# Patient Record
Sex: MALE | Race: WHITE | NOT HISPANIC OR LATINO | Employment: OTHER | URBAN - METROPOLITAN AREA
[De-identification: names, ages, dates, MRNs, and addresses within clinical notes are randomized per-mention and may not be internally consistent; named-entity substitution may affect disease eponyms.]

---

## 2017-03-28 RX ORDER — POTASSIUM CITRATE 10 MEQ/1
TABLET, EXTENDED RELEASE ORAL
Qty: 270 TABLET | Refills: 3 | Status: SHIPPED | OUTPATIENT
Start: 2017-03-28 | End: 2018-05-28 | Stop reason: SDUPTHER

## 2017-05-08 ENCOUNTER — TELEPHONE (OUTPATIENT)
Dept: UROLOGY | Facility: CLINIC | Age: 72
End: 2017-05-08

## 2017-05-08 NOTE — TELEPHONE ENCOUNTER
----- Message from Gladys Falcon sent at 5/8/2017  9:28 AM CDT -----  Contact: pt   X  1st Request  _  2nd Request  _  3rd Request    Who:ALEXANDRO RENE [[id number]]    Why: Patient states he would like to speak with the staff in his medication (Finasteride)..... Please contact patient to further discuss and advise     What Number to Call Back: [phone]    When to Expect a call back: (Before the end of the day)   -- if call after 3:00 call back will be tomorrow.

## 2017-05-08 NOTE — TELEPHONE ENCOUNTER
----- Message from Ananya Flores sent at 5/8/2017 10:19 AM CDT -----  Contact: Self  X  1st Request  _  2nd Request  _  3rd Request        Who: ALEXANDRO RENE [[id number]]    Why: Pt states he wants to also provide his mobile number for a call back.     What Number to Call Back: [phone]    When to Expect a call back: (Before the end of the day)   -- if the call is after 12:00, the call back will be tomorrow.

## 2017-05-30 DIAGNOSIS — Z12.5 ENCOUNTER FOR SCREENING FOR MALIGNANT NEOPLASM OF PROSTATE: ICD-10-CM

## 2017-05-30 DIAGNOSIS — Z00.00 ANNUAL PHYSICAL EXAM: Primary | ICD-10-CM

## 2017-05-30 DIAGNOSIS — I10 ESSENTIAL HYPERTENSION: ICD-10-CM

## 2017-05-30 DIAGNOSIS — N40.0 BENIGN NON-NODULAR PROSTATIC HYPERPLASIA WITHOUT LOWER URINARY TRACT SYMPTOMS: ICD-10-CM

## 2017-05-30 DIAGNOSIS — E78.3 HYPERCHYLOMICRONEMIA: ICD-10-CM

## 2017-05-30 DIAGNOSIS — Z11.59 ENCOUNTER FOR HEPATITIS C SCREENING TEST FOR LOW RISK PATIENT: ICD-10-CM

## 2017-05-30 RX ORDER — LOSARTAN POTASSIUM AND HYDROCHLOROTHIAZIDE 12.5; 1 MG/1; MG/1
TABLET ORAL
Qty: 90 TABLET | Refills: 3 | Status: SHIPPED | OUTPATIENT
Start: 2017-05-30 | End: 2017-06-13

## 2017-05-30 NOTE — TELEPHONE ENCOUNTER
----- Message from Zoie Yuan sent at 5/30/2017  1:16 PM CDT -----  Contact: Patient, phone [phone]  Type: Orders Request    What orders/ testing are being requested? Lab     Is there a future appointment scheduled for the patient with PCP? Yes     When? 6/12/17    Comments: Please call the patient and let him know he can schedule the appointment.

## 2017-06-07 ENCOUNTER — LAB VISIT (OUTPATIENT)
Dept: LAB | Facility: HOSPITAL | Age: 72
End: 2017-06-07
Attending: INTERNAL MEDICINE
Payer: MEDICARE

## 2017-06-07 DIAGNOSIS — Z12.5 ENCOUNTER FOR SCREENING FOR MALIGNANT NEOPLASM OF PROSTATE: ICD-10-CM

## 2017-06-07 DIAGNOSIS — N40.0 BENIGN NON-NODULAR PROSTATIC HYPERPLASIA WITHOUT LOWER URINARY TRACT SYMPTOMS: ICD-10-CM

## 2017-06-07 DIAGNOSIS — Z00.00 ANNUAL PHYSICAL EXAM: ICD-10-CM

## 2017-06-07 DIAGNOSIS — I10 ESSENTIAL HYPERTENSION: ICD-10-CM

## 2017-06-07 DIAGNOSIS — E78.3 HYPERCHYLOMICRONEMIA: ICD-10-CM

## 2017-06-07 DIAGNOSIS — Z11.59 ENCOUNTER FOR HEPATITIS C SCREENING TEST FOR LOW RISK PATIENT: ICD-10-CM

## 2017-06-07 LAB
ALBUMIN SERPL BCP-MCNC: 3.7 G/DL
ALP SERPL-CCNC: 68 U/L
ALT SERPL W/O P-5'-P-CCNC: 33 U/L
ANION GAP SERPL CALC-SCNC: 10 MMOL/L
AST SERPL-CCNC: 32 U/L
BASOPHILS # BLD AUTO: 0.02 K/UL
BASOPHILS NFR BLD: 0.3 %
BILIRUB SERPL-MCNC: 0.6 MG/DL
BUN SERPL-MCNC: 24 MG/DL
CALCIUM SERPL-MCNC: 9.6 MG/DL
CHLORIDE SERPL-SCNC: 104 MMOL/L
CHOLEST/HDLC SERPL: 4.8 {RATIO}
CO2 SERPL-SCNC: 23 MMOL/L
COMPLEXED PSA SERPL-MCNC: 1.4 NG/ML
CREAT SERPL-MCNC: 1.2 MG/DL
DIFFERENTIAL METHOD: ABNORMAL
EOSINOPHIL # BLD AUTO: 0.2 K/UL
EOSINOPHIL NFR BLD: 3.1 %
ERYTHROCYTE [DISTWIDTH] IN BLOOD BY AUTOMATED COUNT: 12.9 %
EST. GFR  (AFRICAN AMERICAN): >60 ML/MIN/1.73 M^2
EST. GFR  (NON AFRICAN AMERICAN): >60 ML/MIN/1.73 M^2
GLUCOSE SERPL-MCNC: 99 MG/DL
HCT VFR BLD AUTO: 46.3 %
HCV AB SERPL QL IA: NEGATIVE
HDL/CHOLESTEROL RATIO: 21.1 %
HDLC SERPL-MCNC: 171 MG/DL
HDLC SERPL-MCNC: 36 MG/DL
HGB BLD-MCNC: 15.6 G/DL
LDLC SERPL CALC-MCNC: 108.4 MG/DL
LYMPHOCYTES # BLD AUTO: 0.8 K/UL
LYMPHOCYTES NFR BLD: 13.9 %
MCH RBC QN AUTO: 30.6 PG
MCHC RBC AUTO-ENTMCNC: 33.7 %
MCV RBC AUTO: 91 FL
MONOCYTES # BLD AUTO: 0.7 K/UL
MONOCYTES NFR BLD: 11.9 %
NEUTROPHILS # BLD AUTO: 4.3 K/UL
NEUTROPHILS NFR BLD: 70.8 %
NONHDLC SERPL-MCNC: 135 MG/DL
PLATELET # BLD AUTO: 206 K/UL
PMV BLD AUTO: 9.7 FL
POTASSIUM SERPL-SCNC: 4.5 MMOL/L
PROT SERPL-MCNC: 7.1 G/DL
RBC # BLD AUTO: 5.1 M/UL
SODIUM SERPL-SCNC: 137 MMOL/L
TRIGL SERPL-MCNC: 133 MG/DL
TSH SERPL DL<=0.005 MIU/L-ACNC: 2.39 UIU/ML
WBC # BLD AUTO: 6.05 K/UL

## 2017-06-07 PROCEDURE — 84443 ASSAY THYROID STIM HORMONE: CPT

## 2017-06-07 PROCEDURE — 86803 HEPATITIS C AB TEST: CPT

## 2017-06-07 PROCEDURE — 85025 COMPLETE CBC W/AUTO DIFF WBC: CPT | Mod: PO

## 2017-06-07 PROCEDURE — 80053 COMPREHEN METABOLIC PANEL: CPT

## 2017-06-07 PROCEDURE — 80061 LIPID PANEL: CPT

## 2017-06-07 PROCEDURE — 36415 COLL VENOUS BLD VENIPUNCTURE: CPT | Mod: PO

## 2017-06-07 PROCEDURE — 84153 ASSAY OF PSA TOTAL: CPT

## 2017-06-12 ENCOUNTER — OFFICE VISIT (OUTPATIENT)
Dept: INTERNAL MEDICINE | Facility: CLINIC | Age: 72
End: 2017-06-12
Payer: MEDICARE

## 2017-06-12 VITALS
BODY MASS INDEX: 29.54 KG/M2 | HEART RATE: 64 BPM | DIASTOLIC BLOOD PRESSURE: 92 MMHG | HEIGHT: 70 IN | SYSTOLIC BLOOD PRESSURE: 144 MMHG | WEIGHT: 206.38 LBS

## 2017-06-12 DIAGNOSIS — Z00.00 ANNUAL PHYSICAL EXAM: Primary | ICD-10-CM

## 2017-06-12 DIAGNOSIS — N40.0 BENIGN NON-NODULAR PROSTATIC HYPERPLASIA WITHOUT LOWER URINARY TRACT SYMPTOMS: ICD-10-CM

## 2017-06-12 DIAGNOSIS — E78.00 PURE HYPERCHOLESTEROLEMIA: ICD-10-CM

## 2017-06-12 DIAGNOSIS — M47.816 SPONDYLOSIS OF LUMBAR REGION WITHOUT MYELOPATHY OR RADICULOPATHY: ICD-10-CM

## 2017-06-12 DIAGNOSIS — F32.A DEPRESSION, UNSPECIFIED DEPRESSION TYPE: ICD-10-CM

## 2017-06-12 DIAGNOSIS — I10 ESSENTIAL HYPERTENSION: ICD-10-CM

## 2017-06-12 PROCEDURE — 99499 UNLISTED E&M SERVICE: CPT | Mod: S$GLB,,, | Performed by: INTERNAL MEDICINE

## 2017-06-12 PROCEDURE — 99397 PER PM REEVAL EST PAT 65+ YR: CPT | Mod: S$GLB,,, | Performed by: INTERNAL MEDICINE

## 2017-06-12 PROCEDURE — 99999 PR PBB SHADOW E&M-EST. PATIENT-LVL III: CPT | Mod: PBBFAC,,, | Performed by: INTERNAL MEDICINE

## 2017-06-12 NOTE — PROGRESS NOTES
REASON FOR VISIT:  This is a 71-year-old male who comes in for his annual   routine check.  1.  The main ongoing problem is chronic low back pain, which he has had for   years now.  He actually went to the [facility], Dr. Shoaib Kimball, had an MRI of the lumbar spine, shows areas of mild spinal canal   and neural foraminal narrowing at L1-L2, moderate spinal canal narrowing at   L2-L3, at L3-L4 and then severe neural foraminal narrowing at L4-L5 and L5-S1.    Surgery was not recommended at first.  There was recommendation to do epidural   steroid injections, which he has deferred on and actually undergo physical   therapy, but he still has the pain, particularly when he wakes up in the   morning.  It is localized, no radicular symptoms of the extremities.  He is   taking three Advil a day.  2.  He also finds that he has no endurance when he does any type of activity   around his home; however, he is able to swim three days a week and go walking   without difficulty, but he just feels that his endurance has gone down.    PAST MEDICAL HISTORY:  Hypertension.  Hyperlipidemia.  Depression, anxiety.  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  One time diagnosed with sleep apnea.  Bilateral retinal detachment.  Adenomatous colon polyp in .  Tonsillectomy.  Hemorrhoidectomy.  Appendectomy.  Left inguinal hernia repair.  Left knee surgery.    SOCIAL HISTORY:  Tobacco and alcohol use - none.  , has four children.    Works in sales.    FAMILY HISTORY:  Father is , lung cancer, but had diabetes.  Mother is   , lung cancer.  One brother is alive, neuralgia.    SCREENING:  Normal colonoscopy in .    MEDICATIONS:  Aspirin 81 mg.  Atorvastatin 40 mg.  Wellbutrin  mg.  Vitamin D 50,000 units once a month.  Lexapro 20 mg.  Losartan /12.5 mg.  Magnesium potassium citrate 10 mg three times a day.  Vitamin B6.  Viagra 20 mg as needed.    REVIEW OF  SYMPTOMS:  No chest pain, shortness of breath, or abdominal pain.  He   does have constipation.  He will take three Dulcolax, five prunes a day, but has   a bowel movement every other day.  As far as urination, he used to be on   finasteride, but stopped because of diminished ejaculation.  His urine stream is   slow, but no different than before.  There is no urgency or incomplete emptying   with nocturia x2.  No headaches or any other arthralgias.    RECENT LABS:  His hepatitis C was negative.  PSA 1.4.  CBC, chemistry normal.    Cholesterol 171 with .    PHYSICAL EXAMINATION:  VITAL SIGNS:  His weight is 206 pounds, pulse 64, blood pressure by me 170/98.  HEENT:  Tympanic membranes normal.  Deviated septum to the left.  Oropharynx, no   abnormal findings.  NECK:  No thyromegaly.  LUNGS:  Clear.  HEART:  Regular rate and rhythm.  ABDOMEN:  Active bowel sounds, soft, nontender.  No hepatosplenomegaly or   abdominal masses.  PULSES:  2+ carotid, 2+ pedal pulses.  EXTREMITIES:  No edema.  LYMPH GLAND:  No palpable adenopathy.  GENITALIA:  No scrotal masses, no hernias.  RECTAL:  Stool is brown, heme-negative.  Prostate is mildly enlarged.    IMPRESSION:  1.  General examination.  2.  Hypertension with labile control.  3.  Hyperlipidemia.  4.  Lumbar degenerative disc disease.  It should be noted that he also has   history of facet arthropathy.  5.  Malaise or diminished endurance.  6.  Depression, anxiety.    PLAN:    We will refer to the Hypertension Digital Program and also the Healthy Back   Program.    Arrange for an EKG and 2D echo.    Regarding his blood pressure, he say he will go to [facility] and get readings of   105-120/72.    One other option is changing his losartan HCT to losartan 100 mg and   chlorthalidone 25 mg.    He is to increase fluid intake.    Phone review to follow up.        /jessica [id number] blank(s)        JAM/CHIARA  dd: 06/12/2017 15:36:57 (CDT)  td: 06/13/2017 05:13:44 (CDT)  Doc ID   #[id number]  Job  ID #[id number]    CC:

## 2017-06-13 ENCOUNTER — TELEPHONE (OUTPATIENT)
Dept: INTERNAL MEDICINE | Facility: CLINIC | Age: 72
End: 2017-06-13

## 2017-06-13 ENCOUNTER — DOCUMENTATION ONLY (OUTPATIENT)
Dept: INTERNAL MEDICINE | Facility: CLINIC | Age: 72
End: 2017-06-13

## 2017-06-13 ENCOUNTER — HOSPITAL ENCOUNTER (OUTPATIENT)
Dept: CARDIOLOGY | Facility: CLINIC | Age: 72
Discharge: HOME | End: 2017-06-13
Payer: MEDICARE

## 2017-06-13 ENCOUNTER — PATIENT MESSAGE (OUTPATIENT)
Dept: INTERNAL MEDICINE | Facility: CLINIC | Age: 72
End: 2017-06-13

## 2017-06-13 DIAGNOSIS — N40.0 BENIGN NON-NODULAR PROSTATIC HYPERPLASIA WITHOUT LOWER URINARY TRACT SYMPTOMS: ICD-10-CM

## 2017-06-13 DIAGNOSIS — E78.00 PURE HYPERCHOLESTEROLEMIA: ICD-10-CM

## 2017-06-13 DIAGNOSIS — I10 ESSENTIAL HYPERTENSION: ICD-10-CM

## 2017-06-13 DIAGNOSIS — F32.A DEPRESSION, UNSPECIFIED DEPRESSION TYPE: ICD-10-CM

## 2017-06-13 DIAGNOSIS — Z00.00 ANNUAL PHYSICAL EXAM: ICD-10-CM

## 2017-06-13 DIAGNOSIS — I10 ESSENTIAL HYPERTENSION: Primary | ICD-10-CM

## 2017-06-13 LAB
DIASTOLIC DYSFUNCTION: YES
MITRAL VALVE MOBILITY: NORMAL
RETIRED EF AND QEF - SEE NOTES: 60 (ref 55–65)

## 2017-06-13 PROCEDURE — 93307 TTE W/O DOPPLER COMPLETE: CPT | Mod: S$GLB,,, | Performed by: INTERNAL MEDICINE

## 2017-06-13 PROCEDURE — 93000 ELECTROCARDIOGRAM COMPLETE: CPT | Mod: S$GLB,,, | Performed by: INTERNAL MEDICINE

## 2017-06-13 RX ORDER — LOSARTAN POTASSIUM 100 MG/1
100 TABLET ORAL DAILY
Qty: 90 TABLET | Refills: 3 | Status: SHIPPED | OUTPATIENT
Start: 2017-06-13 | End: 2017-12-11 | Stop reason: SDUPTHER

## 2017-06-13 RX ORDER — LOSARTAN POTASSIUM 100 MG/1
100 TABLET ORAL DAILY
Qty: 90 TABLET | Refills: 3 | Status: SHIPPED | OUTPATIENT
Start: 2017-06-13 | End: 2017-06-13 | Stop reason: SDUPTHER

## 2017-06-13 RX ORDER — CHLORTHALIDONE 25 MG/1
25 TABLET ORAL DAILY
Qty: 90 TABLET | Refills: 3 | Status: SHIPPED | OUTPATIENT
Start: 2017-06-13 | End: 2017-06-13 | Stop reason: SDUPTHER

## 2017-06-13 RX ORDER — CHLORTHALIDONE 25 MG/1
25 TABLET ORAL DAILY
Qty: 90 TABLET | Refills: 3 | Status: SHIPPED | OUTPATIENT
Start: 2017-06-13 | End: 2017-07-17

## 2017-06-13 NOTE — PROGRESS NOTES
ECG was fine    2decho - normal except diastolic dysfunction    Will change is blood pressure medication to losartan 100 mg  chlorthalidone 25 mg    D/C losartan-Hct   Ep in 6 months

## 2017-06-19 ENCOUNTER — TELEPHONE (OUTPATIENT)
Dept: INTERNAL MEDICINE | Facility: CLINIC | Age: 72
End: 2017-06-19

## 2017-06-19 NOTE — TELEPHONE ENCOUNTER
Ep 6 months with prelabs       Patient schedule for 6 month prior labs.  Prior labs mail out..Ep appt place on hold.

## 2017-06-26 ENCOUNTER — TELEPHONE (OUTPATIENT)
Dept: INTERNAL MEDICINE | Facility: CLINIC | Age: 72
End: 2017-06-26

## 2017-06-26 ENCOUNTER — NURSE TRIAGE (OUTPATIENT)
Dept: ADMINISTRATIVE | Facility: CLINIC | Age: 72
End: 2017-06-26

## 2017-06-26 NOTE — TELEPHONE ENCOUNTER
"Reason for Disposition   Caller has medication question only, adult not sick, and triager answers question    Answer Assessment - Initial Assessment Questions  1. SYMPTOMS: "Do you have any symptoms?"      -  2. SEVERITY: If symptoms are present, ask "Are they mild, moderate or severe?"      -  Mr. Marti called to verify which blood pressure medications he should be taking. Patient's medication list states patient should take chorthalidone 25 mg and losartan 100 mg. Advised patient that Losartan 100-12.5 has been discontinued.    Protocols used: ST MEDICATION QUESTION CALL-A-       "

## 2017-06-26 NOTE — TELEPHONE ENCOUNTER
"    Reason for Disposition   Caller has medication question only, adult not sick, and triager answers question    Answer Assessment - Initial Assessment Questions  1. SYMPTOMS: "Do you have any symptoms?"      -  2. SEVERITY: If symptoms are present, ask "Are they mild, moderate or severe?"      -  Mr. Marti called to verify which blood pressure medications he should be taking. Patient's medication list states patient should take chorthalidone 25 mg and losartan 100 mg. Advised patient that Losartan 100-12.5 has been discontinued.    Protocols used: ST MEDICATION QUESTION CALL-A-      "

## 2017-06-27 ENCOUNTER — CLINICAL SUPPORT (OUTPATIENT)
Dept: REHABILITATION | Facility: OTHER | Age: 72
End: 2017-06-27
Attending: PHYSICAL MEDICINE & REHABILITATION
Payer: MEDICARE

## 2017-06-27 VITALS
HEIGHT: 70 IN | DIASTOLIC BLOOD PRESSURE: 102 MMHG | SYSTOLIC BLOOD PRESSURE: 172 MMHG | BODY MASS INDEX: 29.78 KG/M2 | WEIGHT: 208 LBS | HEART RATE: 72 BPM

## 2017-06-27 DIAGNOSIS — G89.29 CHRONIC BILATERAL LOW BACK PAIN WITHOUT SCIATICA: ICD-10-CM

## 2017-06-27 DIAGNOSIS — M54.50 CHRONIC BILATERAL LOW BACK PAIN WITHOUT SCIATICA: ICD-10-CM

## 2017-06-27 DIAGNOSIS — M51.36 DDD (DEGENERATIVE DISC DISEASE), LUMBAR: Primary | ICD-10-CM

## 2017-06-27 PROCEDURE — 99204 OFFICE O/P NEW MOD 45 MIN: CPT | Mod: ,,, | Performed by: PHYSICAL MEDICINE & REHABILITATION

## 2017-06-27 NOTE — PROGRESS NOTES
Subjective:      Patient ID: Haja Marti is a 71 y.o. male.    Chief Complaint: No chief complaint on file.    Referred by: Andrez Jackson MD     Mr Marti is a 72 yo male sent by Dr. Jackson for evaluation for the healthy back lumbar program.  he has had low back pain for 20 years on and off, but worsened in the last 7 months.  The pain is low back dominant, across the lower back.  There is no leg pain.  The pain is intermittent ranging from 0-5/10.  The pain is a tight feeling. It is worse with standing, lying on his back, stomach, lifting, or bending forward.  It is better with sitting. He also feels like transitions are painful: getting out of bed or out of chair.  He has tried chiropractor and that increased pain and tried PT.  He tried PT in 2016 with mild relief. He continues to swim and do yoga and walk.  His goals are to be able to use weed-eaters, lift, and transition from sit to stand. Pattern 1    MRI of the lumbar spine 2017 according to Dr. 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, left eye  No date: Sleep apnea  No date: Vitreous hemorrhage of left eye    Past Surgical History:  No date: CATARACT EXTRACTION      Comment: OD  No date: EYE SURGERY  4/8/10: laser indirect      Comment: left eye  4/8/10: Laser Indirect Retinopexy      Comment: OS  No date: PROSTATE SURGERY  2004: RETINAL DETACHMENT SURGERY      Comment: OD  11/13/2013: ROTATOR CUFF  REPAIR  No date: TONSILLECTOMY    Review of patient's family history indicates:    Cataracts                      Father                    Cancer                         Father                      Comment: lung    Diabetes                       Father                    Cancer                         Mother                      Comment: lung      Social History    Marital status:              Spouse name:                       Years of education:                 Number of children:               Occupational History  Occupation          Employer            Comment                                   [employer]            Social History Main Topics    Smoking status: Never Smoker                                                                Alcohol use: No              Drug use: No                Current Outpatient Prescriptions:  aspirin 81 MG Chew, Take 81 mg by mouth. 1 Tablet, Chewable Oral Every day, Disp: , Rfl:   atorvastatin (LIPITOR) 40 MG tablet, TAKE 1 TABLET EVERY EVENING, Disp: 90 tablet, Rfl: 3  buPROPion (WELLBUTRIN XL) 300 MG 24 hr tablet, TAKE 1 TABLET ONE TIME DAILY, Disp: 90 tablet, Rfl: 3  chlorthalidone (HYGROTEN) 25 MG Tab, Take 1 tablet (25 mg total) by mouth once daily., Disp: 90 tablet, Rfl: 3  ergocalciferol (VITAMIN D2) 50,000 unit Cap, Take 50,000 Units by mouth every 30 days. , Disp: , Rfl:   escitalopram oxalate (LEXAPRO) 20 MG tablet, Take 1 tablet (20 mg total) by mouth once daily., Disp: 90 tablet, Rfl: 3  losartan (COZAAR) 100 MG tablet, Take 1 tablet (100 mg total) by mouth once daily., Disp: 90 tablet, Rfl: 3  magnesium oxide (MAG-OX) 400 mg tablet, Take 400 mg by mouth once daily., Disp: , Rfl:   potassium citrate (UROCIT-K) 10 mEq (1,080 mg) TbSR, TAKE 1 TABLET THREE TIMES DAILY WITH MEALS, Disp: 270 tablet, Rfl: 3  pyridoxine (VITAMIN B-6) 50 MG Tab, Take 50 mg by mouth once daily., Disp: , Rfl:   sildenafil (REVATIO) 20 mg Tab, Take 1 tablet (20 mg total) by mouth as  needed (use as directed)., Disp: 12 tablet, Rfl: 11    No current facility-administered medications for this visit.       Review of patient's allergies indicates:  No Known Allergies                Review of Systems   Constitution: Negative for weight gain and weight loss.   Cardiovascular: Negative for chest pain.   Respiratory: Negative for shortness of breath.    Musculoskeletal: Positive for back pain. Negative for joint pain and joint swelling.   Gastrointestinal: Negative for abdominal pain and bowel incontinence.   Genitourinary: Negative for bladder incontinence.   Neurological: Negative for numbness.           Objective:          General    Vitals reviewed.  Constitutional: He is oriented to person, place, and time. He appears well-developed and well-nourished.   HENT:   Head: Normocephalic and atraumatic.   Pulmonary/Chest: Effort normal.   Neurological: He is alert and oriented to person, place, and time.   Flat facial expresion   Psychiatric: He has a normal mood and affect. His behavior is normal. Judgment and thought content normal.     General Musculoskeletal Exam   Gait: abnormal (hold arm stiffly and away from side with slightly flexed posture)     Right Ankle/Foot Exam     Tests   Heel Walk: able to perform  Tiptoe Walk: able to perform    Left Ankle/Foot Exam     Tests   Heel Walk: able to perform  Tiptoe Walk: able to perform  Back (L-Spine & T-Spine) / Neck (C-Spine) Exam     Tenderness Right paramedian tenderness of the Sacrum. Left paramedian tenderness of the Sacrum.     Back (L-Spine & T-Spine) Range of Motion   Extension: 0   Flexion: 80   Lateral Bend Right: 20   Lateral Bend Left: 20   Rotation Right: 40   Rotation Left: 40     Spinal Sensation   Right Side Sensation  C-Spine Level: normal   L-Spine Level: normal  S-Spine Level: normal  Left Side Sensation  C-Spine Level: normal  L-Spine Level: normal  S-Spine Level: normal    Back (L-Spine & T-Spine) Tests   Right Side Tests  Straight  leg raise:      Sitting SLR: > 70 degrees      Left Side Tests  Straight leg raise:     Sitting SLR: > 70 degrees          Other He has no scoliosis .  Spinal Kyphosis:  Absent      Muscle Strength   Right Upper Extremity   Biceps: 5/5/5   Deltoid:  5/5  Triceps:  5/5  Wrist Extension: 5/5/5   Finger Flexors:  5/5  Left Upper Extremity  Biceps: 5/5/5   Deltoid:  5/5  Triceps:  5/5  Wrist Extension: 5/5/5   Finger Flexors:  5/5  Right Lower Extremity   Hip Flexion: 5/5   Quadriceps:  5/5   Anterior tibial:  5/5/5  EHL:  5/5  Left Lower Extremity   Hip Flexion: 5/5   Quadriceps:  5/5   Anterior tibial:  5/5/5   EHL:  5/5    Reflexes     Left Side  Biceps:  2+  Triceps:  2+  Brachioradialis:  2+  Quadriceps:  2+  Achilles:  2+  Left Lane's Sign:  Absent  Babinski Sign:  absent    Right Side   Biceps:  2+  Triceps:  2+  Brachioradialis:  2+  Quadriceps:  2+  Achilles:  2+  Right Lane's Sign:  absent  Babinski Sign:  absent    Vascular Exam     Right Pulses        Carotid:                  2+    Left Pulses        Carotid:                  2+        Assessment:       Encounter Diagnoses   Name Primary?    DDD (degenerative disc disease), lumbar Yes    Chronic bilateral low back pain without sciatica          Plan:       Diagnoses and all orders for this visit:    DDD (degenerative disc disease), lumbar  -     Ambulatory Referral to Physical/Occupational Therapy    Chronic bilateral low back pain without sciatica  -     Ambulatory Referral to Physical/Occupational Therapy       The patient has had a history of low back pain with limitations in there activities of Daily living    Previous treatment has not provided relief.    The situation was discussed at length with the patient.  More than 50% of the total time of 45 minutes was spent in counseling. We discussed different causes of back pain and different treatment options.  We discussed the importance of stretching and strengthening.  We discussed posture.  We  discussed the pros and cons of further diagnostic testing, alternative treatment and Medications    Based on the history, physical exam, and functional index, an active physical therapy program is recommended.  The goal is to restore the patients function and reduce pain.  A program of progressive resistance exercises, biomechanical, and mobility maneuvers, instructions in proper body mechanics, aerobic conditioning and HEP will be utilized. The program will continue as long as making improvements.    An assessment of patients progress will be made at each visit to document change in status.    The patient will be actively involved in there own treatment, and responsible for appointments and home program    The patient's lumbar isometric strength will be tested and they will be placed in a program of isolated strength training based on 50% of their total functional torque and advanced as clinically appropriate.      Directional preference of pain will further influence the patients active rehabilitation program    The patient was instructed there might be an initial increase in discomfort    They are enrolled with fair prognosis  Pattern 1  He has a very stiff gait, His BP was high, a message was sent to his PCP

## 2017-06-27 NOTE — PROGRESS NOTES
Health  met with patient to complete initial outcomes for the Healthy Back lumbar program.  Questions were reviewed with patient and discussed with Dr. Roca. The patient will meet with Dr. Roca to determine program enrollment.   HealthyBack Questionnaire  6/27/2017   Please select the location of your worst pain:  Low back   Please select the location of any additional pain: (hold down the control key, and click to select multiple responses) None of the above   Did any event trigger your pain?  No   How long has this pain been going on?  20 yrs on/off worsening since jesus   Please indicate how the pain is changing.  Worsening   What is the WORST level of pain that you have experienced in the last week?  5   What is the LEAST level of pain that you have experienced in the past week?  0   What can you NOT do not that you used to be able to do?  Weed eating for extended periods   Are your limitations mainly due to your pain?  Yes   What are your additional complaints, if any? None   Is there ever a time during the day when your pain stops, even for a brief moment, before returning? Yes   If yes, when your pain stops, does it disappear completely? Is it totally gone? Yes   Does bending forward make your typical pain worse? Yes   Morning: Same   Afternoon: Same   Evening:  Same   Nighttime: Same   Standing:  Worse   Lying on stomach: Worse   Lying on back: Worse   Sitting:  Worse   Walking: Better   Climbing stairs: Worse   In the last seven days, have you had any changes in your bowel and/or bladder habits? No   Have all of your attempts to treat your back/leg pain resulted in failure?  Yes   Do you believe your doctor(s) do NOT understand how much pain you have?  No   Do you believe that you have one or more conditions that have not been diagnosed by your doctor(s)?  No   Do you believe that you deserve more understanding from others including your family than you are getting?  No   Do you feel  relatively calm about how your back/leg pain has impacted your life?  No   Are you OK with treatment taking a very long time (even years) before you feel relief from your back/leg pain?  No   Do you believe that your pain has caused you to be more forgetful?  No   Do you feel that you have not received enough treatment for your pain?  Yes   Do you believe that your doctor(s) do not have the right training to treat your back/leg pain effectively?  No   Do you believe you should not be allowed to work or attend school because of your back/leg pain?  No   When did this pain begin?  20 years on/off   Did the pain begin after an injury or an accident? No   Is the pain work related?  No   Please louise which of the following over-the-counter medicines you take. (hold down the control key, and click to select multiple responses) Ibuprofen   Please louise which of the following prescription medicines you take. (hold down the control key, and click to select multiple responses) None   Emergency department  No   Health care providers (hold down the control key, and click to select multiple responses) Family doctor, Orthopedic, Chiropractor, Physical therapist, Other   Investigations done (hold down the control key, and click to select multiple responses) MRI   Procedures (hold down the control key, and click to select multiple responses) None   Emergency department  No   Health care providers (hold down the control key, and click to select multiple responses) Family doctor, Orthopedic, Chiropractor, Physical therapist, Other   Investigations done (hold down the control key, and click to select multiple responses) MRI   Procedures (hold down the control key, and click to select multiple responses) None   Have you had any surgery on your back?  No   Please louise what you are experiencing. (hold down the control key, and click to select multiple responses) Arthritic joint pain   First activity you would like to perform better:   Transitioning from sitting to standing   Current ability score to perform first activity: 6   Second activity you would like to perform better: Weed eating   Current ability score to perform second activity: 4   Third activity you would like to perform better: Lifting   Current ability score to perform third activity: 6   Pain intensity:  The pain comes and goes and is moderate.   Personal care (washing, dressing, etc.):  Washing and dressing increase the pain, but I manage not to change my way of doing it.   Lifting: I can lift heavy weights, but it causes pain.   Walking: I have some pain walking, but it does not increase with distance.   Sitting: I can sit only in my favorite chair as long as I like.   Standing: Pain prevents me from standing more than 10 minutes.   Sleeping: I get no pain in bed.   Social life: My social life is normal and gives me no pain.   Traveling: I get extra pain while traveling, but it does not compel me to seek alternative forms of travel.   Changing degree of pain: My pain is gradually worsening.   Do you need any help looking after yourself? I need no help at all.   When doing household tasks, e.g., preparing food, gardening, using the video recorder, radio, telephone, or washing the car: Occasionally I need some help with household tasks.   Thinking about how easily you can get around your home and community: I get around my home and community by myself without any difficulty.   Because of your health, your relationships, e.g., your friends, partner or parents, generally: Are very close and warm   Thinking about your relationships with other people: I have plenty of friends and am never lonely.   Thinking about your health and your relationship with your  family:  There are some parts of my family role I cannot carry out.   Thinking about your vision, including when using your glasses or contact lenses if needed: I see normally.   Thinking about your hearing, including using your  hearing aid if needed: I hear normally.   When you communicate with others, e.g., talking, listening, writing, or signing: I have no trouble speaking to them or understanding what they are saying.   Thinking about how you sleep: I am able to sleep without difficulty most of the time.   Thinking about how you generally feel: I feel moderately anxious, worried or depressed.   How much pain or discomfort do you experience? I have moderate pain.   Little interest or pleasure in doing things Several days   Feeling down, depressed or hopeless More than half the days   How do you spend your leisure time? What are your hobbies? Spending time with family/friends, yardwork, exercising   How do you spend your leisure time? What are your hobbies? Spending time with family/friends, yardwork, exercising   What is your highest level of education? Prefer not to answer   What is your work status? Retired   How did you hear about the Healthyback program?  Physician   When did this pain begin?  20 years on/off

## 2017-06-27 NOTE — Clinical Note
I think that therapy will be helpful for his back.  He is getting scheduled.  Thank you for the referral.  His BP was really high today, it could have been nerves, he said he was anxious about his back.  He also has some interesting gait features.  He holds his arm stiffly and in front of him.  This gait might be his usual, but if new might benefit from neurology

## 2017-07-06 ENCOUNTER — CLINICAL SUPPORT (OUTPATIENT)
Dept: REHABILITATION | Facility: OTHER | Age: 72
End: 2017-07-06
Attending: PHYSICAL MEDICINE & REHABILITATION
Payer: MEDICARE

## 2017-07-06 DIAGNOSIS — M51.36 DDD (DEGENERATIVE DISC DISEASE), LUMBAR: Primary | ICD-10-CM

## 2017-07-06 PROCEDURE — 97110 THERAPEUTIC EXERCISES: CPT

## 2017-07-06 PROCEDURE — G8979 MOBILITY GOAL STATUS: HCPCS | Mod: CJ

## 2017-07-06 PROCEDURE — G8978 MOBILITY CURRENT STATUS: HCPCS | Mod: CK

## 2017-07-06 PROCEDURE — 97162 PT EVAL MOD COMPLEX 30 MIN: CPT

## 2017-07-06 NOTE — PROGRESS NOTES
[facility]     Name: Haja Marti  Clinic Number: [id number]    Haja is a 71 y.o. male evaluated on 07/06/2017.   Time In: 8:30  Time out: 10:00    Diagnosis:   Encounter Diagnosis   Name Primary?    DDD (degenerative disc disease), lumbar Yes     Physician: Esmer Roca, *  Treatment Orders: PT Eval and Treat    Past Medical History:   Diagnosis Date    BPH (benign prostatic hypertrophy)     Cataract     OS    Depression     Epiretinal membrane, both eyes     Hyperlipidemia     Hypertension     Kidney stones     Kidney stones     Posterior vitreous detachment of left eye     Retinal detachment 2004    OD    Retinoschisis, left eye     Sleep apnea     Vitreous hemorrhage of left eye      Current Outpatient Prescriptions   Medication Sig    aspirin 81 MG Chew Take 81 mg by mouth. 1 Tablet, Chewable Oral Every day    atorvastatin (LIPITOR) 40 MG tablet TAKE 1 TABLET EVERY EVENING    buPROPion (WELLBUTRIN XL) 300 MG 24 hr tablet TAKE 1 TABLET ONE TIME DAILY    chlorthalidone (HYGROTEN) 25 MG Tab Take 1 tablet (25 mg total) by mouth once daily.    ergocalciferol (VITAMIN D2) 50,000 unit Cap Take 50,000 Units by mouth every 30 days.     escitalopram oxalate (LEXAPRO) 20 MG tablet Take 1 tablet (20 mg total) by mouth once daily.    losartan (COZAAR) 100 MG tablet Take 1 tablet (100 mg total) by mouth once daily.    magnesium oxide (MAG-OX) 400 mg tablet Take 400 mg by mouth once daily.    potassium citrate (UROCIT-K) 10 mEq (1,080 mg) TbSR TAKE 1 TABLET THREE TIMES DAILY WITH MEALS    pyridoxine (VITAMIN B-6) 50 MG Tab Take 50 mg by mouth once daily.    sildenafil (REVATIO) 20 mg Tab Take 1 tablet (20 mg total) by mouth as needed (use as directed).     No current facility-administered medications for this visit.      Review of patient's allergies indicates:  No Known Allergies  Precautions: HTN, eye problems     Visit # authorized:  20  Authorization period: 12/31/2017  Plan of care Expiration: 10/4/2017      HISTORY     History of Present Illness: Mr Marti is a 70 yo male who presents with low back pain.  He has had low back pain for 20 years on and off, but worsened in the last 7 months.  The pain is low back dominant, across the lower back.  There is no leg pain.  The pain is intermittent ranging from 0-6/10.  The pain is a tight feeling. It is worse with standing, lying on his back, stomach, lifting, or bending forward.  It is better with sitting. He also feels like transitions are painful: getting out of bed or out of chair.  He has tried chiropractor and that increased pain and tried PT.  He tried PT in 2016 with mild relief. He continues to swim and do yoga and walk.  His goals are to be able to use weed-eaters, lift, and transition from sit to stand.     Diagnostic Tests: From EPIC MRI  MRI of the lumbar spine 2017 according to Dr. Jackson's note, shows areas of mild spinal canal   and neural foraminal narrowing at L1-L2, moderate spinal canal narrowing at   L2-L3, at L3-L4 and then severe neural foraminal narrowing at L4-L5 and L5-S1    Pain Scale: Ahja rates pain on a scale of 0-10 to be 6 at worst; 0 currently; 0 at best using VAS.   Pain location: low back     Aggravating factors: edging grass, sit to stand, getting into and out of car, standing, as the day goes on, morning, bending.   Easing Factors: walking, movement, Advil.   Disturbed Sleep: No    Pattern of pain questions:  1.  Where is your pain the worst? Low back   2.  Is your pain constant or intermittent?  Intermittent  3.  Does bending forward make your typical pain worse? Yes   4.  Since the start of your back pain, has there been a change in your bowel or bladder? No  5.  What can't you do now that you use to be able to do? Fishing on a boat without    Prior Treatment: Dry needling - some help, chiropractor - hurt, Physical therapy - not much improvement.   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                      Pts goals:  Be able to fish without pain.     Red Flag Screening:   Cough  Sneeze  Strain: No  Bladder/ bowel: No  Falls: No  General health: Good  Night pain: No   Unexplained weight loss: No     OBJECTIVE     POSTURE  Posture Alignment :slouched posture  Postural examination/scapula alignment: Rounded shoulder and Head forward  Joint integrity: Lumbar spine Hypomobile as seen through spring testing  Skin integrity: WNL   Edema: Not significant   Sitting: Poor  Standing: Poor  Correction of posture: Added slimline roll, Improved posture in sitting.     MOVEMENT LOSS    ROM Loss   Flexion major loss   Extension major loss   Side bending Right major loss   Side bending Left major loss   Rotation Right minimal loss   Rotation Left moderate loss     Lower Extremity Strength  Right LE  Left LE    Hip flexion: 5/5 Hip flexion: 5/5   Hip extension:  5/5 Hip extension: 5/5   Hip abduction: 5/5 Hip abduction: 5/5   Hip adduction:  5/5 Hip adduction:  5/5   Hip Internal rotation   5/5 Hip Internal rotation 5/5   Knee Flexion 5/5 Knee Flexion 5/5   Knee Extension 5/5 Knee Extension 5/5   Ankle dorsiflexion: 5/5 Ankle dorsiflexion: 5/5   Ankle plantarflexion: 5/5 Ankle plantarflexion: 5/5       GAIT:  Assistive Device used: None  Level of Assistance: Independent  Patient displays the following gait deviations: Not significant      Special Tests:   Test Name  Test Result   Prone Instability Test (--)   SI Joint Provocation Test (--)   Straight Leg Raise (--)   Neural Tension Test (--)   Crossed Straight Leg Raise (--)   Walking on toes (--)   Walking on heels  (--)       NEUROLOGICAL SCREENING     Sensory deficit: Tactile sensation WNL    Reflexes:    Left Right   Patella Tendon 1+ 1+   Achilles Tendon 2+ 2+   Babinski NT NT   Clonus - -     REPEATED TEST  MOVEMENTS:  Repeated Flexion in Standing no effect   Repeated Extension in Standing no effect   Repeated Flexion in lying better   Repeated Extension in lying  no effect       STATIC TESTS   Sitting slouched  worse   Sitting erect better   Standing slouched no effect   Standing erect  no effect   Lying prone in extension  worse   Long sitting   no effect     Ely's test - positive B  90-90 SLR - limited >45 degrees B  Leg length - equal    Baseline Isometric Testing on Med X equipment: Testing administered by PT    Baseline IM Testing Results:   Date of testin2017  ROM 0-36 deg   Max Peak Torque 64    Min Peak Torque 22    Flex/Ext Ratio 2.91:1   % below normative data -76.5       FOTO: Focus on Therapeutic Outcomes   Category: lumbar   % Impaired: 47  Current Score  = CK = at least 40% but < 60% impaired, limited or restricted  Goal at Discharge Score = CJ = at least 20% but < 40% impaired, limited or restricted    Score interpretation is as follows:     TEST SCORE  Modifier  Impairment Limitation Restriction    0/50  CH  0 % impaired, limited or restricted   1-9/50  CI  @ least 1% but less than 20% impaired, limited or restricted   10-19/50  CJ  @ least 20%<40% impaired, limited or restricted   20-29/50  CK  @ least 40%<60% impaired, limited or restricted   30-39/50  CL  @ least 60% <80% impaired, limited or restricted   40-49/50  CM  @ least 80%<100% impaired limited or restricted   50/50  CN  100% impaired, limited or restricted       Treatment   Time In: 9:30  Time Out: 10:00    PT Evaluation Completed? Yes  Discussed Plan of Care with patient: Yes      Written Home Exercises Provided:   Handouts were given to the patient. Pt demo good understanding of the education provided. Haja demonstrated good return demonstration of activities.     - Patient received education regarding proper posture and body mechanics.    - Kristy roll tried, recommended, and purchase information was provided.    - Patient  received a handout regarding anticipated muscular soreness following the isometric test and strategies for management were reviewed with patient including stretching, using ice and scheduled rest.     HEP as follows    Flexion in lying 10x, 3x/day   Supine active HSS  10x 3x/day   LTR 10x 3x/day    Pt was instructed in and performed the following:   Cardiovascular exercise and therapeutic exercise to improve posture, lumbar/cervical ROM, strength, and muscular endurance as follows:     Haja received therapeutic exercises to develop/improve posture, lumbar/cervical ROM, strength and muscular endurance for 30 minutes including the following exercises: med-x lumbar machine testing    [facility] 7/6/2017   Visit Number 1   VAS Pain Rating 0   Flexion in Lying 10   Lumbar Extension Seat Pad 0   Femur Restraint 6   Top Dead Center 24   Counterweight 237   Lumbar Flexion 36   Lumbar Extension 0   Lumbar Peak Torque 64   Min Torque 22   Percent From Norm 76.5   Ice - Z Lie (in min.) 10     Assessment   This is a 71 y.o. male referred to [facility] and presents with a medical diagnosis of   Encounter Diagnosis   Name Primary?    DDD (degenerative disc disease), lumbar Yes    and demonstrates limitations as described below in the problem list. Pt rehab potential is Good. Pt presents with lumbar dysfunction, poor posture, muscle weakness.     Pain Pattern: 1 PEN       Patient received education on the [facility] program, purpose of the isometric test, progression of back strengthening as well as wellness approach and systemic strengthening.  Details of the program were discussed.  Reviewed that patient should feel support/pressure from med ex restraints but no pain or discomfort and patient expressed understanding.    Patient tolerated evaluation well and reported no increases in symptoms throughout.  Patient is a good candidate for program.     Based on the above history and physical examination an  active physical therapy program is recommended.  Pt will continue to benefit from skilled outpatient physical therapy to address the deficits listed below in the chart, provide pt/family education and to maximize pt's level of independence in the home and community environment. .     No environmental, cultural, spiritual, developmental or education needs expressed or noted    Medical necessity is demonstrated by the following problem list.    Pt presents with the following impairments:   History  Co-morbidities and personal factors that may impact the plan of care Examination  Body Structures and Functions, activity limitations and participation restrictions that may impact the plan of care Clinical Presentation   Decision Making/ Complexity Score   Co-morbidities:   see PMH        Personal Factors:   no deficits Body Regions:   back    Body Systems:   gross symmetry  ROM  strength  transfers  motor control    Activity limitations:   Learning and applying knowledge  no deficits    General Tasks and Commands  no deficits    Communication  no deficits    Mobility  transfers    Self care  no deficits    Domestic Life  doing house work (cleaning house, washing dishes, laundry)    Interactions/Relationships  no deficits    Life Areas  no deficits    Community and Social Life  no deficits    Participation Restrictions:   Fishing on a boat     evolving clinical presentation with changing clinical characteristics   moderate         GOALS: Pt is in agreement with the following goals.    Short term goals:  6 weeks or 10 visits   1.  Pt will demonstrate increased lumbar ROM by at least 3 degrees from the initial ROM value with improvements noted in functional ROM and ability to perform ADLs  2.  Pt will demonstrate increased maximum isometric torque value by 5% when compared to the initial value resulting in improved ability to perform bending, lifting, and carrying activities safely, confidently.  3.  Patient report a  "reduction in worst pain score by 1-2 points for improved tolerance during work and recreational activities  4.  Pt able to perform HEP correctly with minimal cueing or supervision for therapist    Long term goals: 13 weeks or 20 visits   1. Pt will demonstrate increased lumbar ROM by at least 6 degrees from initial ROM value, resulting in improved ability to perform functional fwd bending while standing and sitting.   2. Pt will demonstrate increased maximum isometric torque value by 10% when compared to the initial value resulting in improved ability to perform bending, lifting, and carrying activities safely, confidently.  3. Pt to demonstrate ability to independently control and reduce their pain through posture positioning and mechanical movements throughout a typical day.  4.  Patient will demonstrate improved overall function per FOTO Survey to CJ = at least 20% but < 40% impaired, limited or restricted score or less.      Plan   Outpatient physical therapy 2x week for 13 weeks or 20 visits to include the following:   - Patient education  - Therapeutic exercise  - Manual therapy  - Performance testing   - Neuromuscular Re-education  - Therapeutic activity   - Modalities    Pt may be seen by PTA as part of the rehabilitation team.     Therapist: Esteban La, PT  7/6/2017    "I certify the need for these services furnished under this plan of treatment and while under my care."    ____________________________________  Physician/Referring Practitioner    _______________  Date of Signature          "

## 2017-07-06 NOTE — PLAN OF CARE
[facility]     Name: Haja Marti  Clinic Number: [id number]    Haja is a 71 y.o. male evaluated on 07/06/2017.   Time In: 8:30  Time out: 10:00    Diagnosis:   Encounter Diagnosis   Name Primary?    DDD (degenerative disc disease), lumbar Yes     Physician: Esmer Roca, *  Treatment Orders: PT Eval and Treat    Past Medical History:   Diagnosis Date    BPH (benign prostatic hypertrophy)     Cataract     OS    Depression     Epiretinal membrane, both eyes     Hyperlipidemia     Hypertension     Kidney stones     Kidney stones     Posterior vitreous detachment of left eye     Retinal detachment 2004    OD    Retinoschisis, left eye     Sleep apnea     Vitreous hemorrhage of left eye      Current Outpatient Prescriptions   Medication Sig    aspirin 81 MG Chew Take 81 mg by mouth. 1 Tablet, Chewable Oral Every day    atorvastatin (LIPITOR) 40 MG tablet TAKE 1 TABLET EVERY EVENING    buPROPion (WELLBUTRIN XL) 300 MG 24 hr tablet TAKE 1 TABLET ONE TIME DAILY    chlorthalidone (HYGROTEN) 25 MG Tab Take 1 tablet (25 mg total) by mouth once daily.    ergocalciferol (VITAMIN D2) 50,000 unit Cap Take 50,000 Units by mouth every 30 days.     escitalopram oxalate (LEXAPRO) 20 MG tablet Take 1 tablet (20 mg total) by mouth once daily.    losartan (COZAAR) 100 MG tablet Take 1 tablet (100 mg total) by mouth once daily.    magnesium oxide (MAG-OX) 400 mg tablet Take 400 mg by mouth once daily.    potassium citrate (UROCIT-K) 10 mEq (1,080 mg) TbSR TAKE 1 TABLET THREE TIMES DAILY WITH MEALS    pyridoxine (VITAMIN B-6) 50 MG Tab Take 50 mg by mouth once daily.    sildenafil (REVATIO) 20 mg Tab Take 1 tablet (20 mg total) by mouth as needed (use as directed).     No current facility-administered medications for this visit.      Review of patient's allergies indicates:  No Known Allergies  Precautions: HTN, eye problems     Visit # authorized:  20  Authorization period: 12/31/2017  Plan of care Expiration: 10/4/2017      HISTORY     History of Present Illness: Mr Marti is a 70 yo male who presents with low back pain.  He has had low back pain for 20 years on and off, but worsened in the last 7 months.  The pain is low back dominant, across the lower back.  There is no leg pain.  The pain is intermittent ranging from 0-6/10.  The pain is a tight feeling. It is worse with standing, lying on his back, stomach, lifting, or bending forward.  It is better with sitting. He also feels like transitions are painful: getting out of bed or out of chair.  He has tried chiropractor and that increased pain and tried PT.  He tried PT in 2016 with mild relief. He continues to swim and do yoga and walk.  His goals are to be able to use weed-eaters, lift, and transition from sit to stand.     Diagnostic Tests: From EPIC MRI  MRI of the lumbar spine 2017 according to Dr. Jackson's note, shows areas of mild spinal canal   and neural foraminal narrowing at L1-L2, moderate spinal canal narrowing at   L2-L3, at L3-L4 and then severe neural foraminal narrowing at L4-L5 and L5-S1    Pain Scale: Haja rates pain on a scale of 0-10 to be 6 at worst; 0 currently; 0 at best using VAS.   Pain location: low back     Aggravating factors: edging grass, sit to stand, getting into and out of car, standing, as the day goes on, morning, bending.   Easing Factors: walking, movement, Advil.   Disturbed Sleep: No    Pattern of pain questions:  1.  Where is your pain the worst? Low back   2.  Is your pain constant or intermittent?  Intermittent  3.  Does bending forward make your typical pain worse? Yes   4.  Since the start of your back pain, has there been a change in your bowel or bladder? No  5.  What can't you do now that you use to be able to do? Fishing on a boat without    Prior Treatment: Dry needling - some help, chiropractor - hurt, Physical therapy - not much improvement.   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                      Pts goals:  Be able to fish without pain.     Red Flag Screening:   Cough  Sneeze  Strain: No  Bladder/ bowel: No  Falls: No  General health: Good  Night pain: No   Unexplained weight loss: No     OBJECTIVE     POSTURE  Posture Alignment :slouched posture  Postural examination/scapula alignment: Rounded shoulder and Head forward  Joint integrity: Lumbar spine Hypomobile as seen through spring testing  Skin integrity: WNL   Edema: Not significant   Sitting: Poor  Standing: Poor  Correction of posture: Added slimline roll, Improved posture in sitting.     MOVEMENT LOSS    ROM Loss   Flexion major loss   Extension major loss   Side bending Right major loss   Side bending Left major loss   Rotation Right minimal loss   Rotation Left moderate loss     Lower Extremity Strength  Right LE  Left LE    Hip flexion: 5/5 Hip flexion: 5/5   Hip extension:  5/5 Hip extension: 5/5   Hip abduction: 5/5 Hip abduction: 5/5   Hip adduction:  5/5 Hip adduction:  5/5   Hip Internal rotation   5/5 Hip Internal rotation 5/5   Knee Flexion 5/5 Knee Flexion 5/5   Knee Extension 5/5 Knee Extension 5/5   Ankle dorsiflexion: 5/5 Ankle dorsiflexion: 5/5   Ankle plantarflexion: 5/5 Ankle plantarflexion: 5/5       GAIT:  Assistive Device used: None  Level of Assistance: Independent  Patient displays the following gait deviations: Not significant      Special Tests:   Test Name  Test Result   Prone Instability Test (--)   SI Joint Provocation Test (--)   Straight Leg Raise (--)   Neural Tension Test (--)   Crossed Straight Leg Raise (--)   Walking on toes (--)   Walking on heels  (--)       NEUROLOGICAL SCREENING     Sensory deficit: Tactile sensation WNL    Reflexes:    Left Right   Patella Tendon 1+ 1+   Achilles Tendon 2+ 2+   Babinski NT NT   Clonus - -     REPEATED TEST  MOVEMENTS:  Repeated Flexion in Standing no effect   Repeated Extension in Standing no effect   Repeated Flexion in lying better   Repeated Extension in lying  no effect       STATIC TESTS   Sitting slouched  worse   Sitting erect better   Standing slouched no effect   Standing erect  no effect   Lying prone in extension  worse   Long sitting   no effect     Ely's test - positive B  90-90 SLR - limited >45 degrees B  Leg length - equal    Baseline Isometric Testing on Med X equipment: Testing administered by PT    Baseline IM Testing Results:   Date of testin2017  ROM 0-36 deg   Max Peak Torque 64    Min Peak Torque 22    Flex/Ext Ratio 2.91:1   % below normative data -76.5       FOTO: Focus on Therapeutic Outcomes   Category: lumbar   % Impaired: 47  Current Score  = CK = at least 40% but < 60% impaired, limited or restricted  Goal at Discharge Score = CJ = at least 20% but < 40% impaired, limited or restricted    Score interpretation is as follows:     TEST SCORE  Modifier  Impairment Limitation Restriction    0/50  CH  0 % impaired, limited or restricted   1-9/50  CI  @ least 1% but less than 20% impaired, limited or restricted   10-19/50  CJ  @ least 20%<40% impaired, limited or restricted   20-29/50  CK  @ least 40%<60% impaired, limited or restricted   30-39/50  CL  @ least 60% <80% impaired, limited or restricted   40-49/50  CM  @ least 80%<100% impaired limited or restricted   50/50  CN  100% impaired, limited or restricted       Treatment   Time In: 9:30  Time Out: 10:00    PT Evaluation Completed? Yes  Discussed Plan of Care with patient: Yes      Written Home Exercises Provided:   Handouts were given to the patient. Pt demo good understanding of the education provided. Haja demonstrated good return demonstration of activities.     - Patient received education regarding proper posture and body mechanics.    - Kristy roll tried, recommended, and purchase information was provided.    - Patient  received a handout regarding anticipated muscular soreness following the isometric test and strategies for management were reviewed with patient including stretching, using ice and scheduled rest.     HEP as follows    Flexion in lying 10x, 3x/day   Supine active HSS  10x 3x/day   LTR 10x 3x/day    Pt was instructed in and performed the following:   Cardiovascular exercise and therapeutic exercise to improve posture, lumbar/cervical ROM, strength, and muscular endurance as follows:     Haja received therapeutic exercises to develop/improve posture, lumbar/cervical ROM, strength and muscular endurance for 30 minutes including the following exercises: med-x lumbar machine testing    [facility] 7/6/2017   Visit Number 1   VAS Pain Rating 0   Flexion in Lying 10   Lumbar Extension Seat Pad 0   Femur Restraint 6   Top Dead Center 24   Counterweight 237   Lumbar Flexion 36   Lumbar Extension 0   Lumbar Peak Torque 64   Min Torque 22   Percent From Norm 76.5   Ice - Z Lie (in min.) 10     Assessment   This is a 71 y.o. male referred to [facility] and presents with a medical diagnosis of   Encounter Diagnosis   Name Primary?    DDD (degenerative disc disease), lumbar Yes    and demonstrates limitations as described below in the problem list. Pt rehab potential is Good. Pt presents with lumbar dysfunction, poor posture, muscle weakness.     Pain Pattern: 1 PEN       Patient received education on the [facility] program, purpose of the isometric test, progression of back strengthening as well as wellness approach and systemic strengthening.  Details of the program were discussed.  Reviewed that patient should feel support/pressure from med ex restraints but no pain or discomfort and patient expressed understanding.    Patient tolerated evaluation well and reported no increases in symptoms throughout.  Patient is a good candidate for program.     Based on the above history and physical examination an  active physical therapy program is recommended.  Pt will continue to benefit from skilled outpatient physical therapy to address the deficits listed below in the chart, provide pt/family education and to maximize pt's level of independence in the home and community environment. .     No environmental, cultural, spiritual, developmental or education needs expressed or noted    Medical necessity is demonstrated by the following problem list.    Pt presents with the following impairments:   History  Co-morbidities and personal factors that may impact the plan of care Examination  Body Structures and Functions, activity limitations and participation restrictions that may impact the plan of care Clinical Presentation   Decision Making/ Complexity Score   Co-morbidities:   see PMH        Personal Factors:   no deficits Body Regions:   back    Body Systems:   gross symmetry  ROM  strength  transfers  motor control    Activity limitations:   Learning and applying knowledge  no deficits    General Tasks and Commands  no deficits    Communication  no deficits    Mobility  transfers    Self care  no deficits    Domestic Life  doing house work (cleaning house, washing dishes, laundry)    Interactions/Relationships  no deficits    Life Areas  no deficits    Community and Social Life  no deficits    Participation Restrictions:   Fishing on a boat     evolving clinical presentation with changing clinical characteristics   moderate         GOALS: Pt is in agreement with the following goals.    Short term goals:  6 weeks or 10 visits   1.  Pt will demonstrate increased lumbar ROM by at least 3 degrees from the initial ROM value with improvements noted in functional ROM and ability to perform ADLs  2.  Pt will demonstrate increased maximum isometric torque value by 5% when compared to the initial value resulting in improved ability to perform bending, lifting, and carrying activities safely, confidently.  3.  Patient report a  "reduction in worst pain score by 1-2 points for improved tolerance during work and recreational activities  4.  Pt able to perform HEP correctly with minimal cueing or supervision for therapist    Long term goals: 13 weeks or 20 visits   1. Pt will demonstrate increased lumbar ROM by at least 6 degrees from initial ROM value, resulting in improved ability to perform functional fwd bending while standing and sitting.   2. Pt will demonstrate increased maximum isometric torque value by 10% when compared to the initial value resulting in improved ability to perform bending, lifting, and carrying activities safely, confidently.  3. Pt to demonstrate ability to independently control and reduce their pain through posture positioning and mechanical movements throughout a typical day.  4.  Patient will demonstrate improved overall function per FOTO Survey to CJ = at least 20% but < 40% impaired, limited or restricted score or less.      Plan   Outpatient physical therapy 2x week for 13 weeks or 20 visits to include the following:   - Patient education  - Therapeutic exercise  - Manual therapy  - Performance testing   - Neuromuscular Re-education  - Therapeutic activity   - Modalities    Pt may be seen by PTA as part of the rehabilitation team.     Therapist: Esteban La, PT  7/6/2017    "I certify the need for these services furnished under this plan of treatment and while under my care."    ____________________________________  Physician/Referring Practitioner    _______________  Date of Signature            "

## 2017-07-10 RX ORDER — ESCITALOPRAM OXALATE 20 MG/1
TABLET ORAL
Qty: 90 TABLET | Refills: 3 | Status: CANCELLED | OUTPATIENT
Start: 2017-07-10

## 2017-07-10 NOTE — TELEPHONE ENCOUNTER
Contact: Dr. 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 Jackson about.      Thanks!

## 2017-07-10 NOTE — TELEPHONE ENCOUNTER
----- Message from Zoie Yuan sent at 7/10/2017  9:57 AM CDT -----  Contact: Dr. 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 Jackson about.     Thanks!

## 2017-07-11 ENCOUNTER — CLINICAL SUPPORT (OUTPATIENT)
Dept: REHABILITATION | Facility: OTHER | Age: 72
End: 2017-07-11
Attending: PHYSICAL MEDICINE & REHABILITATION
Payer: MEDICARE

## 2017-07-11 DIAGNOSIS — M51.36 DDD (DEGENERATIVE DISC DISEASE), LUMBAR: Primary | ICD-10-CM

## 2017-07-11 PROCEDURE — 97110 THERAPEUTIC EXERCISES: CPT

## 2017-07-11 NOTE — PROGRESS NOTES
[facility] Treatment      Name: Haja Marti  Clinic Number: [id number]  Date of Treatment: 2017   Diagnosis:   Encounter Diagnosis   Name Primary?    DDD (degenerative disc disease), lumbar Yes     Physician: Esmer Roca, *    Pain pattern determined: 1 PEN  Plan of care signed: 17  Time in: 2:30  Time Out: 3:30  Total Treatment time: 40  Precautions: HTN, eye problems  Visit #: 2      Subjective   Haja reports he has no LBP pre session.  He usually gets LBP with sit>stand transfer and standing prolonged period.    Patient reports their pain to be 0/10 on a 0-10 scale with 0 being no pain and 10 being the worst pain imaginable.  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                      Pts goals:  Be able to fish without pain.          Objective     Baseline IM Testing Results:   Date of testin17  ROM 0-36 deg   Max Peak Torque 64    Min Peak Torque 22    Flex/Ext Ratio 2.91:1   % below normative data -76.5       Treatment    Pt was instructed in and performed the following:     Haja received therapeutic exercises to develop/improved posture, cardiovascular endurance, muscular endurance, lumbar ROM, strength and muscular endurance for 40 minutes including the following exercises: Torso, leg ext, leg curl, chest press and upright row.  [facility] 2017   Visit Number 2   VAS Pain Rating 0   Flexion in Lying 10   Lumbar Weight 50   Repetitions 20   Rating of Perceived Exertion 2   Ice - Z Lie (in min.) 10     TLR x 10 reps    Haja received the following manual therapy techniques: n/a were applied to the: n/a for 0 minutes.         Home Exercise Program as follows:      Flexion in lying 10x, 3x/day     Supine active HSS  10x 3x/day       LTR 10x 3x/day    Handouts were given to the patient. Pt demo good understanding of the education  bob Haja demonstrated good return demonstration of activities.   Lumbar roll use compliance: unknown  Additional exercises taught this treatment session:   Seated flexion stretch 10x   Standing against wall to correct upright posture.  Assessment   Pt c/o no LBP today pre session, during and post session. She demo HEP well with mod vc for tech. Showed him seated trunk flexion he can do during the day when standing or sitting to much.  Pt tolerated lumbar medx machine with starting weight more than 50% of his max peak due to max peak torque low with c/o pain. Increase weight 10% next session due to RPE a 2. He tolerated some of the peripheral medx machine with no c/o LBP. He is trying to be more mindful of standing more upright due to he stands and walks with slight flexed posture. Educated him to stand against wall to correct posture. He will do at home.       Patient is making good progress towards established goals.  Pt will continue to benefit from skilled outpatient physical therapy to address the deficits stated in the impairment chart, provide pt/family education and to maximize pt's level of independence in the home and community environment.       Pt's spiritual, cultural and educational needs considered and pt agreeable to plan of care and goals as stated below:     Medical necessity is demonstrated by the following IMPAIRMENTS/PROBLEMS:        Goals:   Short term goals:  6 weeks or 10 visits   1.  Pt will demonstrate increased lumbar ROM by at least 3 degrees from the initial ROM value with improvements noted in functional ROM and ability to perform ADLs  2.  Pt will demonstrate increased maximum isometric torque value by 5% when compared to the initial value resulting in improved ability to perform bending, lifting, and carrying activities safely, confidently.  3.  Patient report a reduction in worst pain score by 1-2 points for improved tolerance during work and recreational activities  4.  Pt  able to perform HEP correctly with minimal cueing or supervision for therapist     Long term goals: 13 weeks or 20 visits   1. Pt will demonstrate increased lumbar ROM by at least 6 degrees from initial ROM value, resulting in improved ability to perform functional fwd bending while standing and sitting.   2. Pt will demonstrate increased maximum isometric torque value by 10% when compared to the initial value resulting in improved ability to perform bending, lifting, and carrying activities safely, confidently.  3. Pt to demonstrate ability to independently control and reduce their pain through posture positioning and mechanical movements throughout a typical day.  4.  Patient will demonstrate improved overall function per FOTO Survey to CJ = at least 20% but < 40% impaired, limited or restricted score or less.      Plan   Continue with established Plan of Care towards established PT goals.

## 2017-07-12 NOTE — TELEPHONE ENCOUNTER
----- Message from Mildred Rosa sent at 7/12/2017  2:25 PM CDT -----  Contact: HRA  Good afternoon,    Patient is requesting his son receive a callback to discuss patient's medical situation.  Son is a medical doctor.    [phone]   Dr. Tod Marti    Thanks.

## 2017-07-13 ENCOUNTER — OFFICE VISIT (OUTPATIENT)
Dept: INTERNAL MEDICINE | Facility: CLINIC | Age: 72
End: 2017-07-13
Payer: MEDICARE

## 2017-07-13 ENCOUNTER — CLINICAL SUPPORT (OUTPATIENT)
Dept: REHABILITATION | Facility: OTHER | Age: 72
End: 2017-07-13
Attending: PHYSICAL MEDICINE & REHABILITATION
Payer: MEDICARE

## 2017-07-13 ENCOUNTER — HOSPITAL ENCOUNTER (OUTPATIENT)
Dept: RADIOLOGY | Facility: HOSPITAL | Age: 72
Discharge: HOME | End: 2017-07-13
Attending: INTERNAL MEDICINE
Payer: MEDICARE

## 2017-07-13 VITALS
DIASTOLIC BLOOD PRESSURE: 76 MMHG | SYSTOLIC BLOOD PRESSURE: 117 MMHG | HEIGHT: 70 IN | WEIGHT: 201.94 LBS | HEART RATE: 60 BPM | BODY MASS INDEX: 28.91 KG/M2

## 2017-07-13 DIAGNOSIS — R53.83 FATIGUE, UNSPECIFIED TYPE: ICD-10-CM

## 2017-07-13 DIAGNOSIS — R49.0 HYPOPHONIA WITH HOARSENESS: ICD-10-CM

## 2017-07-13 DIAGNOSIS — M51.36 DDD (DEGENERATIVE DISC DISEASE), LUMBAR: Primary | ICD-10-CM

## 2017-07-13 DIAGNOSIS — G25.9 MOVEMENT DISORDER: Primary | ICD-10-CM

## 2017-07-13 DIAGNOSIS — I95.1 ORTHOSTATIC HYPOTENSION: ICD-10-CM

## 2017-07-13 DIAGNOSIS — G25.9 MOVEMENT DISORDER: ICD-10-CM

## 2017-07-13 DIAGNOSIS — F32.A DEPRESSION, UNSPECIFIED DEPRESSION TYPE: ICD-10-CM

## 2017-07-13 PROCEDURE — 71020 XR CHEST PA AND LATERAL: CPT | Mod: 26,,, | Performed by: RADIOLOGY

## 2017-07-13 PROCEDURE — 99999 PR PBB SHADOW E&M-EST. PATIENT-LVL V: CPT | Mod: PBBFAC,,, | Performed by: INTERNAL MEDICINE

## 2017-07-13 PROCEDURE — 99214 OFFICE O/P EST MOD 30 MIN: CPT | Mod: S$GLB,,, | Performed by: INTERNAL MEDICINE

## 2017-07-13 PROCEDURE — 97110 THERAPEUTIC EXERCISES: CPT

## 2017-07-13 PROCEDURE — 71020 XR CHEST PA AND LATERAL: CPT | Mod: TC,PO

## 2017-07-13 PROCEDURE — 1159F MED LIST DOCD IN RCRD: CPT | Mod: S$GLB,,, | Performed by: INTERNAL MEDICINE

## 2017-07-13 PROCEDURE — 1126F AMNT PAIN NOTED NONE PRSNT: CPT | Mod: S$GLB,,, | Performed by: INTERNAL MEDICINE

## 2017-07-13 PROCEDURE — 72040 X-RAY EXAM NECK SPINE 2-3 VW: CPT | Mod: 26,,, | Performed by: RADIOLOGY

## 2017-07-13 PROCEDURE — 99499 UNLISTED E&M SERVICE: CPT | Mod: S$GLB,,, | Performed by: INTERNAL MEDICINE

## 2017-07-13 PROCEDURE — 72040 X-RAY EXAM NECK SPINE 2-3 VW: CPT | Mod: TC,PO

## 2017-07-13 RX ORDER — ESCITALOPRAM OXALATE 20 MG/1
20 TABLET ORAL DAILY
Qty: 90 TABLET | Refills: 3 | Status: SHIPPED | OUTPATIENT
Start: 2017-07-13 | End: 2017-10-17 | Stop reason: SDUPTHER

## 2017-07-13 NOTE — PROGRESS NOTES
REASON FOR VISIT:  This is a 71-year-old male who comes in for a follow-up.    One of the reasons that prompted this visit is that I got a call from his son,    Tod Marti, who is a Critical Care pulmonologist specialist in [place].    He has noticed a progressive decline in his father in the sense that he moves   slow, gets tired easily.  He also noted a very orthostatic drop in blood   pressure about 50 points.  When I saw him recently, his losartan HCT was changed   to losartan 100 and chlorthalidone 25.    Today he is here with his wife.  Again, he reiterates the fact that any   exertional activity he does he gets tired quickly, which was never the case with   him in the past.  He was a very active person.  He does not recall the   shortness of breath issue.  A 2D echo was performed that was normal.  He still   is limited by his back with movement, but he had one session of the [facility]   program and he thinks there could be a difference.  His wife notices that when   he walks, he is walking with a stoop and he says mainly to maintain balance that   he does not appear to be attentive or alert as much that when he talks the   voice progressively gets lower and he has noticed some raspiness in the voice.    He has no trouble with use of his arms or hands and there has been no tremor.    PAST MEDICAL HISTORY:  Outlined in 06/12/2017 note.    MEDICATIONS:  List per MedCard.    PHYSICAL EXAMINATION:  VITAL SIGNS:  Today, his weight is 201 pounds, supine pulse 60, supine blood   pressure 138/76, standing pulse 60; standing blood pressure /62.  PULSES:  2+ carotid pulses.  LUNGS:  Clear.  HEART:  Regular rate and rhythm.  NEUROLOGIC:  Cranial nerves II-XII are normal.  Negative dysmetria.  Negative   Romberg testing.  He has 1+ biceps, triceps reflexes, 2-3+ knee and ankle   reflexes.    IMPRESSION:  1.  Movement disorder, this could bring up the possibility of parkinsonism or   any other  neurodegenerative disease.  2.  Orthostatic hypotension.  3.  Exertional fatigue.  4.  Lumbar spinal stenosis.  5.  Depression, anxiety.    PLAN:    Today, we will repeat a basic metabolic profile.    Consider changing medications.    We will arrange for myasthenia gravis panel, B12, folate, RPR.  Recommended an   MRI of the brain.  Appointment was set up by his wife with Neurology, which is   in October; however, in talking with son, he may be able to get him to meet with   a neurologist in [place].    In the meantime, we will arrange for him to also get a cervical spine film to   see if there might be any degenerative disc changes, although I do not think   that this is cervical spondylosis.    Phone review to follow up.          /jessica [id number] review        JAM/CHIARA  dd: 07/13/2017 12:47:20 (CDT)  td: 07/14/2017 06:52:18 (CDT)  Doc ID   #[id number]  Job ID #[id number]    CC:

## 2017-07-13 NOTE — PROGRESS NOTES
[facility]      Name: Haja Marti  Clinic Number: [id number]  Date of Treatment: 2017   Diagnosis:   Encounter Diagnosis   Name Primary?    DDD (degenerative disc disease), lumbar Yes     Physician: Esmer Roca, *    Pain pattern determined: 1 PEN  Plan of care signed: 17  Time in: 7:30  Time Out: 8:30  Total Treatment time: 45  Precautions: HTN, eye problems  Visit #: 3      Subjective   Haja reports he has no LBP pre session.  He usually gets LBP with sit>stand transfer and standing prolonged period. He reported mild dizziness after getting off treadmill and when standing against wall. He reported feeling better overall by end of session. Pt reported he is following up with PCP about possible movement disorder diagnosis.     Patient reports their pain to be 0/10 on a 0-10 scale with 0 being no pain and 10 being the worst pain imaginable.  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                      Pts goals:  Be able to fish without pain.          Objective     Baseline IM Testing Results:   Date of testin17  ROM 0-36 deg   Max Peak Torque 64    Min Peak Torque 22    Flex/Ext Ratio 2.91:1   % below normative data -76.5       Treatment    Pt was instructed in and performed the following:     Haja received therapeutic exercises to develop/improved posture, cardiovascular endurance, muscular endurance, lumbar ROM, strength and muscular endurance for 40 minutes including the following exercises: Torso, leg ext, leg curl, chest press and upright row.  [facility] 2017   Visit Number 3   VAS Pain Rating 0   Treadmill Time (in min.) 10   Speed 1.1   Flexion in Lying 10   Lumbar Extension Seat Pad -   Femur Restraint -   Top Dead Center -   Counterweight -   Lumbar Flexion -   Lumbar Extension -   Lumbar Peak Torque -   Min Torque -    Percent From Norm -   Lumbar Weight 55   Repetitions 20   Rating of Perceived Exertion 4   Ice - Z Lie (in min.) 10       TLR x 10 reps    Haja received the following manual therapy techniques: n/a were applied to the: n/a for 0 minutes.         Home Exercise Program as follows:      Flexion in lying 10x, 3x/day     Supine active HSS  10x 3x/day       LTR 10x 3x/day   Seated flexion stretch 10x    Standing against wall to correct upright posture.    Handouts were given to the patient. Pt demo good understanding of the education provided. Haja demonstrated good return demonstration of activities.   Lumbar roll use compliance: unknown  Additional exercises taught this treatment session:     Assessment   Pt c/o no LBP today pre session, during and post session. She demo HEP well with mod vc for tech. Pt demonstrates bradykinesia, shuffling gait pattern, and forward trunk lean throughout gait cycle. Pt is following up with PCP about possible diagnosis of neurological movement disorder. Pt reported mild dizziness when getting off treadmill and will require SBA when transitioning on and off treadmill for safety. Recommend attempting bike warm up next session.  Pt tolerated lumbar medx machine with appropriate RPE and no reports of increased pain or discomfort.  He tolerated some of the peripheral medx machine with no c/o LBP. Continue to work on improving upright posture with postural corrections against wall.    Patient is making good progress towards established goals.  Pt will continue to benefit from skilled outpatient physical therapy to address the deficits stated in the impairment chart, provide pt/family education and to maximize pt's level of independence in the home and community environment.       Pt's spiritual, cultural and educational needs considered and pt agreeable to plan of care and goals as stated below:     Medical necessity is demonstrated by the following IMPAIRMENTS/PROBLEMS:  Medical  necessity is demonstrated by the following problem list.    Pt presents with the following impairments:   History  Co-morbidities and personal factors that may impact the plan of care Examination  Body Structures and Functions, activity limitations and participation restrictions that may impact the plan of care Clinical Presentation Decision Making/ Complexity Score   Co-morbidities:   see PMH           Personal Factors:   no deficits Body Regions:   back     Body Systems:   gross symmetry  ROM  strength  transfers  motor control     Activity limitations:   Learning and applying knowledge  no deficits     General Tasks and Commands  no deficits     Communication  no deficits     Mobility  transfers     Self care  no deficits     Domestic Life  doing house work (cleaning house, washing dishes, laundry)     Interactions/Relationships  no deficits     Life Areas  no deficits     Community and Social Life  no deficits     Participation Restrictions:   Fishing on a boat    evolving clinical presentation with changing clinical characteristics    moderate               Goals:   Short term goals:  6 weeks or 10 visits   1.  Pt will demonstrate increased lumbar ROM by at least 3 degrees from the initial ROM value with improvements noted in functional ROM and ability to perform ADLs  2.  Pt will demonstrate increased maximum isometric torque value by 5% when compared to the initial value resulting in improved ability to perform bending, lifting, and carrying activities safely, confidently.  3.  Patient report a reduction in worst pain score by 1-2 points for improved tolerance during work and recreational activities  4.  Pt able to perform HEP correctly with minimal cueing or supervision for therapist     Long term goals: 13 weeks or 20 visits   1. Pt will demonstrate increased lumbar ROM by at least 6 degrees from initial ROM value, resulting in improved ability to perform functional fwd bending while standing and sitting.    2. Pt will demonstrate increased maximum isometric torque value by 10% when compared to the initial value resulting in improved ability to perform bending, lifting, and carrying activities safely, confidently.  3. Pt to demonstrate ability to independently control and reduce their pain through posture positioning and mechanical movements throughout a typical day.  4.  Patient will demonstrate improved overall function per FOTO Survey to CJ = at least 20% but < 40% impaired, limited or restricted score or less.      Plan   Continue with established Plan of Care towards established PT goals.

## 2017-07-13 NOTE — TELEPHONE ENCOUNTER
----- Message from Madonna Marie MA sent at 7/13/2017  8:57 AM CDT -----  Contact: Serg michael/Ifianw-[phone]  Type: Rx    Name of medication(s): escitalopram oxalate (LEXAPRO) 20 MG tablet    Is this a refill? New rx? Refill    Who prescribed medication?    Pharmacy Name, Phone, & Location: [facility] - [facility] [address]    Comments: Please advise. Thanks!

## 2017-07-15 ENCOUNTER — HOSPITAL ENCOUNTER (OUTPATIENT)
Dept: RADIOLOGY | Facility: OTHER | Age: 72
Discharge: HOME | End: 2017-07-15
Attending: INTERNAL MEDICINE
Payer: MEDICARE

## 2017-07-15 DIAGNOSIS — R49.0 HYPOPHONIA WITH HOARSENESS: ICD-10-CM

## 2017-07-15 DIAGNOSIS — G25.9 MOVEMENT DISORDER: ICD-10-CM

## 2017-07-15 PROCEDURE — 70551 MRI BRAIN STEM W/O DYE: CPT | Mod: 26,,, | Performed by: RADIOLOGY

## 2017-07-15 PROCEDURE — 70551 MRI BRAIN STEM W/O DYE: CPT | Mod: TC

## 2017-07-18 ENCOUNTER — CLINICAL SUPPORT (OUTPATIENT)
Dept: REHABILITATION | Facility: OTHER | Age: 72
End: 2017-07-18
Attending: PHYSICAL MEDICINE & REHABILITATION
Payer: MEDICARE

## 2017-07-18 DIAGNOSIS — M51.36 DDD (DEGENERATIVE DISC DISEASE), LUMBAR: Primary | ICD-10-CM

## 2017-07-18 PROCEDURE — 97110 THERAPEUTIC EXERCISES: CPT

## 2017-07-18 NOTE — PROGRESS NOTES
[facility]      Name: Haja Marti  Clinic Number: [id number]  Date of Treatment: 2017   Diagnosis:   Encounter Diagnosis   Name Primary?    DDD (degenerative disc disease), lumbar Yes     Physician: Esmer Roca, *    Pain pattern determined: 1 PEN  Plan of care signed: 17  Time in: 11:00  Time Out: 12:00  Total Treatment time: 45  Precautions: HTN, eye problems  Visit #: 4      Subjective   Haja reports he has no LBP pre session.  He states that he has not had dizziness since last treatment.  He states that he went to see his neurologist yesterday and was told that he might have Parkinson's disease.  He will be started on medication.  He states that he has no restrictions for physical therapy.     Patient reports their pain to be 0/10 on a 0-10 scale with 0 being no pain and 10 being the worst pain imaginable.  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                      Pts goals:  Be able to fish without pain.          Objective     Baseline IM Testing Results:   Date of testin17  ROM 0-36 deg   Max Peak Torque 64    Min Peak Torque 22    Flex/Ext Ratio 2.91:1   % below normative data -76.5       Treatment    Pt was instructed in and performed the following:     Haja received therapeutic exercises to develop/improved posture, cardiovascular endurance, muscular endurance, lumbar ROM, strength and muscular endurance for 40 minutes including the following exercises: Torso, leg ext, leg curl, chest press, upright row, biceps, triceps, leg press, and hip abduction.     [facility] 2017   Visit Number 4   VAS Pain Rating 0   Treadmill Time (in min.) 10   Speed 1.5   Flexion in Lying 10   Lumbar Extension Seat Pad -   Femur Restraint -   Top Dead Center -   Counterweight -   Lumbar Flexion -   Lumbar Extension -   Lumbar Peak Torque -    Min Torque -   Percent From Norm -   Lumbar Weight 58   Repetitions 20   Rating of Perceived Exertion 3   Ice - Z Lie (in min.) 10       Supine HSS c strap 10s/h 5x B  Bridges 10x  LTRx 10 reps    Haja received the following manual therapy techniques: n/a were applied to the: n/a for 0 minutes.     Home Exercise Program as follows:      Flexion in lying 10x, 3x/day     Supine active HSS  10x 3x/day       LTR 10x 3x/day   Seated flexion stretch 10x    Standing against wall to correct upright posture.    Handouts were given to the patient. Pt demo good understanding of the education provided. Haja demonstrated good return demonstration of activities.   Lumbar roll use compliance: unknown  Additional exercises taught this treatment session:   Bridges 10x    Assessment     Pt c/o no LBP today pre session, during and post session. He demo HEP well with mod vc for tech. Pt demonstrates bradykinesia, shuffling gait pattern, and forward trunk lean throughout gait cycle.  Pt tolerated lumbar medx machine with appropriate RPE and no reports of increased pain or discomfort.  He tolerated all of the peripheral medx machine with no c/o LBP. Continue to work on improving upright posture with postural corrections against wall as well as core stabilization exercises.     Patient is making good progress towards established goals.  Pt will continue to benefit from skilled outpatient physical therapy to address the deficits stated in the impairment chart, provide pt/family education and to maximize pt's level of independence in the home and community environment.       Pt's spiritual, cultural and educational needs considered and pt agreeable to plan of care and goals as stated below:     Medical necessity is demonstrated by the following IMPAIRMENTS/PROBLEMS:  Medical necessity is demonstrated by the following problem list.    Pt presents with the following impairments:   History  Co-morbidities and personal factors that may  impact the plan of care Examination  Body Structures and Functions, activity limitations and participation restrictions that may impact the plan of care Clinical Presentation Decision Making/ Complexity Score   Co-morbidities:   see PMH           Personal Factors:   no deficits Body Regions:   back     Body Systems:   gross symmetry  ROM  strength  transfers  motor control     Activity limitations:   Learning and applying knowledge  no deficits     General Tasks and Commands  no deficits     Communication  no deficits     Mobility  transfers     Self care  no deficits     Domestic Life  doing house work (cleaning house, washing dishes, laundry)     Interactions/Relationships  no deficits     Life Areas  no deficits     Community and Social Life  no deficits     Participation Restrictions:   Fishing on a boat    evolving clinical presentation with changing clinical characteristics    moderate               Goals:   Short term goals:  6 weeks or 10 visits   1.  Pt will demonstrate increased lumbar ROM by at least 3 degrees from the initial ROM value with improvements noted in functional ROM and ability to perform ADLs  2.  Pt will demonstrate increased maximum isometric torque value by 5% when compared to the initial value resulting in improved ability to perform bending, lifting, and carrying activities safely, confidently.  3.  Patient report a reduction in worst pain score by 1-2 points for improved tolerance during work and recreational activities  4.  Pt able to perform HEP correctly with minimal cueing or supervision for therapist     Long term goals: 13 weeks or 20 visits   1. Pt will demonstrate increased lumbar ROM by at least 6 degrees from initial ROM value, resulting in improved ability to perform functional fwd bending while standing and sitting.   2. Pt will demonstrate increased maximum isometric torque value by 10% when compared to the initial value resulting in improved ability to perform bending,  lifting, and carrying activities safely, confidently.  3. Pt to demonstrate ability to independently control and reduce their pain through posture positioning and mechanical movements throughout a typical day.  4.  Patient will demonstrate improved overall function per FOTO Survey to CJ = at least 20% but < 40% impaired, limited or restricted score or less.      Plan   Continue with established Plan of Care towards established PT goals.

## 2017-07-20 ENCOUNTER — CLINICAL SUPPORT (OUTPATIENT)
Dept: REHABILITATION | Facility: OTHER | Age: 72
End: 2017-07-20
Attending: PHYSICAL MEDICINE & REHABILITATION
Payer: MEDICARE

## 2017-07-20 DIAGNOSIS — M51.36 DDD (DEGENERATIVE DISC DISEASE), LUMBAR: Primary | ICD-10-CM

## 2017-07-20 PROCEDURE — 97110 THERAPEUTIC EXERCISES: CPT

## 2017-07-20 NOTE — PROGRESS NOTES
[facility]      Name: Haja Marti  Clinic Number: [id number]  Date of Treatment: 2017   Diagnosis:   Encounter Diagnosis   Name Primary?    DDD (degenerative disc disease), lumbar Yes     Physician: Esmer Roca, *    Pain pattern determined: 1 PEN  Plan of care signed: 17  Time in: 1:30  Time Out: 2:30  Total Treatment time: 45  Precautions: HTN, eye problems  Visit #: 5      Subjective   Haja reports he has no LBP pre session.  He states that he feels stiff and he was sore following last treatment.  He states that he has started on medication for Parkinson's disease.  He is not sure if it is helping yet.  He reports pain with transfers and sitting.     Patient reports their pain to be 0/10 on a 0-10 scale with 0 being no pain and 10 being the worst pain imaginable.  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                      Pts goals:  Be able to fish without pain.      Objective     Baseline IM Testing Results:   Date of testin17  ROM 0-36 deg   Max Peak Torque 64    Min Peak Torque 22    Flex/Ext Ratio 2.91:1   % below normative data -76.5     FOTO: [facility]   Category: lumbar EVAL  % Impaired: 47  Current Score  = CK = at least 40% but < 60% impaired, limited or restricted  Goal at Discharge Score = CJ = at least 20% but < 40% impaired, limited or restricted    Category: lumbar 5th visit (2017)  % Impaired: 50  Current Score  = CK = at least 40% but < 60% impaired, limited or restricted  Goal at Discharge Score = CJ = at least 20% but < 40% impaired, limited or restricted  Treatment    Pt was instructed in and performed the following:     Haja received therapeutic exercises to develop/improved posture, cardiovascular endurance, muscular endurance, lumbar ROM, strength and muscular endurance for 40 minutes including  the following exercises: Torso, leg ext, leg curl, chest press, upright row, biceps, triceps, leg press, and hip abduction.     [facility] 7/20/2017   Visit Number 5   VAS Pain Rating 0   Treadmill Time (in min.) 5   Speed 1.8   Flexion in Lying 10   Lumbar Extension Seat Pad -   Femur Restraint -   Top Dead Center -   Counterweight -   Lumbar Flexion -   Lumbar Extension -   Lumbar Peak Torque -   Min Torque -   Percent From Norm -   Lumbar Weight 61   Repetitions 20   Rating of Perceived Exertion 4   Ice - Z Lie (in min.) 10       Supine HSS c strap 10s/h 5x B  LTRx 10 reps  Open Books 10x B    Haja received the following manual therapy techniques: n/a were applied to the: n/a for 0 minutes.     Home Exercise Program as follows:       Flexion in lying 10x, 3x/day      Supine active HSS  10x 3x/day        LTR 10x 3x/day   Seated flexion stretch 10x    Standing against wall to correct upright posture.   Open book 10x, 3x/day    Handouts were given to the patient. Pt demo good understanding of the education provided. Haja demonstrated good return demonstration of activities.   Lumbar roll use compliance: unknown  Additional exercises taught this treatment session:   Open books 10x    Assessment     Pt c/o no LBP today pre session, during and post session. He demo HEP well with mod vc for tech. Pt demonstrates bradykinesia, shuffling gait pattern, and forward trunk lean throughout gait cycle.  Introduced open books to improve rotation in the spine.  Pt tolerated lumbar medx machine with appropriate RPE and no reports of increased pain or discomfort with increased weight.  He tolerated all of the peripheral medx machine with no c/o LBP.    Patient is making good progress towards established goals.  Pt will continue to benefit from skilled outpatient physical therapy to address the deficits stated in the impairment chart, provide pt/family education and to maximize pt's level of independence in the home  and community environment.       Pt's spiritual, cultural and educational needs considered and pt agreeable to plan of care and goals as stated below:     Medical necessity is demonstrated by the following IMPAIRMENTS/PROBLEMS:  Medical necessity is demonstrated by the following problem list.    Pt presents with the following impairments:   History  Co-morbidities and personal factors that may impact the plan of care Examination  Body Structures and Functions, activity limitations and participation restrictions that may impact the plan of care Clinical Presentation Decision Making/ Complexity Score   Co-morbidities:   see PMH           Personal Factors:   no deficits Body Regions:   back     Body Systems:   gross symmetry  ROM  strength  transfers  motor control     Activity limitations:   Learning and applying knowledge  no deficits     General Tasks and Commands  no deficits     Communication  no deficits     Mobility  transfers     Self care  no deficits     Domestic Life  doing house work (cleaning house, washing dishes, laundry)     Interactions/Relationships  no deficits     Life Areas  no deficits     Community and Social Life  no deficits     Participation Restrictions:   Fishing on a boat    evolving clinical presentation with changing clinical characteristics    moderate               Goals:   Short term goals:  6 weeks or 10 visits   1.  Pt will demonstrate increased lumbar ROM by at least 3 degrees from the initial ROM value with improvements noted in functional ROM and ability to perform ADLs  2.  Pt will demonstrate increased maximum isometric torque value by 5% when compared to the initial value resulting in improved ability to perform bending, lifting, and carrying activities safely, confidently.  3.  Patient report a reduction in worst pain score by 1-2 points for improved tolerance during work and recreational activities  4.  Pt able to perform HEP correctly with minimal cueing or supervision for  therapist     Long term goals: 13 weeks or 20 visits   1. Pt will demonstrate increased lumbar ROM by at least 6 degrees from initial ROM value, resulting in improved ability to perform functional fwd bending while standing and sitting.   2. Pt will demonstrate increased maximum isometric torque value by 10% when compared to the initial value resulting in improved ability to perform bending, lifting, and carrying activities safely, confidently.  3. Pt to demonstrate ability to independently control and reduce their pain through posture positioning and mechanical movements throughout a typical day.  4.  Patient will demonstrate improved overall function per FOTO Survey to CJ = at least 20% but < 40% impaired, limited or restricted score or less.      Plan   Continue with established Plan of Care towards established PT goals.

## 2017-07-25 ENCOUNTER — CLINICAL SUPPORT (OUTPATIENT)
Dept: REHABILITATION | Facility: OTHER | Age: 72
End: 2017-07-25
Attending: PHYSICAL MEDICINE & REHABILITATION
Payer: MEDICARE

## 2017-07-25 DIAGNOSIS — M51.36 DDD (DEGENERATIVE DISC DISEASE), LUMBAR: ICD-10-CM

## 2017-07-25 PROCEDURE — 97110 THERAPEUTIC EXERCISES: CPT

## 2017-07-25 NOTE — PROGRESS NOTES
[facility]      Name: Haja Marti  Clinic Number: [id number]  Date of Treatment: 2017   Diagnosis:   Encounter Diagnosis   Name Primary?    DDD (degenerative disc disease), lumbar      Physician: Esmer Roca, *    Pain pattern determined: 1 PEN  Plan of care signed: 17  Time in: 1:30  Time Out: 2:30  Total Treatment time: 45  Precautions: HTN, eye problems  Visit #: 6      Subjective   Haja reports he has no LBP today.  He states that the pain is still when he gets up and starts walking.  Symptoms are not effected by HEP.  He states that it does help with his flexibility.     Patient reports their pain to be 0/10 on a 0-10 scale with 0 being no pain and 10 being the worst pain imaginable.  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                      Pts goals:  Be able to fish without pain.      Objective     Baseline IM Testing Results:   Date of testin17  ROM 0-36 deg   Max Peak Torque 64    Min Peak Torque 22    Flex/Ext Ratio 2.91:1   % below normative data -76.5     FOTO: Focus on Therapeutic Outcomes   Category: lumbar EVAL  % Impaired: 47  Current Score  = CK = at least 40% but < 60% impaired, limited or restricted  Goal at Discharge Score = CJ = at least 20% but < 40% impaired, limited or restricted    Category: lumbar 5th visit (2017)  % Impaired: 50  Current Score  = CK = at least 40% but < 60% impaired, limited or restricted  Goal at Discharge Score = CJ = at least 20% but < 40% impaired, limited or restricted  Treatment    Pt was instructed in and performed the following:     Haja received therapeutic exercises to develop/improved posture, cardiovascular endurance, muscular endurance, lumbar ROM, strength and muscular endurance for 40 minutes including the following exercises: Torso, leg ext, leg curl, chest press, upright row,  biceps, triceps, leg press, and hip abduction.     [facility] 7/25/2017   Visit Number 6   VAS Pain Rating 0   Treadmill Time (in min.) -   Speed -   Recumbent Bike Seat Pos. 12   Time 10   Extension in Standing 10   Flexion in Lying 10   Lumbar Extension Seat Pad -   Femur Restraint -   Top Dead Center -   Counterweight -   Lumbar Flexion -   Lumbar Extension -   Lumbar Peak Torque -   Min Torque -   Percent From Norm -   Lumbar Weight 64   Repetitions 20   Rating of Perceived Exertion 4   Ice - Z Lie (in min.) 10     Supine HSS c strap 10s/h 5x B  LTRx 10 reps  Open Books 10x B  Modified piriformis stretch 10s/h 3x B    Home Exercise Program as follows:       Flexion in lying 10x, 3x/day      Supine active HSS  10x 3x/day        LTR 10x 3x/day   Seated flexion stretch 10x    Standing against wall to correct upright posture.   Open book 10x, 3x/day    Handouts were given to the patient. Pt demo good understanding of the education provided. Haja demonstrated good return demonstration of activities.   Lumbar roll use compliance: unknown  Additional exercises taught this treatment session:   Modified piriformis stretch   EIS 10x    Assessment     Pt c/o no LBP today pre session, during and post session. He demo HEP well with mod vc for tech. Pt demonstrates bradykinesia, shuffling gait pattern, and forward trunk lean throughout gait cycle.  Introduced modified piriformis stretch and extension in standing.  He reported mild discomfort with extension in standing so exercise was not added to HEP.  Pt tolerated lumbar medx machine with appropriate RPE and no reports of increased pain or discomfort with increased weight.  He tolerated all of the peripheral medx machine with no c/o LBP.    Patient is making good progress towards established goals.  Pt will continue to benefit from skilled outpatient physical therapy to address the deficits stated in the impairment chart, provide pt/family education and to  maximize pt's level of independence in the home and community environment.       Pt's spiritual, cultural and educational needs considered and pt agreeable to plan of care and goals as stated below:     Medical necessity is demonstrated by the following IMPAIRMENTS/PROBLEMS:  Medical necessity is demonstrated by the following problem list.    Pt presents with the following impairments:   History  Co-morbidities and personal factors that may impact the plan of care Examination  Body Structures and Functions, activity limitations and participation restrictions that may impact the plan of care Clinical Presentation Decision Making/ Complexity Score   Co-morbidities:   see PMH           Personal Factors:   no deficits Body Regions:   back     Body Systems:   gross symmetry  ROM  strength  transfers  motor control     Activity limitations:   Learning and applying knowledge  no deficits     General Tasks and Commands  no deficits     Communication  no deficits     Mobility  transfers     Self care  no deficits     Domestic Life  doing house work (cleaning house, washing dishes, laundry)     Interactions/Relationships  no deficits     Life Areas  no deficits     Community and Social Life  no deficits     Participation Restrictions:   Fishing on a boat    evolving clinical presentation with changing clinical characteristics    moderate               Goals:   Short term goals:  6 weeks or 10 visits   1.  Pt will demonstrate increased lumbar ROM by at least 3 degrees from the initial ROM value with improvements noted in functional ROM and ability to perform ADLs  2.  Pt will demonstrate increased maximum isometric torque value by 5% when compared to the initial value resulting in improved ability to perform bending, lifting, and carrying activities safely, confidently.  3.  Patient report a reduction in worst pain score by 1-2 points for improved tolerance during work and recreational activities  4.  Pt able to perform HEP  correctly with minimal cueing or supervision for therapist     Long term goals: 13 weeks or 20 visits   1. Pt will demonstrate increased lumbar ROM by at least 6 degrees from initial ROM value, resulting in improved ability to perform functional fwd bending while standing and sitting.   2. Pt will demonstrate increased maximum isometric torque value by 10% when compared to the initial value resulting in improved ability to perform bending, lifting, and carrying activities safely, confidently.  3. Pt to demonstrate ability to independently control and reduce their pain through posture positioning and mechanical movements throughout a typical day.  4.  Patient will demonstrate improved overall function per FOTO Survey to CJ = at least 20% but < 40% impaired, limited or restricted score or less.      Plan   Continue with established Plan of Care towards established PT goals.

## 2017-07-27 ENCOUNTER — CLINICAL SUPPORT (OUTPATIENT)
Dept: REHABILITATION | Facility: OTHER | Age: 72
End: 2017-07-27
Attending: PHYSICAL MEDICINE & REHABILITATION
Payer: MEDICARE

## 2017-07-27 DIAGNOSIS — M51.36 DDD (DEGENERATIVE DISC DISEASE), LUMBAR: Primary | ICD-10-CM

## 2017-07-27 PROCEDURE — 97110 THERAPEUTIC EXERCISES: CPT

## 2017-07-27 NOTE — PROGRESS NOTES
[facility]      Name: Haja Marti  Clinic Number: [id number]  Date of Treatment: 2017   Diagnosis:   Encounter Diagnosis   Name Primary?    DDD (degenerative disc disease), lumbar Yes     Physician: Esmer Roca, *    Pain pattern determined: 1 PEN  Plan of care signed: 17  Time in: 1:00  Time Out: 2:00  Total Treatment time: 45  Precautions: HTN, eye problems  Visit #: 7      Subjective   Haja reports he has no LBP today.  He states that he has not made the progress that he would like.  He states that his pain is still there when he gets up from bed or up from a chair.  He states that he is debating if it is worth it for him to continue since he has not had much relief.      Patient reports their pain to be 0/10 on a 0-10 scale with 0 being no pain and 10 being the worst pain imaginable.  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                      Pts goals:  Be able to fish without pain.      Objective     Baseline IM Testing Results:   Date of testin17  ROM 0-36 deg   Max Peak Torque 64    Min Peak Torque 22    Flex/Ext Ratio 2.91:1   % below normative data -76.5     FOTO: Focus on Therapeutic Outcomes   Category: lumbar EVAL  % Impaired: 47  Current Score  = CK = at least 40% but < 60% impaired, limited or restricted  Goal at Discharge Score = CJ = at least 20% but < 40% impaired, limited or restricted    Category: lumbar 5th visit (2017)  % Impaired: 50  Current Score  = CK = at least 40% but < 60% impaired, limited or restricted  Goal at Discharge Score = CJ = at least 20% but < 40% impaired, limited or restricted  Treatment    Pt was instructed in and performed the following:     Haja received therapeutic exercises to develop/improved posture, cardiovascular endurance, muscular endurance, lumbar ROM, strength and muscular endurance  "for 40 minutes including the following exercises: Torso, leg ext, leg curl, chest press, upright row, biceps, triceps, leg press, and hip abduction.     [facility] 7/27/2017   Visit Number 7   VAS Pain Rating 0   Treadmill Time (in min.) -   Speed -   Recumbent Bike Seat Pos. 12   Time 10   Extension in Standing 10   Flexion in Lying 10   Lumbar Extension Seat Pad -   Femur Restraint -   Top Dead Center -   Counterweight -   Lumbar Flexion -   Lumbar Extension -   Lumbar Peak Torque -   Min Torque -   Percent From Norm -   Lumbar Weight 67   Repetitions 20   Rating of Perceived Exertion 3   Ice - Z Lie (in min.) 10     Manual therapy: Manual therapy consisted of IASTM with edge tool.  IASTM was performed to lumbar paraspinals.  Patient reported no increased symptoms following.       Home Exercise Program as follows:       Flexion in lying 10x, 3x/day      Supine active HSS  10x 3x/day        LTR 10x 3x/day   Seated flexion stretch 10x    Standing against wall to correct upright posture.   Open book 10x, 3x/day   Modified piriformis stretch 10s/h 3x B  Handouts were given to the patient. Pt demo good understanding of the education provided. Haja demonstrated good return demonstration of activities.   Lumbar roll use compliance: unknown  Additional exercises taught this treatment session:   Reviewed HEP    Assessment     Pt c/o no LBP today pre session, during and post session. He demo HEP well with mod vc for tech. Pt demonstrates bradykinesia, shuffling gait pattern, and forward trunk lean throughout gait cycle.  Patient reported that IASTM felt good and he could tell it was doing "something."  He reported no discomfort with extension in standing so exercise was added to HEP.  Pt tolerated lumbar medx machine with appropriate RPE and no reports of increased pain or discomfort with increased weight.  Patient reported no discomfort in performing sit to stand following med-x machine.  He tolerated all of " the peripheral medx machine with no c/o LBP.    Patient is making good progress towards established goals.  Pt will continue to benefit from skilled outpatient physical therapy to address the deficits stated in the impairment chart, provide pt/family education and to maximize pt's level of independence in the home and community environment.       Pt's spiritual, cultural and educational needs considered and pt agreeable to plan of care and goals as stated below:     Medical necessity is demonstrated by the following IMPAIRMENTS/PROBLEMS:  Medical necessity is demonstrated by the following problem list.    Pt presents with the following impairments:   History  Co-morbidities and personal factors that may impact the plan of care Examination  Body Structures and Functions, activity limitations and participation restrictions that may impact the plan of care Clinical Presentation Decision Making/ Complexity Score   Co-morbidities:   see PMH           Personal Factors:   no deficits Body Regions:   back     Body Systems:   gross symmetry  ROM  strength  transfers  motor control     Activity limitations:   Learning and applying knowledge  no deficits     General Tasks and Commands  no deficits     Communication  no deficits     Mobility  transfers     Self care  no deficits     Domestic Life  doing house work (cleaning house, washing dishes, laundry)     Interactions/Relationships  no deficits     Life Areas  no deficits     Community and Social Life  no deficits     Participation Restrictions:   Fishing on a boat    evolving clinical presentation with changing clinical characteristics    moderate               Goals:   Short term goals:  6 weeks or 10 visits   1.  Pt will demonstrate increased lumbar ROM by at least 3 degrees from the initial ROM value with improvements noted in functional ROM and ability to perform ADLs  2.  Pt will demonstrate increased maximum isometric torque value by 5% when compared to the initial  value resulting in improved ability to perform bending, lifting, and carrying activities safely, confidently.  3.  Patient report a reduction in worst pain score by 1-2 points for improved tolerance during work and recreational activities  4.  Pt able to perform HEP correctly with minimal cueing or supervision for therapist     Long term goals: 13 weeks or 20 visits   1. Pt will demonstrate increased lumbar ROM by at least 6 degrees from initial ROM value, resulting in improved ability to perform functional fwd bending while standing and sitting.   2. Pt will demonstrate increased maximum isometric torque value by 10% when compared to the initial value resulting in improved ability to perform bending, lifting, and carrying activities safely, confidently.  3. Pt to demonstrate ability to independently control and reduce their pain through posture positioning and mechanical movements throughout a typical day.  4.  Patient will demonstrate improved overall function per FOTO Survey to CJ = at least 20% but < 40% impaired, limited or restricted score or less.      Plan   Continue with established Plan of Care towards established PT goals.

## 2017-08-01 ENCOUNTER — CLINICAL SUPPORT (OUTPATIENT)
Dept: REHABILITATION | Facility: OTHER | Age: 72
End: 2017-08-01
Attending: PHYSICAL MEDICINE & REHABILITATION
Payer: MEDICARE

## 2017-08-01 DIAGNOSIS — M51.36 DDD (DEGENERATIVE DISC DISEASE), LUMBAR: Primary | ICD-10-CM

## 2017-08-01 PROCEDURE — 97110 THERAPEUTIC EXERCISES: CPT

## 2017-08-01 NOTE — PROGRESS NOTES
[facility]      Name: Haja Marti  Clinic Number: [id number]  Date of Treatment: 2017   Diagnosis:   Encounter Diagnosis   Name Primary?    DDD (degenerative disc disease), lumbar Yes     Physician: Esmer Roca, *    Pain pattern determined: 1 PEN  Plan of care signed: 17  Time in: 1:00  Time Out: 2:00  Total Treatment time: 45  Precautions: HTN, eye problems  Visit #: 8      Subjective   Haja reports he has minimal LBP today.  He states that he has not made the progress that he would like.  He states that his pain is still there when he gets up from bed or up from a chair.  He has been taking medication that the neuro dr gave him for parkinsons and he feels it is helping him stand taller. His family states that he is moving better.     Patient reports their pain to be 2/10 on a 0-10 scale with 0 being no pain and 10 being the worst pain imaginable.  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                      Pts goals:  Be able to fish without pain.      Objective     Baseline IM Testing Results:   Date of testin17  ROM 0-36 deg   Max Peak Torque 64    Min Peak Torque 22    Flex/Ext Ratio 2.91:1   % below normative data -76.5     FOTO: Focus on Therapeutic Outcomes   Category: lumbar EVAL  % Impaired: 47  Current Score  = CK = at least 40% but < 60% impaired, limited or restricted  Goal at Discharge Score = CJ = at least 20% but < 40% impaired, limited or restricted    Category: lumbar 5th visit (2017)  % Impaired: 50  Current Score  = CK = at least 40% but < 60% impaired, limited or restricted  Goal at Discharge Score = CJ = at least 20% but < 40% impaired, limited or restricted  Treatment    Pt was instructed in and performed the following:   [facility] 2017   Visit Number 8   VAS Pain Rating 0   Treadmill Time (in min.) -    Speed -   Recumbent Bike Seat Pos. 12   Time 10   Extension in Standing 5   Flexion in Lying 10   Lumbar Weight 70   Repetitions 20   Rating of Perceived Exertion 4   Ice - Z Lie (in min.) 10       Haja received therapeutic exercises to develop/improved posture, cardiovascular endurance, muscular endurance, lumbar ROM, strength and muscular endurance for 40 minutes including the following exercises: Torso, leg ext, leg curl, chest press, upright row, biceps, triceps, leg press, and hip abduction.       Manual therapy: None      Home Exercise Program as follows:       Flexion in lying 10x, 3x/day      Supine active HSS  10x 3x/day        LTR 10x 3x/day   Seated flexion stretch 10x    Standing against wall to correct upright posture.   Open book 10x, 3x/day   Modified piriformis stretch 10s/h 3x B  Handouts were given to the patient. Pt demo good understanding of the education provided. Haja demonstrated good return demonstration of activities.   Lumbar roll use compliance: unknown  Additional exercises taught this treatment session:   Reviewed HEP  EIS with small extension ROM.   Assessment     Pt c/o no LBP today pre session, that did decrease during and  post session.  He states that post this session, he is looser, but a day after or so he gets tight again.  He demo HEP well with mod vc for tech. Pt demonstrates bradykinesia, shuffling gait pattern, and forward trunk lean throughout gait cycle.   He reported no discomfort with extension in standing so exercise was added to HEP. If he extens to much he will get pain. Educated to ext lightly.   Pt tolerated lumbar medx machine with appropriate RPE and no reports of increased pain or discomfort with increased weight.  Patient reported no discomfort in performing sit to stand following med-x machine.  He tolerated all of the peripheral medx machine with no c/o LBP.    Patient is making good progress towards established goals.  Pt will continue to benefit from  skilled outpatient physical therapy to address the deficits stated in the impairment chart, provide pt/family education and to maximize pt's level of independence in the home and community environment.       Pt's spiritual, cultural and educational needs considered and pt agreeable to plan of care and goals as stated below:     Medical necessity is demonstrated by the following IMPAIRMENTS/PROBLEMS:  Medical necessity is demonstrated by the following problem list.    Pt presents with the following impairments:   History  Co-morbidities and personal factors that may impact the plan of care Examination  Body Structures and Functions, activity limitations and participation restrictions that may impact the plan of care Clinical Presentation Decision Making/ Complexity Score   Co-morbidities:   see PMH           Personal Factors:   no deficits Body Regions:   back     Body Systems:   gross symmetry  ROM  strength  transfers  motor control     Activity limitations:   Learning and applying knowledge  no deficits     General Tasks and Commands  no deficits     Communication  no deficits     Mobility  transfers     Self care  no deficits     Domestic Life  doing house work (cleaning house, washing dishes, laundry)     Interactions/Relationships  no deficits     Life Areas  no deficits     Community and Social Life  no deficits     Participation Restrictions:   Fishing on a boat    evolving clinical presentation with changing clinical characteristics    moderate               Goals:   Short term goals:  6 weeks or 10 visits   1.  Pt will demonstrate increased lumbar ROM by at least 3 degrees from the initial ROM value with improvements noted in functional ROM and ability to perform ADLs  2.  Pt will demonstrate increased maximum isometric torque value by 5% when compared to the initial value resulting in improved ability to perform bending, lifting, and carrying activities safely, confidently.  3.  Patient report a reduction  in worst pain score by 1-2 points for improved tolerance during work and recreational activities  4.  Pt able to perform HEP correctly with minimal cueing or supervision for therapist     Long term goals: 13 weeks or 20 visits   1. Pt will demonstrate increased lumbar ROM by at least 6 degrees from initial ROM value, resulting in improved ability to perform functional fwd bending while standing and sitting.   2. Pt will demonstrate increased maximum isometric torque value by 10% when compared to the initial value resulting in improved ability to perform bending, lifting, and carrying activities safely, confidently.  3. Pt to demonstrate ability to independently control and reduce their pain through posture positioning and mechanical movements throughout a typical day.  4.  Patient will demonstrate improved overall function per FOTO Survey to CJ = at least 20% but < 40% impaired, limited or restricted score or less.      Plan   Continue with established Plan of Care towards established PT goals.

## 2017-08-03 ENCOUNTER — CLINICAL SUPPORT (OUTPATIENT)
Dept: REHABILITATION | Facility: OTHER | Age: 72
End: 2017-08-03
Attending: PHYSICAL MEDICINE & REHABILITATION
Payer: MEDICARE

## 2017-08-03 DIAGNOSIS — M51.36 DDD (DEGENERATIVE DISC DISEASE), LUMBAR: ICD-10-CM

## 2017-08-03 PROCEDURE — 97110 THERAPEUTIC EXERCISES: CPT

## 2017-08-03 NOTE — PROGRESS NOTES
[facility]      Name: Haja Marti  Clinic Number: [id number]  Date of Treatment: 2017   Diagnosis:   Encounter Diagnosis   Name Primary?    DDD (degenerative disc disease), lumbar      Physician: Esmer Rcoa, *    Pain pattern determined: 1 PEN  Plan of care signed: 17  Time in: 1:00  Time Out: 2:00  Total Treatment time: 45  Precautions: HTN, eye problems  Visit #: 9    Reassessment due 2017    Subjective   Haja reports he has no back pain at the moment.  He reports no change in symptoms overall.  He continues to report pain with sit to stand. He has follow up appointment with his neurologist next week.     Patient reports their pain to be 0/10 on a 0-10 scale with 0 being no pain and 10 being the worst pain imaginable.  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                      Pts goals:  Be able to fish without pain.      Objective     Baseline IM Testing Results:   Date of testin17  ROM 0-36 deg   Max Peak Torque 64    Min Peak Torque 22    Flex/Ext Ratio 2.91:1   % below normative data -76.5     FOTO: Focus on Therapeutic Outcomes   Category: lumbar EVAL  % Impaired: 47  Current Score  = CK = at least 40% but < 60% impaired, limited or restricted  Goal at Discharge Score = CJ = at least 20% but < 40% impaired, limited or restricted    Category: lumbar 5th visit (2017)  % Impaired: 50  Current Score  = CK = at least 40% but < 60% impaired, limited or restricted  Goal at Discharge Score = CJ = at least 20% but < 40% impaired, limited or restricted    Treatment    Pt was instructed in and performed the following:     [facility] 8/3/2017   Visit Number 9   VAS Pain Rating 0   Treadmill Time (in min.) 10   Speed 1.5   Recumbent Bike Seat Pos. -   Time -   Extension in Standing 10   Flexion in Lying 10   Lumbar Extension Seat Pad  -   Femur Restraint -   Top Dead Center -   Counterweight -   Lumbar Flexion -   Lumbar Extension -   Lumbar Peak Torque -   Min Torque -   Percent From Norm -   Lumbar Weight 73   Repetitions 20   Rating of Perceived Exertion 4   Ice - Z Lie (in min.) 10     Haja received therapeutic exercises to develop/improved posture, cardiovascular endurance, muscular endurance, lumbar ROM, strength and muscular endurance for 40 minutes including the following exercises: Torso, leg ext, leg curl, chest press, upright row, biceps, triceps, leg press, and hip abduction.     Manual therapy: Manual therapy: Manual therapy consisted of IASTM with edge tool.  IASTM was performed to lumbar.  Patient reported no increased symptoms following.       Home Exercise Program as follows:       Flexion in lying 10x, 3x/day      Supine active HSS  10x 3x/day        LTR 10x 3x/day   Seated flexion stretch 10x    Standing against wall to correct upright posture.   Open book 10x, 3x/day   Modified piriformis stretch 10s/h 3x B  Handouts were given to the patient. Pt demo good understanding of the education provided. Haja demonstrated good return demonstration of activities.   Lumbar roll use compliance: unknown  Additional exercises taught this treatment session:   Reviewed HEP    Assessment     Pt c/o no LBP today pre session, that did decrease with IASTM and post session.  He states that this does not last long usually. He demo HEP well with mod vc for tech. Pt demonstrates bradykinesia, shuffling gait pattern, and forward trunk lean throughout gait cycle.   He reported no discomfort with extension in standing again and was instructed to use exercise when symptoms increase. Pt tolerated lumbar medx machine with appropriate RPE and no reports of increased pain or discomfort with increased weight.  Patient reported no discomfort in performing sit to stand following med-x machine.  He tolerated all of the peripheral medx machine with no c/o  LBP.    Patient is making good progress towards established goals.  Pt will continue to benefit from skilled outpatient physical therapy to address the deficits stated in the impairment chart, provide pt/family education and to maximize pt's level of independence in the home and community environment.       Pt's spiritual, cultural and educational needs considered and pt agreeable to plan of care and goals as stated below:     Medical necessity is demonstrated by the following IMPAIRMENTS/PROBLEMS:  Medical necessity is demonstrated by the following problem list.    Pt presents with the following impairments:   History  Co-morbidities and personal factors that may impact the plan of care Examination  Body Structures and Functions, activity limitations and participation restrictions that may impact the plan of care Clinical Presentation Decision Making/ Complexity Score   Co-morbidities:   see PMH           Personal Factors:   no deficits Body Regions:   back     Body Systems:   gross symmetry  ROM  strength  transfers  motor control     Activity limitations:   Learning and applying knowledge  no deficits     General Tasks and Commands  no deficits     Communication  no deficits     Mobility  transfers     Self care  no deficits     Domestic Life  doing house work (cleaning house, washing dishes, laundry)     Interactions/Relationships  no deficits     Life Areas  no deficits     Community and Social Life  no deficits     Participation Restrictions:   Fishing on a boat    evolving clinical presentation with changing clinical characteristics    moderate               Goals:   Short term goals:  6 weeks or 10 visits   1.  Pt will demonstrate increased lumbar ROM by at least 3 degrees from the initial ROM value with improvements noted in functional ROM and ability to perform ADLs  2.  Pt will demonstrate increased maximum isometric torque value by 5% when compared to the initial value resulting in improved ability to  perform bending, lifting, and carrying activities safely, confidently.  3.  Patient report a reduction in worst pain score by 1-2 points for improved tolerance during work and recreational activities  4.  Pt able to perform HEP correctly with minimal cueing or supervision for therapist     Long term goals: 13 weeks or 20 visits   1. Pt will demonstrate increased lumbar ROM by at least 6 degrees from initial ROM value, resulting in improved ability to perform functional fwd bending while standing and sitting.   2. Pt will demonstrate increased maximum isometric torque value by 10% when compared to the initial value resulting in improved ability to perform bending, lifting, and carrying activities safely, confidently.  3. Pt to demonstrate ability to independently control and reduce their pain through posture positioning and mechanical movements throughout a typical day.  4.  Patient will demonstrate improved overall function per FOTO Survey to CJ = at least 20% but < 40% impaired, limited or restricted score or less.      Plan   Continue with established Plan of Care towards established PT goals.

## 2017-08-08 ENCOUNTER — CLINICAL SUPPORT (OUTPATIENT)
Dept: REHABILITATION | Facility: OTHER | Age: 72
End: 2017-08-08
Attending: PHYSICAL MEDICINE & REHABILITATION
Payer: MEDICARE

## 2017-08-08 DIAGNOSIS — M51.36 DDD (DEGENERATIVE DISC DISEASE), LUMBAR: ICD-10-CM

## 2017-08-08 PROCEDURE — G8979 MOBILITY GOAL STATUS: HCPCS | Mod: CJ

## 2017-08-08 PROCEDURE — 97110 THERAPEUTIC EXERCISES: CPT

## 2017-08-08 PROCEDURE — G8978 MOBILITY CURRENT STATUS: HCPCS | Mod: CK

## 2017-08-08 NOTE — PROGRESS NOTES
[facility]      Name: Haja Marti  Clinic Number: [id number]  Date of Treatment: 2017   Diagnosis:   Encounter Diagnosis   Name Primary?    DDD (degenerative disc disease), lumbar      Physician: Esmer Roca, *    Pain pattern determined: 1 PEN  Plan of care signed: 17  Time in: 1:00  Time Out: 2:00  Total Treatment time: 45  Precautions: HTN, eye problems  Visit #: 10    Reassessment due 2017, done 2017  Reassessment due 2017    Subjective   Haja reports he has no back pain at the moment.  He is concerned because his symptoms have not changed since starting therapy.  He states that he does not know his diagnosis but has been to a neurologist and he may have parkinson's  He will be going to neurologist soon.  If he has Parkinson's he may want to do therapy to address that.     Patient reports their pain to be 0/10 on a 0-10 scale with 0 being no pain and 10 being the worst pain imaginable.  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                      Pts goals:  Be able to fish without pain.      Objective     Baseline IM Testing Results:   Date of testin17  ROM 0-36 deg   Max Peak Torque 64    Min Peak Torque 22    Flex/Ext Ratio 2.91:1   % below normative data -76.5     Midpoint IM Testing Results:   Date of testin17  ROM 0-42 deg   Max Peak Torque 109   Min Peak Torque 35   Flex/Ext Ratio 3.11:1   % below normative data -59.75     MOVEMENT LOSS 2017    ROM Loss   Flexion Moderate loss   Extension major loss   Side bending Right major loss   Side bending Left major loss   Rotation Right minimal loss   Rotation Left Minimal loss       FOTO: Focus on Therapeutic Outcomes   Category: lumbar EVAL  % Impaired: 47  Current Score  = CK = at least 40% but < 60% impaired, limited or restricted  Goal at Discharge Score = CJ = at least  20% but < 40% impaired, limited or restricted    Category: lumbar 5th visit (7/20/2017)  % Impaired: 50  Current Score  = CK = at least 40% but < 60% impaired, limited or restricted  Goal at Discharge Score = CJ = at least 20% but < 40% impaired, limited or restricted    Category: lumbar 10th visit 8/8/2017  % Impaired: 55%  Current Score  = CK = at least 40% but < 60% impaired, limited or restricted  Goal at Discharge Score = CJ = at least 20% but < 40% impaired, limited or restricted    Treatment    Pt was instructed in and performed the following:     [facility] 8/8/2017   Visit Number 10   VAS Pain Rating 0   Treadmill Time (in min.) 10   Speed 2   Recumbent Bike Seat Pos. -   Time -   Extension in Standing 10   Flexion in Lying 10   Lumbar Extension Seat Pad -   Femur Restraint -   Top Dead Center -   Counterweight -   Lumbar Flexion 42   Lumbar Extension 0   Lumbar Peak Torque 109   Min Torque 35   Percent From Norm -59.75   Percent Change from Initial 75.25   Lumbar Weight -   Repetitions -   Rating of Perceived Exertion -   Ice - Z Lie (in min.) 10     Haja received therapeutic exercises to develop/improved posture, cardiovascular endurance, muscular endurance, lumbar ROM, strength and muscular endurance for 40 minutes including the following exercises: Torso, leg ext, leg curl, chest press, upright row, biceps, triceps, leg press, and hip abduction.     Home Exercise Program as follows:       Flexion in lying 10x, 3x/day      Supine active HSS  10x 3x/day        LTR 10x 3x/day   Seated flexion stretch 10x    Standing against wall to correct upright posture.   Open book 10x, 3x/day   Modified piriformis stretch 10s/h 3x B  Handouts were given to the patient. Pt demo good understanding of the education provided. Haja demonstrated good return demonstration of activities.   Lumbar roll use compliance: unknown  Additional exercises taught this treatment session:   Reviewed HEP    Assessment      Patient has attended 10 visits at [facility] which included MD evaluation, PT evaluation with isometric testing, and physical therapy treatment including HEP instruction, education, aerobic work, dynamic strengthening on med ex equipment for the spine, and whole body strengthening on med ex equipment with increasing weight loads.  Patient  is demonstrating increased ability to reduce symptoms, improved posture, improved lumbar ROM, and improved lumbar strength on med ex test by 75%  average.    Pt c/o no LBP today.  He demo HEP well with mod vc for tech. Pt demonstrates bradykinesia, shuffling gait pattern, and forward trunk lean throughout gait cycle.  Patient tolerated med-x testing well with no increases in symptoms. Patient demonstrated increased strength and ROM.  He tolerated all of the peripheral medx machine with no c/o LBP. Patient progressing towards all goals. Plan to continue with therapy at this point.      Patient is making good progress towards established goals.  Pt will continue to benefit from skilled outpatient physical therapy to address the deficits stated in the impairment chart, provide pt/family education and to maximize pt's level of independence in the home and community environment.       Pt's spiritual, cultural and educational needs considered and pt agreeable to plan of care and goals as stated below:     Medical necessity is demonstrated by the following IMPAIRMENTS/PROBLEMS:  Medical necessity is demonstrated by the following problem list.    Pt presents with the following impairments:   History  Co-morbidities and personal factors that may impact the plan of care Examination  Body Structures and Functions, activity limitations and participation restrictions that may impact the plan of care Clinical Presentation Decision Making/ Complexity Score   Co-morbidities:   see PMH           Personal Factors:   no deficits Body Regions:   back     Body Systems:   gross  symmetry  ROM  strength  transfers  motor control     Activity limitations:   Learning and applying knowledge  no deficits     General Tasks and Commands  no deficits     Communication  no deficits     Mobility  transfers     Self care  no deficits     Domestic Life  doing house work (cleaning house, washing dishes, laundry)     Interactions/Relationships  no deficits     Life Areas  no deficits     Community and Social Life  no deficits     Participation Restrictions:   Fishing on a boat    evolving clinical presentation with changing clinical characteristics    moderate        Goals:   Short term goals:  6 weeks or 10 visits   1.  Pt will demonstrate increased lumbar ROM by at least 3 degrees from the initial ROM value with improvements noted in functional ROM and ability to perform ADLs MET  2.  Pt will demonstrate increased maximum isometric torque value by 5% when compared to the initial value resulting in improved ability to perform bending, lifting, and carrying activities safely, confidently.MET  3.  Patient report a reduction in worst pain score by 1-2 points for improved tolerance during work and recreational activities NOT MET  4.  Pt able to perform HEP correctly with minimal cueing or supervision for therapist. MET     Long term goals: 13 weeks or 20 visits   1. Pt will demonstrate increased lumbar ROM by at least 6 degrees from initial ROM value, resulting in improved ability to perform functional fwd bending while standing and sitting. PROGRESSING  2. Pt will demonstrate increased maximum isometric torque value by 10% when compared to the initial value resulting in improved ability to perform bending, lifting, and carrying activities safely, confidently. PROGRESSING  3. Pt to demonstrate ability to independently control and reduce their pain through posture positioning and mechanical movements throughout a typical day. PROGRESSING  4.  Patient will demonstrate improved overall function per FOTO Survey  to CJ = at least 20% but < 40% impaired, limited or restricted score or less. PROGRESSING    Plan   Continue with established Plan of Care towards established PT goals.

## 2017-08-10 ENCOUNTER — CLINICAL SUPPORT (OUTPATIENT)
Dept: REHABILITATION | Facility: OTHER | Age: 72
End: 2017-08-10
Attending: PHYSICAL MEDICINE & REHABILITATION
Payer: MEDICARE

## 2017-08-10 DIAGNOSIS — M51.36 DDD (DEGENERATIVE DISC DISEASE), LUMBAR: ICD-10-CM

## 2017-08-10 DIAGNOSIS — M54.50 CHRONIC BILATERAL LOW BACK PAIN WITHOUT SCIATICA: ICD-10-CM

## 2017-08-10 DIAGNOSIS — G89.29 CHRONIC BILATERAL LOW BACK PAIN WITHOUT SCIATICA: ICD-10-CM

## 2017-08-10 PROCEDURE — 97110 THERAPEUTIC EXERCISES: CPT

## 2017-08-10 NOTE — PROGRESS NOTES
[facility]      Name: Haja Marti  Clinic Number: [id number]  Date of Treatment: 08/10/2017   Diagnosis:   No diagnosis found.  Physician: Esmer Roca, *    Pain pattern determined: 1 PEN  Plan of care signed: 17  Time in: 1:30  Time Out: 2:30  Total Treatment time: 45  Precautions: HTN, eye problems  Visit #: 11    Reassessment due 2017, done 2017  Reassessment due 2017    Subjective   Haja reports he has no back pain at the moment.  He is concerned because his symptoms have not changed since starting therapy.  He went to his neuro Dr. yesterday and he told him he has beginning sign's of Parkinson's. The PT staff at  suggested him to start the [facility] program for parkinsons at the [facility]. He agreed.     Patient reports their pain to be 0/10 on a 0-10 scale with 0 being no pain and 10 being the worst pain imaginable.  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                      Pts goals:  Be able to fish without pain.      Objective     Baseline IM Testing Results:   Date of testin17  ROM 0-36 deg   Max Peak Torque 64    Min Peak Torque 22    Flex/Ext Ratio 2.91:1   % below normative data -76.5     Midpoint IM Testing Results:   Date of testin17  ROM 0-42 deg   Max Peak Torque 109   Min Peak Torque 35   Flex/Ext Ratio 3.11:1   % below normative data -59.75     MOVEMENT LOSS 2017    ROM Loss   Flexion Moderate loss   Extension major loss   Side bending Right major loss   Side bending Left major loss   Rotation Right minimal loss   Rotation Left Minimal loss       FOTO: Focus on Therapeutic Outcomes   Category: lumbar EVAL  % Impaired: 47  Current Score  = CK = at least 40% but < 60% impaired, limited or restricted  Goal at Discharge Score = CJ = at least 20% but < 40% impaired, limited or restricted    Category:  lumbar 5th visit (7/20/2017)  % Impaired: 50  Current Score  = CK = at least 40% but < 60% impaired, limited or restricted  Goal at Discharge Score = CJ = at least 20% but < 40% impaired, limited or restricted    Category: lumbar 10th visit 8/8/2017  % Impaired: 55%  Current Score  = CK = at least 40% but < 60% impaired, limited or restricted  Goal at Discharge Score = CJ = at least 20% but < 40% impaired, limited or restricted    Treatment    Pt was instructed in and performed the following:       Haja received therapeutic exercises to develop/improved posture, cardiovascular endurance, muscular endurance, lumbar ROM, strength and muscular endurance for 40 minutes including the following exercises: Torso, leg ext, leg curl, chest press, upright row, biceps, triceps, leg press, and hip abduction.   [facility] 8/10/2017   Visit Number 11   VAS Pain Rating 0   Treadmill Time (in min.) 10   Speed -   Recumbent Bike Seat Pos. 19   Time 10   Extension in Standing 10   Flexion in Lying 10   Lumbar Weight 77   Repetitions 18   Rating of Perceived Exertion 4   Ice - Z Lie (in min.) 10   Standing ext 10x  HHS 3x B LE  Open book  LTR 10x    Home Exercise Program as follows:       Flexion in lying 10x, 3x/day      Supine active HSS  10x 3x/day        LTR 10x 3x/day   Seated flexion stretch 10x    Standing against wall to correct upright posture.   Open book 10x, 3x/day   Modified piriformis stretch 10s/h 3x B    Handouts were given to the patient. Pt demo good understanding of the education provided. Haja demonstrated good return demonstration of activities.   Lumbar roll use compliance: unknown  Additional exercises taught this treatment session:   Reviewed HEP    Assessment       Pt c/o no LBP today pre, during and post session.  He demo HEP well with mod vc for tech. Pt demonstrates bradykinesia, shuffling gait pattern, and forward trunk lean throughout gait cycle. Pt tolerated lumbar medx machine at same  weight with no c/o pain.  He tolerated all of the peripheral medx machine with no c/o LBP. Patient progressing towards all goals. Reviewed log roll in clinic and pt demo well and is doing it at home.  Plan to continue with therapy at this point until pt gets a referral from neuro  For Parkinson program at the [facility].     Patient is making good progress towards established goals.  Pt will continue to benefit from skilled outpatient physical therapy to address the deficits stated in the impairment chart, provide pt/family education and to maximize pt's level of independence in the home and community environment.       Pt's spiritual, cultural and educational needs considered and pt agreeable to plan of care and goals as stated below:     Medical necessity is demonstrated by the following IMPAIRMENTS/PROBLEMS:  Medical necessity is demonstrated by the following problem list.    Pt presents with the following impairments:   History  Co-morbidities and personal factors that may impact the plan of care Examination  Body Structures and Functions, activity limitations and participation restrictions that may impact the plan of care Clinical Presentation Decision Making/ Complexity Score   Co-morbidities:   see PMH           Personal Factors:   no deficits Body Regions:   back     Body Systems:   gross symmetry  ROM  strength  transfers  motor control     Activity limitations:   Learning and applying knowledge  no deficits     General Tasks and Commands  no deficits     Communication  no deficits     Mobility  transfers     Self care  no deficits     Domestic Life  doing house work (cleaning house, washing dishes, laundry)     Interactions/Relationships  no deficits     Life Areas  no deficits     Community and Social Life  no deficits     Participation Restrictions:   Fishing on a boat    evolving clinical presentation with changing clinical characteristics    moderate        Goals:   Short term goals:  6  weeks or 10 visits   1.  Pt will demonstrate increased lumbar ROM by at least 3 degrees from the initial ROM value with improvements noted in functional ROM and ability to perform ADLs MET  2.  Pt will demonstrate increased maximum isometric torque value by 5% when compared to the initial value resulting in improved ability to perform bending, lifting, and carrying activities safely, confidently.MET  3.  Patient report a reduction in worst pain score by 1-2 points for improved tolerance during work and recreational activities NOT MET  4.  Pt able to perform HEP correctly with minimal cueing or supervision for therapist. MET     Long term goals: 13 weeks or 20 visits   1. Pt will demonstrate increased lumbar ROM by at least 6 degrees from initial ROM value, resulting in improved ability to perform functional fwd bending while standing and sitting. PROGRESSING  2. Pt will demonstrate increased maximum isometric torque value by 10% when compared to the initial value resulting in improved ability to perform bending, lifting, and carrying activities safely, confidently. PROGRESSING  3. Pt to demonstrate ability to independently control and reduce their pain through posture positioning and mechanical movements throughout a typical day. PROGRESSING  4.  Patient will demonstrate improved overall function per FOTO Survey to CJ = at least 20% but < 40% impaired, limited or restricted score or less. PROGRESSING    Plan   Continue with established Plan of Care towards established PT goals. Plan to cont to see pt for a few visits at  while [place] the PT connects with the pts neuro doctor to get an referral (script) for pt to start the Parkinson's program [facility].

## 2017-08-15 ENCOUNTER — TELEPHONE (OUTPATIENT)
Dept: REHABILITATION | Facility: OTHER | Age: 72
End: 2017-08-15

## 2017-08-16 ENCOUNTER — CLINICAL SUPPORT (OUTPATIENT)
Dept: REHABILITATION | Facility: OTHER | Age: 72
End: 2017-08-16
Attending: PHYSICAL MEDICINE & REHABILITATION
Payer: MEDICARE

## 2017-08-16 DIAGNOSIS — M51.36 DDD (DEGENERATIVE DISC DISEASE), LUMBAR: Primary | ICD-10-CM

## 2017-08-16 PROCEDURE — 97110 THERAPEUTIC EXERCISES: CPT

## 2017-08-16 NOTE — PROGRESS NOTES
[facility]      Name: Haja Marti  Clinic Number: [id number]  Date of Treatment: 2017   Diagnosis:   Encounter Diagnosis   Name Primary?    DDD (degenerative disc disease), lumbar Yes     Physician: Esmer Roca, *    Pain pattern determined: 1 PEN  Plan of care signed: 17  Time in: 1:30  Time Out: 2:30  Total Treatment time: 45  Precautions: HTN, eye problems  Visit #: 11    Reassessment due 2017, done 2017  Reassessment due 2017    Subjective   Haja reports he has LBP today and he feels that he has not gotten better.  He is concerned because his symptoms have not changed since starting therapy.  He went to his neuro Dr. yesterday and he told him he has beginning sign's of Parkinson's.  Educated him about the [facility] Parkinson's program at [facility] and he is thinking about it, but he would like to go to the facility and see the program. We are going to follow with the program and get more info for the pt.     Patient reports their pain to be 0/10 on a 0-10 scale with 0 being no pain and 10 being the worst pain imaginable.  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                      Pts goals:  Be able to fish without pain.      Objective     Baseline IM Testing Results:   Date of testin17  ROM 0-36 deg   Max Peak Torque 64    Min Peak Torque 22    Flex/Ext Ratio 2.91:1   % below normative data -76.5     Midpoint IM Testing Results:   Date of testin17  ROM 0-42 deg   Max Peak Torque 109   Min Peak Torque 35   Flex/Ext Ratio 3.11:1   % below normative data -59.75     MOVEMENT LOSS 2017    ROM Loss   Flexion Moderate loss   Extension major loss   Side bending Right major loss   Side bending Left major loss   Rotation Right minimal loss   Rotation Left Minimal loss       FOTO: Focus on Therapeutic Outcomes   Category: lumbar  EVAL  % Impaired: 47  Current Score  = CK = at least 40% but < 60% impaired, limited or restricted  Goal at Discharge Score = CJ = at least 20% but < 40% impaired, limited or restricted    Category: lumbar 5th visit (7/20/2017)  % Impaired: 50  Current Score  = CK = at least 40% but < 60% impaired, limited or restricted  Goal at Discharge Score = CJ = at least 20% but < 40% impaired, limited or restricted    Category: lumbar 10th visit 8/8/2017  % Impaired: 55%  Current Score  = CK = at least 40% but < 60% impaired, limited or restricted  Goal at Discharge Score = CJ = at least 20% but < 40% impaired, limited or restricted    Treatment    Pt was instructed in and performed the following:       Haja received therapeutic exercises to develop/improved posture, cardiovascular endurance, muscular endurance, lumbar ROM, strength and muscular endurance for 40 minutes including the following exercises: Torso, leg ext, leg curl, chest press, upright row, biceps, triceps, leg press, and hip abduction.  [facility] 8/16/2017   Visit Number 12   VAS Pain Rating 5   Recumbent Bike Seat Pos. 19   Time 10   Extension in Standing 10   Flexion in Lying 10   Lumbar Weight 77   Repetitions 20   Rating of Perceived Exertion 5   Ice - Z Lie (in min.) 10        Standing ext 10x  HHS 3x B LE  Open book  LTR 10x    Home Exercise Program as follows:       Flexion in lying 10x, 3x/day      Supine active HSS  10x 3x/day        LTR 10x 3x/day   Seated flexion stretch 10x    Standing against wall to correct upright posture.   Open book 10x, 3x/day   Modified piriformis stretch 10s/h 3x B    Handouts were given to the patient. Pt demo good understanding of the education provided. Haja demonstrated good return demonstration of activities.   Lumbar roll use compliance: unknown  Additional exercises taught this treatment session:   Reviewed HEP    Assessment       Pt c/o LBP that stayed the same with during and post session.  He demo  HEP well with min vc for tech. Pt demonstrates bradykinesia, shuffling gait pattern, and forward trunk lean throughout gait cycle. Pt tolerated lumbar medx machine with no c/o increased pain.  He tolerated all of the peripheral medx machine with no c/o increased LBP. Patient progressing towards all goals.  Plan to continue with therapy at this point until pt gets more info about the Parkinson program at the [facility] therapy [facility]. We are helping him find out more info about the program.     Patient is making good progress towards established goals.  Pt will continue to benefit from skilled outpatient physical therapy to address the deficits stated in the impairment chart, provide pt/family education and to maximize pt's level of independence in the home and community environment.       Pt's spiritual, cultural and educational needs considered and pt agreeable to plan of care and goals as stated below:     Medical necessity is demonstrated by the following IMPAIRMENTS/PROBLEMS:  Medical necessity is demonstrated by the following problem list.    Pt presents with the following impairments:   History  Co-morbidities and personal factors that may impact the plan of care Examination  Body Structures and Functions, activity limitations and participation restrictions that may impact the plan of care Clinical Presentation Decision Making/ Complexity Score   Co-morbidities:   see PMH           Personal Factors:   no deficits Body Regions:   back     Body Systems:   gross symmetry  ROM  strength  transfers  motor control     Activity limitations:   Learning and applying knowledge  no deficits     General Tasks and Commands  no deficits     Communication  no deficits     Mobility  transfers     Self care  no deficits     Domestic Life  doing house work (cleaning house, washing dishes, laundry)     Interactions/Relationships  no deficits     Life Areas  no deficits     Community and Social Life  no deficits     Participation  Restrictions:   Fishing on a boat    evolving clinical presentation with changing clinical characteristics    moderate        Goals:   Short term goals:  6 weeks or 10 visits   1.  Pt will demonstrate increased lumbar ROM by at least 3 degrees from the initial ROM value with improvements noted in functional ROM and ability to perform ADLs MET  2.  Pt will demonstrate increased maximum isometric torque value by 5% when compared to the initial value resulting in improved ability to perform bending, lifting, and carrying activities safely, confidently.MET  3.  Patient report a reduction in worst pain score by 1-2 points for improved tolerance during work and recreational activities NOT MET  4.  Pt able to perform HEP correctly with minimal cueing or supervision for therapist. MET     Long term goals: 13 weeks or 20 visits   1. Pt will demonstrate increased lumbar ROM by at least 6 degrees from initial ROM value, resulting in improved ability to perform functional fwd bending while standing and sitting. PROGRESSING  2. Pt will demonstrate increased maximum isometric torque value by 10% when compared to the initial value resulting in improved ability to perform bending, lifting, and carrying activities safely, confidently. PROGRESSING  3. Pt to demonstrate ability to independently control and reduce their pain through posture positioning and mechanical movements throughout a typical day. PROGRESSING  4.  Patient will demonstrate improved overall function per FOTO Survey to CJ = at least 20% but < 40% impaired, limited or restricted score or less. PROGRESSING    Plan   Continue with established Plan of Care towards established PT goals. Plan to cont to see pt for a few visits at  while pt finds more info about the  Parkinson's program at [facility]. Manager of [facility] (Alexys) is going to call pt to discuss the benefits of the [facility] program.

## 2017-08-18 ENCOUNTER — CLINICAL SUPPORT (OUTPATIENT)
Dept: REHABILITATION | Facility: OTHER | Age: 72
End: 2017-08-18
Attending: PHYSICAL MEDICINE & REHABILITATION
Payer: MEDICARE

## 2017-08-18 DIAGNOSIS — M51.36 DDD (DEGENERATIVE DISC DISEASE), LUMBAR: ICD-10-CM

## 2017-08-18 PROCEDURE — G8980 MOBILITY D/C STATUS: HCPCS | Mod: CK | Performed by: PHYSICAL MEDICINE & REHABILITATION

## 2017-08-18 PROCEDURE — 97110 THERAPEUTIC EXERCISES: CPT | Performed by: PHYSICAL MEDICINE & REHABILITATION

## 2017-08-18 PROCEDURE — G8979 MOBILITY GOAL STATUS: HCPCS | Mod: CJ | Performed by: PHYSICAL MEDICINE & REHABILITATION

## 2017-08-18 NOTE — PROGRESS NOTES
[facility]        DISCHARGE NOTE- PATIENT  SELF DISCHARGING      Name: Haja Marti  Clinic Number: [id number]  Date of Treatment: 2017   Diagnosis:   Encounter Diagnosis   Name Primary?    DDD (degenerative disc disease), lumbar      Physician: Esmer Roca, *    Pain pattern determined: 1 PEN  Plan of care signed: 17  Time in: 11:20 am  Time Out: 12:20 pm  Total Treatment time: 45  Precautions: HTN, eye problems  Visit #: 13    Reassessment due 2017, done 2017          Subjective   Haja reports he has LBP today and he feels that he has not gotten better.  He is concerned because his symptoms have not changed since starting therapy.  He went to his neuro Dr. yesterday and he told him he has beginning sign's of Parkinson's.  Educated him about the [facility] Parkinson's program at [facility] and [employer] and [facility] therapist, Fadia called him.  He does not want to continue therapy, he also does not want to go to [email or web address].  He reports he belongs to 2 gyms, and he walks 1 hour a day, he swims 2/week, he does weights.  He wants to manage on his own.  He does his stretches.  His back pain continues. He uses a lumbar roll.  Praised him for his compliance, again gave him options, and he reported being thankful for options, but reports he wants to continue indep.   He did his exercises well.   He left with pain the same 2/10 pre and post visit.  He is thankful for the time and exercises, but reports he can manage and he does all his own work.      Patient reports their pain to be 2/10 on a 0-10 scale with 0 being no pain and 10 being the worst pain imaginable.  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                      Pts goals:  Be able to fish without pain. -fishing but with pain     Objective     Baseline IM Testing Results:   Date of testin17  ROM  0-36 deg   Max Peak Torque 64    Min Peak Torque 22    Flex/Ext Ratio 2.91:1   % below normative data -76.5     Midpoint IM Testing Results:   Date of testin17  ROM 0-42 deg   Max Peak Torque 109   Min Peak Torque 35   Flex/Ext Ratio 3.11:1   % below normative data -59.75     MOVEMENT LOSS 2017    ROM Loss   Flexion Moderate loss   Extension major loss   Side bending Right major loss   Side bending Left major loss   Rotation Right minimal loss   Rotation Left Minimal loss       FOTO: Focus on Therapeutic Outcomes   Category: lumbar EVAL  % Impaired: 47  Current Score  = CK = at least 40% but < 60% impaired, limited or restricted  Goal at Discharge Score = CJ = at least 20% but < 40% impaired, limited or restricted    Category: lumbar 5th visit (2017)  % Impaired: 50  Current Score  = CK = at least 40% but < 60% impaired, limited or restricted  Goal at Discharge Score = CJ = at least 20% but < 40% impaired, limited or restricted    Category: lumbar 13th visit 2017  % Impaired: 55%  Current Score  = CK = at least 40% but < 60% impaired, limited or restricted  Goal at Discharge Score = CJ = at least 20% but < 40% impaired, limited or restricted    Treatment    Pt was instructed in and performed the following:       Haja received therapeutic exercises to develop/improved posture, cardiovascular endurance, muscular endurance, lumbar ROM, strength and muscular endurance for 40 minutes including the following exercises: Torso, leg ext, leg curl, chest press, upright row, biceps, triceps, leg press, and hip abduction.    [facility] 2017   Visit Number 13   VAS Pain Rating 2   Treadmill Time (in min.) -   Speed -   Recumbent Bike Seat Pos. 19   Time 10   Extension in Standing 10   Flexion in Lying 10   Lumbar Extension Seat Pad -   Femur Restraint -   Top Dead Center -   Counterweight -   Lumbar Flexion -   Lumbar Extension -   Lumbar Peak Torque -   Min Torque -   Percent From Norm -    Percent Change from Initial -   Lumbar Weight 80   Repetitions 20   Rating of Perceived Exertion 4   Ice - Z Lie (in min.) 10          Standing ext 10x  HHS 3x B LE  Open book  LTR 10x    Home Exercise Program as follows:       Flexion in lying 10x, 3x/day      Supine active HSS  10x 3x/day        LTR 10x 3x/day   Seated flexion stretch 10x    Standing against wall to correct upright posture.   Open book 10x, 3x/day   Modified piriformis stretch 10s/h 3x B    Handouts were given to the patient. Pt demo good understanding of the education provided. Haja demonstrated good return demonstration of activities.   Lumbar roll use compliance: unknown  Additional exercises taught this treatment session:   Reviewed HEP    Assessment       Pt c/o LBP that stayed the same with during and post session.  He demo HEP well with no vc for tech. Pt demonstrates bradykinesia, shuffling gait pattern, and forward trunk lean throughout gait cycle. Pt tolerated lumbar medx machine with no c/o increased pain.  He tolerated all of the peripheral medx machine with no c/o increased LBP. Patient progressing towards all goals.   Patient does not wish to continue therapy at this time.   Discussed back care, posture, use of lumbar roll, stretching, there ex.  He understands information and declined interest in wellness, [facility] and continuing therapy.  He was pleased with care but reports no change and he can manage indep.             Goals:   Short term goals:  6 weeks or 10 visits   1.  Pt will demonstrate increased lumbar ROM by at least 3 degrees from the initial ROM value with improvements noted in functional ROM and ability to perform ADLs MET  2.  Pt will demonstrate increased maximum isometric torque value by 5% when compared to the initial value resulting in improved ability to perform bending, lifting, and carrying activities safely, confidently.MET  3.  Patient report a reduction in worst pain score by 1-2 points for improved  tolerance during work and recreational activities NOT MET  4.  Pt able to perform HEP correctly with minimal cueing or supervision for therapist. MET     Long term goals: 13 weeks or 20 visits   1. Pt will demonstrate increased lumbar ROM by at least 6 degrees from initial ROM value, resulting in improved ability to perform functional fwd bending while standing and sitting. PROGRESSING  2. Pt will demonstrate increased maximum isometric torque value by 10% when compared to the initial value resulting in improved ability to perform bending, lifting, and carrying activities safely, confidently. PROGRESSING  3. Pt to demonstrate ability to independently control and reduce their pain through posture positioning and mechanical movements throughout a typical day. PROGRESSING  4.  Patient will demonstrate improved overall function per FOTO Survey to CJ = at least 20% but < 40% impaired, limited or restricted score or less. PROGRESSING    Plan     Discharge, education reviewed, patient self discharging to manage his program indep.

## 2017-08-29 ENCOUNTER — TELEPHONE (OUTPATIENT)
Dept: INTERNAL MEDICINE | Facility: CLINIC | Age: 72
End: 2017-08-29

## 2017-08-29 DIAGNOSIS — M48.061 LUMBAR SPINAL STENOSIS: Primary | ICD-10-CM

## 2017-08-29 NOTE — TELEPHONE ENCOUNTER
Pt states you advised him to go to [facility] with [facility] but it didn't help at all and is now requesting pain management to get injections because he has tried everything at this point.      Please advise

## 2017-08-29 NOTE — TELEPHONE ENCOUNTER
----- Message from Sola Jackson sent at 8/29/2017  3:15 PM CDT -----  Contact: Self/ [phone]   Pt is calling to see if the doctor can recommend a pain management doctor for his back. Please call and advise     Thank you

## 2017-08-30 NOTE — TELEPHONE ENCOUNTER
Spoke to pt and informed order for the pain clinic was put in and to bring  Copy of the MRI with him per pcp.    Pt expressed understanding

## 2017-08-31 ENCOUNTER — OFFICE VISIT (OUTPATIENT)
Dept: UROLOGY | Facility: CLINIC | Age: 72
End: 2017-08-31
Payer: MEDICARE

## 2017-08-31 ENCOUNTER — TELEPHONE (OUTPATIENT)
Dept: UROLOGY | Facility: CLINIC | Age: 72
End: 2017-08-31

## 2017-08-31 VITALS
DIASTOLIC BLOOD PRESSURE: 68 MMHG | SYSTOLIC BLOOD PRESSURE: 102 MMHG | HEIGHT: 70 IN | HEART RATE: 78 BPM | BODY MASS INDEX: 28.93 KG/M2 | WEIGHT: 202.06 LBS

## 2017-08-31 DIAGNOSIS — R39.15 URINARY URGENCY: Primary | ICD-10-CM

## 2017-08-31 LAB
BILIRUB SERPL-MCNC: NORMAL MG/DL
BLOOD URINE, POC: NORMAL
COLOR, POC UA: YELLOW
GLUCOSE UR QL STRIP: NORMAL
KETONES UR QL STRIP: NORMAL
LEUKOCYTE ESTERASE URINE, POC: NORMAL
NITRITE, POC UA: NORMAL
PH, POC UA: 5
PROTEIN, POC: NORMAL
SPECIFIC GRAVITY, POC UA: 1.02
UROBILINOGEN, POC UA: NORMAL

## 2017-08-31 PROCEDURE — 1126F AMNT PAIN NOTED NONE PRSNT: CPT | Mod: S$GLB,,, | Performed by: NURSE PRACTITIONER

## 2017-08-31 PROCEDURE — 3074F SYST BP LT 130 MM HG: CPT | Mod: S$GLB,,, | Performed by: NURSE PRACTITIONER

## 2017-08-31 PROCEDURE — 99499 UNLISTED E&M SERVICE: CPT | Mod: S$GLB,,, | Performed by: NURSE PRACTITIONER

## 2017-08-31 PROCEDURE — 99213 OFFICE O/P EST LOW 20 MIN: CPT | Mod: 25,S$GLB,, | Performed by: NURSE PRACTITIONER

## 2017-08-31 PROCEDURE — 3008F BODY MASS INDEX DOCD: CPT | Mod: S$GLB,,, | Performed by: NURSE PRACTITIONER

## 2017-08-31 PROCEDURE — 81002 URINALYSIS NONAUTO W/O SCOPE: CPT | Mod: S$GLB,,, | Performed by: NURSE PRACTITIONER

## 2017-08-31 PROCEDURE — 1159F MED LIST DOCD IN RCRD: CPT | Mod: S$GLB,,, | Performed by: NURSE PRACTITIONER

## 2017-08-31 PROCEDURE — 3078F DIAST BP <80 MM HG: CPT | Mod: S$GLB,,, | Performed by: NURSE PRACTITIONER

## 2017-08-31 RX ORDER — ERGOCALCIFEROL 1.25 MG/1
50000 CAPSULE ORAL
COMMUNITY
End: 2017-08-31 | Stop reason: SDUPTHER

## 2017-08-31 RX ORDER — BUPROPION HYDROCHLORIDE 150 MG/1
150 TABLET ORAL
COMMUNITY
End: 2017-10-17

## 2017-08-31 RX ORDER — POTASSIUM CITRATE AND CITRIC ACID MONOHYDRATE 1100; 334 MG/5ML; MG/5ML
10 SOLUTION ORAL
COMMUNITY
End: 2020-03-03

## 2017-08-31 RX ORDER — ASPIRIN 81 MG/1
81 TABLET ORAL
COMMUNITY
End: 2017-08-31 | Stop reason: SDUPTHER

## 2017-08-31 RX ORDER — ESCITALOPRAM OXALATE 10 MG/1
10 TABLET ORAL
COMMUNITY
End: 2017-08-31 | Stop reason: SDUPTHER

## 2017-08-31 RX ORDER — CARBIDOPA AND LEVODOPA 25; 100 MG/1; MG/1
25-100 TABLET ORAL 3 TIMES DAILY
COMMUNITY
Start: 2017-08-10 | End: 2017-11-30 | Stop reason: SDUPTHER

## 2017-08-31 RX ORDER — LANOLIN ALCOHOL/MO/W.PET/CERES
50 CREAM (GRAM) TOPICAL
COMMUNITY
End: 2017-08-31 | Stop reason: SDUPTHER

## 2017-08-31 RX ORDER — DONEPEZIL HYDROCHLORIDE 5 MG/1
5 TABLET, FILM COATED ORAL
COMMUNITY
Start: 2017-08-30 | End: 2018-04-24

## 2017-08-31 RX ORDER — FINASTERIDE 5 MG/1
5 TABLET, FILM COATED ORAL
COMMUNITY
End: 2017-10-31 | Stop reason: SDUPTHER

## 2017-08-31 RX ORDER — ATORVASTATIN CALCIUM 40 MG/1
20 TABLET, FILM COATED ORAL
COMMUNITY
End: 2017-08-31 | Stop reason: SDUPTHER

## 2017-08-31 RX ORDER — LOSARTAN POTASSIUM 100 MG/1
100 TABLET ORAL
COMMUNITY
End: 2017-08-31 | Stop reason: SDUPTHER

## 2017-08-31 RX ORDER — CHLORTHALIDONE 25 MG/1
TABLET ORAL
COMMUNITY
Start: 2017-08-28 | End: 2017-10-17

## 2017-08-31 NOTE — PROGRESS NOTES
"Subjective:      Haja Marti is a 71 y.o. male who returns today regarding his urinary symptoms. The patient is an established patient of Dr. Oquendo and is new to me today.    The patient reports urinary urgency, nocturia x2, and urinary frequency for the last few months. He denies a weak urinary stream and incomplete bladder emptying. PVR today is 20 mL. He was previously on Proscar but stopped the medication due to adverse sexual SE. He has been off of this medication for the last year. He was recently diagnosed with parkinson's disease and has orthostatic hypotension. Today his AUASS is 22/5, with urgency being his primary concern.     The following portions of the patient's history were reviewed and updated as appropriate: allergies, current medications, past family history, past medical history, past social history, past surgical history and problem list.    Review of Systems  A comprehensive multipoint review of systems was negative except as otherwise stated in the HPI.     Objective:   Vitals: /68 (BP Location: Left arm, Patient Position: Sitting, BP Method: Large (Automatic))   Pulse 78   Ht 5' 10" (1.778 m)   Wt 91.7 kg (202 lb 0.8 oz)   BMI 28.99 kg/m²     Physical Exam   General: alert and oriented, no acute distress  Respiratory: Symmetric expansion, non-labored breathing  Cardiovascular: regular rate and rhythm, no peripheral edema  Abdomen: soft, non distended  Genitourinary: not done  Rectal: patient declined exam  Skin: normal coloration and turgor, no rashes, no suspicious skin lesions noted  Neuro: no gross deficits  Psych: normal judgment and insight, normal mood/affect and non-anxious    Lab Review   Urinalysis demonstrates positive for protein (trace)  PVR: 20 mL  Lab Results   Component Value Date    WBC 6.05 06/07/2017    HGB 15.6 06/07/2017    HCT 46.3 06/07/2017    MCV 91 06/07/2017     06/07/2017     Lab Results   Component Value Date    CREATININE 1.3 07/13/2017    " BUN 33 (H) 07/13/2017     Lab Results   Component Value Date    PSA 1.4 06/07/2017    PSADIAG 0.67 06/15/2015     Imaging   None     Assessment and Plan:     Haja was seen today for urinary frequency, urinary urgency, urinary incontinence and advice only.    Diagnoses and all orders for this visit:    Urinary urgency  -     POCT URINE DIPSTICK WITHOUT MICROSCOPE  -     mirabegron (MYRBETRIQ) 25 mg Tb24 ER tablet; Take 1 tablet (25 mg total) by mouth once daily.  -     mirabegron (MYRBETRIQ) 25 mg Tb24 ER tablet; Take 1 tablet (25 mg total) by mouth once daily.    Plan:   --Would not recommend flomax given history of orthostatic hypotension. The patient would like more immediate relief for his symptoms. Therefore Proscar is not the best option for treatment.   --Trial of myrbetriq for OAB symptoms  --Follow up in 6 weeks with PVR and DEVI or sooner if no relief

## 2017-08-31 NOTE — TELEPHONE ENCOUNTER
----- Message from Pooja Fleming sent at 8/31/2017  8:49 AM CDT -----  Contact: Patient  x_  1st Request  _  2nd Request  _  3rd Request        Who: ALEXANDRO RENE [[id number]]    Why: Pt returned a call from the office, he said he was disconnected. Please call him back.     What Number to Call Back: [phone]    When to Expect a call back: (With in 24 hours)

## 2017-09-05 ENCOUNTER — HOSPITAL ENCOUNTER (OUTPATIENT)
Dept: RADIOLOGY | Facility: OTHER | Age: 72
Discharge: HOME | End: 2017-09-05
Attending: ANESTHESIOLOGY
Payer: MEDICARE

## 2017-09-05 ENCOUNTER — OFFICE VISIT (OUTPATIENT)
Dept: PAIN MEDICINE | Facility: CLINIC | Age: 72
End: 2017-09-05
Attending: ANESTHESIOLOGY
Payer: MEDICARE

## 2017-09-05 VITALS — HEART RATE: 73 BPM | HEIGHT: 70 IN | SYSTOLIC BLOOD PRESSURE: 122 MMHG | DIASTOLIC BLOOD PRESSURE: 83 MMHG

## 2017-09-05 DIAGNOSIS — M48.07 FORAMINAL STENOSIS OF LUMBOSACRAL REGION: ICD-10-CM

## 2017-09-05 DIAGNOSIS — M54.16 LUMBAR RADICULOPATHY: ICD-10-CM

## 2017-09-05 DIAGNOSIS — M47.816 FACET ARTHROPATHY, LUMBAR: ICD-10-CM

## 2017-09-05 DIAGNOSIS — M51.36 DDD (DEGENERATIVE DISC DISEASE), LUMBAR: Primary | ICD-10-CM

## 2017-09-05 DIAGNOSIS — M51.36 DDD (DEGENERATIVE DISC DISEASE), LUMBAR: ICD-10-CM

## 2017-09-05 PROCEDURE — 3008F BODY MASS INDEX DOCD: CPT | Mod: S$GLB,,, | Performed by: ANESTHESIOLOGY

## 2017-09-05 PROCEDURE — 99499 UNLISTED E&M SERVICE: CPT | Mod: S$GLB,,, | Performed by: ANESTHESIOLOGY

## 2017-09-05 PROCEDURE — 3079F DIAST BP 80-89 MM HG: CPT | Mod: S$GLB,,, | Performed by: ANESTHESIOLOGY

## 2017-09-05 PROCEDURE — 99999 PR PBB SHADOW E&M-EST. PATIENT-LVL III: CPT | Mod: PBBFAC,,, | Performed by: ANESTHESIOLOGY

## 2017-09-05 PROCEDURE — 99204 OFFICE O/P NEW MOD 45 MIN: CPT | Mod: S$GLB,,, | Performed by: ANESTHESIOLOGY

## 2017-09-05 PROCEDURE — 1125F AMNT PAIN NOTED PAIN PRSNT: CPT | Mod: S$GLB,,, | Performed by: ANESTHESIOLOGY

## 2017-09-05 PROCEDURE — 72114 X-RAY EXAM L-S SPINE BENDING: CPT | Mod: 26,,, | Performed by: RADIOLOGY

## 2017-09-05 PROCEDURE — 1159F MED LIST DOCD IN RCRD: CPT | Mod: S$GLB,,, | Performed by: ANESTHESIOLOGY

## 2017-09-05 PROCEDURE — 3074F SYST BP LT 130 MM HG: CPT | Mod: S$GLB,,, | Performed by: ANESTHESIOLOGY

## 2017-09-05 PROCEDURE — 72114 X-RAY EXAM L-S SPINE BENDING: CPT | Mod: TC

## 2017-09-05 NOTE — LETTER
September 5, 2017      Andrez Jackson MD  [address]  [facility] [address]           [facility]  [address]  [facility] [address]  Phone: [phone]  Fax: [phone]          Patient: Haja Marti   MR Number: [id number]   YOB: 1945   Date of Visit: 9/5/2017       Dear Dr. Andrez Jackson:    Thank you for referring Haja Marti to me for evaluation. Attached you will find relevant portions of my assessment and plan of care.    If you have questions, please do not hesitate to call me. I look forward to following Haja Marti along with you.    Sincerely,    Laina Crockett MD    Enclosure  CC:  No Recipients    If you would like to receive this communication electronically, please contact [email or web address] or [phone] to request more information on [facility] Link access.    For providers and/or their staff who would like to refer a patient to [facility], please contact us through our one-stop-shop provider referral line, [facility], at [phone].    If you feel you have received this communication in error or would no longer like to receive these types of communications, please e-mail [email or web address]

## 2017-09-05 NOTE — PROGRESS NOTES
"  Chronic Pain - New Consult    Referring Physician: Andrez Jackson MD    Chief Complaint:   Chief Complaint   Patient presents with    Low-back Pain        SUBJECTIVE: Disclaimer: This note has been generated using voice-recognition software. There may be typographical errors that have been missed during proof-reading    Initial encounter:    Haja Marti presents to the clinic for the evaluation of back pain. The pain started 3 years ago  and symptoms have been worsening.  Pt reports doing back exercises from orthopedic office and didn't help.  Pt reports [facility]'s "[facility]" physical therapy program did not help.  Walks 2 mi daily at [facility]. Also used to run marathons. 15 total. Started swimming 2 years ago, stopped swimming 3 mo ago due to increased weakness from Parkinson's disease. Staying active helps the pain. Denies radiation. Cannot stand upright for longer than 10-15min before back pain gets more intense. Walking does not exacerbate the pain. Transitioning from sitting to standing causes pain. Pain is described as an ache.  Tightness in upper back. Not associated with trauma/fall or other inciting event.     Brief history:    Pain Description:    The pain is located in the back area     At BEST  6/10     At WORST  9/10 on the WORST day.      On average pain is rated as 7/10.     Today the pain is rated as 6/10    The pain is described as aching and tight band      Symptoms interfere with daily activity (pt wishes to be able to swim again, and do light weight-lifting exercises),  pain does not awaken pt during the night and does not hinder the patient the patient from falling asleep.     Exacerbating factors: Getting out of bed/chair.      Mitigating factors nothing.     Patient denies .  Patient denies any suicidal or homicidal ideations    Pain Medications:  Current:  advil with mild relief    Tried in Past:  NSAIDs - Advil prn   TCA -never   SNRI -Wellbutrin, lexapro  Anti-convulsants " -Never  Muscle Relaxants -Never  Opioids- Never    Physical Therapy/Home Exercise:  yes       report:  Reviewed and consistent with medication use as prescribed.    Pain Procedures: None     Chiropractor - adjustment made pain worse in March  Acupuncture - no results, dry needling 2 months ago  TENS unit -never  Spinal decompression -never  Joint replacement -never    Imaging:  Outside MRI L SPINE 2/15/2016    Impression:   Multilevel lumbar spondylosis.   Multilevel, multifactorial spinal canal and neuroforaminal narrowing as above.  Patient has facet hypertrophy throughout the lumbar spine injury to right sided severe neuroforaminal stenosis at L4-5 and bilateral neuroforaminal stenosis at L5-S1 which is mild to moderate.               Past Medical History:   Diagnosis Date    BPH (benign prostatic hypertrophy)     Cataract     OS    Depression     Epiretinal membrane, both eyes     Hyperlipidemia     Hypertension     Kidney stones     Kidney stones     Posterior vitreous detachment of left eye     Retinal detachment 2004    OD    Retinoschisis, left eye     Sleep apnea     Vitreous hemorrhage of left eye      Past Surgical History:   Procedure Laterality Date    CATARACT EXTRACTION      OD    EYE SURGERY      laser indirect  4/8/10    left eye    Laser Indirect Retinopexy  4/8/10    OS    PROSTATE SURGERY      RETINAL DETACHMENT SURGERY  2004    OD    ROTATOR CUFF REPAIR  11/13/2013    TONSILLECTOMY       Social History     Social History    Marital status:      Spouse name: N/A    Number of children: N/A    Years of education: N/A     Occupational History     [employer]     Social History Main Topics    Smoking status: Never Smoker    Smokeless tobacco: Never Used    Alcohol use No    Drug use: No    Sexual activity: Not on file     Other Topics Concern    Not on file     Social History Narrative    No narrative on file     Family History   Problem Relation Age of  Onset    Cataracts Father     Cancer Father      lung    Diabetes Father     Cancer Mother      lung       Review of patient's allergies indicates:  No Known Allergies    Current Outpatient Prescriptions   Medication Sig    aspirin 81 MG Chew Take 81 mg by mouth. 1 Tablet, Chewable Oral Every day    atorvastatin (LIPITOR) 40 MG tablet TAKE 1 TABLET EVERY EVENING    buPROPion (WELLBUTRIN XL) 150 MG TB24 tablet Take 150 mg by mouth.    carbidopa-levodopa  mg (SINEMET)  mg per tablet     chlorthalidone (HYGROTEN) 25 MG Tab     citric acid-potassium citrate (POLYCITRA) 1,100-334 mg/5 mL solution Take 10 mLs by mouth.    donepezil (ARICEPT) 5 MG tablet Take 5 mg by mouth.    ergocalciferol (VITAMIN D2) 50,000 unit Cap Take 50,000 Units by mouth every 30 days.     escitalopram oxalate (LEXAPRO) 20 MG tablet Take 1 tablet (20 mg total) by mouth once daily.    finasteride (PROSCAR) 5 mg tablet Take 5 mg by mouth.    losartan (COZAAR) 100 MG tablet Take 1 tablet (100 mg total) by mouth once daily.    magnesium oxide (MAG-OX) 400 mg tablet Take 400 mg by mouth once daily.    mirabegron (MYRBETRIQ) 25 mg Tb24 ER tablet Take 1 tablet (25 mg total) by mouth once daily.    mirabegron (MYRBETRIQ) 25 mg Tb24 ER tablet Take 1 tablet (25 mg total) by mouth once daily.    potassium citrate (UROCIT-K) 10 mEq (1,080 mg) TbSR TAKE 1 TABLET THREE TIMES DAILY WITH MEALS    pyridoxine (VITAMIN B-6) 50 MG Tab Take 50 mg by mouth once daily.    sildenafil (REVATIO) 20 mg Tab Take 1 tablet (20 mg total) by mouth as needed (use as directed).     No current facility-administered medications for this visit.        REVIEW OF SYSTEMS:    GENERAL:  weight loss 3-4 lb/month, no malaise or fevers.  HEENT:   No recent changes in vision or hearing  NECK:  Negative for lumps, no difficulty with swallowing.  RESPIRATORY:  Negative for cough, wheezing or shortness of breath, patient denies any recent URI.  CARDIOVASCULAR:   "Negative for chest pain, leg swelling or palpitations.  GI: +N/V a/w pt's home medications. Negative for abdominal discomfort, blood in stools or black stools or change in bowel habits.  MUSCULOSKELETAL:  See HPI.  SKIN:  Negative for lesions, rash, and itching.  PSYCH:  No mood disorder or recent psychosocial stressors.  Patients sleep is not disturbed secondary to pain.  HEMATOLOGY/LYMPHOLOGY: + bruising easily   ENDO: No history of diabetes or thyroid dysfunction  NEURO:    Patient has Parkinson's with parkinsonian tremor and affect.  All other reviewed and negative other than HPI.    OBJECTIVE:    /83   Pulse 73   Ht 5' 10" (1.778 m)     PHYSICAL EXAMINATION:    GENERAL: Well appearing, in no acute distress, alert and oriented x3.  PSYCH:  Mood and affect appropriate.  SKIN: Skin color, texture, turgor normal, no rashes or lesions.  HEAD/FACE:  Normocephalic, atraumatic. Cranial nerves grossly intact.  CV: RRR with palpation of the radial artery.  PULM: No evidence of respiratory difficulty, symmetric chest rise.  BACK: Straight leg raising in the supine position is positive to radicular pain in the L5 distribution on the right at 30 degrees and in the L5/S1 distribution on the left at 30 degrees. There is pain with palpation over the facet joints of the lumbar spine bilaterally at L5/S1. There is decreased range of motion with extension to 15 degrees, and facet loading maneuvers cause reproducible pain.    EXTREMITIES: Peripheral joint ROM is full and pain free without obvious instability or laxity in all four extremities. No deformities, edema, or skin discoloration. Good capillary refill.  MUSCULOSKELETAL: Hip, and knee provocative maneuvers are negative.  There is no pain with palpation over the sacroiliac joints bilaterally.  There is no pain to palpation over the greater trochanteric bursa bilaterally.  FABERs test is negative.  FADIRs test is negative.   5/5 strength in right ankle with plantar " and 4/5 dorsiflexion 4/5 EHL raise, 5/5 strength in left ankle with plantar and dorsiflexion 5/5 EHL raise, 5/5 strength with right knee flexion extension, 5/5 strength with knee flexion extension on the left  No atrophy or tone abnormalities are noted.  NEURO:  2+ right knee, 3+ left knee reflexes, absent S1 reflexes bilaterally.  Plantar response are downgoing. No clonus.  No loss of sensation is noted.  GAIT: Antalgic, ambulates without assistance       ASSESSMENT: 71 y.o. year old male with pain, consistent with     Encounter Diagnoses   Name Primary?    DDD (degenerative disc disease), lumbar Yes    Foraminal stenosis of lumbosacral region     Lumbar radiculopathy     Facet arthropathy, lumbar        PLAN:   Flexion extension x-rays lumbar spine to rule out instability and evaluate for facet arthropathy bilateral L4 transforaminal epidural steroid injection by 2, 2 weeks apart with a two-week follow-up    Follow-up 2 weeks after second injection with APC in the future the patient may benefit from medial branch nerve blocks to be followed by radiofrequency ablation of diagnostic if his lower extremity weakness starts to resolve, otherwise we will send the patient for neurosurgical evaluation.    Greater than 25 minutes spent in total in todays visit with the patient, with more than half that time direct face to face counseling and education with the patient today. We discussed the disease process, prognosis, treatment plan, and risks and benefits.     Assistants:  Parias Cooley MD PGY-3  I was present and supervising all critical portions of the visit    Laina Crockett  09/05/2017

## 2017-09-06 ENCOUNTER — HOSPITAL ENCOUNTER (OUTPATIENT)
Facility: OTHER | Age: 72
Discharge: HOME | End: 2017-09-06
Attending: ANESTHESIOLOGY | Admitting: ANESTHESIOLOGY
Payer: MEDICARE

## 2017-09-06 ENCOUNTER — SURGERY (OUTPATIENT)
Age: 72
End: 2017-09-06

## 2017-09-06 VITALS
BODY MASS INDEX: 28.35 KG/M2 | RESPIRATION RATE: 18 BRPM | OXYGEN SATURATION: 95 % | DIASTOLIC BLOOD PRESSURE: 72 MMHG | TEMPERATURE: 98 F | HEIGHT: 70 IN | WEIGHT: 198 LBS | SYSTOLIC BLOOD PRESSURE: 126 MMHG | HEART RATE: 54 BPM

## 2017-09-06 DIAGNOSIS — M54.16 LUMBAR RADICULOPATHY: Primary | ICD-10-CM

## 2017-09-06 PROCEDURE — 25500020 PHARM REV CODE 255: Performed by: ANESTHESIOLOGY

## 2017-09-06 PROCEDURE — 64483 NJX AA&/STRD TFRM EPI L/S 1: CPT | Mod: 50 | Performed by: ANESTHESIOLOGY

## 2017-09-06 PROCEDURE — 25000003 PHARM REV CODE 250: Performed by: ANESTHESIOLOGY

## 2017-09-06 PROCEDURE — 99152 MOD SED SAME PHYS/QHP 5/>YRS: CPT | Mod: ,,, | Performed by: ANESTHESIOLOGY

## 2017-09-06 PROCEDURE — 64483 NJX AA&/STRD TFRM EPI L/S 1: CPT | Mod: 50,,, | Performed by: ANESTHESIOLOGY

## 2017-09-06 PROCEDURE — 63600175 PHARM REV CODE 636 W HCPCS: Performed by: ANESTHESIOLOGY

## 2017-09-06 RX ORDER — MIDAZOLAM HYDROCHLORIDE 1 MG/ML
INJECTION INTRAMUSCULAR; INTRAVENOUS
Status: DISCONTINUED | OUTPATIENT
Start: 2017-09-06 | End: 2017-09-06 | Stop reason: HOSPADM

## 2017-09-06 RX ORDER — SODIUM CHLORIDE 9 MG/ML
INJECTION, SOLUTION INTRAVENOUS CONTINUOUS
Status: DISCONTINUED | OUTPATIENT
Start: 2017-09-06 | End: 2017-09-06 | Stop reason: HOSPADM

## 2017-09-06 RX ORDER — METHYLPREDNISOLONE ACETATE 40 MG/ML
INJECTION, SUSPENSION INTRA-ARTICULAR; INTRALESIONAL; INTRAMUSCULAR; SOFT TISSUE
Status: DISCONTINUED | OUTPATIENT
Start: 2017-09-06 | End: 2017-09-06 | Stop reason: HOSPADM

## 2017-09-06 RX ORDER — VITAMIN A 3000 MCG
10000 CAPSULE ORAL DAILY
COMMUNITY
End: 2017-10-31

## 2017-09-06 RX ORDER — LIDOCAINE HYDROCHLORIDE 10 MG/ML
INJECTION INFILTRATION; PERINEURAL
Status: DISCONTINUED | OUTPATIENT
Start: 2017-09-06 | End: 2017-09-06 | Stop reason: HOSPADM

## 2017-09-06 RX ADMIN — METHYLPREDNISOLONE ACETATE 80 MG: 40 INJECTION, SUSPENSION INTRA-ARTICULAR; INTRALESIONAL; INTRAMUSCULAR; SOFT TISSUE at 11:09

## 2017-09-06 RX ADMIN — MIDAZOLAM HYDROCHLORIDE 2 MG: 1 INJECTION, SOLUTION INTRAMUSCULAR; INTRAVENOUS at 11:09

## 2017-09-06 RX ADMIN — IOHEXOL 50 ML: 300 INJECTION, SOLUTION INTRAVENOUS at 11:09

## 2017-09-06 RX ADMIN — LIDOCAINE HYDROCHLORIDE 10 ML: 10 INJECTION, SOLUTION INFILTRATION; PERINEURAL at 11:09

## 2017-09-06 NOTE — OP NOTE
INFORMED CONSENT: The procedure, risks, benefits and options were discussed with patient. There are no contraindications to the procedure. The patient expressed understanding and agreed to proceed. The personnel performing the procedure was discussed.    09/06/2017    Surgeon: Laina Crockett MD    Assistants: None    PROCEDURE:  Bilateral  L4  1) Left  L4 TRANSFORAMINAL EPIDURAL STEROID INJECTION  2) Right  L4 TRANSFORAMINAL EPIDURAL STEROID INJECTION      Pre Procedure diagnosis:  Bilateral L4  Lumbar radiculopathy [M54.16]    Post-Procedure diagnosis:   same    Complications: None    Specimens: None      DESCRIPTION OF PROCEDURE: The patient was brought to the procedure room. IV access was obtained prior to the procedure. The patient was positioned prone on the fluoroscopy table. Continuous hemodynamic monitoring was initiated including blood pressure, EKG, and pulse oximetry. . The skin was prepped with chlorhexidine and draped in a sterile fashion. Skin anesthesia was achieved using a total of 10mL of lidocaine, 5mL over each respective injection site.     The  L4 transforaminal spaces were identified with fluoroscopy in the  AP, oblique, and lateral views.  A 22 gauge spinal quinke needle was then advanced into the area of the trans foraminal spaces bilaterally with confirmation of proper needle position using AP, oblique, and lateral fluoroscopic views. Once the needle tip was in the area of the transforaminal space, and there was no blood, CSF or paraesthesias,  1.5 mL of Omnipaque 300mg/ml was injected on each side for a total of 3mL.  Fluoroscopic imaging in the AP and lateral views revealed a clear outline of the spinal nerve with proximal spread of agent through the neural foramen into the epidural space. A total combination of 1 mL of Bupivicaine 0.25% and 40 mg depo medrol was injected on each side for a total of 4mL of injected medications with displacement of the contrast dye confirming that the  medication went into the area of the transforaminal spaces bilaterally. A sterile dressing was applied.   Patient tolerated the procedure well.    Conscious sedation provided by M.D    The patient was monitored with continuous pulse oximetry, EKG, and intermittent blood pressure monitors.  The patient was hemodynamically stable throughout the entire process was responsive to voice, and breathing spontaneously.  Supplemental O2 was provided at 2L/min via nasal cannula.  Patient was comfortable for the duration of the procedure. (See nurse documentation and case log for sedation time)    There was a total of 2mg IV Midazolam was titrated for the procedure    Patient was taken back to the recovery room for further observation.     The patient was discharged to home in stable condition

## 2017-09-06 NOTE — H&P (VIEW-ONLY)
"  Chronic Pain - New Consult    Referring Physician: Andrez Jackson MD    Chief Complaint:   Chief Complaint   Patient presents with    Low-back Pain        SUBJECTIVE: Disclaimer: This note has been generated using voice-recognition software. There may be typographical errors that have been missed during proof-reading    Initial encounter:    Haja Marti presents to the clinic for the evaluation of back pain. The pain started 3 years ago  and symptoms have been worsening.  Pt reports doing back exercises from orthopedic office and didn't help.  Pt reports [facility]'s "[facility]" physical therapy program did not help.  Walks 2 mi daily at [facility]. Also used to run marathons. 15 total. Started swimming 2 years ago, stopped swimming 3 mo ago due to increased weakness from Parkinson's disease. Staying active helps the pain. Denies radiation. Cannot stand upright for longer than 10-15min before back pain gets more intense. Walking does not exacerbate the pain. Transitioning from sitting to standing causes pain. Pain is described as an ache.  Tightness in upper back. Not associated with trauma/fall or other inciting event.     Brief history:    Pain Description:    The pain is located in the back area     At BEST  6/10     At WORST  9/10 on the WORST day.      On average pain is rated as 7/10.     Today the pain is rated as 6/10    The pain is described as aching and tight band      Symptoms interfere with daily activity (pt wishes to be able to swim again, and do light weight-lifting exercises),  pain does not awaken pt during the night and does not hinder the patient the patient from falling asleep.     Exacerbating factors: Getting out of bed/chair.      Mitigating factors nothing.     Patient denies .  Patient denies any suicidal or homicidal ideations    Pain Medications:  Current:  advil with mild relief    Tried in Past:  NSAIDs - Advil prn   TCA -never   SNRI -Wellbutrin, lexapro  Anti-convulsants " -Never  Muscle Relaxants -Never  Opioids- Never    Physical Therapy/Home Exercise:  yes       report:  Reviewed and consistent with medication use as prescribed.    Pain Procedures: None     Chiropractor - adjustment made pain worse in March  Acupuncture - no results, dry needling 2 months ago  TENS unit -never  Spinal decompression -never  Joint replacement -never    Imaging:  Outside MRI L SPINE 2/15/2016    Impression:   Multilevel lumbar spondylosis.   Multilevel, multifactorial spinal canal and neuroforaminal narrowing as above.  Patient has facet hypertrophy throughout the lumbar spine injury to right sided severe neuroforaminal stenosis at L4-5 and bilateral neuroforaminal stenosis at L5-S1 which is mild to moderate.               Past Medical History:   Diagnosis Date    BPH (benign prostatic hypertrophy)     Cataract     OS    Depression     Epiretinal membrane, both eyes     Hyperlipidemia     Hypertension     Kidney stones     Kidney stones     Posterior vitreous detachment of left eye     Retinal detachment 2004    OD    Retinoschisis, left eye     Sleep apnea     Vitreous hemorrhage of left eye      Past Surgical History:   Procedure Laterality Date    CATARACT EXTRACTION      OD    EYE SURGERY      laser indirect  4/8/10    left eye    Laser Indirect Retinopexy  4/8/10    OS    PROSTATE SURGERY      RETINAL DETACHMENT SURGERY  2004    OD    ROTATOR CUFF REPAIR  11/13/2013    TONSILLECTOMY       Social History     Social History    Marital status:      Spouse name: N/A    Number of children: N/A    Years of education: N/A     Occupational History     [employer]     Social History Main Topics    Smoking status: Never Smoker    Smokeless tobacco: Never Used    Alcohol use No    Drug use: No    Sexual activity: Not on file     Other Topics Concern    Not on file     Social History Narrative    No narrative on file     Family History   Problem Relation Age of  Onset    Cataracts Father     Cancer Father      lung    Diabetes Father     Cancer Mother      lung       Review of patient's allergies indicates:  No Known Allergies    Current Outpatient Prescriptions   Medication Sig    aspirin 81 MG Chew Take 81 mg by mouth. 1 Tablet, Chewable Oral Every day    atorvastatin (LIPITOR) 40 MG tablet TAKE 1 TABLET EVERY EVENING    buPROPion (WELLBUTRIN XL) 150 MG TB24 tablet Take 150 mg by mouth.    carbidopa-levodopa  mg (SINEMET)  mg per tablet     chlorthalidone (HYGROTEN) 25 MG Tab     citric acid-potassium citrate (POLYCITRA) 1,100-334 mg/5 mL solution Take 10 mLs by mouth.    donepezil (ARICEPT) 5 MG tablet Take 5 mg by mouth.    ergocalciferol (VITAMIN D2) 50,000 unit Cap Take 50,000 Units by mouth every 30 days.     escitalopram oxalate (LEXAPRO) 20 MG tablet Take 1 tablet (20 mg total) by mouth once daily.    finasteride (PROSCAR) 5 mg tablet Take 5 mg by mouth.    losartan (COZAAR) 100 MG tablet Take 1 tablet (100 mg total) by mouth once daily.    magnesium oxide (MAG-OX) 400 mg tablet Take 400 mg by mouth once daily.    mirabegron (MYRBETRIQ) 25 mg Tb24 ER tablet Take 1 tablet (25 mg total) by mouth once daily.    mirabegron (MYRBETRIQ) 25 mg Tb24 ER tablet Take 1 tablet (25 mg total) by mouth once daily.    potassium citrate (UROCIT-K) 10 mEq (1,080 mg) TbSR TAKE 1 TABLET THREE TIMES DAILY WITH MEALS    pyridoxine (VITAMIN B-6) 50 MG Tab Take 50 mg by mouth once daily.    sildenafil (REVATIO) 20 mg Tab Take 1 tablet (20 mg total) by mouth as needed (use as directed).     No current facility-administered medications for this visit.        REVIEW OF SYSTEMS:    GENERAL:  weight loss 3-4 lb/month, no malaise or fevers.  HEENT:   No recent changes in vision or hearing  NECK:  Negative for lumps, no difficulty with swallowing.  RESPIRATORY:  Negative for cough, wheezing or shortness of breath, patient denies any recent URI.  CARDIOVASCULAR:   "Negative for chest pain, leg swelling or palpitations.  GI: +N/V a/w pt's home medications. Negative for abdominal discomfort, blood in stools or black stools or change in bowel habits.  MUSCULOSKELETAL:  See HPI.  SKIN:  Negative for lesions, rash, and itching.  PSYCH:  No mood disorder or recent psychosocial stressors.  Patients sleep is not disturbed secondary to pain.  HEMATOLOGY/LYMPHOLOGY: + bruising easily   ENDO: No history of diabetes or thyroid dysfunction  NEURO:    Patient has Parkinson's with parkinsonian tremor and affect.  All other reviewed and negative other than HPI.    OBJECTIVE:    /83   Pulse 73   Ht 5' 10" (1.778 m)     PHYSICAL EXAMINATION:    GENERAL: Well appearing, in no acute distress, alert and oriented x3.  PSYCH:  Mood and affect appropriate.  SKIN: Skin color, texture, turgor normal, no rashes or lesions.  HEAD/FACE:  Normocephalic, atraumatic. Cranial nerves grossly intact.  CV: RRR with palpation of the radial artery.  PULM: No evidence of respiratory difficulty, symmetric chest rise.  BACK: Straight leg raising in the supine position is positive to radicular pain in the L5 distribution on the right at 30 degrees and in the L5/S1 distribution on the left at 30 degrees. There is pain with palpation over the facet joints of the lumbar spine bilaterally at L5/S1. There is decreased range of motion with extension to 15 degrees, and facet loading maneuvers cause reproducible pain.    EXTREMITIES: Peripheral joint ROM is full and pain free without obvious instability or laxity in all four extremities. No deformities, edema, or skin discoloration. Good capillary refill.  MUSCULOSKELETAL: Hip, and knee provocative maneuvers are negative.  There is no pain with palpation over the sacroiliac joints bilaterally.  There is no pain to palpation over the greater trochanteric bursa bilaterally.  FABERs test is negative.  FADIRs test is negative.   5/5 strength in right ankle with plantar " and 4/5 dorsiflexion 4/5 EHL raise, 5/5 strength in left ankle with plantar and dorsiflexion 5/5 EHL raise, 5/5 strength with right knee flexion extension, 5/5 strength with knee flexion extension on the left  No atrophy or tone abnormalities are noted.  NEURO:  2+ right knee, 3+ left knee reflexes, absent S1 reflexes bilaterally.  Plantar response are downgoing. No clonus.  No loss of sensation is noted.  GAIT: Antalgic, ambulates without assistance       ASSESSMENT: 71 y.o. year old male with pain, consistent with     Encounter Diagnoses   Name Primary?    DDD (degenerative disc disease), lumbar Yes    Foraminal stenosis of lumbosacral region     Lumbar radiculopathy     Facet arthropathy, lumbar        PLAN:   Flexion extension x-rays lumbar spine to rule out instability and evaluate for facet arthropathy bilateral L4 transforaminal epidural steroid injection by 2, 2 weeks apart with a two-week follow-up    Follow-up 2 weeks after second injection with APC in the future the patient may benefit from medial branch nerve blocks to be followed by radiofrequency ablation of diagnostic if his lower extremity weakness starts to resolve, otherwise we will send the patient for neurosurgical evaluation.    Greater than 25 minutes spent in total in todays visit with the patient, with more than half that time direct face to face counseling and education with the patient today. We discussed the disease process, prognosis, treatment plan, and risks and benefits.     Assistants:  Parisa Cooley MD PGY-3  I was present and supervising all critical portions of the visit    Laina Crockett  09/05/2017

## 2017-09-06 NOTE — DISCHARGE SUMMARY
Discharge Note  Short Stay      SUMMARY     Admit Date: 9/6/2017    Attending Physician: Laina Crockett    Discharge Diagnosis: Lumbar radiculopathy [M54.16]    Discharge Physician: Laina Crockett      Discharge Date: 9/6/2017 11:51 AM     PROCEDURE:  Bilateral  L4  1) Left  L4 TRANSFORAMINAL EPIDURAL STEROID INJECTION  2) Right  L4 TRANSFORAMINAL EPIDURAL STEROID INJECTION      Pre Procedure diagnosis:  Bilateral L4  Lumbar radiculopathy [M54.16]    Disposition: Home or self care    Patient Instructions:   Current Discharge Medication List      CONTINUE these medications which have NOT CHANGED    Details   vitamin A 60015 UNIT capsule Take 10,000 Units by mouth once daily.      aspirin 81 MG Chew Take 81 mg by mouth. 1 Tablet, Chewable Oral Every day      atorvastatin (LIPITOR) 40 MG tablet TAKE 1 TABLET EVERY EVENING  Qty: 90 tablet, Refills: 3      buPROPion (WELLBUTRIN XL) 150 MG TB24 tablet Take 150 mg by mouth.      carbidopa-levodopa  mg (SINEMET)  mg per tablet       chlorthalidone (HYGROTEN) 25 MG Tab       citric acid-potassium citrate (POLYCITRA) 1,100-334 mg/5 mL solution Take 10 mLs by mouth.      donepezil (ARICEPT) 5 MG tablet Take 5 mg by mouth.      ergocalciferol (VITAMIN D2) 50,000 unit Cap Take 50,000 Units by mouth every 30 days.       escitalopram oxalate (LEXAPRO) 20 MG tablet Take 1 tablet (20 mg total) by mouth once daily.  Qty: 90 tablet, Refills: 3      finasteride (PROSCAR) 5 mg tablet Take 5 mg by mouth.      losartan (COZAAR) 100 MG tablet Take 1 tablet (100 mg total) by mouth once daily.  Qty: 90 tablet, Refills: 3      magnesium oxide (MAG-OX) 400 mg tablet Take 400 mg by mouth once daily.      !! mirabegron (MYRBETRIQ) 25 mg Tb24 ER tablet Take 1 tablet (25 mg total) by mouth once daily.  Qty: 30 tablet, Refills: 0    Associated Diagnoses: Urinary urgency      !! mirabegron (MYRBETRIQ) 25 mg Tb24 ER tablet Take 1 tablet (25 mg total) by mouth once daily.  Qty: 90  tablet, Refills: 3    Associated Diagnoses: Urinary urgency      potassium citrate (UROCIT-K) 10 mEq (1,080 mg) TbSR TAKE 1 TABLET THREE TIMES DAILY WITH MEALS  Qty: 270 tablet, Refills: 3      pyridoxine (VITAMIN B-6) 50 MG Tab Take 50 mg by mouth once daily.      sildenafil (REVATIO) 20 mg Tab Take 1 tablet (20 mg total) by mouth as needed (use as directed).  Qty: 12 tablet, Refills: 11       !! - Potential duplicate medications found. Please discuss with provider.          Resume home diet and activity

## 2017-09-06 NOTE — DISCHARGE INSTRUCTIONS
Home Care Instructions Pain Management:    1. DIET:   You may resume your normal diet today.   2. BATHING:   You may shower with luke warm water.  3. DRESSING:   You may remove your bandage today.   4. ACTIVITY LEVEL:   You may resume your normal activities 24 hrs after your procedure.  5. MEDICATIONS:   You may resume your normal medications today.   6. SPECIAL INSTRUCTIONS:   No heat to the injection site for 24 hrs including, bath or shower, heating pad, moist heat, or hot tubs.    Use ice pack to injection site for any pain or discomfort.  Apply ice packs for 20 minute intervals as needed.   If you have received any sedatives by mouth today you may not drive for 12 hours.    If you have received any sedation through your IV, you may not drive for 24 hrs.     PLEASE CALL YOUR DOCTOR IF:  1. Redness or swelling around the injection site.  2. Fever of 101 degrees  3. Drainage (pus) from the injection site.  4. For any continuous bleeding (some dried blood over the incision is normal.)    FOR EMERGENCIES:   If any unusual problems or difficulties occur during clinic hours, call [phone] or [phone].   Procedural Sedation  Procedural sedation is medicine to ease discomfort, pain, and anxiety during a procedure. The medicine is often given through an intravenous (IV) line in your arm or hand. In some cases, the medicine may be taken by mouth or inhaled. While you are under sedation, you will likely be awake. But you may not remember anything afterward.  Why procedural sedation is used  Sedation is used for many types of procedures. The goal is to reduce pain, anxiety, and stressful memories of a procedure. It can also help your health care provider treat you. For example, having a broken bone fixed may be easier if you feel relaxed.  Procedural sedation is used only for short, basic procedures. It is not used for complex surgeries. Some procedures that use this type of sedation include:  · Dental surgery  · Breast  biopsy, to take a sample of breast tissue  · Endoscopy, to look at gastrointestinal problems  · Bronchoscopy, to check for lung problems  · Bone or joint realignment, to fix a broken bone or dislocated joint  · Minor foot or skin surgery  · Electrical cardioversion, to restore a normal heart rhythm  · Lumbar puncture, to assess neurological disease  Risks of procedural sedation  Procedural sedation has some risks and possible side effects, such as:  · Headache  · Nausea and vomiting  · Unpleasant memory of the procedure  · Lowered rate of breathing  · Changes in heart rate and blood pressure (rare)  · Inhalation of stomach contents into your lungs (rare)  Side effects will likely go away shortly after the procedure. Your health care team will watch your heart rate and breathing during your sedation. This is to help prevent problems.  Your own risks may vary based on your age and your overall health. They also depend on the type of sedation you are given. Talk with your health care provider about the risks that apply most to you.  Getting ready for procedural sedation  Talk with your health care provider how to get ready for your procedure. Tell him or her about all the medicines you take. This includes over-the-counter medicines such as ibuprofen. It also includes vitamins, herbs, and other supplements. You may need to stop taking some medicines before the procedure, such as blood thinners and aspirin. If you smoke, you may need to stop. Talk with your health care provider if you need help to stop smoking.  Tell your health care provider if you:  · Have had any problems in the past with sedation or anesthesia  · Have had any recent changes in your health, such as an infection or fever  · Are pregnant or think you may be pregnant  Also, make sure to:  · Ask a family member or friend to take you home after the procedure. You cannot drive on the day you receive sedation.  · Not eat or drink after midnight the night  before your procedure, if advised.  · Follow all other instructions from your health care provider.  During your procedural sedation  You may have your procedure in a hospital or a medical clinic. Sedation is done by a trained health care provider. In general, you can expect the following:  · You will be given medicine through an IV line in your arm or hand. Or you may receive a shot. The medicine may also be given by mouth. Or you may inhale it through a mask.  · If you receive medicine through an IV, you may feel the effects very quickly. You will start to feel relaxed and drowsy.  · During the procedure, your heart rate, breathing, and blood pressure will be closely watched. Your breathing and blood pressure may decrease a little. But you will likely not need help with your breathing. You may receive a little extra oxygen through a mask.  · You will probably be awake the entire time. If you do fall asleep, you should be easy to wake up, if needed. You should feel little or no pain.  · When your procedure is over, the sedative medicine will be stopped.  After your procedural sedation  You will begin to feel more awake and aware. But you will likely be drowsy for a while afterward. You will be closely watched as you become more alert. You may have a faint memory of the procedure. Or you may not remember it at all.  You should be able to return home within an hour or two after your procedure. Plan to have someone stay with you for a few hours. Side effects such as headache and nausea may go away quickly. Tell your health care provider if they continue.  Dont drive or make any important decisions for at least 24 hours. Be sure to follow all after-care instructions.      When to call your health care provider  Have someone call your health care provider right away if you have any of these:  · Drowsiness that gets worse  · Weakness or dizziness that gets worse  · Repeated vomiting  · You cant be awakened   Date Last  Reviewed: 2/6/2015  © 7610-1516 [employer]. [address] [place] [address], [place] [address]. All rights reserved. This information is not intended as a substitute for professional medical care. Always follow your healthcare professional's instructions.

## 2017-09-26 ENCOUNTER — PATIENT MESSAGE (OUTPATIENT)
Dept: INTERNAL MEDICINE | Facility: CLINIC | Age: 72
End: 2017-09-26

## 2017-09-26 ENCOUNTER — TELEPHONE (OUTPATIENT)
Dept: INTERNAL MEDICINE | Facility: CLINIC | Age: 72
End: 2017-09-26

## 2017-09-26 NOTE — TELEPHONE ENCOUNTER
----- Message from Jacinta Gary sent at 9/26/2017  3:36 PM CDT -----  Contact: pt [phone]  Pt would like call back regarding a form that needs to be filled out, he dropped it off 10 days ago. It was a form allowing him to participate in noncontact boxing

## 2017-09-27 DIAGNOSIS — R39.15 URINARY URGENCY: ICD-10-CM

## 2017-09-27 RX ORDER — MIRABEGRON 25 MG/1
25 TABLET, FILM COATED, EXTENDED RELEASE ORAL DAILY
Qty: 30 TABLET | Refills: 0 | Status: SHIPPED | OUTPATIENT
Start: 2017-09-27 | End: 2017-10-31 | Stop reason: SDUPTHER

## 2017-09-27 NOTE — TELEPHONE ENCOUNTER
Called pt back to inform him that his papers are signed and he can come and get them spoke to wife and informed her to let him know she said he will  today

## 2017-10-11 ENCOUNTER — SURGERY (OUTPATIENT)
Age: 72
End: 2017-10-11

## 2017-10-11 ENCOUNTER — HOSPITAL ENCOUNTER (OUTPATIENT)
Facility: OTHER | Age: 72
Discharge: HOME | End: 2017-10-11
Attending: ANESTHESIOLOGY | Admitting: ANESTHESIOLOGY
Payer: MEDICARE

## 2017-10-11 VITALS
HEART RATE: 97 BPM | SYSTOLIC BLOOD PRESSURE: 152 MMHG | TEMPERATURE: 98 F | WEIGHT: 195 LBS | DIASTOLIC BLOOD PRESSURE: 84 MMHG | HEIGHT: 70 IN | BODY MASS INDEX: 27.92 KG/M2 | RESPIRATION RATE: 18 BRPM | OXYGEN SATURATION: 97 %

## 2017-10-11 DIAGNOSIS — M48.07 FORAMINAL STENOSIS OF LUMBOSACRAL REGION: Primary | ICD-10-CM

## 2017-10-11 DIAGNOSIS — M54.16 LUMBAR RADICULOPATHY: ICD-10-CM

## 2017-10-11 PROCEDURE — 63600175 PHARM REV CODE 636 W HCPCS: Performed by: ANESTHESIOLOGY

## 2017-10-11 PROCEDURE — 64483 NJX AA&/STRD TFRM EPI L/S 1: CPT | Mod: 50 | Performed by: ANESTHESIOLOGY

## 2017-10-11 PROCEDURE — 25000003 PHARM REV CODE 250: Performed by: ANESTHESIOLOGY

## 2017-10-11 PROCEDURE — 64483 NJX AA&/STRD TFRM EPI L/S 1: CPT | Mod: 50,,, | Performed by: ANESTHESIOLOGY

## 2017-10-11 PROCEDURE — 25500020 PHARM REV CODE 255: Performed by: ANESTHESIOLOGY

## 2017-10-11 RX ORDER — METHYLPREDNISOLONE ACETATE 40 MG/ML
INJECTION, SUSPENSION INTRA-ARTICULAR; INTRALESIONAL; INTRAMUSCULAR; SOFT TISSUE
Status: DISCONTINUED | OUTPATIENT
Start: 2017-10-11 | End: 2017-10-11 | Stop reason: HOSPADM

## 2017-10-11 RX ORDER — LIDOCAINE HYDROCHLORIDE 10 MG/ML
INJECTION INFILTRATION; PERINEURAL
Status: DISCONTINUED | OUTPATIENT
Start: 2017-10-11 | End: 2017-10-11 | Stop reason: HOSPADM

## 2017-10-11 RX ORDER — SODIUM CHLORIDE 9 MG/ML
INJECTION, SOLUTION INTRAVENOUS CONTINUOUS
Status: DISCONTINUED | OUTPATIENT
Start: 2017-10-11 | End: 2017-10-11 | Stop reason: HOSPADM

## 2017-10-11 RX ORDER — ALPRAZOLAM 0.5 MG/1
0.5 TABLET, ORALLY DISINTEGRATING ORAL ONCE
Status: COMPLETED | OUTPATIENT
Start: 2017-10-11 | End: 2017-10-11

## 2017-10-11 RX ORDER — BUPIVACAINE HYDROCHLORIDE 2.5 MG/ML
INJECTION, SOLUTION EPIDURAL; INFILTRATION; INTRACAUDAL
Status: DISCONTINUED | OUTPATIENT
Start: 2017-10-11 | End: 2017-10-11 | Stop reason: HOSPADM

## 2017-10-11 RX ADMIN — ALPRAZOLAM 0.5 MG: 0.5 TABLET, ORALLY DISINTEGRATING ORAL at 10:10

## 2017-10-11 RX ADMIN — IOHEXOL 5 ML: 300 INJECTION, SOLUTION INTRAVENOUS at 10:10

## 2017-10-11 RX ADMIN — LIDOCAINE HYDROCHLORIDE 10 ML: 10 INJECTION, SOLUTION INFILTRATION; PERINEURAL at 10:10

## 2017-10-11 RX ADMIN — METHYLPREDNISOLONE ACETATE 40 MG: 40 INJECTION, SUSPENSION INTRA-ARTICULAR; INTRALESIONAL; INTRAMUSCULAR; SOFT TISSUE at 10:10

## 2017-10-11 RX ADMIN — BUPIVACAINE HYDROCHLORIDE 10 ML: 2.5 INJECTION, SOLUTION EPIDURAL; INFILTRATION; INTRACAUDAL; PERINEURAL at 10:10

## 2017-10-11 NOTE — DISCHARGE SUMMARY
Discharge Note  Short Stay      SUMMARY     Admit Date: 10/11/2017    Attending Physician: Laina Crockett    Discharge Diagnosis: Lumbar radiculopathy [M54.16]    Discharge Physician: Laina Crockett      Discharge Date: 10/11/2017 10:47 AM       PROCEDURE:  Bilateral  L4  1) Left  L4 TRANSFORAMINAL EPIDURAL STEROID INJECTION  2) Right  L4 TRANSFORAMINAL EPIDURAL STEROID INJECTION      Pre Procedure diagnosis:  Bilateral L4  Lumbar radiculopathy [M54.16]    Disposition: Home or self care    Patient Instructions:   Current Discharge Medication List      CONTINUE these medications which have NOT CHANGED    Details   aspirin 81 MG Chew Take 81 mg by mouth. 1 Tablet, Chewable Oral Every day      atorvastatin (LIPITOR) 40 MG tablet TAKE 1 TABLET EVERY EVENING  Qty: 90 tablet, Refills: 3      buPROPion (WELLBUTRIN XL) 150 MG TB24 tablet Take 150 mg by mouth.      carbidopa-levodopa  mg (SINEMET)  mg per tablet       chlorthalidone (HYGROTEN) 25 MG Tab       citric acid-potassium citrate (POLYCITRA) 1,100-334 mg/5 mL solution Take 10 mLs by mouth.      donepezil (ARICEPT) 5 MG tablet Take 5 mg by mouth.      ergocalciferol (VITAMIN D2) 50,000 unit Cap Take 50,000 Units by mouth every 30 days.       escitalopram oxalate (LEXAPRO) 20 MG tablet Take 1 tablet (20 mg total) by mouth once daily.  Qty: 90 tablet, Refills: 3      finasteride (PROSCAR) 5 mg tablet Take 5 mg by mouth.      losartan (COZAAR) 100 MG tablet Take 1 tablet (100 mg total) by mouth once daily.  Qty: 90 tablet, Refills: 3      magnesium oxide (MAG-OX) 400 mg tablet Take 400 mg by mouth once daily.      !! mirabegron (MYRBETRIQ) 25 mg Tb24 ER tablet Take 1 tablet (25 mg total) by mouth once daily.  Qty: 90 tablet, Refills: 3    Associated Diagnoses: Urinary urgency      !! MYRBETRIQ 25 mg Tb24 ER tablet TAKE 1 TABLET (25 MG TOTAL) BY MOUTH ONCE DAILY.  Qty: 30 tablet, Refills: 0    Associated Diagnoses: Urinary urgency      potassium citrate  (UROCIT-K) 10 mEq (1,080 mg) TbSR TAKE 1 TABLET THREE TIMES DAILY WITH MEALS  Qty: 270 tablet, Refills: 3      pyridoxine (VITAMIN B-6) 50 MG Tab Take 50 mg by mouth once daily.      sildenafil (REVATIO) 20 mg Tab Take 1 tablet (20 mg total) by mouth as needed (use as directed).  Qty: 12 tablet, Refills: 11      vitamin A 55504 UNIT capsule Take 10,000 Units by mouth once daily.       !! - Potential duplicate medications found. Please discuss with provider.          Resume home diet and activity

## 2017-10-11 NOTE — DISCHARGE INSTRUCTIONS

## 2017-10-11 NOTE — OP NOTE
INFORMED CONSENT: The procedure, risks, benefits and options were discussed with patient. There are no contraindications to the procedure. The patient expressed understanding and agreed to proceed. The personnel performing the procedure was discussed.    10/11/2017    Surgeon: Laina Crockett MD    Assistants:   Heath Yeung MD PGY-2  I was present and supervising all critical portions of the procedure      PROCEDURE:  Bilateral  L4  1) Left  L4 TRANSFORAMINAL EPIDURAL STEROID INJECTION  2) Right  L4 TRANSFORAMINAL EPIDURAL STEROID INJECTION      Pre Procedure diagnosis:  Bilateral L4  Lumbar radiculopathy [M54.16]    Post-Procedure diagnosis:   same    Complications: None    Specimens: None      DESCRIPTION OF PROCEDURE: The patient was brought to the procedure room. IV access was obtained prior to the procedure. The patient was positioned prone on the fluoroscopy table. Continuous hemodynamic monitoring was initiated including blood pressure, EKG, and pulse oximetry. . The skin was prepped with chlorhexidine and draped in a sterile fashion. Skin anesthesia was achieved using a total of 10mL of lidocaine, 5mL over each respective injection site.     The  L4 transforaminal spaces were identified with fluoroscopy in the  AP, oblique, and lateral views.  A 22 gauge spinal quinke needle was then advanced into the area of the trans foraminal spaces bilaterally with confirmation of proper needle position using AP, oblique, and lateral fluoroscopic views. Once the needle tip was in the area of the transforaminal space, and there was no blood, CSF or paraesthesias,  1.5 mL of Omnipaque 300mg/ml was injected on each side for a total of 3mL.  Fluoroscopic imaging in the AP and lateral views revealed a clear outline of the spinal nerve with proximal spread of agent through the neural foramen into the epidural space. A total combination of 1 mL of Bupivicaine 0.25% and 40 mg depo medrol was injected on each side for a total  of 4mL of injected medications with displacement of the contrast dye confirming that the medication went into the area of the transforaminal spaces bilaterally. A sterile dressing was applied.   Patient tolerated the procedure well.    Patient was taken back to the recovery room for further observation.     The patient was discharged to home in stable condition

## 2017-10-17 ENCOUNTER — OFFICE VISIT (OUTPATIENT)
Dept: PSYCHIATRY | Facility: CLINIC | Age: 72
End: 2017-10-17
Payer: MEDICARE

## 2017-10-17 ENCOUNTER — TELEPHONE (OUTPATIENT)
Dept: PSYCHIATRY | Facility: CLINIC | Age: 72
End: 2017-10-17

## 2017-10-17 VITALS
DIASTOLIC BLOOD PRESSURE: 79 MMHG | HEIGHT: 70 IN | WEIGHT: 200.81 LBS | HEART RATE: 66 BPM | BODY MASS INDEX: 28.75 KG/M2 | SYSTOLIC BLOOD PRESSURE: 131 MMHG

## 2017-10-17 DIAGNOSIS — F41.1 GENERALIZED ANXIETY DISORDER: Primary | ICD-10-CM

## 2017-10-17 PROCEDURE — 99999 PR PBB SHADOW E&M-EST. PATIENT-LVL III: CPT | Mod: PBBFAC,GC,, | Performed by: PSYCHIATRY & NEUROLOGY

## 2017-10-17 PROCEDURE — 99204 OFFICE O/P NEW MOD 45 MIN: CPT | Mod: GC,S$GLB,, | Performed by: PSYCHIATRY & NEUROLOGY

## 2017-10-17 RX ORDER — SERTRALINE HYDROCHLORIDE 50 MG/1
50 TABLET, FILM COATED ORAL DAILY
Qty: 30 TABLET | Refills: 1 | Status: SHIPPED | OUTPATIENT
Start: 2017-10-17 | End: 2017-11-14 | Stop reason: SDUPTHER

## 2017-10-17 RX ORDER — ESCITALOPRAM OXALATE 20 MG/1
10 TABLET ORAL DAILY
Qty: 90 TABLET | Refills: 3
Start: 2017-10-17 | End: 2017-10-17

## 2017-10-17 NOTE — TELEPHONE ENCOUNTER
Spoke with patient on the phone. He wanted to clarify instructions regarding medication changes. I recommended discontinuation of bupropion and escitalopram, and also removed chlorthalidone from his medication list per the medication reconciliation we did earlier today. He again confirmed that medication was stopped by his PCP, and voiced understanding with the instructions.    ----- Message from Tahmina Coates MA sent at 10/17/2017  4:50 PM CDT -----  Contact: pt  [phone]    Pt would like you to call him says it's urgent. Says he is confused about the information you gave him today

## 2017-10-17 NOTE — PROGRESS NOTES
"Outpatient Psychiatry Initial Visit (MD/NP)    10/17/2017    Haja Marti, a 71 y.o. male, for initial evaluation visit.  Patient is referred by Andrez Jackson MD .      Reason for Encounter: Referral for treatment    Complaints:  He has been experiencing anxiety about a large number of things. Says that he had many worries throughout his life, able to worry "about anything". Felt this was excessive, but was able to manage it on his own and continue functioning well. Says this was exacerbated by diagnosis of Parkinson's disease given in June 2017. Reports anxiety keeping him awake at night occasionally and causing fatigue. Also describes orthostatic hypotension, shuffling gait, stooped posture, quiet voice, balance problems. Also moves around a lot in his sleep, including punching headboard, hitting wife, falling onto floor - did not used to have this problem. Reports a few falls when on boat over past few years. Reports intermittent sadness related to situation, but feels like this is secondary to anxiety. Denies extended periods of depressed mood, anhedonia. Denies hopelessness, SI/HI. Reports compliance with buproprion XL 150mg PO daily (which he has taken for about 3 years) and escitalopram 10mg PO daily (which he has taken for 1-2 years), but has never found either of these helpful with mood, anxiety, or Parkinson's symptoms. Recently received a small amount of alprazolam from his son, and says he has taken a half-tablet of this occasionally but is concerned about potential for addiction and worsening memory. Is interested in discontinuing both of these medications and starting something different that can help with anxiety. Gave permission for his wife to be present during encounter.    Endorses difficulties with memory, and needing to ask questions multiple times. However denies forgetting names of loved ones or getting lost. Does all ADLs and IADLs independently.     His wife agrees with the above. " She believes the patient has excessive anxiety that is affecting his quality of life. However, she also adds that part of his sleep problem is that he has SHU and does not use a CPAP. He has tried one in the past for just a few days - he did not like it and immediately discontinued.    Current Medications:  Scheduled Meds  Current Outpatient Prescriptions on File Prior to Visit   Medication Sig Dispense Refill    aspirin 81 MG Chew Take 81 mg by mouth. 1 Tablet, Chewable Oral Every day      atorvastatin (LIPITOR) 40 MG tablet TAKE 1 TABLET EVERY EVENING 90 tablet 3    buPROPion (WELLBUTRIN XL) 150 MG TB24 tablet Take 150 mg by mouth.      carbidopa-levodopa  mg (SINEMET)  mg per tablet       citric acid-potassium citrate (POLYCITRA) 1,100-334 mg/5 mL solution Take 10 mLs by mouth.      donepezil (ARICEPT) 5 MG tablet Take 5 mg by mouth.      ergocalciferol (VITAMIN D2) 50,000 unit Cap Take 50,000 Units by mouth every 30 days.       finasteride (PROSCAR) 5 mg tablet Take 5 mg by mouth.      losartan (COZAAR) 100 MG tablet Take 1 tablet (100 mg total) by mouth once daily. 90 tablet 3    magnesium oxide (MAG-OX) 400 mg tablet Take 400 mg by mouth once daily.      mirabegron (MYRBETRIQ) 25 mg Tb24 ER tablet Take 1 tablet (25 mg total) by mouth once daily. 90 tablet 3    MYRBETRIQ 25 mg Tb24 ER tablet TAKE 1 TABLET (25 MG TOTAL) BY MOUTH ONCE DAILY. 30 tablet 0    potassium citrate (UROCIT-K) 10 mEq (1,080 mg) TbSR TAKE 1 TABLET THREE TIMES DAILY WITH MEALS 270 tablet 3    pyridoxine (VITAMIN B-6) 50 MG Tab Take 50 mg by mouth once daily.      sildenafil (REVATIO) 20 mg Tab Take 1 tablet (20 mg total) by mouth as needed (use as directed). 12 tablet 11    vitamin A 88295 UNIT capsule Take 10,000 Units by mouth once daily.      [DISCONTINUED] chlorthalidone (HYGROTEN) 25 MG Tab       [DISCONTINUED] escitalopram oxalate (LEXAPRO) 20 MG tablet Take 1 tablet (20 mg total) by mouth once daily. 90  tablet 3     No current facility-administered medications on file prior to visit.        Stressors:      History:     Past Psychiatric History:   Previous therapy: no  Previous psychiatric treatment and medication trials: yes - currently on bupropion and escitalopram  Previous psychiatric hospitalizations: no  Previous diagnoses: yes  Previous suicide attempts: no  History of violence: no  Not currently in any kind of treatment.  Education: post college graduate work or degree  Other pertinent history: None  Depression screening was performed with standardized tool: Not Screened    Substance Abuse History:  Recreational drugs: none  Use of alcohol: denied  Use of caffeine: coffee 1 /day  Tobacco use: no  Legal consequences of chemical use: no  Patient feels he ought to cut down on drinking and/or drug use: no  Patient has been annoyed by others criticizing his drinking or drug use: no  Patient has felt bad or guilty about drinking or drug use:no  Patient has had a drink or used drugs as an eye opener first thing in the morning to steady nerves, get rid of a hangover or get the day started: no  Use of OTC medications: none    Record Review: moderate      Review Of Systems:     Medical Review Of Systems:  A comprehensive review of systems was negative except for: Neurological: positive for dizziness, gait problems and speech problems    Psychiatric Review Of Systems:  Sleep: no  Appetite changes: yes (although relates this to decreased physical activity)  Weight changes: no  Energy: yes  Interest/pleasure/anhedonia: no  Somatic symptoms: no  Panic: no  Guilty/hopeless: yes, feeling guilty. Denies hopelessness  Self-injurious behavior/risky behavior: no  Any drugs: no  Alcohol: no     Current Evaluation:     Mental Status Evaluation:  Appearance:  well-nourished white male, good grooming and hygiene, shuffling gait, masked facies   Behavior:  normal, cooperative   Speech:  quiet and raspy, but normal rate and tone    Mood:  steady, euthymic   Affect:  congruent and appropriate, appropriate   Thought Process:  normal and logical   Thought Content:  normal, no suicidality, no homicidality, delusions, or paranoia   Sensorium:  grossly intact, person, place, situation, time/date, day of week, month of year, year   Cognition:  See MOCA below   Insight:  intact   Judgment:  behavior is adequate to circumstances     MOCA: 23/30  - Visuospatial/Executive: 3/5. Lost points on trail-making and cube-copy  - Naming: 3/3.  - Attention: 6/6.  - Language: 2/3. Lost 1 point for verbal fluency  - Abstraction: 2/2  - Delayed Recall: 1/5. Got 2 more with category cues and final 2 with multiple choice  - Orientation: 6/6      SIGECAPS  Sleep: interrupted, non-restorative  Interest: no changes  Guilt: yes  Energy: yes  Concentration: No problems. Does notice some memory lapses occasionally, but denies forgetting names of loved ones or getting lost.  Appetite: decreased, lost about 10 pounds since June but attributes this to Sinemet  Psychomotor agitation: yes, occasionally pacing around or feeling restless  Suicidal intentions: no  Suicidal plan: no    Functioning in Relationships:  Spouse/partner: good  Peers: good  Employers: N/A    Family:  Sons with depression and anxiety. They take combinations of citalopram and bupropion and find these helpful. No suicide or substance problems in the family.    Social:  Used to exercise 7 days per week, 1-2 hours per day, doing things like walking, swimming, yoga. Recently started boxing classes designed for Parkinson's patients. They have 3 sons and 1 daughter. Born and raised in [place], masters degree in Marketing and Business. 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 Retired when wife turned 65, which was 2.5 years ago. Wife believes that may have been motivated by anxiety somewhat. Daughter lives in [place] and visits with them often. Son with 5 children  lives in [place], and those kids are in school so do not visit very often.    Assessment - Diagnosis - Goals:       ICD-10-CM ICD-9-CM   1. Generalized anxiety disorder F41.1 300.02       Treatment Goals:  Specify outcomes written in observable, behavioral terms:   Anxiety: reducing physical symptoms of anxiety and reducing time spent worrying (<30 minutes/day)    Treatment Plan/Recommendations:   Follow-up plan for anxiety was discussed with patient.   - discontinue bupropion and escitalopram   - initiate sertraline 50mg PO daily    Review with patient: Treatment plan reviewed with the patient.  Medication risks/benefit reviewed with the patient    Return to clinic in 4 weeks.    Patient seen and discussed with Dr Sudhir Elliott.    Alexys Nash

## 2017-10-17 NOTE — PATIENT INSTRUCTIONS
Discontinue bupropion and escitalopram.    Initiate sertraline 50mg PO daily.    Return to clinic on Tuesday, November 14 at 1:30.

## 2017-10-23 ENCOUNTER — OFFICE VISIT (OUTPATIENT)
Dept: NEUROLOGY | Facility: CLINIC | Age: 72
End: 2017-10-23
Payer: MEDICARE

## 2017-10-23 VITALS
HEIGHT: 70 IN | DIASTOLIC BLOOD PRESSURE: 77 MMHG | BODY MASS INDEX: 28.92 KG/M2 | SYSTOLIC BLOOD PRESSURE: 125 MMHG | WEIGHT: 202 LBS | HEART RATE: 76 BPM

## 2017-10-23 DIAGNOSIS — G20.A1 PARKINSON DISEASE: Primary | ICD-10-CM

## 2017-10-23 PROCEDURE — 99205 OFFICE O/P NEW HI 60 MIN: CPT | Mod: S$GLB,,, | Performed by: PSYCHIATRY & NEUROLOGY

## 2017-10-23 PROCEDURE — 99499 UNLISTED E&M SERVICE: CPT | Mod: S$GLB,,, | Performed by: PSYCHIATRY & NEUROLOGY

## 2017-10-23 PROCEDURE — 99999 PR PBB SHADOW E&M-EST. PATIENT-LVL III: CPT | Mod: PBBFAC,,, | Performed by: PSYCHIATRY & NEUROLOGY

## 2017-10-23 RX ORDER — CLONAZEPAM 0.5 MG/1
0.5 TABLET ORAL NIGHTLY PRN
Qty: 30 TABLET | Refills: 5 | Status: SHIPPED | OUTPATIENT
Start: 2017-10-23 | End: 2017-12-12

## 2017-10-23 RX ORDER — RASAGILINE 1 MG/1
1 TABLET ORAL DAILY
Qty: 30 TABLET | Refills: 11 | Status: SHIPPED | OUTPATIENT
Start: 2017-10-23 | End: 2017-11-08

## 2017-10-23 NOTE — LETTER
October 31, 2017      Andrez Jackson MD  [address]  [facility] [address]           [facility]  [address]  [facility] [address]  Phone: [phone]  Fax: [phone]          Patient: Haja Marti   MR Number: [id number]   YOB: 1945   Date of Visit: 10/23/2017       Dear Dr. Andrez Jackson:    Thank you for referring Haja Marit to me for evaluation. Attached you will find relevant portions of my assessment and plan of care.    If you have questions, please do not hesitate to call me. I look forward to following Haja Marti along with you.    Sincerely,    Edward Ivey MD    Enclosure  CC:  No Recipients    If you would like to receive this communication electronically, please contact [email or web address] or [phone] to request more information on [employer] access.    For providers and/or their staff who would like to refer a patient to [facility], please contact us through our one-stop-shop provider referral line, [facility], at [phone].    If you feel you have received this communication in error or would no longer like to receive these types of communications, please e-mail [email or web address]

## 2017-10-23 NOTE — PATIENT INSTRUCTIONS
1) Klonopin 0.5 mg at night, try half tab initially  2) Azilect 1mg daily  3) Stop the carbidopa/levodopa    Edward Ivey MD, MPH  Division of Movement and Memory Disorders  [facility]

## 2017-10-23 NOTE — PROGRESS NOTES
"Name: Haja Marti  MRN: [id number]   CSN: [id number]      Date: 10/23/2017    Referring physician:  Andrez Jackson MD  [address] [place] [address]    Chief Complaint / Interval History: No chief complaint on file.      History of Present Illness (HPI):  70 yo     2+ years of progressive stooping posture, shuffling feet, slower walking, struggles to get out of a chair.  Handwriting has gotten "terrible."  Slower with dressing overall, and with most things.    Is a cautious , but has a tendency to pull to the R side of the road (correcting to the left).  More issues with parking, no issues moving backwards but uses side mirrors.  No accidents.      For about 6 months, there has been a progressive hoarseness of the voice.  Comes and goes, not clear timing.      No tremor.      Saw Dr. 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  RBD/sleep issues (Constitutional)?Yes - fights in his sleep, jumps out of bed, falls out over 5 years, talking for 30 years.  Depression/anxiety (Psychiatric)?Yes  Fatigue (Constitutional)?Yes  Constipation (GI)?Yes  Urinary issues ()?Yes  Sexual dysfunction ()?Yes  -ED  Orthostasis (Cardiovascular)?Yes  Leg swelling (Cardiovascular)? No  Falls (Musculoskeletal)?Yes - has had a couple of falls, more unsteady on a boat.    Cognitive impairment (Neurologic)?Yes - a little slower overall  Psychoses (Psychiatric)?Yes - recently had experience thinking he saw people in his den, non-descript faces; 2 years ago, had a experience of seeing someone in the bedroom.  He's had no clear delusions.   Pain/Paresthesia (Neurologic)?Yes - lumbar for years, some disability, helped with pain management.  Visual changes (Eyes)?No  Moles / skin changes (Skin)?No  Stridor / SOB (Pulm)?No  Bruising (Heme)?No    Past " Medical History: The patient  has a past medical history of Anxiety; BPH (benign prostatic hypertrophy); Cataract; Depression; Epiretinal membrane, both eyes; psychiatric care; Hyperlipidemia; Hypertension; Kidney stones; Kidney stones; Posterior vitreous detachment of left eye; Psychiatric problem; Retinal detachment (2004); Retinoschisis, left eye; Sleep apnea; and Vitreous hemorrhage of left eye.    Social History: The patient  reports that he has never smoked. He has never used smokeless tobacco. He reports that he does not drink alcohol or use drugs.    Family History: Their family history includes Anxiety disorder in his daughter and son; Cancer in his father and mother; Cataracts in his father; Depression in his son; Diabetes in his father.    Allergies: Patient has no known allergies.     Meds:   Current Outpatient Prescriptions on File Prior to Visit   Medication Sig Dispense Refill    aspirin 81 MG Chew Take 81 mg by mouth. 1 Tablet, Chewable Oral Every day      atorvastatin (LIPITOR) 40 MG tablet TAKE 1 TABLET EVERY EVENING 90 tablet 3    carbidopa-levodopa  mg (SINEMET)  mg per tablet       citric acid-potassium citrate (POLYCITRA) 1,100-334 mg/5 mL solution Take 10 mLs by mouth.      donepezil (ARICEPT) 5 MG tablet Take 5 mg by mouth.      ergocalciferol (VITAMIN D2) 50,000 unit Cap Take 50,000 Units by mouth every 30 days.       finasteride (PROSCAR) 5 mg tablet Take 5 mg by mouth.      losartan (COZAAR) 100 MG tablet Take 1 tablet (100 mg total) by mouth once daily. 90 tablet 3    magnesium oxide (MAG-OX) 400 mg tablet Take 400 mg by mouth once daily.      MYRBETRIQ 25 mg Tb24 ER tablet TAKE 1 TABLET (25 MG TOTAL) BY MOUTH ONCE DAILY. 30 tablet 0    potassium citrate (UROCIT-K) 10 mEq (1,080 mg) TbSR TAKE 1 TABLET THREE TIMES DAILY WITH MEALS 270 tablet 3    pyridoxine (VITAMIN B-6) 50 MG Tab Take 50 mg by mouth once daily.      sertraline (ZOLOFT) 50 MG tablet Take 1 tablet  "(50 mg total) by mouth once daily. 30 tablet 1    sildenafil (REVATIO) 20 mg Tab Take 1 tablet (20 mg total) by mouth as needed (use as directed). 12 tablet 11    vitamin A 89354 UNIT capsule Take 10,000 Units by mouth once daily.      [DISCONTINUED] mirabegron (MYRBETRIQ) 25 mg Tb24 ER tablet Take 1 tablet (25 mg total) by mouth once daily. 90 tablet 3     No current facility-administered medications on file prior to visit.        Exam:  /77 (BP Location: Right arm, Patient Position: Sitting, BP Method: Large (Automatic))   Pulse 76   Ht 5' 10" (1.778 m)   Wt 91.6 kg (202 lb)   BMI 28.98 kg/m²     Constitutional  Well-developed, well-nourished, appears stated age   Ophthalmoscopic  No papilledema with no hemorrhages or exudates bilaterally   Cardiovascular  Radial pulses 2+ and symmetric, no LE edema bilaterally   Neurological    * Mental status  MOCA =      - Orientation  Oriented to person, place, time, and situation     - Memory   Intact recent and remote     - Attention/concentration  Attentive, vigilant during exam     - Language  Naming & repetition intact, +2-step commands     - Fund of knowledge  Aware of current events     - Executive  Well-organized thoughts     - Other     * Cranial nerves       - CN II  PERRL, visual fields full to confrontation     - CN III, IV, VI  Extraocular movements full, normal pursuits and saccades     - CN V  Sensation V1 - V3 intact     - CN VII  Face strong and symmetric bilaterally     - CN VIII  Hearing intact bilaterally     - CN IX, X  Palate raises midline and symmetric     - CN XI  SCM and trapezius 5/5 bilaterally     - CN XII  Tongue midline   * Motor  Muscle bulk normal, strength 5/5 throughout   * Sensory   Intact to temperature and vibration throughout   * Coordination  No dysmetria with finger-to-nose or heel-to-shin   * Gait  See below.   * Deep tendon reflexes  2+ and symmetric throughout   Babinski downgoing bilaterally   * Specialized movement " exam  Severe hypophonic speech.    ++ mouth sl open, facial masking.   L>R mild-mod cogwheel rigidity.     L>R mild-mod bradykinesia.   No tremor with rest, posture, kinesis, or intention.    No other dystonia, chorea, athetosis, myoclonus, or tics.   No motor impersistence.   Normal-based gait.   Mildly shortened stride length.  + abnormal arm swing.     No postural instability.      Laboratory/Radiological:  - Results:  Office Visit on 08/31/2017   Component Date Value Ref Range Status    Color, UA 08/31/2017 YELLOW   Final    Spec Grav UA 08/31/2017 1.025   Final    pH, UA 08/31/2017 5   Final    WBC, UA 08/31/2017 NEG   Final    Nitrite, UA 08/31/2017 NEG   Final    Protein 08/31/2017 NEG   Final    Glucose, UA 08/31/2017 NORM   Final    Ketones, UA 08/31/2017 NEG   Final    Urobilinogen, UA 08/31/2017 NORM   Final    Bilirubin 08/31/2017 NEG   Final    Blood, UA 08/31/2017 NEG   Final       - Independent review of images:    Diagnoses:          1) Parkinsonism, 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 DLB      Medical Decision Making:  - klonopin 0.5 mg at night for RBD   - trial of azilect, aware of taking zoloft, risk of interaction is low  - stop cd/ld for now - will retry later at broader low dose or rytary  - exercise  - diet    Edward Ivey MD, MPH  Division of Movement and Memory Disorders  [facility]  [phone]

## 2017-10-26 ENCOUNTER — TELEPHONE (OUTPATIENT)
Dept: UROLOGY | Facility: CLINIC | Age: 72
End: 2017-10-26

## 2017-10-26 ENCOUNTER — OFFICE VISIT (OUTPATIENT)
Dept: PAIN MEDICINE | Facility: CLINIC | Age: 72
End: 2017-10-26
Payer: MEDICARE

## 2017-10-26 VITALS
HEART RATE: 70 BPM | BODY MASS INDEX: 29.03 KG/M2 | RESPIRATION RATE: 16 BRPM | TEMPERATURE: 97 F | DIASTOLIC BLOOD PRESSURE: 82 MMHG | HEIGHT: 70 IN | WEIGHT: 202.81 LBS | SYSTOLIC BLOOD PRESSURE: 123 MMHG

## 2017-10-26 DIAGNOSIS — M47.816 FACET ARTHRITIS, DEGENERATIVE, LUMBAR SPINE: ICD-10-CM

## 2017-10-26 DIAGNOSIS — M51.36 DDD (DEGENERATIVE DISC DISEASE), LUMBAR: ICD-10-CM

## 2017-10-26 DIAGNOSIS — M48.07 FORAMINAL STENOSIS OF LUMBOSACRAL REGION: Primary | ICD-10-CM

## 2017-10-26 DIAGNOSIS — M43.06 SPONDYLOLYSIS OF LUMBAR REGION: ICD-10-CM

## 2017-10-26 PROCEDURE — 99213 OFFICE O/P EST LOW 20 MIN: CPT | Mod: S$GLB,,, | Performed by: NURSE PRACTITIONER

## 2017-10-26 PROCEDURE — 99499 UNLISTED E&M SERVICE: CPT | Mod: S$GLB,,, | Performed by: NURSE PRACTITIONER

## 2017-10-26 PROCEDURE — 99999 PR PBB SHADOW E&M-EST. PATIENT-LVL III: CPT | Mod: PBBFAC,,, | Performed by: NURSE PRACTITIONER

## 2017-10-26 NOTE — PROGRESS NOTES
"  Chronic Pain - Established Visit    Referring Physician: No ref. provider found    Chief Complaint:   Chief Complaint   Patient presents with    Low-back Pain        SUBJECTIVE: Disclaimer: This note has been generated using voice-recognition software. There may be typographical errors that have been missed during proof-reading    Interval History 10/26/2017:  The patient returns to clinic today for follow up. He is s/p bilateral L4 TF AFTAB on 9/6/2017 and 10/11/2017. He reports 50% relief of his low back pain. He does report intermittent low back pain that is aching in nature. He does endorse stiffness especially first this in the morning. The pain is worse with prolonged sitting and transitioning for sitting to standing. He denies any radiating leg pain. He does take Ibuprofen with benefit. He denies any other health changes. He denies any bowel or bladder incontinence. His pain today is 4/10.    Initial encounter:    Haja Marti presents to the clinic for the evaluation of back pain. The pain started 3 years ago  and symptoms have been worsening.  Pt reports doing back exercises from orthopedic office and didn't help.  Pt reports [facility]" physical therapy program did not help.  [facility]ks 2 mi daily at [facility]. Also used to run marathons. 15 total. Started swimming 2 years ago, stopped swimming 3 mo ago due to increased weakness from Parkinson's disease. Staying active helps the pain. Denies radiation. Cannot stand upright for longer than 10-15min before back pain gets more intense. Walking does not exacerbate the pain. Transitioning from sitting to standing causes pain. Pain is described as an ache.  Tightness in upper back. Not associated with trauma/fall or other inciting event.     Brief history:    Pain Description:    The pain is located in the back area     At BEST  6/10     At WORST  9/10 on the WORST day.      On average pain is rated as 7/10.     Today the pain is rated as 6/10    The " pain is described as aching and tight band      Symptoms interfere with daily activity (pt wishes to be able to swim again, and do light weight-lifting exercises),  pain does not awaken pt during the night and does not hinder the patient the patient from falling asleep.     Exacerbating factors: Getting out of bed/chair.      Mitigating factors nothing.     Patient denies .  Patient denies any suicidal or homicidal ideations    Pain Medications:  Current:  advil with mild relief    Tried in Past:  NSAIDs - Advil prn   TCA -never   SNRI -Wellbutrin, lexapro  Anti-convulsants -Never  Muscle Relaxants -Never  Opioids- Never    Physical Therapy/Home Exercise:  yes       report:  Reviewed and consistent with medication use as prescribed.    Pain Procedures: None     Chiropractor - adjustment made pain worse in March  Acupuncture - no results, dry needling 2 months ago  TENS unit -never  Spinal decompression -never  Joint replacement -never    Imaging:  Outside MRI L SPINE 2/15/2016    Impression:   Multilevel lumbar spondylosis.   Multilevel, multifactorial spinal canal and neuroforaminal narrowing as above.  Patient has facet hypertrophy throughout the lumbar spine injury to right sided severe neuroforaminal stenosis at L4-5 and bilateral neuroforaminal stenosis at L5-S1 which is mild to moderate.            Past Medical History:   Diagnosis Date    Anxiety     BPH (benign prostatic hypertrophy)     Cataract     OS    Depression     Epiretinal membrane, both eyes     Hx of psychiatric care     Hyperlipidemia     Hypertension     Kidney stones     Kidney stones     Posterior vitreous detachment of left eye     Psychiatric problem     Retinal detachment 2004    OD    Retinoschisis, left eye     Sleep apnea     Vitreous hemorrhage of left eye      Past Surgical History:   Procedure Laterality Date    CATARACT EXTRACTION      OD    EYE SURGERY      laser indirect  4/8/10    left eye    Laser Indirect  Retinopexy  4/8/10    OS    PROSTATE SURGERY      RETINAL DETACHMENT SURGERY  2004    OD    ROTATOR CUFF REPAIR  11/13/2013    TONSILLECTOMY       Social History     Social History    Marital status:      Spouse name: Trinidad    Number of children: 4    Years of education: N/A     Occupational History     [employer]     Social History Main Topics    Smoking status: Never Smoker    Smokeless tobacco: Never Used    Alcohol use No    Drug use: No    Sexual activity: Not on file     Other Topics Concern    Not on file     Social History Narrative    No narrative on file     Family History   Problem Relation Age of Onset    Cataracts Father     Cancer Father      lung    Diabetes Father     Cancer Mother      lung    Anxiety disorder Son     Depression Son     Anxiety disorder Daughter        Review of patient's allergies indicates:  No Known Allergies    Current Outpatient Prescriptions   Medication Sig    aspirin 81 MG Chew Take 81 mg by mouth. 1 Tablet, Chewable Oral Every day    atorvastatin (LIPITOR) 40 MG tablet TAKE 1 TABLET EVERY EVENING    carbidopa-levodopa  mg (SINEMET)  mg per tablet     citric acid-potassium citrate (POLYCITRA) 1,100-334 mg/5 mL solution Take 10 mLs by mouth.    clonazePAM (KLONOPIN) 0.5 MG tablet Take 1 tablet (0.5 mg total) by mouth nightly as needed (sleep). Try 1/2 tab with first dosing    donepezil (ARICEPT) 5 MG tablet Take 5 mg by mouth.    ergocalciferol (VITAMIN D2) 50,000 unit Cap Take 50,000 Units by mouth every 30 days.     finasteride (PROSCAR) 5 mg tablet Take 5 mg by mouth.    losartan (COZAAR) 100 MG tablet Take 1 tablet (100 mg total) by mouth once daily.    magnesium oxide (MAG-OX) 400 mg tablet Take 400 mg by mouth once daily.    MYRBETRIQ 25 mg Tb24 ER tablet TAKE 1 TABLET (25 MG TOTAL) BY MOUTH ONCE DAILY.    potassium citrate (UROCIT-K) 10 mEq (1,080 mg) TbSR TAKE 1 TABLET THREE TIMES DAILY WITH MEALS    pyridoxine  "(VITAMIN B-6) 50 MG Tab Take 50 mg by mouth once daily.    rasagiline (AZILECT) 1 mg Tab Take 1 tablet (1 mg total) by mouth once daily.    sertraline (ZOLOFT) 50 MG tablet Take 1 tablet (50 mg total) by mouth once daily.    sildenafil (REVATIO) 20 mg Tab Take 1 tablet (20 mg total) by mouth as needed (use as directed).    vitamin A 33492 UNIT capsule Take 10,000 Units by mouth once daily.     No current facility-administered medications for this visit.        REVIEW OF SYSTEMS:    GENERAL:  weight loss 3-4 lb/month, no malaise or fevers.  HEENT:   No recent changes in vision or hearing  NECK:  Negative for lumps, no difficulty with swallowing.  RESPIRATORY:  Negative for cough, wheezing or shortness of breath, patient denies any recent URI.  CARDIOVASCULAR:  Negative for chest pain, leg swelling or palpitations.  GI: +N/V a/w pt's home medications. Negative for abdominal discomfort, blood in stools or black stools or change in bowel habits.  MUSCULOSKELETAL:  See HPI.  SKIN:  Negative for lesions, rash, and itching.  PSYCH:  No mood disorder or recent psychosocial stressors.  Patients sleep is not disturbed secondary to pain.  HEMATOLOGY/LYMPHOLOGY: + bruising easily   ENDO: No history of diabetes or thyroid dysfunction  NEURO:    Patient has Parkinson's with parkinsonian tremor and affect.  All other reviewed and negative other than HPI.    OBJECTIVE:    /82   Pulse 70   Temp 97.1 °F (36.2 °C) (Oral)   Resp 16   Ht 5' 10" (1.778 m)   Wt 92 kg (202 lb 13.2 oz)   BMI 29.10 kg/m²     PHYSICAL EXAMINATION:    GENERAL: Well appearing, in no acute distress, alert and oriented x3.  PSYCH:  Mood and affect appropriate.  SKIN: Skin color, texture, turgor normal, no rashes or lesions.  HEAD/FACE:  Normocephalic, atraumatic. Cranial nerves grossly intact.  CV: RRR with palpation of the radial artery.  PULM: No evidence of respiratory difficulty, symmetric chest rise.  BACK: Straight leg raising in the sitting " position is negative to radicular pain bilaterally. There is pain with palpation over the facet joints of the lumbar spine bilaterally at L5/S1. Limited ROM with pain on extension. Positive facet loading bilaterally.   EXTREMITIES: Peripheral joint ROM is full and pain free without obvious instability or laxity in all four extremities. No deformities, edema, or skin discoloration. Good capillary refill.  MUSCULOSKELETAL: Hip, and knee provocative maneuvers are negative.  There is no pain with palpation over the sacroiliac joints bilaterally.  There is no pain to palpation over the greater trochanteric bursa bilaterally.  FABERs test is negative.  FADIRs test is negative.   5/5 strength in right ankle with plantar and 4/5 dorsiflexion 4/5 EHL raise, 5/5 strength in left ankle with plantar and dorsiflexion 5/5 EHL raise, 5/5 strength with right knee flexion extension, 5/5 strength with knee flexion extension on the left  No atrophy or tone abnormalities are noted.  NEURO:  2+ right knee, 3+ left knee reflexes, absent S1 reflexes bilaterally.  Plantar response are downgoing. No clonus.  No loss of sensation is noted.  GAIT: Antalgic, ambulates without assistance       ASSESSMENT: 71 y.o. year old male with pain, consistent with     Encounter Diagnoses   Name Primary?    Foraminal stenosis of lumbosacral region Yes    Spondylolysis of lumbar region     Facet arthritis, degenerative, lumbar spine     DDD (degenerative disc disease), lumbar        PLAN:     - Previous imaging was reviewed and discussed with the patient today.    - He is s/p bilateral L4 TF AFTAB with relief. We can repeat this in the future if needed.     - Consider medial branch blocks in the future for facet mediated pain. The patient would like to wait at this time.     - Continue home exercise program.     - RTC PRN.     - Dr. Crockett was consulted on the patient and agrees with this plan.    The above plan and management options were discussed at  length with patient. Patient is in agreement with the above and verbalized understanding.     Xochilt Arenas NP  10/26/2017

## 2017-10-26 NOTE — TELEPHONE ENCOUNTER
----- Message from Serafin Oquendo MD sent at 10/26/2017  1:03 PM CDT -----  Contact: ALEXANDRO RENE [[id number]]  Enriqueta    He just need to see me for a UA, PVR and prostate exam  No other testing    Thanks    sc  ----- Message -----  From: Mimi Langford MA  Sent: 10/25/2017   4:31 PM  To: Serafin Oquendo MD    Please inform myself of what labs , x-ray or ultrasound that you may request on the above patient .I will call the patient to schedule after orders are placed/ds  ----- Message -----  From: Pooja Ngocsalomon  Sent: 10/25/2017   4:03 PM  To: Jere Betancourt Staff    x_  1st Request  _  2nd Request  _  3rd Request        Who: ALEXANDRO RENE [[id number]]    Why: Requesting a call back in regards to getting blood work before his appointment on 10/31    What Number to Call Back: [phone]    When to Expect a call back: (Within 24 hours)    Please return the call at earliest convenience. Thanks!

## 2017-10-31 ENCOUNTER — OFFICE VISIT (OUTPATIENT)
Dept: UROLOGY | Facility: CLINIC | Age: 72
End: 2017-10-31
Attending: UROLOGY
Payer: MEDICARE

## 2017-10-31 VITALS
DIASTOLIC BLOOD PRESSURE: 94 MMHG | SYSTOLIC BLOOD PRESSURE: 164 MMHG | WEIGHT: 198 LBS | HEART RATE: 76 BPM | BODY MASS INDEX: 28.35 KG/M2 | HEIGHT: 70 IN

## 2017-10-31 DIAGNOSIS — N40.0 ENLARGED PROSTATE: ICD-10-CM

## 2017-10-31 DIAGNOSIS — R39.15 URINARY URGENCY: Primary | ICD-10-CM

## 2017-10-31 DIAGNOSIS — N20.0 KIDNEY STONES: ICD-10-CM

## 2017-10-31 PROCEDURE — 99214 OFFICE O/P EST MOD 30 MIN: CPT | Mod: S$GLB,,, | Performed by: UROLOGY

## 2017-10-31 RX ORDER — FINASTERIDE 5 MG/1
5 TABLET, FILM COATED ORAL DAILY
Qty: 90 TABLET | Refills: 3 | Status: SHIPPED | OUTPATIENT
Start: 2017-10-31 | End: 2018-04-24 | Stop reason: SDUPTHER

## 2017-10-31 NOTE — PROGRESS NOTES
"Subjective:      Haja Marti is a 71 y.o. male who returns today regarding his     Dribbling and nocturia.  His urinary urgency and frequency have improved somewhat with Myrbetriq 25 mg but have not completely resolved.  His urinary stream is plus minus.  He previously stopped Proscar due to sexual side effects; he is not sexually active at this time    He has not had any further difficulty with orthostatic hypotension.  He reports no side effects with Myrbetriq  .    The following portions of the patient's history were reviewed and updated as appropriate: allergies, current medications, past family history, past medical history, past social history, past surgical history and problem list.    Review of Systems  Pertinent items are noted in HPI.  A comprehensive multipoint review of systems was negative except as otherwise stated in the HPI.     Objective:   Vitals: BP (!) 164/94   Pulse 76   Ht 5' 10" (1.778 m)   Wt 89.8 kg (198 lb)   BMI 28.41 kg/m²     Physical Exam   General: alert and oriented, no acute distress  Respiratory: Symmetric expansion, non-labored breathing  Cardiovascular: no peripheral edema  Abdomen: soft, non distended  Skin: normal coloration and turgor, no rashes, no suspicious skin lesions noted  Neuro: no gross deficits  Psych: normal judgment and insight, normal mood/affect and non-anxious  Prostate difficult examination unable to reach base    Physical Exam    Lab Review   Urinalysis demonstrates as of blood otherwise negative microscopic urinalysis 10-25 red blood cells per high-power field otherwise negative.  Previous hematuria workups were negative patient has a long history of renal stones  Lab Results   Component Value Date    WBC 6.05 06/07/2017    HGB 15.6 06/07/2017    HCT 46.3 06/07/2017    MCV 91 06/07/2017     06/07/2017     Lab Results   Component Value Date    CREATININE 1.3 07/13/2017    BUN 33 (H) 07/13/2017       Imaging  Post Void residual 0 " cc    Assessment and Plan:   Urinary urgency  -     mirabegron (MYRBETRIQ) 25 mg Tb24 ER tablet; Take 1 tablet (25 mg total) by mouth once daily.  Dispense: 90 tablet; Refill: 3  Avoid pm fluids  Avoid caffeine and alcohol  Timed voiding      Kidney stones  -     X-Ray KUB; Future; Expected date: 04/30/2018  He is seeing nephrology for metabolic management, Dr. Blanche Wright    Enlarged prostate  Cannot tolerate Flomax due to orthostatic hypotension.  Would restart Proscar.  Return to clinic 6 months for repeat prostate examination      Other orders  -     finasteride (PROSCAR) 5 mg tablet; Take 1 tablet (5 mg total) by mouth once daily.  Dispense: 90 tablet; Refill: 3

## 2017-11-02 ENCOUNTER — PATIENT MESSAGE (OUTPATIENT)
Dept: NEUROLOGY | Facility: CLINIC | Age: 72
End: 2017-11-02

## 2017-11-03 ENCOUNTER — TELEPHONE (OUTPATIENT)
Dept: NEUROLOGY | Facility: CLINIC | Age: 72
End: 2017-11-03

## 2017-11-03 NOTE — TELEPHONE ENCOUNTER
Spoken to  Patient wife and she informed me that since the intake of the two prescribed medication rasagiline and clonazepam her  been having sleepless nights. He barely sleep and when he does its only for a short period of time. patient wife would like a call back from the provider regarding this situation.    Whitley Marti [phone]

## 2017-11-03 NOTE — TELEPHONE ENCOUNTER
----- Message from Vinnie Dillon sent at 11/1/2017  1:09 PM CDT -----  Contact: Self @ [phone]  Pt states he's been taking clonazePAM (KLONOPIN) 0.5 MG tablet since the 23rd and he is not sleeping until 3 AM or 4 AM and when waking up, he feels drugged. Pt is asking to speak the doctor in regards to this. Pls call.

## 2017-11-06 ENCOUNTER — TELEPHONE (OUTPATIENT)
Dept: NEUROLOGY | Facility: CLINIC | Age: 72
End: 2017-11-06

## 2017-11-06 NOTE — TELEPHONE ENCOUNTER
----- Message from Karthikeyan Eaton sent at 11/6/2017 11:28 AM CST -----  Contact: German ( spouse ) [phone]  Patient is requesting a return call regarding medication, after taking the new medication on 10-23, he's unable to sleep.  (clonazePAM (KLONOPIN) 0.5 MG tablet ) (sertraline (ZOLOFT) 50 MG tablet ) (rasagiline (AZILECT) 1 mg Tab ), pls call

## 2017-11-07 NOTE — TELEPHONE ENCOUNTER
Message repeat, no changes patient would like to providers response joey.    Me            11/3/17 9:18 AM   Note      Spoken to  Patient wife and she informed me that since the intake of the two prescribed medication rasagiline and clonazepam her  been having sleepless nights. He barely sleep and when he does its only for a short period of time. patient wife would like a call back from the provider regarding this situation.     Whitley Marti [phone]

## 2017-11-08 ENCOUNTER — TELEPHONE (OUTPATIENT)
Dept: NEUROLOGY | Facility: CLINIC | Age: 72
End: 2017-11-08

## 2017-11-08 NOTE — TELEPHONE ENCOUNTER
Spoke to patient.  Instructed to stop the rasagiline and will take the low dose klonopin earlier in the evening versus not at all.    May try trazodone as an alternative at night.

## 2017-11-08 NOTE — TELEPHONE ENCOUNTER
----- Message from Teresa Vaughn sent at 11/8/2017 12:41 PM CST -----  Contact: Pt  Pt has not been able to sleep for about a week in a half and the medication he is taking is not the right medication...    Says he has been trying to get a hold of doctor and none of his calls has been returned    Pt can be reached at [phone] or [phone]    Thanks

## 2017-11-09 RX ORDER — SERTRALINE HYDROCHLORIDE 50 MG/1
50 TABLET, FILM COATED ORAL DAILY
Qty: 30 TABLET | Refills: 1 | Status: CANCELLED | OUTPATIENT
Start: 2017-11-09 | End: 2018-11-09

## 2017-11-13 ENCOUNTER — PATIENT MESSAGE (OUTPATIENT)
Dept: NEUROLOGY | Facility: CLINIC | Age: 72
End: 2017-11-13

## 2017-11-14 ENCOUNTER — OFFICE VISIT (OUTPATIENT)
Dept: PSYCHIATRY | Facility: CLINIC | Age: 72
End: 2017-11-14
Payer: MEDICARE

## 2017-11-14 VITALS
HEART RATE: 67 BPM | HEIGHT: 70 IN | SYSTOLIC BLOOD PRESSURE: 142 MMHG | DIASTOLIC BLOOD PRESSURE: 78 MMHG | WEIGHT: 209 LBS | BODY MASS INDEX: 29.92 KG/M2

## 2017-11-14 DIAGNOSIS — F41.1 GENERALIZED ANXIETY DISORDER: Primary | ICD-10-CM

## 2017-11-14 PROCEDURE — 99999 PR PBB SHADOW E&M-EST. PATIENT-LVL II: CPT | Mod: PBBFAC,GC,, | Performed by: PSYCHIATRY & NEUROLOGY

## 2017-11-14 PROCEDURE — 99213 OFFICE O/P EST LOW 20 MIN: CPT | Mod: GC,S$GLB,, | Performed by: PSYCHIATRY & NEUROLOGY

## 2017-11-14 RX ORDER — TRAZODONE HYDROCHLORIDE 50 MG/1
25 TABLET ORAL NIGHTLY PRN
Qty: 15 TABLET | Refills: 1 | Status: SHIPPED | OUTPATIENT
Start: 2017-11-14 | End: 2017-11-14 | Stop reason: SDUPTHER

## 2017-11-14 RX ORDER — TRAZODONE HYDROCHLORIDE 50 MG/1
25 TABLET ORAL NIGHTLY PRN
Qty: 15 TABLET | Refills: 1 | Status: SHIPPED | OUTPATIENT
Start: 2017-11-14 | End: 2017-12-12 | Stop reason: SDUPTHER

## 2017-11-14 RX ORDER — SERTRALINE HYDROCHLORIDE 100 MG/1
100 TABLET, FILM COATED ORAL DAILY
Qty: 30 TABLET | Refills: 1 | Status: SHIPPED | OUTPATIENT
Start: 2017-11-14 | End: 2017-12-12 | Stop reason: SDUPTHER

## 2017-11-14 NOTE — TELEPHONE ENCOUNTER
STAFF NOTE:  I received an error code e-message noting the trazodone e-Rx had not been communicated with the pharmacy.  I then called in a new Rx for trazodone for this pt to a [facility] pharmacy as noted in the EMR.

## 2017-11-14 NOTE — PROGRESS NOTES
Outpatient Psychiatry Follow-Up Visit (MD/NP)    11/14/2017    Clinical Status of Patient:  Outpatient (Ambulatory)    Chief Complaint:  Haja Marti is a 72 y.o. male who presents today for follow-up of anxiety.  Met with patient and spouse.      Interval History and Content of Current Session:  Interim Events/Subjective Report/Content of Current Session: On interview, Mr Marti states that he has been having significant trouble sleeping since last visit. Says that he started sertraline right after our last appointment and reports compliance with it - started taking rasagaline and clonazepam about one week later as prescribed by neurologist. Sleep onset was gradually delayed to the point that he was hardly sleeping at all. Last Thursday his neurologist recommended discontinuing rasagaline and clonazepam. He also called the clinic complaining of troubles sleeping which he thought were related to a medication, but has been getting more sleep over last few nights. Neurologist recommended discontinuation of rasagaline, and his sleep has improved since then. Over past few nights has been falling asleep between 2 and 4am, and sleeping about 6 hours per night. Still feels mostly tired during the day. Appetite low in the mornings but then increases later in the day. Anxiety not as severe recently (not having dry heaves since last visit and denies panic attacks), although still is significant and bothersome. Is ultimately agreeable to continuing titration of sertraline to target anxiety.    Wife was also present was patient's permission. She agrees with the above.      Review of Systems   · Prob Pert. 1 sys, Ext. psych +2 add., Comp. 10-14 sys  · PSYCHIATRIC: Pertinant items are noted in the narrative.  · NEUROLOGIC: tremors, shuffling gait  · CARDIOVASCULAR: No tachycardia or chest pain.    Past Medical, Family and Social History: The patient's past medical, family and social history have been reviewed and updated as  "appropriate within the electronic medical record - see encounter notes.    Compliance: yes    Side effects: possibly causing insomnia as described in HPI    Risk Parameters:  Patient reports no suicidal ideation  Patient reports no homicidal ideation  Patient reports no self-injurious behavior  Patient reports no violent behavior    Exam (detailed: at least 9 elements; comprehensive: all 15 elements)   Constitutional  Vitals:  Most recent vital signs, dated less than 90 days prior to this appointment, were reviewed.   Vitals:    11/14/17 1324   BP: (!) 142/78   Pulse: 67   Weight: 94.8 kg (209 lb)   Height: 5' 10" (1.778 m)        General:  unremarkable, age appropriate, normal weight, well nourished     Musculoskeletal  Muscle Strength/Tone:  cogwheeling noted in BUEs   Gait & Station:  parkinsonian, slow     Psychiatric  Speech:  slowed, soft   Mood & Affect:  "anxious"  congruent and appropriate, appropriate, blunted, mood-congruent   Thought Process:  normal and logical   Associations:  intact   Thought Content:  normal, no suicidality, no homicidality, delusions, or paranoia   Insight:  intact   Judgement: behavior is adequate to circumstances   Orientation:  grossly intact   Memory: intact for content of interview   Language: grossly intact   Attention Span & Concentration:  able to focus   Fund of Knowledge:  intact and appropriate to age and level of education     Assessment and Diagnosis   Status/Progress: Based on the examination today, the patient's problem(s) is/are improved and inadequately controlled.  New problems have been presented today (insomnia - this is not likely related to sertraline given severity and the fact that it has improved significantly since discontinuation of rasagaline).   Co-morbidities are complicating management of the primary condition.  There are no active rule-out diagnoses for this patient at this time.     General Impression:      ICD-10-CM ICD-9-CM   1. Generalized anxiety " disorder F41.1 300.02       Intervention/Counseling/Treatment Plan   · Medication Management: The risks and benefits of medication were discussed with the patient.    Follow-up plan for anxiety was discussed with patient.              - increase sertraline to 100mg PO daily   - initiate trazodone 25mg PO qhs for insomnia    Return to Clinic: 1 month

## 2017-11-20 ENCOUNTER — OFFICE VISIT (OUTPATIENT)
Dept: PAIN MEDICINE | Facility: CLINIC | Age: 72
End: 2017-11-20
Attending: ANESTHESIOLOGY
Payer: MEDICARE

## 2017-11-20 VITALS
DIASTOLIC BLOOD PRESSURE: 99 MMHG | HEART RATE: 65 BPM | SYSTOLIC BLOOD PRESSURE: 172 MMHG | RESPIRATION RATE: 18 BRPM | WEIGHT: 211.63 LBS | BODY MASS INDEX: 30.3 KG/M2 | HEIGHT: 70 IN | TEMPERATURE: 98 F

## 2017-11-20 DIAGNOSIS — M51.36 DDD (DEGENERATIVE DISC DISEASE), LUMBAR: ICD-10-CM

## 2017-11-20 DIAGNOSIS — M48.07 FORAMINAL STENOSIS OF LUMBOSACRAL REGION: ICD-10-CM

## 2017-11-20 DIAGNOSIS — M54.16 LUMBAR RADICULOPATHY: ICD-10-CM

## 2017-11-20 DIAGNOSIS — M47.816 FACET ARTHROPATHY, LUMBAR: Primary | ICD-10-CM

## 2017-11-20 PROCEDURE — 99999 PR PBB SHADOW E&M-EST. PATIENT-LVL III: CPT | Mod: PBBFAC,,, | Performed by: ANESTHESIOLOGY

## 2017-11-20 PROCEDURE — 99213 OFFICE O/P EST LOW 20 MIN: CPT | Mod: S$GLB,,, | Performed by: ANESTHESIOLOGY

## 2017-11-20 PROCEDURE — 99499 UNLISTED E&M SERVICE: CPT | Mod: S$GLB,,, | Performed by: ANESTHESIOLOGY

## 2017-11-20 NOTE — PROGRESS NOTES
Chronic Pain - Established Visit    Referring Physician: No ref. provider found    Chief Complaint:   Chief Complaint   Patient presents with    Back Pain        SUBJECTIVE: Disclaimer: This note has been generated using voice-recognition software. There may be typographical errors that have been missed during proof-reading    Interval History 11/20/2017  The patient returns to the clinic for a follow up visit, he is reporting back pain of 2/10 today.  His pain had significantly exacerbated on Friday when the patient may be appointment but states that over the weekend with conservative care and occasional ibuprofen his pain has gradually improved.  He denies any radicular symptoms but is continue to have significant lower back pain over the area of the facet joints of the lumbar spine.  Previous x-rays have revealed that the patient has significant facet arthropathy most severe at L4-5 in the area where the patient is having his most severe pain.  On previous encounter was discussed that in the future if his pain persists we can proceed with medial branch nerve blocks to be followed by radiofrequency ablation if diagnostic, the patient has been tolerating physical therapy and doing home stretches and states that overall his pain is better currently compared to what it was on Friday.      Interval History 10/26/2017:  The patient returns to clinic today for follow up. He is s/p bilateral L4 TF AFTAB on 9/6/2017 and 10/11/2017. He reports 50% relief of his low back pain. He does report intermittent low back pain that is aching in nature. He does endorse stiffness especially first this in the morning. The pain is worse with prolonged sitting and transitioning for sitting to standing. He denies any radiating leg pain. He does take Ibuprofen with benefit. He denies any other health changes. He denies any bowel or bladder incontinence. His pain today is 4/10.    Initial encounter:    Haja Marti presents to the  "clinic for the evaluation of back pain. The pain started 3 years ago  and symptoms have been worsening.  Pt reports doing back exercises from orthopedic office and didn't help.  Pt reports [facility]'s "[facility]" physical therapy program did not help.  Walks 2 mi daily at [facility]. Also used to run marathons. 15 total. Started swimming 2 years ago, stopped swimming 3 mo ago due to increased weakness from Parkinson's disease. Staying active helps the pain. Denies radiation. Cannot stand upright for longer than 10-15min before back pain gets more intense. Walking does not exacerbate the pain. Transitioning from sitting to standing causes pain. Pain is described as an ache.  Tightness in upper back. Not associated with trauma/fall or other inciting event.     Brief history:    Pain Description:    The pain is located in the back area     At BEST  6/10     At WORST  9/10 on the WORST day.      On average pain is rated as 7/10.     Today the pain is rated as 6/10    The pain is described as aching and tight band      Symptoms interfere with daily activity (pt wishes to be able to swim again, and do light weight-lifting exercises),  pain does not awaken pt during the night and does not hinder the patient the patient from falling asleep.     Exacerbating factors: Getting out of bed/chair.      Mitigating factors nothing.     Patient denies .  Patient denies any suicidal or homicidal ideations    Pain Medications:  Current:  advil with mild relief    Tried in Past:  NSAIDs - Advil prn   TCA -never   SNRI -Wellbutrin, lexapro  Anti-convulsants -Never  Muscle Relaxants -Never  Opioids- Never    Physical Therapy/Home Exercise:  yes       report:  Reviewed and consistent with medication use as prescribed.    Pain Procedures:   10/11/2017 BL L4 TFESI    Chiropractor - adjustment made pain worse in March  Acupuncture - no results, dry needling 2 months ago  TENS unit -never  Spinal decompression -never  Joint replacement " -never    Imaging:  Outside MRI L SPINE 2/15/2016    Impression:   Multilevel lumbar spondylosis.   Multilevel, multifactorial spinal canal and neuroforaminal narrowing as above.  Patient has facet hypertrophy throughout the lumbar spine injury to right sided severe neuroforaminal stenosis at L4-5 and bilateral neuroforaminal stenosis at L5-S1 which is mild to moderate.            Past Medical History:   Diagnosis Date    Anxiety     BPH (benign prostatic hypertrophy)     Cataract     OS    Depression     Epiretinal membrane, both eyes     Hx of psychiatric care     Hyperlipidemia     Hypertension     Kidney stones     Kidney stones     Parkinsons     Posterior vitreous detachment of left eye     Psychiatric problem     Retinal detachment 2004    OD    Retinoschisis, left eye     Sleep apnea     Vitreous hemorrhage of left eye      Past Surgical History:   Procedure Laterality Date    CATARACT EXTRACTION      OD    EYE SURGERY      laser indirect  4/8/10    left eye    Laser Indirect Retinopexy  4/8/10    OS    PROSTATE SURGERY      RETINAL DETACHMENT SURGERY  2004    OD    ROTATOR CUFF REPAIR  11/13/2013    TONSILLECTOMY       Social History     Social History    Marital status:      Spouse name: Trinidad    Number of children: 4    Years of education: N/A     Occupational History     [employer]     Social History Main Topics    Smoking status: Never Smoker    Smokeless tobacco: Never Used    Alcohol use No    Drug use: No    Sexual activity: Not on file     Other Topics Concern    Not on file     Social History Narrative    No narrative on file     Family History   Problem Relation Age of Onset    Cataracts Father     Cancer Father      lung    Diabetes Father     Cancer Mother      lung    Anxiety disorder Son     Depression Son     Anxiety disorder Daughter        Review of patient's allergies indicates:  No Known Allergies    Current Outpatient Prescriptions    Medication Sig    aspirin 81 MG Chew Take 81 mg by mouth. 1 Tablet, Chewable Oral Every day    atorvastatin (LIPITOR) 40 MG tablet TAKE 1 TABLET EVERY EVENING    carbidopa-levodopa  mg (SINEMET)  mg per tablet     citric acid-potassium citrate (POLYCITRA) 1,100-334 mg/5 mL solution Take 10 mLs by mouth.    clonazePAM (KLONOPIN) 0.5 MG tablet Take 1 tablet (0.5 mg total) by mouth nightly as needed (sleep). Try 1/2 tab with first dosing    donepezil (ARICEPT) 5 MG tablet Take 5 mg by mouth.    ergocalciferol (VITAMIN D2) 50,000 unit Cap Take 50,000 Units by mouth every 30 days.     finasteride (PROSCAR) 5 mg tablet Take 1 tablet (5 mg total) by mouth once daily.    losartan (COZAAR) 100 MG tablet Take 1 tablet (100 mg total) by mouth once daily.    magnesium oxide (MAG-OX) 400 mg tablet Take 400 mg by mouth once daily.    mirabegron (MYRBETRIQ) 25 mg Tb24 ER tablet Take 1 tablet (25 mg total) by mouth once daily.    potassium citrate (UROCIT-K) 10 mEq (1,080 mg) TbSR TAKE 1 TABLET THREE TIMES DAILY WITH MEALS    pyridoxine (VITAMIN B-6) 50 MG Tab Take 50 mg by mouth once daily.    sertraline (ZOLOFT) 100 MG tablet Take 1 tablet (100 mg total) by mouth once daily.    traZODone (DESYREL) 50 MG tablet Take 0.5 tablets (25 mg total) by mouth nightly as needed for Insomnia.     No current facility-administered medications for this visit.        REVIEW OF SYSTEMS:    GENERAL:  weight loss 3-4 lb/month, no malaise or fevers.  HEENT:   No recent changes in vision or hearing  NECK:  Negative for lumps, no difficulty with swallowing.  RESPIRATORY:  Negative for cough, wheezing or shortness of breath, patient denies any recent URI.  CARDIOVASCULAR:  Negative for chest pain, leg swelling or palpitations.  GI: +N/V a/w pt's home medications. Negative for abdominal discomfort, blood in stools or black stools or change in bowel habits.  MUSCULOSKELETAL:  See HPI.  SKIN:  Negative for lesions, rash, and  "itching.  PSYCH:  No mood disorder or recent psychosocial stressors.  Patients sleep is not disturbed secondary to pain.  HEMATOLOGY/LYMPHOLOGY: + bruising easily   ENDO: No history of diabetes or thyroid dysfunction  NEURO:    Patient has Parkinson's with parkinsonian tremor and affect.  All other reviewed and negative other than HPI.    OBJECTIVE:    BP (!) 172/99 (BP Location: Left arm, Patient Position: Sitting)   Pulse 65   Temp 98.1 °F (36.7 °C) (Oral)   Resp 18   Ht 5' 10" (1.778 m)   Wt 96 kg (211 lb 10.3 oz)   BMI 30.37 kg/m²     PHYSICAL EXAMINATION:    GENERAL: Well appearing, in no acute distress, alert and oriented x3.  PSYCH:  Mood and affect appropriate.  SKIN: Skin color, texture, turgor normal, no rashes or lesions.  HEAD/FACE:  Normocephalic, atraumatic. Cranial nerves grossly intact.  CV: RRR with palpation of the radial artery.  PULM: No evidence of respiratory difficulty, symmetric chest rise.  BACK: Straight leg raising in the sitting position is negative to radicular pain bilaterally. There is pain with palpation over the facet joints of the lumbar spine bilaterally at L5/S1. Limited ROM with pain on extension. Positive facet loading bilaterally.   EXTREMITIES: Peripheral joint ROM is full and pain free without obvious instability or laxity in all four extremities. No deformities, edema, or skin discoloration. Good capillary refill.  MUSCULOSKELETAL: Hip, and knee provocative maneuvers are negative.  There is no pain with palpation over the sacroiliac joints bilaterally.  There is no pain to palpation over the greater trochanteric bursa bilaterally.  FABERs test is negative.  FADIRs test is negative.   5/5 strength in right ankle with plantar and 4/5 dorsiflexion 4/5 EHL raise, 5/5 strength in left ankle with plantar and dorsiflexion 5/5 EHL raise, 5/5 strength with right knee flexion extension, 5/5 strength with knee flexion extension on the left  No atrophy or tone abnormalities are " noted.  NEURO:  2+ right knee, 3+ left knee reflexes, absent S1 reflexes bilaterally.  Plantar response are downgoing. No clonus.  No loss of sensation is noted.  GAIT: Antalgic, ambulates without assistance       ASSESSMENT: 72 y.o. year old male with pain, consistent with     Encounter Diagnoses   Name Primary?    Facet arthropathy, lumbar Yes    Lumbar radiculopathy     DDD (degenerative disc disease), lumbar     Foraminal stenosis of lumbosacral region        PLAN:       Previous imaging was reviewed and discussed with the patient today    In the future his radicular symptoms return repeat  Bilateral L4 transforaminal epidural steroid injections    Continue home exercises    I will schedule patient for bilateral medial branch nerve blocks at the levels of L2, L3, L4, L5 for diagnostic purposes if his pain worsens, I explained to the patient that he can call and schedule the procedure directly if his pain intensifies and remains severe    Laina Crockett MD  11/20/2017

## 2017-11-22 NOTE — TELEPHONE ENCOUNTER
What would you like to increase the medication to? I can put the medication in and route it over to you.

## 2017-11-30 RX ORDER — CARBIDOPA AND LEVODOPA 25; 100 MG/1; MG/1
TABLET ORAL
Qty: 135 TABLET | Refills: 3 | Status: SHIPPED | OUTPATIENT
Start: 2017-11-30 | End: 2017-12-21 | Stop reason: SDUPTHER

## 2017-11-30 NOTE — TELEPHONE ENCOUNTER
Confirmed with pt's wife that we will restart CD/LD 25-100mg tablets at one half tablet three times daily with meals. Previous dosing of CD/LD 25-100mg BID was causing nausea for the pt. Ms. Arreaga states he has done well on trazadone thus far regarding reduction of insomnia. F/u appt currently scheduled for end of January (appt with FILI Faye to be cancelled due to illness). I advised to contact office near end of December to update us on adjustment to new dosing.

## 2017-12-01 ENCOUNTER — PATIENT MESSAGE (OUTPATIENT)
Dept: NEUROLOGY | Facility: CLINIC | Age: 72
End: 2017-12-01

## 2017-12-01 NOTE — TELEPHONE ENCOUNTER
Left voicemail informing of 12/7 appointment cancellation. Will return call with r/s options. Asked to return call if any further questions

## 2017-12-11 RX ORDER — ATORVASTATIN CALCIUM 40 MG/1
40 TABLET, FILM COATED ORAL DAILY
Qty: 90 TABLET | Refills: 3 | Status: SHIPPED | OUTPATIENT
Start: 2017-12-11 | End: 2019-03-11 | Stop reason: SDUPTHER

## 2017-12-11 RX ORDER — LOSARTAN POTASSIUM 100 MG/1
100 TABLET ORAL DAILY
Qty: 90 TABLET | Refills: 3 | Status: SHIPPED | OUTPATIENT
Start: 2017-12-11 | End: 2018-12-15 | Stop reason: SDUPTHER

## 2017-12-11 NOTE — TELEPHONE ENCOUNTER
"----- Message from Kellie Gifford sent at 12/11/2017  9:51 AM CST -----  Contact: Pt [phone]  RX request - refill or new RX.  Is this a refill or new RX:  Refill  RX name and strength: losartan (COZAAR) 100 MG tablet  Directions:   Is this a 30 day or 90 day RX:  90  Pharmacy name and phone # (DON'T enter "on file" or "in chart"): [facility] - [place] [id number] [facility] [phone] (Phone)  [phone] (Fax)  Comments:      RX request - refill or new RX.  Is this a refill or new RX:  Refill  RX name and strength: atorvastatin (LIPITOR) 40 MG tablet  Directions:   Is this a 30 day or 90 day RX:  90  Pharmacy name and phone # (DON'T enter "on file" or "in chart"): [facility] [address] [facility] [phone] (Phone)  [phone] (Fax)  Comments:      "

## 2017-12-12 ENCOUNTER — OFFICE VISIT (OUTPATIENT)
Dept: PSYCHIATRY | Facility: CLINIC | Age: 72
End: 2017-12-12
Payer: MEDICARE

## 2017-12-12 VITALS
HEART RATE: 70 BPM | SYSTOLIC BLOOD PRESSURE: 165 MMHG | HEIGHT: 70 IN | WEIGHT: 209 LBS | DIASTOLIC BLOOD PRESSURE: 85 MMHG | BODY MASS INDEX: 29.92 KG/M2

## 2017-12-12 DIAGNOSIS — F41.1 GENERALIZED ANXIETY DISORDER: Primary | ICD-10-CM

## 2017-12-12 PROCEDURE — 3008F BODY MASS INDEX DOCD: CPT | Mod: GC,S$GLB,, | Performed by: PSYCHIATRY & NEUROLOGY

## 2017-12-12 PROCEDURE — 1159F MED LIST DOCD IN RCRD: CPT | Mod: GC,S$GLB,, | Performed by: PSYCHIATRY & NEUROLOGY

## 2017-12-12 PROCEDURE — 99213 OFFICE O/P EST LOW 20 MIN: CPT | Mod: GC,S$GLB,, | Performed by: PSYCHIATRY & NEUROLOGY

## 2017-12-12 PROCEDURE — 99999 PR PBB SHADOW E&M-EST. PATIENT-LVL III: CPT | Mod: PBBFAC,GC,, | Performed by: PSYCHIATRY & NEUROLOGY

## 2017-12-12 RX ORDER — SERTRALINE HYDROCHLORIDE 100 MG/1
TABLET, FILM COATED ORAL
Qty: 135 TABLET | Refills: 1 | Status: SHIPPED | OUTPATIENT
Start: 2017-12-12 | End: 2018-05-15 | Stop reason: SDUPTHER

## 2017-12-12 RX ORDER — TRAZODONE HYDROCHLORIDE 50 MG/1
50 TABLET ORAL NIGHTLY PRN
Qty: 90 TABLET | Refills: 1 | Status: SHIPPED | OUTPATIENT
Start: 2017-12-12 | End: 2018-01-27 | Stop reason: SDUPTHER

## 2017-12-12 NOTE — PROGRESS NOTES
Outpatient Psychiatry Follow-Up Visit (MD/NP)    12/12/2017    Clinical Status of Patient:  Outpatient (Ambulatory)    Chief Complaint:  Haja Marti is a 72 y.o. male who presents today for follow-up of anxiety.  Met with patient and spouse.      Interval History and Content of Current Session:  Interim Events/Subjective Report/Content of Current Session: On interview, Mr Marti states anxiety continues to be problematic - uses anxiety about today's clinic visit as an example, but then admits that he is frequently anxious on other mornings as well. Does not feel sertraline has been helpful at all. Does not feel trazodone has been helpful, but says that he is typically falling asleep at 1AM, getting about 5-6 hours per night, and also taking a nap each day. Feels fatigued very quickly from any physical activity. Reports depressed mood which he attributes to his physical condition, but feels this is in proportion to situation. Endorses irritability, but denies Hi. Reports compliance with medications, denies adverse effects. Continues to participate in boxing 3x per week and also go to gym to exercise on other mornings. Is interested in increasing sertraline to see if he can get any more benefits from it.    Wife was also present was patient's permission. She agrees with the above.      Review of Systems   · Prob Pert. 1 sys, Ext. psych +2 add., Comp. 10-14 sys  · PSYCHIATRIC: Pertinant items are noted in the narrative.  · NEUROLOGIC: tremors, shuffling gait  · CARDIOVASCULAR: No tachycardia or chest pain.    Past Medical, Family and Social History: The patient's past medical, family and social history have been reviewed and updated as appropriate within the electronic medical record - see encounter notes.    Compliance: yes    Side effects: possibly causing insomnia as described in HPI    Risk Parameters:  Patient reports no suicidal ideation  Patient reports no homicidal ideation  Patient reports no  "self-injurious behavior  Patient reports no violent behavior    Exam (detailed: at least 9 elements; comprehensive: all 15 elements)   Constitutional  Vitals:  Most recent vital signs, dated less than 90 days prior to this appointment, were reviewed.   Vitals:    12/12/17 1430   BP: (!) 165/85   Pulse: 70   Weight: 94.8 kg (209 lb)   Height: 5' 10" (1.778 m)        General:  unremarkable, age appropriate, normal weight, well nourished     Musculoskeletal  Muscle Strength/Tone:  cogwheeling noted in BUEs   Gait & Station:  parkinsonian, slow     Psychiatric  Speech:  slowed, soft   Mood & Affect:  "anxious"  congruent and appropriate, appropriate, blunted, mood-congruent   Thought Process:  normal and logical   Associations:  intact   Thought Content:  normal, no suicidality, no homicidality, delusions, or paranoia   Insight:  intact   Judgement: behavior is adequate to circumstances   Orientation:  grossly intact   Memory: intact for content of interview   Language: grossly intact   Attention Span & Concentration:  able to focus   Fund of Knowledge:  intact and appropriate to age and level of education     Assessment and Diagnosis   Status/Progress: Based on the examination today, the patient's problem(s) is/are inadequately controlled.  New problems have not been presented today.  Co-morbidities are complicating management of the primary condition.  There are no active rule-out diagnoses for this patient at this time.     General Impression:      ICD-10-CM ICD-9-CM   1. Generalized anxiety disorder F41.1 300.02       Intervention/Counseling/Treatment Plan   · Medication Management: The risks and benefits of medication were discussed with the patient.    Follow-up plan for anxiety was discussed with patient.              - increase sertraline to 150mg PO daily   - initiate trazodone 50mg PO qhs for insomnia    Return to Clinic: 1 month  "

## 2017-12-13 RX ORDER — LOSARTAN POTASSIUM 100 MG/1
100 TABLET ORAL DAILY
Qty: 90 TABLET | Refills: 3 | Status: CANCELLED | OUTPATIENT
Start: 2017-12-13 | End: 2018-12-13

## 2017-12-13 RX ORDER — ATORVASTATIN CALCIUM 40 MG/1
40 TABLET, FILM COATED ORAL DAILY
Qty: 90 TABLET | Refills: 3 | Status: CANCELLED | OUTPATIENT
Start: 2017-12-13

## 2017-12-13 NOTE — TELEPHONE ENCOUNTER
----- Message from Kylah Nicholas sent at 12/13/2017 12:45 PM CST -----  Contact: calll [phone]  Please call patient he states that his refill requests were sent to the wrong pharmacy,  [facility] to [facility] and needs to have sent to [facility], NEEDS DONE TODAY TO MAKE SURE HE GETS HIS MEDICATIONS IN TIME

## 2017-12-13 NOTE — TELEPHONE ENCOUNTER
----- Message from Ty Parry sent at 12/13/2017  8:30 AM CST -----  Contact: self/ [phone] home  Pt would like to request that the medications atorvastatin (LIPITOR) 40 MG tablet and losartan (COZAAR) 100 MG tablet be re-sent to the [facility] pharmacy and not the [facility] pharmacy.  He is running low on the medication and would like to request that it be sent in today, if possible.  He would like a call back when the prescriptions have been sent in to [facility].  Please call and advise.    Thank you

## 2017-12-22 RX ORDER — CARBIDOPA AND LEVODOPA 25; 100 MG/1; MG/1
TABLET ORAL
Qty: 135 TABLET | Refills: 3 | Status: SHIPPED | OUTPATIENT
Start: 2017-12-22 | End: 2018-04-24

## 2018-01-03 ENCOUNTER — TELEPHONE (OUTPATIENT)
Dept: PSYCHIATRY | Facility: CLINIC | Age: 73
End: 2018-01-03

## 2018-01-03 DIAGNOSIS — F41.9 ANXIETY DISORDER, UNSPECIFIED TYPE: Primary | ICD-10-CM

## 2018-01-03 RX ORDER — CLONAZEPAM 0.5 MG/1
TABLET ORAL
Qty: 60 TABLET | Refills: 1 | Status: SHIPPED | OUTPATIENT
Start: 2018-01-03 | End: 2018-03-06 | Stop reason: SDUPTHER

## 2018-01-08 NOTE — PROGRESS NOTES
STAFF NOTE:  Patient's case was formally presented to me by Dr. Nash and patient was seen by me in supervision on the same day.  I agree with his assessment and plan as specified to discontinue bupropion and escitalopram and try sertraline instead for anxiety Sx management.

## 2018-01-09 ENCOUNTER — OFFICE VISIT (OUTPATIENT)
Dept: PSYCHIATRY | Facility: CLINIC | Age: 73
End: 2018-01-09
Payer: MEDICARE

## 2018-01-09 VITALS
BODY MASS INDEX: 29.78 KG/M2 | SYSTOLIC BLOOD PRESSURE: 111 MMHG | HEIGHT: 70 IN | DIASTOLIC BLOOD PRESSURE: 69 MMHG | HEART RATE: 71 BPM | WEIGHT: 208 LBS

## 2018-01-09 DIAGNOSIS — F41.1 GENERALIZED ANXIETY DISORDER: Primary | ICD-10-CM

## 2018-01-09 PROCEDURE — 99999 PR PBB SHADOW E&M-EST. PATIENT-LVL III: CPT | Mod: PBBFAC,GC,, | Performed by: PSYCHIATRY & NEUROLOGY

## 2018-01-09 PROCEDURE — 3074F SYST BP LT 130 MM HG: CPT | Mod: CPTII,GC,S$GLB, | Performed by: PSYCHIATRY & NEUROLOGY

## 2018-01-09 PROCEDURE — 99213 OFFICE O/P EST LOW 20 MIN: CPT | Mod: GC,S$GLB,, | Performed by: PSYCHIATRY & NEUROLOGY

## 2018-01-09 PROCEDURE — 3078F DIAST BP <80 MM HG: CPT | Mod: CPTII,GC,S$GLB, | Performed by: PSYCHIATRY & NEUROLOGY

## 2018-01-09 NOTE — PROGRESS NOTES
Outpatient Psychiatry Follow-Up Visit (MD/NP)    1/9/2018    Clinical Status of Patient:  Outpatient (Ambulatory)    Chief Complaint:  Haja Marti is a 72 y.o. male who presents today for follow-up of anxiety.  Met with patient and spouse.      Interval History and Content of Current Session:  Interim Events/Subjective Report/Content of Current Session: On interview, Mr Marti states that he was feeling more anxious around holidays. This was because large groups of people came over to his home for parties - on New Year's day he actually went to daughter's house to be alone while everyone else was in his house. He states this is because he feels embarrassed about his Parkinson's disease. Says that anxiety is decreasing somewhat now that holidays have past. Denies feelings of panic at other times, but does not feel anxiety has decreased much either. States that he has low energy all the time, but that he continues to do cardiovascular exercise every morning and boxing class three times per week. Reports compliance with psychotropics, denies adverse effects. Tried going without trazodone for a few days, but did not sleep well during that time so he re-started it. Sleep good over last few weeks. Clonazepam seems to help somewhat, but patient is concerned this may be making him more sedated. Says he has been taking 1/2 tablet of this three times every day. Also increased to 200mg of sertraline daily for last few days after speaking with Dr Elliott on the phone. Endorses feeling sad at times, but says he thinks more about his physical symptoms themselves rather than feeling sad. Denies anhedonia, irritability, feelings of guilt, hopelessness, SI/HI. Is agreeable to continuing current regimen, and says he will probably take trazodone only when needed and decrease clonazepam to twice daily. Is interested in participating in psychotherapy.    Wife was also present was patient's permission. She agrees with the above,  "although worries the patient is becoming less and less active.      Review of Systems   · Prob Pert. 1 sys, Ext. psych +2 add., Comp. 10-14 sys  · PSYCHIATRIC: Pertinant items are noted in the narrative.  · NEUROLOGIC: tremors, shuffling gait No weakness, sensory changes, seizures, confusion, memory loss, tremor or other abnormal movements.  · RESPIRATORY: Positive for cough and wheeze.  · CARDIOVASCULAR: No tachycardia or chest pain.    Past Medical, Family and Social History: The patient's past medical, family and social history have been reviewed and updated as appropriate within the electronic medical record - see encounter notes.    Compliance: yes    Side effects: possibly causing insomnia as described in HPI    Risk Parameters:  Patient reports no suicidal ideation  Patient reports no homicidal ideation  Patient reports no self-injurious behavior  Patient reports no violent behavior    Exam (detailed: at least 9 elements; comprehensive: all 15 elements)   Constitutional  Vitals:  Most recent vital signs, dated less than 90 days prior to this appointment, were reviewed.   Vitals:    01/09/18 1332   BP: 111/69   Pulse: 71   Weight: 94.3 kg (208 lb)   Height: 5' 10" (1.778 m)        General:  unremarkable, age appropriate, normal weight, well nourished     Musculoskeletal  Muscle Strength/Tone:  cogwheeling noted in BUEs   Gait & Station:  parkinsonian, slow     Psychiatric  Speech:  slowed, soft   Mood & Affect:  "anxious"  congruent and appropriate, appropriate, blunted, mood-congruent   Thought Process:  normal and logical   Associations:  intact   Thought Content:  normal, no suicidality, no homicidality, delusions, or paranoia   Insight:  intact   Judgement: behavior is adequate to circumstances   Orientation:  grossly intact   Memory: intact for content of interview   Language: grossly intact   Attention Span & Concentration:  able to focus   Fund of Knowledge:  intact and appropriate to age and level of " education     Assessment and Diagnosis   Status/Progress: Based on the examination today, the patient's problem(s) is/are inadequately controlled.  New problems have not been presented today.  Co-morbidities are complicating management of the primary condition.  There are no active rule-out diagnoses for this patient at this time.     General Impression:      ICD-10-CM ICD-9-CM   1. Generalized anxiety disorder F41.1 300.02       Intervention/Counseling/Treatment Plan   · Medication Management: The risks and benefits of medication were discussed with the patient.    Follow-up plan for anxiety was discussed with patient.              - continue sertraline at recently-increased dose of 200mg PO daily   - continue trazodone 50mg PO qhs for insomnia, with instruction that he can take it as-needed   - continue clonazepam 0.5-1mg PO TID PRN anxiety for now (patient has recently been taking 0.25mg TID on a scheduled basis)    Return to Clinic: 1 month     Alexys Nash MD

## 2018-01-11 ENCOUNTER — PATIENT MESSAGE (OUTPATIENT)
Dept: PSYCHIATRY | Facility: CLINIC | Age: 73
End: 2018-01-11

## 2018-01-23 ENCOUNTER — TELEPHONE (OUTPATIENT)
Dept: UROLOGY | Facility: CLINIC | Age: 73
End: 2018-01-23

## 2018-01-23 NOTE — TELEPHONE ENCOUNTER
Pt did not want to change med at this time.  He will check with neurology to see if anticholinergics would be ok to take with his Parkinsons.  They will call us back, or see us in April.

## 2018-01-23 NOTE — TELEPHONE ENCOUNTER
----- Message from Daja Ford sent at 1/23/2018  9:43 AM CST -----  Contact: pt  x_  1st Request  _  2nd Request  _  3rd Request      Who:pt    Why: states that  mirabegron (MYRBETRIQ) 25 mg Tb24 ER tablet 90 tablet is to expensive      What Number to Call Back: [phone]    When to Expect a call back: (Before the end of the day)   -- if call after 3:00 call back will be tomorrow.

## 2018-01-23 NOTE — PROGRESS NOTES
STAFF NOTE:  Patient's case was discussed with Dr. Nash in supervision.  I agree with his assessment and plan as specified to increase sertraline and try trazodone for treatment of anxiety and insomnia, respectively.

## 2018-01-29 RX ORDER — TRAZODONE HYDROCHLORIDE 50 MG/1
TABLET ORAL
Qty: 15 TABLET | Refills: 2 | Status: SHIPPED | OUTPATIENT
Start: 2018-01-29 | End: 2018-03-06 | Stop reason: SDUPTHER

## 2018-01-31 ENCOUNTER — OFFICE VISIT (OUTPATIENT)
Dept: NEUROLOGY | Facility: CLINIC | Age: 73
End: 2018-01-31
Payer: MEDICARE

## 2018-01-31 VITALS
HEIGHT: 70 IN | HEART RATE: 64 BPM | BODY MASS INDEX: 28.75 KG/M2 | SYSTOLIC BLOOD PRESSURE: 149 MMHG | WEIGHT: 200.81 LBS | DIASTOLIC BLOOD PRESSURE: 88 MMHG

## 2018-01-31 DIAGNOSIS — G20.A1 PARKINSON DISEASE: Primary | ICD-10-CM

## 2018-01-31 PROCEDURE — 1126F AMNT PAIN NOTED NONE PRSNT: CPT | Mod: S$GLB,,, | Performed by: PSYCHIATRY & NEUROLOGY

## 2018-01-31 PROCEDURE — 99214 OFFICE O/P EST MOD 30 MIN: CPT | Mod: S$GLB,,, | Performed by: PSYCHIATRY & NEUROLOGY

## 2018-01-31 PROCEDURE — 1159F MED LIST DOCD IN RCRD: CPT | Mod: S$GLB,,, | Performed by: PSYCHIATRY & NEUROLOGY

## 2018-01-31 PROCEDURE — 99999 PR PBB SHADOW E&M-EST. PATIENT-LVL III: CPT | Mod: PBBFAC,,, | Performed by: PSYCHIATRY & NEUROLOGY

## 2018-01-31 PROCEDURE — 3008F BODY MASS INDEX DOCD: CPT | Mod: S$GLB,,, | Performed by: PSYCHIATRY & NEUROLOGY

## 2018-01-31 NOTE — PROGRESS NOTES
"Name: Haja Marti  MRN: [id number]   CSN: [id number]      Date: 01/31/2018    Chief Complaint / Interval History:   - azilect had some insomnia  - restarted the cd/ld 1/2 tid - wife thinks all is a little better but not dramatic  - voice particularly better  - no recurrence of hallucinations on the low dose cd/ld  -  He thinks the new cd/ld has leveled out, but anxiety is worse, for nearly every event in the family  - he is ruminating about having to give a speech in the next month and he is very nervous    * might be worse since he started the aricept    History of Present Illness (HPI):  70 yo     2+ years of progressive stooping posture, shuffling feet, slower walking, struggles to get out of a chair.  Handwriting has gotten "terrible."  Slower with dressing overall, and with most things.    Is a cautious , but has a tendency to pull to the R side of the road (correcting to the left).  More issues with parking, no issues moving backwards but uses side mirrors.  No accidents.      For about 6 months, there has been a progressive hoarseness of the voice.  Comes and goes, not clear timing.      No tremor.      Saw Dr. 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  RBD/sleep issues (Constitutional)?Yes - fights in his sleep, jumps out of bed, falls out over 5 years, talking for 30 years.  Depression/anxiety (Psychiatric)?Yes  Fatigue (Constitutional)?Yes  Constipation (GI)?Yes  Urinary issues ()?Yes  Sexual dysfunction ()?Yes  -ED  Orthostasis (Cardiovascular)?Yes  Leg swelling (Cardiovascular)? No  Falls (Musculoskeletal)?Yes - has had a couple of falls, more unsteady on a boat.    Cognitive impairment (Neurologic)?Yes - a little slower overall  Psychoses (Psychiatric)?Yes - recently had experience thinking he saw people in his den, " non-descript faces; 2 years ago, had a experience of seeing someone in the bedroom.  He's had no clear delusions.   Pain/Paresthesia (Neurologic)?Yes - lumbar for years, some disability, helped with pain management.  Visual changes (Eyes)?No  Moles / skin changes (Skin)?No  Stridor / SOB (Pulm)?No  Bruising (Heme)?No    Past Medical History: The patient  has a past medical history of Anxiety; BPH (benign prostatic hypertrophy); Cataract; Depression; Epiretinal membrane, both eyes; psychiatric care; Hyperlipidemia; Hypertension; Kidney stones; Kidney stones; Parkinsons; Posterior vitreous detachment of left eye; Psychiatric problem; Retinal detachment (2004); Retinoschisis, left eye; Sleep apnea; and Vitreous hemorrhage of left eye.    Social History: The patient  reports that he has never smoked. He has never used smokeless tobacco. He reports that he does not drink alcohol or use drugs.    Family History: Their family history includes Anxiety disorder in his daughter and son; Cancer in his father and mother; Cataracts in his father; Depression in his son; Diabetes in his father.    Allergies: Patient has no known allergies.     Meds:   Current Outpatient Prescriptions on File Prior to Visit   Medication Sig Dispense Refill    aspirin 81 MG Chew Take 81 mg by mouth. 1 Tablet, Chewable Oral Every day      atorvastatin (LIPITOR) 40 MG tablet Take 1 tablet (40 mg total) by mouth once daily. 90 tablet 3    carbidopa-levodopa  mg (SINEMET)  mg per tablet Take half tablet three times daily with meals 135 tablet 3    citric acid-potassium citrate (POLYCITRA) 1,100-334 mg/5 mL solution Take 10 mLs by mouth.      clonazePAM (KLONOPIN) 0.5 MG tablet Take oral 1/2 to 1 tablet TID prn anxiety 60 tablet 1    donepezil (ARICEPT) 5 MG tablet Take 5 mg by mouth.      ergocalciferol (VITAMIN D2) 50,000 unit Cap Take 50,000 Units by mouth every 30 days.       finasteride (PROSCAR) 5 mg tablet Take 1 tablet (5 mg  "total) by mouth once daily. 90 tablet 3    losartan (COZAAR) 100 MG tablet Take 1 tablet (100 mg total) by mouth once daily. 90 tablet 3    magnesium oxide (MAG-OX) 400 mg tablet Take 400 mg by mouth once daily.      mirabegron (MYRBETRIQ) 25 mg Tb24 ER tablet Take 1 tablet (25 mg total) by mouth once daily. 90 tablet 3    potassium citrate (UROCIT-K) 10 mEq (1,080 mg) TbSR TAKE 1 TABLET THREE TIMES DAILY WITH MEALS 270 tablet 3    pyridoxine (VITAMIN B-6) 50 MG Tab Take 50 mg by mouth once daily.      sertraline (ZOLOFT) 100 MG tablet TAKE 1.5 TABLETS BY MOUTH EVERY MORNING 135 tablet 1    traZODone (DESYREL) 50 MG tablet TAKE 1/2 TABLET BY MOUTH EVERY DAY AT BEDTIME AS NEEDED FOR INSOMNIA 15 tablet 2     No current facility-administered medications on file prior to visit.        Exam:  BP (!) 149/88   Pulse 64   Ht 5' 10" (1.778 m)   Wt 91.1 kg (200 lb 13.4 oz)   BMI 28.82 kg/m²     * Specialized movement exam  Severe hypophonic speech.    ++ mouth sl open, facial masking.   L>R mild-mod cogwheel rigidity.     L>R mild-mod bradykinesia.   No tremor with rest, posture, kinesis, or intention.    No other dystonia, chorea, athetosis, myoclonus, or tics.   No motor impersistence.   Normal-based gait.   Mildly shortened stride length.  + abnormal arm swing.     No postural instability.      Laboratory/Radiological:  - Results:  No visits with results within 3 Month(s) from this visit.   Latest known visit with results is:   Office Visit on 08/31/2017   Component Date Value Ref Range Status    Color, UA 08/31/2017 YELLOW   Final    Spec Grav UA 08/31/2017 1.025   Final    pH, UA 08/31/2017 5   Final    WBC, UA 08/31/2017 NEG   Final    Nitrite, UA 08/31/2017 NEG   Final    Protein 08/31/2017 NEG   Final    Glucose, UA 08/31/2017 NORM   Final    Ketones, UA 08/31/2017 NEG   Final    Urobilinogen, UA 08/31/2017 NORM   Final    Bilirubin 08/31/2017 NEG   Final    Blood, UA 08/31/2017 NEG   Final     - " Independent review of images:    Diagnoses:          1) Parkinsonism, likely iPD (vs. DLB given the early neuropsychiatric features)      Medical Decision Makin) Dr. Elliott to prescribe for anxiety  2) I am stopping the Aricept now to see if it is worsening the anxiety  3) Cautious driving for now  4) Exercising regularly -   5) Diet    Edward Ivey MD, MPH  Division of Movement and Memory Disorders  [facility]  [phone]

## 2018-01-31 NOTE — LETTER
January 31, 2018    Haja Marti  [address] [facility] [address]     [facility] - Neurology  [address]  [place] [address]  Phone: [phone]  Fax: [phone] To Whom It May Concern:    I am the treating neurologist for Mr. Marti and his Parkinson's disease.  He cannot tolerate lengthy standing in lines.  Please accommodate his needs in such situations as able.    Please feel free to contact me with questions.    Sincerely,        Edward Ivey MD

## 2018-02-06 ENCOUNTER — OFFICE VISIT (OUTPATIENT)
Dept: PSYCHIATRY | Facility: CLINIC | Age: 73
End: 2018-02-06
Payer: MEDICARE

## 2018-02-06 VITALS
BODY MASS INDEX: 28.6 KG/M2 | HEIGHT: 70 IN | WEIGHT: 199.81 LBS | DIASTOLIC BLOOD PRESSURE: 76 MMHG | HEART RATE: 71 BPM | SYSTOLIC BLOOD PRESSURE: 137 MMHG

## 2018-02-06 DIAGNOSIS — F41.1 GENERALIZED ANXIETY DISORDER: Primary | ICD-10-CM

## 2018-02-06 PROCEDURE — 3075F SYST BP GE 130 - 139MM HG: CPT | Mod: CPTII,GC,S$GLB, | Performed by: PSYCHIATRY & NEUROLOGY

## 2018-02-06 PROCEDURE — 99213 OFFICE O/P EST LOW 20 MIN: CPT | Mod: GC,S$GLB,, | Performed by: PSYCHIATRY & NEUROLOGY

## 2018-02-06 PROCEDURE — 99999 PR PBB SHADOW E&M-EST. PATIENT-LVL III: CPT | Mod: PBBFAC,GC,, | Performed by: PSYCHIATRY & NEUROLOGY

## 2018-02-06 PROCEDURE — 3078F DIAST BP <80 MM HG: CPT | Mod: CPTII,GC,S$GLB, | Performed by: PSYCHIATRY & NEUROLOGY

## 2018-02-06 NOTE — PROGRESS NOTES
Outpatient Psychiatry Follow-Up Visit (MD/NP)    2/6/2018    Clinical Status of Patient:  Outpatient (Ambulatory)    Chief Complaint:  Haja Marti is a 72 y.o. male who presents today for follow-up of anxiety.  Met with patient and spouse.      Interval History and Content of Current Session:  Interim Events/Subjective Report/Content of Current Session: On interview, Mr Marti states that anxiety seems to have decreased slightly since last visit, although is still bothersome. 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 He continues to participate in boxing class 3 days per week and do aerobic exercises the other days - finds this physically exhausting but feels better after he takes a nap. Sleeping well at night, from about 10pm to 6am with usually just one awakening to use the restroom. Has been using a relaxation favio his son recommended to him, and finds this helpful. Continues to take trazodone every night, but wants to try taking it only when needed now. Taking clonazepam 0.25mg BID and finds that helpful. Thinks the sertraline might also be helping some, and feels that discontinuing donepezil may also have helped reduce anxiety. Denies feelings of sadness, anhedonia, SI/HI. Wants to continue medications the same while he starts psychotherapy with Sherif Galvan.    Wife was also present was patient's permission. She agrees with the above, although states that the patient does isolate himself sometimes when he is anxious. He typically attributes this to feeling tired, but she feels anxiety is also contributing.      Review of Systems   · Prob Pert. 1 sys, Ext. psych +2 add., Comp. 10-14 sys  · PSYCHIATRIC: Pertinant items are noted in the narrative.  · NEUROLOGIC: tremors, shuffling gait No weakness, sensory changes, seizures, confusion, memory loss,  "tremor or other abnormal movements.  · RESPIRATORY: Positive for cough and wheeze.  · CARDIOVASCULAR: No tachycardia or chest pain.    Past Medical, Family and Social History: The patient's past medical, family and social history have been reviewed and updated as appropriate within the electronic medical record - see encounter notes.    Compliance: yes    Side effects: possibly causing insomnia as described in HPI    Risk Parameters:  Patient reports no suicidal ideation  Patient reports no homicidal ideation  Patient reports no self-injurious behavior  Patient reports no violent behavior    Exam (detailed: at least 9 elements; comprehensive: all 15 elements)   Constitutional  Vitals:  Most recent vital signs, dated less than 90 days prior to this appointment, were reviewed.   Vitals:    02/06/18 1248   BP: 137/76   Pulse: 71   Weight: 90.6 kg (199 lb 12.8 oz)   Height: 5' 10" (1.778 m)        General:  unremarkable, age appropriate, normal weight, well nourished     Musculoskeletal  Muscle Strength/Tone:  cogwheeling noted in BUEs   Gait & Station:  parkinsonian, slow     Psychiatric  Speech:  slowed but normal volume and tone   Mood & Affect:  a little better  congruent and appropriate, appropriate, blunted, mood-congruent   Thought Process:  normal and logical   Associations:  intact   Thought Content:  normal, no suicidality, no homicidality, delusions, or paranoia   Insight:  intact   Judgement: behavior is adequate to circumstances   Orientation:  grossly intact   Memory: intact for content of interview   Language: grossly intact   Attention Span & Concentration:  able to focus   Fund of Knowledge:  intact and appropriate to age and level of education     Assessment and Diagnosis   Status/Progress: Based on the examination today, the patient's problem(s) is/are resolving but still problematic.  New problems have not been presented today.  Co-morbidities are complicating management of the primary condition.  " There are no active rule-out diagnoses for this patient at this time.     General Impression:      ICD-10-CM ICD-9-CM   1. Generalized anxiety disorder F41.1 300.02       Intervention/Counseling/Treatment Plan   · Medication Management: The risks and benefits of medication were discussed with the patient.    Follow-up plan for anxiety was discussed with patient.              - continue sertraline at 200mg PO daily   - continue trazodone 50mg PO qhs for insomnia, with reminder that he can take it as-needed   - continue clonazepam 0.5-1mg PO TID PRN anxiety for now (patient has recently been taking 0.25mg BID on a scheduled basis, which is slight decrease compared to previous use)    Starting psychotherapy with Sherif Galvan later this month; hopefully this combined with current medication will provide improved control of anxiety    Return to Clinic: 1 month     Alexys Nash MD

## 2018-02-18 NOTE — PROGRESS NOTES
STAFF NOTE:  Patient's case was formally presented to me by Dr. Nash and patient was seen by me in supervision on the same day.  I agree with his assessment and plan as specified to increase sertraline and initiate trazodone for better management of anxiety Sx.

## 2018-02-20 ENCOUNTER — OFFICE VISIT (OUTPATIENT)
Dept: PSYCHIATRY | Facility: CLINIC | Age: 73
End: 2018-02-20
Payer: MEDICARE

## 2018-02-20 DIAGNOSIS — F41.1 GENERALIZED ANXIETY DISORDER: Primary | ICD-10-CM

## 2018-02-20 PROCEDURE — 90791 PSYCH DIAGNOSTIC EVALUATION: CPT | Mod: S$GLB,,, | Performed by: SOCIAL WORKER

## 2018-02-20 PROCEDURE — 99499 UNLISTED E&M SERVICE: CPT | Mod: S$GLB,,, | Performed by: SOCIAL WORKER

## 2018-02-20 NOTE — PROGRESS NOTES
Psychiatry Initial Visit (PhD/LCSW)  Diagnostic Interview - CPT 72172    Date: 2018    Site: [facility]    Referral source: self    Clinical status of patient: Outpatient    Haja Marti, a 72 y.o. male, for initial evaluation visit.  Met with patient and spouse.    Chief complaint/reason for encounter: anxiety    History of present illness:     Pain: noncontributory    Symptoms:   · Mood: dysphoria 3-4  Scale to 10.    Wife says 6.  Sleep is corrected with trazodone.  Self critical as per decline in activity per parkinson's,   No thoughts of suicide, no hx of attempt,   It has affected intimacy as well.     Memory declined   Energy is low.  Doing boxing class for parkinson's     ·  That he is mad at the   · Anxiety: no trauma.   Irritable,  Worry and excessive.   Not restless,  No rituals. No phobias.     · Wife recently moved out of bedroom because pt restless sleep and snoring.   · Substance abuse: denied  · Cognitive functioning: denied  · Health behaviors: noncontributory    Psychiatric history:  Counseling as senior in high school for nerviousness;  some family counseling later in life.   since 3-4 mo. seeing Dr. Nash.  he has parkinson's as well and his worries intensified significantly.   he reports that a few years ago he had a presentation he had dry heeves and felt anxious; wife reports it was other areas too that provoked the dry heeves.  parkinsons dx in  last year but suspect showing signs by a few years.      Medical history: see epic.      Family history of psychiatric illness: two children depression.       Social history (marriage, employment, etc.):   Wife retired with [employer] and pt a few years ago.  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  Christianity.   First marriage  4 children.    Played sports in school.   Masters in education.        Substance use:   Alcohol: none   Drugs: none   Tobacco: none    Caffeine: none    Current medications and drug reactions (include OTC, herbal): see medication list     Strengths and liabilities: Strength: Patient accepts guidance/feedback, Strength: Patient is expressive/articulate., Strength: Patient has positive support network., Strength: Patient has reasonable judgment.    Current Evaluation:     Mental Status Exam:  General Appearance:  unremarkable, age appropriate   Speech: normal tone, normal rate, normal pitch, normal volume, slowed, increased latency of response      Level of Cooperation: cooperative      Thought Processes: normal and logical   Mood: anxious      Thought Content: normal, no suicidality, no homicidality, delusions, or paranoia   Affect: congruent and appropriate, restricted   Orientation: Oriented x3   Memory: recent >  intact   Attention Span & Concentration: intact   Fund of General Knowledge: 4 of 4 recent presidents   Abstract Reasoning: not tested   Judgment & Insight: good     Language  intact     Diagnostic Impression - Plan:       ICD-10-CM ICD-9-CM   1. Generalized anxiety disorder F41.1 300.02       Plan:individual psychotherapy    Return to Clinic: 1 month    Length of Service (minutes): 45

## 2018-03-06 ENCOUNTER — OFFICE VISIT (OUTPATIENT)
Dept: PSYCHIATRY | Facility: CLINIC | Age: 73
End: 2018-03-06
Payer: MEDICARE

## 2018-03-06 VITALS
BODY MASS INDEX: 28.1 KG/M2 | WEIGHT: 196.31 LBS | SYSTOLIC BLOOD PRESSURE: 134 MMHG | DIASTOLIC BLOOD PRESSURE: 70 MMHG | HEIGHT: 70 IN | HEART RATE: 69 BPM

## 2018-03-06 DIAGNOSIS — F41.9 ANXIETY DISORDER, UNSPECIFIED TYPE: Primary | ICD-10-CM

## 2018-03-06 PROCEDURE — 99999 PR PBB SHADOW E&M-EST. PATIENT-LVL II: CPT | Mod: PBBFAC,GC,, | Performed by: PSYCHIATRY & NEUROLOGY

## 2018-03-06 PROCEDURE — 99499 UNLISTED E&M SERVICE: CPT | Mod: GC,S$GLB,, | Performed by: PSYCHIATRY & NEUROLOGY

## 2018-03-06 RX ORDER — CLONAZEPAM 0.5 MG/1
TABLET ORAL
Qty: 60 TABLET | Refills: 1 | Status: SHIPPED | OUTPATIENT
Start: 2018-03-06 | End: 2018-03-06 | Stop reason: SDUPTHER

## 2018-03-06 RX ORDER — TRAZODONE HYDROCHLORIDE 50 MG/1
50 TABLET ORAL NIGHTLY PRN
Qty: 30 TABLET | Refills: 2 | Status: SHIPPED | OUTPATIENT
Start: 2018-03-06 | End: 2019-01-23

## 2018-03-06 RX ORDER — CLONAZEPAM 0.5 MG/1
TABLET ORAL
Qty: 60 TABLET | Refills: 1 | Status: SHIPPED | OUTPATIENT
Start: 2018-03-06 | End: 2018-08-14 | Stop reason: SDUPTHER

## 2018-03-06 NOTE — PROGRESS NOTES
"Outpatient Psychiatry Follow-Up Visit (MD/NP)    3/6/2018    Clinical Status of Patient:  Outpatient (Ambulatory)    Chief Complaint:  Haja Marti is a 72 y.o. male who presents today for follow-up of anxiety.  Met with patient and spouse.      Interval History and Content of Current Session:  Interim Events/Subjective Report/Content of Current Session: On interview, Mr Marti states that anxiety seems to have decreased significantly since last visit. Is not sure if medications are now working better or if this is just because he does not have any large, anxiety-provoking events coming up. Denies adverse effects from medications. States he has been having a "good month", and that his legs are actually starting to feel a bit stronger now. Denies feeling depressed mood, although does get irritable at times. Denies yelling or urge to hurt anyone. Enjoyed family vacation to [place] and also says that recent speech went well. Sleeping well, taking trazodone only as needed now. Continues to take clonazepam 0.25mg two times per day. Continues with boxing class and other daily exercise, and feels he is actually starting to get more energy now. Had first visit with psychotherapist and found that helpful - plans to continue that.      Review of Systems   · Prob Pert. 1 sys, Ext. psych +2 add., Comp. 10-14 sys  · PSYCHIATRIC: Pertinant items are noted in the narrative.  · NEUROLOGIC: tremors, shuffling gait No weakness, sensory changes, seizures, confusion, memory loss, tremor or other abnormal movements.  · RESPIRATORY: Positive for cough and wheeze.  · CARDIOVASCULAR: No tachycardia or chest pain.    Past Medical, Family and Social History: The patient's past medical, family and social history have been reviewed and updated as appropriate within the electronic medical record - see encounter notes.    Compliance: yes    Side effects: None    Risk Parameters:  Patient reports no suicidal ideation  Patient reports " "no homicidal ideation  Patient reports no self-injurious behavior  Patient reports no violent behavior    Exam (detailed: at least 9 elements; comprehensive: all 15 elements)   Constitutional  Vitals:  Most recent vital signs, dated less than 90 days prior to this appointment, were reviewed.   Vitals:    03/06/18 1437   BP: 134/70   Pulse: 69   Weight: 89 kg (196 lb 5.1 oz)   Height: 5' 10" (1.778 m)        General:  unremarkable, age appropriate, normal weight, well nourished     Musculoskeletal  Muscle Strength/Tone:  cogwheeling noted in BUEs   Gait & Station:  parkinsonian, stooped but steady     Psychiatric  Speech:  slowed but normal volume and tone   Mood & Affect:  "not bad"  congruent and appropriate, appropriate, mood-congruent, reactive, full range   Thought Process:  normal and logical   Associations:  intact   Thought Content:  normal, no suicidality, no homicidality, delusions, or paranoia   Insight:  intact   Judgement: behavior is adequate to circumstances   Orientation:  grossly intact   Memory: intact for content of interview   Language: grossly intact   Attention Span & Concentration:  able to focus   Fund of Knowledge:  intact and appropriate to age and level of education     Assessment and Diagnosis   Status/Progress: Based on the examination today, the patient's problem(s) is/are improved and well controlled.  New problems have not been presented today.  Co-morbidities are complicating management of the primary condition.  There are no active rule-out diagnoses for this patient at this time.     General Impression:      ICD-10-CM ICD-9-CM   1. Anxiety disorder, unspecified type F41.9 300.00       Intervention/Counseling/Treatment Plan   · Medication Management: The risks and benefits of medication were discussed with the patient.    Follow-up plan for anxiety was discussed with patient.              - continue sertraline at 200mg PO daily   - continue trazodone 50mg PO qhs PRN insomnia   - " continue clonazepam 0.5-1mg PO TID PRN anxiety for now (patient has recently been taking 0.25mg BID on a scheduled basis, which is slight decrease compared to previous use)    Continuing psychotherapy as well. If he can maintain some stability until next visit, then we may try decreasing, or even discontinuing, clonazepam.    Return to Clinic: 1 month     Alexys Nash MD

## 2018-03-15 ENCOUNTER — TELEPHONE (OUTPATIENT)
Dept: NEUROLOGY | Facility: CLINIC | Age: 73
End: 2018-03-15

## 2018-03-15 NOTE — TELEPHONE ENCOUNTER
----- Message from Teresa Vaughn sent at 3/15/2018 10:04 AM CDT -----  Contact: Pt  Pt needs to know if he made the list for Parkinson says he was informed to call within a few days and he will be informed     Pt can be reached at [phone]    Thanks

## 2018-03-15 NOTE — TELEPHONE ENCOUNTER
Returned call to pt and confirmed it was okay he did not leave his provider name and it was okay. WE have him confirmed for Parkinson's disease symposium.

## 2018-03-27 ENCOUNTER — OFFICE VISIT (OUTPATIENT)
Dept: PSYCHIATRY | Facility: CLINIC | Age: 73
End: 2018-03-27
Payer: MEDICARE

## 2018-03-27 DIAGNOSIS — F41.1 GENERALIZED ANXIETY DISORDER: Primary | ICD-10-CM

## 2018-03-27 PROCEDURE — 99499 UNLISTED E&M SERVICE: CPT | Mod: S$GLB,,, | Performed by: SOCIAL WORKER

## 2018-03-27 PROCEDURE — 90834 PSYTX W PT 45 MINUTES: CPT | Mod: S$GLB,,, | Performed by: SOCIAL WORKER

## 2018-03-27 NOTE — PROGRESS NOTES
Individual Psychotherapy (PhD/LCSW)    3/27/2018    Site:  [facility]         Therapeutic Intervention: Met with patient and spouse.  Outpatient - Insight oriented psychotherapy 45 min - CPT code 68090    Chief complaint/reason for encounter: anxiety     Interval history and content of current session:   Pt was worried he would not be able to answer questions today.  He believes 50-60% estimation.  Once here he gave it about a 30-40%.   I had wife score the number of times he answer or not the questions.  Pt was able to see his score was 100% able.   He was taught how to score anxiety, record situation and thoughts.   He has read chapter one and is to read chapter two.   He will record his thoughts for now.   Logic behind cognitive therapy was fully explained.   Answers were given at end of session.      Treatment plan:  · Target symptoms: anxiety   · Why chosen therapy is appropriate versus another modality: relevant to diagnosis  · Outcome monitoring methods: self-report, observation  · Therapeutic intervention type: behavior modifying psychotherapy    Risk parameters:  Patient reports no suicidal ideation  Patient reports no homicidal ideation  Patient reports no self-injurious behavior  Patient reports no violent behavior    Verbal deficits: None    Patient's response to intervention:  The patient's response to intervention is accepting.    Progress toward goals and other mental status changes:  The patient's progress toward goals is fair .    Diagnosis:     ICD-10-CM ICD-9-CM   1. Generalized anxiety disorder F41.1 300.02       Plan:  individual psychotherapy    Return to clinic: 2 weeks    Length of Service (minutes): 45

## 2018-04-17 ENCOUNTER — OFFICE VISIT (OUTPATIENT)
Dept: NEUROLOGY | Facility: CLINIC | Age: 73
End: 2018-04-17
Payer: MEDICARE

## 2018-04-17 VITALS
DIASTOLIC BLOOD PRESSURE: 94 MMHG | HEIGHT: 70 IN | BODY MASS INDEX: 29.03 KG/M2 | WEIGHT: 202.81 LBS | SYSTOLIC BLOOD PRESSURE: 170 MMHG | HEART RATE: 64 BPM

## 2018-04-17 DIAGNOSIS — G20.A1 PD (PARKINSON'S DISEASE): Primary | ICD-10-CM

## 2018-04-17 PROCEDURE — 3077F SYST BP >= 140 MM HG: CPT | Mod: CPTII,S$GLB,, | Performed by: PSYCHIATRY & NEUROLOGY

## 2018-04-17 PROCEDURE — 3080F DIAST BP >= 90 MM HG: CPT | Mod: CPTII,S$GLB,, | Performed by: PSYCHIATRY & NEUROLOGY

## 2018-04-17 PROCEDURE — 99214 OFFICE O/P EST MOD 30 MIN: CPT | Mod: S$GLB,,, | Performed by: PSYCHIATRY & NEUROLOGY

## 2018-04-17 PROCEDURE — 99999 PR PBB SHADOW E&M-EST. PATIENT-LVL III: CPT | Mod: PBBFAC,,, | Performed by: PSYCHIATRY & NEUROLOGY

## 2018-04-17 PROCEDURE — 99499 UNLISTED E&M SERVICE: CPT | Mod: S$PBB,,, | Performed by: PSYCHIATRY & NEUROLOGY

## 2018-04-17 NOTE — PROGRESS NOTES
"Name: Haja Marti  MRN: [id number]   CSN: [id number]      Date: 04/17/2018    Chief Complaint / Interval History:   - balance and gait is better   - taking miralax as needed, usually takes 2-3 days to move bowels  - not drinking much water    From January 2018:  - azilect had some insomnia  - restarted the cd/ld 1/2 tid - wife thinks all is a little better but not dramatic  - voice particularly better  - no recurrence of hallucinations on the low dose cd/ld  -  He thinks the new cd/ld has leveled out, but anxiety is worse, for nearly every event in the family  - he is ruminating about having to give a speech in the next month and he is very nervous    * might be worse since he started the aricept    History of Present Illness (HPI):  72 yo     2+ years of progressive stooping posture, shuffling feet, slower walking, struggles to get out of a chair.  Handwriting has gotten "terrible."  Slower with dressing overall, and with most things.    Is a cautious , but has a tendency to pull to the R side of the road (correcting to the left).  More issues with parking, no issues moving backwards but uses side mirrors.  No accidents.      For about 6 months, there has been a progressive hoarseness of the voice.  Comes and goes, not clear timing.      No tremor.      Saw Dr. 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  RBD/sleep issues (Constitutional)?Yes - fights in his sleep, jumps out of bed, falls out over 5 years, talking for 30 years.  Depression/anxiety (Psychiatric)?Yes  Fatigue (Constitutional)?Yes  Constipation (GI)?Yes  Urinary issues ()?Yes  Sexual dysfunction ()?Yes  -ED  Orthostasis (Cardiovascular)?Yes  Leg swelling (Cardiovascular)? No  Falls (Musculoskeletal)?Yes - has had a couple of falls, more unsteady on a boat.    Cognitive " impairment (Neurologic)?Yes - a little slower overall  Psychoses (Psychiatric)?Yes - recently had experience thinking he saw people in his den, non-descript faces; 2 years ago, had a experience of seeing someone in the bedroom.  He's had no clear delusions.   Pain/Paresthesia (Neurologic)?Yes - lumbar for years, some disability, helped with pain management.  Visual changes (Eyes)?No  Moles / skin changes (Skin)?No  Stridor / SOB (Pulm)?No  Bruising (Heme)?No    Past Medical History: The patient  has a past medical history of Anxiety; BPH (benign prostatic hypertrophy); Cataract; Depression; Epiretinal membrane, both eyes; psychiatric care; Hyperlipidemia; Hypertension; Kidney stones; Kidney stones; Parkinsons; Posterior vitreous detachment of left eye; Psychiatric problem; Retinal detachment (2004); Retinoschisis, left eye; Sleep apnea; and Vitreous hemorrhage of left eye.    Social History: The patient  reports that he has never smoked. He has never used smokeless tobacco. He reports that he does not drink alcohol or use drugs.    Family History: Their family history includes Anxiety disorder in his daughter and son; Cancer in his father and mother; Cataracts in his father; Depression in his son; Diabetes in his father.    Allergies: Patient has no known allergies.     Meds:   Current Outpatient Prescriptions on File Prior to Visit   Medication Sig Dispense Refill    aspirin 81 MG Chew Take 81 mg by mouth. 1 Tablet, Chewable Oral Every day      atorvastatin (LIPITOR) 40 MG tablet Take 1 tablet (40 mg total) by mouth once daily. 90 tablet 3    carbidopa-levodopa  mg (SINEMET)  mg per tablet Take half tablet three times daily with meals 135 tablet 3    citric acid-potassium citrate (POLYCITRA) 1,100-334 mg/5 mL solution Take 10 mLs by mouth.      clonazePAM (KLONOPIN) 0.5 MG tablet Take oral 1/2 to 1 tablet TID prn anxiety 60 tablet 1    ergocalciferol (VITAMIN D2) 50,000 unit Cap Take 50,000 Units  "by mouth every 30 days.       finasteride (PROSCAR) 5 mg tablet Take 1 tablet (5 mg total) by mouth once daily. 90 tablet 3    losartan (COZAAR) 100 MG tablet Take 1 tablet (100 mg total) by mouth once daily. 90 tablet 3    magnesium oxide (MAG-OX) 400 mg tablet Take 400 mg by mouth once daily.      mirabegron (MYRBETRIQ) 25 mg Tb24 ER tablet Take 1 tablet (25 mg total) by mouth once daily. 90 tablet 3    potassium citrate (UROCIT-K) 10 mEq (1,080 mg) TbSR TAKE 1 TABLET THREE TIMES DAILY WITH MEALS 270 tablet 3    pyridoxine (VITAMIN B-6) 50 MG Tab Take 50 mg by mouth once daily.      sertraline (ZOLOFT) 100 MG tablet TAKE 1.5 TABLETS BY MOUTH EVERY MORNING 135 tablet 1    traZODone (DESYREL) 50 MG tablet Take 1 tablet (50 mg total) by mouth nightly as needed for Insomnia. 30 tablet 2    donepezil (ARICEPT) 5 MG tablet Take 5 mg by mouth.       No current facility-administered medications on file prior to visit.      Exam:  BP (!) 155/93   Pulse 62   Ht 5' 10" (1.778 m)   Wt 92 kg (202 lb 13.2 oz)   BMI 29.10 kg/m²     * Specialized movement exam  Severe hypophonic speech.    ++ mouth sl open, facial masking.   L>R mild-mod cogwheel rigidity.     L>R mild-mod bradykinesia.   No tremor with rest, posture, kinesis, or intention.    No other dystonia, chorea, athetosis, myoclonus, or tics.   No motor impersistence.   Normal-based gait.   Mildly shortened stride length.  + abnormal arm swing.     No postural instability.      Laboratory/Radiological:  - Results:  No visits with results within 3 Month(s) from this visit.   Latest known visit with results is:   Office Visit on 08/31/2017   Component Date Value Ref Range Status    Color, UA 08/31/2017 YELLOW   Final    Spec Grav UA 08/31/2017 1.025   Final    pH, UA 08/31/2017 5   Final    WBC, UA 08/31/2017 NEG   Final    Nitrite, UA 08/31/2017 NEG   Final    Protein 08/31/2017 NEG   Final    Glucose, UA 08/31/2017 NORM   Final    Ketones, UA " 08/31/2017 NEG   Final    Urobilinogen, UA 08/31/2017 NORM   Final    Bilirubin 08/31/2017 NEG   Final    Blood, UA 08/31/2017 NEG   Final     - Independent review of images:    Diagnoses:          1) Parkinsonism, likely iPD (vs. DLB given the early neuropsychiatric features)    Medical Decision Making:  - LSVT LOUD THERAPY NOW   - continue meds at current dose  - continue with boxing  - clean diet  -      Edward Ivey MD, MPH  Division of Movement and Memory Disorders  [facility]  [phone]

## 2018-04-17 NOTE — LETTER
April 20, 2018      Andrez Jackson MD  [address]  [facility] [address]           [facility]  [address]  [facility] [address]  Phone: [phone]  Fax: [phone]          Patient: Haja Marti   MR Number: [id number]   YOB: 1945   Date of Visit: 4/17/2018       Dear Dr. Andrez Jackson:    Thank you for referring Haja Marti to me for evaluation. Attached you will find relevant portions of my assessment and plan of care.    If you have questions, please do not hesitate to call me. I look forward to following Haja Marti along with you.    Sincerely,        Enclosure  CC:  No Recipients    If you would like to receive this communication electronically, please contact [email or web address] or [phone] to request more information on [employer] access.    For providers and/or their staff who would like to refer a patient to [facility], please contact us through our one-stop-shop provider referral line, [facility], at [phone].    If you feel you have received this communication in error or would no longer like to receive these types of communications, please e-mail [email or web address]

## 2018-04-19 ENCOUNTER — PATIENT MESSAGE (OUTPATIENT)
Dept: NEUROLOGY | Facility: CLINIC | Age: 73
End: 2018-04-19

## 2018-04-24 ENCOUNTER — PATIENT MESSAGE (OUTPATIENT)
Dept: NEUROLOGY | Facility: CLINIC | Age: 73
End: 2018-04-24

## 2018-04-24 ENCOUNTER — OFFICE VISIT (OUTPATIENT)
Dept: UROLOGY | Facility: CLINIC | Age: 73
End: 2018-04-24
Attending: UROLOGY
Payer: MEDICARE

## 2018-04-24 ENCOUNTER — HOSPITAL ENCOUNTER (OUTPATIENT)
Dept: RADIOLOGY | Facility: OTHER | Age: 73
Discharge: HOME | End: 2018-04-24
Attending: UROLOGY
Payer: MEDICARE

## 2018-04-24 ENCOUNTER — OFFICE VISIT (OUTPATIENT)
Dept: INTERNAL MEDICINE | Facility: CLINIC | Age: 73
End: 2018-04-24
Payer: MEDICARE

## 2018-04-24 ENCOUNTER — LAB VISIT (OUTPATIENT)
Dept: LAB | Facility: HOSPITAL | Age: 73
End: 2018-04-24
Attending: INTERNAL MEDICINE
Payer: MEDICARE

## 2018-04-24 VITALS
HEIGHT: 70 IN | SYSTOLIC BLOOD PRESSURE: 200 MMHG | HEART RATE: 71 BPM | DIASTOLIC BLOOD PRESSURE: 102 MMHG | WEIGHT: 202 LBS | BODY MASS INDEX: 28.92 KG/M2

## 2018-04-24 VITALS
BODY MASS INDEX: 29.22 KG/M2 | WEIGHT: 204.13 LBS | DIASTOLIC BLOOD PRESSURE: 72 MMHG | SYSTOLIC BLOOD PRESSURE: 136 MMHG | HEART RATE: 75 BPM | HEIGHT: 70 IN

## 2018-04-24 DIAGNOSIS — E78.00 PURE HYPERCHOLESTEROLEMIA: ICD-10-CM

## 2018-04-24 DIAGNOSIS — R39.15 URINARY URGENCY: ICD-10-CM

## 2018-04-24 DIAGNOSIS — Z12.5 ENCOUNTER FOR SCREENING FOR MALIGNANT NEOPLASM OF PROSTATE: ICD-10-CM

## 2018-04-24 DIAGNOSIS — N20.0 KIDNEY STONES: Primary | ICD-10-CM

## 2018-04-24 DIAGNOSIS — I10 ESSENTIAL HYPERTENSION: ICD-10-CM

## 2018-04-24 DIAGNOSIS — N40.0 ENLARGED PROSTATE: ICD-10-CM

## 2018-04-24 DIAGNOSIS — N20.0 KIDNEY STONES: ICD-10-CM

## 2018-04-24 DIAGNOSIS — I10 ESSENTIAL HYPERTENSION: Primary | ICD-10-CM

## 2018-04-24 LAB
ALBUMIN SERPL BCP-MCNC: 3.8 G/DL
ALP SERPL-CCNC: 81 U/L
ALT SERPL W/O P-5'-P-CCNC: 31 U/L
ANION GAP SERPL CALC-SCNC: 13 MMOL/L
AST SERPL-CCNC: 26 U/L
BILIRUB SERPL-MCNC: 0.7 MG/DL
BILIRUB SERPL-MCNC: ABNORMAL MG/DL
BLOOD URINE, POC: ABNORMAL
BUN SERPL-MCNC: 21 MG/DL
CALCIUM SERPL-MCNC: 9.6 MG/DL
CHLORIDE SERPL-SCNC: 103 MMOL/L
CHOLEST SERPL-MCNC: 152 MG/DL
CHOLEST/HDLC SERPL: 3.8 {RATIO}
CO2 SERPL-SCNC: 24 MMOL/L
COLOR, POC UA: ABNORMAL
CREAT SERPL-MCNC: 1 MG/DL
EST. GFR  (AFRICAN AMERICAN): >60 ML/MIN/1.73 M^2
EST. GFR  (NON AFRICAN AMERICAN): >60 ML/MIN/1.73 M^2
GLUCOSE SERPL-MCNC: 79 MG/DL
GLUCOSE UR QL STRIP: NORMAL
HDLC SERPL-MCNC: 40 MG/DL
HDLC SERPL: 26.3 %
KETONES UR QL STRIP: ABNORMAL
LDLC SERPL CALC-MCNC: 68 MG/DL
LEUKOCYTE ESTERASE URINE, POC: ABNORMAL
NITRITE, POC UA: ABNORMAL
NONHDLC SERPL-MCNC: 112 MG/DL
PH, POC UA: 5
POC RESIDUAL URINE VOLUME: 0 ML (ref 0–100)
POTASSIUM SERPL-SCNC: 4.2 MMOL/L
PROT SERPL-MCNC: 7.1 G/DL
PROTEIN, POC: ABNORMAL
SODIUM SERPL-SCNC: 140 MMOL/L
SPECIFIC GRAVITY, POC UA: 1.02
TRIGL SERPL-MCNC: 220 MG/DL
UROBILINOGEN, POC UA: NORMAL

## 2018-04-24 PROCEDURE — 3077F SYST BP >= 140 MM HG: CPT | Mod: CPTII,S$GLB,, | Performed by: UROLOGY

## 2018-04-24 PROCEDURE — 99499 UNLISTED E&M SERVICE: CPT | Mod: S$PBB,,, | Performed by: INTERNAL MEDICINE

## 2018-04-24 PROCEDURE — 3075F SYST BP GE 130 - 139MM HG: CPT | Mod: CPTII,S$GLB,, | Performed by: INTERNAL MEDICINE

## 2018-04-24 PROCEDURE — 74018 RADEX ABDOMEN 1 VIEW: CPT | Mod: 26,,, | Performed by: RADIOLOGY

## 2018-04-24 PROCEDURE — 74018 RADEX ABDOMEN 1 VIEW: CPT | Mod: TC,FY

## 2018-04-24 PROCEDURE — 99499 UNLISTED E&M SERVICE: CPT | Mod: S$GLB,,, | Performed by: UROLOGY

## 2018-04-24 PROCEDURE — 3080F DIAST BP >= 90 MM HG: CPT | Mod: CPTII,S$GLB,, | Performed by: UROLOGY

## 2018-04-24 PROCEDURE — 36415 COLL VENOUS BLD VENIPUNCTURE: CPT | Mod: PO

## 2018-04-24 PROCEDURE — 3078F DIAST BP <80 MM HG: CPT | Mod: CPTII,S$GLB,, | Performed by: INTERNAL MEDICINE

## 2018-04-24 PROCEDURE — 81002 URINALYSIS NONAUTO W/O SCOPE: CPT | Mod: S$GLB,,, | Performed by: UROLOGY

## 2018-04-24 PROCEDURE — 99214 OFFICE O/P EST MOD 30 MIN: CPT | Mod: S$GLB,,, | Performed by: INTERNAL MEDICINE

## 2018-04-24 PROCEDURE — 80053 COMPREHEN METABOLIC PANEL: CPT

## 2018-04-24 PROCEDURE — 80061 LIPID PANEL: CPT

## 2018-04-24 PROCEDURE — 99214 OFFICE O/P EST MOD 30 MIN: CPT | Mod: 25,S$GLB,, | Performed by: UROLOGY

## 2018-04-24 PROCEDURE — 51798 US URINE CAPACITY MEASURE: CPT | Mod: S$GLB,,, | Performed by: UROLOGY

## 2018-04-24 PROCEDURE — 99499 UNLISTED E&M SERVICE: CPT | Mod: ,,, | Performed by: INTERNAL MEDICINE

## 2018-04-24 PROCEDURE — 99999 PR PBB SHADOW E&M-EST. PATIENT-LVL III: CPT | Mod: PBBFAC,,, | Performed by: INTERNAL MEDICINE

## 2018-04-24 RX ORDER — FINASTERIDE 5 MG/1
5 TABLET, FILM COATED ORAL DAILY
Qty: 90 TABLET | Refills: 3 | Status: SHIPPED | OUTPATIENT
Start: 2018-04-24 | End: 2019-08-05 | Stop reason: SDUPTHER

## 2018-04-24 RX ORDER — CARBIDOPA AND LEVODOPA 25; 100 MG/1; MG/1
1 TABLET ORAL 3 TIMES DAILY
COMMUNITY
End: 2018-07-16 | Stop reason: SDUPTHER

## 2018-04-24 NOTE — PROGRESS NOTES
"REASON FOR VISIT:  This is a 72-year-old male.  He is here to follow up on his   blood pressure.  Today, he was seen in Urology where there was a blood pressure   reading recorded of 202/102 in the office.  Last week, he was seen by his   neurologist where there was a reading listed as 179/94.  Actually in reviewing   previous Neurology visits and Urology visits since 10/31/2017, the blood   pressure readings have been systolics in the 170, diastolics in the 90s,   although there were other readings in Psychiatry where reportedly readings   showed the systolics to be below 100.  At times when he checks his blood   pressure at home, he gets "normal readings."  He was started on Myrbetriq by   Urology back in August and today it was recommended to stop the medication.    Despite taking the medicine, he still reports having some symptoms of urgency   and frequency and so he is not totally certain if it was helpful.    PAST MEDICAL HISTORY:  Hypertension.  BPH with overactive bladder  Parkinsonism.  Nephrolithiasis.    MEDICATIONS:  List was reviewed and as per MedCard.  His antihypertensives   include losartan 100 mg a day.    PHYSICAL EXAMINATION:  VITAL SIGNS:  His weight is 204 pounds, pulse 76, blood pressure reading at   first was 144/72, then later 136/72.  LUNGS:  Clear.  HEART:  Regular rate and rhythm.  ABDOMEN:  Active bowel sounds, soft, nontender.    IMPRESSION:  1.  Hypertension with labile control.  2.  Hyperlipidemia.  3.  Parkinson disease.    PLAN:  We will not add any medication.  We will see how he does without the use   of Myrbetriq.  I am going to have him come back in to get up-to-date on lab   testing and talked about him get involved in the digital hypertension medicine   program.          /jessica [id number] review        IVONE/CHIARA  dd: 04/24/2018 16:35:35 (CDT)  td: 04/25/2018 10:52:29 (CDT)  Doc ID   #[id number]  Job ID #[id number]    CC:       "

## 2018-04-24 NOTE — PROGRESS NOTES
"Subjective:      Haja Marti is a 72 y.o. male who returns today regarding his     Urinary symptoms are not optimal but controlled with finasteride and Myrbetriq    Nocturia is bothersome.    He is seeing Dr. Blanche Wright nephrology for metabolic management of his stone disease.  No recent episodes of flank pain.    The following portions of the patient's history were reviewed and updated as appropriate: allergies, current medications, past family history, past medical history, past social history, past surgical history and problem list.    Review of Systems  Pertinent items are noted in HPI.  A comprehensive multipoint review of systems was negative except as otherwise stated in the HPI.     Objective:   Vitals: BP (!) 200/102 (BP Location: Left arm, Patient Position: Sitting, BP Method: Large (Automatic))   Pulse 71   Ht 5' 10" (1.778 m)   Wt 91.6 kg (202 lb)   BMI 28.98 kg/m²     Physical Exam   General: alert and oriented, no acute distress  Respiratory: Symmetric expansion, non-labored breathing  Cardiovascular: no peripheral edema  Abdomen: non distended  Skin: normal coloration and turgor, no rashes, no suspicious skin lesions noted  Neuro: no gross deficits  Psych: normal judgment and insight, normal mood/affect and non-anxious  Prostate 55 g no nodules smaller than last examination    Physical Exam    Lab Review   Urinalysis demonstrates negative for all components  Lab Results   Component Value Date    WBC 6.05 06/07/2017    HGB 15.6 06/07/2017    HCT 46.3 06/07/2017    MCV 91 06/07/2017     06/07/2017     Lab Results   Component Value Date    CREATININE 1.3 07/13/2017    BUN 33 (H) 07/13/2017       Imaging  Postvoid residual 0 cc    TECHNIQUE:  Single AP supine view of the abdomen (KUB) was performed    COMPARISON:  02/05/2016    FINDINGS:  Scattered gas-filled loops of bowel within the abdomen and pelvis with gas and fecal material projecting over the colon and rectum.  Overall " nonspecific bowel gas pattern.  Punctate probable phleboliths in the right lower pelvis again seen.  Degenerative change in the visualized spine.  Probable left hilar calcified lymph nodes partially visualized.  Further evaluation as warranted clinically.   Impression       Please see above      Electronically signed by: Kody Ly DO  Date: 04/24/2018  Time: 08:24         Assessment and Plan:   Urinary urgency  -     POCT URINE DIPSTICK WITHOUT MICROSCOPE  -     mirabegron (MYRBETRIQ) 25 mg Tb24 ER tablet; Take 1 tablet (25 mg total) by mouth once daily.  Dispense: 90 tablet; Refill: 3    Kidney stones; stone free  -     POCT URINE DIPSTICK WITHOUT MICROSCOPE  -     X-Ray KUB; Future; Expected date: 04/24/2019  Continue seeing Dr. Wright nephrology for metabolic management.  Return to clinic 1 year with KUB    Enlarged prostate  -     POCT URINE DIPSTICK WITHOUT MICROSCOPE  Cont proscar  RTC 1 yr for prostate exam    Other orders  -     finasteride (PROSCAR) 5 mg tablet; Take 1 tablet (5 mg total) by mouth once daily.  Dispense: 90 tablet; Refill: 3      See primary physician for hypertension and to follow-up on the calcified pulmonary hilar nodes noted.  I gave Mr. Marti copy of this reports that he could also bring this to his son who is a pulmonologist  We will stop the Myrbetriq due to his hypertension.  If his primary physician is able to obtain better controlled hypertension he can restart this at that time

## 2018-04-24 NOTE — PATIENT INSTRUCTIONS
Stop Myrbetriq      See your primary doctor to follow-up on your blood pressure and the calcified lymph nodes

## 2018-04-25 ENCOUNTER — PATIENT MESSAGE (OUTPATIENT)
Dept: NEUROLOGY | Facility: CLINIC | Age: 73
End: 2018-04-25

## 2018-04-25 PROBLEM — R49.8 HYPOPHONIA: Status: ACTIVE | Noted: 2018-04-25

## 2018-04-25 PROBLEM — R49.9 VOICE AND RESONANCE DISORDER: Status: ACTIVE | Noted: 2018-04-25

## 2018-04-25 PROBLEM — R47.1 DYSARTHRIA: Status: ACTIVE | Noted: 2018-04-25

## 2018-04-25 NOTE — PROGRESS NOTES
Test results looked good      Triglycerides slightly elevated probably due to nonfasting state      He will be more consistent monitoring his blood pressure     If readings remain consistently elevated above 140 systolic, , will start on amlodipine 2.5-5 mg    We'll set up return in 4 months for annual checkup

## 2018-04-26 ENCOUNTER — PATIENT MESSAGE (OUTPATIENT)
Dept: NEUROLOGY | Facility: CLINIC | Age: 73
End: 2018-04-26

## 2018-05-03 ENCOUNTER — TELEPHONE (OUTPATIENT)
Dept: INTERNAL MEDICINE | Facility: CLINIC | Age: 73
End: 2018-05-03

## 2018-05-03 NOTE — TELEPHONE ENCOUNTER
MD Lis Carrizales MA             Physical in 4 months with prelabs     Patient schedule for Phys appt/labs.  Appt's mail out to home address on file.

## 2018-05-14 ENCOUNTER — OFFICE VISIT (OUTPATIENT)
Dept: OTOLARYNGOLOGY | Facility: CLINIC | Age: 73
End: 2018-05-14
Payer: MEDICARE

## 2018-05-14 VITALS
SYSTOLIC BLOOD PRESSURE: 124 MMHG | TEMPERATURE: 99 F | BODY MASS INDEX: 28.5 KG/M2 | HEART RATE: 75 BPM | WEIGHT: 198.63 LBS | DIASTOLIC BLOOD PRESSURE: 85 MMHG

## 2018-05-14 DIAGNOSIS — R49.9 VOICE AND RESONANCE DISORDER: Primary | ICD-10-CM

## 2018-05-14 DIAGNOSIS — R49.8 HYPOPHONIA: ICD-10-CM

## 2018-05-14 DIAGNOSIS — J38.3 VOCAL FOLD ATROPHY: ICD-10-CM

## 2018-05-14 PROCEDURE — 3074F SYST BP LT 130 MM HG: CPT | Mod: CPTII,S$GLB,, | Performed by: OTOLARYNGOLOGY

## 2018-05-14 PROCEDURE — 3079F DIAST BP 80-89 MM HG: CPT | Mod: CPTII,S$GLB,, | Performed by: OTOLARYNGOLOGY

## 2018-05-14 PROCEDURE — 99999 PR PBB SHADOW E&M-EST. PATIENT-LVL III: CPT | Mod: PBBFAC,,, | Performed by: OTOLARYNGOLOGY

## 2018-05-14 PROCEDURE — 99499 UNLISTED E&M SERVICE: CPT | Mod: S$PBB,,, | Performed by: OTOLARYNGOLOGY

## 2018-05-14 PROCEDURE — 99203 OFFICE O/P NEW LOW 30 MIN: CPT | Mod: 25,S$GLB,, | Performed by: OTOLARYNGOLOGY

## 2018-05-14 PROCEDURE — 31579 LARYNGOSCOPY TELESCOPIC: CPT | Mod: S$GLB,,, | Performed by: OTOLARYNGOLOGY

## 2018-05-14 NOTE — PROGRESS NOTES
[facility]  Department of Otorhinolaryngology and Communication Sciences    Haja Marti is a 72 y.o. male who presents to the [facility] for consultation at the kind request of Dr. Edward Ivey for further management of dysphonia. He is followed by Dr. Ievy regarding PD.    He complains of hoarseness. He reports a weak voice, frequent loss of voice, inability to be understood, his words cutting off at the end of phrases.  Onset was gradual. Duration is 1+ year. Time course is constant. Symptoms are worsening. Exacerbating factors include voice use; sometimes he is unable to complete a sentence. He denies any alleviating factors. He denies any associated symptoms.  PD was dx'd in July 2017.      Voice Handicap Index-10 (VHI-10) score is 15.   Reflux Symptom Index (RSI) score is 5.   Eating Assessment Tool-10 (EAT-10) score is 0.   Dyspnea Index (DI) score is 0.  Cough Severity Index (CSI) score is 0.    Past Medical History  He has a past medical history of Anxiety; BPH (benign prostatic hypertrophy); Cataract; Depression; Epiretinal membrane, both eyes; psychiatric care; Hyperlipidemia; Hypertension; Kidney stones; Kidney stones; Parkinsons; Posterior vitreous detachment of left eye; Psychiatric problem; Retinal detachment; Retinoschisis, left eye; Sleep apnea; and Vitreous hemorrhage of left eye.    Past Surgical History  He has a past surgical history that includes Tonsillectomy; Eye surgery; Prostate surgery; laser indirect (4/8/10); Retinal detachment surgery (2004); Laser Indirect Retinopexy (4/8/10); Cataract extraction; and Rotator cuff repair (11/13/2013).    Family History  His family history includes Anxiety disorder in his daughter and son; Cancer in his father and mother; Cataracts in his father; Depression in his son; Diabetes in his father.    Social History  He reports that he has never smoked. He has never used smokeless tobacco. He reports that he does not drink alcohol or  use drugs.    Allergies  He has No Known Allergies.    Medications  He has a current medication list which includes the following prescription(s): aspirin, atorvastatin, carbidopa-levodopa  mg, citric acid-potassium citrate, clonazepam, ergocalciferol, finasteride, losartan, magnesium oxide, potassium citrate, pyridoxine (vitamin b6), sertraline, and trazodone.    Review of Systems   Constitutional: Negative for fever.   HENT: Negative for sore throat.    Eyes: Negative for visual disturbance.   Respiratory: Negative for wheezing.    Cardiovascular: Negative for chest pain.   Gastrointestinal: Negative for nausea.   Musculoskeletal: Negative for arthralgias.   Skin: Negative for rash.   Neurological: Negative for tremors.   Hematological: Does not bruise/bleed easily.   Psychiatric/Behavioral: The patient is nervous/anxious.           Objective:     /85 (Patient Position: Sitting)   Pulse 75   Temp 99 °F (37.2 °C) (Tympanic)   Wt 90.1 kg (198 lb 10.2 oz)   BMI 28.50 kg/m²      Physical Exam    Constitutional: comfortable, well dressed  Psychiatric: appropriate affect  Respiratory: comfortably breathing, symmetric chest rise, no stridor  Voice: breathy; weak; impaired flexibility; minimal roughness  Cardiovascular: upper extremities non-edematous  Lymphatic: no cervical lymphadenopathy  Neurologic: alert and oriented to time, place, person, and situation; cranial nerves 3-12 grossly intact  Head: normocephalic  Eyes: conjunctivae and sclerae clear  Ears: normal pinnae, normal external auditory canals, tympanic membranes intact  Nose: mucosa pink and noncongested, no masses, no mucopurulence, no polyps  Oral cavity / oropharynx: no mucosal lesions  Neck: soft, full range of motion, laryngotracheal complex palpable with appropriate landmarks, larynx elevates on swallowing  Indirect laryngoscopy: limited due to gag    Procedure  Flexible Laryngeal Videostroboscopy (95079): Laryngeal videostroboscopy is  indicated to assess laryngeal vibratory biomechanics and vocal fold oscillation, which cannot be assessed with a plain light examination. This was carried out transnasally with a distal chip videoendoscope. After verbal consent was obtained, the patient was positioned and the nose was topically decongested with 1% phenylephrine and topically anesthetized with 4% lidocaine. The endoscope was passed through the most patent nasal cavity and positioned to image the nasopharynx, larynx, and hypopharynx in detail. The following features were examined: nasopharyngeal, laryngeal, hypopharyngeal masses; velopharyngeal strength, closure, and symmetry of motion; vocal fold range and symmetry of motion; laryngeal mucosal edema, erythema, inflammation, and hydration; salivary pooling; and gross laryngeal sensation. During phonation, the vocal folds were assessed for glottal closure; mucosal wave; vocal fold lesions; vibratory periodicity, amplitude, and phase symmetry; and vertical height match. The equipment was removed. The patient tolerated the procedure well without complication. All findings were normal except:  - bilateral, symmetric true vocal fold bowing  - glottal insufficiency across all frequencies, mild; spindle shaped gap; slightly impaired pliability        Assessment:     Haja Marti is a 72 y.o. 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       Plan:        I had a discussion with the patient regarding his condition and the further workup and management options.      LSVT SLP treatment is medically necessary for restoration of voice function. Should he fail to make progress, he may wish to consider vocal fold injection augmentation. He will follow up as planned for LSVT. He will follow up with me in the future on an as-needed basis.    All questions were answered, and the patient is in agreement with the above.     Rj Goodson M.D.  [facility]  Department of Otorhinolaryngology and  Communication Sciences

## 2018-05-14 NOTE — PATIENT INSTRUCTIONS
VOCAL CORD BOWING or VOCAL CORD ATROPHY    What is bowing?    Bowed (or atrophic) vocal folds are vocal folds that look like two bows joined at the front and the back. While the front and back of the two cords come together, the middle stays open. The opening in the middle allows air to leak out during voice use, causing symptoms including weak voice and vocal fatigue.    Bowing is most often caused by presbylarynx, which means an aging larynx. However, it can also occur in young persons. Often, bowing comes from a lack of nerve input to the vocal folds, resulting in poor closure in the middle part of the cords. Over time, the muscle bulk in the vocal folds can be lost, adding to greater difficulty with closure.     How is bowing treated?    The first line of treatment for bowing is voice therapy. Therapy is used to exercise the vocal folds and to bulk them up to improve the closure. Therapy exercises may also work to reduce over-activity of the surrounding muscles, which often get over-used to compensate when the vocal folds arent closing well.    In extreme cases of vocal fold bowing, one of both vocal folds may be injected with material to add bulk to improve closure.           VOCAL FOLD INJECTION AUGMENTATION     Description   If the vocal folds (vocal cords) cannot close completely, you may experience voice problems: roughness, breathiness, inability to get loud, increased vocal effort, increased vocal fatigue. Some patients may also experience aspiration (coughing or choking with swallowing). Vocal fold injection augmentation plumps up the vocal fold and/or repositions it in the midline in order to help the vocal folds close completely while speaking or swallowing. Following the procedure, most patients experience a louder, stronger, clearer voice. The procedure also helps some patients protect against aspiration, although the swallowing improvement is not as dramatic as the voice improvement. We  use the following materials for the procedure: hyaluronic acid (Restylane); carboxymethylcellulose (Radiesse Voice Gel); and calcium hydroxyapatite (Radiesse Voice). For most patients, the injection is performed with a small needle passed through the skin of the neck. However, in some patients we perform the injection with a device passed through the mouth. In either case, the injection is guided by the visualization provided by a scope passed through the nose.     What to expect during the procedure   We perform the injection in our office under local (topical) anesthesia. You are awake the whole time, and the entire procedure lasts about 15 minutes. Our primary focus is your safety and comfort. We usually make the larynx numb by spraying the throat and/or dripping anesthetic on the larynx. At this time, you may cough or gag, or you may have the sensation that you spilled some water down the wrong pipe. These are temporary sensations that allow us to get you numb. The numbing process takes about 2 minutes. We will not proceed until we know you will be as comfortable as possible. A small needle is passed either through the skin of the neck or via a long instrument through the mouth to perform the injection. During the injection, you may experience mild discomfort in the throat. You may feel an unusual sensation in the ear because the larynx and the ear share the same sensory nerve. In rare cases in which a patient does not tolerate the procedure, it may be performed in the operating room, with the patient completely asleep under general anesthesia.     What to expect afterwards   Most patients note a stronger, less effortful voice immediately after the injection. Sometimes the voice is tight or pressed voice is noted right after the injection. The voice usually has good days and bad days and gradually improves until you reach your new baseline voice over the first 1-2 weeks. Voice therapy may be a necessary part of  your treatment plan to optimize your vocal outcome. None of the materials we inject are permanent. As the material dissolves, you may experience a gradual worsening of voice quality over the course of several months. At this point we may consider repeating the injection. You may be a candidate for a permanent fix, which involves an open surgery in the neck performed in the operating room.     Instructions: before the procedure   1. Do not take aspirin-containing products or other medications that can thin the blood (such as ibuprofen, Advil, Motrin, Aleve, Plavix) for 7 days prior to the injection. If you take Coumadin (warfarin), you may need to stop using this a few days prior to the injection. If you are on blood thinning (anti-platelet or anti-coagulant) medication and it is not clear what you should do, please clarify this with your physician.   2. On the day of your procedure, please make sure you take your other regular morning medications.   3. On the day of your procedure, it is OK to eat and drink as you would normally, up until 3 hours before to your appointment time. During that time frame, we ask that you restrict yourself only to clear liquids. A clear liquid is anything you can see through (water, ginger ale, apple juice).     Instructions: after the procedure   1. Please refrain from eating or drinking for 1 hour following the procedure. This allows time for the numbing medication to wear off.   2. Most patients experience very little pain. If necessary, most patients are able to keep comfortable with plain Tylenol (acetaminophen) and/or other non-steroidal anti-inflammatory medications such as ibuprofen (Advil, Motrin). Please follow package instructions if considering taking these medications.   3. In most instances, it is OK to use your voice immediately after the procedure. However, for the first week, you should avoid speaking over heavy background noise or in a very loud voice. It is rare, but  in some cases you will be asked to rest your voice for the first 24 hours.   4. Please call the [facility] Center at [phone] if   · You have a temperature above 101°F   · You develop Increasing throat pain not relieved by over-the-counter medications   · You have any other post-operative questions or concerns   5. Please go immediately to the nearest emergency room if you are experiencing   · Shortness of breath   · Difficulty breathing   · Difficulty swallowing   · Severe bleeding     FREQUENTLY ASKED QUESTIONS     Is this a Botox injection? No. Botox weakens the voice box muscles. Instead, with a vocal fold injection augmentation, we are injecting filler material to bulk up the vocal fold(s).     How do you decide which material to use for the injection? Our decision is based on the indication for the procedure, the position of the vocal fold, and other patient historical/anatomical factors. In some instances, the approval of your insurance company is an important factor.     How to you decide which technique to use (through the neck versus through the mouth)? Patient anatomy and the position of the vocal fold play an important role. Other patient factors such as gag reflex are also strongly considered.     Why do you perform this in your office instead of in the operating room? Performing the procedure in the office is safe and precise. In addition, performing the injection with the patient awake gives us direct visual and auditory feedback, which we do not get when the patient is asleep in the operating room. Furthermore, an office-based injection is much less time consuming, is more convenient for the patient, and does not involve the risks or the recovery time associated with general anesthesia. We can still do this in the operating room; we save that setting for specific diagnoses or situations, as well as for the rare patient who is unable to tolerate the awake procedure.     Why did I have discomfort in  my ear (during or after the injection)? This is an example of referred pain. The ear and the larynx share the same sensory nerve.     I got an injection 3 days ago. Why is my voice still hoarse? To optimize vocal outcome, we overinject a little bit. Additionally, there may be a mild amount of swelling. Finally, the muscles of the larynx need to adjust to the injected material. Most people will have good days and bad days at first. After 1-2 weeks, you should settle out to your new baseline voice.     How long does the injection last? Carboxymethylcellulose (Radiesse Voice Gel) lasts approximately 1-2 months. Hyaluronic acid (Restylane) lasts approximately 4 months. Calcium hydroxyapatite (Radiesse Voice) lasts up to 1 year; however its characteristics are such that only few patients are appropriate for using this material.     Is there a permanent injectable material? No.     Can the injection be repeated? Yes. There is no limit to the number of times an injection can be repeated. However, a permanent surgical fix is often an option to consider.

## 2018-05-14 NOTE — LETTER
May 16, 2018      Edward Ivey MD  [address]  [facility] [address]           [facility]  [address] [facility] [address] [facility]  [facility] [address]  Phone: [phone]  Fax: [phone]          Patient: Haja Marti   MR Number: [id number]   YOB: 1945   Date of Visit: 5/14/2018       Dear Dr. Edward Ivey:    Thank you for referring Haja Marti to me for evaluation. Attached you will find relevant portions of my assessment and plan of care.    If you have questions, please do not hesitate to call me. I look forward to following Haja Marti along with you.    Sincerely,    Rj Goodson MD    Enclosure  CC:  Andrez Jackson MD    If you would like to receive this communication electronically, please contact [email or web address] or [phone] to request more information on [employer] Link access.    For providers and/or their staff who would like to refer a patient to [facility], please contact us through our one-stop-shop provider referral line, [facility], at [phone].    If you feel you have received this communication in error or would no longer like to receive these types of communications, please e-mail [email or web address]

## 2018-05-15 ENCOUNTER — OFFICE VISIT (OUTPATIENT)
Dept: PSYCHIATRY | Facility: CLINIC | Age: 73
End: 2018-05-15
Payer: MEDICARE

## 2018-05-15 ENCOUNTER — PES CALL (OUTPATIENT)
Dept: ADMINISTRATIVE | Facility: CLINIC | Age: 73
End: 2018-05-15

## 2018-05-15 VITALS
BODY MASS INDEX: 29.05 KG/M2 | WEIGHT: 202.5 LBS | HEART RATE: 66 BPM | SYSTOLIC BLOOD PRESSURE: 127 MMHG | DIASTOLIC BLOOD PRESSURE: 76 MMHG

## 2018-05-15 DIAGNOSIS — F41.1 GENERALIZED ANXIETY DISORDER: Primary | ICD-10-CM

## 2018-05-15 PROCEDURE — 3078F DIAST BP <80 MM HG: CPT | Mod: CPTII,GC,S$GLB, | Performed by: PSYCHIATRY & NEUROLOGY

## 2018-05-15 PROCEDURE — 99999 PR PBB SHADOW E&M-EST. PATIENT-LVL II: CPT | Mod: PBBFAC,GC,, | Performed by: PSYCHIATRY & NEUROLOGY

## 2018-05-15 PROCEDURE — 99213 OFFICE O/P EST LOW 20 MIN: CPT | Mod: GC,S$GLB,, | Performed by: PSYCHIATRY & NEUROLOGY

## 2018-05-15 PROCEDURE — 3074F SYST BP LT 130 MM HG: CPT | Mod: CPTII,GC,S$GLB, | Performed by: PSYCHIATRY & NEUROLOGY

## 2018-05-15 RX ORDER — SERTRALINE HYDROCHLORIDE 100 MG/1
200 TABLET, FILM COATED ORAL DAILY
Qty: 180 TABLET | Refills: 1 | Status: SHIPPED | OUTPATIENT
Start: 2018-05-15 | End: 2018-12-15 | Stop reason: SDUPTHER

## 2018-05-15 NOTE — PROGRESS NOTES
Outpatient Psychiatry Follow-Up Visit (MD/NP)    5/15/2018    Clinical Status of Patient:  Outpatient (Ambulatory)    Chief Complaint:  Haja Marti is a 72 y.o. male who presents today for follow-up of anxiety.  Met with patient and spouse.      Interval History and Content of Current Session:  Interim Events/Subjective Report/Content of Current Session: On interview, Mr Marti reports that he is doing well overall. However after wife expresses concerns, he admits that he does get angered quickly with grandchildren. Then goes to sit by himself in a different room and watch television. Says that he is doing well otherwise - gets easily fatigued but denies feeling sad often. Does get anxious over the unknown (mostly doctors visits and medical procedures) but denies panic attacks. Continues to take clonazepam 0.25mg PO BID. States he is taking sertraline every day, but is taking 150mg daily because that is what the prescription says. Denies adverse effects and feels it has been helpful - is agreeable to increasing dose to 200 daily. Is not taking trazodone at all, saying that he is sleeping well without it (continues to use relaxation/meditation tapes his son gave him). Continues to exercise on a regular basis and is about to start speech therapy. Working with Apolinar Galvan and finding that helpful. Has no other questions or complaints at this time.    Wife agrees with the above but is concerned that he still gets depressed at times. Feels he has improved, though, and also thinks maximum dose of sertraline is a good idea.      Review of Systems   · Prob Pert. 1 sys, Ext. psych +2 add., Comp. 10-14 sys  · PSYCHIATRIC: Pertinant items are noted in the narrative.  · NEUROLOGIC: tremors, shuffling gait No weakness, sensory changes, seizures, confusion, memory loss, tremor or other abnormal movements.  · CARDIOVASCULAR: No tachycardia or chest pain.    Past Medical, Family and Social History: The patient's past  "medical, family and social history have been reviewed and updated as appropriate within the electronic medical record - see encounter notes.    Compliance: yes    Side effects: None    Risk Parameters:  Patient reports no suicidal ideation  Patient reports no homicidal ideation  Patient reports no self-injurious behavior  Patient reports no violent behavior    Exam (detailed: at least 9 elements; comprehensive: all 15 elements)   Constitutional  Vitals:  Most recent vital signs, dated less than 90 days prior to this appointment, were reviewed.   Vitals:    05/15/18 1253   BP: 127/76   Pulse: 66   Weight: 91.8 kg (202 lb 7.9 oz)        General:  unremarkable, age appropriate, normal weight, well nourished     Musculoskeletal  Muscle Strength/Tone:  cogwheeling noted in BUEs   Gait & Station:  parkinsonian, stooped but steady     Psychiatric  Speech:  slowed but normal volume and tone   Mood & Affect:  "pretty good, I think"  congruent and appropriate, appropriate, mood-congruent, reactive, full range   Thought Process:  normal and logical   Associations:  intact   Thought Content:  normal, no suicidality, no homicidality, delusions, or paranoia   Insight:  intact   Judgement: behavior is adequate to circumstances   Orientation:  grossly intact   Memory: intact for content of interview   Language: grossly intact   Attention Span & Concentration:  able to focus   Fund of Knowledge:  intact and appropriate to age and level of education     Assessment and Diagnosis   Status/Progress: Based on the examination today, the patient's problem(s) is/are improved and well controlled.  New problems have not been presented today.  Co-morbidities are complicating management of the primary condition.  There are no active rule-out diagnoses for this patient at this time.     General Impression:      ICD-10-CM ICD-9-CM   1. Generalized anxiety disorder F41.1 300.02       Intervention/Counseling/Treatment Plan   · Medication " Management: The risks and benefits of medication were discussed with the patient.    Follow-up plan for anxiety was discussed with patient.              - increase sertraline to 200mg PO daily   - discontinue trazodone, as patient is no longer taking   - continue clonazepam 0.5-1mg PO TID PRN anxiety for now (patient has continued to take 0.25mg BID on a scheduled basis)    Continuing psychotherapy as well. If he can maintain some stability until next visit, then we may try decreasing, or even discontinuing, clonazepam.    Return to Clinic: 1 month with Dr Elliott directly. Care transition discussed today    Alexys Nash MD

## 2018-05-16 ENCOUNTER — PATIENT MESSAGE (OUTPATIENT)
Dept: NEUROLOGY | Facility: CLINIC | Age: 73
End: 2018-05-16

## 2018-05-18 ENCOUNTER — PATIENT MESSAGE (OUTPATIENT)
Dept: NEUROLOGY | Facility: CLINIC | Age: 73
End: 2018-05-18

## 2018-05-23 ENCOUNTER — OFFICE VISIT (OUTPATIENT)
Dept: PSYCHIATRY | Facility: CLINIC | Age: 73
End: 2018-05-23
Payer: MEDICARE

## 2018-05-23 DIAGNOSIS — F41.1 GENERALIZED ANXIETY DISORDER: Primary | ICD-10-CM

## 2018-05-23 PROCEDURE — 99499 UNLISTED E&M SERVICE: CPT | Mod: S$PBB,,, | Performed by: SOCIAL WORKER

## 2018-05-23 PROCEDURE — 90834 PSYTX W PT 45 MINUTES: CPT | Mod: S$GLB,,, | Performed by: SOCIAL WORKER

## 2018-05-23 PROCEDURE — 90785 PSYTX COMPLEX INTERACTIVE: CPT | Mod: S$GLB,,, | Performed by: SOCIAL WORKER

## 2018-05-23 NOTE — PROGRESS NOTES
Individual Psychotherapy (PhD/LCSW)    5/23/2018    Site:  [facility]         Therapeutic Intervention: Met with patient and spouse.  Outpatient - Insight oriented psychotherapy 45 min - CPT code 22100    Chief complaint/reason for encounter: anxiety Wife Whitley  Pt with parkinsons with cognitive difficulties for which wife is needed to aid in therapeutic instructions including homework.       Interval history and content of current session:    He read chapter two.  Wife downloaded cbt favio.  We went through how to record worries.  Exposure therapy was explained and a video was shown to further this explanation.   Pt uses distractions likely inhibiting full mental and physical process.   They will experiment with worry time 20 min daily.  Wife will ask  for the thought once or twice daily when she sees he is anxious.   He will continue book.   About 3 pages a day a few days per week.       Pt may have a chore belief that anxiety is dangerous and he cant possibly stand it.             Treatment plan:  · Target symptoms: anxiety   · Why chosen therapy is appropriate versus another modality: relevant to diagnosis  · Outcome monitoring methods: self-report, observation  · Therapeutic intervention type: behavior modifying psychotherapy    Risk parameters:  Patient reports no suicidal ideation  Patient reports no homicidal ideation  Patient reports no self-injurious behavior  Patient reports no violent behavior    Verbal deficits: None    Patient's response to intervention:  The patient's response to intervention is accepting.    Progress toward goals and other mental status changes:  The patient's progress toward goals is fair .    Diagnosis:   No diagnosis found.    Plan:  individual psychotherapy    Return to clinic: 2 weeks    Length of Service (minutes): 45

## 2018-05-29 RX ORDER — POTASSIUM CITRATE 10 MEQ/1
TABLET, EXTENDED RELEASE ORAL
Qty: 270 TABLET | Refills: 3 | Status: SHIPPED | OUTPATIENT
Start: 2018-05-29

## 2018-06-04 ENCOUNTER — CLINICAL SUPPORT (OUTPATIENT)
Dept: REHABILITATION | Facility: HOSPITAL | Age: 73
End: 2018-06-04
Payer: MEDICARE

## 2018-06-04 DIAGNOSIS — R49.8 HYPOPHONIA: ICD-10-CM

## 2018-06-04 DIAGNOSIS — R47.1 DYSARTHRIA: ICD-10-CM

## 2018-06-04 DIAGNOSIS — R49.9 VOICE AND RESONANCE DISORDER: ICD-10-CM

## 2018-06-04 PROCEDURE — 92507 TX SP LANG VOICE COMM INDIV: CPT | Mod: PO

## 2018-06-04 NOTE — PROGRESS NOTES
"Outpatient Neurological Rehabilitation   Speech and Language Therapy Daily Note  Date:  6/4/2018     Start Time:  1645  Stop Time:  1730    Name: Haja Marti   MRN: [id number]   Therapy Diagnosis:   Encounter Diagnoses   Name Primary?    Hypophonia     Dysarthria     Voice and resonance disorder    Physician: Edward Ivey MD  Physician Orders: ST evaluate and treat   Medical Diagnosis: Parkinson's Disease    Visit #:  2  Visits Authorized: 20  Visits Cancelled: 0  Visits No Showed: 0  Date of Evaluation:  4/25/18  Insurance Authorization Period: 4/25/18 - 12/31/18  Plan of Care Expiration:  4/25/18 - 6/22/18 ( LSVT LOUD program initiated 6/4/18)  Extended POC:    G-CODE   2 /10    Precautions: Standard  History:   Current Medical History: Haja Marti  presents to the [facility] with moderately impaired speech skills secondary to the medical diagnosis of Parkinson's Disease.  Subjective:   Pt reports: feeling ok.    Response to previous treatment: Pt was seen by Dr. Tyler Goodson on 5/14/18 and cleared for LSVT LOUD voice therapy.  "LSVT SLP treatment is medically necessary for restoration of voice function. Should he fail to make progress, he may wish to consider vocal fold injection augmentation. He will follow up as planned for LSVT. He will follow up with me in the future on an as-needed basis."  Pain Scale:  0/10 on VAS currently.   Pain Location: n/a  Objective:     UNTIMED  Procedure Min.   Speech- Language- Voice Therapy    45   Total Minutes: 45  Total Timed Units: 0  Total Untimed Units: 1  Charges Billed/# of units: 1    Short Term Goals: (4 weeks) Current Progress:   1. Mr. Marti will be seen by ENT to assess vocal cord function. Goal met/ Discontinue   2. Mr. Marti will participate in further assessment of his/her vocal intensity to determine his sound pressure level (SPL, acoustic correlate of vocal loudness) measured with a sound " "level meter and more specific goals will be determined. - GOAL MET for assessment with results as follows:  - Task 1: sustained "ah" for average SPL of 74.8 dB (range 73.2-76.4 dB) and average 5.0 seconds (range 4-5.75 secs)  - Task 2a: sustained "high ah" for average SPL of 80.3 dB (range 79.3 - 82.3 dB) and highest pitch 288.8 Hz (pitch range 285-294 Hz)  - Task 2b: sustained "low ah" for average of SPL of 76.3 dB (range of 75.2-78.1 dB) and lowest pitch 191.8 (pitch range 184-198 Hz)  - Task 3: while reading a passage: Average SPL of 76.6 dB (range of 66.9-80.6 dB)  - Task 4: during monologue: Average SPL of 73.1 dB (range of 64.1-80.6 dB)  - Task 5: generated words with specific letters:  Average SPL of 69.2 dB (range of 61.1-81.8 dB)  - Task 6: describe a motor task while completing a dual motor activity: Average SPL of 74.2 dB (range of 63.3 - 81.0 dB)  - Task 7a: stimulability testing: sustained vowel phonation ("ah") with average SPL 86.2 dB (range 87.7-87.9 dB) and duration average of 6.7 seconds (range 5-8 seconds)  - Task 7b: "high ah": average frequency of 361.3 Hz (range 346-374 Hz) and SPL average 80.7 dB (range 78.1-82.5 dB)  - Task 7c:  "low ah": average frequency of 187.0 Hz (range 186-189 Hz) and average SPL 84.2 dB (range 82.8-85.0 dB)   - Task 7d: functional phrases: SPL range of 85.1 dB (range 65.9-90.8 dB)   New Goals 6/4/18  1. The patient will produce loud, good quality "AH" sounds in the 80 decibel range for an average of 20 to 25 seconds (as a voice/speech "foundation" skill) for sets of 15 with minimal to no cues at least 95% of the time to further develop breath-speech coordination and for loudness calibration.     2. The patient will produce "AH" from a mid-level to at least 5 notes higher with 80 SPL decibel range and from a mid-level to at least 3 notes lower with 80 SPL decibel range in sets of 15 for each pitch direction with minimal to no cues 95% of the time to improve pitch " variability.     3. The patient will verbalize a set of 10 functional phrases/ sentences at an average loudness level at least in the 80 decibel range with minimal to no cues at least 95% of the time.    4. The patient will read aloud at an average loudness level at least in the 80 decibel range with minimal to no cues at least 95% of the time.    5. The patient will verbalize at least in the 80 decibel range in his conversational speech at least 95% of the time with minimal to no cues.      Patient Education/Response:   LSVT LOUD exercises were demonstrated. Pt verbalized understanding.   Home Program: Voice Handicap Index, perceptual rating form, 10 functional phrases/sentences  Assessment:   Haja is progressing well towards his goals. Pt was cleared for LSVT LOUD program. Voice quality is hoarse, pitch is monotone and flat, vocal intensity is reduced. Current goals remain appropriate. Goals to be updated as necessary.   Pt prognosis is Good to fair. Pt will continue to benefit from skilled outpatient speech and language therapy to address the deficits listed in the problem list on initial evaluation, provide pt/family education and to maximize pt's level of independence in the home and community environment.     Medical necessity is demonstrated by the following IMPAIRMENTS:  Pt with decreased vocal intensity resulting in severely decreased speech intelligibility. Reportedly, this worsens over the phone negatively impacting his ability to effectively and efficiently explain an emergency situation to emergency operators via phone or in person.  Barriers to Therapy: progressive degenerative disease  Environmental Concerns/Cultural/Spiritual/Developmental/Educational Needs:  Pt's spiritual, cultural and educational needs considered and pt agreeable to plan of care and goals.    Functional Communication Measure (FCM):   Severity Modifier for Medicare G-Code:  Motor Speech  Current status: FCM:  LEVEL 6:  - CI  at least 1% but less than 20% impaired, limited or restricted  Projected status:  FCM:   LEVEL 7:  - CH 0% impaired, limited or restricted       Voice  Current status: FCM:  LEVEL 4:  - CK at least 40% < 60% impaired, limited or restricted  Projected status:  FCM:  LEVEL 5:  -  CJ at least 20% < 40% impaired, limited or restricted     Plan:   Continue POC with focus on voice loudness and clarity    JERAMY Villarreal, CCC-SLP, IS  Speech-Language Pathologist  6/4/2018

## 2018-06-05 ENCOUNTER — CLINICAL SUPPORT (OUTPATIENT)
Dept: REHABILITATION | Facility: HOSPITAL | Age: 73
End: 2018-06-05
Payer: MEDICARE

## 2018-06-05 DIAGNOSIS — R49.8 HYPOPHONIA: ICD-10-CM

## 2018-06-05 DIAGNOSIS — R47.1 DYSARTHRIA: ICD-10-CM

## 2018-06-05 DIAGNOSIS — R49.9 VOICE AND RESONANCE DISORDER: ICD-10-CM

## 2018-06-05 PROCEDURE — 92507 TX SP LANG VOICE COMM INDIV: CPT | Mod: PO

## 2018-06-05 NOTE — PROGRESS NOTES
"Outpatient Neurological Rehabilitation   Speech and Language Therapy Daily Note  Date:  6/5/2018     Start Time:  9:00  Stop Time:  9:45    Name: Haja Marti   MRN: [id number]   Therapy Diagnosis:   Encounter Diagnoses   Name Primary?    Hypophonia     Dysarthria     Voice and resonance disorder    Physician: Edward Ivey MD  Physician Orders: ST evaluate and treat   Medical Diagnosis: Parkinson's Disease    Visit #:  3  Visits Authorized: 20  Visits Cancelled: 0  Visits No Showed: 0  Date of Evaluation:  4/25/18  Insurance Authorization Period: 4/25/18 - 12/31/18  Plan of Care Expiration:  4/25/18 - 6/22/18 ( LSVT LOUD program initiated 6/4/18)  Extended POC:    G-CODE   3/10    Precautions: Standard  History:   Current Medical History: Haja Marti  presents to the [facility] with moderately impaired speech skills secondary to the medical diagnosis of Parkinson's Disease.  Subjective:   Pt reports: "I didn't do it right."    Response to previous treatment: Pt completed the Voice Handicap Index form and perceptual rating form incorrectly. He will redo for tomorrow.  Pain Scale:  0/10 on VAS currently.   Pain Location: n/a  Objective:     UNTIMED  Procedure Min.   Speech- Language- Voice Therapy    45   Total Minutes: 45  Total Timed Units: 0  Total Untimed Units: 1  Charges Billed/# of units: 1    Short Term Goals: (4 weeks) Current Progress:   1. Mr. Marti will be seen by ENT to assess vocal cord function. Goal met/ Discontinue   2. Mr. Marti will participate in further assessment of his/her vocal intensity to determine his sound pressure level (SPL, acoustic correlate of vocal loudness) measured with a sound level meter and more specific goals will be determined. - GOAL MET for assessment with results as follows:  - Task 1: sustained "ah" for average SPL of 74.8 dB (range 73.2-76.4 dB) and average 5.0 seconds (range 4-5.75 secs)  - Task 2a: " "sustained "high ah" for average SPL of 80.3 dB (range 79.3 - 82.3 dB) and highest pitch 288.8 Hz (pitch range 285-294 Hz)  - Task 2b: sustained "low ah" for average of SPL of 76.3 dB (range of 75.2-78.1 dB) and lowest pitch 191.8 (pitch range 184-198 Hz)  - Task 3: while reading a passage: Average SPL of 76.6 dB (range of 66.9-80.6 dB)  - Task 4: during monologue: Average SPL of 73.1 dB (range of 64.1-80.6 dB)  - Task 5: generated words with specific letters:  Average SPL of 69.2 dB (range of 61.1-81.8 dB)  - Task 6: describe a motor task while completing a dual motor activity: Average SPL of 74.2 dB (range of 63.3 - 81.0 dB)  - Task 7a: stimulability testing: sustained vowel phonation ("ah") with average SPL 86.2 dB (range 87.7-87.9 dB) and duration average of 6.7 seconds (range 5-8 seconds)  - Task 7b: "high ah": average frequency of 361.3 Hz (range 346-374 Hz) and SPL average 80.7 dB (range 78.1-82.5 dB)  - Task 7c:  "low ah": average frequency of 187.0 Hz (range 186-189 Hz) and average SPL 84.2 dB (range 82.8-85.0 dB)   - Task 7d: functional phrases: SPL range of 85.1 dB (range 65.9-90.8 dB)   New Goals 6/4/18  1. The patient will produce loud, good quality "AH" sounds in the 80 decibel range for an average of 20 to 25 seconds (as a voice/speech "foundation" skill) for sets of 15 with minimal to no cues at least 95% of the time to further develop breath-speech coordination and for loudness calibration.  - Pt sustained the vowel for an average SPL of 83.8 dB (range 81.8-88.3dB) and average of 9.0 seconds (range 4.5-11.25 seconds) given max cues for proper technique.    2. The patient will produce "AH" from a mid-level to at least 5 notes higher with 80 SPL decibel range and from a mid-level to at least 3 notes lower with 80 SPL decibel range in sets of 15 for each pitch direction with minimal to no cues 95% of the time to improve pitch variability.  - Pt produced high AH for an average SPL of 81.9 dB (terrence " 76.4-87.9 dB) and average 335.2 Hz (range 243-379 Hz) given max cues for proper technique.    - Pt produced low AH for an average SPL of 80.7 dB (terrence 76.9-86.3 dB) and average 155.6 Hz (range 137-177 Hz) given max cues for proper technique.   3. The patient will verbalize a set of 10 functional phrases/ sentences at an average loudness level at least in the 80 decibel range with minimal to no cues at least 95% of the time. Not formally addressed      4. The patient will read aloud at an average loudness level at least in the 80 decibel range with minimal to no cues at least 95% of the time. Not formally addressed      5. The patient will verbalize at least in the 80 decibel range in his conversational speech at least 95% of the time with minimal to no cues. Not formally addressed        Patient Education/Response:   LSVT LOUD exercises were demonstrated. Pt verbalized understanding.   Home Program: Voice Handicap Index, perceptual rating form, AH exercises  Assessment:   Haja is progressing well towards his goals. Max cues needed for breath-speech support. Current goals remain appropriate. Goals to be updated as necessary.   Pt prognosis is Good to fair. Pt will continue to benefit from skilled outpatient speech and language therapy to address the deficits listed in the problem list on initial evaluation, provide pt/family education and to maximize pt's level of independence in the home and community environment.     Medical necessity is demonstrated by the following IMPAIRMENTS:  Pt with decreased vocal intensity resulting in severely decreased speech intelligibility. Reportedly, this worsens over the phone negatively impacting his ability to effectively and efficiently explain an emergency situation to emergency operators via phone or in person.  Barriers to Therapy: progressive degenerative disease  Environmental Concerns/Cultural/Spiritual/Developmental/Educational Needs:  Pt's spiritual, cultural and  educational needs considered and pt agreeable to plan of care and goals.    Functional Communication Measure (FCM):   Severity Modifier for Medicare G-Code:  Motor Speech  Current status: FCM:  LEVEL 6:  - CI at least 1% but less than 20% impaired, limited or restricted  Projected status:  FCM:   LEVEL 7:  - CH 0% impaired, limited or restricted       Voice  Current status: FCM:  LEVEL 4:  - CK at least 40% < 60% impaired, limited or restricted  Projected status:  FCM:  LEVEL 5:  -  CJ at least 20% < 40% impaired, limited or restricted     Plan:   Continue POC with focus on voice loudness and clarity    MEG Villarreal., CCC-SLP, CBIS  Speech-Language Pathologist  6/5/2018

## 2018-06-06 NOTE — PROGRESS NOTES
"Outpatient Neurological Rehabilitation   Speech and Language Therapy Daily Note  Date:  6/7/2018     Start Time:  1550  Stop Time:  1630    Name: Haja Marti   MRN: [id number]   Therapy Diagnosis:   Encounter Diagnoses   Name Primary?    Hypophonia     Dysarthria     Voice and resonance disorder    Physician: Edward Ivey MD  Physician Orders: ST evaluate and treat   Medical Diagnosis: Parkinson's Disease    Visit #:  4  Visits Authorized: 20  Visits Cancelled: 0  Visits No Showed: 0  Date of Evaluation:  4/25/18  Insurance Authorization Period: 4/25/18 - 12/31/18  Plan of Care Expiration:  4/25/18 - 6/22/18 ( LSVT LOUD program initiated 6/4/18)  Extended POC:    G-CODE   4/10    Precautions: Standard  History:   Current Medical History: Haja Marti  presents to the [facility] with moderately impaired speech skills secondary to the medical diagnosis of Parkinson's Disease.  Subjective:   Pt reports: that he is frustrated with his PT experience; he participated in [facility] at [facility] but feels his legs are getting generally weaker. He reports that he goes to [employer] 3x per week and the gym 3x per week. He is a former triathlete and marathon participant. Mobility is very important to him. In addition, he reports a renewed interest in his sachi s/p Parkinson's diagnosis. Would like additional stimuli to revolve around the [employer].  Response to previous treatment: "it's hard. My voice gives out at the end".  Pain Scale:  0/10 on VAS currently.   Pain Location: n/a  Objective:     UNTIMED  Procedure Min.   Speech- Language- Voice Therapy    40   Total Minutes: 40  Total Timed Units: 0  Total Untimed Units: 1  Charges Billed/# of units: 1    Short Term Goals: (4 weeks) Current Progress:   1. The patient will produce loud, good quality "AH" sounds in the 80 decibel range for an average of 20 to 25 seconds (as a voice/speech " ""foundation" skill) for sets of 15 with minimal to no cues at least 95% of the time to further develop breath-speech coordination and for loudness calibration.  - Pt sustained the vowel for an average SPL of 84.0 dB (range 81.0 to 87.1dB) and average of 7.7 seconds (range 3.75 to 12.25 seconds) given max cues for proper technique.     - hard glottal attack at beginning of sustained phonation that was not improved    2. The patient will produce "AH" from a mid-level to at least 5 notes higher with 80 SPL decibel range and from a mid-level to at least 3 notes lower with 80 SPL decibel range in sets of 15 for each pitch direction with minimal to no cues 95% of the time to improve pitch variability.  - Pt produced high AH for an average SPL of 81.3 dB (range 78.1 to 85.4 dB) and average 253.7 Hz (range 222-291 Hz) given max cues for proper technique.    - Pt produced low AH for an average SPL of 80.3 dB (range 75.5 to 85.7 dB)and average 151.1 Hz (range 125-177 Hz) given max cues for proper technique.   3. The patient will verbalize a set of 10 functional phrases/ sentences at an average loudness level at least in the 80 decibel range with minimal to no cues at least 95% of the time. Pt read functional phrases after a model and indly with an average SPL of 85.6 dB   4. The patient will read aloud at an average loudness level at least in the 80 decibel range with minimal to no cues at least 95% of the time. Not formally addressed      5. The patient will verbalize at least in the 80 decibel range in his conversational speech at least 95% of the time with minimal to no cues. Not formally addressed        Goals Met:  1. Mr. Marti will be seen by ENT to assess vocal cord function. Goal Met 5/14/18  2. Mr. Marti will participate in further assessment of his/her vocal intensity to determine his sound pressure level (SPL, acoustic correlate of vocal loudness) measured with a sound level meter and more specific goals will " be determined. Goal Met 6/4/18 /Discontinue  Patient Education/Response:   LSVT LOUD exercises were demonstrated. Pt verbalized understanding.   Home Program: Voice Handicap Index, perceptual rating form, AH exercises  Assessment:   Haja is progressing well towards his goals. Pt's vocal intensity decreased as session progressed. Pt with good awareness of decrease Current goals remain appropriate. Goals to be updated as necessary.   Pt prognosis is Good to fair. Pt will continue to benefit from skilled outpatient speech and language therapy to address the deficits listed in the problem list on initial evaluation, provide pt/family education and to maximize pt's level of independence in the home and community environment.     Medical necessity is demonstrated by the following IMPAIRMENTS:  Pt with decreased vocal intensity resulting in severely decreased speech intelligibility. Reportedly, this worsens over the phone negatively impacting his ability to effectively and efficiently explain an emergency situation to emergency operators via phone or in person.  Barriers to Therapy: progressive degenerative disease  Environmental Concerns/Cultural/Spiritual/Developmental/Educational Needs:  Pt's spiritual, cultural and educational needs considered and pt agreeable to plan of care and goals.    Functional Communication Measure (FCM):   Severity Modifier for Medicare G-Code:  Motor Speech  Current status: FCM:  LEVEL 6:  - CI at least 1% but less than 20% impaired, limited or restricted  Projected status:  FCM:   LEVEL 7:  - CH 0% impaired, limited or restricted    Voice  Current status: FCM:  LEVEL 4:  - CK at least 40% < 60% impaired, limited or restricted  Projected status:  FCM:  LEVEL 5:  -  CJ at least 20% < 40% impaired, limited or restricted  Plan:   Continue POC with focus on voice loudness and clarity    JAYMIE Bailey, CCC-SLP  Speech Language Pathologist   6/7/2018

## 2018-06-07 ENCOUNTER — CLINICAL SUPPORT (OUTPATIENT)
Dept: REHABILITATION | Facility: HOSPITAL | Age: 73
End: 2018-06-07
Payer: MEDICARE

## 2018-06-07 DIAGNOSIS — R49.8 HYPOPHONIA: ICD-10-CM

## 2018-06-07 DIAGNOSIS — R47.1 DYSARTHRIA: ICD-10-CM

## 2018-06-07 DIAGNOSIS — R49.9 VOICE AND RESONANCE DISORDER: ICD-10-CM

## 2018-06-07 PROCEDURE — 92507 TX SP LANG VOICE COMM INDIV: CPT | Mod: PO

## 2018-06-08 ENCOUNTER — CLINICAL SUPPORT (OUTPATIENT)
Dept: REHABILITATION | Facility: HOSPITAL | Age: 73
End: 2018-06-08
Payer: MEDICARE

## 2018-06-08 DIAGNOSIS — R49.8 HYPOPHONIA: ICD-10-CM

## 2018-06-08 DIAGNOSIS — R47.1 DYSARTHRIA: ICD-10-CM

## 2018-06-08 DIAGNOSIS — R49.9 VOICE AND RESONANCE DISORDER: ICD-10-CM

## 2018-06-08 PROCEDURE — 92507 TX SP LANG VOICE COMM INDIV: CPT | Mod: PO

## 2018-06-08 NOTE — PROGRESS NOTES
"Outpatient Neurological Rehabilitation   Speech and Language Therapy Daily Note  Date:  6/8/2018     Start Time:  9:00  Stop Time:  9:45    Name: Haja Marti   MRN: [id number]   Therapy Diagnosis:   Encounter Diagnoses   Name Primary?    Hypophonia     Dysarthria     Voice and resonance disorder    Physician: Edward Ivey MD  Physician Orders: ST evaluate and treat   Medical Diagnosis: Parkinson's Disease    Visit #:  6  Visits Authorized: 20  Visits Cancelled: 0  Visits No Showed: 0  Date of Evaluation:  4/25/18  Insurance Authorization Period: 4/25/18 - 12/31/18  Plan of Care Expiration:  4/25/18 - 6/22/18 ( LSVT LOUD program initiated 6/4/18)  Extended POC:    G-CODE   6/10    Precautions: Standard  History:   Current Medical History: Haja Marti  presents to the [facility] with moderately impaired speech skills secondary to the medical diagnosis of Parkinson's Disease.  Subjective:   Pt reports: "It's a little difficult."  Response to previous treatment: n/a  Pain Scale:  0/10 on VAS currently.   Pain Location: n/a  Objective:     UNTIMED  Procedure Min.   Speech- Language- Voice Therapy    45   Total Minutes: 45  Total Timed Units: 0  Total Untimed Units: 1  Charges Billed/# of units: 1    Short Term Goals: (4 weeks) Current Progress:   1. The patient will produce loud, good quality "AH" sounds in the 80 decibel range for an average of 20 to 25 seconds (as a voice/speech "foundation" skill) for sets of 15 with minimal to no cues at least 95% of the time to further develop breath-speech coordination and for loudness calibration.  - Pt sustained the vowel for an average SPL of 82.1 dB (range 78.4-85.4 dB) and average of 10.9 seconds (range 8.75-13.75 seconds) given mod cues for proper technique.    2. The patient will produce "AH" from a mid-level to at least 5 notes higher with 80 SPL decibel range and from a mid-level to at least 3 notes lower " with 80 SPL decibel range in sets of 15 for each pitch direction with minimal to no cues 95% of the time to improve pitch variability.  - Pt produced high AH for an average SPL of 82.0 dB (range 78.1-84.3 dB) and average 308.5 Hz (range 299-337 Hz) given max cues for proper technique.    - Pt produced low AH for an average SPL of 80.1 dB (range 76.8-84.8 dB)and average 151.2 Hz (range 139-167 Hz) given max cues for proper technique.   3. The patient will verbalize a set of 10 functional phrases/ sentences at an average loudness level at least in the 80 decibel range with minimal to no cues at least 95% of the time. Pt read functional sentences with an average SPL of 78.8 dB.   4. The patient will read aloud at an average loudness level at least in the 80 decibel range with minimal to no cues at least 95% of the time. Pt read a list of words with an average SPL of 77.7 dB.      5. The patient will verbalize at least in the 80 decibel range in his conversational speech at least 95% of the time with minimal to no cues. Pt engaged in conversation with an average SPL of 72.7 dB with min cues to speak louder.         Goals Met:  1. Mr. Marti will be seen by ENT to assess vocal cord function. Goal Met 5/14/18  2. Mr. Marti will participate in further assessment of his/her vocal intensity to determine his sound pressure level (SPL, acoustic correlate of vocal loudness) measured with a sound level meter and more specific goals will be determined. Goal Met 6/4/18 /Discontinue  Patient Education/Response:   LSVT LOUD exercises were demonstrated. Pt verbalized understanding.   Home Program: Voice Handicap Index, perceptual rating form, AH exercises  Assessment:   Haja is progressing well towards his goals. Pt's vocal intensity decreased as session progressed. Pt with good awareness of decrease. He was motivated to perform LOUD exercises and tasks.  Current goals remain appropriate. Goals to be updated as necessary.   Pt  prognosis is Good to fair. Pt will continue to benefit from skilled outpatient speech and language therapy to address the deficits listed in the problem list on initial evaluation, provide pt/family education and to maximize pt's level of independence in the home and community environment.     Medical necessity is demonstrated by the following IMPAIRMENTS:  Pt with decreased vocal intensity resulting in severely decreased speech intelligibility. Reportedly, this worsens over the phone negatively impacting his ability to effectively and efficiently explain an emergency situation to emergency operators via phone or in person.  Barriers to Therapy: progressive degenerative disease  Environmental Concerns/Cultural/Spiritual/Developmental/Educational Needs:  Pt's spiritual, cultural and educational needs considered and pt agreeable to plan of care and goals.    Functional Communication Measure (FCM):   Severity Modifier for Medicare G-Code:  Motor Speech  Current status: FCM:  LEVEL 6:  - CI at least 1% but less than 20% impaired, limited or restricted  Projected status:  FCM:   LEVEL 7:  - CH 0% impaired, limited or restricted    Voice  Current status: FCM:  LEVEL 4:  - CK at least 40% < 60% impaired, limited or restricted  Projected status:  FCM:  LEVEL 5:  -  CJ at least 20% < 40% impaired, limited or restricted  Plan:   Continue POC with focus on voice loudness and clarity    MEG Villarreal., CCC-SLP, CBIS  Speech-Language Pathologist  6/8/2018

## 2018-06-11 ENCOUNTER — CLINICAL SUPPORT (OUTPATIENT)
Dept: REHABILITATION | Facility: HOSPITAL | Age: 73
End: 2018-06-11
Payer: MEDICARE

## 2018-06-11 DIAGNOSIS — R49.8 HYPOPHONIA: ICD-10-CM

## 2018-06-11 DIAGNOSIS — R47.1 DYSARTHRIA: ICD-10-CM

## 2018-06-11 DIAGNOSIS — R49.9 VOICE AND RESONANCE DISORDER: ICD-10-CM

## 2018-06-11 PROCEDURE — 92507 TX SP LANG VOICE COMM INDIV: CPT | Mod: PO

## 2018-06-12 RX ORDER — TRAZODONE HYDROCHLORIDE 50 MG/1
50 TABLET ORAL NIGHTLY PRN
Qty: 30 TABLET | Refills: 2 | OUTPATIENT
Start: 2018-06-12

## 2018-06-12 NOTE — PROGRESS NOTES
"Outpatient Neurological Rehabilitation   Speech and Language Therapy Daily Note  Date:  6/11/2018     Start Time:  1645  Stop Time:  1730    Name: Haja Marti   MRN: [id number]   Therapy Diagnosis:   Encounter Diagnoses   Name Primary?    Hypophonia     Dysarthria     Voice and resonance disorder    Physician: Edward Ivey MD  Physician Orders: ST evaluate and treat   Medical Diagnosis: Parkinson's Disease    Visit #:  7  Visits Authorized: 20  Visits Cancelled: 0  Visits No Showed: 0  Date of Evaluation:  4/25/18  Insurance Authorization Period: 4/25/18 - 12/31/18  Plan of Care Expiration:  4/25/18 - 6/22/18 ( LSVT LOUD program initiated 6/4/18)  Extended POC:    G-CODE   7/10    Precautions: Standard  History:   Current Medical History: Haja Marti  presents to the [facility] with moderately impaired speech skills secondary to the medical diagnosis of Parkinson's Disease.  Subjective:   Pt reports: Pt came directly from [facility]   Response to previous treatment: n/a  Pain Scale:  0/10 on VAS currently.   Pain Location: n/a  Objective:     UNTIMED  Procedure Min.   Speech- Language- Voice Therapy    45   Total Minutes: 45  Total Timed Units: 0  Total Untimed Units: 1  Charges Billed/# of units: 1    Short Term Goals: (4 weeks) Current Progress:   1. The patient will produce loud, good quality "AH" sounds in the 80 decibel range for an average of 20 to 25 seconds (as a voice/speech "foundation" skill) for sets of 15 with minimal to no cues at least 95% of the time to further develop breath-speech coordination and for loudness calibration.  - Pt sustained the vowel for an average SPL of 83.7 dB (range 79.6-86.04 dB) and average of 10.7 seconds (range 6.5-14.25 seconds) given min cues for proper technique.    2. The patient will produce "AH" from a mid-level to at least 5 notes higher with 80 SPL decibel range and from a mid-level to at " least 3 notes lower with 80 SPL decibel range in sets of 15 for each pitch direction with minimal to no cues 95% of the time to improve pitch variability.  - Pt produced high AH for an average SPL of 84.1 dB (range 78.1-84.3 dB) and average 299.9 Hz (range 292-319 Hz) given min cues for proper technique.    - Pt produced low AH for an average SPL of 80.9 dB (range 78.0-82.8 dB)and average 157.0 Hz (range 145-163 Hz) given min cues for proper technique.   3. The patient will verbalize a set of 10 functional phrases/ sentences at an average loudness level at least in the 80 decibel range with minimal to no cues at least 95% of the time. Pt read functional sentences x 3 with an average SPL of 78.0 dB.   4. The patient will read aloud at an average loudness level at least in the 80 decibel range with minimal to no cues at least 95% of the time. Pt read functional phrases with an average SPL of 82.8 dB.      5. The patient will verbalize at least in the 80 decibel range in his conversational speech at least 95% of the time with minimal to no cues. Pt engaged in conversation with an average SPL of 71.7 dB with min cues to speak louder.         Goals Met:  1. Mr. Marti will be seen by ENT to assess vocal cord function. Goal Met 5/14/18  2. Mr. Marti will participate in further assessment of his/her vocal intensity to determine his sound pressure level (SPL, acoustic correlate of vocal loudness) measured with a sound level meter and more specific goals will be determined. Goal Met 6/4/18 /Discontinue  Patient Education/Response:   LSVT LOUD exercises were demonstrated. Pt verbalized understanding.   Home Program: LOUD exercises, word, phrase, and sentence lists  Assessment:   Haja is progressing well towards his goals. Pt's vocal intensity decreased with aphonia as session progressed. Current goals remain appropriate. Goals to be updated as necessary.   Pt prognosis is Good to fair. Pt will continue to benefit from  skilled outpatient speech and language therapy to address the deficits listed in the problem list on initial evaluation, provide pt/family education and to maximize pt's level of independence in the home and community environment.     Medical necessity is demonstrated by the following IMPAIRMENTS:  Pt with decreased vocal intensity resulting in severely decreased speech intelligibility. Reportedly, this worsens over the phone negatively impacting his ability to effectively and efficiently explain an emergency situation to emergency operators via phone or in person.  Barriers to Therapy: progressive degenerative disease  Environmental Concerns/Cultural/Spiritual/Developmental/Educational Needs:  Pt's spiritual, cultural and educational needs considered and pt agreeable to plan of care and goals.    Functional Communication Measure (FCM):   Severity Modifier for Medicare G-Code:  Motor Speech  Current status: FCM:  LEVEL 6:  - CI at least 1% but less than 20% impaired, limited or restricted  Projected status:  FCM:   LEVEL 7:  - CH 0% impaired, limited or restricted    Voice  Current status: FCM:  LEVEL 4:  - CK at least 40% < 60% impaired, limited or restricted  Projected status:  FCM:  LEVEL 5:  -  CJ at least 20% < 40% impaired, limited or restricted  Plan:   Continue POC with focus on voice loudness and clarity    MEG Villarreal., CCC-SLP, CBIS  Speech-Language Pathologist  6/11/2018

## 2018-06-13 NOTE — PROGRESS NOTES
"Outpatient Neurological Rehabilitation   Speech and Language Therapy Daily Note  Date:  6/14/2018     Start Time:  1605  Stop Time:  1645    Name: Haja Marti   MRN: [id number]   Therapy Diagnosis:   Encounter Diagnoses   Name Primary?    Hypophonia     Dysarthria     Voice and resonance disorder    Physician: Edward Ivey MD  Physician Orders: ST evaluate and treat   Medical Diagnosis: Parkinson's Disease    Visit #:  8  Visits Authorized: 20  Visits Cancelled: 0  Visits No Showed: 0  Date of Evaluation:  4/25/18  Insurance Authorization Period: 4/25/18 - 12/31/18  Plan of Care Expiration:  4/25/18 - 6/22/18 ( LSVT LOUD program initiated 6/4/18)  Extended POC:    G-CODE   8/10    Precautions: Standard  History:   Current Medical History: Haja Marti  presents to the [facility] with moderately impaired speech skills secondary to the medical diagnosis of Parkinson's Disease.  Subjective:   Pt reports: fatigue has been worsening over the past month. Asked if he should ask his MD regarding medication changes. SLP advised pt to call MD for information regarding medication changes. Pt verbalized understanding.   Response to previous treatment: "it was good"  Pain Scale:  0/10 on VAS currently.   Pain Location: n/a  Objective:   UNTIMED  Procedure Min.   Speech- Language- Voice Therapy    40   Total Minutes: 40  Total Timed Units: 0  Total Untimed Units: 1  Charges Billed/# of units: 1    Short Term Goals: (4 weeks) Current Progress:   1. The patient will produce loud, good quality "AH" sounds in the 80 decibel range for an average of 20 to 25 seconds (as a voice/speech "foundation" skill) for sets of 15 with minimal to no cues at least 95% of the time to further develop breath-speech coordination and for loudness calibration.  - Pt sustained the vowel for an average SPL of 83.2  dB (range 80.7. To 84.0  dB) and average of 9.5 seconds (range 5.75 to 11.5  " "seconds) given maximum visual cues for proper technique.    2. The patient will produce "AH" from a mid-level to at least 5 notes higher with 80 SPL decibel range and from a mid-level to at least 3 notes lower with 80 SPL decibel range in sets of 15 for each pitch direction with minimal to no cues 95% of the time to improve pitch variability.  - Pt produced high AH for an average SPL of 82.1  dB (range 80.3 to 84.8  dB) and average 280.5  Hz (range 274 to 296 Hz) given min cues for proper technique.    - Pt produced low AH for an average SPL of 80.3  dB (range 79.4 to 82.0  dB)and average 154.5 Hz (range 149 to 158 Hz) given min cues for proper technique.   3. The patient will verbalize a set of 10 functional phrases/ sentences at an average loudness level at least in the 80 decibel range with minimal to no cues at least 95% of the time. Pt read functional sentences x 3 with an average SPL of 85.3 dB.   4. The patient will read aloud at an average loudness level at least in the 80 decibel range with minimal to no cues at least 95% of the time. Pt read at word / phrase level with an average SPL of 80.7  dB.      5. The patient will verbalize at least in the 80 decibel range in his conversational speech at least 95% of the time with minimal to no cues. Not formally addressed       Goals Met:  1. Mr. Marti will be seen by ENT to assess vocal cord function. Goal Met 5/14/18  2. Mr. Marti will participate in further assessment of his/her vocal intensity to determine his sound pressure level (SPL, acoustic correlate of vocal loudness) measured with a sound level meter and more specific goals will be determined. Goal Met 6/4/18 /Discontinue  Patient Education/Response:   LSVT LOUD exercises were demonstrated. Pt verbalized understanding.   Home Program: LOUD exercises, word, phrase, and sentence lists  Assessment:   Haja is progressing well towards his goals. Increased loudness after review of sensory mismatch " between perception of vocal loudness and measured vocal loudness associated with Parkinson's. Current goals remain appropriate. Goals to be updated as necessary.   Pt prognosis is Good to fair. Pt will continue to benefit from skilled outpatient speech and language therapy to address the deficits listed in the problem list on initial evaluation, provide pt/family education and to maximize pt's level of independence in the home and community environment.     Medical necessity is demonstrated by the following IMPAIRMENTS:  Pt with decreased vocal intensity resulting in severely decreased speech intelligibility. Reportedly, this worsens over the phone negatively impacting his ability to effectively and efficiently explain an emergency situation to emergency operators via phone or in person.  Barriers to Therapy: progressive degenerative disease  Environmental Concerns/Cultural/Spiritual/Developmental/Educational Needs:  Pt's spiritual, cultural and educational needs considered and pt agreeable to plan of care and goals.    Functional Communication Measure (FCM):   Severity Modifier for Medicare G-Code:  Motor Speech  Current status: FCM:  LEVEL 6:  - CI at least 1% but less than 20% impaired, limited or restricted  Projected status:  FCM:   LEVEL 7:  - CH 0% impaired, limited or restricted    Voice  Current status: FCM:  LEVEL 4:  - CK at least 40% < 60% impaired, limited or restricted  Projected status:  FCM:  LEVEL 5:  -  CJ at least 20% < 40% impaired, limited or restricted  Plan:   Continue POC with focus on voice loudness and clarity    JAYMIE Bailey, CCC-SLP  Speech Language Pathologist   6/14/2018

## 2018-06-14 ENCOUNTER — CLINICAL SUPPORT (OUTPATIENT)
Dept: REHABILITATION | Facility: HOSPITAL | Age: 73
End: 2018-06-14
Payer: MEDICARE

## 2018-06-14 DIAGNOSIS — R49.8 HYPOPHONIA: ICD-10-CM

## 2018-06-14 DIAGNOSIS — R49.9 VOICE AND RESONANCE DISORDER: ICD-10-CM

## 2018-06-14 DIAGNOSIS — R47.1 DYSARTHRIA: ICD-10-CM

## 2018-06-14 PROCEDURE — 92507 TX SP LANG VOICE COMM INDIV: CPT | Mod: PO

## 2018-06-15 ENCOUNTER — CLINICAL SUPPORT (OUTPATIENT)
Dept: REHABILITATION | Facility: HOSPITAL | Age: 73
End: 2018-06-15
Payer: MEDICARE

## 2018-06-15 ENCOUNTER — PATIENT MESSAGE (OUTPATIENT)
Dept: NEUROLOGY | Facility: CLINIC | Age: 73
End: 2018-06-15

## 2018-06-15 DIAGNOSIS — R49.8 HYPOPHONIA: ICD-10-CM

## 2018-06-15 DIAGNOSIS — R47.1 DYSARTHRIA: ICD-10-CM

## 2018-06-15 DIAGNOSIS — R49.9 VOICE AND RESONANCE DISORDER: ICD-10-CM

## 2018-06-15 PROCEDURE — 92507 TX SP LANG VOICE COMM INDIV: CPT | Mod: PO

## 2018-06-15 NOTE — PROGRESS NOTES
"Outpatient Neurological Rehabilitation   Speech and Language Therapy Daily Note  Date:  6/15/2018     Start Time:  815  Stop Time:  900    Name: Haja Marti   MRN: [id number]   Therapy Diagnosis:   Encounter Diagnoses   Name Primary?    Hypophonia     Dysarthria     Voice and resonance disorder    Physician: Edward Ivey MD  Physician Orders: ST evaluate and treat   Medical Diagnosis: Parkinson's Disease    Visit #:  9  Visits Authorized: 20  Visits Cancelled: 0  Visits No Showed: 0  Date of Evaluation:  4/25/18  Insurance Authorization Period: 4/25/18 - 12/31/18  Plan of Care Expiration:  4/25/18 - 6/22/18 ( LSVT LOUD program initiated 6/4/18)  Extended POC:    G-CODE   9/10    Precautions: Standard  History:   Current Medical History: Haja Marti  presents to the [facility] with moderately impaired speech skills secondary to the medical diagnosis of Parkinson's Disease.  Subjective:   Pt reports: "Mornings take longer to get moving."   Response to previous treatment: "I think it went good."  Pain Scale:  0/10 on VAS currently.   Pain Location: n/a  Objective:   UNTIMED  Procedure Min.   Speech- Language- Voice Therapy    45   Total Minutes: 45  Total Timed Units: 0  Total Untimed Units: 1  Charges Billed/# of units: 1    Short Term Goals: (4 weeks) Current Progress:   1. The patient will produce loud, good quality "AH" sounds in the 80 decibel range for an average of 20 to 25 seconds (as a voice/speech "foundation" skill) for sets of 15 with minimal to no cues at least 95% of the time to further develop breath-speech coordination and for loudness calibration.  - Pt sustained the vowel for an average SPL of 82.7  dB (range 76.9-86.7  dB) and average of 11.3 seconds (range 6-15.5  seconds) given moderate verbal cues for proper technique.    2. The patient will produce "AH" from a mid-level to at least 5 notes higher with 80 SPL decibel range and from " a mid-level to at least 3 notes lower with 80 SPL decibel range in sets of 15 for each pitch direction with minimal to no cues 95% of the time to improve pitch variability.  - Pt produced high AH for an average SPL of 81.8 dB (range 79.5-84.3 dB) and average 273.1 Hz (range 264-282 Hz) given mod cues for proper technique.    - Pt produced low AH for an average SPL of 78.5  dB (range 74.0-79.6  DB) and average 139.1 Hz (range 109-153 Hz) given mod cues for proper technique.   3. The patient will verbalize a set of 10 functional phrases/ sentences at an average loudness level at least in the 80 decibel range with minimal to no cues at least 95% of the time. Pt read functional sentences x 3 with an average SPL of 80.6 dB.   4. The patient will read aloud at an average loudness level at least in the 80 decibel range with minimal to no cues at least 95% of the time. Pt read at word / phrase level with an average SPL of 80.1  dB.     Pt read a magazine article with an average SPL of 77.8 dB given mod cues for increased loudness.     5. The patient will verbalize at least in the 80 decibel range in his conversational speech at least 95% of the time with minimal to no cues. Pt engaged in conversation with an average SPL of 72.2 dB given mod cues for increased loudness.     Goals Met:  1. Mr. Marti will be seen by ENT to assess vocal cord function. Goal Met 5/14/18  2. Mr. Marti will participate in further assessment of his/her vocal intensity to determine his sound pressure level (SPL, acoustic correlate of vocal loudness) measured with a sound level meter and more specific goals will be determined. Goal Met 6/4/18 /Discontinue  Patient Education/Response:   LSVT LOUD exercises were demonstrated. Pt verbalized understanding.   Home Program: LOUD exercises, word, phrase, and sentence lists  Assessment:   Haja is progressing well towards his goals.  Current goals remain appropriate. Goals to be updated as necessary.    Pt prognosis is Good to fair. Pt will continue to benefit from skilled outpatient speech and language therapy to address the deficits listed in the problem list on initial evaluation, provide pt/family education and to maximize pt's level of independence in the home and community environment.     Medical necessity is demonstrated by the following IMPAIRMENTS:  Pt with decreased vocal intensity resulting in severely decreased speech intelligibility. Reportedly, this worsens over the phone negatively impacting his ability to effectively and efficiently explain an emergency situation to emergency operators via phone or in person.  Barriers to Therapy: progressive degenerative disease  Environmental Concerns/Cultural/Spiritual/Developmental/Educational Needs:  Pt's spiritual, cultural and educational needs considered and pt agreeable to plan of care and goals.    Functional Communication Measure (FCM):   Severity Modifier for Medicare G-Code:  Motor Speech  Current status: FCM:  LEVEL 6:  - CI at least 1% but less than 20% impaired, limited or restricted  Projected status:  FCM:   LEVEL 7:  - CH 0% impaired, limited or restricted    Voice  Current status: FCM:  LEVEL 4:  - CK at least 40% < 60% impaired, limited or restricted  Projected status:  FCM:  LEVEL 5:  -  CJ at least 20% < 40% impaired, limited or restricted  Plan:   Continue POC with focus on voice loudness and clarity      MEG Villarreal., CCC-SLP, CBIS  Speech-Language Pathologist  6/15/2018

## 2018-06-18 ENCOUNTER — CLINICAL SUPPORT (OUTPATIENT)
Dept: REHABILITATION | Facility: HOSPITAL | Age: 73
End: 2018-06-18
Payer: MEDICARE

## 2018-06-18 DIAGNOSIS — R49.9 VOICE AND RESONANCE DISORDER: ICD-10-CM

## 2018-06-18 DIAGNOSIS — R47.1 DYSARTHRIA: ICD-10-CM

## 2018-06-18 DIAGNOSIS — R49.8 HYPOPHONIA: ICD-10-CM

## 2018-06-18 PROCEDURE — G9172 VOICE GOAL STATUS: HCPCS | Mod: CJ,PO

## 2018-06-18 PROCEDURE — 92507 TX SP LANG VOICE COMM INDIV: CPT | Mod: PO

## 2018-06-18 PROCEDURE — G9171 VOICE CURRENT STATUS: HCPCS | Mod: CK,PO

## 2018-06-18 NOTE — PROGRESS NOTES
"Outpatient Neurological Rehabilitation   Speech and Language Therapy Daily Note  Date:  6/19/2018     Start Time:  815  Stop Time:  900    Name: Haja Marti   MRN: [id number]   Therapy Diagnosis:   Encounter Diagnoses   Name Primary?    Hypophonia     Dysarthria     Voice and resonance disorder    Physician: Edward Ivey MD  Physician Orders: ST evaluate and treat   Medical Diagnosis: Parkinson's Disease    Visit #:  10  Visits Authorized: 20  Visits Cancelled: 0  Visits No Showed: 0  Date of Evaluation:  4/25/18  Insurance Authorization Period: 4/25/18 - 12/31/18  Plan of Care Expiration:  4/25/18 - 6/22/18 ( LSVT LOUD program initiated 6/4/18)  Extended POC:    G-CODE   10/10    Precautions: Standard  History:   Current Medical History: Haja Marti  presents to the [facility] with moderately impaired speech skills secondary to the medical diagnosis of Parkinson's Disease.  Subjective:   Pt reports: pt presented with hoarse vocal quality today. Vocal quality improved after voice exercises.  Response to previous treatment: "I think it went good."  Pain Scale:  0/10 on VAS currently.   Pain Location: n/a  Objective:   UNTIMED  Procedure Min.   Speech- Language- Voice Therapy    45   Total Minutes: 45  Total Timed Units: 0  Total Untimed Units: 1  Charges Billed/# of units: 1    Short Term Goals: (4 weeks) Current Progress:   1. The patient will produce loud, good quality "AH" sounds in the 80 decibel range for an average of 20 to 25 seconds (as a voice/speech "foundation" skill) for sets of 15 with minimal to no cues at least 95% of the time to further develop breath-speech coordination and for loudness calibration.  - Pt sustained the vowel for an average SPL of 83.3  dB (range 79.9-85.9  dB) and average of 7.3 seconds (range 4.25-10.75 seconds) given moderate verbal cues for proper technique.    2. The patient will produce "AH" from a mid-level to " at least 5 notes higher with 80 SPL decibel range and from a mid-level to at least 3 notes lower with 80 SPL decibel range in sets of 15 for each pitch direction with minimal to no cues 95% of the time to improve pitch variability.  - Pt produced high AH for an average SPL of 80.7 dB (range 78.7 to 83.2 dB) and average 267 Hz (range 264-284 Hz) given mod cues for proper technique.    - Pt produced low AH for an average SPL of 77.6  dB (range 71.3-79.0  DB) and average 141.8 Hz (range 135-142 Hz) given mod cues for proper technique.   3. The patient will verbalize a set of 10 functional phrases/ sentences at an average loudness level at least in the 80 decibel range with minimal to no cues at least 95% of the time. Pt read functional sentences x 3 with an average SPL of 76.6 dB.   4. The patient will read aloud at an average loudness level at least in the 80 decibel range with minimal to no cues at least 95% of the time. Pt read at paragraph level with an average SPL of 77.1  dB.      5. The patient will verbalize at least in the 80 decibel range in his conversational speech at least 95% of the time with minimal to no cues. Not formally addressed       Goals Met:  1. Mr. Marti will be seen by ENT to assess vocal cord function. Goal Met 5/14/18  2. Mr. Marti will participate in further assessment of his/her vocal intensity to determine his sound pressure level (SPL, acoustic correlate of vocal loudness) measured with a sound level meter and more specific goals will be determined. Goal Met 6/4/18 /Discontinue  Patient Education/Response:   LSVT LOUD exercises were demonstrated. Pt verbalized understanding.   Home Program: LOUD exercises, word, phrase, and sentence lists  Assessment:   Haja is progressing well towards his goals.  Increased vocal intensity on speech tasks today. Current goals remain appropriate. Goals to be updated as necessary.   Pt prognosis is Good to fair. Pt will continue to benefit from  skilled outpatient speech and language therapy to address the deficits listed in the problem list on initial evaluation, provide pt/family education and to maximize pt's level of independence in the home and community environment.     Medical necessity is demonstrated by the following IMPAIRMENTS:  Pt with decreased vocal intensity resulting in severely decreased speech intelligibility. Reportedly, this worsens over the phone negatively impacting his ability to effectively and efficiently explain an emergency situation to emergency operators via phone or in person.  Barriers to Therapy: progressive degenerative disease  Environmental Concerns/Cultural/Spiritual/Developmental/Educational Needs:  Pt's spiritual, cultural and educational needs considered and pt agreeable to plan of care and goals.    Functional Communication Measure (FCM):   Severity Modifier for Medicare G-Code:  Motor Speech -Updated 6/19/18 (previous CI/CH)  Current status: FCM:  LEVEL 6:  - CI at least 1% but less than 20% impaired, limited or restricted  Projected status:  FCM:   LEVEL 7:  - CH 0% impaired, limited or restricted    Voice  Current status: FCM:  LEVEL 4:  - CK at least 40% < 60% impaired, limited or restricted  Projected status:  FCM:  LEVEL 5:  -  CJ at least 20% < 40% impaired, limited or restricted  Plan:   Continue POC with focus on behaviorally increasing vocal loudness.    JAYMIE Bailey, CCC-SLP  Speech Language Pathologist   6/19/2018

## 2018-06-18 NOTE — PROGRESS NOTES
"Outpatient Neurological Rehabilitation   Speech and Language Therapy Daily Note  Date:  6/18/2018     Start Time:  1645  Stop Time:  1730    Name: Haja Marti   MRN: [id number]   Therapy Diagnosis:   Encounter Diagnoses   Name Primary?    Hypophonia     Dysarthria     Voice and resonance disorder    Physician: Edward Ivey MD  Physician Orders: ST evaluate and treat   Medical Diagnosis: Parkinson's Disease    Visit #:  10  Visits Authorized: 20  Visits Cancelled: 0  Visits No Showed: 0  Date of Evaluation:  4/25/18  Insurance Authorization Period: 4/25/18 - 12/31/18  Plan of Care Expiration:  4/25/18 - 6/22/18 ( LSVT LOUD program initiated 6/4/18)  Extended POC:    G-CODE   10/10    Precautions: Standard  History:   Current Medical History: Haja Marti  presents to the [facility] with moderately impaired speech skills secondary to the medical diagnosis of Parkinson's Disease.  Subjective:   Pt reports: "I thought that my strength and abilities would stay at the same level when I was diagnosed a year ago." Reportedly his medication was increased today to 1 1/2 tablet 3x daily.   Response to previous treatment: More cues needed today for breath support while doing the speech tasks.   Pain Scale:  0/10 on VAS currently.   Pain Location: n/a  Objective:   UNTIMED  Procedure Min.   Speech- Language- Voice Therapy    45   Total Minutes: 45  Total Timed Units: 0  Total Untimed Units: 1  Charges Billed/# of units: 1    Short Term Goals: (4 weeks) Current Progress:   1. The patient will produce loud, good quality "AH" sounds in the 80 decibel range for an average of 20 to 25 seconds (as a voice/speech "foundation" skill) for sets of 15 with minimal to no cues at least 95% of the time to further develop breath-speech coordination and for loudness calibration.  - Pt sustained the vowel for an average SPL of 81.3  dB (range 77.5-84.3 dB) and average of 10.6 " "seconds (range 8.5-14.8 seconds) given moderate verbal cues for proper technique.    2. The patient will produce "AH" from a mid-level to at least 5 notes higher with 80 SPL decibel range and from a mid-level to at least 3 notes lower with 80 SPL decibel range in sets of 15 for each pitch direction with minimal to no cues 95% of the time to improve pitch variability.  - Pt produced high AH for an average SPL of 80.1 dB (range 75.4-82.3 dB) and average 314.4 Hz (range 305-333 Hz) given mod cues for proper technique.    - Pt produced low AH for an average SPL of 77.1  dB (range 74.4-80.2 dB) and average 156.1 Hz (range 141-166 Hz) given mod cues for proper technique.   3. The patient will verbalize a set of 10 functional phrases/ sentences at an average loudness level at least in the 80 decibel range with minimal to no cues at least 95% of the time. Pt read functional sentences x 3 with an average SPL of 77.9 dB.   4. The patient will read aloud at an average loudness level at least in the 80 decibel range with minimal to no cues at least 95% of the time. Pt read at word / phrase level with an average SPL of 79.7  dB.     Pt read a magazine article with an average SPL of 76.9 dB given mod cues for increased loudness.     5. The patient will verbalize at least in the 80 decibel range in his conversational speech at least 95% of the time with minimal to no cues. Pt engaged in conversation with an average SPL of 71.5 dB given mod cues for increased loudness.     Goals Met:  1. Mr. Marti will be seen by ENT to assess vocal cord function. Goal Met 5/14/18  2. Mr. Marti will participate in further assessment of his/her vocal intensity to determine his sound pressure level (SPL, acoustic correlate of vocal loudness) measured with a sound level meter and more specific goals will be determined. Goal Met 6/4/18 /Discontinue  Patient Education/Response:   Educated pt on the progression of Parkinson's Disease for each " individual. Pt verbalized understanding.   Home Program: LOUD exercises, word, phrase, and sentence lists  Assessment:   Haja is progressing well towards his goals. Inconsistent carryover of loud voice in conversation. Decreased understanding of the progression of his disease. He was encouraged to speak to Dr. Ivey for more detailed discussion.  Current goals remain appropriate. Goals to be updated as necessary.   Pt prognosis is Good to fair. Pt will continue to benefit from skilled outpatient speech and language therapy to address the deficits listed in the problem list on initial evaluation, provide pt/family education and to maximize pt's level of independence in the home and community environment.     Medical necessity is demonstrated by the following IMPAIRMENTS:  Pt with decreased vocal intensity resulting in severely decreased speech intelligibility. Reportedly, this worsens over the phone negatively impacting his ability to effectively and efficiently explain an emergency situation to emergency operators via phone or in person.  Barriers to Therapy: progressive degenerative disease  Environmental Concerns/Cultural/Spiritual/Developmental/Educational Needs:  Pt's spiritual, cultural and educational needs considered and pt agreeable to plan of care and goals.    Functional Communication Measure (FCM):   Severity Modifier for Medicare G-Code:  Motor Speech  Current status: FCM:  LEVEL 6:  - CI at least 1% but less than 20% impaired, limited or restricted  Projected status:  FCM:   LEVEL 7:  - CH 0% impaired, limited or restricted    Voice  Current status: FCM:  LEVEL 4:  - CK at least 40% < 60% impaired, limited or restricted  Projected status:  FCM:  LEVEL 5:  -  CJ at least 20% < 40% impaired, limited or restricted  Plan:   Continue POC with focus on voice loudness and clarity      MEG Villarreal., CCC-SLP, CBIS  Speech-Language Pathologist  6/18/2018

## 2018-06-19 ENCOUNTER — CLINICAL SUPPORT (OUTPATIENT)
Dept: REHABILITATION | Facility: HOSPITAL | Age: 73
End: 2018-06-19
Payer: MEDICARE

## 2018-06-19 DIAGNOSIS — R49.9 VOICE AND RESONANCE DISORDER: ICD-10-CM

## 2018-06-19 DIAGNOSIS — R49.8 HYPOPHONIA: ICD-10-CM

## 2018-06-19 DIAGNOSIS — R47.1 DYSARTHRIA: ICD-10-CM

## 2018-06-19 PROCEDURE — 92507 TX SP LANG VOICE COMM INDIV: CPT | Mod: PO

## 2018-06-19 PROCEDURE — G8999 MOTOR SPEECH CURRENT STATUS: HCPCS | Mod: CI,PO

## 2018-06-19 PROCEDURE — G9186 MOTOR SPEECH GOAL STATUS: HCPCS | Mod: CH,PO

## 2018-06-21 ENCOUNTER — CLINICAL SUPPORT (OUTPATIENT)
Dept: REHABILITATION | Facility: HOSPITAL | Age: 73
End: 2018-06-21
Payer: MEDICARE

## 2018-06-21 DIAGNOSIS — R47.1 DYSARTHRIA: ICD-10-CM

## 2018-06-21 DIAGNOSIS — R49.8 HYPOPHONIA: ICD-10-CM

## 2018-06-21 DIAGNOSIS — R49.9 VOICE AND RESONANCE DISORDER: ICD-10-CM

## 2018-06-21 PROCEDURE — 92507 TX SP LANG VOICE COMM INDIV: CPT | Mod: PO

## 2018-06-21 NOTE — PROGRESS NOTES
"Outpatient Neurological Rehabilitation   Speech and Language Therapy Daily Note  Date:  6/21/2018     Start Time:  1600   Stop Time:  1645     Name: Haja Marti   MRN: [id number]   Therapy Diagnosis:   Encounter Diagnoses   Name Primary?    Hypophonia     Dysarthria     Voice and resonance disorder    Physician: Edward Ivey MD  Physician Orders: ST evaluate and treat   Medical Diagnosis: Parkinson's Disease    Visit #:  11  Visits Authorized: 20  Visits Cancelled: 0  Visits No Showed: 0  Date of Evaluation:  4/25/18  Insurance Authorization Period: 4/25/18 - 12/31/18  Plan of Care Expiration:  4/25/18 - 6/22/18 ( LSVT LOUD program initiated 6/4/18)  Extended POC:    G-CODE   1/10    Precautions: Standard  History:   Current Medical History: Haja Marti  presents to the [facility] with moderately impaired speech skills secondary to the medical diagnosis of Parkinson's Disease.  Subjective:   Pt reports: that he has a hard time thinking of his loud speech in conversation. Pt perceptually louder in his greeting to SLP in waiting room today.  Response to previous treatment: "much better."  Pain Scale:  0/10 on VAS currently.   Pain Location: n/a  Objective:   UNTIMED  Procedure Min.   Speech- Language- Voice Therapy    45    Total Minutes: 45   Total Timed Units: 0  Total Untimed Units: 1  Charges Billed/# of units: 1    Short Term Goals: (4 weeks) Current Progress:   1. The patient will produce loud, good quality "AH" sounds in the 80 decibel range for an average of 20 to 25 seconds (as a voice/speech "foundation" skill) for sets of 15 with minimal to no cues at least 95% of the time to further develop breath-speech coordination and for loudness calibration.  - Pt sustained the vowel for an average SPL of 10.2   dB (range 4.25 -13.25   dB) and average of 82.4  seconds (range 79.6  -83.7  seconds) given moderate verbal cues for proper technique.    2. " "The patient will produce "AH" from a mid-level to at least 5 notes higher with 80 SPL decibel range and from a mid-level to at least 3 notes lower with 80 SPL decibel range in sets of 15 for each pitch direction with minimal to no cues 95% of the time to improve pitch variability.  - Pt produced high AH for an average SPL of 83.9  dB (range 80.3 to 85.9  dB) and average 286  Hz (range 276 - 302  Hz) given consistent visual cues .    - Pt produced low AH for an average SPL of  78.7 dB (range 76.7 80.5  DB) and average 132.6  Hz (range 73 -149  Hz) given moderate visual cues    3. The patient will verbalize a set of 10 functional phrases/ sentences at an average loudness level at least in the 80 decibel range with minimal to no cues at least 95% of the time. Pt read functional sentences x 3 with an average SPL of 81.4  dB.   4. The patient will read aloud at an average loudness level at least in the 80 decibel range with minimal to no cues at least 95% of the time. Pt read at paragraph level with an average SPL of 76.4  dB.      5. The patient will verbalize at least in the 80 decibel range in his conversational speech at least 95% of the time with minimal to no cues. Pt participated in conversation with an average SPL of 71.2 dB     Goals Met:  1. Mr. Marti will be seen by ENT to assess vocal cord function. Goal Met 5/14/18  2. Mr. Marti will participate in further assessment of his/her vocal intensity to determine his sound pressure level (SPL, acoustic correlate of vocal loudness) measured with a sound level meter and more specific goals will be determined. Goal Met 6/4/18 /Discontinue  Patient Education/Response:   hierarchy of tasks (word level, phrase level, paragraph level) discussed.  Pt verbalized understanding.   Home Program: LOUD exercises, word, phrase, and sentence lists  Assessment:   Haja is progressing well towards his goals.  Increased vocal loudness in greetings today . Current goals remain " appropriate. Goals to be updated as necessary.   Pt prognosis is Good to fair. Pt will continue to benefit from skilled outpatient speech and language therapy to address the deficits listed in the problem list on initial evaluation, provide pt/family education and to maximize pt's level of independence in the home and community environment.     Medical necessity is demonstrated by the following IMPAIRMENTS:  Pt with decreased vocal intensity resulting in severely decreased speech intelligibility. Reportedly, this worsens over the phone negatively impacting his ability to effectively and efficiently explain an emergency situation to emergency operators via phone or in person.  Barriers to Therapy: progressive degenerative disease  Environmental Concerns/Cultural/Spiritual/Developmental/Educational Needs:  Pt's spiritual, cultural and educational needs considered and pt agreeable to plan of care and goals.    Functional Communication Measure (FCM):   Severity Modifier for Medicare G-Code:  Motor Speech -Updated 6/19/18 (previous CI/CH)  Current status: FCM:  LEVEL 6:  - CI at least 1% but less than 20% impaired, limited or restricted  Projected status:  FCM:   LEVEL 7:  - CH 0% impaired, limited or restricted    Voice  Current status: FCM:  LEVEL 4:  - CK at least 40% < 60% impaired, limited or restricted  Projected status:  FCM:  LEVEL 5:  -  CJ at least 20% < 40% impaired, limited or restricted  Plan:   Continue POC with focus on behaviorally increasing vocal loudness.    JAYMIE Bailey, CCC-SLP  Speech Language Pathologist   6/21/2018

## 2018-06-22 ENCOUNTER — CLINICAL SUPPORT (OUTPATIENT)
Dept: REHABILITATION | Facility: HOSPITAL | Age: 73
End: 2018-06-22
Payer: MEDICARE

## 2018-06-22 ENCOUNTER — TELEPHONE (OUTPATIENT)
Dept: PAIN MEDICINE | Facility: CLINIC | Age: 73
End: 2018-06-22

## 2018-06-22 DIAGNOSIS — R49.8 HYPOPHONIA: ICD-10-CM

## 2018-06-22 DIAGNOSIS — R49.9 VOICE AND RESONANCE DISORDER: ICD-10-CM

## 2018-06-22 DIAGNOSIS — R47.1 DYSARTHRIA: ICD-10-CM

## 2018-06-22 PROCEDURE — 92507 TX SP LANG VOICE COMM INDIV: CPT | Mod: PO

## 2018-06-22 NOTE — TELEPHONE ENCOUNTER
----- Message from Lori Gaston sent at 6/22/2018 10:18 AM CDT -----            Name of Who is Calling: luis enrique      What is the request in detail: pt. Would like to schedule for a prrocedure.       Can the clinic reply by [facility]: no      What Number to Call Back if not in [facility]:[phone]

## 2018-06-22 NOTE — TELEPHONE ENCOUNTER
Spoke with patient and he is requesting a procedure with Dr. Crockett.  He stated his pain has gotten worse and severe at times.  He is okay sitting for short periods of time and walking very short   Distances with no pain. He stated it is not radiating down his legs it is more of an aching pain.  He take boxing classs for Parkinson patients and that seen to irritate the lower back pain at times.  Last procedure was on 10-11-17 bilateral L4 TF AFTAB.  Per note from Dr. Crockett dated 11-07-17 he may consider bilateral MBB at L2,3,4,5.

## 2018-06-22 NOTE — PROGRESS NOTES
"Outpatient Neurological Rehabilitation   Speech and Language Therapy Daily Note  Date:  6/22/2018     Start Time:  905  Stop Time:  945    Name: Haja Marti   MRN: [id number]   Therapy Diagnosis:   Encounter Diagnoses   Name Primary?    Hypophonia     Dysarthria     Voice and resonance disorder    Physician: Edward Ivey MD  Physician Orders: ST evaluate and treat   Medical Diagnosis: Parkinson's Disease    Visit #:  12  Visits Authorized: 20  Visits Cancelled: 0  Visits No Showed: 0  Date of Evaluation:  4/25/18  Insurance Authorization Period: 4/25/18 - 12/31/18  Plan of Care Expiration:  4/25/18 - 6/22/18 ( LSVT LOUD program initiated 6/4/18)  Extended POC:  7/6/18  G-CODE   2/10    Precautions: Standard  History:   Current Medical History: Haja Marti  presents to the [facility] with moderately impaired speech skills secondary to the medical diagnosis of Parkinson's Disease.  Subjective:   Pt reports: having a restless night. "I'm tired a lot." He thinks he is still getting used to his recent medication adjustments.    Response to previous treatment: n/a  Pain Scale:  0/10 on VAS currently.   Pain Location: n/a  Objective:   UNTIMED  Procedure Min.   Speech- Language- Voice Therapy    40   Total Minutes: 40  Total Timed Units: 0  Total Untimed Units: 1  Charges Billed/# of units: 1    Short Term Goals: (4 weeks) Current Progress:   1. The patient will produce loud, good quality "AH" sounds in the 80 decibel range for an average of 20 to 25 seconds (as a voice/speech "foundation" skill) for sets of 15 with minimal to no cues at least 95% of the time to further develop breath-speech coordination and for loudness calibration.  - Pt sustained the vowel for an average SPL of 85.1 dB (range 83.5 -85.9 dB) and average of 12.1 seconds (range 9  -15.5  seconds) given moderate verbal cues for proper technique.    2. The patient will produce "AH" from a " mid-level to at least 5 notes higher with 80 SPL decibel range and from a mid-level to at least 3 notes lower with 80 SPL decibel range in sets of 15 for each pitch direction with minimal to no cues 95% of the time to improve pitch variability.  - Pt produced high AH for an average SPL of 81.0  dB (range 78.7 to 83.3 dB) and average 284.5  Hz (range 278 - 302  Hz) given mod cues for proper technique.    - Pt produced low AH for an average SPL of 80.4 dB (range 77.2 - 83.8   DB) and average 151.6  Hz (range 135 -170  Hz) given mod cues for proper technique    3. The patient will verbalize a set of 10 functional phrases/ sentences at an average loudness level at least in the 80 decibel range with minimal to no cues at least 95% of the time. Pt read functional sentences x 3 with an average SPL of 81.8 dB.   4. The patient will read aloud at an average loudness level at least in the 80 decibel range with minimal to no cues at least 95% of the time. Pt read at paragraph level with an average SPL of 76.8 dB.      5. The patient will verbalize at least in the 80 decibel range in his conversational speech at least 95% of the time with minimal to no cues. Pt engaged in conversation with an average SPL of 73.6 dB.     Goals Met:  1. Mr. Marti will be seen by ENT to assess vocal cord function. Goal Met 5/14/18  2. Mr. Marti will participate in further assessment of his/her vocal intensity to determine his sound pressure level (SPL, acoustic correlate of vocal loudness) measured with a sound level meter and more specific goals will be determined. Goal Met 6/4/18 /Discontinue  Patient Education/Response:   hierarchy of tasks (word level, phrase level, paragraph level) discussed.  Pt verbalized understanding.   Home Program: LOUD exercises, word, phrase, and sentence lists  Assessment:   Haja is progressing well towards his goals.  Increased vocal loudness in greetings today . Current goals remain appropriate. Goals to be  updated as necessary.   Pt prognosis is Good to fair. Pt will continue to benefit from skilled continued outpatient speech and language therapy to address the deficits listed in the problem list on initial evaluation, provide pt/family education and to maximize pt's level of independence in the home and community environment.     Medical necessity is demonstrated by the following IMPAIRMENTS:  Pt with decreased vocal intensity resulting in severely decreased speech intelligibility. Reportedly, this worsens over the phone negatively impacting his ability to effectively and efficiently explain an emergency situation to emergency operators via phone or in person.  Barriers to Therapy: progressive degenerative disease  Environmental Concerns/Cultural/Spiritual/Developmental/Educational Needs:  Pt's spiritual, cultural and educational needs considered and pt agreeable to plan of care and goals.    Functional Communication Measure (FCM):   Severity Modifier for Medicare G-Code:  Motor Speech -Updated 6/19/18 (previous CI/CH)  Current status: FCM:  LEVEL 6:  - CI at least 1% but less than 20% impaired, limited or restricted  Projected status:  FCM:   LEVEL 7:  - CH 0% impaired, limited or restricted    Voice  Current status: FCM:  LEVEL 4:  - CK at least 40% < 60% impaired, limited or restricted  Projected status:  FCM:  LEVEL 5:  -  CJ at least 20% < 40% impaired, limited or restricted  Plan:   Continue POC with focus on behaviorally increasing vocal loudness.    MEG Villarreal., CCC-SLP, CBIS  Speech-Language Pathologist    6/22/2018

## 2018-06-22 NOTE — PLAN OF CARE
Outpatient Neurological Rehabilitation Therapy  Updated POC     Date: 6/22/2018   Name: Haja Marti  Clinic Number: [id number]    Therapy Diagnosis:   Encounter Diagnoses   Name Primary?    Hypophonia     Dysarthria     Voice and resonance disorder      Physician: Edward Ivey MD    Physician Orders: ST evaluate and treat  Medical Diagnosis: Parkinson's Disease  Evaluation Date: 4/25/18    Total Visits Received: 12  Cancelled Visits: 0  No Show Visits: 0  Authorization Period: 4/25/18 to 12/31/18  Plan of Care Expiration:  6/22/18  New POC Certification Period:  7/6/18  G-CODE   2 /10    Precautions:Standard         Subjective     Update: Patient is making steady progress towards his goals. He has completed 3 weeks out of 4 weeks for the LSVT LOUD program.     Objective     Update: see follow up note dated 6/22/2018    Assessment     Update: Haja Marti presents to [facility] s/p medical diagnosis of  Parkinson's Disease. Demonstrates impairments including limitations as described in the problem list. Positive prognostic factors include motivation. Negative prognostic factors include progressive nature of disease. He presents with hyperkinetic dysarthria and hypophonia c/b reduced vocal intensity, hoarse vocal quality with dysphonia, reduced prosody, reduced breath-speech coordination, and vocal tremors. Barriers to progress include progressive nature of the disease. Patient will benefit from skilled, outpatient neurological rehabilitation speech therapy.    Functional Communication Measure (FCM):   Severity Modifier for Medicare G-Code:  Motor Speech -Updated 6/19/18 (previous CI/CH)  Current status: FCM:  LEVEL 6:  - CI at least 1% but less than 20% impaired, limited or restricted  Projected status:  FCM:   LEVEL 7:  - CH 0% impaired, limited or restricted    Voice  Current status: FCM:  LEVEL 4:  - CK at least 40% < 60% impaired, limited or  "restricted  Projected status:  FCM:  LEVEL 5:  -  CJ at least 20% < 40% impaired, limited or restricted    Rehab Potential: fair     Education: POC, motor speech strategies and course of medical disease affect on therapy diagnosis  was discussed with pt. Patient expressed understanding.    Previous Short Term Goals Status: 4 weeks  1. The patient will produce loud, good quality "AH" sounds in the 80 decibel range for an average of 20 to 25 seconds (as a voice/speech "foundation" skill) for sets of 15 with minimal to no cues at least 95% of the time to further develop breath-speech coordination and for loudness calibration. Goal not met consistently/continue    2. The patient will produce "AH" from a mid-level to at least 5 notes higher with 80 SPL decibel range and from a mid-level to at least 3 notes lower with 80 SPL decibel range in sets of 15 for each pitch direction with minimal to no cues 95% of the time to improve pitch variability. Goal not met consistently/continue    3. The patient will verbalize a set of 10 functional phrases/ sentences at an average loudness level at least in the 80 decibel range with minimal to no cues at least 95% of the time. Goal not met consistently/continue    4. The patient will read aloud at an average loudness level at least in the 80 decibel range with minimal to no cues at least 95% of the time. Goal not met/continue    5. The patient will verbalize at least in the 80 decibel range in his conversational speech at least 95% of the time with minimal to no cues. Goal not met/continue    New Short Term Goals: 2 weeks  1. The patient will produce loud, good quality "AH" sounds in the 80 decibel range for an average of 20 to 25 seconds (as a voice/speech "foundation" skill) for sets of 15 with minimal to no cues at least 95% of the time to further develop breath-speech coordination and for loudness calibration.     2. The patient will produce "AH" from a mid-level to at least 5 " notes higher with 80 SPL decibel range and from a mid-level to at least 3 notes lower with 80 SPL decibel range in sets of 15 for each pitch direction with minimal to no cues 95% of the time to improve pitch variability.    3. The patient will verbalize a set of 10 functional phrases/ sentences at an average loudness level at least in the 80 decibel range with minimal to no cues at least 95% of the time.    4. The patient will read aloud at an average loudness level at least in the 80 decibel range with minimal to no cues at least 95% of the time.    5. The patient will verbalize at least in the 80 decibel range in his conversational speech at least 95% of the time with minimal to no cues.    Long Term Goal Status:  2 weeks  1. Mr. Marti will develop functional and intelligible speech and utilize compensatory strategies through the use of adequate labial and lingual function, increased articulatory precision, loud voice, and speech prosody. Goal ongoing     2. Mr. Marti will develop functional motor programming, articulatory proficiency, vocal intensity, and utilize compensatory strategies to express wants and needs for intelligible speech and functional prosody in the functional living environment. Goal ongoing    Goals Previously Met:  1. Mr. Marti will be seen by ENT to assess vocal cord function. Goal Met 5/14/18  2. Mr. Marti will participate in further assessment of his/her vocal intensity to determine his sound pressure level (SPL, acoustic correlate of vocal loudness) measured with a sound level meter and more specific goals will be determined. Goal Met 6/4/18 /Discontinue     Reasons for Recertification of Therapy: update POC and to complete LSVT LOUD program     Plan     Updated Certification Period: 6/22/2018 to 7/6/18  Recommended Treatment Plan: Patient will participate in the [facility] program for speech therapy 4 times per week to address his  Motor Speech deficits,  to educate patient and their family, and to participate in a home exercise program.     Other recommendations: none at this time     Therapist's Name:  JERAMY Villarreal, CCC-SLP, IS  Speech-Language Pathologist    6/22/2018      I CERTIFY THE NEED FOR THESE SERVICES FURNISHED UNDER THIS PLAN OF TREATMENT AND WHILE UNDER MY CARE    Physician's comments:     Physician's Name:

## 2018-06-25 ENCOUNTER — TELEPHONE (OUTPATIENT)
Dept: PAIN MEDICINE | Facility: CLINIC | Age: 73
End: 2018-06-25

## 2018-06-25 ENCOUNTER — CLINICAL SUPPORT (OUTPATIENT)
Dept: REHABILITATION | Facility: HOSPITAL | Age: 73
End: 2018-06-25
Payer: MEDICARE

## 2018-06-25 DIAGNOSIS — R49.8 HYPOPHONIA: ICD-10-CM

## 2018-06-25 DIAGNOSIS — R47.1 DYSARTHRIA: ICD-10-CM

## 2018-06-25 DIAGNOSIS — R49.9 VOICE AND RESONANCE DISORDER: ICD-10-CM

## 2018-06-25 PROCEDURE — 92507 TX SP LANG VOICE COMM INDIV: CPT | Mod: PO

## 2018-06-25 NOTE — PROGRESS NOTES
"Outpatient Neurological Rehabilitation   Speech and Language Therapy Daily Note  Date:  6/25/2018     Start Time:  1600  Stop Time:  1645    Name: Haja Marti   MRN: [id number]   Therapy Diagnosis:   Encounter Diagnoses   Name Primary?    Hypophonia     Dysarthria     Voice and resonance disorder    Physician: Edward Ivey MD  Physician Orders: ST evaluate and treat   Medical Diagnosis: Parkinson's Disease    Visit #:  13  Visits Authorized: 20  Visits Cancelled: 0  Visits No Showed: 0  Date of Evaluation:  4/25/18  Insurance Authorization Period: 4/25/18 - 12/31/18  Plan of Care Expiration:  4/25/18 - 6/22/18 ( LSVT LOUD program initiated 6/4/18)  Extended POC:  7/6/18  G-CODE   3/10    Precautions: Standard  History:   Current Medical History: Haja Marti  presents to the [facility] with moderately impaired speech skills secondary to the medical diagnosis of Parkinson's Disease.  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    Response to previous treatment: n/a  Pain Scale:  0/10 on VAS currently.   Pain Location: n/a  Objective:   UNTIMED  Procedure Min.   Speech- Language- Voice Therapy    45   Total Minutes: 45  Total Timed Units: 0  Total Untimed Units: 1  Charges Billed/# of units: 1    Short Term Goals: (4 weeks) Current Progress:   1. The patient will produce loud, good quality "AH" sounds in the 80 decibel range for an average of 20 to 25 seconds (as a voice/speech "foundation" skill) for sets of 15 with minimal to no cues at least 95% of the time to further develop breath-speech coordination and for loudness calibration.  - Pt sustained the vowel for an average SPL of 81.2 dB (range 79.1-83.5 dB) and average of 9.1 seconds (range 8  -11.5 seconds) given moderate verbal cues for proper technique. Pt c/o fatigue.   2. The patient will produce "AH" from a mid-level to at least 5 " notes higher with 80 SPL decibel range and from a mid-level to at least 3 notes lower with 80 SPL decibel range in sets of 15 for each pitch direction with minimal to no cues 95% of the time to improve pitch variability.  - Pt produced high AH for an average SPL of 81.1  dB (range 78.6 to 84.0 dB) and average 275.4  Hz (range 261 - 321 Hz) given mod cues for proper technique.    - Pt produced low AH for an average SPL of 78.0 dB (range 76.2 - 80.5 dB) and average 162.2 Hz (range 156 -165  Hz) given mod cues for proper technique     Pt c/o fatigue.   3. The patient will verbalize a set of 10 functional phrases/ sentences at an average loudness level at least in the 80 decibel range with minimal to no cues at least 95% of the time. Pt read functional sentences x 3 with an average SPL of 77.9 dB.   4. The patient will read aloud at an average loudness level at least in the 80 decibel range with minimal to no cues at least 95% of the time. Pt read at paragraph level with an average SPL of 77.6 dB.      5. The patient will verbalize at least in the 80 decibel range in his conversational speech at least 95% of the time with minimal to no cues. Pt engaged in conversation with an average SPL of 72.4 dB.     Goals Met:  1. Mr. Marti will be seen by ENT to assess vocal cord function. Goal Met 5/14/18  2. Mr. Marti will participate in further assessment of his/her vocal intensity to determine his sound pressure level (SPL, acoustic correlate of vocal loudness) measured with a sound level meter and more specific goals will be determined. Goal Met 6/4/18 /Discontinue  Patient Education/Response:   Possible Effects of medications and Parkinson on his energy level.  Pt verbalized understanding.   Home Program: LOUD exercises, word, phrase, and sentence lists  Assessment:   Haja is progressing well towards his goals.  Increased vocal loudness in greetings and tasks today . However his vocal quality was more hoarse and  aphonic possibly due to his increased fatigue. Current goals remain appropriate. Goals to be updated as necessary.   Pt prognosis is Good to fair. Pt will continue to benefit from skilled continued outpatient speech and language therapy to address the deficits listed in the problem list on initial evaluation, provide pt/family education and to maximize pt's level of independence in the home and community environment.     Medical necessity is demonstrated by the following IMPAIRMENTS:  Pt with decreased vocal intensity resulting in severely decreased speech intelligibility. Reportedly, this worsens over the phone negatively impacting his ability to effectively and efficiently explain an emergency situation to emergency operators via phone or in person.  Barriers to Therapy: progressive degenerative disease  Environmental Concerns/Cultural/Spiritual/Developmental/Educational Needs:  Pt's spiritual, cultural and educational needs considered and pt agreeable to plan of care and goals.    Functional Communication Measure (FCM):   Severity Modifier for Medicare G-Code:  Motor Speech -Updated 6/19/18 (previous CI/CH)  Current status: FCM:  LEVEL 6:  - CI at least 1% but less than 20% impaired, limited or restricted  Projected status:  FCM:   LEVEL 7:  - CH 0% impaired, limited or restricted    Voice  Current status: FCM:  LEVEL 4:  - CK at least 40% < 60% impaired, limited or restricted  Projected status:  FCM:  LEVEL 5:  -  CJ at least 20% < 40% impaired, limited or restricted  Plan:   Continue POC with focus on behaviorally increasing vocal loudness.    MEG Villarreal., CCC-SLP, CBIS  Speech-Language Pathologist    6/25/2018

## 2018-06-25 NOTE — TELEPHONE ENCOUNTER
Left voice message asking for a return call to schedule for bilateral L2,3,4,5 MBB with Dr. Crockett.

## 2018-06-26 ENCOUNTER — CLINICAL SUPPORT (OUTPATIENT)
Dept: REHABILITATION | Facility: HOSPITAL | Age: 73
End: 2018-06-26
Payer: MEDICARE

## 2018-06-26 DIAGNOSIS — R49.9 VOICE AND RESONANCE DISORDER: ICD-10-CM

## 2018-06-26 DIAGNOSIS — R47.1 DYSARTHRIA: ICD-10-CM

## 2018-06-26 DIAGNOSIS — R49.8 HYPOPHONIA: ICD-10-CM

## 2018-06-26 PROCEDURE — 92507 TX SP LANG VOICE COMM INDIV: CPT | Mod: PO

## 2018-06-26 NOTE — PROGRESS NOTES
"Outpatient Neurological Rehabilitation   Speech and Language Therapy Daily Note  Date:  6/26/2018     Start Time:  1605  Stop Time:  1645    Name: Haja Marti   MRN: [id number]   Therapy Diagnosis:   Encounter Diagnoses   Name Primary?    Hypophonia     Dysarthria     Voice and resonance disorder    Physician: Edward Ivey MD  Physician Orders: ST evaluate and treat   Medical Diagnosis: Parkinson's Disease    Visit #:  14  Visits Authorized: 20  Visits Cancelled: 0  Visits No Showed: 0  Date of Evaluation:  4/25/18  Insurance Authorization Period: 4/25/18 - 12/31/18  Plan of Care Expiration:  4/25/18 - 6/22/18 ( LSVT LOUD program initiated 6/4/18)  Extended POC:  7/6/18  G-CODE   4/10    Precautions: Standard  History:   Current Medical History: Haja Marti  presents to the [facility] with moderately impaired speech skills secondary to the medical diagnosis of Parkinson's Disease.  Subjective:   Pt reports:that fatigue is better this afternoon. He belives this is due to the increase in carbidopa-levadopa as prescribed by pt's MD.   Response to previous treatment: "what is this helping?"  Pain Scale:  0/10 on VAS currently.   Pain Location: n/a  Objective:   UNTIMED  Procedure Min.   Speech- Language- Voice Therapy    40   Total Minutes: 40  Total Timed Units: 0  Total Untimed Units: 1  Charges Billed/# of units: 1    Short Term Goals: (4 weeks) Current Progress:   1. The patient will produce loud, good quality "AH" sounds in the 80 decibel range for an average of 20 to 25 seconds (as a voice/speech "foundation" skill) for sets of 15 with minimal to no cues at least 95% of the time to further develop breath-speech coordination and for loudness calibration.  - Pt sustained the vowel for an average SPL of 82.6  dB (range 79.8 to 84.4  dB) and average of 10.4 seconds (range 8-14.5  seconds) given moderate verbal cues for proper technique.    2. The " "patient will produce "AH" from a mid-level to at least 5 notes higher with 80 SPL decibel range and from a mid-level to at least 3 notes lower with 80 SPL decibel range in sets of 15 for each pitch direction with minimal to no cues 95% of the time to improve pitch variability.  - Pt produced high AH for an average SPL of 83.1  dB (range 81.1 to 85.7 dB) and average 291.3   Hz (range 274 -308  Hz) given mod cues for proper technique.    - Pt produced low AH for an average SPL of 79.7 dB (range 77.6 to 83.6  dB) and average 146.4 Hz (range 142-174   Hz) given mod cues for proper technique    3. The patient will verbalize a set of 10 functional phrases/ sentences at an average loudness level at least in the 80 decibel range with minimal to no cues at least 95% of the time. Pt read functional sentences x 3 with an average SPL of 78.6 dB.   4. The patient will read aloud at an average loudness level at least in the 80 decibel range with minimal to no cues at least 95% of the time. Pt read at paragraph level with an average SPL of 76.8 dB.      5. The patient will verbalize at least in the 80 decibel range in his conversational speech at least 95% of the time with minimal to no cues. Pt engaged in conversation with an average SPL of 71.8  dB.     Goals Met:  1. Mr. Marti will be seen by ENT to assess vocal cord function. Goal Met 5/14/18  2. Mr. Marti will participate in further assessment of his/her vocal intensity to determine his sound pressure level (SPL, acoustic correlate of vocal loudness) measured with a sound level meter and more specific goals will be determined. Goal Met 6/4/18 /Discontinue  Patient Education/Response:   Benefits of LSVT loud reviewed (i.e. Increased breath support for speech, increased vocal quality, possible increased swallow function)  Home Program: LOUD exercises, word, phrase, and sentence lists  Assessment:   Haja is progressing well towards his goals. Limited carryover without " cues to conversational speech. Current goals remain appropriate. Goals to be updated as necessary.   Pt prognosis is Good to fair. Pt will continue to benefit from skilled continued outpatient speech and language therapy to address the deficits listed in the problem list on initial evaluation, provide pt/family education and to maximize pt's level of independence in the home and community environment.     Medical necessity is demonstrated by the following IMPAIRMENTS:  Pt with decreased vocal intensity resulting in severely decreased speech intelligibility. Reportedly, this worsens over the phone negatively impacting his ability to effectively and efficiently explain an emergency situation to emergency operators via phone or in person.  Barriers to Therapy: progressive degenerative disease  Environmental Concerns/Cultural/Spiritual/Developmental/Educational Needs:  Pt's spiritual, cultural and educational needs considered and pt agreeable to plan of care and goals.    Functional Communication Measure (FCM):   Severity Modifier for Medicare G-Code:  Motor Speech -Updated 6/19/18 (previous CI/CH)  Current status: FCM:  LEVEL 6:  - CI at least 1% but less than 20% impaired, limited or restricted  Projected status:  FCM:   LEVEL 7:  - CH 0% impaired, limited or restricted    Voice  Current status: FCM:  LEVEL 4:  - CK at least 40% < 60% impaired, limited or restricted  Projected status:  FCM:  LEVEL 5:  -  CJ at least 20% < 40% impaired, limited or restricted  Plan:   Continue POC with focus on behaviorally increasing vocal loudness.    JAYMIE Bailey, CCC-SLP  Speech Language Pathologist   6/26/2018

## 2018-06-27 ENCOUNTER — OFFICE VISIT (OUTPATIENT)
Dept: PSYCHIATRY | Facility: CLINIC | Age: 73
End: 2018-06-27
Payer: MEDICARE

## 2018-06-27 ENCOUNTER — CLINICAL SUPPORT (OUTPATIENT)
Dept: REHABILITATION | Facility: HOSPITAL | Age: 73
End: 2018-06-27
Payer: MEDICARE

## 2018-06-27 DIAGNOSIS — F41.1 GENERALIZED ANXIETY DISORDER: Primary | ICD-10-CM

## 2018-06-27 DIAGNOSIS — R47.1 DYSARTHRIA: ICD-10-CM

## 2018-06-27 DIAGNOSIS — R49.8 HYPOPHONIA: ICD-10-CM

## 2018-06-27 DIAGNOSIS — R49.9 VOICE AND RESONANCE DISORDER: ICD-10-CM

## 2018-06-27 PROCEDURE — 90785 PSYTX COMPLEX INTERACTIVE: CPT | Mod: S$GLB,,, | Performed by: SOCIAL WORKER

## 2018-06-27 PROCEDURE — 99499 UNLISTED E&M SERVICE: CPT | Mod: S$PBB,,, | Performed by: SOCIAL WORKER

## 2018-06-27 PROCEDURE — 90834 PSYTX W PT 45 MINUTES: CPT | Mod: S$GLB,,, | Performed by: SOCIAL WORKER

## 2018-06-27 PROCEDURE — 92507 TX SP LANG VOICE COMM INDIV: CPT | Mod: PO

## 2018-06-27 NOTE — PROGRESS NOTES
"Outpatient Neurological Rehabilitation   Speech and Language Therapy Daily Note  Date:  6/27/2018     Start Time:  1605  Stop Time:  1650    Name: Haja Marti   MRN: [id number]   Therapy Diagnosis:   Encounter Diagnoses   Name Primary?    Hypophonia     Dysarthria     Voice and resonance disorder    Physician: Edward Ivey MD  Physician Orders: ST evaluate and treat   Medical Diagnosis: Parkinson's Disease    Visit #:  14  Visits Authorized: 20  Visits Cancelled: 0  Visits No Showed: 0  Date of Evaluation:  4/25/18  Insurance Authorization Period: 4/25/18 - 12/31/18  Plan of Care Expiration:  4/25/18 - 6/22/18 ( LSVT LOUD program initiated 6/4/18)  Extended POC:  7/6/18  G-CODE   4/10    Precautions: Standard  History:   Current Medical History: Haja Marti  presents to the [facility] with moderately impaired speech skills secondary to the medical diagnosis of Parkinson's Disease.  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    Response to previous treatment: n/a  Pain Scale:  0/10 on VAS currently.   Pain Location: n/a  Objective:   UNTIMED  Procedure Min.   Speech- Language- Voice Therapy    45   Total Minutes: 45  Total Timed Units: 0  Total Untimed Units: 1  Charges Billed/# of units: 1    Short Term Goals: (4 weeks) Current Progress:   1. The patient will produce loud, good quality "AH" sounds in the 80 decibel range for an average of 20 to 25 seconds (as a voice/speech "foundation" skill) for sets of 15 with minimal to no cues at least 95% of the time to further develop breath-speech coordination and for loudness calibration.  - Pt sustained the vowel for an average SPL of 84.1 dB (range 81.9-86.7 dB) and average of 10.5 seconds (range 6.3-12.5 seconds) given min verbal cues for proper technique.    2. The patient will produce "AH" from a mid-level to at least 5 notes higher with 80 " SPL decibel range and from a mid-level to at least 3 notes lower with 80 SPL decibel range in sets of 15 for each pitch direction with minimal to no cues 95% of the time to improve pitch variability.  - Pt produced high AH for an average SPL of 79.8 dB (range 76.2-86.0dB) and average 296.6  Hz (range 280-314 Hz) given min cues for proper technique.    - Pt produced low AH for an average SPL of 79.8 dB (range 79.4-81.9 dB) and average 167.4 Hz (range 162-183 Hz) given min cues for proper technique      3. The patient will verbalize a set of 10 functional phrases/ sentences at an average loudness level at least in the 80 decibel range with minimal to no cues at least 95% of the time. Pt read functional sentences x 3 with an average SPL of 82.2 dB.   4. The patient will read aloud at an average loudness level at least in the 80 decibel range with minimal to no cues at least 95% of the time. Pt read several paragraphs with an average SPL of 78.4 dB. Mod cues needed to increase volume.      Pt read a paragraph while tapping his leg with an average SPL of 77.7 given mod cues to increase volume.    5. The patient will verbalize at least in the 80 decibel range in his conversational speech at least 95% of the time with minimal to no cues. Pt engaged in conversation with an average SPL of 73.5 dB. Mod cues needed to increased volume.     Goals Met:  1. Mr. Marti will be seen by ENT to assess vocal cord function. Goal Met 5/14/18  2. Mr. Marti will participate in further assessment of his/her vocal intensity to determine his sound pressure level (SPL, acoustic correlate of vocal loudness) measured with a sound level meter and more specific goals will be determined. Goal Met 6/4/18 /Discontinue  Patient Education/Response:   Possible Effects of medications and Parkinson on his energy level.  Pt verbalized understanding.   Home Program: LOUD exercises, word, phrase, and sentence lists  Assessment:   Haja is progressing  well towards his goals.  Current goals remain appropriate. Goals to be updated as necessary.   Pt prognosis is Good to fair. Pt will continue to benefit from skilled continued outpatient speech and language therapy to address the deficits listed in the problem list on initial evaluation, provide pt/family education and to maximize pt's level of independence in the home and community environment.     Medical necessity is demonstrated by the following IMPAIRMENTS:  Pt with decreased vocal intensity resulting in severely decreased speech intelligibility. Reportedly, this worsens over the phone negatively impacting his ability to effectively and efficiently explain an emergency situation to emergency operators via phone or in person.  Barriers to Therapy: progressive degenerative disease  Environmental Concerns/Cultural/Spiritual/Developmental/Educational Needs:  Pt's spiritual, cultural and educational needs considered and pt agreeable to plan of care and goals.    Functional Communication Measure (FCM):   Severity Modifier for Medicare G-Code:  Motor Speech -Updated 6/19/18 (previous CI/CH)  Current status: FCM:  LEVEL 6:  - CI at least 1% but less than 20% impaired, limited or restricted  Projected status:  FCM:   LEVEL 7:  - CH 0% impaired, limited or restricted    Voice  Current status: FCM:  LEVEL 4:  - CK at least 40% < 60% impaired, limited or restricted  Projected status:  FCM:  LEVEL 5:  -  CJ at least 20% < 40% impaired, limited or restricted  Plan:   Continue POC with focus on behaviorally increasing vocal loudness.    MEG Villarreal., CCC-SLP, CBIS  Speech-Language Pathologist    6/27/2018

## 2018-06-28 ENCOUNTER — CLINICAL SUPPORT (OUTPATIENT)
Dept: REHABILITATION | Facility: HOSPITAL | Age: 73
End: 2018-06-28
Payer: MEDICARE

## 2018-06-28 DIAGNOSIS — R47.1 DYSARTHRIA: ICD-10-CM

## 2018-06-28 DIAGNOSIS — R49.9 VOICE AND RESONANCE DISORDER: ICD-10-CM

## 2018-06-28 DIAGNOSIS — R49.8 HYPOPHONIA: ICD-10-CM

## 2018-06-28 PROCEDURE — G9158 MOTOR SPEECH D/C STATUS: HCPCS | Mod: CH,PO

## 2018-06-28 PROCEDURE — 92507 TX SP LANG VOICE COMM INDIV: CPT | Mod: PO

## 2018-06-28 PROCEDURE — G9186 MOTOR SPEECH GOAL STATUS: HCPCS | Mod: CH,PO

## 2018-06-28 NOTE — PROGRESS NOTES
"Outpatient Neurological Rehabilitation   Speech and Language Therapy Daily Note  Date:  6/28/2018     Start Time:  1600  Stop Time:  1645    Name: Haja Marti   MRN: [id number]   Therapy Diagnosis:   Encounter Diagnoses   Name Primary?    Hypophonia     Dysarthria     Voice and resonance disorder    Physician: Edward Ivey MD  Physician Orders: ST evaluate and treat   Medical Diagnosis: Parkinson's Disease    Visit #:  15  Visits Authorized: 20  Visits Cancelled: 0  Visits No Showed: 0  Date of Evaluation:  4/25/18  Insurance Authorization Period: 4/25/18 - 12/31/18  Plan of Care Expiration:  4/25/18 - 6/22/18 ( LSVT LOUD program initiated 6/4/18)  Extended POC:  7/6/18  G-CODE   5/10    Precautions: Standard  History:   Current Medical History: Haja Marti  presents to the [facility] with moderately impaired speech skills secondary to the medical diagnosis of Parkinson's Disease.  Subjective:   Pt reports: being glad he is on his last session and has seen an improvement in his quality.  Response to previous treatment: n/a  Pain Scale:  0/10 on VAS currently.   Pain Location: n/a  Objective:   UNTIMED  Procedure Min.   Speech- Language- Voice Therapy    45   Total Minutes: 45  Total Timed Units: 0  Total Untimed Units: 1  Charges Billed/# of units: 1    Short Term Goals: (4 weeks) Current Progress:   1. The patient will produce loud, good quality "AH" sounds in the 80 decibel range for an average of 20 to 25 seconds (as a voice/speech "foundation" skill) for sets of 15 with minimal to no cues at least 95% of the time to further develop breath-speech coordination and for loudness calibration.  Not addressed in session.   2. The patient will produce "AH" from a mid-level to at least 5 notes higher with 80 SPL decibel range and from a mid-level to at least 3 notes lower with 80 SPL decibel range in sets of 15 for each pitch direction with minimal to no " "cues 95% of the time to improve pitch variability.  Not addressed in session.     3. The patient will verbalize a set of 10 functional phrases/ sentences at an average loudness level at least in the 80 decibel range with minimal to no cues at least 95% of the time. Not addressed in session.   4. The patient will read aloud at an average loudness level at least in the 80 decibel range with minimal to no cues at least 95% of the time. Not addressed in session.   5. The patient will verbalize at least in the 80 decibel range in his conversational speech at least 95% of the time with minimal to no cues. Not addressed in session.     - Task 1: sustained "ah" for average SPL of 85.7 dB (range 84.5 - 86.9 dB) and average 10.1  seconds (range 6.5 - 14  secs)  - Task 2a: sustained "high ah" for average SPL of 86.6  dB (range 83.9  - 89.7  dB) and highest pitch 318  Hz (pitch range 316  - 320  Hz)  - Task 2b: sustained "low ah" for average of SPL of 85.1  dB (range of 83.4  - 86.8  dB) and lowest pitch 316.5  (pitch range 314  - 319  Hz)  - Task 3: while reading a passage: Average SPL of 77.9  dB  - Task 4: during monologue: Average SPL of 73.9 dB  - Task 5: generated words with specific letters:  Average SPL of 75 dB (range of 73.9  - 76.6  dB)  - Task 6: describe a motor task while completing a dual motor activity: Average SPL of 75  dB  - Task 7a: stimulability testing: sustained vowel phonation ("ah") with average SPL 83  dB (range 80.7 -84.3  dB) and duration average of 10.7 seconds (range 19.5 -11.5 seconds)  - Task 7b: "high ah": average frequency of 309.7  Hz (range 302 -323  Hz) and SPL average 83.7  dB (range 82.7 -84.3  dB)  - Task 7c:  "low ah": average frequency of 161.3  Hz (range 147 -183  Hz) and average SPL 79.2  dB (range 76.4 -83.2 dB)   - Task 7d: functional phrases: SPL range of 86.6  dB (range 82.7 -92 dB)     - Description: Quality, volume, and duration have all improved.  Goals Met:  1. Mr. Marti " will be seen by ENT to assess vocal cord function. Goal Met 5/14/18  2. Mr. Marti will participate in further assessment of his/her vocal intensity to determine his sound pressure level (SPL, acoustic correlate of vocal loudness) measured with a sound level meter and more specific goals will be determined. Goal Met 6/4/18 /Discontinue  Patient Education/Response:   Reviewed homework for life and reviewed progress. Pt verbalized understanding.   Home Program: LOUD exercises, word, phrase, and sentence lists  Assessment:   Haja progressed well towards his goals. He has completed the LSVT-LOUD program, and has been given homework exercises to continue in perpituity. Pt is to discharged today. Pt has addressed the deficits listed in the problem list on initial evaluation, provided pt/family education and to maximize pt's level of independence in the home and community environment.     Functional Communication Measure (FCM):   Severity Modifier for Medicare G-Code:  Motor Speech -Updated 6/19/18 (previous CI/CH)  Current status: FCM:  LEVEL 7:  - CH 0% impaired, limited or restricted   Projected status:  FCM:   LEVEL 7:  - CH 0% impaired, limited or restricted   Discharge status:  FCM:   LEVEL 7:  - CH 0% impaired, limited or restricted    Voice  Current status: FCM:  LEVEL 5:  - CJ at least 20% < 40% impaired, limited or restricted  Projected status:  FCM:  LEVEL 5:  -  CJ at least 20% < 40% impaired, limited or restricted  Discharge status: FCM:  LEVEL 5:  - CJ at least 20% < 40% impaired, limited or restricted  Plan:   Discharged today with homework exercisees to be completed 2x a day at home every day going forth.    Deborah Leigh  Master of Science Candidate  Communication Science and Disorders  [facility]  6/28/2018

## 2018-06-29 NOTE — PLAN OF CARE
"  Outpatient Therapy Discharge Summary     Name: Haja Marti  Clinic Number: [id number]    Therapy Diagnosis:   Encounter Diagnoses   Name Primary?    Hypophonia     Dysarthria     Voice and resonance disorder      Physician: Edward Ivey MD  Physician Orders: ST eval and treat  Medical Diagnosis: Parkinson's Disease  Evaluation Date: 4/25/18    Date of Last visit: 6/28/2018   Total Visits Received: 16  Cancelled Visits: 0  No Show Visits: 0    Assessment    Short Term Goals:   1. The patient will produce loud, good quality "AH" sounds in the 80 decibel range for an average of 20 to 25 seconds (as a voice/speech "foundation" skill) for sets of 15 with minimal to no cues at least 95% of the time to further develop breath-speech coordination and for loudness calibration. Goal Partially Met / Discontinue   2. The patient will produce "AH" from a mid-level to at least 5 notes higher with 80 SPL decibel range and from a mid-level to at least 3 notes lower with 80 SPL decibel range in sets of 15 for each pitch direction with minimal to no cues 95% of the time to improve pitch variability. Goal Partially Met / Discontinue   3. The patient will verbalize a set of 10 functional phrases/ sentences at an average loudness level at least in the 80 decibel range with minimal to no cues at least 95% of the time. Goal Not Met / Discontinue   4. The patient will read aloud at an average loudness level at least in the 80 decibel range with minimal to no cues at least 95% of the time. Goal Not Met / Discontinue   5. The patient will verbalize at least in the 80 decibel range in his conversational speech at least 95% of the time with minimal to no cues. Goal Not Met / Discontinue     In re-assessment today: pt increased SPL 10dB while sustaining a vowel. Pt sustained high "ah" and low "ah" at an average SPL increase of 5 dB.  SPL while reading a passage increased 1dB. This indicates continued stimulability for increased " loudness with carryover of loudness while patient is conscious of it.     Long Term Goals:  1. Mr. Marti will develop functional and intelligible speech and utilize compensatory strategies through the use of adequate labial and lingual function, increased articulatory precision, loud voice, and speech prosody. Goal Met / Discontinue   2. Mr. Marti will develop functional motor programming, articulatory proficiency, vocal intensity, and utilize compensatory strategies to express wants and needs for intelligible speech and functional prosody in the functional living environment. Goal Met / Discontinue     Discharge reason: Patient has reached the maximum rehab potential for the present time    Plan   This patient is discharged from Speech Therapy     Hope JAYMIE Solis, CCC-SLP  Speech Language Pathologist   6/28/2018

## 2018-06-30 NOTE — PROGRESS NOTES
STAFF NOTE:  Patient's case was formally presented to me by Dr. Alexys Nash and patient was seen by me in supervision.  I agree with his assessment and plan as specified to continue sertraline, trazodone and Klonopin for management of anxiety and depressive Sx.

## 2018-06-30 NOTE — PROGRESS NOTES
STAFF NOTE:  Patient's case was formally presented to me by Dr. Alexys Nash and patient was seen by me in supervision.  I agree with his assessment and plan as specified to continue sertraline, trazodone and clonazepam for management of anxiety and depressive Sx.

## 2018-07-03 ENCOUNTER — SURGERY (OUTPATIENT)
Age: 73
End: 2018-07-03

## 2018-07-03 ENCOUNTER — HOSPITAL ENCOUNTER (OUTPATIENT)
Facility: OTHER | Age: 73
Discharge: HOME | End: 2018-07-03
Attending: ANESTHESIOLOGY | Admitting: ANESTHESIOLOGY
Payer: MEDICARE

## 2018-07-03 VITALS
HEART RATE: 63 BPM | BODY MASS INDEX: 27.92 KG/M2 | TEMPERATURE: 98 F | OXYGEN SATURATION: 97 % | DIASTOLIC BLOOD PRESSURE: 84 MMHG | HEIGHT: 70 IN | WEIGHT: 195 LBS | RESPIRATION RATE: 18 BRPM | SYSTOLIC BLOOD PRESSURE: 152 MMHG

## 2018-07-03 DIAGNOSIS — M47.816 FACET ARTHROPATHY, LUMBAR: Primary | ICD-10-CM

## 2018-07-03 PROCEDURE — S0020 INJECTION, BUPIVICAINE HYDRO: HCPCS | Performed by: ANESTHESIOLOGY

## 2018-07-03 PROCEDURE — 64493 INJ PARAVERT F JNT L/S 1 LEV: CPT | Mod: 50,,, | Performed by: ANESTHESIOLOGY

## 2018-07-03 PROCEDURE — 64493 INJ PARAVERT F JNT L/S 1 LEV: CPT | Mod: 50 | Performed by: ANESTHESIOLOGY

## 2018-07-03 PROCEDURE — 64495 INJ PARAVERT F JNT L/S 3 LEV: CPT | Mod: 50 | Performed by: ANESTHESIOLOGY

## 2018-07-03 PROCEDURE — 64494 INJ PARAVERT F JNT L/S 2 LEV: CPT | Mod: 50,,, | Performed by: ANESTHESIOLOGY

## 2018-07-03 PROCEDURE — 64494 INJ PARAVERT F JNT L/S 2 LEV: CPT | Mod: 50 | Performed by: ANESTHESIOLOGY

## 2018-07-03 PROCEDURE — 64495 INJ PARAVERT F JNT L/S 3 LEV: CPT | Mod: 50,,, | Performed by: ANESTHESIOLOGY

## 2018-07-03 PROCEDURE — 25000003 PHARM REV CODE 250: Performed by: ANESTHESIOLOGY

## 2018-07-03 RX ORDER — LIDOCAINE HYDROCHLORIDE 10 MG/ML
INJECTION INFILTRATION; PERINEURAL
Status: DISCONTINUED | OUTPATIENT
Start: 2018-07-03 | End: 2018-07-03 | Stop reason: HOSPADM

## 2018-07-03 RX ORDER — BUPIVACAINE HYDROCHLORIDE 5 MG/ML
INJECTION, SOLUTION EPIDURAL; INTRACAUDAL
Status: DISCONTINUED | OUTPATIENT
Start: 2018-07-03 | End: 2018-07-03 | Stop reason: HOSPADM

## 2018-07-03 RX ADMIN — LIDOCAINE HYDROCHLORIDE 10 ML: 10 INJECTION, SOLUTION INFILTRATION; PERINEURAL at 10:07

## 2018-07-03 RX ADMIN — BUPIVACAINE HYDROCHLORIDE 10 ML: 5 INJECTION, SOLUTION EPIDURAL; INTRACAUDAL; PERINEURAL at 10:07

## 2018-07-03 NOTE — OP NOTE
lUMBAR Medial Branch Block Under Fluoroscopy  Time-out taken to identify patient and procedure side prior to starting the procedure.            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    COMPLICATIONS:  None.    TECHNIQUE: Laying in a prone position, the patient was prepped and draped in the usual sterile fashion using ChloraPrep and fenestrated drape.  The level was determined under fluoroscopic guidance.  Local anesthetic was given by going down to the hub of the 27-gauge 1.25in needle and raising a wheel.  A 22-gauge 3.5inch needle was introduced to the anatomic local of the medial branch at each of the above levels using fluoroscopy in the AP, oblique, and lateral views.  After negative aspiration, medication was injected slowly. The patient tolerated the procedure well.       The patient was monitored after the procedure.  Patient was given post procedure and discharge instructions to follow at home.  We will see the patient back in two weeks or the patient may call to inform of status. The patient was discharged in a stable condition

## 2018-07-03 NOTE — H&P
"HPI  Mr. Marti is a 72M s/p bilateral L4 TF AFTAB on 9/6/2017 and 10/11/2017.with 50% relief of his low back pain who now presents for bilateral MBB.    He is not taking any anticoagulation and denies changes in health status since last seen by Dr. Crockett in 11/2017    PMHx, PSHx, Allergies, Medications reviewed in epic    ROS negative except pain complaints in HPI    OBJECTIVE:    BP (!) 141/85 (BP Location: Right arm, Patient Position: Lying)   Pulse 67   Temp 97.7 °F (36.5 °C) (Oral)   Resp 18   Ht 5' 10" (1.778 m)   Wt 88.5 kg (195 lb)   SpO2 100%   BMI 27.98 kg/m²     PHYSICAL EXAMINATION:    GENERAL: Well appearing, in no acute distress, alert and oriented x3.  PSYCH:  Mood and affect appropriate.  SKIN: Skin color, texture, turgor normal, no rashes or lesions.  CV: RRR with palpation of the radial artery.  PULM: No evidence of respiratory difficulty, symmetric chest rise. Clear to auscultation.  NEURO: Cranial nerves grossly intact.    Plan:    Proceed with procedure as planned    Elsi Cruz  07/03/2018    "

## 2018-07-03 NOTE — DISCHARGE SUMMARY
Discharge Note  Short Stay      SUMMARY     Admit Date: 7/3/2018    Attending Physician: Laina Crockett      Discharge Physician: Laina Crockett      Discharge Date: 7/3/2018 10:25 AM    Procedure(s) (LRB):  BLOCK, NERVE (Bilateral)    Final Diagnosis: Lumbar spondylolysis [M43.06]    Disposition: Home or self care    Patient Instructions:   Current Discharge Medication List      CONTINUE these medications which have NOT CHANGED    Details   aspirin 81 MG Chew Take 81 mg by mouth. 1 Tablet, Chewable Oral Every day      atorvastatin (LIPITOR) 40 MG tablet Take 1 tablet (40 mg total) by mouth once daily.  Qty: 90 tablet, Refills: 3      carbidopa-levodopa  mg (SINEMET)  mg per tablet Take 1 tablet by mouth 3 (three) times daily.      citric acid-potassium citrate (POLYCITRA) 1,100-334 mg/5 mL solution Take 10 mLs by mouth.      clonazePAM (KLONOPIN) 0.5 MG tablet Take oral 1/2 to 1 tablet TID prn anxiety  Qty: 60 tablet, Refills: 1    Associated Diagnoses: Anxiety disorder, unspecified type      ergocalciferol (VITAMIN D2) 50,000 unit Cap Take 50,000 Units by mouth every 30 days.       finasteride (PROSCAR) 5 mg tablet Take 1 tablet (5 mg total) by mouth once daily.  Qty: 90 tablet, Refills: 3      losartan (COZAAR) 100 MG tablet Take 1 tablet (100 mg total) by mouth once daily.  Qty: 90 tablet, Refills: 3      magnesium oxide (MAG-OX) 400 mg tablet Take 400 mg by mouth once daily.      potassium citrate (UROCIT-K) 10 mEq (1,080 mg) TbSR TAKE 1 TABLET THREE TIMES DAILY WITH MEALS  Qty: 270 tablet, Refills: 3      pyridoxine (VITAMIN B-6) 50 MG Tab Take 50 mg by mouth once daily.      sertraline (ZOLOFT) 100 MG tablet Take 2 tablets (200 mg total) by mouth once daily.  Qty: 180 tablet, Refills: 1      traZODone (DESYREL) 50 MG tablet Take 1 tablet (50 mg total) by mouth nightly as needed for Insomnia.  Qty: 30 tablet, Refills: 2                 Discharge Diagnosis: Lumbar spondylolysis  [M43.06]  Condition on Discharge: Stable with no complications to procedure   Diet on Discharge: Same as before.  Activity: as per instruction sheet.  Discharge to: Home with a responsible adult.  Follow up: 2-4 weeks

## 2018-07-03 NOTE — DISCHARGE INSTRUCTIONS
Home Care Instructions Pain Management:    1. DIET:   You may resume your normal diet today.   2. BATHING:   You may shower with luke warm water.  3. DRESSING:   You may remove your bandage today.   4. ACTIVITY LEVEL:   You may resume your normal activities 24 hrs after your procedure.  5. MEDICATIONS:   You may resume your normal medications today.   6. SPECIAL INSTRUCTIONS:   No heat to the injection site for 24 hrs including, bath or shower, heating pad, moist heat, or hot tubs.    Use ice pack to injection site for any pain or discomfort.  Apply ice packs for 20 minute intervals as needed.   If you have received any sedatives by mouth today you may not drive for 12 hours.    If you have received any sedation through your IV, you may not drive for 24 hrs.     PLEASE CALL YOUR DOCTOR IF:  1. Redness or swelling around the injection site.  2. Fever of 101 degrees  3. Drainage (pus) from the injection site.  4. For any continuous bleeding (some dried blood over the incision is normal.)    FOR EMERGENCIES:   If any unusual problems or difficulties occur during clinic hours, call [phone] or 602.     Thank you for allowing us to care for you today. You may receive a survey about the care we provided. Your feedback is valuable and helps us provide excellent care throughout the community.

## 2018-07-06 ENCOUNTER — PATIENT MESSAGE (OUTPATIENT)
Dept: NEUROLOGY | Facility: CLINIC | Age: 73
End: 2018-07-06

## 2018-07-11 ENCOUNTER — TELEPHONE (OUTPATIENT)
Dept: PAIN MEDICINE | Facility: CLINIC | Age: 73
End: 2018-07-11

## 2018-07-11 NOTE — TELEPHONE ENCOUNTER
----- Message from Jennifer Stovall sent at 7/11/2018 10:15 AM CDT -----  Contact: pt            Name of Who is Calling: Haja      What is the request in detail:requesting a call back. Pt wants to know is he suppose to schedule a post op. Please call and advise      Can the clinic reply by [facility]: yes      [facility] [phone]

## 2018-07-11 NOTE — TELEPHONE ENCOUNTER
Contacted patient regarding message.    Appointment scheduled for 08/01/18 at 10;45am with Dr. Crockett.    Patient acknowledged information given and expressed understanding.    Appointment letter mailed.

## 2018-07-15 ENCOUNTER — PATIENT MESSAGE (OUTPATIENT)
Dept: NEUROLOGY | Facility: CLINIC | Age: 73
End: 2018-07-15

## 2018-07-16 ENCOUNTER — TELEPHONE (OUTPATIENT)
Dept: NEUROLOGY | Facility: CLINIC | Age: 73
End: 2018-07-16

## 2018-07-16 NOTE — TELEPHONE ENCOUNTER
Called new prescription in to pharmacy Thanks for your message.  Try going up on the carbidopa/levodopa to 1.5 tabs three times a day and keep me posted.

## 2018-07-16 NOTE — PROGRESS NOTES
STAFF NOTE:  Patient's case was formally presented to me by Dr. Alexys Nash and patient and his wife were seen by me in supervision.  I agree with his assessment and plan as specified to continue sertraline, trazodone and clonazepam for management of anxiety and mood Sx.

## 2018-07-16 NOTE — PROGRESS NOTES
STAFF NOTE:  Patient's case was formally presented to me by Dr. Alexys Nash in supervision.  I agree with his assessment and plan as specified to increase sertraline, discontinue trazodone and continue PRN clonazepam for management of anxiety and depressive Sx.

## 2018-07-16 NOTE — TELEPHONE ENCOUNTER
----- Message from Vinniewallace Dillon sent at 7/16/2018  9:34 AM CDT -----  Needs Advice    Reason for call: Pt states pharmacy below will not dispense carbidopa-levodopa until August 22nd, due to prescription change, and the pt is asking the doctor call the pharmacy to confirm how much the pt needs to take. Pt states he needs this completed today bc he's leaving out of town tomorrow.   Communication Preference: [phone]  Additional Information:    [id number] - [place] - [address]  [address]  [place] [id number]  Phone: [phone] Fax: [phone]

## 2018-07-17 RX ORDER — CARBIDOPA AND LEVODOPA 25; 100 MG/1; MG/1
1.5 TABLET ORAL 3 TIMES DAILY
Qty: 135 TABLET | Refills: 6 | Status: SHIPPED | OUTPATIENT
Start: 2018-07-17 | End: 2019-04-08 | Stop reason: SDUPTHER

## 2018-07-18 ENCOUNTER — TELEPHONE (OUTPATIENT)
Dept: NEUROLOGY | Facility: CLINIC | Age: 73
End: 2018-07-18

## 2018-08-01 ENCOUNTER — OFFICE VISIT (OUTPATIENT)
Dept: SPINE | Facility: CLINIC | Age: 73
End: 2018-08-01
Attending: ANESTHESIOLOGY
Payer: MEDICARE

## 2018-08-01 VITALS
HEART RATE: 74 BPM | BODY MASS INDEX: 27.92 KG/M2 | WEIGHT: 195 LBS | DIASTOLIC BLOOD PRESSURE: 72 MMHG | SYSTOLIC BLOOD PRESSURE: 132 MMHG | TEMPERATURE: 98 F | HEIGHT: 70 IN

## 2018-08-01 DIAGNOSIS — M47.816 FACET ARTHROPATHY, LUMBAR: ICD-10-CM

## 2018-08-01 DIAGNOSIS — M54.16 LUMBAR RADICULOPATHY: Primary | ICD-10-CM

## 2018-08-01 PROCEDURE — 99499 UNLISTED E&M SERVICE: CPT | Mod: HCNC,S$GLB,, | Performed by: ANESTHESIOLOGY

## 2018-08-01 PROCEDURE — 3078F DIAST BP <80 MM HG: CPT | Mod: CPTII,S$GLB,, | Performed by: ANESTHESIOLOGY

## 2018-08-01 PROCEDURE — 99213 OFFICE O/P EST LOW 20 MIN: CPT | Mod: S$GLB,,, | Performed by: ANESTHESIOLOGY

## 2018-08-01 PROCEDURE — 99999 PR PBB SHADOW E&M-EST. PATIENT-LVL IV: CPT | Mod: PBBFAC,,, | Performed by: ANESTHESIOLOGY

## 2018-08-01 PROCEDURE — 3075F SYST BP GE 130 - 139MM HG: CPT | Mod: CPTII,S$GLB,, | Performed by: ANESTHESIOLOGY

## 2018-08-01 NOTE — PROGRESS NOTES
Chronic Pain - Established Visit    Referring Physician: No ref. provider found    Chief Complaint:   Chief Complaint   Patient presents with    Follow-up     3 week after Injection        SUBJECTIVE: Disclaimer: This note has been generated using voice-recognition software. There may be typographical errors that have been missed during proof-reading  Interval history 08/01/2018:  Since previous encounter the patient had bilateral medial branch nerve blocks at the left L2, L3, L4, L5 without any improvement in his pain symptoms.  He has previously had much better success with bilateral L4 transforaminal epidural steroid injections and continues to have lower extremity radicular pain symptoms.  Interval History 11/20/2017  The patient returns to the clinic for a follow up visit, he is reporting back pain of 2/10 today.  His pain had significantly exacerbated on Friday when the patient may be appointment but states that over the weekend with conservative care and occasional ibuprofen his pain has gradually improved.  He denies any radicular symptoms but is continue to have significant lower back pain over the area of the facet joints of the lumbar spine.  Previous x-rays have revealed that the patient has significant facet arthropathy most severe at L4-5 in the area where the patient is having his most severe pain.  On previous encounter was discussed that in the future if his pain persists we can proceed with medial branch nerve blocks to be followed by radiofrequency ablation if diagnostic, the patient has been tolerating physical therapy and doing home stretches and states that overall his pain is better currently compared to what it was on Friday.      Interval History 10/26/2017:  The patient returns to clinic today for follow up. He is s/p bilateral L4 TF AFTAB on 9/6/2017 and 10/11/2017. He reports 50% relief of his low back pain. He does report intermittent low back pain that is aching in nature. He does  "endorse stiffness especially first this in the morning. The pain is worse with prolonged sitting and transitioning for sitting to standing. He denies any radiating leg pain. He does take Ibuprofen with benefit. He denies any other health changes. He denies any bowel or bladder incontinence. His pain today is 4/10.    Initial encounter:    Haja Marti presents to the clinic for the evaluation of back pain. The pain started 3 years ago  and symptoms have been worsening.  Pt reports doing back exercises from orthopedic office and didn't help.  Pt reports [facility]" physical therapy program did not help.  Walks 2 mi daily at [facility]. Also used to run marathons. 15 total. Started swimming 2 years ago, stopped swimming 3 mo ago due to increased weakness from Parkinson's disease. Staying active helps the pain. Denies radiation. Cannot stand upright for longer than 10-15min before back pain gets more intense. Walking does not exacerbate the pain. Transitioning from sitting to standing causes pain. Pain is described as an ache.  Tightness in upper back. Not associated with trauma/fall or other inciting event.     Brief history:    Pain Description:    The pain is located in the back area     At BEST  6/10     At WORST  9/10 on the WORST day.      On average pain is rated as 7/10.     Today the pain is rated as 6/10    The pain is described as aching and tight band      Symptoms interfere with daily activity (pt wishes to be able to swim again, and do light weight-lifting exercises),  pain does not awaken pt during the night and does not hinder the patient the patient from falling asleep.     Exacerbating factors: Getting out of bed/chair.      Mitigating factors nothing.     Patient denies .  Patient denies any suicidal or homicidal ideations    Pain Medications:  Current:  advil with mild relief    Tried in Past:  NSAIDs - Advil prn   TCA -never   SNRI -Wellbutrin, lexapro  Anti-convulsants -Never  Muscle " Relaxants -Never  Opioids- Never    Physical Therapy/Home Exercise:  yes       report:  Reviewed and consistent with medication use as prescribed.    Pain Procedures:   10/11/2017 BL L4 TFESI    Chiropractor - adjustment made pain worse in March  Acupuncture - no results, dry needling 2 months ago  TENS unit -never  Spinal decompression -never  Joint replacement -never    Imaging:  Outside MRI L SPINE 2/15/2016    Impression:   Multilevel lumbar spondylosis.   Multilevel, multifactorial spinal canal and neuroforaminal narrowing as above.  Patient has facet hypertrophy throughout the lumbar spine injury to right sided severe neuroforaminal stenosis at L4-5 and bilateral neuroforaminal stenosis at L5-S1 which is mild to moderate.            Past Medical History:   Diagnosis Date    Anxiety     BPH (benign prostatic hypertrophy)     Cataract     OS    Depression     Epiretinal membrane, both eyes     Hx of psychiatric care     Hyperlipidemia     Hypertension     Kidney stones     Kidney stones     Parkinsons     Posterior vitreous detachment of left eye     Psychiatric problem     Retinal detachment 2004    OD    Retinoschisis, left eye     Sleep apnea     Vitreous hemorrhage of left eye      Past Surgical History:   Procedure Laterality Date    CATARACT EXTRACTION      OD    EYE SURGERY      INJECTION OF ANESTHETIC AGENT AROUND NERVE Bilateral 7/3/2018    Procedure: BLOCK, NERVE;  Surgeon: Laina Crockett MD;  Location: [facility];  Service: Pain Management;  Laterality: Bilateral;  Bilateral Lumbar L2,L3,L4,L5 MBB      NEEDS CONSENT    laser indirect  4/8/10    left eye    Laser Indirect Retinopexy  4/8/10    OS    PROSTATE SURGERY      RETINAL DETACHMENT SURGERY  2004    OD    ROTATOR CUFF REPAIR  11/13/2013    TONSILLECTOMY       Social History     Social History    Marital status:      Spouse name: Trinidad    Number of children: 4    Years of education: N/A      Occupational History     [employer]     Social History Main Topics    Smoking status: Never Smoker    Smokeless tobacco: Never Used    Alcohol use No    Drug use: No    Sexual activity: Not on file     Other Topics Concern    Not on file     Social History Narrative    No narrative on file     Family History   Problem Relation Age of Onset    Cataracts Father     Cancer Father         lung    Diabetes Father     Cancer Mother         lung    Anxiety disorder Son     Depression Son     Anxiety disorder Daughter        Review of patient's allergies indicates:  No Known Allergies    Current Outpatient Prescriptions   Medication Sig    aspirin 81 MG Chew Take 81 mg by mouth. 1 Tablet, Chewable Oral Every day    atorvastatin (LIPITOR) 40 MG tablet Take 1 tablet (40 mg total) by mouth once daily.    carbidopa-levodopa  mg (SINEMET)  mg per tablet Take 1.5 tablets by mouth 3 (three) times daily.    citric acid-potassium citrate (POLYCITRA) 1,100-334 mg/5 mL solution Take 10 mLs by mouth.    clonazePAM (KLONOPIN) 0.5 MG tablet Take oral 1/2 to 1 tablet TID prn anxiety    ergocalciferol (VITAMIN D2) 50,000 unit Cap Take 50,000 Units by mouth every 30 days.     finasteride (PROSCAR) 5 mg tablet Take 1 tablet (5 mg total) by mouth once daily.    losartan (COZAAR) 100 MG tablet Take 1 tablet (100 mg total) by mouth once daily.    magnesium oxide (MAG-OX) 400 mg tablet Take 400 mg by mouth once daily.    potassium citrate (UROCIT-K) 10 mEq (1,080 mg) TbSR TAKE 1 TABLET THREE TIMES DAILY WITH MEALS    sertraline (ZOLOFT) 100 MG tablet Take 2 tablets (200 mg total) by mouth once daily.    traZODone (DESYREL) 50 MG tablet Take 1 tablet (50 mg total) by mouth nightly as needed for Insomnia.    pyridoxine (VITAMIN B-6) 50 MG Tab Take 50 mg by mouth once daily.     No current facility-administered medications for this visit.        REVIEW OF SYSTEMS:    GENERAL:  weight loss 3-4 lb/month,  "no malaise or fevers.  HEENT:   No recent changes in vision or hearing  NECK:  Negative for lumps, no difficulty with swallowing.  RESPIRATORY:  Negative for cough, wheezing or shortness of breath, patient denies any recent URI.  CARDIOVASCULAR:  Negative for chest pain, leg swelling or palpitations.  GI: +N/V a/w pt's home medications. Negative for abdominal discomfort, blood in stools or black stools or change in bowel habits.  MUSCULOSKELETAL:  See HPI.  SKIN:  Negative for lesions, rash, and itching.  PSYCH:  No mood disorder or recent psychosocial stressors.  Patients sleep is not disturbed secondary to pain.  HEMATOLOGY/LYMPHOLOGY: + bruising easily   ENDO: No history of diabetes or thyroid dysfunction  NEURO:    Patient has Parkinson's with parkinsonian tremor and affect.  All other reviewed and negative other than HPI.    OBJECTIVE:    /72   Pulse 74   Temp 97.6 °F (36.4 °C)   Ht 5' 10" (1.778 m)   Wt 88.5 kg (195 lb)   BMI 27.98 kg/m²     PHYSICAL EXAMINATION:    GENERAL: Well appearing, in no acute distress, alert and oriented x3.  PSYCH:  Mood and affect appropriate.  SKIN: Skin color, texture, turgor normal, no rashes or lesions.  HEAD/FACE:  Normocephalic, atraumatic. Cranial nerves grossly intact.  CV: RRR with palpation of the radial artery.  PULM: No evidence of respiratory difficulty, symmetric chest rise.  BACK: Straight leg raising in the sitting position is negative to radicular pain bilaterally. There is pain with palpation over the facet joints of the lumbar spine bilaterally at L5/S1. Limited ROM with pain on extension. Positive facet loading bilaterally.   EXTREMITIES: Peripheral joint ROM is full and pain free without obvious instability or laxity in all four extremities. No deformities, edema, or skin discoloration. Good capillary refill.  MUSCULOSKELETAL: Hip, and knee provocative maneuvers are negative.  There is no pain with palpation over the sacroiliac joints bilaterally.  " There is no pain to palpation over the greater trochanteric bursa bilaterally.  FABERs test is negative.  FADIRs test is negative.   5/5 strength in right ankle with plantar and 4/5 dorsiflexion 4/5 EHL raise, 5/5 strength in left ankle with plantar and dorsiflexion 5/5 EHL raise, 5/5 strength with right knee flexion extension, 5/5 strength with knee flexion extension on the left  No atrophy or tone abnormalities are noted.  NEURO:  2+ right knee, 3+ left knee reflexes, absent S1 reflexes bilaterally.  Plantar response are downgoing. No clonus.  No loss of sensation is noted.  GAIT: Antalgic, ambulates without assistance       ASSESSMENT: 72 y.o. year old male with pain, consistent with     Encounter Diagnoses   Name Primary?    Lumbar radiculopathy Yes    Facet arthropathy, lumbar        PLAN:       Previous imaging was reviewed and discussed with the patient today    Bilateral L4 transforaminal epidural steroid injections    Continue home exercises        Laina Crockett MD  08/01/2018

## 2018-08-02 ENCOUNTER — OFFICE VISIT (OUTPATIENT)
Dept: NEUROLOGY | Facility: CLINIC | Age: 73
End: 2018-08-02
Payer: MEDICARE

## 2018-08-02 VITALS
DIASTOLIC BLOOD PRESSURE: 94 MMHG | WEIGHT: 200.81 LBS | SYSTOLIC BLOOD PRESSURE: 162 MMHG | HEART RATE: 67 BPM | HEIGHT: 70 IN | BODY MASS INDEX: 28.75 KG/M2

## 2018-08-02 DIAGNOSIS — G25.89 OTHER SPECIFIED EXTRAPYRAMIDAL AND MOVEMENT DISORDERS: ICD-10-CM

## 2018-08-02 DIAGNOSIS — G20.A1 PD (PARKINSON'S DISEASE): Primary | ICD-10-CM

## 2018-08-02 DIAGNOSIS — R64 CACHEXIA: ICD-10-CM

## 2018-08-02 PROCEDURE — 99999 PR PBB SHADOW E&M-EST. PATIENT-LVL III: CPT | Mod: PBBFAC,,, | Performed by: PSYCHIATRY & NEUROLOGY

## 2018-08-02 PROCEDURE — 3080F DIAST BP >= 90 MM HG: CPT | Mod: CPTII,S$GLB,, | Performed by: PSYCHIATRY & NEUROLOGY

## 2018-08-02 PROCEDURE — 3077F SYST BP >= 140 MM HG: CPT | Mod: CPTII,S$GLB,, | Performed by: PSYCHIATRY & NEUROLOGY

## 2018-08-02 PROCEDURE — 99214 OFFICE O/P EST MOD 30 MIN: CPT | Mod: S$GLB,,, | Performed by: PSYCHIATRY & NEUROLOGY

## 2018-08-02 NOTE — PROGRESS NOTES
"Name: Haja Marti  MRN: [id number]   CSN: [id number]      Date: 08/02/2018    Chief Complaint / Interval History:   - having much more fatigue, lays down to feel better  - balance and gait about the same  -  Still constipated   - watching his BP, has been more normal for other doctors    From April 2018:  - balance and gait is better   - taking miralax as needed, usually takes 2-3 days to move bowels  - not drinking much water    From January 2018:  - azilect had some insomnia  - restarted the cd/ld 1/2 tid - wife thinks all is a little better but not dramatic  - voice particularly better  - no recurrence of hallucinations on the low dose cd/ld  -  He thinks the new cd/ld has leveled out, but anxiety is worse, for nearly every event in the family  - he is ruminating about having to give a speech in the next month and he is very nervous    * might be worse since he started the aricept    History of Present Illness (HPI):  70 yo     2+ years of progressive stooping posture, shuffling feet, slower walking, struggles to get out of a chair.  Handwriting has gotten "terrible."  Slower with dressing overall, and with most things.    Is a cautious , but has a tendency to pull to the R side of the road (correcting to the left).  More issues with parking, no issues moving backwards but uses side mirrors.  No accidents.      For about 6 months, there has been a progressive hoarseness of the voice.  Comes and goes, not clear timing.      No tremor.      Saw Dr. 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  RBD/sleep issues (Constitutional)?Yes - fights in his sleep, jumps out of bed, falls out over 5 years, talking for 30 years.  Depression/anxiety (Psychiatric)?Yes  Fatigue (Constitutional)?Yes  Constipation (GI)?Yes  Urinary issues ()?Yes  Sexual " dysfunction ()?Yes  -ED  Orthostasis (Cardiovascular)?Yes  Leg swelling (Cardiovascular)? No  Falls (Musculoskeletal)?Yes - has had a couple of falls, more unsteady on a boat.    Cognitive impairment (Neurologic)?Yes - a little slower overall  Psychoses (Psychiatric)?Yes - recently had experience thinking he saw people in his den, non-descript faces; 2 years ago, had a experience of seeing someone in the bedroom.  He's had no clear delusions.   Pain/Paresthesia (Neurologic)?Yes - lumbar for years, some disability, helped with pain management.  Visual changes (Eyes)?No  Moles / skin changes (Skin)?No  Stridor / SOB (Pulm)?No  Bruising (Heme)?No    Past Medical History: The patient  has a past medical history of Anxiety; BPH (benign prostatic hypertrophy); Cataract; Depression; Epiretinal membrane, both eyes; psychiatric care; Hyperlipidemia; Hypertension; Kidney stones; Kidney stones; Parkinsons; Posterior vitreous detachment of left eye; Psychiatric problem; Retinal detachment (2004); Retinoschisis, left eye; Sleep apnea; and Vitreous hemorrhage of left eye.    Social History: The patient  reports that he has never smoked. He has never used smokeless tobacco. He reports that he does not drink alcohol or use drugs.    Family History: Their family history includes Anxiety disorder in his daughter and son; Cancer in his father and mother; Cataracts in his father; Depression in his son; Diabetes in his father.    Allergies: Patient has no known allergies.     Meds:   Current Outpatient Prescriptions on File Prior to Visit   Medication Sig Dispense Refill    aspirin 81 MG Chew Take 81 mg by mouth. 1 Tablet, Chewable Oral Every day      atorvastatin (LIPITOR) 40 MG tablet Take 1 tablet (40 mg total) by mouth once daily. 90 tablet 3    carbidopa-levodopa  mg (SINEMET)  mg per tablet Take 1.5 tablets by mouth 3 (three) times daily. 135 tablet 6    citric acid-potassium citrate (POLYCITRA) 1,100-334 mg/5 mL  "solution Take 10 mLs by mouth.      clonazePAM (KLONOPIN) 0.5 MG tablet Take oral 1/2 to 1 tablet TID prn anxiety 60 tablet 1    ergocalciferol (VITAMIN D2) 50,000 unit Cap Take 50,000 Units by mouth every 30 days.       finasteride (PROSCAR) 5 mg tablet Take 1 tablet (5 mg total) by mouth once daily. 90 tablet 3    losartan (COZAAR) 100 MG tablet Take 1 tablet (100 mg total) by mouth once daily. 90 tablet 3    magnesium oxide (MAG-OX) 400 mg tablet Take 400 mg by mouth once daily.      potassium citrate (UROCIT-K) 10 mEq (1,080 mg) TbSR TAKE 1 TABLET THREE TIMES DAILY WITH MEALS 270 tablet 3    pyridoxine (VITAMIN B-6) 50 MG Tab Take 50 mg by mouth once daily.      sertraline (ZOLOFT) 100 MG tablet Take 2 tablets (200 mg total) by mouth once daily. 180 tablet 1    traZODone (DESYREL) 50 MG tablet Take 1 tablet (50 mg total) by mouth nightly as needed for Insomnia. 30 tablet 2     No current facility-administered medications on file prior to visit.      Exam:  BP (!) 162/94   Pulse 67   Ht 5' 10" (1.778 m)   Wt 91.1 kg (200 lb 13.4 oz)   BMI 28.82 kg/m²     * Specialized movement exam  Severe hypophonic speech.    ++ mouth sl open, facial masking.   L>R mild-mod cogwheel rigidity.     L>R mild-mod bradykinesia.   No tremor with rest, posture, kinesis, or intention.    No other dystonia, chorea, athetosis, myoclonus, or tics.   No motor impersistence.   Normal-based gait.   Mildly shortened stride length.  + abnormal arm swing.     No postural instability.      Laboratory/Radiological:  - Results:  No visits with results within 3 Month(s) from this visit.   Latest known visit with results is:   Lab Visit on 04/24/2018   Component Date Value Ref Range Status    Sodium 04/24/2018 140  136 - 145 mmol/L Final    Potassium 04/24/2018 4.2  3.5 - 5.1 mmol/L Final    Chloride 04/24/2018 103  95 - 110 mmol/L Final    CO2 04/24/2018 24  23 - 29 mmol/L Final    Glucose 04/24/2018 79  70 - 110 mg/dL Final    " BUN, Bld 04/24/2018 21  8 - 23 mg/dL Final    Creatinine 04/24/2018 1.0  0.5 - 1.4 mg/dL Final    Calcium 04/24/2018 9.6  8.7 - 10.5 mg/dL Final    Total Protein 04/24/2018 7.1  6.0 - 8.4 g/dL Final    Albumin 04/24/2018 3.8  3.5 - 5.2 g/dL Final    Total Bilirubin 04/24/2018 0.7  0.1 - 1.0 mg/dL Final    Alkaline Phosphatase 04/24/2018 81  55 - 135 U/L Final    AST 04/24/2018 26  10 - 40 U/L Final    ALT 04/24/2018 31  10 - 44 U/L Final    Anion Gap 04/24/2018 13  8 - 16 mmol/L Final    eGFR if African American 04/24/2018 >60.0  >60 mL/min/1.73 m^2 Final    eGFR if non African American 04/24/2018 >60.0  >60 mL/min/1.73 m^2 Final    Cholesterol 04/24/2018 152  120 - 199 mg/dL Final    Triglycerides 04/24/2018 220* 30 - 150 mg/dL Final    HDL 04/24/2018 40  40 - 75 mg/dL Final    LDL Cholesterol 04/24/2018 68.0  63.0 - 159.0 mg/dL Final    HDL/Chol Ratio 04/24/2018 26.3  20.0 - 50.0 % Final    Total Cholesterol/HDL Ratio 04/24/2018 3.8  2.0 - 5.0 Final    Non-HDL Cholesterol 04/24/2018 112  mg/dL Final     - Independent review of images:    Diagnoses:          1) Parkinsonism, likely iPD (vs. DLB given the early neuropsychiatric features)    Medical Decision Making:  - continue meds at current dose  - continue with boxing and pt  - clean diet  - consider change of sertraline but currently at 200      Edward Ivey MD, MPH  Division of Movement and Memory Disorders  [facility]  [phone]

## 2018-08-02 NOTE — LETTER
August 7, 2018      Andrez Jackson MD  [address]  [place] [address]           [facility]  [address]  [place] [facility] [place] [facility] [address]  Phone: [phone]  Fax: [phone]          Patient: Haja Marti   MR Number: [id number]   YOB: 1945   Date of Visit: 8/2/2018       Dear Dr. Andrez Jackson:    Thank you for referring Haja Marti to me for evaluation. Attached you will find relevant portions of my assessment and plan of care.    If you have questions, please do not hesitate to call me. I look forward to following Haja Marti along with you.    Sincerely,    Edward Ivey MD    Enclosure  CC:  No Recipients    If you would like to receive this communication electronically, please contact [email or web address] or [phone] to request more information on [employer] access.    For providers and/or their staff who would like to refer a patient to [facility], please contact us through our one-stop-shop provider referral line, [facility], at [phone].    If you feel you have received this communication in error or would no longer like to receive these types of communications, please e-mail [email or web address]

## 2018-08-08 ENCOUNTER — TELEPHONE (OUTPATIENT)
Dept: PAIN MEDICINE | Facility: CLINIC | Age: 73
End: 2018-08-08

## 2018-08-08 NOTE — TELEPHONE ENCOUNTER
Staff contacted patient regarding message. He was concerned that he received a form with his time listed at 10:30 am and he was contacted by someone and given the time of 10:00 am.     He was informed that his arrival time is 10:00 am.     Patient verbalized understanding.

## 2018-08-08 NOTE — TELEPHONE ENCOUNTER
----- Message from Escobar Martinez sent at 8/8/2018  9:17 AM CDT -----  Contact: ALEXANDRO RENE [[id number]]    Name of Who is Calling: ALEXANDRO RENE [[id number]]      What is the request in detail: Patient would like to speak with staff in regards to clarification of surgery time.       Can the clinic reply by [facility]: no      What Number to Call Back if not in [facility]: [phone]

## 2018-08-12 ENCOUNTER — NURSE TRIAGE (OUTPATIENT)
Dept: ADMINISTRATIVE | Facility: CLINIC | Age: 73
End: 2018-08-12

## 2018-08-12 DIAGNOSIS — F41.9 ANXIETY DISORDER, UNSPECIFIED TYPE: ICD-10-CM

## 2018-08-12 RX ORDER — CLONAZEPAM 0.5 MG/1
TABLET ORAL
Qty: 60 TABLET | Refills: 1 | Status: CANCELLED | OUTPATIENT
Start: 2018-08-12

## 2018-08-12 NOTE — TELEPHONE ENCOUNTER
Pt needs prescription refill. Pt called re clonazepam. Pt notified one refill noted on [facility]. Office message sent for refill. Call back with questions.     Reason for Disposition   Caller requesting a NON-URGENT new prescription or refill and triager unable to refill per unit policy    Protocols used: ST MEDICATION QUESTION CALL-A-

## 2018-08-13 ENCOUNTER — OFFICE VISIT (OUTPATIENT)
Dept: PSYCHIATRY | Facility: CLINIC | Age: 73
End: 2018-08-13
Payer: MEDICARE

## 2018-08-13 DIAGNOSIS — F41.1 GENERALIZED ANXIETY DISORDER: Primary | ICD-10-CM

## 2018-08-13 PROCEDURE — 90834 PSYTX W PT 45 MINUTES: CPT | Mod: S$GLB,,, | Performed by: SOCIAL WORKER

## 2018-08-13 PROCEDURE — 90785 PSYTX COMPLEX INTERACTIVE: CPT | Mod: S$GLB,,, | Performed by: SOCIAL WORKER

## 2018-08-13 NOTE — PROGRESS NOTES
Individual Psychotherapy (PhD/LCSW)    8/13/2018    Site:  [facility]         Therapeutic Intervention: Met with patient and spouse.  Outpatient - Insight oriented psychotherapy 45 min - CPT code 75009    Chief complaint/reason for encounter: anxiety Wife Whitley  Pt with parkinsons with cognitive difficulties for which wife is needed to aid in therapeutic instructions including homework.       Interval history and content of current session:    Read 5,6,7.   Has progressive muscle tape at home.     Read on overestimation and thinking the worse.    He wants to avoid coming here per his worries.   I will do a bad job and he is going to think I am dum---automatic thought.  Session centered on correcting this and similar thoughts.  Did not record any thought (likely avoidance).     Angry at his parkinsons.             Treatment plan:  · Target symptoms: anxiety   · Why chosen therapy is appropriate versus another modality: relevant to diagnosis  · Outcome monitoring methods: self-report, observation  · Therapeutic intervention type: behavior modifying psychotherapy    Risk parameters:  Patient reports no suicidal ideation  Patient reports no homicidal ideation  Patient reports no self-injurious behavior  Patient reports no violent behavior      Verbal deficits: None    Patient's response to intervention:  The patient's response to intervention is accepting.    Progress toward goals and other mental status changes:  The patient's progress toward goals is fair .    Diagnosis:     ICD-10-CM ICD-9-CM   1. Generalized anxiety disorder F41.1 300.02       Plan:  individual psychotherapy    Return to clinic: 2 weeks    Length of Service (minutes): 45

## 2018-08-14 DIAGNOSIS — F41.9 ANXIETY DISORDER, UNSPECIFIED TYPE: ICD-10-CM

## 2018-08-14 RX ORDER — CLONAZEPAM 0.5 MG/1
TABLET ORAL
Qty: 60 TABLET | Refills: 1 | OUTPATIENT
Start: 2018-08-14

## 2018-08-14 RX ORDER — CLONAZEPAM 0.5 MG/1
TABLET ORAL
Qty: 60 TABLET | Refills: 1 | Status: SHIPPED | OUTPATIENT
Start: 2018-08-14 | End: 2018-10-01 | Stop reason: SDUPTHER

## 2018-08-16 ENCOUNTER — HOSPITAL ENCOUNTER (OUTPATIENT)
Facility: OTHER | Age: 73
Discharge: HOME | End: 2018-08-16
Attending: ANESTHESIOLOGY | Admitting: ANESTHESIOLOGY
Payer: MEDICARE

## 2018-08-16 VITALS
HEIGHT: 70 IN | DIASTOLIC BLOOD PRESSURE: 86 MMHG | BODY MASS INDEX: 27.92 KG/M2 | SYSTOLIC BLOOD PRESSURE: 155 MMHG | RESPIRATION RATE: 18 BRPM | HEART RATE: 68 BPM | OXYGEN SATURATION: 99 % | WEIGHT: 195 LBS

## 2018-08-16 DIAGNOSIS — M54.16 LUMBAR RADICULOPATHY: Primary | ICD-10-CM

## 2018-08-16 PROCEDURE — 64483 NJX AA&/STRD TFRM EPI L/S 1: CPT | Mod: 50,,, | Performed by: ANESTHESIOLOGY

## 2018-08-16 PROCEDURE — 25000003 PHARM REV CODE 250: Performed by: ANESTHESIOLOGY

## 2018-08-16 PROCEDURE — 25500020 PHARM REV CODE 255: Performed by: ANESTHESIOLOGY

## 2018-08-16 PROCEDURE — 64483 NJX AA&/STRD TFRM EPI L/S 1: CPT | Mod: 50 | Performed by: ANESTHESIOLOGY

## 2018-08-16 PROCEDURE — 63600175 PHARM REV CODE 636 W HCPCS: Performed by: ANESTHESIOLOGY

## 2018-08-16 RX ORDER — SODIUM CHLORIDE 9 MG/ML
INJECTION, SOLUTION INTRAVENOUS CONTINUOUS
Status: DISCONTINUED | OUTPATIENT
Start: 2018-08-16 | End: 2018-08-16 | Stop reason: HOSPADM

## 2018-08-16 RX ORDER — BUPIVACAINE HYDROCHLORIDE 2.5 MG/ML
INJECTION, SOLUTION EPIDURAL; INFILTRATION; INTRACAUDAL
Status: DISCONTINUED | OUTPATIENT
Start: 2018-08-16 | End: 2018-08-16 | Stop reason: HOSPADM

## 2018-08-16 RX ORDER — ALPRAZOLAM 0.5 MG/1
0.5 TABLET, ORALLY DISINTEGRATING ORAL NIGHTLY PRN
Status: DISCONTINUED | OUTPATIENT
Start: 2018-08-16 | End: 2018-08-16 | Stop reason: HOSPADM

## 2018-08-16 RX ORDER — LIDOCAINE HYDROCHLORIDE 10 MG/ML
INJECTION INFILTRATION; PERINEURAL
Status: DISCONTINUED | OUTPATIENT
Start: 2018-08-16 | End: 2018-08-16 | Stop reason: HOSPADM

## 2018-08-16 RX ORDER — METHYLPREDNISOLONE ACETATE 40 MG/ML
INJECTION, SUSPENSION INTRA-ARTICULAR; INTRALESIONAL; INTRAMUSCULAR; SOFT TISSUE
Status: DISCONTINUED | OUTPATIENT
Start: 2018-08-16 | End: 2018-08-16 | Stop reason: HOSPADM

## 2018-08-16 RX ADMIN — ALPRAZOLAM 0.5 MG: 0.5 TABLET, ORALLY DISINTEGRATING ORAL at 10:08

## 2018-08-16 NOTE — DISCHARGE SUMMARY
Discharge Note  Short Stay      SUMMARY     Admit Date: 8/16/2018    Attending Physician: Laina Crockett      Discharge Physician: Laina Crockett      Discharge Date: 8/16/2018 11:19 AM    Procedure(s) (LRB):  INJECTION,STEROID,EPIDURAL,TRANSFORAMINAL APPROACH (Bilateral)    Final Diagnosis: Lumbar radiculopathy [M54.16]    Disposition: Home or self care    Patient Instructions:   Current Discharge Medication List      CONTINUE these medications which have NOT CHANGED    Details   aspirin 81 MG Chew Take 81 mg by mouth. 1 Tablet, Chewable Oral Every day      atorvastatin (LIPITOR) 40 MG tablet Take 1 tablet (40 mg total) by mouth once daily.  Qty: 90 tablet, Refills: 3      carbidopa-levodopa  mg (SINEMET)  mg per tablet Take 1.5 tablets by mouth 3 (three) times daily.  Qty: 135 tablet, Refills: 6      citric acid-potassium citrate (POLYCITRA) 1,100-334 mg/5 mL solution Take 10 mLs by mouth.      clonazePAM (KLONOPIN) 0.5 MG tablet Take oral 1/2 to 1 tablet TID prn anxiety  Qty: 60 tablet, Refills: 1    Associated Diagnoses: Anxiety disorder, unspecified type      ergocalciferol (VITAMIN D2) 50,000 unit Cap Take 50,000 Units by mouth every 30 days.       finasteride (PROSCAR) 5 mg tablet Take 1 tablet (5 mg total) by mouth once daily.  Qty: 90 tablet, Refills: 3      losartan (COZAAR) 100 MG tablet Take 1 tablet (100 mg total) by mouth once daily.  Qty: 90 tablet, Refills: 3      magnesium oxide (MAG-OX) 400 mg tablet Take 400 mg by mouth once daily.      potassium citrate (UROCIT-K) 10 mEq (1,080 mg) TbSR TAKE 1 TABLET THREE TIMES DAILY WITH MEALS  Qty: 270 tablet, Refills: 3      pyridoxine (VITAMIN B-6) 50 MG Tab Take 50 mg by mouth once daily.      sertraline (ZOLOFT) 100 MG tablet Take 2 tablets (200 mg total) by mouth once daily.  Qty: 180 tablet, Refills: 1      traZODone (DESYREL) 50 MG tablet Take 1 tablet (50 mg total) by mouth nightly as needed for Insomnia.  Qty: 30 tablet, Refills: 2                  Discharge Diagnosis: Lumbar radiculopathy [M54.16]  Condition on Discharge: Stable with no complications to procedure   Diet on Discharge: Same as before.  Activity: as per instruction sheet.  Discharge to: Home with a responsible adult.  Follow up: 2-4 weeks

## 2018-08-16 NOTE — OP NOTE
INFORMED CONSENT: The procedure, risks, benefits and options were discussed with patient. There are no contraindications to the procedure. The patient expressed understanding and agreed to proceed. The personnel performing the procedure was discussed.    08/16/2018    Surgeon: Laina Crockett MD    Assistants: None    PROCEDURE:  Bilateral  L4  1) Left  L4 TRANSFORAMINAL EPIDURAL STEROID INJECTION  2) Right  L4 TRANSFORAMINAL EPIDURAL STEROID INJECTION      Pre Procedure diagnosis:  Bilateral L4  Lumbar radiculopathy [M54.16]    Post-Procedure diagnosis:   same    Complications: None    Specimens: None      DESCRIPTION OF PROCEDURE: The patient was brought to the procedure room. IV access was obtained prior to the procedure. The patient was positioned prone on the fluoroscopy table. Continuous hemodynamic monitoring was initiated including blood pressure, EKG, and pulse oximetry. . The skin was prepped with chlorhexidine and draped in a sterile fashion. Skin anesthesia was achieved using a total of 10mL of lidocaine, 5mL over each respective injection site.     The  L4 transforaminal spaces were identified with fluoroscopy in the  AP, oblique, and lateral views.  A 22 gauge spinal quinke needle was then advanced into the area of the trans foraminal spaces bilaterally with confirmation of proper needle position using AP, oblique, and lateral fluoroscopic views. Once the needle tip was in the area of the transforaminal space, and there was no blood, CSF or paraesthesias,  1.5 mL of Omnipaque 300mg/ml was injected on each side for a total of 3mL.  Fluoroscopic imaging in the AP and lateral views revealed a clear outline of the spinal nerve with proximal spread of agent through the neural foramen into the epidural space. A total combination of 1 mL of Bupivicaine 0.25% and 40 mg depo medrol was injected on each side for a total of 4mL of injected medications with displacement of the contrast dye confirming that the  medication went into the area of the transforaminal spaces bilaterally. A sterile dressing was applied.   Patient tolerated the procedure well.    Patient was taken back to the recovery room for further observation.     The patient was discharged to home in stable condition

## 2018-08-16 NOTE — DISCHARGE INSTRUCTIONS
Thank you for allowing us to care for you today. You may receive a survey about the care we provided. Your feedback is valuable and helps us provide excellent care throughout the community.     Home Care Instructions for Pain Management:    1. DIET:   You may resume your normal diet today.   2. BATHING:   You may shower with luke warm water. No tub baths or anything that will soak injection sites under water for the next 24 hours.  3. DRESSING:   You may remove your bandage today.   4. ACTIVITY LEVEL:   You may resume your normal activities 24 hrs after your procedure. Nothing strenuous today.  5. MEDICATIONS:   You may resume your normal medications today. To restart blood thinners, ask your doctor.  6. DRIVING    If you have received any sedatives by mouth today, you may not drive for 12 hours.    If you have received any sedation through your IV, you may not drive for 24 hrs.   7. SPECIAL INSTRUCTIONS:   No heat to the injection site for 24 hrs including, hot bath or shower, heating pad, moist heat, or hot tubs.    Use ice pack to injection site for any pain or discomfort.  Apply ice packs for 20 minute intervals as needed.    IF you have diabetes, be sure to monitor your blood sugar more closely. IF your injection contained steroids your blood sugar levels may become higher than normal.    If you are still having pain upon discharge:  Your pain may improve over the next 48 hours. The anesthetic (numbing medication) works immediately to 48 hours. IF your injection contained a steroid (anti-inflammatory medication), it takes approximately 3 days to start feeling relief and 7-10 days to see your greatest results from the medication. It is possible you may need subsequent injections. This would be discussed at your follow up appointment with pain management or your referring doctor.      PLEASE CALL YOUR DOCTOR IF:  1. Redness or swelling around the injection site.  2. Fever of 101 degrees or more  3. Drainage  (pus) from the injection site.  4. For any continuous bleeding (some dried blood over the incision is normal.)    FOR EMERGENCIES:   If any unusual problems or difficulties occur during clinic hours, call [phone] or [phone].

## 2018-08-22 ENCOUNTER — LAB VISIT (OUTPATIENT)
Dept: LAB | Facility: HOSPITAL | Age: 73
End: 2018-08-22
Attending: INTERNAL MEDICINE
Payer: MEDICARE

## 2018-08-22 DIAGNOSIS — Z12.5 ENCOUNTER FOR SCREENING FOR MALIGNANT NEOPLASM OF PROSTATE: ICD-10-CM

## 2018-08-22 DIAGNOSIS — I10 ESSENTIAL HYPERTENSION: ICD-10-CM

## 2018-08-22 DIAGNOSIS — E78.00 PURE HYPERCHOLESTEROLEMIA: ICD-10-CM

## 2018-08-22 LAB
ALBUMIN SERPL BCP-MCNC: 3.9 G/DL
ALP SERPL-CCNC: 79 U/L
ALT SERPL W/O P-5'-P-CCNC: 29 U/L
ANION GAP SERPL CALC-SCNC: 9 MMOL/L
AST SERPL-CCNC: 22 U/L
BASOPHILS # BLD AUTO: 0.05 K/UL
BASOPHILS NFR BLD: 0.7 %
BILIRUB SERPL-MCNC: 0.5 MG/DL
BUN SERPL-MCNC: 24 MG/DL
CALCIUM SERPL-MCNC: 9.9 MG/DL
CHLORIDE SERPL-SCNC: 104 MMOL/L
CHOLEST SERPL-MCNC: 166 MG/DL
CHOLEST/HDLC SERPL: 3.8 {RATIO}
CO2 SERPL-SCNC: 28 MMOL/L
COMPLEXED PSA SERPL-MCNC: 1.1 NG/ML
CREAT SERPL-MCNC: 1.1 MG/DL
DIFFERENTIAL METHOD: ABNORMAL
EOSINOPHIL # BLD AUTO: 0.2 K/UL
EOSINOPHIL NFR BLD: 2.9 %
ERYTHROCYTE [DISTWIDTH] IN BLOOD BY AUTOMATED COUNT: 12.8 %
EST. GFR  (AFRICAN AMERICAN): >60 ML/MIN/1.73 M^2
EST. GFR  (NON AFRICAN AMERICAN): >60 ML/MIN/1.73 M^2
GLUCOSE SERPL-MCNC: 92 MG/DL
HCT VFR BLD AUTO: 48.8 %
HDLC SERPL-MCNC: 44 MG/DL
HDLC SERPL: 26.5 %
HGB BLD-MCNC: 16.3 G/DL
IMM GRANULOCYTES # BLD AUTO: 0.08 K/UL
IMM GRANULOCYTES NFR BLD AUTO: 1.1 %
LDLC SERPL CALC-MCNC: 94 MG/DL
LYMPHOCYTES # BLD AUTO: 1 K/UL
LYMPHOCYTES NFR BLD: 12.6 %
MCH RBC QN AUTO: 31.4 PG
MCHC RBC AUTO-ENTMCNC: 33.4 G/DL
MCV RBC AUTO: 94 FL
MONOCYTES # BLD AUTO: 0.7 K/UL
MONOCYTES NFR BLD: 9 %
NEUTROPHILS # BLD AUTO: 5.6 K/UL
NEUTROPHILS NFR BLD: 73.7 %
NONHDLC SERPL-MCNC: 122 MG/DL
NRBC BLD-RTO: 0 /100 WBC
PLATELET # BLD AUTO: 208 K/UL
PMV BLD AUTO: 9.8 FL
POTASSIUM SERPL-SCNC: 4.4 MMOL/L
PROT SERPL-MCNC: 7.2 G/DL
RBC # BLD AUTO: 5.19 M/UL
SODIUM SERPL-SCNC: 141 MMOL/L
TRIGL SERPL-MCNC: 140 MG/DL
TSH SERPL DL<=0.005 MIU/L-ACNC: 3.24 UIU/ML
WBC # BLD AUTO: 7.53 K/UL

## 2018-08-22 PROCEDURE — 36415 COLL VENOUS BLD VENIPUNCTURE: CPT | Mod: PO

## 2018-08-22 PROCEDURE — 80061 LIPID PANEL: CPT

## 2018-08-22 PROCEDURE — 84443 ASSAY THYROID STIM HORMONE: CPT

## 2018-08-22 PROCEDURE — 84153 ASSAY OF PSA TOTAL: CPT

## 2018-08-22 PROCEDURE — 85025 COMPLETE CBC W/AUTO DIFF WBC: CPT

## 2018-08-22 PROCEDURE — 80053 COMPREHEN METABOLIC PANEL: CPT

## 2018-08-27 ENCOUNTER — OFFICE VISIT (OUTPATIENT)
Dept: INTERNAL MEDICINE | Facility: CLINIC | Age: 73
End: 2018-08-27
Payer: MEDICARE

## 2018-08-27 VITALS
SYSTOLIC BLOOD PRESSURE: 131 MMHG | HEIGHT: 70 IN | DIASTOLIC BLOOD PRESSURE: 74 MMHG | OXYGEN SATURATION: 99 % | WEIGHT: 196 LBS | BODY MASS INDEX: 28.06 KG/M2 | HEART RATE: 62 BPM

## 2018-08-27 DIAGNOSIS — I10 ESSENTIAL HYPERTENSION: ICD-10-CM

## 2018-08-27 DIAGNOSIS — E78.00 PURE HYPERCHOLESTEROLEMIA: ICD-10-CM

## 2018-08-27 DIAGNOSIS — G20.C PARKINSONISM, UNSPECIFIED PARKINSONISM TYPE: ICD-10-CM

## 2018-08-27 DIAGNOSIS — Z12.11 SCREEN FOR COLON CANCER: ICD-10-CM

## 2018-08-27 DIAGNOSIS — G47.33 OSA (OBSTRUCTIVE SLEEP APNEA): ICD-10-CM

## 2018-08-27 DIAGNOSIS — F32.A DEPRESSION, UNSPECIFIED DEPRESSION TYPE: ICD-10-CM

## 2018-08-27 DIAGNOSIS — Z00.00 ANNUAL PHYSICAL EXAM: Primary | ICD-10-CM

## 2018-08-27 DIAGNOSIS — M47.816 LUMBAR SPONDYLOSIS: ICD-10-CM

## 2018-08-27 PROCEDURE — 99397 PER PM REEVAL EST PAT 65+ YR: CPT | Mod: 25,S$GLB,, | Performed by: INTERNAL MEDICINE

## 2018-08-27 PROCEDURE — 3075F SYST BP GE 130 - 139MM HG: CPT | Mod: CPTII,S$GLB,, | Performed by: INTERNAL MEDICINE

## 2018-08-27 PROCEDURE — 99999 PR PBB SHADOW E&M-EST. PATIENT-LVL V: CPT | Mod: PBBFAC,,, | Performed by: INTERNAL MEDICINE

## 2018-08-27 PROCEDURE — 3078F DIAST BP <80 MM HG: CPT | Mod: CPTII,S$GLB,, | Performed by: INTERNAL MEDICINE

## 2018-08-27 PROCEDURE — G0009 ADMIN PNEUMOCOCCAL VACCINE: HCPCS | Mod: S$GLB,,, | Performed by: INTERNAL MEDICINE

## 2018-08-27 PROCEDURE — 90732 PPSV23 VACC 2 YRS+ SUBQ/IM: CPT | Mod: S$GLB,,, | Performed by: INTERNAL MEDICINE

## 2018-08-27 NOTE — PROGRESS NOTES
PAST MEDICAL HISTORY:  Hypertension.  Hyperlipidemia.  Parkinsonism with cognitive deficit  Depression, anxiety.  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  sleep apnea.  Bilateral retinal detachment.  Adenomatous colon polyp in .  Tonsillectomy.  Hemorrhoidectomy.  Appendectomy.  Left inguinal hernia repair.  Left knee surgery.  Lumbar degenerative disc disease, status post bilateral L4 transforaminal epidural steroid injection  BPH      SOCIAL HISTORY:  Tobacco and alcohol use - none.  , has four children.    Works in sales.    FAMILY HISTORY:  Father is , lung cancer, but had diabetes.  Mother is   , lung cancer.  One brother is alive, neuralgia.    SCREENING:  Normal colonoscopy in .      REASON FOR VISIT:  This is a 72-year-old male who is here with his wife, comes   in for annual routine visit.  In overall sense he has been feeling well.  His   only complaint has been during the day at some point he might get tired where he   just has to sit down and rest.  Sometimes he will take a nap.  He has been   diagnosed with sleep apnea.  His wife sleeps in another room because of snoring.    He does breathe through his mouth.  He has attempted CPAP in the past but just   unable to tolerate it.    He is still being followed by Pain Management regarding lumbar degenerative disc   disease.  Recently in August underwent a bilateral L4 transforaminal epidural   steroid injection and has responded well to his back pain.  Now and then he will   take Advil or Aleve and it is helpful.    He is still being followed by Neurology regarding parkinsonism and is doing well   with his current carbidopa dose.  In regard to the Parkinson's, he goes to a   special class for Parkinson's patients and has found that he is doing well in   regard with this, and his wife feels that his balance is much improved, he is   able to move, is much better and there has actually been no tremors.    CURRENT  MEDICATIONS:  Aspirin 81 mg.  Atorvastatin 40 mg.  Carbidopa/levodopa 25/100 mg 1.5 tablets three times a day.  Urocit-K one twice a day.  Clonazepam 0.5 mg twice a day as needed.  Vitamin D 50,000 units once a month,  Losartan 100 mg.  Finasteride 5 mg.  B6.  Zoloft 100 mg.  Trazodone 50 mg at night.    RECENT LABS:  Cholesterol 166, LDL 94.  PSA 1.1, improved.  TSH normal.    Comprehensive metabolic profile and CBC normal.    REVIEW OF SYMPTOMS:  Endorses no chest pain, shortness of breath, or abdominal   pain.  Bowel function is every other day.  Has nocturia x1.  Currently no   arthralgias or headaches.    PHYSICAL EXAMINATION:  VITAL SIGNS:  Weight is 196 pounds, pulse 64, blood pressure 144/82.  HEENT:  Tympanic membranes normal.  Nasal mucosa is clear.  Oropharynx, no   abnormal findings.  NECK:  No thyromegaly.  LUNGS:  Clear.  HEART:  Regular rate and rhythm.  ABDOMEN:  Active bowel sounds, soft, nontender.  No hepatosplenomegaly or   abdominal masses.  PULSES:  2+ carotid, 2+ pedal pulses.  EXTREMITIES:  No edema.  LYMPH GLAND:  No palpable adenopathy.    IMPRESSION:  1.  General examination.  2.  Hypertension.  3.  Hyperlipidemia.  4.  Parkinsonism.  5.  Depression, anxiety.  6.  Lumbar degenerative disc disease.  7.  BPH.    PLAN:  We will arrange for a FIT test, refer to [facility], hopefully get back   on using CPAP.  Pneumo 23 to be given today to be up-to-date.  Continue current   medicines and make return appointment again in six months.              /jessica [id number] review        IVONE/CHIARA  dd: 08/27/2018 09:48:00 (CDT)  td: 08/27/2018 15:12:45 (CDT)  Doc ID   #[id number]  Job ID #[id number]    CC:

## 2018-08-31 ENCOUNTER — LAB VISIT (OUTPATIENT)
Dept: LAB | Facility: HOSPITAL | Age: 73
End: 2018-08-31
Attending: INTERNAL MEDICINE
Payer: MEDICARE

## 2018-08-31 DIAGNOSIS — Z12.11 SCREEN FOR COLON CANCER: ICD-10-CM

## 2018-08-31 LAB — HEMOCCULT STL QL IA: NEGATIVE

## 2018-08-31 PROCEDURE — 82274 ASSAY TEST FOR BLOOD FECAL: CPT

## 2018-09-04 ENCOUNTER — OFFICE VISIT (OUTPATIENT)
Dept: PAIN MEDICINE | Facility: CLINIC | Age: 73
End: 2018-09-04
Payer: MEDICARE

## 2018-09-04 VITALS
BODY MASS INDEX: 28.62 KG/M2 | HEIGHT: 70 IN | TEMPERATURE: 98 F | HEART RATE: 66 BPM | SYSTOLIC BLOOD PRESSURE: 151 MMHG | WEIGHT: 199.94 LBS | DIASTOLIC BLOOD PRESSURE: 106 MMHG

## 2018-09-04 DIAGNOSIS — M47.816 FACET ARTHRITIS, DEGENERATIVE, LUMBAR SPINE: Primary | ICD-10-CM

## 2018-09-04 DIAGNOSIS — M48.07 FORAMINAL STENOSIS OF LUMBOSACRAL REGION: ICD-10-CM

## 2018-09-04 DIAGNOSIS — M54.16 LUMBAR RADICULOPATHY: ICD-10-CM

## 2018-09-04 DIAGNOSIS — M43.06 SPONDYLOLYSIS OF LUMBAR REGION: ICD-10-CM

## 2018-09-04 PROCEDURE — 99213 OFFICE O/P EST LOW 20 MIN: CPT | Mod: S$PBB,,, | Performed by: NURSE PRACTITIONER

## 2018-09-04 PROCEDURE — 99213 OFFICE O/P EST LOW 20 MIN: CPT | Mod: PBBFAC | Performed by: NURSE PRACTITIONER

## 2018-09-04 PROCEDURE — 1101F PT FALLS ASSESS-DOCD LE1/YR: CPT | Mod: CPTII,,, | Performed by: NURSE PRACTITIONER

## 2018-09-04 PROCEDURE — 99499 UNLISTED E&M SERVICE: CPT | Mod: HCNC,S$GLB,, | Performed by: NURSE PRACTITIONER

## 2018-09-04 PROCEDURE — 3077F SYST BP >= 140 MM HG: CPT | Mod: CPTII,,, | Performed by: NURSE PRACTITIONER

## 2018-09-04 PROCEDURE — 99999 PR PBB SHADOW E&M-EST. PATIENT-LVL III: CPT | Mod: PBBFAC,,, | Performed by: NURSE PRACTITIONER

## 2018-09-04 PROCEDURE — 3080F DIAST BP >= 90 MM HG: CPT | Mod: CPTII,,, | Performed by: NURSE PRACTITIONER

## 2018-09-04 RX ORDER — MELOXICAM 7.5 MG/1
7.5 TABLET ORAL DAILY
Qty: 30 TABLET | Refills: 0 | Status: SHIPPED | OUTPATIENT
Start: 2018-09-04 | End: 2018-10-04

## 2018-09-04 NOTE — PROGRESS NOTES
Chronic Pain - Established Visit    Referring Physician: No ref. provider found    Chief Complaint:   Chief Complaint   Patient presents with    Follow-up     2 weeks after injection        SUBJECTIVE: Disclaimer: This note has been generated using voice-recognition software. There may be typographical errors that have been missed during proof-reading    Interval History 9/4/2018:  The patient returns to clinic today for follow up. He is s/p bilateral L4 TF AFTAB on 8/16/2018. He reports limited relief of his low back pain. He continues to report aching low back pain across his back. This pain is worse with prolonged walking and activity. He denies any radiating leg pain today. He continues to perform a home exercise routine. He is currently taking Aleve with limited benefit. He denies any other health changes. He denies any bowel or bladder incontinence. His pain today is 1/10.    Interval history 08/01/2018:  Since previous encounter the patient had bilateral medial branch nerve blocks at the left L2, L3, L4, L5 without any improvement in his pain symptoms.  He has previously had much better success with bilateral L4 transforaminal epidural steroid injections and continues to have lower extremity radicular pain symptoms.    Interval History 11/20/2017  The patient returns to the clinic for a follow up visit, he is reporting back pain of 2/10 today.  His pain had significantly exacerbated on Friday when the patient may be appointment but states that over the weekend with conservative care and occasional ibuprofen his pain has gradually improved.  He denies any radicular symptoms but is continue to have significant lower back pain over the area of the facet joints of the lumbar spine.  Previous x-rays have revealed that the patient has significant facet arthropathy most severe at L4-5 in the area where the patient is having his most severe pain.  On previous encounter was discussed that in the future if his pain  "persists we can proceed with medial branch nerve blocks to be followed by radiofrequency ablation if diagnostic, the patient has been tolerating physical therapy and doing home stretches and states that overall his pain is better currently compared to what it was on Friday.    Interval History 10/26/2017:  The patient returns to clinic today for follow up. He is s/p bilateral L4 TF AFTAB on 9/6/2017 and 10/11/2017. He reports 50% relief of his low back pain. He does report intermittent low back pain that is aching in nature. He does endorse stiffness especially first this in the morning. The pain is worse with prolonged sitting and transitioning for sitting to standing. He denies any radiating leg pain. He does take Ibuprofen with benefit. He denies any other health changes. He denies any bowel or bladder incontinence. His pain today is 4/10.    Initial encounter:    Haja Marti presents to the clinic for the evaluation of back pain. The pain started 3 years ago  and symptoms have been worsening.  Pt reports doing back exercises from orthopedic office and didn't help.  Pt reports [facility]'s "[facility]" physical therapy program did not help.  Walks 2 mi daily at [facility]. Also used to run marathons. 15 total. Started swimming 2 years ago, stopped swimming 3 mo ago due to increased weakness from Parkinson's disease. Staying active helps the pain. Denies radiation. Cannot stand upright for longer than 10-15min before back pain gets more intense. Walking does not exacerbate the pain. Transitioning from sitting to standing causes pain. Pain is described as an ache.  Tightness in upper back. Not associated with trauma/fall or other inciting event.     Brief history:    Pain Description:    The pain is located in the back area     At BEST  6/10     At WORST  9/10 on the WORST day.      On average pain is rated as 7/10.     Today the pain is rated as 6/10    The pain is described as aching and tight band      Symptoms " interfere with daily activity (pt wishes to be able to swim again, and do light weight-lifting exercises),  pain does not awaken pt during the night and does not hinder the patient the patient from falling asleep.     Exacerbating factors: Getting out of bed/chair.      Mitigating factors nothing.     Patient denies .  Patient denies any suicidal or homicidal ideations    Pain Medications:  Current:  advil with mild relief    Tried in Past:  NSAIDs - Advil prn   TCA -never   SNRI -Wellbutrin, lexapro  Anti-convulsants -Never  Muscle Relaxants -Never  Opioids- Never    Physical Therapy/Home Exercise:  yes       report:  Reviewed and consistent with medication use as prescribed.    Pain Procedures:   9/6/2017- Bilateral L4 TF AFTAB  10/11/2017 BL L4 TFESI  7/3/2018- Bilateral L2,3,4,5 MBB- no relief  8/16/2018- Bilateral L4 TF AFTAB    Chiropractor - adjustment made pain worse in March  Acupuncture - no results, dry needling 2 months ago  TENS unit -never  Spinal decompression -never  Joint replacement -never    Imaging:  Outside MRI L SPINE 2/15/2016  Impression:   Multilevel lumbar spondylosis.   Multilevel, multifactorial spinal canal and neuroforaminal narrowing as above.  Patient has facet hypertrophy throughout the lumbar spine injury to right sided severe neuroforaminal stenosis at L4-5 and bilateral neuroforaminal stenosis at L5-S1 which is mild to moderate.      Xray Lumbar Spine 9/5/2017:  The lumbar vertebra are intact. AP view shows slight curvature convex to the left in the lower lumbar region. The lateral views show a grade 1 retrolisthesis at L1-2, stable between flexion and extension. Degenerative changes are present throughout the lumbar spine with disc space narrowing and marginal osteophytes. Mild degenerative facet arthropathy is present at lower levels with narrowing and sclerosis. No obvious paraspinal lesion is seen.      Impression      No acute abnormality. Multilevel degenerative spine  changes.           Past Medical History:   Diagnosis Date    Anxiety     BPH (benign prostatic hypertrophy)     Cataract     OS    Depression     Epiretinal membrane, both eyes     Hx of psychiatric care     Hyperlipidemia     Hypertension     Kidney stones     Kidney stones     Parkinsons     Posterior vitreous detachment of left eye     Psychiatric problem     Retinal detachment 2004    OD    Retinoschisis, left eye     Sleep apnea     Vitreous hemorrhage of left eye      Past Surgical History:   Procedure Laterality Date    CATARACT EXTRACTION      OD    EYE SURGERY      laser indirect  4/8/10    left eye    Laser Indirect Retinopexy  4/8/10    OS    PROSTATE SURGERY      RETINAL DETACHMENT SURGERY  2004    OD    ROTATOR CUFF REPAIR  11/13/2013    TONSILLECTOMY       Social History     Socioeconomic History    Marital status:      Spouse name: Trinidad    Number of children: 4    Years of education: Not on file    Highest education level: Not on file   Social Needs    Financial resource strain: Not on file    Food insecurity - worry: Not on file    Food insecurity - inability: Not on file    Transportation needs - medical: Not on file    Transportation needs - non-medical: Not on file   Occupational History     Employer: [employer]   Tobacco Use    Smoking status: Never Smoker    Smokeless tobacco: Never Used   Substance and Sexual Activity    Alcohol use: No    Drug use: No    Sexual activity: Not on file   Other Topics Concern    Patient feels they ought to cut down on drinking/drug use Not Asked    Patient annoyed by others criticizing their drinking/drug use Not Asked    Patient has felt bad or guilty about drinking/drug use Not Asked    Patient has had a drink/used drugs as an eye opener in the AM Not Asked   Social History Narrative    Not on file     Family History   Problem Relation Age of Onset    Cataracts Father     Cancer Father         lung     Diabetes Father     Cancer Mother         lung    Anxiety disorder Son     Depression Son     Anxiety disorder Daughter        Review of patient's allergies indicates:  No Known Allergies    Current Outpatient Medications   Medication Sig    aspirin 81 MG Chew Take 81 mg by mouth. 1 Tablet, Chewable Oral Every day    atorvastatin (LIPITOR) 40 MG tablet Take 1 tablet (40 mg total) by mouth once daily.    carbidopa-levodopa  mg (SINEMET)  mg per tablet Take 1.5 tablets by mouth 3 (three) times daily.    citric acid-potassium citrate (POLYCITRA) 1,100-334 mg/5 mL solution Take 10 mLs by mouth.    clonazePAM (KLONOPIN) 0.5 MG tablet Take oral 1/2 to 1 tablet TID prn anxiety    ergocalciferol (VITAMIN D2) 50,000 unit Cap Take 50,000 Units by mouth every 30 days.     finasteride (PROSCAR) 5 mg tablet Take 1 tablet (5 mg total) by mouth once daily.    losartan (COZAAR) 100 MG tablet Take 1 tablet (100 mg total) by mouth once daily.    magnesium oxide (MAG-OX) 400 mg tablet Take 400 mg by mouth once daily.    potassium citrate (UROCIT-K) 10 mEq (1,080 mg) TbSR TAKE 1 TABLET THREE TIMES DAILY WITH MEALS    pyridoxine (VITAMIN B-6) 50 MG Tab Take 50 mg by mouth once daily.    sertraline (ZOLOFT) 100 MG tablet Take 2 tablets (200 mg total) by mouth once daily.    traZODone (DESYREL) 50 MG tablet Take 1 tablet (50 mg total) by mouth nightly as needed for Insomnia.     No current facility-administered medications for this visit.        REVIEW OF SYSTEMS:    GENERAL:  weight loss 3-4 lb/month, no malaise or fevers.  HEENT:   No recent changes in vision or hearing  NECK:  Negative for lumps, no difficulty with swallowing.  RESPIRATORY:  Negative for cough, wheezing or shortness of breath, patient denies any recent URI.  CARDIOVASCULAR:  Negative for chest pain, leg swelling or palpitations.  GI: +N/V a/w pt's home medications. Negative for abdominal discomfort, blood in stools or black stools or change  "in bowel habits.  MUSCULOSKELETAL:  See HPI.  SKIN:  Negative for lesions, rash, and itching.  PSYCH:  No mood disorder or recent psychosocial stressors.  Patients sleep is not disturbed secondary to pain.  HEMATOLOGY/LYMPHOLOGY: + bruising easily   ENDO: No history of diabetes or thyroid dysfunction  NEURO:    Patient has Parkinson's with parkinsonian tremor and affect.  All other reviewed and negative other than HPI.    OBJECTIVE:    BP (!) 151/106   Pulse 66   Temp 97.5 °F (36.4 °C)   Ht 5' 10" (1.778 m)   Wt 90.7 kg (199 lb 15.3 oz)   BMI 28.69 kg/m²     PHYSICAL EXAMINATION:    GENERAL: Well appearing, in no acute distress, alert and oriented x3.  PSYCH:  Mood and affect appropriate.  SKIN: Skin color, texture, turgor normal, no rashes or lesions.  HEAD/FACE:  Normocephalic, atraumatic. Cranial nerves grossly intact.  CV: RRR with palpation of the radial artery.  PULM: No evidence of respiratory difficulty, symmetric chest rise.  BACK: Straight leg raising in the sitting position is negative to radicular pain bilaterally. There is pain with palpation over the facet joints of the lumbar spine. Limited ROM with pain on extension. Positive facet loading bilaterally.   EXTREMITIES: Peripheral joint ROM is full and pain free without obvious instability or laxity in all four extremities. No deformities, edema, or skin discoloration. Good capillary refill.  MUSCULOSKELETAL: Hip and knee provocative maneuvers are negative.  There is no pain with palpation over the sacroiliac joints bilaterally.  There is no pain to palpation over the greater trochanteric bursa bilaterally.  FABERs test is negative.  FADIRs test is negative.   5/5 strength in right ankle with plantar and 4/5 dorsiflexion. 5/5 strength in left ankle with plantar and dorsiflexion, 5/5 strength with right knee flexion extension, 5/5 strength with knee flexion extension on the left  No atrophy or tone abnormalities are noted.  NEURO:  2+ right knee, 3+ " left knee reflexes, absent S1 reflexes bilaterally.  Plantar response are downgoing. No clonus.  No loss of sensation is noted.  GAIT: Antalgic, ambulates without assistance       ASSESSMENT: 72 y.o. year old male with pain, consistent with     Encounter Diagnoses   Name Primary?    Facet arthritis, degenerative, lumbar spine Yes    Foraminal stenosis of lumbosacral region     Lumbar radiculopathy     Spondylolysis of lumbar region        PLAN:     - Previous imaging was reviewed and discussed with the patient today.    - We discussed updating his MRI. He would like to wait at this time.     - Trial Mobic 7.5 mg daily. We may increase to 15 mg daily if needed.     - Continue home exercise routine.     - RTC in 1 month.    - Dr. Crockett was consulted on the patient and agrees with this plan.    The above plan and management options were discussed at length with patient. Patient is in agreement with the above and verbalized understanding.     Xochilt Arenas NP  09/04/2018

## 2018-09-12 ENCOUNTER — PATIENT MESSAGE (OUTPATIENT)
Dept: PAIN MEDICINE | Facility: CLINIC | Age: 73
End: 2018-09-12

## 2018-09-17 ENCOUNTER — PES CALL (OUTPATIENT)
Dept: ADMINISTRATIVE | Facility: CLINIC | Age: 73
End: 2018-09-17

## 2018-09-18 ENCOUNTER — OFFICE VISIT (OUTPATIENT)
Dept: PSYCHIATRY | Facility: CLINIC | Age: 73
End: 2018-09-18
Payer: MEDICARE

## 2018-09-18 DIAGNOSIS — F41.1 GENERALIZED ANXIETY DISORDER: Primary | ICD-10-CM

## 2018-09-18 PROCEDURE — 90834 PSYTX W PT 45 MINUTES: CPT | Mod: S$PBB,,, | Performed by: SOCIAL WORKER

## 2018-09-18 PROCEDURE — 90785 PSYTX COMPLEX INTERACTIVE: CPT | Mod: S$PBB,,, | Performed by: SOCIAL WORKER

## 2018-09-18 PROCEDURE — 90834 PSYTX W PT 45 MINUTES: CPT | Mod: PBBFAC | Performed by: SOCIAL WORKER

## 2018-09-18 PROCEDURE — 90785 PSYTX COMPLEX INTERACTIVE: CPT | Mod: PBBFAC | Performed by: SOCIAL WORKER

## 2018-09-18 NOTE — PROGRESS NOTES
Individual Psychotherapy (PhD/LCSW)    9/18/2018    Site:  [facility]         Therapeutic Intervention: Met with patient and spouse.  Outpatient - Insight oriented psychotherapy 45 min - CPT code 76497    Chief complaint/reason for encounter: anxiety Wife Whitley  Pt with parkinsons with cognitive difficulties for which wife is needed to aid in therapeutic instructions including homework.       Interval history and content of current session:    He finished next chapter.  Both reporting decrease anxiety.  He discussed sleep pap machine.  We did mindfulness.  He reported no distractions.  He will record thoughts with help of wife.             Treatment plan:  · Target symptoms: anxiety   · Why chosen therapy is appropriate versus another modality: relevant to diagnosis  · Outcome monitoring methods: self-report, observation  · Therapeutic intervention type: behavior modifying psychotherapy    Risk parameters:  Patient reports no suicidal ideation  Patient reports no homicidal ideation  Patient reports no self-injurious behavior  Patient reports no violent behavior      Verbal deficits: None    Patient's response to intervention:  The patient's response to intervention is accepting.    Progress toward goals and other mental status changes:  The patient's progress toward goals is fair .    Diagnosis:     ICD-10-CM ICD-9-CM   1. Generalized anxiety disorder F41.1 300.02       Plan:  individual psychotherapy    Return to clinic: 2 weeks    Length of Service (minutes): 45

## 2018-09-29 ENCOUNTER — PATIENT MESSAGE (OUTPATIENT)
Dept: PSYCHIATRY | Facility: CLINIC | Age: 73
End: 2018-09-29

## 2018-10-01 DIAGNOSIS — F41.9 ANXIETY DISORDER, UNSPECIFIED TYPE: ICD-10-CM

## 2018-10-04 DIAGNOSIS — F41.9 ANXIETY DISORDER, UNSPECIFIED TYPE: ICD-10-CM

## 2018-10-04 RX ORDER — CLONAZEPAM 0.5 MG/1
TABLET ORAL
Qty: 60 TABLET | Refills: 2 | Status: SHIPPED | OUTPATIENT
Start: 2018-10-04 | End: 2019-01-10 | Stop reason: SDUPTHER

## 2018-10-05 ENCOUNTER — TELEPHONE (OUTPATIENT)
Dept: NEUROLOGY | Facility: CLINIC | Age: 73
End: 2018-10-05

## 2018-10-05 RX ORDER — CLONAZEPAM 0.5 MG/1
TABLET ORAL
Qty: 60 TABLET | Refills: 1 | OUTPATIENT
Start: 2018-10-05

## 2018-10-05 NOTE — TELEPHONE ENCOUNTER
Called pt and he stated that the pharmacy would like a call from the physician due to the change in the intake instruction for the medication. Pt was taking half tablet by mouth tid but is now taking 1.5 tablet by mouth tid. Provider response is needed on correct intake instruction.

## 2018-10-05 NOTE — TELEPHONE ENCOUNTER
----- Message from Vinnie Dillon sent at 10/5/2018  3:29 PM CDT -----  Needs Advice    Reason for call: Pt states his script for carbidopa-levodopa  mg (SINEMET)  mg per tablet is saying take half tablet 3x daily, but the script on file says take 1.5 tablet 3x daily. Pt is calling to confirm how he should take the medication.        Communication Preference: [phone]    Additional Information:

## 2018-10-08 ENCOUNTER — TELEPHONE (OUTPATIENT)
Dept: NEUROLOGY | Facility: CLINIC | Age: 73
End: 2018-10-08

## 2018-10-08 NOTE — TELEPHONE ENCOUNTER
----- Message from Ana De Jesus sent at 10/8/2018 11:47 AM CDT -----  Contact: Pt. [phone]  Needs Advice    Reason for call: The patient would like to speak to someone regarding the instruction change to his medication. He called and spoke to Bobbi Friday but never received a follow up call. Please contact the patient to discuss further.      Communication Preference:PHONE     Additional Information:

## 2018-10-09 NOTE — TELEPHONE ENCOUNTER
----- Message from Karthikeyan Eaton sent at 10/9/2018 10:37 AM CDT -----  Contact: Patient @ [phone]  3rd Request: Patient is requesting a return call about the change in direction and on (carbidopa-levodopa  mg (SINEMET)  mg per tablet ) pls call to advise

## 2018-10-09 NOTE — TELEPHONE ENCOUNTER
Returned call to pt and informed Rx was updated by Bobbi on yesterday to pharmacy and they will call and get the refill.

## 2018-10-11 ENCOUNTER — TELEPHONE (OUTPATIENT)
Dept: SLEEP MEDICINE | Facility: CLINIC | Age: 73
End: 2018-10-11

## 2018-10-11 ENCOUNTER — OFFICE VISIT (OUTPATIENT)
Dept: SLEEP MEDICINE | Facility: CLINIC | Age: 73
End: 2018-10-11
Payer: MEDICARE

## 2018-10-11 VITALS
WEIGHT: 199.81 LBS | HEART RATE: 67 BPM | SYSTOLIC BLOOD PRESSURE: 139 MMHG | DIASTOLIC BLOOD PRESSURE: 85 MMHG | BODY MASS INDEX: 28.6 KG/M2 | HEIGHT: 70 IN

## 2018-10-11 DIAGNOSIS — G47.52 DREAM ENACTMENT BEHAVIOR: ICD-10-CM

## 2018-10-11 DIAGNOSIS — G47.33 OBSTRUCTIVE SLEEP APNEA: Primary | ICD-10-CM

## 2018-10-11 PROCEDURE — 99213 OFFICE O/P EST LOW 20 MIN: CPT | Mod: S$PBB,,, | Performed by: NURSE PRACTITIONER

## 2018-10-11 PROCEDURE — 1101F PT FALLS ASSESS-DOCD LE1/YR: CPT | Mod: CPTII,,, | Performed by: NURSE PRACTITIONER

## 2018-10-11 PROCEDURE — 99213 OFFICE O/P EST LOW 20 MIN: CPT | Mod: PBBFAC,PO | Performed by: NURSE PRACTITIONER

## 2018-10-11 PROCEDURE — 99999 PR PBB SHADOW E&M-EST. PATIENT-LVL III: CPT | Mod: PBBFAC,,, | Performed by: NURSE PRACTITIONER

## 2018-10-11 PROCEDURE — 3075F SYST BP GE 130 - 139MM HG: CPT | Mod: CPTII,,, | Performed by: NURSE PRACTITIONER

## 2018-10-11 PROCEDURE — 3079F DIAST BP 80-89 MM HG: CPT | Mod: CPTII,,, | Performed by: NURSE PRACTITIONER

## 2018-10-11 NOTE — TELEPHONE ENCOUNTER
----- Message from Lili Silva sent at 10/11/2018 12:02 PM CDT -----            Name of Who is Calling: Whitley(wife)      What is the request in detail: Pt's wife is calling to speak with Lizz regarding the medications her  is currently taking. It's regarding the Klonozepam. Please call to discuss.      Can the clinic reply by [facility]: yes      What [facility] Back [facility]: [phone]

## 2018-10-11 NOTE — LETTER
October 11, 2018      Andrez Jackson MD  [address]  [facility] [address]           [facility]  [address] [facility] [address]  Phone: [phone]  Fax: [phone]          Patient: Haja Marti   MR Number: [id number]   YOB: 1945   Date of Visit: 10/11/2018       Dear Dr. Andrez Jackson:    Thank you for referring Haja Marti to me for evaluation. Attached you will find relevant portions of my assessment and plan of care.    If you have questions, please do not hesitate to call me. I look forward to following Haja Marti along with you.    Sincerely,    Lizz Coburn, NP    Enclosure  CC:  No Recipients    If you would like to receive this communication electronically, please contact [email or web address] or [phone] to request more information on [employer] Link access.    For providers and/or their staff who would like to refer a patient to [facility], please contact us through our one-stop-shop provider referral line, [facility], at [phone].    If you feel you have received this communication in error or would no longer like to receive these types of communications, please e-mail [email or web address]

## 2018-10-11 NOTE — PROGRESS NOTES
"Haja Marti returns today for mgt of SHU/probable RBD    He can't recall if he tried EPAP after last seen. He can't recall dose of melatonin he tried. He was still acting out dreams. Sleeps separately from wife now, with nightstand. She has been tyrying to have him move it away from bed. He is not excited about revisiting use of PAP therapy, but willing to trial. Feels he sleeps good 8hr, but is tired during daytime. Doing boxing. Sometimes wife looks at his bed and says "youve been fighting again". Snoring. Denies apneic pauses. Seeing neuro for PD, on sinemet. Doing PD walk this weekend. Reviewed his sleep study results today. Noturia 1-2x.       HISTORY  2012  Chief Complaint:  Snoring    History of Present Illness:  The patient is referred by Dr. Jackson for evaluation and management of sleep apnea.  He underwent a split-night PSG in 2004 revealing mild-moderate SHU with an RDI of 11.2 and O2 sana of 89%. He had limited TST during baseline study and no REM. He was initiated with CPAP 11cm but had difficulty keeping mask on/acclimating to it. He had a repeat CPAP titration study in 2010 where an effective pressure was not determined due to mouth leak and lack of REM stage sleep. Improvement was seen in NREM at 9 cm with a nasal mask and at 13 cm in NREM with a full face mask. He was initiated at CPAP 13cm. He has not used it for awhile. He felt mask was cumbersome.  He feels tired mainly in evenings after home from work. He denies unrefreshing or frequently disrupted sleep. He does snore but wife denies apneic pauses or air gasping. He does not wish to retrial PAP therapy.     He has researched RLS and does not feel this is problematic for him. He does report occasionally fighting in bed or kicking in bed. He reports jumping out of bed once every 3-6months, he recalls a few episodes in recent years. He denies head injury only because he was in a different room without nightstand close.     On today's " "Easton Sleepiness Scale the patient scores a 1/24    Sleep routine:   Time to bed:  "window" is 9-10p, if delays sleep then it is 1-2a, watching TV  Sleep onset latency: minutes to sometimes 30min  Disruptions:  Nocturia x 1  Wake up time:  0530-6  Perceive sleep quality: Refreshed, sometime walks 1-2 mi prior to working  Caffeine use:  1c coffee/am    Past Medical History:  Past Medical History:   Diagnosis Date    Anxiety     BPH (benign prostatic hypertrophy)     Cataract     OS    Depression     Epiretinal membrane, both eyes     Hx of psychiatric care     Hyperlipidemia     Hypertension     Kidney stones     Kidney stones     Parkinsons     Posterior vitreous detachment of left eye     Psychiatric problem     Retinal detachment 2004    OD    Retinoschisis, left eye     Sleep apnea     Vitreous hemorrhage of left eye        Review of Systems: Sleep related symptoms as per HPI. 13# loss, slow mentation/gait. Otherwise a balance review of 10-systems is negative.        Physical Examination:    /85   Pulse 67   Ht 5' 10" (1.778 m)   Wt 90.6 kg (199 lb 12.8 oz)   BMI 28.67 kg/m²   GENERAL: W/D, overweight  male, well groomed       Impression:  Obstructive Sleep Apnea (SHU), nonadherent with CPAP due to difficulty acclimating to mask/pressure with return symptoms of snoring, evening sleepiness. 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 DLB given the early neuropsychiatric features)  Dream enacting behavior    PLAN:   1.Discussed health benefits of PAP therapy. PSG with PARASOMNIA montage. Please do with extended EEG and EMG Parasomnia montage. Please keep camera on the WHOLE pt including FEET. Document any unusual behaviors arising from sleep and in case unusual sleep behaviors take place, ask the pt if he/she remembers the content of the preceding dream- please document the dream too.PLEASE RECORD " AUDIO.       2.  Melatonin 3-12mg QHS to reduce potential symptoms and improve sleep efficiency. Discussed importance of bedroom and home safety. Spoke with wife on phone about plan of care. If sx persist begin klonopin qhs (already has for anxiety, but does not know if he takes)

## 2018-10-12 ENCOUNTER — OFFICE VISIT (OUTPATIENT)
Dept: PSYCHIATRY | Facility: CLINIC | Age: 73
End: 2018-10-12
Payer: MEDICARE

## 2018-10-12 DIAGNOSIS — F41.1 GENERALIZED ANXIETY DISORDER: Primary | ICD-10-CM

## 2018-10-12 PROCEDURE — 90834 PSYTX W PT 45 MINUTES: CPT | Mod: PBBFAC | Performed by: SOCIAL WORKER

## 2018-10-12 PROCEDURE — 90834 PSYTX W PT 45 MINUTES: CPT | Mod: S$PBB,,, | Performed by: SOCIAL WORKER

## 2018-10-12 NOTE — PROGRESS NOTES
Individual Psychotherapy (PhD/LCSW)    10/12/2018    Site:  [facility]         Therapeutic Intervention: Met with patient and spouse.  Outpatient - Insight oriented psychotherapy 45 min - CPT code 11805    Chief complaint/reason for encounter: anxiety Wife Whitley  Pt with parkinsons with cognitive difficulties for which wife is needed to aid in therapeutic instructions including homework.       Interval history and content of current session:    After noticing his anxiety raised by the uncertainty principal and decline by certainty I decided to expose pt to anxiety via uncertainty.  His anxiety climbed to 10 and dissipated to 6 after 25 minutes.   With his permission we will do further exposure on next visit.    Wife present to facilitate process.            Treatment plan:  · Target symptoms: anxiety   · Why chosen therapy is appropriate versus another modality: relevant to diagnosis  · Outcome monitoring methods: self-report, observation  · Therapeutic intervention type: behavior modifying psychotherapy    Risk parameters:  Patient reports no suicidal ideation  Patient reports no homicidal ideation  Patient reports no self-injurious behavior  Patient reports no violent behavior      Verbal deficits: None    Patient's response to intervention:  The patient's response to intervention is accepting.    Progress toward goals and other mental status changes:  The patient's progress toward goals is fair .    Diagnosis:     ICD-10-CM ICD-9-CM   1. Generalized anxiety disorder F41.1 300.02       Plan:  individual psychotherapy    Return to clinic: 2 weeks    Length of Service (minutes): 45

## 2018-10-19 ENCOUNTER — TELEPHONE (OUTPATIENT)
Dept: SLEEP MEDICINE | Facility: OTHER | Age: 73
End: 2018-10-19

## 2018-10-23 ENCOUNTER — TELEPHONE (OUTPATIENT)
Dept: PAIN MEDICINE | Facility: CLINIC | Age: 73
End: 2018-10-23

## 2018-10-23 DIAGNOSIS — M47.816 FACET ARTHRITIS, DEGENERATIVE, LUMBAR SPINE: Primary | ICD-10-CM

## 2018-10-23 DIAGNOSIS — M43.06 SPONDYLOLYSIS OF LUMBAR REGION: ICD-10-CM

## 2018-10-23 DIAGNOSIS — M48.07 FORAMINAL STENOSIS OF LUMBOSACRAL REGION: ICD-10-CM

## 2018-10-23 DIAGNOSIS — M54.16 LUMBAR RADICULOPATHY: ICD-10-CM

## 2018-10-23 RX ORDER — MELOXICAM 7.5 MG/1
7.5 TABLET ORAL DAILY
Qty: 30 TABLET | Refills: 3 | Status: SHIPPED | OUTPATIENT
Start: 2018-10-23 | End: 2019-03-27

## 2018-10-23 NOTE — TELEPHONE ENCOUNTER
----- Message from Brandi Cotter LPN sent at 10/22/2018  4:56 PM CDT -----  Contact: Pt  Can you refill for patient, Thanks  ----- Message -----  From: Syeda Shahid  Sent: 10/22/2018   4:41 PM  To: Dagoberto Lemon Staff    Can the clinic reply in [facility]: No    Please refill the medication(s) listed below. The patient can be reached at this phone number (_[phone]_) once it is called into the pharmacy.    Medication #1meloxicam (MOBIC) 7.5 MG tablet    Preferred Pharmacy:[facility]ARMACY #[id number] - [place] [address]

## 2018-10-26 ENCOUNTER — TELEPHONE (OUTPATIENT)
Dept: SLEEP MEDICINE | Facility: CLINIC | Age: 73
End: 2018-10-26

## 2018-10-26 NOTE — TELEPHONE ENCOUNTER
----- Message from Jing Josh sent at 10/26/2018  9:21 AM CDT -----  Contact: pt   Name of Who is Calling: ALEXANDRO RENE [[id number]]      What is the request in detail: Patient is requesting a call from staff in regards to his sleep study. Please contact to further discuss and advise      Can the clinic reply by [facility]: No       What Number to Call Back if not in [facility]: [phone]

## 2018-11-14 ENCOUNTER — OFFICE VISIT (OUTPATIENT)
Dept: PSYCHIATRY | Facility: CLINIC | Age: 73
End: 2018-11-14
Payer: MEDICARE

## 2018-11-14 DIAGNOSIS — F41.1 GENERALIZED ANXIETY DISORDER: Primary | ICD-10-CM

## 2018-11-14 PROCEDURE — 90785 PSYTX COMPLEX INTERACTIVE: CPT | Mod: HCNC,S$GLB,, | Performed by: SOCIAL WORKER

## 2018-11-14 PROCEDURE — 90834 PSYTX W PT 45 MINUTES: CPT | Mod: HCNC,S$GLB,, | Performed by: SOCIAL WORKER

## 2018-11-14 NOTE — PROGRESS NOTES
Individual Psychotherapy (PhD/LCSW)    11/14/2018    Site:  [facility]         Therapeutic Intervention: Met with patient and spouse.  Outpatient - Insight oriented psychotherapy 45 min - CPT code 33761    Chief complaint/reason for encounter: anxiety Wife Whitley  Pt with parkinsons with cognitive difficulties for which wife is needed to aid in therapeutic instructions including homework.       Interval history and content of current session:    ACTherapy.  Share two computer metaphor.  Mindfulness meditation.   He has been feeling better.  He is also more talkative.  As usual wife continues to be in the sessions and necessary in facilitating the interpretation of instructions inside and outside the sessions.                  Treatment plan:  · Target symptoms: anxiety   · Why chosen therapy is appropriate versus another modality: relevant to diagnosis  · Outcome monitoring methods: self-report, observation  · Therapeutic intervention type: behavior modifying psychotherapy    Risk parameters:  Patient reports no suicidal ideation  Patient reports no homicidal ideation  Patient reports no self-injurious behavior  Patient reports no violent behavior      Verbal deficits: None    Patient's response to intervention:  The patient's response to intervention is accepting.    Progress toward goals and other mental status changes:  The patient's progress toward goals is fair .    Diagnosis:     ICD-10-CM ICD-9-CM   1. Generalized anxiety disorder F41.1 300.02       Plan:  individual psychotherapy    Return to clinic: 2 weeks    Length of Service (minutes): 45

## 2018-11-17 ENCOUNTER — HOSPITAL ENCOUNTER (OUTPATIENT)
Dept: SLEEP MEDICINE | Facility: HOSPITAL | Age: 73
Discharge: HOME | End: 2018-11-17
Attending: NURSE PRACTITIONER
Payer: MEDICARE

## 2018-11-17 DIAGNOSIS — G47.52 DREAM ENACTMENT BEHAVIOR: ICD-10-CM

## 2018-11-17 DIAGNOSIS — G47.33 OBSTRUCTIVE SLEEP APNEA: ICD-10-CM

## 2018-11-17 PROCEDURE — 95810 POLYSOM 6/> YRS 4/> PARAM: CPT | Mod: 26,HCNC,, | Performed by: PSYCHIATRY & NEUROLOGY

## 2018-11-17 PROCEDURE — 95810 POLYSOM 6/> YRS 4/> PARAM: CPT | Mod: HCNC

## 2018-11-17 PROCEDURE — 95810 PR POLYSOMNOGRAPHY, 4 OR MORE: ICD-10-PCS | Mod: 26,HCNC,, | Performed by: PSYCHIATRY & NEUROLOGY

## 2018-11-17 NOTE — Clinical Note
"Lizz,I'm sorry to say that this study was noted as missing by Compliance team just recently.It was done in 11/18, right before the Thanksgiving.I'm not sure how it happened, since I have this report marked as "read", please let me know if I need to intervene re: informing the patient.I'm working on the internal system to catch those "fallen through the cracks" studies. Chance will be looking at the assignments table at the end of the month to make sure that every read study is physically still in Epic at the end of the month.Sirisha"

## 2018-11-18 NOTE — PROGRESS NOTES
This is a Screen study with extended EEG and parasomnia leads  for 73 year old Haja Marti.  The procedure was explained to the patient upon arrival. This included the set-up process and what to expect during the night.   It was also explained to the patient that the test will be interpreted by a physician and the results will be sent to his PCP.  His EKG appeared to be NSR occasional PAC and PVC.  Mild - moderate snoring observed.  Sleep disordered breathing most significant in supine sleep.  He did not meet the medicare criteria in time for a split night study.  A thank you letter was given to the patient upon leaving the sleep lab that explains the next steps regarding the sleep study and the treatment, if needed.

## 2018-11-28 ENCOUNTER — OFFICE VISIT (OUTPATIENT)
Dept: NEUROLOGY | Facility: CLINIC | Age: 73
End: 2018-11-28
Payer: MEDICARE

## 2018-11-28 VITALS
BODY MASS INDEX: 28.72 KG/M2 | SYSTOLIC BLOOD PRESSURE: 127 MMHG | HEIGHT: 70 IN | DIASTOLIC BLOOD PRESSURE: 79 MMHG | HEART RATE: 70 BPM | WEIGHT: 200.63 LBS

## 2018-11-28 DIAGNOSIS — G20.A1 PARKINSON DISEASE: Primary | ICD-10-CM

## 2018-11-28 PROCEDURE — 99999 PR PBB SHADOW E&M-EST. PATIENT-LVL III: CPT | Mod: PBBFAC,HCNC,, | Performed by: PSYCHIATRY & NEUROLOGY

## 2018-11-28 PROCEDURE — 3074F SYST BP LT 130 MM HG: CPT | Mod: CPTII,HCNC,S$GLB, | Performed by: PSYCHIATRY & NEUROLOGY

## 2018-11-28 PROCEDURE — 99214 OFFICE O/P EST MOD 30 MIN: CPT | Mod: HCNC,S$GLB,, | Performed by: PSYCHIATRY & NEUROLOGY

## 2018-11-28 PROCEDURE — 1101F PT FALLS ASSESS-DOCD LE1/YR: CPT | Mod: CPTII,HCNC,S$GLB, | Performed by: PSYCHIATRY & NEUROLOGY

## 2018-11-28 PROCEDURE — 3078F DIAST BP <80 MM HG: CPT | Mod: CPTII,HCNC,S$GLB, | Performed by: PSYCHIATRY & NEUROLOGY

## 2018-11-28 RX ORDER — HYDROCODONE BITARTRATE AND ACETAMINOPHEN 5; 325 MG/1; MG/1
TABLET ORAL
Refills: 0 | COMMUNITY
Start: 2018-10-30 | End: 2019-01-23

## 2018-11-28 RX ORDER — METHYLPHENIDATE HYDROCHLORIDE 5 MG/1
5 TABLET ORAL DAILY
Qty: 30 TABLET | Refills: 0 | Status: SHIPPED | OUTPATIENT
Start: 2018-11-28 | End: 2018-12-17 | Stop reason: SDUPTHER

## 2018-11-28 NOTE — PROGRESS NOTES
"Name: Haja Marti  MRN: [id number]   CSN: [id number]      Date: 11/28/2018    Chief Complaint / Interval History:   - more fatigue  - exercising some  - feels pretty stable  -  Good support with family  - little off time  -  No dyskinesia  - exercising  - no bowel/bladder changes  - mild AM LH and non-motor complications of near-syncope    From Aug 2018:  - having much more fatigue, lays down to feel better  - balance and gait about the same  -  Still constipated   - watching his BP, has been more normal for other doctors    From April 2018:  - balance and gait is better   - taking miralax as needed, usually takes 2-3 days to move bowels  - not drinking much water    From January 2018:  - azilect had some insomnia  - restarted the cd/ld 1/2 tid - wife thinks all is a little better but not dramatic  - voice particularly better  - no recurrence of hallucinations on the low dose cd/ld  -  He thinks the new cd/ld has leveled out, but anxiety is worse, for nearly every event in the family  - he is ruminating about having to give a speech in the next month and he is very nervous    * might be worse since he started the aricept    History of Present Illness (HPI):  70 yo     2+ years of progressive stooping posture, shuffling feet, slower walking, struggles to get out of a chair.  Handwriting has gotten "terrible."  Slower with dressing overall, and with most things.    Is a cautious , but has a tendency to pull to the R side of the road (correcting to the left).  More issues with parking, no issues moving backwards but uses side mirrors.  No accidents.      For about 6 months, there has been a progressive hoarseness of the voice.  Comes and goes, not clear timing.      No tremor.      Saw Dr. Bedoya in July, dx with suspected parkinson's, and was started on CD/LD 25/100 up to 1 TID (but has never taken more than 1 tab twice a day)    Nonmotor/Premotor ROS:  Hyposmia (HENT)?Yes (and lost taste) for 20 " years.  RBD/sleep issues (Constitutional)?Yes - fights in his sleep, jumps out of bed, falls out over 5 years, talking for 30 years.  Depression/anxiety (Psychiatric)?Yes  Fatigue (Constitutional)?Yes  Constipation (GI)?Yes  Urinary issues ()?Yes  Sexual dysfunction ()?Yes  -ED  Orthostasis (Cardiovascular)?Yes  Leg swelling (Cardiovascular)? No  Falls (Musculoskeletal)?Yes - has had a couple of falls, more unsteady on a boat.    Cognitive impairment (Neurologic)?Yes - a little slower overall  Psychoses (Psychiatric)?Yes - recently had experience thinking he saw people in his den, non-descript faces; 2 years ago, had a experience of seeing someone in the bedroom.  He's had no clear delusions.   Pain/Paresthesia (Neurologic)?Yes - lumbar for years, some disability, helped with pain management.  Visual changes (Eyes)?No  Moles / skin changes (Skin)?No  Stridor / SOB (Pulm)?No  Bruising (Heme)?No    Past Medical History: The patient  has a past medical history of Anxiety, BPH (benign prostatic hypertrophy), Cataract, Depression, Epiretinal membrane, both eyes, psychiatric care, Hyperlipidemia, Hypertension, Kidney stones, Kidney stones, Parkinsons, Posterior vitreous detachment of left eye, Psychiatric problem, Retinal detachment (2004), Retinoschisis, left eye, Sleep apnea, and Vitreous hemorrhage of left eye.    Social History: The patient  reports that  has never smoked. he has never used smokeless tobacco. He reports that he does not drink alcohol or use drugs.    Family History: Their family history includes Anxiety disorder in his daughter and son; Cancer in his father and mother; Cataracts in his father; Depression in his son; Diabetes in his father.    Allergies: Patient has no known allergies.     Meds:   Current Outpatient Medications on File Prior to Visit   Medication Sig Dispense Refill    aspirin 81 MG Chew Take 81 mg by mouth. 1 Tablet, Chewable Oral Every day      atorvastatin (LIPITOR) 40 MG  "tablet Take 1 tablet (40 mg total) by mouth once daily. 90 tablet 3    carbidopa-levodopa  mg (SINEMET)  mg per tablet Take 1.5 tablets by mouth 3 (three) times daily. 135 tablet 6    citric acid-potassium citrate (POLYCITRA) 1,100-334 mg/5 mL solution Take 10 mLs by mouth.      clonazePAM (KLONOPIN) 0.5 MG tablet Take oral 1/2 to 1 tablet TID prn anxiety 60 tablet 2    ergocalciferol (VITAMIN D2) 50,000 unit Cap Take 50,000 Units by mouth every 30 days.       losartan (COZAAR) 100 MG tablet Take 1 tablet (100 mg total) by mouth once daily. 90 tablet 3    magnesium oxide (MAG-OX) 400 mg tablet Take 400 mg by mouth once daily.      meloxicam (MOBIC) 7.5 MG tablet Take 1 tablet (7.5 mg total) by mouth once daily. 30 tablet 3    potassium citrate (UROCIT-K) 10 mEq (1,080 mg) TbSR TAKE 1 TABLET THREE TIMES DAILY WITH MEALS 270 tablet 3    pyridoxine (VITAMIN B-6) 50 MG Tab Take 50 mg by mouth once daily.      sertraline (ZOLOFT) 100 MG tablet Take 2 tablets (200 mg total) by mouth once daily. 180 tablet 1    traZODone (DESYREL) 50 MG tablet Take 1 tablet (50 mg total) by mouth nightly as needed for Insomnia. 30 tablet 2    finasteride (PROSCAR) 5 mg tablet Take 1 tablet (5 mg total) by mouth once daily. 90 tablet 3    HYDROcodone-acetaminophen (NORCO) 5-325 mg per tablet TAKE 1 TABLET EVERY 4 TO 6 HOURS AS NEEDED FOR PAIN  0     No current facility-administered medications on file prior to visit.      Exam:  /79   Pulse 70   Ht 5' 10" (1.778 m)   Wt 91 kg (200 lb 9.9 oz)   BMI 28.79 kg/m²     * Specialized movement exam  Severe hypophonic speech.    ++ mouth sl open, facial masking.   L>R mild-mod cogwheel rigidity.     L>R mild-mod bradykinesia.   No tremor with rest, posture, kinesis, or intention.    No other dystonia, chorea, athetosis, myoclonus, or tics.   No motor impersistence.   Normal-based gait.   Mildly shortened stride length.  + abnormal arm swing.     No postural " instability.      Laboratory/Radiological:  - Results:  Lab Visit on 08/31/2018   Component Date Value Ref Range Status    Fecal Immunochemical Test (iFOBT) 08/31/2018 Negative  Negative Final     - Independent review of images:    Diagnoses:          1) Parkinsonism, likely iPD (vs. DLB given the early neuropsychiatric features)    Medical Decision Making:  - add low dose ritalin now  - more cold h2o in the am  - keep other meds for now    - 30-40 minutes of medication now  - medi diet          Edward Ivey MD, MPH  Division of Movement and Memory Disorders  [facility]  [phone]

## 2018-12-12 ENCOUNTER — OFFICE VISIT (OUTPATIENT)
Dept: PSYCHIATRY | Facility: CLINIC | Age: 73
End: 2018-12-12
Payer: MEDICARE

## 2018-12-12 ENCOUNTER — PATIENT MESSAGE (OUTPATIENT)
Dept: NEUROLOGY | Facility: CLINIC | Age: 73
End: 2018-12-12

## 2018-12-12 DIAGNOSIS — F41.1 GENERALIZED ANXIETY DISORDER: Primary | ICD-10-CM

## 2018-12-12 PROCEDURE — 90834 PSYTX W PT 45 MINUTES: CPT | Mod: HCNC,S$GLB,, | Performed by: SOCIAL WORKER

## 2018-12-12 NOTE — PROGRESS NOTES
Individual Psychotherapy (PhD/LCSW)    12/12/2018    Site:  [facility]         Therapeutic Intervention: Met with patient and spouse.  Outpatient - Insight oriented psychotherapy 45 min - CPT code 42414    Chief complaint/reason for encounter: anxiety Wife Whitley  Pt with parkinsons with cognitive difficulties for which wife is needed to aid in therapeutic instructions including homework.       Interval history and content of current session:    As usual wife continues to be in the sessions and necessary in facilitating the interpretation of instructions inside and outside the sessions.     Pt and wife continue to report better adaptability to illness.  He has develop muscle pain.   We practice the body scan.   Pain lessened to zero from 2.   When he thinks of his illness his mood worsen.                   Treatment plan:  · Target symptoms: anxiety   · Why chosen therapy is appropriate versus another modality: relevant to diagnosis  · Outcome monitoring methods: self-report, observation  · Therapeutic intervention type: behavior modifying psychotherapy    Risk parameters:  Patient reports no suicidal ideation  Patient reports no homicidal ideation  Patient reports no self-injurious behavior  Patient reports no violent behavior      Verbal deficits: None    Patient's response to intervention:  The patient's response to intervention is accepting.    Progress toward goals and other mental status changes:  The patient's progress toward goals is fair .    Diagnosis:     ICD-10-CM ICD-9-CM   1. Generalized anxiety disorder F41.1 300.02       Plan:  individual psychotherapy    Return to clinic: 1 month    Length of Service (minutes): 45

## 2018-12-15 DIAGNOSIS — F41.1 GAD (GENERALIZED ANXIETY DISORDER): Primary | ICD-10-CM

## 2018-12-17 RX ORDER — SERTRALINE HYDROCHLORIDE 100 MG/1
TABLET, FILM COATED ORAL
Qty: 180 TABLET | Refills: 0 | Status: SHIPPED | OUTPATIENT
Start: 2018-12-17 | End: 2019-01-10 | Stop reason: SDUPTHER

## 2018-12-17 RX ORDER — LOSARTAN POTASSIUM 100 MG/1
TABLET ORAL
Qty: 90 TABLET | Refills: 3 | Status: SHIPPED | OUTPATIENT
Start: 2018-12-17 | End: 2019-03-18 | Stop reason: SDUPTHER

## 2018-12-17 NOTE — TELEPHONE ENCOUNTER
----- Message from Ana De Jesus sent at 12/17/2018  9:07 AM CST -----  Contact: Pt. [phone]  Rx Refill/Request     Refill Rx:      Rx Name and Strength:  methylphenidate HCl (RITALIN) 5 MG     Preferred Pharmacy with phone number:   [facility]/pharmacy #[id number] - [place] - [address]  [address] [place] [address]  Phone: [phone] Fax: [phone]      Communication Preference:PHONE     Additional Information:

## 2018-12-18 NOTE — TELEPHONE ENCOUNTER
----- Message from Karthikeyan Eaton sent at 12/18/2018 11:25 AM CST -----  Contact: Patient @ [phone]  Patient calling to get a new script for (methylphenidate HCl (RITALIN) 5 MG tablet ) sent to:     [facility]/pharmacy #[id number] - [place] - [address]  [address] [place] [address]  Phone: [phone] Fax: [phone]

## 2018-12-19 ENCOUNTER — TELEPHONE (OUTPATIENT)
Dept: NEUROLOGY | Facility: CLINIC | Age: 73
End: 2018-12-19

## 2018-12-19 RX ORDER — METHYLPHENIDATE HYDROCHLORIDE 5 MG/1
5 TABLET ORAL DAILY
Qty: 30 TABLET | Refills: 0 | Status: SHIPPED | OUTPATIENT
Start: 2018-12-19 | End: 2019-01-11 | Stop reason: SDUPTHER

## 2018-12-19 NOTE — TELEPHONE ENCOUNTER
----- Message from Karthikeyan Eaton sent at 12/19/2018  9:39 AM CST -----  Contact: Patient @ [phone]  Patient requesting a return call to discuss the pain he's experiencing, pls call to discuss

## 2018-12-19 NOTE — TELEPHONE ENCOUNTER
Pt been having a lot of pain and its becoming hard to stand up and walk. Pt wife is not sure of what's causing this and why it's becoming more painful. Pt wife mentioned that she would like to speak with the provider regarding er 's situation.

## 2018-12-19 NOTE — TELEPHONE ENCOUNTER
----- Message from Ana De eJsus sent at 12/19/2018 12:25 PM CST -----  Contact: Pt. [phone]  Needs Advice    Reason for call: The patient would like to speak to someone regarding the pain behind his thighs. He's been calling for weeks and still haven't received the appropriate  Response. Please contact the patient to discuss further.          Communication Preference:PHONE     Additional Information:

## 2018-12-23 ENCOUNTER — PATIENT MESSAGE (OUTPATIENT)
Dept: NEUROLOGY | Facility: CLINIC | Age: 73
End: 2018-12-23

## 2018-12-24 ENCOUNTER — OFFICE VISIT (OUTPATIENT)
Dept: URGENT CARE | Facility: CLINIC | Age: 73
End: 2018-12-24
Payer: MEDICARE

## 2018-12-24 ENCOUNTER — NURSE TRIAGE (OUTPATIENT)
Dept: ADMINISTRATIVE | Facility: CLINIC | Age: 73
End: 2018-12-24

## 2018-12-24 ENCOUNTER — TELEPHONE (OUTPATIENT)
Dept: NEUROLOGY | Facility: CLINIC | Age: 73
End: 2018-12-24

## 2018-12-24 VITALS
HEIGHT: 70 IN | DIASTOLIC BLOOD PRESSURE: 99 MMHG | OXYGEN SATURATION: 99 % | SYSTOLIC BLOOD PRESSURE: 173 MMHG | BODY MASS INDEX: 27.92 KG/M2 | WEIGHT: 195 LBS | RESPIRATION RATE: 19 BRPM | TEMPERATURE: 98 F | HEART RATE: 69 BPM

## 2018-12-24 DIAGNOSIS — M62.838 MUSCLE SPASM OF BOTH LOWER LEGS: Primary | ICD-10-CM

## 2018-12-24 LAB
GLUCOSE SERPL-MCNC: 91 MG/DL (ref 70–110)
POC ANION GAP: 19 MMOL/L (ref 10–20)
POC BUN: 20 MMOL/L (ref 8–26)
POC CHLORIDE: 99 MMOL/L (ref 98–109)
POC CREATININE: 0.9 MG/DL (ref 0.6–1.3)
POC HEMATOCRIT: 49 %PCV (ref 42–52)
POC HEMOGLOBIN: 16.7 G/DL (ref 13.5–18)
POC ICA: 1.17 MMOL/L (ref 1.12–1.32)
POC POTASSIUM: 4.3 MMOL/L (ref 3.5–4.9)
POC SODIUM: 140 MMOL/L (ref 138–146)
POC TCO2: 27 MMOL/L (ref 24–29)

## 2018-12-24 PROCEDURE — 80047 BASIC METABLC PNL IONIZED CA: CPT | Mod: QW,S$GLB,, | Performed by: FAMILY MEDICINE

## 2018-12-24 PROCEDURE — 1101F PT FALLS ASSESS-DOCD LE1/YR: CPT | Mod: CPTII,S$GLB,, | Performed by: FAMILY MEDICINE

## 2018-12-24 PROCEDURE — 3080F DIAST BP >= 90 MM HG: CPT | Mod: CPTII,S$GLB,, | Performed by: FAMILY MEDICINE

## 2018-12-24 PROCEDURE — 3077F SYST BP >= 140 MM HG: CPT | Mod: CPTII,S$GLB,, | Performed by: FAMILY MEDICINE

## 2018-12-24 PROCEDURE — 99214 OFFICE O/P EST MOD 30 MIN: CPT | Mod: S$GLB,,, | Performed by: FAMILY MEDICINE

## 2018-12-24 RX ORDER — CYCLOBENZAPRINE HCL 10 MG
10 TABLET ORAL NIGHTLY
Qty: 30 TABLET | Refills: 0 | Status: SHIPPED | OUTPATIENT
Start: 2018-12-24 | End: 2019-01-23

## 2018-12-24 RX ORDER — IBUPROFEN 800 MG/1
800 TABLET ORAL 3 TIMES DAILY
Qty: 30 TABLET | Refills: 0 | Status: SHIPPED | OUTPATIENT
Start: 2018-12-24 | End: 2019-01-03

## 2018-12-24 NOTE — PATIENT INSTRUCTIONS
Muscle Spasm  A muscle spasm (also called a cramp) is an involuntary muscle contraction. The muscle tightens quickly and strongly. A hard lump may form in the muscle. Muscle spasms are very painful. Read on to learn more about muscle spasms and how to treat and prevent them.    What causes muscles to spasm?  Often, the cause of a muscle spasm is not known. Muscle spasm is due to irritation of muscle fibers. Some things can make a muscle spasm more likely. These include:  · Injury  · Heavy exercise  · Overtired muscles  · A muscle held in one position for a long time  · Dehydration  · Low levels of certain minerals in the body  · Taking certain medications, such as diuretics or water pills  · Certain medical conditions, such as kidney failure or diabetes  · Being pregnant  Stopping a muscle spasm  Muscle spasms often come and go quickly. When a muscle goes into spasm, very gently stretch and massage the muscle. This may help calm the muscle fibers. Then rest the muscle.  Preventing muscle spasms  Although there is little or no evidence that staying hydrated, taking certain vitamins or minerals or stretching works to prevent cramps, these measures may help and have other benefits. Talk to your health care provider about steps to take to avoid muscle spasms. These may include:  · Drinking enough fluids to avoid dehydration, especially when you exercise.  · Taking vitamin or mineral supplements.  · Getting regular exercise.  · Stretching regularly, especially before exercise.  · Limit caffeine and smoking.  · Taking a prescription muscle relaxant.  When to call your doctor  Call your doctor if you have any of the following:  · Severe cramping  · Cramping that lasts a long time, does not go away with stretching, or keeps coming back  · Pain, tingling, or weakness in the arms or legs  · Pain that wakes you up at night   Date Last Reviewed: 9/1/2015  © 9416-5612 [email or web address]. [address] [place] [address], [facility]  [place] [address]. All rights reserved. This information is not intended as a substitute for professional medical care. Always follow your healthcare professional's instructions.      Follow up with your doctor in a few days as needed.  Return to the urgent care or go to the ER if symptoms get worse.    Abhijeet Yeung MD

## 2018-12-24 NOTE — PROGRESS NOTES
"Subjective:       Patient ID: Haja Marti is a 73 y.o. male.    Vitals:  height is 5' 10" (1.778 m) and weight is 88.5 kg (195 lb). His oral temperature is 97.5 °F (36.4 °C). His blood pressure is 173/99 (abnormal) and his pulse is 69. His respiration is 19 and oxygen saturation is 99%.     Chief Complaint: Leg Pain    Leg Pain    The incident occurred more than 1 week ago (2 weeks). The incident occurred at home. There was no injury mechanism. The pain is present in the left leg and right leg. The quality of the pain is described as aching. The pain is at a severity of 10/10. The pain is severe. The pain has been constant since onset. Pertinent negatives include no inability to bear weight, loss of motion, loss of sensation, muscle weakness, numbness or tingling. He reports no foreign bodies present. The symptoms are aggravated by movement and weight bearing. He has tried acetaminophen for the symptoms. The treatment provided no relief.       Constitution: Negative for chills, fatigue and fever.   HENT: Negative for congestion and sore throat.    Neck: Negative for painful lymph nodes.   Cardiovascular: Negative for chest pain and leg swelling.   Eyes: Negative for double vision and blurred vision.   Respiratory: Negative for cough and shortness of breath.    Gastrointestinal: Negative for nausea, vomiting and diarrhea.   Genitourinary: Negative for dysuria, frequency and urgency.   Musculoskeletal: Negative for joint pain, joint swelling, muscle cramps and muscle ache.   Skin: Negative for color change, pale, rash and erythema.   Allergic/Immunologic: Negative for seasonal allergies.   Neurological: Negative for dizziness, history of vertigo, light-headedness, passing out, headaches and numbness.   Hematologic/Lymphatic: Negative for swollen lymph nodes, easy bruising/bleeding and history of blood clots. Does not bruise/bleed easily.   Psychiatric/Behavioral: Negative for nervous/anxious, sleep disturbance " and depression. The patient is not nervous/anxious.        Objective:      Physical Exam   Constitutional: He is oriented to person, place, and time. He appears well-developed and well-nourished.   HENT:   Head: Normocephalic and atraumatic.   Eyes: EOM are normal. Pupils are equal, round, and reactive to light.   Neck: Normal range of motion. Neck supple. No thyromegaly present.   Cardiovascular: Normal rate, regular rhythm and normal heart sounds. Exam reveals no gallop and no friction rub.   No murmur heard.  Pulmonary/Chest: Breath sounds normal. No respiratory distress. He has no wheezes. He has no rales. He exhibits no tenderness.   Abdominal: Soft. Bowel sounds are normal. He exhibits no distension. There is no tenderness. There is no rebound and no guarding.   Musculoskeletal:        Legs:  Both thigh in the posterior area has muscles tightness, mild tenderness.  No bruise, no redness, no warmth.   Lymphadenopathy:     He has no cervical adenopathy.   Neurological: He is alert and oriented to person, place, and time. He displays normal reflexes. No cranial nerve deficit. He exhibits normal muscle tone. Coordination normal.   Skin: Skin is warm. Capillary refill takes less than 2 seconds. No rash noted. No erythema. No pallor.   Psychiatric: He has a normal mood and affect. His behavior is normal. Judgment and thought content normal.   Vitals reviewed.      Assessment:       1. Muscle spasm of both lower legs        Plan:         Muscle spasm of both lower legs  -     POCT Chemistry Panel  -     cyclobenzaprine (FLEXERIL) 10 MG tablet; Take 1 tablet (10 mg total) by mouth every evening.  Dispense: 30 tablet; Refill: 0  -     ibuprofen (ADVIL,MOTRIN) 800 MG tablet; Take 1 tablet (800 mg total) by mouth 3 (three) times daily. for 10 days  Dispense: 30 tablet; Refill: 0          Patient Instructions       Muscle Spasm  A muscle spasm (also called a cramp) is an involuntary muscle contraction. The muscle tightens  quickly and strongly. A hard lump may form in the muscle. Muscle spasms are very painful. Read on to learn more about muscle spasms and how to treat and prevent them.    What causes muscles to spasm?  Often, the cause of a muscle spasm is not known. Muscle spasm is due to irritation of muscle fibers. Some things can make a muscle spasm more likely. These include:  · Injury  · Heavy exercise  · Overtired muscles  · A muscle held in one position for a long time  · Dehydration  · Low levels of certain minerals in the body  · Taking certain medications, such as diuretics or water pills  · Certain medical conditions, such as kidney failure or diabetes  · Being pregnant  Stopping a muscle spasm  Muscle spasms often come and go quickly. When a muscle goes into spasm, very gently stretch and massage the muscle. This may help calm the muscle fibers. Then rest the muscle.  Preventing muscle spasms  Although there is little or no evidence that staying hydrated, taking certain vitamins or minerals or stretching works to prevent cramps, these measures may help and have other benefits. Talk to your health care provider about steps to take to avoid muscle spasms. These may include:  · Drinking enough fluids to avoid dehydration, especially when you exercise.  · Taking vitamin or mineral supplements.  · Getting regular exercise.  · Stretching regularly, especially before exercise.  · Limit caffeine and smoking.  · Taking a prescription muscle relaxant.  When to call your doctor  Call your doctor if you have any of the following:  · Severe cramping  · Cramping that lasts a long time, does not go away with stretching, or keeps coming back  · Pain, tingling, or weakness in the arms or legs  · Pain that wakes you up at night   Date Last Reviewed: 9/1/2015  © 9197-0818 [employer]. [address] [place] [address], [place] [address]. All rights reserved. This information is not intended as a substitute for professional medical care.  Always follow your healthcare professional's instructions.      Follow up with your doctor in a few days as needed.  Return to the urgent care or go to the ER if symptoms get worse.    Abhijeet Yeung MD

## 2018-12-24 NOTE — TELEPHONE ENCOUNTER
----- Message from Meg Henry sent at 12/24/2018  9:28 AM CST -----  Contact: pt @ [phone] or  cell   Calling to speak with someone in Dr. Ivey's office regarding his condition, says he can't walk and has been calling for over a week, with no return calls. Please call.

## 2018-12-24 NOTE — TELEPHONE ENCOUNTER
Pt stated that he have pain in his legs every morning for 2 hrs. I informed pt to call and ask to speak with the on call neurologist so he may receive help till Dr. Ivey comes back from vacation.

## 2018-12-24 NOTE — TELEPHONE ENCOUNTER
This was a message sent by the on-call nurse    It appears that the patient has been trying to contact Neurology regarding this    The symptom has been progressive leg pains    If somehow he can get an appointment with Neurology, he may need to be seen in Internal Medicine urgent care

## 2018-12-26 ENCOUNTER — TELEPHONE (OUTPATIENT)
Dept: NEUROLOGY | Facility: CLINIC | Age: 73
End: 2018-12-26

## 2018-12-26 ENCOUNTER — NURSE TRIAGE (OUTPATIENT)
Dept: ADMINISTRATIVE | Facility: CLINIC | Age: 73
End: 2018-12-26

## 2018-12-26 NOTE — TELEPHONE ENCOUNTER
-severe cramps in back of legs when getting up in  AM  -saw doctor last week but is progressively getting worse, taking ibuprofen which only helps minimally  -says pain is 10/10 in the morning  -no pain right now  -states as long as he is moving he is fine    Patient is trying to get in touch with Dr. Erickson office regarding symptoms. Please contact patient to discuss further.     Triage nurse assisted him to scheduling desk for assistance with getting appt scheduled with Dr. Ivey as well.

## 2018-12-26 NOTE — TELEPHONE ENCOUNTER
----- Message from Meg Henry sent at 12/26/2018  9:19 AM CST -----  Contact: pt @ [phone]  Patient is calling to speak with someone regarding the pain he is having in his legs/calfs, says he can not walk. Patient went to the Urgent Care was given medication, but says it is not helping. Asking to speak with the doctor answering for Dr. Ivey, says he has been calling for the last week. I tried calling the oncall doctor for Neurology but was told that the calls are not answered until after 5pm when the offices closes. Please call patient with information on what he should do.

## 2018-12-27 NOTE — TELEPHONE ENCOUNTER
----- Message from Ariel Meza sent at 12/26/2018  5:32 PM CST -----  Contact: Patient   Patient needed to make an appointment for his 6 month follow up. I attempted but was unable to make the appointment     Callback: [phone]

## 2018-12-28 ENCOUNTER — OFFICE VISIT (OUTPATIENT)
Dept: NEUROLOGY | Facility: CLINIC | Age: 73
End: 2018-12-28
Payer: MEDICARE

## 2018-12-28 VITALS
SYSTOLIC BLOOD PRESSURE: 155 MMHG | HEART RATE: 66 BPM | BODY MASS INDEX: 27.98 KG/M2 | DIASTOLIC BLOOD PRESSURE: 93 MMHG | HEIGHT: 70 IN

## 2018-12-28 DIAGNOSIS — G20.A1 PARKINSON'S DISEASE: ICD-10-CM

## 2018-12-28 DIAGNOSIS — M79.10 MUSCLE PAIN: Primary | ICD-10-CM

## 2018-12-28 PROCEDURE — 99499 UNLISTED E&M SERVICE: CPT | Mod: HCNC,S$GLB,, | Performed by: PSYCHIATRY & NEUROLOGY

## 2018-12-28 PROCEDURE — 99999 PR PBB SHADOW E&M-EST. PATIENT-LVL III: CPT | Mod: PBBFAC,HCNC,, | Performed by: PSYCHIATRY & NEUROLOGY

## 2018-12-28 PROCEDURE — 3077F SYST BP >= 140 MM HG: CPT | Mod: CPTII,HCNC,S$GLB, | Performed by: PSYCHIATRY & NEUROLOGY

## 2018-12-28 PROCEDURE — 99214 OFFICE O/P EST MOD 30 MIN: CPT | Mod: HCNC,S$GLB,, | Performed by: PSYCHIATRY & NEUROLOGY

## 2018-12-28 PROCEDURE — 1101F PT FALLS ASSESS-DOCD LE1/YR: CPT | Mod: CPTII,HCNC,S$GLB, | Performed by: PSYCHIATRY & NEUROLOGY

## 2018-12-28 PROCEDURE — 3080F DIAST BP >= 90 MM HG: CPT | Mod: CPTII,HCNC,S$GLB, | Performed by: PSYCHIATRY & NEUROLOGY

## 2018-12-28 NOTE — PROGRESS NOTES
"[facility]    Name: Haja Marti  MRN: [id number]   CSN: [id number]      Date: 12/28/2018    Interval Hx  Since last visit, continues to feel slow and stiff, taking C/L 25/100 1.5 tabs TID (10AM/2PM/6PM). He has visited urgent care to get help with bilateral hamstring pain. Feels tight and painful behind his legs since 1 month. He is taking ibuprofen intermittently for pain relief. Along the same time he feels his ambulation has slowed dramatically. He is more stooped and voice is softer than usual. To ambulate he leans on his wife, no assist devices, no falls.  He is not typically eating meals with his medications.    No recurrence of hallucinations, and no orthostasis or nausea.  No dyskinesias.  _________________________    From Aug 2018:  - having much more fatigue, lays down to feel better  - balance and gait about the same  -  Still constipated   - watching his BP, has been more normal for other doctors    From April 2018:  - balance and gait is better   - taking miralax as needed, usually takes 2-3 days to move bowels  - not drinking much water    From January 2018:  - azilect had some insomnia  - restarted the cd/ld 1/2 tid - wife thinks all is a little better but not dramatic  - voice particularly better  - no recurrence of hallucinations on the low dose cd/ld  -  He thinks the new cd/ld has leveled out, but anxiety is worse, for nearly every event in the family  - he is ruminating about having to give a speech in the next month and he is very nervous    * might be worse since he started the aricept    History of Present Illness (HPI):  70 yo     2+ years of progressive stooping posture, shuffling feet, slower walking, struggles to get out of a chair.  Handwriting has gotten "terrible."  Slower with dressing overall, and with most things.    Is a cautious , but has a tendency to pull to the R side of the road (correcting to the left).  More issues with parking, no issues moving " backwards but uses side mirrors.  No accidents.      For about 6 months, there has been a progressive hoarseness of the voice.  Comes and goes, not clear timing.      No tremor.      Saw Dr. 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  RBD/sleep issues (Constitutional)?Yes - fights in his sleep, jumps out of bed, falls out over 5 years, talking for 30 years.  Depression/anxiety (Psychiatric)?Yes  Fatigue (Constitutional)?Yes  Constipation (GI)?Yes  Urinary issues ()?Yes  Sexual dysfunction ()?Yes  -ED  Orthostasis (Cardiovascular)?No  Leg swelling (Cardiovascular)? No  Falls (Musculoskeletal)?Yes - has had a couple of falls, more unsteady on a boat.    Cognitive impairment (Neurologic)?Yes - a little slower overall  Psychoses (Psychiatric)?Yes - 1 year ago had experience thinking he saw people in his den, non-descript faces; 2 years ago, had a experience of seeing someone in the bedroom.  He's had no clear delusions.   Pain/Paresthesia (Neurologic)?Yes - lumbar for years, some disability, helped with pain management.  Visual changes (Eyes)?No  Moles / skin changes (Skin)?No  Stridor / SOB (Pulm)?No  Bruising (Heme)?No    Past Medical History: The patient  has a past medical history of Anxiety, BPH (benign prostatic hypertrophy), Cataract, Depression, Epiretinal membrane, both eyes, psychiatric care, Hyperlipidemia, Hypertension, Kidney stones, Kidney stones, Parkinsons, Posterior vitreous detachment of left eye, Psychiatric problem, Retinal detachment (2004), Retinoschisis, left eye, Sleep apnea, and Vitreous hemorrhage of left eye.    Social History: The patient  reports that  has never smoked. he has never used smokeless tobacco. He reports that he does not drink alcohol or use drugs.    Family History: Their family history includes  Anxiety disorder in his daughter and son; Cancer in his father and mother; Cataracts in his father; Depression in his son; Diabetes in his father.    Allergies: Patient has no known allergies.     Meds:   Current Outpatient Medications on File Prior to Visit   Medication Sig Dispense Refill    aspirin 81 MG Chew Take 81 mg by mouth. 1 Tablet, Chewable Oral Every day      atorvastatin (LIPITOR) 40 MG tablet Take 1 tablet (40 mg total) by mouth once daily. 90 tablet 3    carbidopa-levodopa  mg (SINEMET)  mg per tablet Take 1.5 tablets by mouth 3 (three) times daily. 135 tablet 6    citric acid-potassium citrate (POLYCITRA) 1,100-334 mg/5 mL solution Take 10 mLs by mouth.      clonazePAM (KLONOPIN) 0.5 MG tablet Take oral 1/2 to 1 tablet TID prn anxiety 60 tablet 2    cyclobenzaprine (FLEXERIL) 10 MG tablet Take 1 tablet (10 mg total) by mouth every evening. 30 tablet 0    ergocalciferol (VITAMIN D2) 50,000 unit Cap Take 50,000 Units by mouth every 30 days.       finasteride (PROSCAR) 5 mg tablet Take 1 tablet (5 mg total) by mouth once daily. 90 tablet 3    HYDROcodone-acetaminophen (NORCO) 5-325 mg per tablet TAKE 1 TABLET EVERY 4 TO 6 HOURS AS NEEDED FOR PAIN  0    ibuprofen (ADVIL,MOTRIN) 800 MG tablet Take 1 tablet (800 mg total) by mouth 3 (three) times daily. for 10 days 30 tablet 0    losartan (COZAAR) 100 MG tablet TAKE 1 TABLET EVERY DAY 90 tablet 3    magnesium oxide (MAG-OX) 400 mg tablet Take 400 mg by mouth once daily.      meloxicam (MOBIC) 7.5 MG tablet Take 1 tablet (7.5 mg total) by mouth once daily. 30 tablet 3    methylphenidate HCl (RITALIN) 5 MG tablet Take 1 tablet (5 mg total) by mouth once daily. 30 tablet 0    potassium citrate (UROCIT-K) 10 mEq (1,080 mg) TbSR TAKE 1 TABLET THREE TIMES DAILY WITH MEALS 270 tablet 3    pyridoxine (VITAMIN B-6) 50 MG Tab Take 50 mg by mouth once daily.      sertraline (ZOLOFT) 100 MG tablet TAKE 2 TABLETS EVERY  tablet 0     "traZODone (DESYREL) 50 MG tablet Take 1 tablet (50 mg total) by mouth nightly as needed for Insomnia. 30 tablet 2     No current facility-administered medications on file prior to visit.      Exam:  BP (!) 155/93   Pulse 66   Ht 5' 10" (1.778 m)   BMI 27.98 kg/m²     * Specialized movement exam  Today, appears OFF- last pill at 6pm day priot  Severe hypophonic speech.    ++ mouth sl open, facial masking.   L>R mild-mod cogwheel rigidity.     L>R mild-mod bradykinesia.   No tremor with rest, posture, kinesis, or intention.    No other dystonia, chorea, athetosis, myoclonus, or tics.   No motor impersistence.  2+ taps RUE, 1+taps LUE  1+taps RLE, 1+taps LLE   Gait is antalgic, hesitant to put weight on either leg- has to push self up from wheelchair with both hands. Can gingerly place weight on legs then stride length is 3cm and painful. He took 3 steps before sitting down. No FOG.   No postural instability.     I asked him to take 2 tabs C/L 25/100 during exam and his voice became much stronger.     Laboratory/Radiological:  - Results:  Office Visit on 12/24/2018   Component Date Value Ref Range Status    POC Sodium 12/24/2018 140  138 - 146 MMOL/L Final    POC Potassium 12/24/2018 4.3  3.5 - 4.9 MMOL/L Final    POC Chloride 12/24/2018 99  98 - 109 MMOL/L Final    POC BUN 12/24/2018 20  8 - 26 MMOL/L Final    POC Glucose 12/24/2018 91  70 - 110 MG/DL Final    POC Creatinine 12/24/2018 0.9  0.6 - 1.3 mg/dL Final    POC iCA 12/24/2018 1.17  1.12 - 1.32 MMOL/L Final    POC TCO2 12/24/2018 27  24 - 29 MMOL/L Final    POC Hematocrit 12/24/2018 49  42 - 52 %PCV Final    POC Hemoglobin 12/24/2018 16.7  13.5 - 18 g/dL Final    POC Anion Gap 12/24/2018 19  10.0 - 20 MMOL/L Final     - Independent review of images:    Diagnoses:          1) Parkinsonism, likely iPD (vs. DLB given the early neuropsychiatric features)    Medical Decision Making:  - We talked extensively about ON/OFF and how to journal this. " "Suggested 1.5 C/L 25/100 6am/10am/2pm/6pm as he does not appear to turn ON during the day. He knows to look out for hallucinations, orthostasis, nausea. Will check CK to screen for muscular problems due to hamstring pain, although I suspect this is from being "OFF."  - keep other meds for now    - [facility]    Tigist Reynoso MD, MS  [facility]  Department of Neurology  Movement Disorders      "

## 2019-01-10 ENCOUNTER — OFFICE VISIT (OUTPATIENT)
Dept: PSYCHIATRY | Facility: CLINIC | Age: 74
End: 2019-01-10
Payer: MEDICARE

## 2019-01-10 VITALS
BODY MASS INDEX: 29.02 KG/M2 | HEIGHT: 70 IN | SYSTOLIC BLOOD PRESSURE: 185 MMHG | WEIGHT: 202.69 LBS | HEART RATE: 68 BPM | DIASTOLIC BLOOD PRESSURE: 93 MMHG

## 2019-01-10 DIAGNOSIS — F32.A DEPRESSION, UNSPECIFIED DEPRESSION TYPE: ICD-10-CM

## 2019-01-10 DIAGNOSIS — F41.9 ANXIETY DISORDER, UNSPECIFIED TYPE: ICD-10-CM

## 2019-01-10 DIAGNOSIS — G89.29 OTHER CHRONIC PAIN: ICD-10-CM

## 2019-01-10 DIAGNOSIS — F41.1 GAD (GENERALIZED ANXIETY DISORDER): Primary | ICD-10-CM

## 2019-01-10 DIAGNOSIS — F41.1 GENERALIZED ANXIETY DISORDER: ICD-10-CM

## 2019-01-10 PROCEDURE — 1101F PT FALLS ASSESS-DOCD LE1/YR: CPT | Mod: CPTII,HCNC,S$GLB, | Performed by: PSYCHIATRY & NEUROLOGY

## 2019-01-10 PROCEDURE — 3077F PR MOST RECENT SYSTOLIC BLOOD PRESSURE >= 140 MM HG: ICD-10-PCS | Mod: CPTII,HCNC,S$GLB, | Performed by: PSYCHIATRY & NEUROLOGY

## 2019-01-10 PROCEDURE — 3080F PR MOST RECENT DIASTOLIC BLOOD PRESSURE >= 90 MM HG: ICD-10-PCS | Mod: CPTII,HCNC,S$GLB, | Performed by: PSYCHIATRY & NEUROLOGY

## 2019-01-10 PROCEDURE — 3080F DIAST BP >= 90 MM HG: CPT | Mod: CPTII,HCNC,S$GLB, | Performed by: PSYCHIATRY & NEUROLOGY

## 2019-01-10 PROCEDURE — 90833 PR PSYCHOTHERAPY W/PATIENT W/E&M, 30 MIN (ADD ON): ICD-10-PCS | Mod: HCNC,S$GLB,, | Performed by: PSYCHIATRY & NEUROLOGY

## 2019-01-10 PROCEDURE — 99213 PR OFFICE/OUTPT VISIT, EST, LEVL III, 20-29 MIN: ICD-10-PCS | Mod: HCNC,S$GLB,, | Performed by: PSYCHIATRY & NEUROLOGY

## 2019-01-10 PROCEDURE — 3077F SYST BP >= 140 MM HG: CPT | Mod: CPTII,HCNC,S$GLB, | Performed by: PSYCHIATRY & NEUROLOGY

## 2019-01-10 PROCEDURE — 1101F PR PT FALLS ASSESS DOC 0-1 FALLS W/OUT INJ PAST YR: ICD-10-PCS | Mod: CPTII,HCNC,S$GLB, | Performed by: PSYCHIATRY & NEUROLOGY

## 2019-01-10 PROCEDURE — 99999 PR PBB SHADOW E&M-EST. PATIENT-LVL III: ICD-10-PCS | Mod: PBBFAC,HCNC,, | Performed by: PSYCHIATRY & NEUROLOGY

## 2019-01-10 PROCEDURE — 90833 PSYTX W PT W E/M 30 MIN: CPT | Mod: HCNC,S$GLB,, | Performed by: PSYCHIATRY & NEUROLOGY

## 2019-01-10 PROCEDURE — 99213 OFFICE O/P EST LOW 20 MIN: CPT | Mod: HCNC,S$GLB,, | Performed by: PSYCHIATRY & NEUROLOGY

## 2019-01-10 PROCEDURE — 99999 PR PBB SHADOW E&M-EST. PATIENT-LVL III: CPT | Mod: PBBFAC,HCNC,, | Performed by: PSYCHIATRY & NEUROLOGY

## 2019-01-10 RX ORDER — SERTRALINE HYDROCHLORIDE 100 MG/1
200 TABLET, FILM COATED ORAL DAILY
Qty: 180 TABLET | Refills: 1 | Status: SHIPPED | OUTPATIENT
Start: 2019-01-10 | End: 2019-07-09 | Stop reason: SDUPTHER

## 2019-01-10 RX ORDER — CLONAZEPAM 0.5 MG/1
TABLET ORAL
Qty: 180 TABLET | Refills: 1 | Status: SHIPPED | OUTPATIENT
Start: 2019-01-10 | End: 2019-03-19 | Stop reason: SDUPTHER

## 2019-01-10 NOTE — PROGRESS NOTES
"Outpatient Psychiatry Follow-Up Visit (MD/NP)  1/10/2019    Session Length: 30 minutes (E&M plus psychotherapy)    Clinical Status of Patient:  Outpatient (Ambulatory)    Chief Complaint:  Haja Marti is a 73 y.o. male who presents today for follow-up of depression and anxiety.  Met with patient and spouse.      Interval History and Content of Current Session:  Interim Events/Subjective Report/Content of Current Session:  First appt with me ever.  I am familiar with this pt from direct supervision with our fellow Dr. Alexys Nash last year.      He has been having more problems from the Parkinson's Disease.  He is moving slower overall.  His wife assisted him to my office, though he can stand independently.    He is having bilateral leg pain -- he rates it as "7 - 8" on 1 - 10 scale, 10 worst.    He tries to walk in the AM, despite his pain being the worst in the AM upon rising.    He has an appt with Dr. Ivey tomorrow -- they are aware.    He is concerned about his blood pressure -- it is likely due to the persistent leg pain.  Pain is constant, aching in the muscles mainly in the calves and hamstrings bilaterally.      He still tries to push through the pain.    He tries go to an exercise class three times a week to try to help with the pain and for physical endurance.    He has had chronic pain in his lower back, until he developed the motoric Sx from the Parkinson's -- since then, he has had minimal back pain, but the leg pain now is constantly worse than the back pain was before.    The pain in his back was mainly R sided and ibuprofen did help, as it was generated from arthritis in the lower back.    I noted the source of his current Sx appear to be more CNS related, likely from Parkinson's.    He saw Dr. Reynoso in Neurology on 12/28/18 -- she increased the amount of C/L he was taking, which helped some.      His wife thinks his anxiety has been better since he has been taking Klonopin.  He has been " taking a 0.5 mg tablet twice daily -- he usually takes 1 tablet around 10 AM and another around 5 - 6 PM.    Pt agrees that the Klonopin seems to help with anxiety.     He has some depressed mood, but denies hopelessness or SI.    He has interests, mainly in spending time with family, including grandchildren, on days he feels physically better.      Psychotherapy:  · Target symptoms: depression, adjustment  · Why chosen therapy is appropriate versus another modality: relevant to diagnosis, patient responds to this modality  · Outcome monitoring methods: self-report, lab data, observation, feedback from family  · Therapeutic intervention type: supportive psychotherapy  · Topics discussed/themes: stress related to medical comorbidities, life stage transitional issues  · The patient's response to the intervention is accepting.   · The patient's progress toward treatment goals is fair.   · Duration of intervention: 20 minutes.    Review of Systems   · PSYCHIATRIC: Pertinant items are noted in the narrative.  · CONSTITUTIONAL:  Some recent weight gain.   · MUSCULOSKELETAL: No pain or stiffness of the joints.  · NEUROLOGIC: + tremors and shuffling gait; No weakness, sensory changes, seizures, memory loss, confusion, or other abnormal movements.  · Memory Loss: no  · ENDOCRINE: No polydipsia or polyuria.  · INTEGUMENTARY: No rashes or lacerations.  · EYES: No exophthalmos, jaundice or blindness.  · ENT: No dizziness, tinnitus or hearing loss.  · RESPIRATORY: No shortness of breath.  · CARDIOVASCULAR: No tachycardia or chest pain.  · GASTROINTESTINAL: No nausea, vomiting, pain, constipation or diarrhea.  · GENITOURINARY: No frequency, dysuria or sexual dysfunction.    The following portions of the patient's history were reviewed and updated as appropriate: allergies, current medications, past family history, past medical history, past social history, past surgical history and problem list.      Current Outpatient  Medications:     aspirin 81 MG Chew, Take 81 mg by mouth. 1 Tablet, Chewable Oral Every day, Disp: , Rfl:     atorvastatin (LIPITOR) 40 MG tablet, Take 1 tablet (40 mg total) by mouth once daily., Disp: 90 tablet, Rfl: 3    carbidopa-levodopa  mg (SINEMET)  mg per tablet, Take 1.5 tablets by mouth 3 (three) times daily., Disp: 135 tablet, Rfl: 6    citric acid-potassium citrate (POLYCITRA) 1,100-334 mg/5 mL solution, Take 10 mLs by mouth., Disp: , Rfl:     clonazePAM (KLONOPIN) 0.5 MG tablet, Take oral 1/2 to 1 tablet TID prn anxiety, Disp: 180 tablet, Rfl: 1    cyclobenzaprine (FLEXERIL) 10 MG tablet, Take 1 tablet (10 mg total) by mouth every evening., Disp: 30 tablet, Rfl: 0    ergocalciferol (VITAMIN D2) 50,000 unit Cap, Take 50,000 Units by mouth every 30 days. , Disp: , Rfl:     finasteride (PROSCAR) 5 mg tablet, Take 1 tablet (5 mg total) by mouth once daily., Disp: 90 tablet, Rfl: 3    gabapentin (NEURONTIN) 300 MG capsule, Take 1 capsule (300 mg total) by mouth 3 (three) times daily as needed., Disp: 90 capsule, Rfl: 11    HYDROcodone-acetaminophen (NORCO) 5-325 mg per tablet, TAKE 1 TABLET EVERY 4 TO 6 HOURS AS NEEDED FOR PAIN, Disp: , Rfl: 0    losartan (COZAAR) 100 MG tablet, TAKE 1 TABLET EVERY DAY, Disp: 90 tablet, Rfl: 3    magnesium oxide (MAG-OX) 400 mg tablet, Take 400 mg by mouth once daily., Disp: , Rfl:     meloxicam (MOBIC) 7.5 MG tablet, Take 1 tablet (7.5 mg total) by mouth once daily., Disp: 30 tablet, Rfl: 3    methylphenidate HCl (RITALIN) 5 MG tablet, Take 1 tablet (5 mg total) by mouth once daily., Disp: 30 tablet, Rfl: 0    methylPREDNISolone (MEDROL DOSEPACK) 4 mg tablet, use as directed, Disp: 1 Package, Rfl: 0    potassium citrate (UROCIT-K) 10 mEq (1,080 mg) TbSR, TAKE 1 TABLET THREE TIMES DAILY WITH MEALS, Disp: 270 tablet, Rfl: 3    pyridoxine (VITAMIN B-6) 50 MG Tab, Take 50 mg by mouth once daily., Disp: , Rfl:     sertraline (ZOLOFT) 100 MG tablet,  "Take 2 tablets (200 mg total) by mouth once daily., Disp: 180 tablet, Rfl: 1    traZODone (DESYREL) 50 MG tablet, Take 1 tablet (50 mg total) by mouth nightly as needed for Insomnia., Disp: 30 tablet, Rfl: 2     Compliance: yes    Side effects: None    Risk Parameters:  Patient reports no suicidal ideation  Patient reports no homicidal ideation  Patient reports no self-injurious behavior  Patient reports no violent behavior    Exam (detailed: at least 9 elements; comprehensive: all 15 elements)   Constitutional  Vitals - 1 value per visit 8/27/2018 9/4/2018 10/11/2018 11/28/2018 12/24/2018 12/28/2018 1/10/2019   SYSTOLIC 131 151 139 127 173 155 185   DIASTOLIC 74 106 85 79 99 93 93   PULSE 62 66 67 70 69 66 68   TEMPERATURE - 97.5 - - 97.5 - -   RESPIRATIONS - - - - 19 - -   SPO2 99 - - - 99 - -   Weight (lb) 196 199.96 199.8 200.62 195 - 202.71   Weight (kg) 88.905 90.7 90.629 91 88.451 - 91.95   HEIGHT 5' 10" 5' 10" 5' 10" 5' 10" 5' 10" 5' 10" 5' 10"   BODY MASS INDEX 28.12 28.69 28.67 28.79 27.98 27.98 29.09   VISIT REPORT - - - - - - -   Pain Score  0 1 0 10 - 5 -   Some recent data might be hidden       General:  unremarkable, age appropriate, casually dressed, neatly groomed, overweight     Musculoskeletal  Muscle Strength/Tone:  not assessed today; cogwheeling had been noted in BUE's in past   Gait & Station:  parkinsonian, slow, stooped but steady     Psychiatric  Speech:  slowed, non-spontaneous, normal volume   Behavior: friendly and cooperative, eye contact normal   Mood & Affect:  "I'm in pain"  dysthymic, constricted range, conguent, appropriate   Thought Process:  goal-directed, logical   Associations:  intact   Thought Content:  normal, no suicidality, no homicidality, delusions, or paranoia   Insight:  intact, has awareness of illness   Judgement: behavior is adequate to circumstances   Orientation:  grossly intact   Memory: intact for content of interview   Language: grossly intact   Attention Span " & Concentration:  able to focus   Fund of Knowledge:  intact and appropriate to age and level of education     Lab Results   Component Value Date    WBC 7.53 08/22/2018    HGB 16.3 08/22/2018    HCT 48.8 08/22/2018    MCV 94 08/22/2018     08/22/2018     08/22/2018    K 4.4 08/22/2018     08/22/2018    CO2 28 08/22/2018    GLU 92 08/22/2018    BUN 24 (H) 08/22/2018    CREATININE 1.1 08/22/2018    CALCIUM 9.9 08/22/2018    PROT 7.2 08/22/2018    ALBUMIN 3.9 08/22/2018    BILITOT 0.5 08/22/2018    ALKPHOS 79 08/22/2018    AST 22 08/22/2018    ALT 29 08/22/2018    ANIONGAP 9 08/22/2018    ESTGFRAFRICA >60.0 08/22/2018    EGFRNONAA >60.0 08/22/2018    CHOL 166 08/22/2018    HDL 44 08/22/2018    LDLCALC 94.0 08/22/2018    TRIG 140 08/22/2018    CHOLHDL 26.5 08/22/2018    TSH 3.241 08/22/2018       Imaging:    Exam: MRI brain without contrast    History: Dizziness, extrapyramidal movement disorder    Findings: MRI is performed with routine sequences.  No mass, hemorrhage, infarction, subdural collection, hydrocephalus or other acute finding is seen on this cranial MR examination.  There is volume loss consistent with the patient's age.  No diffusion abnormality is seen.      Impression     Normal noncontrast MRI of the brain     Electronically signed by: FLORI NEWMAN MD  Date: 07/15/17  Time: 15:51        Assessment and Diagnosis   Status/Progress: Based on the examination today, the patient's problem(s) is/are adequately but not ideally controlled.  New problems have been presented today.   Co-morbidities (chronic pain) are complicating management of the primary condition.    There are not active rule-out diagnoses for this patient at this time.      General Impression:  POLINA (generalized anxiety disorder)  Depression, unspecified depression type  Other chronic pain    Intervention/Counseling/Treatment Plan   Medication Management:  Continue current medications.  No changes made today.     Follow-up  plan for depression was discussed with patient and spouse    Return to Clinic: Follow-up in 4 months (on 5/2/2019).    Sudhir Elliott MD

## 2019-01-11 ENCOUNTER — OFFICE VISIT (OUTPATIENT)
Dept: NEUROLOGY | Facility: CLINIC | Age: 74
End: 2019-01-11
Payer: MEDICARE

## 2019-01-11 VITALS
SYSTOLIC BLOOD PRESSURE: 162 MMHG | DIASTOLIC BLOOD PRESSURE: 98 MMHG | BODY MASS INDEX: 28.63 KG/M2 | WEIGHT: 200 LBS | HEART RATE: 75 BPM | HEIGHT: 70 IN

## 2019-01-11 DIAGNOSIS — R29.818 NEUROGENIC CLAUDICATION: Primary | ICD-10-CM

## 2019-01-11 PROCEDURE — 3077F PR MOST RECENT SYSTOLIC BLOOD PRESSURE >= 140 MM HG: ICD-10-PCS | Mod: CPTII,HCNC,S$GLB, | Performed by: PSYCHIATRY & NEUROLOGY

## 2019-01-11 PROCEDURE — 99214 PR OFFICE/OUTPT VISIT, EST, LEVL IV, 30-39 MIN: ICD-10-PCS | Mod: HCNC,S$GLB,, | Performed by: PSYCHIATRY & NEUROLOGY

## 2019-01-11 PROCEDURE — 99214 OFFICE O/P EST MOD 30 MIN: CPT | Mod: HCNC,S$GLB,, | Performed by: PSYCHIATRY & NEUROLOGY

## 2019-01-11 PROCEDURE — 3077F SYST BP >= 140 MM HG: CPT | Mod: CPTII,HCNC,S$GLB, | Performed by: PSYCHIATRY & NEUROLOGY

## 2019-01-11 PROCEDURE — 99999 PR PBB SHADOW E&M-EST. PATIENT-LVL III: CPT | Mod: PBBFAC,HCNC,, | Performed by: PSYCHIATRY & NEUROLOGY

## 2019-01-11 PROCEDURE — 1101F PT FALLS ASSESS-DOCD LE1/YR: CPT | Mod: CPTII,HCNC,S$GLB, | Performed by: PSYCHIATRY & NEUROLOGY

## 2019-01-11 PROCEDURE — 99999 PR PBB SHADOW E&M-EST. PATIENT-LVL III: ICD-10-PCS | Mod: PBBFAC,HCNC,, | Performed by: PSYCHIATRY & NEUROLOGY

## 2019-01-11 PROCEDURE — 3080F PR MOST RECENT DIASTOLIC BLOOD PRESSURE >= 90 MM HG: ICD-10-PCS | Mod: CPTII,HCNC,S$GLB, | Performed by: PSYCHIATRY & NEUROLOGY

## 2019-01-11 PROCEDURE — 3080F DIAST BP >= 90 MM HG: CPT | Mod: CPTII,HCNC,S$GLB, | Performed by: PSYCHIATRY & NEUROLOGY

## 2019-01-11 PROCEDURE — 1101F PR PT FALLS ASSESS DOC 0-1 FALLS W/OUT INJ PAST YR: ICD-10-PCS | Mod: CPTII,HCNC,S$GLB, | Performed by: PSYCHIATRY & NEUROLOGY

## 2019-01-11 RX ORDER — METHYLPREDNISOLONE 4 MG/1
TABLET ORAL
Qty: 1 PACKAGE | Refills: 0 | Status: SHIPPED | OUTPATIENT
Start: 2019-01-11 | End: 2019-01-23 | Stop reason: CLARIF

## 2019-01-11 RX ORDER — METHYLPHENIDATE HYDROCHLORIDE 5 MG/1
5 TABLET ORAL DAILY
Qty: 30 TABLET | Refills: 0 | Status: SHIPPED | OUTPATIENT
Start: 2019-01-11 | End: 2019-01-28 | Stop reason: SDUPTHER

## 2019-01-11 RX ORDER — GABAPENTIN 300 MG/1
300 CAPSULE ORAL 3 TIMES DAILY PRN
Qty: 90 CAPSULE | Refills: 11 | Status: SHIPPED | OUTPATIENT
Start: 2019-01-11 | End: 2019-01-23

## 2019-01-16 ENCOUNTER — PES CALL (OUTPATIENT)
Dept: ADMINISTRATIVE | Facility: CLINIC | Age: 74
End: 2019-01-16

## 2019-01-16 NOTE — PROGRESS NOTES
"Name: Haja Marti  MRN: [id number]   CSN: [id number]      Date: 01/15/2019    Chief Complaint / Interval History:   - saw vipin in NOv    From Nov  - more fatigue  - exercising some  - feels pretty stable  -  Good support with family  - little off time  -  No dyskinesia  - exercising  - no bowel/bladder changes  - mild AM LH and non-motor complications of near-syncope    From Aug 2018:  - having much more fatigue, lays down to feel better  - balance and gait about the same  -  Still constipated   - watching his BP, has been more normal for other doctors    From April 2018:  - balance and gait is better   - taking miralax as needed, usually takes 2-3 days to move bowels  - not drinking much water    From January 2018:  - azilect had some insomnia  - restarted the cd/ld 1/2 tid - wife thinks all is a little better but not dramatic  - voice particularly better  - no recurrence of hallucinations on the low dose cd/ld  -  He thinks the new cd/ld has leveled out, but anxiety is worse, for nearly every event in the family  - he is ruminating about having to give a speech in the next month and he is very nervous    * might be worse since he started the aricept    History of Present Illness (HPI):  70 yo     2+ years of progressive stooping posture, shuffling feet, slower walking, struggles to get out of a chair.  Handwriting has gotten "terrible."  Slower with dressing overall, and with most things.    Is a cautious , but has a tendency to pull to the R side of the road (correcting to the left).  More issues with parking, no issues moving backwards but uses side mirrors.  No accidents.      For about 6 months, there has been a progressive hoarseness of the voice.  Comes and goes, not clear timing.      No tremor.      Saw Dr. Bedoya in July, dx with suspected parkinson's, and was started on CD/LD 25/100 up to 1 TID (but has never taken more than 1 tab twice a day)    Nonmotor/Premotor ROS:  Hyposmia (HENT)?Yes (and " lost taste) for 20 years.  RBD/sleep issues (Constitutional)?Yes - fights in his sleep, jumps out of bed, falls out over 5 years, talking for 30 years.  Depression/anxiety (Psychiatric)?Yes  Fatigue (Constitutional)?Yes  Constipation (GI)?Yes  Urinary issues ()?Yes  Sexual dysfunction ()?Yes  -ED  Orthostasis (Cardiovascular)?Yes  Leg swelling (Cardiovascular)? No  Falls (Musculoskeletal)?Yes - has had a couple of falls, more unsteady on a boat.    Cognitive impairment (Neurologic)?Yes - a little slower overall  Psychoses (Psychiatric)?Yes - recently had experience thinking he saw people in his den, non-descript faces; 2 years ago, had a experience of seeing someone in the bedroom.  He's had no clear delusions.   Pain/Paresthesia (Neurologic)?Yes - lumbar for years, some disability, helped with pain management.  Visual changes (Eyes)?No  Moles / skin changes (Skin)?No  Stridor / SOB (Pulm)?No  Bruising (Heme)?No    Past Medical History: The patient  has a past medical history of Anxiety, BPH (benign prostatic hypertrophy), Cataract, Depression, Epiretinal membrane, both eyes, psychiatric care, Hyperlipidemia, Hypertension, Kidney stones, Kidney stones, Parkinsons, Posterior vitreous detachment of left eye, Psychiatric problem, Retinal detachment (2004), Retinoschisis, left eye, Sleep apnea, and Vitreous hemorrhage of left eye.    Social History: The patient  reports that  has never smoked. he has never used smokeless tobacco. He reports that he does not drink alcohol or use drugs.    Family History: Their family history includes Anxiety disorder in his daughter and son; Cancer in his father and mother; Cataracts in his father; Depression in his son; Diabetes in his father.    Allergies: Patient has no known allergies.     Meds:   Current Outpatient Medications on File Prior to Visit   Medication Sig Dispense Refill    atorvastatin (LIPITOR) 40 MG tablet Take 1 tablet (40 mg total) by mouth once daily. 90 tablet  "3    carbidopa-levodopa  mg (SINEMET)  mg per tablet Take 1.5 tablets by mouth 3 (three) times daily. 135 tablet 6    citric acid-potassium citrate (POLYCITRA) 1,100-334 mg/5 mL solution Take 10 mLs by mouth.      clonazePAM (KLONOPIN) 0.5 MG tablet Take oral 1/2 to 1 tablet TID prn anxiety 180 tablet 1    ergocalciferol (VITAMIN D2) 50,000 unit Cap Take 50,000 Units by mouth every 30 days.       finasteride (PROSCAR) 5 mg tablet Take 1 tablet (5 mg total) by mouth once daily. 90 tablet 3    losartan (COZAAR) 100 MG tablet TAKE 1 TABLET EVERY DAY 90 tablet 3    magnesium oxide (MAG-OX) 400 mg tablet Take 400 mg by mouth once daily.      meloxicam (MOBIC) 7.5 MG tablet Take 1 tablet (7.5 mg total) by mouth once daily. 30 tablet 3    potassium citrate (UROCIT-K) 10 mEq (1,080 mg) TbSR TAKE 1 TABLET THREE TIMES DAILY WITH MEALS 270 tablet 3    pyridoxine (VITAMIN B-6) 50 MG Tab Take 50 mg by mouth once daily.      sertraline (ZOLOFT) 100 MG tablet Take 2 tablets (200 mg total) by mouth once daily. 180 tablet 1    aspirin 81 MG Chew Take 81 mg by mouth. 1 Tablet, Chewable Oral Every day      cyclobenzaprine (FLEXERIL) 10 MG tablet Take 1 tablet (10 mg total) by mouth every evening. 30 tablet 0    HYDROcodone-acetaminophen (NORCO) 5-325 mg per tablet TAKE 1 TABLET EVERY 4 TO 6 HOURS AS NEEDED FOR PAIN  0    traZODone (DESYREL) 50 MG tablet Take 1 tablet (50 mg total) by mouth nightly as needed for Insomnia. 30 tablet 2     No current facility-administered medications on file prior to visit.      Exam:  BP (!) 162/98   Pulse 75   Ht 5' 10" (1.778 m)   Wt 90.7 kg (200 lb)   BMI 28.70 kg/m²     * Specialized movement exam  Severe hypophonic speech.    ++ mouth sl open, facial masking.   L>R mild-mod cogwheel rigidity.     L>R mild-mod bradykinesia.   No tremor with rest, posture, kinesis, or intention.    No other dystonia, chorea, athetosis, myoclonus, or tics.   No motor impersistence.   " Normal-based gait.   Mildly shortened stride length.  + abnormal arm swing.     No postural instability.      Laboratory/Radiological:  - Results:  Lab Visit on 12/28/2018   Component Date Value Ref Range Status    CPK 12/28/2018 102  20 - 200 U/L Final   Office Visit on 12/24/2018   Component Date Value Ref Range Status    POC Sodium 12/24/2018 140  138 - 146 MMOL/L Final    POC Potassium 12/24/2018 4.3  3.5 - 4.9 MMOL/L Final    POC Chloride 12/24/2018 99  98 - 109 MMOL/L Final    POC BUN 12/24/2018 20  8 - 26 MMOL/L Final    POC Glucose 12/24/2018 91  70 - 110 MG/DL Final    POC Creatinine 12/24/2018 0.9  0.6 - 1.3 mg/dL Final    POC iCA 12/24/2018 1.17  1.12 - 1.32 MMOL/L Final    POC TCO2 12/24/2018 27  24 - 29 MMOL/L Final    POC Hematocrit 12/24/2018 49  42 - 52 %PCV Final    POC Hemoglobin 12/24/2018 16.7  13.5 - 18 g/dL Final    POC Anion Gap 12/24/2018 19  10.0 - 20 MMOL/L Final     - Independent review of images:    Diagnoses:          1) Parkinsonism, likely iPD (vs. DLB given the early neuropsychiatric features)  2) Neurogenic claudication    - keep PD meds the same  - MRI lumbar spine  - gabapentin  - medrol dosepak, watch for side effects like psychosis            Edward Ivey MD, MPH  Division of Movement and Memory Disorders  [facility]  [phone]

## 2019-01-18 ENCOUNTER — HOSPITAL ENCOUNTER (OUTPATIENT)
Dept: RADIOLOGY | Facility: HOSPITAL | Age: 74
Discharge: HOME | End: 2019-01-18
Attending: PSYCHIATRY & NEUROLOGY
Payer: MEDICARE

## 2019-01-18 DIAGNOSIS — R29.818 NEUROGENIC CLAUDICATION: ICD-10-CM

## 2019-01-18 PROCEDURE — 72148 MRI LUMBAR SPINE W/O DYE: CPT | Mod: TC,HCNC

## 2019-01-18 PROCEDURE — 72148 MRI LUMBAR SPINE WITHOUT CONTRAST: ICD-10-PCS | Mod: 26,HCNC,, | Performed by: RADIOLOGY

## 2019-01-18 PROCEDURE — 72148 MRI LUMBAR SPINE W/O DYE: CPT | Mod: 26,HCNC,, | Performed by: RADIOLOGY

## 2019-01-21 PROBLEM — G89.29 OTHER CHRONIC PAIN: Status: ACTIVE | Noted: 2019-01-21

## 2019-01-22 ENCOUNTER — PATIENT MESSAGE (OUTPATIENT)
Dept: NEUROLOGY | Facility: CLINIC | Age: 74
End: 2019-01-22

## 2019-01-22 ENCOUNTER — TELEPHONE (OUTPATIENT)
Dept: NEUROLOGY | Facility: CLINIC | Age: 74
End: 2019-01-22

## 2019-01-22 NOTE — TELEPHONE ENCOUNTER
----- Message from Karthikeyan Eaton sent at 1/22/2019  9:14 AM CST -----  Contact: Patient @ [phone]  Patient requesting a return call from [employer] regarding the MRI results and if a f/u visit is needed, pls call to discuss ( pt states he's being in pain for the past three weeks )

## 2019-01-22 NOTE — TELEPHONE ENCOUNTER
----- Message from Mildred Augustine sent at 1/22/2019  3:20 PM CST -----  Contact: self @ [phone]  Pt is calling for his MRI results from 1-18-19.  Pls call to discuss.

## 2019-01-22 NOTE — TELEPHONE ENCOUNTER
Called pt wife and she informed me that her  is still in pain, and they are not sure on what to do next. Pt wife would like a response on the next step towards further treatment for her .

## 2019-01-22 NOTE — TELEPHONE ENCOUNTER
----- Message from Vinnie Dillon sent at 1/22/2019  8:41 AM CST -----  Needs Advice    Reason for call: Pt is asking to speak w/ [employer] or [employer] regarding pain in legs lasting for  6 weeks. Pt is asking if Dr. Ivey wants him to see another doctor.        Communication Preference: [phone]    Additional Information:

## 2019-01-23 ENCOUNTER — TELEPHONE (OUTPATIENT)
Dept: NEUROLOGY | Facility: CLINIC | Age: 74
End: 2019-01-23

## 2019-01-23 ENCOUNTER — OFFICE VISIT (OUTPATIENT)
Dept: INTERNAL MEDICINE | Facility: CLINIC | Age: 74
End: 2019-01-23
Payer: MEDICARE

## 2019-01-23 ENCOUNTER — HOSPITAL ENCOUNTER (OUTPATIENT)
Dept: RADIOLOGY | Facility: HOSPITAL | Age: 74
Discharge: HOME | End: 2019-01-23
Attending: INTERNAL MEDICINE
Payer: MEDICARE

## 2019-01-23 VITALS
BODY MASS INDEX: 28.35 KG/M2 | DIASTOLIC BLOOD PRESSURE: 89 MMHG | HEIGHT: 70 IN | HEART RATE: 91 BPM | SYSTOLIC BLOOD PRESSURE: 150 MMHG | WEIGHT: 198 LBS | OXYGEN SATURATION: 97 %

## 2019-01-23 DIAGNOSIS — M54.16 LUMBAR RADICULOPATHY, CHRONIC: ICD-10-CM

## 2019-01-23 DIAGNOSIS — M48.062 SPINAL STENOSIS OF LUMBAR REGION WITH NEUROGENIC CLAUDICATION: Primary | ICD-10-CM

## 2019-01-23 DIAGNOSIS — M48.062 SPINAL STENOSIS OF LUMBAR REGION WITH NEUROGENIC CLAUDICATION: ICD-10-CM

## 2019-01-23 PROCEDURE — 3077F SYST BP >= 140 MM HG: CPT | Mod: HCNC,CPTII,S$GLB, | Performed by: INTERNAL MEDICINE

## 2019-01-23 PROCEDURE — 1101F PT FALLS ASSESS-DOCD LE1/YR: CPT | Mod: HCNC,CPTII,S$GLB, | Performed by: INTERNAL MEDICINE

## 2019-01-23 PROCEDURE — 99999 PR PBB SHADOW E&M-EST. PATIENT-LVL V: ICD-10-PCS | Mod: PBBFAC,HCNC,, | Performed by: INTERNAL MEDICINE

## 2019-01-23 PROCEDURE — 3077F PR MOST RECENT SYSTOLIC BLOOD PRESSURE >= 140 MM HG: ICD-10-PCS | Mod: HCNC,CPTII,S$GLB, | Performed by: INTERNAL MEDICINE

## 2019-01-23 PROCEDURE — 72110 XR LUMBAR SPINE 5 VIEW WITH FLEX AND EXT: ICD-10-PCS | Mod: 26,HCNC,, | Performed by: RADIOLOGY

## 2019-01-23 PROCEDURE — 3079F DIAST BP 80-89 MM HG: CPT | Mod: HCNC,CPTII,S$GLB, | Performed by: INTERNAL MEDICINE

## 2019-01-23 PROCEDURE — 72110 X-RAY EXAM L-2 SPINE 4/>VWS: CPT | Mod: TC,FY,HCNC,PO

## 2019-01-23 PROCEDURE — 99999 PR PBB SHADOW E&M-EST. PATIENT-LVL V: CPT | Mod: PBBFAC,HCNC,, | Performed by: INTERNAL MEDICINE

## 2019-01-23 PROCEDURE — 99214 PR OFFICE/OUTPT VISIT, EST, LEVL IV, 30-39 MIN: ICD-10-PCS | Mod: HCNC,S$GLB,, | Performed by: INTERNAL MEDICINE

## 2019-01-23 PROCEDURE — 99214 OFFICE O/P EST MOD 30 MIN: CPT | Mod: HCNC,S$GLB,, | Performed by: INTERNAL MEDICINE

## 2019-01-23 PROCEDURE — 1101F PR PT FALLS ASSESS DOC 0-1 FALLS W/OUT INJ PAST YR: ICD-10-PCS | Mod: HCNC,CPTII,S$GLB, | Performed by: INTERNAL MEDICINE

## 2019-01-23 PROCEDURE — 3079F PR MOST RECENT DIASTOLIC BLOOD PRESSURE 80-89 MM HG: ICD-10-PCS | Mod: HCNC,CPTII,S$GLB, | Performed by: INTERNAL MEDICINE

## 2019-01-23 PROCEDURE — 72110 X-RAY EXAM L-2 SPINE 4/>VWS: CPT | Mod: 26,HCNC,, | Performed by: RADIOLOGY

## 2019-01-23 RX ORDER — PREDNISONE 20 MG/1
TABLET ORAL
Qty: 30 TABLET | Refills: 0 | Status: SHIPPED | OUTPATIENT
Start: 2019-01-23 | End: 2019-03-27

## 2019-01-23 RX ORDER — METHOCARBAMOL 500 MG/1
500 TABLET, FILM COATED ORAL 3 TIMES DAILY
Qty: 30 TABLET | Refills: 0 | Status: SHIPPED | OUTPATIENT
Start: 2019-01-23 | End: 2019-02-02

## 2019-01-23 RX ORDER — GABAPENTIN 300 MG/1
300 CAPSULE ORAL 3 TIMES DAILY PRN
Qty: 90 CAPSULE | Refills: 11
Start: 2019-01-23 | End: 2019-03-11 | Stop reason: SDUPTHER

## 2019-01-23 NOTE — PROGRESS NOTES
REASON FOR VISIT:  This is a 73-year-old male who is here with his wife.  For   about two months now, starting in late October, mainly November, he has been   experiencing a cramp-like pain that is in his posterior calf that extends or   feels it up to the lower buttocks.  It is worse with walking and it seems to be   progressive.  Now when he does walk, he is kind of bending over in order to   walk.  Occasionally, he might feel numbness in the feet.  He is not having back   pain.  Actually, in July and August 2018 he underwent a facet joint injection,   then a transforaminal epidural steroid injection at L3 to L4 and L4 to L5 for   back pain at that time, which was helpful.    He followed up with Neurology in December where they felt it might have been   related to Parkinson's disease and the carbidopa and levodopa dose was increased   to an extra one-half pill a day.  He also one time was seen in urgent care   where he was given Flexeril, ibuprofen, did not find it to be that helpful.    Then saw Neurology again on January 11th where gabapentin and a Medrol Dosepak   was given.  The only took this for seven days as he did not find to be helpful.    January 18th, he had an MRI.  It is revealing moderate spinal stenosis at L2 to   L3 and L3 to L4 and mild at L4 to L5, moderate neuroforaminal stenosis from   L2-L3 to L5-S1.  He also has facet arthropathy.    MEDICAL HISTORY:  Parkinson disease.  Hypertension.  Hyperlipidemia.    MEDICATIONS:  List was reviewed and per MedCard.  Actually, the carbidopa and   levodopa he is taking four times a day.    PHYSICAL EXAMINATION:  VITAL SIGNS:  Weight is 198 pounds, blood pressure 140/88.  NEUROLOGIC:  He has 2 to 3+ bilateral knee joint reflex, trace absent ankle   reflex.  Negative straight leg raises, except with the left leg.  No pain when I   abduct the leg at the hip joint.  He is nontender over the back.    IMPRESSION:  Bilateral leg pain, which is probably consistent  with lumbar spinal   stenosis.  It does not seem to be vascular.  He has very strong 2+ pedal pulses   and the symptoms are probably that of pseudoclaudication or neurogenic   claudication.    PLAN:  We will [facility] him back to the [facility].  In the meantime, I would like   for him to restart gabapentin 300 mg three times a day and we will put him on a   higher dose of prednisone to take 60 mg for five days, 40 for five days, 20 for   five days and methocarbamol 500 mg three times a day was prescribed.          /jessica [id number] review        IVONE/CHIARA  dd: 01/23/2019 16:18:57 (CST)  td: 01/24/2019 00:09:19 (CST)  Doc ID   #[id number]  Job ID #[id number]    CC:

## 2019-01-23 NOTE — PROGRESS NOTES
No instability and was seen between flexion and extension views    Still continue with what has been instructed with the use of muscle relaxant and high-dose prednisone    Contact me in a few days to let me know if there has been improvement or not

## 2019-01-23 NOTE — TELEPHONE ENCOUNTER
----- Message from Karthikeyan Eaton sent at 1/23/2019 10:27 AM CST -----  Contact: Whitley ( spouse ) @ [phone]  Caller requesting a return call regarding the MRI result and what to do until, pls call to advise

## 2019-01-28 NOTE — TELEPHONE ENCOUNTER
----- Message from Meg Henry sent at 1/28/2019  8:46 AM CST -----  Rx Refill/Request     Is this a Refill or New Rx:  refill  Rx Name and Strength:  methylphenidate HCl (RITALIN) 5 MG tabletPreferred Pharmacy with phone number:   Communication Preference:pt @ [phone]    Preferred Pharmacy with phone number:     [facility]/pharmacy #[id number] - [place] - [address]  [address]  [place] [address]  Phone: [phone] Fax: [phone]    Additional Information:

## 2019-01-29 ENCOUNTER — OFFICE VISIT (OUTPATIENT)
Dept: PAIN MEDICINE | Facility: CLINIC | Age: 74
End: 2019-01-29
Payer: MEDICARE

## 2019-01-29 VITALS
BODY MASS INDEX: 29.13 KG/M2 | DIASTOLIC BLOOD PRESSURE: 74 MMHG | WEIGHT: 203.5 LBS | HEART RATE: 77 BPM | TEMPERATURE: 98 F | SYSTOLIC BLOOD PRESSURE: 110 MMHG | HEIGHT: 70 IN

## 2019-01-29 DIAGNOSIS — M48.07 FORAMINAL STENOSIS OF LUMBOSACRAL REGION: ICD-10-CM

## 2019-01-29 DIAGNOSIS — M47.816 LUMBAR SPONDYLOSIS: ICD-10-CM

## 2019-01-29 DIAGNOSIS — M47.816 FACET ARTHRITIS OF LUMBAR REGION: ICD-10-CM

## 2019-01-29 DIAGNOSIS — M51.36 DDD (DEGENERATIVE DISC DISEASE), LUMBAR: ICD-10-CM

## 2019-01-29 DIAGNOSIS — M54.16 LUMBAR RADICULOPATHY: Primary | ICD-10-CM

## 2019-01-29 PROCEDURE — 99499 UNLISTED E&M SERVICE: CPT | Mod: HCNC,S$GLB,, | Performed by: NURSE PRACTITIONER

## 2019-01-29 PROCEDURE — 99213 PR OFFICE/OUTPT VISIT, EST, LEVL III, 20-29 MIN: ICD-10-PCS | Mod: HCNC,S$GLB,, | Performed by: NURSE PRACTITIONER

## 2019-01-29 PROCEDURE — 3288F FALL RISK ASSESSMENT DOCD: CPT | Mod: HCNC,CPTII,S$GLB, | Performed by: NURSE PRACTITIONER

## 2019-01-29 PROCEDURE — 99999 PR PBB SHADOW E&M-EST. PATIENT-LVL III: ICD-10-PCS | Mod: PBBFAC,HCNC,, | Performed by: NURSE PRACTITIONER

## 2019-01-29 PROCEDURE — 99213 OFFICE O/P EST LOW 20 MIN: CPT | Mod: HCNC,S$GLB,, | Performed by: NURSE PRACTITIONER

## 2019-01-29 PROCEDURE — 1100F PR PT FALLS ASSESS DOC 2+ FALLS/FALL W/INJURY/YR: ICD-10-PCS | Mod: HCNC,CPTII,S$GLB, | Performed by: NURSE PRACTITIONER

## 2019-01-29 PROCEDURE — 3074F PR MOST RECENT SYSTOLIC BLOOD PRESSURE < 130 MM HG: ICD-10-PCS | Mod: HCNC,CPTII,S$GLB, | Performed by: NURSE PRACTITIONER

## 2019-01-29 PROCEDURE — 3078F PR MOST RECENT DIASTOLIC BLOOD PRESSURE < 80 MM HG: ICD-10-PCS | Mod: HCNC,CPTII,S$GLB, | Performed by: NURSE PRACTITIONER

## 2019-01-29 PROCEDURE — 3288F PR FALLS RISK ASSESSMENT DOCUMENTED: ICD-10-PCS | Mod: HCNC,CPTII,S$GLB, | Performed by: NURSE PRACTITIONER

## 2019-01-29 PROCEDURE — 99499 RISK ADDL DX/OHS AUDIT: ICD-10-PCS | Mod: HCNC,S$GLB,, | Performed by: NURSE PRACTITIONER

## 2019-01-29 PROCEDURE — 99999 PR PBB SHADOW E&M-EST. PATIENT-LVL III: CPT | Mod: PBBFAC,HCNC,, | Performed by: NURSE PRACTITIONER

## 2019-01-29 PROCEDURE — 3078F DIAST BP <80 MM HG: CPT | Mod: HCNC,CPTII,S$GLB, | Performed by: NURSE PRACTITIONER

## 2019-01-29 PROCEDURE — 1100F PTFALLS ASSESS-DOCD GE2>/YR: CPT | Mod: HCNC,CPTII,S$GLB, | Performed by: NURSE PRACTITIONER

## 2019-01-29 PROCEDURE — 3074F SYST BP LT 130 MM HG: CPT | Mod: HCNC,CPTII,S$GLB, | Performed by: NURSE PRACTITIONER

## 2019-01-29 NOTE — PROGRESS NOTES
Chronic Pain - Established Visit    Referring Physician: No ref. provider found    Chief Complaint:   Chief Complaint   Patient presents with    Follow-up    Leg Pain     Back Bilateral    Rectal Pain        SUBJECTIVE: Disclaimer: This note has been generated using voice-recognition software. There may be typographical errors that have been missed during proof-reading    Interval History 1/29/2019:  The patient returns to clinic today for follow up. He reports increased low back pain that radiates down the posterior aspect of his legs to his mid calf bilaterally over the last month. This pain is shooting in nature. His pain is worse with prolonged standing and walking. He did try a medrol dose pack with limited relief. He is currently taking Robaxin with limited relief.  He is frustrated with this pain as it makes it difficult to exercise. He does have Parkinson's disease and follows an exercise program. He denies any other health changes. His pain today is 10/10.    Interval History 9/4/2018:  The patient returns to clinic today for follow up. He is s/p bilateral L4 TF AFTAB on 8/16/2018. He reports limited relief of his low back pain. He continues to report aching low back pain across his back. This pain is worse with prolonged walking and activity. He denies any radiating leg pain today. He continues to perform a home exercise routine. He is currently taking Aleve with limited benefit. He denies any other health changes. He denies any bowel or bladder incontinence. His pain today is 1/10.    Interval history 08/01/2018:  Since previous encounter the patient had bilateral medial branch nerve blocks at the left L2, L3, L4, L5 without any improvement in his pain symptoms.  He has previously had much better success with bilateral L4 transforaminal epidural steroid injections and continues to have lower extremity radicular pain symptoms.    Interval History 11/20/2017  The patient returns to the clinic for a  "follow up visit, he is reporting back pain of 2/10 today.  His pain had significantly exacerbated on Friday when the patient may be appointment but states that over the weekend with conservative care and occasional ibuprofen his pain has gradually improved.  He denies any radicular symptoms but is continue to have significant lower back pain over the area of the facet joints of the lumbar spine.  Previous x-rays have revealed that the patient has significant facet arthropathy most severe at L4-5 in the area where the patient is having his most severe pain.  On previous encounter was discussed that in the future if his pain persists we can proceed with medial branch nerve blocks to be followed by radiofrequency ablation if diagnostic, the patient has been tolerating physical therapy and doing home stretches and states that overall his pain is better currently compared to what it was on Friday.    Interval History 10/26/2017:  The patient returns to clinic today for follow up. He is s/p bilateral L4 TF AFTAB on 9/6/2017 and 10/11/2017. He reports 50% relief of his low back pain. He does report intermittent low back pain that is aching in nature. He does endorse stiffness especially first this in the morning. The pain is worse with prolonged sitting and transitioning for sitting to standing. He denies any radiating leg pain. He does take Ibuprofen with benefit. He denies any other health changes. He denies any bowel or bladder incontinence. His pain today is 4/10.    Initial encounter:    Haja Marti presents to the clinic for the evaluation of back pain. The pain started 3 years ago  and symptoms have been worsening.  Pt reports doing back exercises from orthopedic office and didn't help.  Pt reports [facility]'s "[facility]" physical therapy program did not help.  Walks 2 mi daily at [facility]. Also used to run marathons. 15 total. Started swimming 2 years ago, stopped swimming 3 mo ago due to increased weakness from " Parkinson's disease. Staying active helps the pain. Denies radiation. Cannot stand upright for longer than 10-15min before back pain gets more intense. Walking does not exacerbate the pain. Transitioning from sitting to standing causes pain. Pain is described as an ache.  Tightness in upper back. Not associated with trauma/fall or other inciting event.     Brief history:    Pain Description:    The pain is located in the back area     At BEST  6/10     At WORST  9/10 on the WORST day.      On average pain is rated as 7/10.     Today the pain is rated as 6/10    The pain is described as aching and tight band      Symptoms interfere with daily activity (pt wishes to be able to swim again, and do light weight-lifting exercises),  pain does not awaken pt during the night and does not hinder the patient the patient from falling asleep.     Exacerbating factors: Getting out of bed/chair.      Mitigating factors nothing.     Patient denies .  Patient denies any suicidal or homicidal ideations    Pain Medications:  Current:  Robaxin    Tried in Past:  NSAIDs - Advil prn   TCA -never   SNRI -Wellbutrin, lexapro  Anti-convulsants -Never  Muscle Relaxants -Never  Opioids- Never    Physical Therapy/Home Exercise:  yes       report:  Reviewed and consistent with medication use as prescribed.    Pain Procedures:   9/6/2017- Bilateral L4 TF AFTAB  10/11/2017 BL L4 TFESI  7/3/2018- Bilateral L2,3,4,5 MBB- no relief  8/16/2018- Bilateral L4 TF AFTAB    Chiropractor - adjustment made pain worse in March  Acupuncture - no results, dry needling 2 months ago  TENS unit -never  Spinal decompression -never  Joint replacement -never    Imaging:  MRI Lumbar Spine 1/18/2019:  COMPARISON:  Lumbar spine radiograph 09/05/2017    FINDINGS:  Alignment: Grade 1 retrolisthesis of L1 on L2.    Vertebrae: Preservation of vertebral body heights with multilevel scattered degenerative changes and without evidence of acute fracture or infiltrative marrow  replacement process.    Discs: Multilevel intervertebral disc height loss and degenerative signal change.    Cord: Normal.  Conus terminates at L1-L2.    Degenerative findings:    T12-L1: Ligamentum flavum thickening and facet arthropathy without significant spinal canal stenosis or neural foraminal narrowing.    L1-L2: Mild retrolisthesis, broad-based disc bulge, ligamentum flavum thickening, and facet arthropathy resulting in mild spinal canal stenosis, moderate left neural foraminal narrowing, and mild right neural foraminal narrowing    L2-L3: Broad-based disc bulge, ligamentum flavum thickening, facet arthropathy resulting in moderate spinal canal stenosis and mild bilateral neural foraminal narrowing.    L3-L4: Broad-based disc bulge, ligamentum flavum thickening, and facet arthropathy resulting in moderate spinal canal stenosis and moderate bilateral neural foraminal narrowing.    L4-L5: Broad-based disc bulge, ligamentum flavum thickening, and facet arthropathy resulting in mild central canal narrowing and moderate bilateral neural foraminal narrowing.    L5-S1: Facet arthropathy resulting in bilateral neural foraminal narrowing.    Paraspinal muscles & soft tissues: Large left renal cyst.      Impression       Multilevel degenerative change of the lumbar spine, most significant at L3-L4 which demonstrates moderate spinal canal stenosis and moderate bilateral neural foraminal narrowing.     Xray Lumbar Spine 1/23/2019:  FINDINGS:  Bones are demineralized.  Degenerative change lower thoracic spine.  Disc space narrowing throughout the visualized lower thoracic and lumbar spine similar.  Vacuum disc phenomena and anterior osteophytes noted.  Facet arthropathy particularly lower lumbar levels.  No spondylolysis.  No convincing subluxation with flexion extension allowing for some rotation on the extension.  Minimal retrolisthesis L1 on L2 similar.      Impression       Degenerative changes above.       Outside  MRI L SPINE 2/15/2016  Impression:   Multilevel lumbar spondylosis.   Multilevel, multifactorial spinal canal and neuroforaminal narrowing as above.  Patient has facet hypertrophy throughout the lumbar spine injury to right sided severe neuroforaminal stenosis at L4-5 and bilateral neuroforaminal stenosis at L5-S1 which is mild to moderate.      Xray Lumbar Spine 9/5/2017:  The lumbar vertebra are intact. AP view shows slight curvature convex to the left in the lower lumbar region. The lateral views show a grade 1 retrolisthesis at L1-2, stable between flexion and extension. Degenerative changes are present throughout the lumbar spine with disc space narrowing and marginal osteophytes. Mild degenerative facet arthropathy is present at lower levels with narrowing and sclerosis. No obvious paraspinal lesion is seen.      Impression      No acute abnormality. Multilevel degenerative spine changes.           Past Medical History:   Diagnosis Date    Anxiety     BPH (benign prostatic hypertrophy)     Cataract     OS    Depression     Epiretinal membrane, both eyes     Hx of psychiatric care     Hyperlipidemia     Hypertension     Kidney stones     Kidney stones     Parkinsons     Posterior vitreous detachment of left eye     Psychiatric problem     Retinal detachment 2004    OD    Retinoschisis, left eye     Sleep apnea     Vitreous hemorrhage of left eye      Past Surgical History:   Procedure Laterality Date    BLOCK, NERVE Bilateral 7/3/2018    [facility] at [facility] PAIN MGT    CATARACT EXTRACTION      OD    EYE SURGERY      INJECTION,STEROID,EPIDURAL,TRANSFORAMINAL APPROACH Bilateral 8/16/2018    [facility] at [facility] PAIN MGT    INJECTION-STEROID-EPIDURAL-TRANSFORAMINAL Bilateral 10/11/2017    Per[facility] by [facility] at [facility] PAIN MGT    INJECTION-STEROID-EPIDURAL-TRANSFORAMINAL Bilateral 9/6/2017    Per[facility] at [facility]  PAIN MGT    laser indirect  4/8/10    left eye    Laser Indirect Retinopexy  4/8/10    OS    PROSTATE SURGERY      RETINAL DETACHMENT SURGERY  2004    OD    ROTATOR CUFF REPAIR  11/13/2013    TONSILLECTOMY       Social History     Socioeconomic History    Marital status:      Spouse name: Trinidad    Number of children: 4    Years of education: Not on file    Highest education level: Not on file   Social Needs    Financial resource strain: Not on file    Food insecurity - worry: Not on file    Food insecurity - inability: Not on file    Transportation needs - medical: Not on file    Transportation needs - non-medical: Not on file   Occupational History     Employer: [employer]   Tobacco Use    Smoking status: Never Smoker    Smokeless tobacco: Never Used   Substance and Sexual Activity    Alcohol use: No    Drug use: No    Sexual activity: Not on file   Other Topics Concern    Patient feels they ought to cut down on drinking/drug use Not Asked    Patient annoyed by others criticizing their drinking/drug use Not Asked    Patient has felt bad or guilty about drinking/drug use Not Asked    Patient has had a drink/used drugs as an eye opener in the AM Not Asked   Social History Narrative    Not on file     Family History   Problem Relation Age of Onset    Cataracts Father     Cancer Father         lung    Diabetes Father     Cancer Mother         lung    Anxiety disorder Son     Depression Son     Anxiety disorder Daughter        Review of patient's allergies indicates:  No Known Allergies    Current Outpatient Medications   Medication Sig    atorvastatin (LIPITOR) 40 MG tablet Take 1 tablet (40 mg total) by mouth once daily.    carbidopa-levodopa  mg (SINEMET)  mg per tablet Take 1.5 tablets by mouth 3 (three) times daily.    citric acid-potassium citrate (POLYCITRA) 1,100-334 mg/5 mL solution Take 10 mLs by mouth.    clonazePAM (KLONOPIN) 0.5 MG tablet Take oral  1/2 to 1 tablet TID prn anxiety    ergocalciferol (VITAMIN D2) 50,000 unit Cap Take 50,000 Units by mouth every 30 days.     finasteride (PROSCAR) 5 mg tablet Take 1 tablet (5 mg total) by mouth once daily.    losartan (COZAAR) 100 MG tablet TAKE 1 TABLET EVERY DAY    magnesium oxide (MAG-OX) 400 mg tablet Take 400 mg by mouth once daily.    methocarbamol (ROBAXIN) 500 MG Tab Take 1 tablet (500 mg total) by mouth 3 (three) times daily. for 10 days    potassium citrate (UROCIT-K) 10 mEq (1,080 mg) TbSR TAKE 1 TABLET THREE TIMES DAILY WITH MEALS    predniSONE (DELTASONE) 20 MG tablet 3 tablets po daily for 5 days, then 2 tablets po daily for 5 days, then 1 tablets po daily for 5 days    pyridoxine (VITAMIN B-6) 50 MG Tab Take 50 mg by mouth once daily.    sertraline (ZOLOFT) 100 MG tablet Take 2 tablets (200 mg total) by mouth once daily.    aspirin 81 MG Chew Take 81 mg by mouth. 1 Tablet, Chewable Oral Every day    gabapentin (NEURONTIN) 300 MG capsule Take 1 capsule (300 mg total) by mouth 3 (three) times daily as needed.    meloxicam (MOBIC) 7.5 MG tablet Take 1 tablet (7.5 mg total) by mouth once daily.    methylphenidate HCl (RITALIN) 5 MG tablet Take 1 tablet (5 mg total) by mouth once daily.     No current facility-administered medications for this visit.        REVIEW OF SYSTEMS:    GENERAL:  weight loss 3-4 lb/month, no malaise or fevers.  HEENT:   No recent changes in vision or hearing  NECK:  Negative for lumps, no difficulty with swallowing.  RESPIRATORY:  Negative for cough, wheezing or shortness of breath, patient denies any recent URI.  CARDIOVASCULAR:  Negative for chest pain, leg swelling or palpitations.  GI: +N/V a/w pt's home medications. Negative for abdominal discomfort, blood in stools or black stools or change in bowel habits.  MUSCULOSKELETAL:  See HPI.  SKIN:  Negative for lesions, rash, and itching.  PSYCH:  No mood disorder or recent psychosocial stressors.  Patients sleep is  "not disturbed secondary to pain.  HEMATOLOGY/LYMPHOLOGY: + bruising easily   ENDO: No history of diabetes or thyroid dysfunction  NEURO:    Patient has Parkinson's with parkinsonian tremor and affect.  All other reviewed and negative other than HPI.    OBJECTIVE:    /74   Pulse 77   Temp 97.7 °F (36.5 °C)   Ht 5' 10" (1.778 m)   Wt 92.3 kg (203 lb 7.8 oz)   BMI 29.20 kg/m²     PHYSICAL EXAMINATION:    GENERAL: Well appearing, in no acute distress, alert and oriented x3.  PSYCH:  Mood and affect appropriate.  SKIN: Skin color, texture, turgor normal, no rashes or lesions.  HEAD/FACE:  Normocephalic, atraumatic. Cranial nerves grossly intact.  CV: RRR with palpation of the radial artery.  PULM: No evidence of respiratory difficulty, symmetric chest rise.  BACK: Straight leg raising in the sitting position is positive to radicular pain bilaterally. There is pain with palpation over the facet joints of the lumbar spine. Limited ROM with pain on extension. Positive facet loading bilaterally.   EXTREMITIES: Peripheral joint ROM is full and pain free without obvious instability or laxity in all four extremities. No deformities, edema, or skin discoloration. Good capillary refill.  MUSCULOSKELETAL: Hip and knee provocative maneuvers are negative.  There is no pain with palpation over the sacroiliac joints bilaterally.  There is no pain to palpation over the greater trochanteric bursa bilaterally.  FABERs test is negative.  FADIRs test is negative.   5/5 strength in right ankle with plantar and 4/5 dorsiflexion. 5/5 strength in left ankle with plantar and dorsiflexion, 5/5 strength with right knee flexion extension, 5/5 strength with knee flexion extension on the left  No atrophy or tone abnormalities are noted.  NEURO:  2+ right knee, 3+ left knee reflexes, absent S1 reflexes bilaterally.  Plantar response are downgoing. No clonus.  No loss of sensation is noted.  GAIT: Antalgic, ambulates without assistance "       ASSESSMENT: 73 y.o. year old male with pain, consistent with     Encounter Diagnoses   Name Primary?    Lumbar radiculopathy Yes    Foraminal stenosis of lumbosacral region     Lumbar spondylosis     Facet arthritis of lumbar region     DDD (degenerative disc disease), lumbar        PLAN:     - Previous imaging was reviewed and discussed with the patient today.    - Schedule for bilateral L5 TF AFTAB.  The procedure, risks, benefits and options were discussed with patient. There are no contraindications to the procedure. The patient expressed understanding and agreed to proceed.      - If limited relief, we can consider repeat lumbar MBB.     - Continue Robaxin.     - Continue home exercise routine.     - RTC 2 weeks after above procedure.     - Dr. Crockett was consulted on the patient and agrees with this plan.    The above plan and management options were discussed at length with patient. Patient is in agreement with the above and verbalized understanding.     Xochilt Arenas NP  01/29/2019

## 2019-01-30 RX ORDER — METHYLPHENIDATE HYDROCHLORIDE 5 MG/1
5 TABLET ORAL DAILY
Qty: 30 TABLET | Refills: 0 | Status: SHIPPED | OUTPATIENT
Start: 2019-01-30 | End: 2019-03-27

## 2019-02-05 ENCOUNTER — OFFICE VISIT (OUTPATIENT)
Dept: PSYCHIATRY | Facility: CLINIC | Age: 74
End: 2019-02-05
Payer: MEDICARE

## 2019-02-05 DIAGNOSIS — F41.1 GAD (GENERALIZED ANXIETY DISORDER): Primary | ICD-10-CM

## 2019-02-05 PROCEDURE — 90834 PSYTX W PT 45 MINUTES: CPT | Mod: HCNC,S$GLB,, | Performed by: SOCIAL WORKER

## 2019-02-05 PROCEDURE — 90834 PR PSYCHOTHERAPY W/PATIENT, 45 MIN: ICD-10-PCS | Mod: HCNC,S$GLB,, | Performed by: SOCIAL WORKER

## 2019-02-05 NOTE — PROGRESS NOTES
Individual Psychotherapy (PhD/LCSW)    2/5/2019    Site:  [facility]         Therapeutic Intervention: Met with patient and spouse.  Outpatient - Insight oriented psychotherapy 45 min - CPT code 51342    Chief complaint/reason for encounter: anxiety Wife Whitley  Pt with parkinsons with cognitive difficulties for which wife is needed to aid in therapeutic instructions including homework.       Interval history and content of current session:    Pt came by himself.   He went through a period of pain.  Pain is improved.  Pain is said not to be related to his parkinsons. He did not seem unduly anxious and communicated well.                    Treatment plan:  · Target symptoms: anxiety   · Why chosen therapy is appropriate versus another modality: relevant to diagnosis  · Outcome monitoring methods: self-report, observation  · Therapeutic intervention type: behavior modifying psychotherapy, supportive therapy.     Risk parameters:  Patient reports no suicidal ideation  Patient reports no homicidal ideation  Patient reports no self-injurious behavior  Patient reports no violent behavior      Verbal deficits: None    Patient's response to intervention:  The patient's response to intervention is accepting.    Progress toward goals and other mental status changes:  The patient's progress toward goals is fair .    Diagnosis:     ICD-10-CM ICD-9-CM   1. Generalized anxiety disorder F41.1 300.02       Plan:  individual psychotherapy    Return to clinic: 1 month    Length of Service (minutes): 45

## 2019-02-19 ENCOUNTER — HOSPITAL ENCOUNTER (OUTPATIENT)
Facility: OTHER | Age: 74
Discharge: HOME | End: 2019-02-19
Attending: ANESTHESIOLOGY | Admitting: ANESTHESIOLOGY
Payer: MEDICARE

## 2019-02-19 VITALS
TEMPERATURE: 98 F | DIASTOLIC BLOOD PRESSURE: 97 MMHG | OXYGEN SATURATION: 98 % | WEIGHT: 200 LBS | HEART RATE: 66 BPM | HEIGHT: 70 IN | BODY MASS INDEX: 28.63 KG/M2 | SYSTOLIC BLOOD PRESSURE: 188 MMHG | RESPIRATION RATE: 18 BRPM

## 2019-02-19 DIAGNOSIS — M51.36 DDD (DEGENERATIVE DISC DISEASE), LUMBAR: Primary | ICD-10-CM

## 2019-02-19 DIAGNOSIS — M54.16 LUMBAR RADICULOPATHY: ICD-10-CM

## 2019-02-19 PROCEDURE — 64483 PR EPIDURAL INJ, ANES/STEROID, TRANSFORAMINAL, LUMB/SACR, SNGL LEVL: ICD-10-PCS | Mod: 50,HCNC,, | Performed by: ANESTHESIOLOGY

## 2019-02-19 PROCEDURE — 64483 NJX AA&/STRD TFRM EPI L/S 1: CPT | Mod: 50,HCNC,, | Performed by: ANESTHESIOLOGY

## 2019-02-19 PROCEDURE — 25000003 PHARM REV CODE 250: Mod: HCNC | Performed by: ANESTHESIOLOGY

## 2019-02-19 PROCEDURE — 63600175 PHARM REV CODE 636 W HCPCS: Mod: HCNC | Performed by: ANESTHESIOLOGY

## 2019-02-19 PROCEDURE — 25500020 PHARM REV CODE 255: Mod: HCNC | Performed by: ANESTHESIOLOGY

## 2019-02-19 PROCEDURE — 64483 NJX AA&/STRD TFRM EPI L/S 1: CPT | Mod: 50,HCNC | Performed by: ANESTHESIOLOGY

## 2019-02-19 RX ORDER — SODIUM CHLORIDE 9 MG/ML
500 INJECTION, SOLUTION INTRAVENOUS CONTINUOUS
Status: DISCONTINUED | OUTPATIENT
Start: 2019-02-19 | End: 2019-02-19 | Stop reason: HOSPADM

## 2019-02-19 RX ORDER — LIDOCAINE HYDROCHLORIDE 10 MG/ML
INJECTION INFILTRATION; PERINEURAL
Status: DISCONTINUED | OUTPATIENT
Start: 2019-02-19 | End: 2019-02-19 | Stop reason: HOSPADM

## 2019-02-19 RX ORDER — METHYLPREDNISOLONE ACETATE 40 MG/ML
INJECTION, SUSPENSION INTRA-ARTICULAR; INTRALESIONAL; INTRAMUSCULAR; SOFT TISSUE
Status: DISCONTINUED | OUTPATIENT
Start: 2019-02-19 | End: 2019-02-19 | Stop reason: HOSPADM

## 2019-02-19 RX ORDER — ALPRAZOLAM 0.5 MG/1
0.5 TABLET, ORALLY DISINTEGRATING ORAL ONCE
Status: COMPLETED | OUTPATIENT
Start: 2019-02-19 | End: 2019-02-19

## 2019-02-19 RX ORDER — SODIUM CHLORIDE 9 MG/ML
INJECTION, SOLUTION INTRAVENOUS CONTINUOUS
Status: DISCONTINUED | OUTPATIENT
Start: 2019-02-19 | End: 2019-02-19 | Stop reason: HOSPADM

## 2019-02-19 RX ORDER — BUPIVACAINE HYDROCHLORIDE 2.5 MG/ML
INJECTION, SOLUTION EPIDURAL; INFILTRATION; INTRACAUDAL
Status: DISCONTINUED | OUTPATIENT
Start: 2019-02-19 | End: 2019-02-19 | Stop reason: HOSPADM

## 2019-02-19 NOTE — OP NOTE
INFORMED CONSENT: The procedure, risks, benefits and options were discussed with patient. There are no contraindications to the procedure. The patient expressed understanding and agreed to proceed. The personnel performing the procedure was discussed.    02/19/2019    Surgeon: Laina Crockett MD    Assistants: None    PROCEDURE:    1)  Bilateral  L5 TRANSFORAMINAL EPIDURAL STEROID INJECTION    2)  Bilateral  L5 TRANSFORAMINAL EPIDURAL STEROID INJECTION    Pre Procedure diagnosis:    Bilateral  L5 and L5  Lumbar radiculopathy [M54.16]  DDD (degenerative disc disease), lumbar [M51.36]    Post-Procedure diagnosis:   same    Complications: None    Specimens: None      DESCRIPTION OF PROCEDURE: The patient was brought to the procedure room. IV access was obtained prior to the procedure. The patient was positioned prone on the fluoroscopy table. Continuous hemodynamic monitoring was initiated including blood pressure, EKG, and pulse oximetry. . The skin was prepped with chlorhexidine and draped in a sterile fashion. Skin anesthesia was achieved using a total of 10mL of lidocaine, 5mL over each respective injection site.     The  L5 and L5  transforaminal spaces were identified with fluoroscopy in the  AP, oblique, and lateral views.  A 22 gauge spinal quinke needle was then advanced into the area of the trans foraminal spaces at the above levels with confirmation of proper needle position using AP, oblique, and lateral fluoroscopic views. Once the needle tip was in the area of the transforaminal space, and there was no blood, CSF or paraesthesias,  1 mL of Omnipaque 300mg/ml was injected on each side for a total of 2 mL.  Fluoroscopic imaging in the AP and lateral views revealed a clear outline of the spinal nerve with proximal spread of agent through the neural foramen into the epidural space. A total combination of 1 mL of Bupivicaine 0.25% and 40 mg depo medrol was injected into each level for a total of 4mL of  injected medications with displacement of the contrast dye confirming that the medication went into the area of the transforaminal spaces at each level. A sterile dressing was applied.   Patient tolerated the procedure well.    Patient was taken back to the recovery room for further observation.     The patient was discharged to home in stable condition

## 2019-02-19 NOTE — DISCHARGE SUMMARY
Discharge Note  Short Stay      SUMMARY     Admit Date: 2/19/2019    Attending Physician: Laina Crockett      Discharge Physician: aLina Crockett      Discharge Date: 2/19/2019 9:54 AM    Procedure(s) (LRB):  INJECTION, STEROID, EPIDURAL, TRANSFORAMINAL APPROACH, L5 (Bilateral)    Final Diagnosis: Lumbar radiculopathy [M54.16]  DDD (degenerative disc disease), lumbar [M51.36]    Disposition: Home or self care    Patient Instructions:   Current Discharge Medication List      CONTINUE these medications which have NOT CHANGED    Details   aspirin 81 MG Chew Take 81 mg by mouth. 1 Tablet, Chewable Oral Every day      atorvastatin (LIPITOR) 40 MG tablet Take 1 tablet (40 mg total) by mouth once daily.  Qty: 90 tablet, Refills: 3      carbidopa-levodopa  mg (SINEMET)  mg per tablet Take 1.5 tablets by mouth 3 (three) times daily.  Qty: 135 tablet, Refills: 6      citric acid-potassium citrate (POLYCITRA) 1,100-334 mg/5 mL solution Take 10 mLs by mouth.      clonazePAM (KLONOPIN) 0.5 MG tablet Take oral 1/2 to 1 tablet TID prn anxiety  Qty: 180 tablet, Refills: 1    Comments: 90 days supply  Associated Diagnoses: Anxiety disorder, unspecified type      ergocalciferol (VITAMIN D2) 50,000 unit Cap Take 50,000 Units by mouth every 30 days.       finasteride (PROSCAR) 5 mg tablet Take 1 tablet (5 mg total) by mouth once daily.  Qty: 90 tablet, Refills: 3      gabapentin (NEURONTIN) 300 MG capsule Take 1 capsule (300 mg total) by mouth 3 (three) times daily as needed.  Qty: 90 capsule, Refills: 11      losartan (COZAAR) 100 MG tablet TAKE 1 TABLET EVERY DAY  Qty: 90 tablet, Refills: 3      magnesium oxide (MAG-OX) 400 mg tablet Take 400 mg by mouth once daily.      meloxicam (MOBIC) 7.5 MG tablet Take 1 tablet (7.5 mg total) by mouth once daily.  Qty: 30 tablet, Refills: 3    Associated Diagnoses: Facet arthritis, degenerative, lumbar spine; Foraminal stenosis of lumbosacral region; Lumbar radiculopathy;  Spondylolysis of lumbar region      methylphenidate HCl (RITALIN) 5 MG tablet Take 1 tablet (5 mg total) by mouth once daily.  Qty: 30 tablet, Refills: 0      potassium citrate (UROCIT-K) 10 mEq (1,080 mg) TbSR TAKE 1 TABLET THREE TIMES DAILY WITH MEALS  Qty: 270 tablet, Refills: 3      predniSONE (DELTASONE) 20 MG tablet 3 tablets po daily for 5 days, then 2 tablets po daily for 5 days, then 1 tablets po daily for 5 days  Qty: 30 tablet, Refills: 0    Associated Diagnoses: Spinal stenosis of lumbar region with neurogenic claudication      pyridoxine (VITAMIN B-6) 50 MG Tab Take 50 mg by mouth once daily.      sertraline (ZOLOFT) 100 MG tablet Take 2 tablets (200 mg total) by mouth once daily.  Qty: 180 tablet, Refills: 1    Comments: 90 days supply  Associated Diagnoses: POLINA (generalized anxiety disorder)                 Discharge Diagnosis: Lumbar radiculopathy [M54.16]  DDD (degenerative disc disease), lumbar [M51.36]  Condition on Discharge: Stable with no complications to procedure   Diet on Discharge: Same as before.  Activity: as per instruction sheet.  Discharge to: Home with a responsible adult.  Follow up: 2-4 weeks

## 2019-02-19 NOTE — DISCHARGE INSTRUCTIONS
Thank you for allowing us to care for you today. You may receive a survey about the care we provided. Your feedback is valuable and helps us provide excellent care throughout the community.     Home Care Instructions for Pain Management:    1. DIET:   You may resume your normal diet today.   2. BATHING:   You may shower with luke warm water. No tub baths or anything that will soak injection sites under water for the next 24 hours.  3. DRESSING:   You may remove your bandage today.   4. ACTIVITY LEVEL:   You may resume your normal activities 24 hrs after your procedure. Nothing strenuous today.  5. MEDICATIONS:   You may resume your normal medications today. To restart blood thinners, ask your doctor.  6. DRIVING    If you have received any sedatives by mouth today, you may not drive for 12 hours.    If you have received any sedation through your IV, you may not drive for 24 hrs.   7. SPECIAL INSTRUCTIONS:   No heat to the injection site for 24 hrs including, hot bath or shower, heating pad, moist heat, or hot tubs.    Use ice pack to injection site for any pain or discomfort.  Apply ice packs for 20 minute intervals as needed.    IF you have diabetes, be sure to monitor your blood sugar more closely. IF your injection contained steroids your blood sugar levels may become higher than normal.    If you are still having pain upon discharge:  Your pain may improve over the next 48 hours. The anesthetic (numbing medication) works immediately to 48 hours. IF your injection contained a steroid (anti-inflammatory medication), it takes approximately 3 days to start feeling relief and 7-10 days to see your greatest results from the medication. It is possible you may need subsequent injections. This would be discussed at your follow up appointment with pain management or your referring doctor.      PLEASE CALL YOUR DOCTOR IF:  1. Redness or swelling around the injection site.  2. Fever of 101 degrees or more  3. Drainage  (pus) from the injection site.  4. For any continuous bleeding (some dried blood over the incision is normal.)    FOR EMERGENCIES:   If any unusual problems or difficulties occur during clinic hours, call [phone] or [phone].

## 2019-02-22 ENCOUNTER — OFFICE VISIT (OUTPATIENT)
Dept: NEUROLOGY | Facility: CLINIC | Age: 74
End: 2019-02-22
Payer: MEDICARE

## 2019-02-22 VITALS
DIASTOLIC BLOOD PRESSURE: 89 MMHG | HEIGHT: 70 IN | BODY MASS INDEX: 29.01 KG/M2 | WEIGHT: 202.63 LBS | HEART RATE: 67 BPM | SYSTOLIC BLOOD PRESSURE: 132 MMHG

## 2019-02-22 DIAGNOSIS — M54.17 LUMBOSACRAL RADICULOPATHY AT L5: Primary | ICD-10-CM

## 2019-02-22 PROCEDURE — 99215 OFFICE O/P EST HI 40 MIN: CPT | Mod: HCNC,S$GLB,, | Performed by: PSYCHIATRY & NEUROLOGY

## 2019-02-22 PROCEDURE — 1100F PTFALLS ASSESS-DOCD GE2>/YR: CPT | Mod: HCNC,CPTII,S$GLB, | Performed by: PSYCHIATRY & NEUROLOGY

## 2019-02-22 PROCEDURE — 3288F FALL RISK ASSESSMENT DOCD: CPT | Mod: HCNC,CPTII,S$GLB, | Performed by: PSYCHIATRY & NEUROLOGY

## 2019-02-22 PROCEDURE — 99999 PR PBB SHADOW E&M-EST. PATIENT-LVL III: ICD-10-PCS | Mod: PBBFAC,HCNC,, | Performed by: PSYCHIATRY & NEUROLOGY

## 2019-02-22 PROCEDURE — 3079F DIAST BP 80-89 MM HG: CPT | Mod: HCNC,CPTII,S$GLB, | Performed by: PSYCHIATRY & NEUROLOGY

## 2019-02-22 PROCEDURE — 99999 PR PBB SHADOW E&M-EST. PATIENT-LVL III: CPT | Mod: PBBFAC,HCNC,, | Performed by: PSYCHIATRY & NEUROLOGY

## 2019-02-22 PROCEDURE — 3079F PR MOST RECENT DIASTOLIC BLOOD PRESSURE 80-89 MM HG: ICD-10-PCS | Mod: HCNC,CPTII,S$GLB, | Performed by: PSYCHIATRY & NEUROLOGY

## 2019-02-22 PROCEDURE — 99215 PR OFFICE/OUTPT VISIT, EST, LEVL V, 40-54 MIN: ICD-10-PCS | Mod: HCNC,S$GLB,, | Performed by: PSYCHIATRY & NEUROLOGY

## 2019-02-22 PROCEDURE — 3075F SYST BP GE 130 - 139MM HG: CPT | Mod: HCNC,CPTII,S$GLB, | Performed by: PSYCHIATRY & NEUROLOGY

## 2019-02-22 PROCEDURE — 3075F PR MOST RECENT SYSTOLIC BLOOD PRESS GE 130-139MM HG: ICD-10-PCS | Mod: HCNC,CPTII,S$GLB, | Performed by: PSYCHIATRY & NEUROLOGY

## 2019-02-22 PROCEDURE — 3288F PR FALLS RISK ASSESSMENT DOCUMENTED: ICD-10-PCS | Mod: HCNC,CPTII,S$GLB, | Performed by: PSYCHIATRY & NEUROLOGY

## 2019-02-22 PROCEDURE — 1100F PR PT FALLS ASSESS DOC 2+ FALLS/FALL W/INJURY/YR: ICD-10-PCS | Mod: HCNC,CPTII,S$GLB, | Performed by: PSYCHIATRY & NEUROLOGY

## 2019-02-22 NOTE — PROGRESS NOTES
"Name: Haja Marti  MRN: [id number]   CSN: [id number]      Date: 02/22/2019    Chief Complaint / Interval History:   - recent back pain  - medrol dose mariel helped take from 10 --> 5  - then got B L5 injections from [facility] 9 now 2/10 at worst)  - worse in hte AM, better with time and stooping over- gabapentin not effective  - PD symptoms are doing   - Ibuprofen x 3- 4 tabs usually helps        - saw vipin in NOv    From Nov  - more fatigue  - exercising some  - feels pretty stable  -  Good support with family  - little off time  -  No dyskinesia  - exercising  - no bowel/bladder changes  - mild AM LH and non-motor complications of near-syncope    From Aug 2018:  - having much more fatigue, lays down to feel better  - balance and gait about the same  -  Still constipated   - watching his BP, has been more normal for other doctors    From April 2018:  - balance and gait is better   - taking miralax as needed, usually takes 2-3 days to move bowels  - not drinking much water    From January 2018:  - azilect had some insomnia  - restarted the cd/ld 1/2 tid - wife thinks all is a little better but not dramatic  - voice particularly better  - no recurrence of hallucinations on the low dose cd/ld  -  He thinks the new cd/ld has leveled out, but anxiety is worse, for nearly every event in the family  - he is ruminating about having to give a speech in the next month and he is very nervous    * might be worse since he started the aricept    History of Present Illness (HPI):  70 yo     2+ years of progressive stooping posture, shuffling feet, slower walking, struggles to get out of a chair.  Handwriting has gotten "terrible."  Slower with dressing overall, and with most things.    Is a cautious , but has a tendency to pull to the R side of the road (correcting to the left).  More issues with parking, no issues moving backwards but uses side mirrors.  No accidents.      For about 6 months, there has been a progressive " hoarseness of the voice.  Comes and goes, not clear timing.      No tremor.      Saw Dr. 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  RBD/sleep issues (Constitutional)?Yes - fights in his sleep, jumps out of bed, falls out over 5 years, talking for 30 years.  Depression/anxiety (Psychiatric)?Yes  Fatigue (Constitutional)?Yes  Constipation (GI)?Yes  Urinary issues ()?Yes  Sexual dysfunction ()?Yes  -ED  Orthostasis (Cardiovascular)?Yes  Leg swelling (Cardiovascular)? No  Falls (Musculoskeletal)?Yes - has had a couple of falls, more unsteady on a boat.    Cognitive impairment (Neurologic)?Yes - a little slower overall  Psychoses (Psychiatric)?Yes - recently had experience thinking he saw people in his den, non-descript faces; 2 years ago, had a experience of seeing someone in the bedroom.  He's had no clear delusions.   Pain/Paresthesia (Neurologic)?Yes - lumbar for years, some disability, helped with pain management.  Visual changes (Eyes)?No  Moles / skin changes (Skin)?No  Stridor / SOB (Pulm)?No  Bruising (Heme)?No    Past Medical History: The patient  has a past medical history of Anxiety, BPH (benign prostatic hypertrophy), Cataract, Depression, Epiretinal membrane, both eyes, psychiatric care, Hyperlipidemia, Hypertension, Kidney stones, Kidney stones, Parkinsons, Posterior vitreous detachment of left eye, Psychiatric problem, Retinal detachment (2004), Retinoschisis, left eye, Sleep apnea, and Vitreous hemorrhage of left eye.    Social History: The patient  reports that  has never smoked. he has never used smokeless tobacco. He reports that he does not drink alcohol or use drugs.    Family History: Their family history includes Anxiety disorder in his daughter and son; Cancer in his father and mother; Cataracts in his father;  "Depression in his son; Diabetes in his father.    Allergies: Patient has no known allergies.     Meds:   Current Outpatient Medications on File Prior to Visit   Medication Sig Dispense Refill    atorvastatin (LIPITOR) 40 MG tablet Take 1 tablet (40 mg total) by mouth once daily. 90 tablet 3    carbidopa-levodopa  mg (SINEMET)  mg per tablet Take 1.5 tablets by mouth 3 (three) times daily. 135 tablet 6    citric acid-potassium citrate (POLYCITRA) 1,100-334 mg/5 mL solution Take 10 mLs by mouth.      clonazePAM (KLONOPIN) 0.5 MG tablet Take oral 1/2 to 1 tablet TID prn anxiety 180 tablet 1    ergocalciferol (VITAMIN D2) 50,000 unit Cap Take 50,000 Units by mouth every 30 days.       finasteride (PROSCAR) 5 mg tablet Take 1 tablet (5 mg total) by mouth once daily. 90 tablet 3    losartan (COZAAR) 100 MG tablet TAKE 1 TABLET EVERY DAY 90 tablet 3    magnesium oxide (MAG-OX) 400 mg tablet Take 400 mg by mouth once daily.      potassium citrate (UROCIT-K) 10 mEq (1,080 mg) TbSR TAKE 1 TABLET THREE TIMES DAILY WITH MEALS 270 tablet 3    pyridoxine (VITAMIN B-6) 50 MG Tab Take 50 mg by mouth once daily.      sertraline (ZOLOFT) 100 MG tablet Take 2 tablets (200 mg total) by mouth once daily. 180 tablet 1    aspirin 81 MG Chew Take 81 mg by mouth. 1 Tablet, Chewable Oral Every day      gabapentin (NEURONTIN) 300 MG capsule Take 1 capsule (300 mg total) by mouth 3 (three) times daily as needed. 90 capsule 11    meloxicam (MOBIC) 7.5 MG tablet Take 1 tablet (7.5 mg total) by mouth once daily. 30 tablet 3    methylphenidate HCl (RITALIN) 5 MG tablet Take 1 tablet (5 mg total) by mouth once daily. 30 tablet 0    predniSONE (DELTASONE) 20 MG tablet 3 tablets po daily for 5 days, then 2 tablets po daily for 5 days, then 1 tablets po daily for 5 days 30 tablet 0     No current facility-administered medications on file prior to visit.      Exam:  /89   Pulse 67   Ht 5' 10" (1.778 m)   Wt 91.9 kg " (202 lb 9.6 oz)   BMI 29.07 kg/m²     * Specialized movement exam  Severe hypophonic speech.    ++ mouth sl open, facial masking.   L>R mild-mod cogwheel rigidity.     L>R mild-mod bradykinesia.   No tremor with rest, posture, kinesis, or intention.    No other dystonia, chorea, athetosis, myoclonus, or tics.   No motor impersistence.   Normal-based gait.   Mildly shortened stride length.  + abnormal arm swing.     No postural instability.      Laboratory/Radiological:  - Results:  Lab Visit on 12/28/2018   Component Date Value Ref Range Status    CPK 12/28/2018 102  20 - 200 U/L Final   Office Visit on 12/24/2018   Component Date Value Ref Range Status    POC Sodium 12/24/2018 140  138 - 146 MMOL/L Final    POC Potassium 12/24/2018 4.3  3.5 - 4.9 MMOL/L Final    POC Chloride 12/24/2018 99  98 - 109 MMOL/L Final    POC BUN 12/24/2018 20  8 - 26 MMOL/L Final    POC Glucose 12/24/2018 91  70 - 110 MG/DL Final    POC Creatinine 12/24/2018 0.9  0.6 - 1.3 mg/dL Final    POC iCA 12/24/2018 1.17  1.12 - 1.32 MMOL/L Final    POC TCO2 12/24/2018 27  24 - 29 MMOL/L Final    POC Hematocrit 12/24/2018 49  42 - 52 %PCV Final    POC Hemoglobin 12/24/2018 16.7  13.5 - 18 g/dL Final    POC Anion Gap 12/24/2018 19  10.0 - 20 MMOL/L Final     - Independent review of images:    Diagnoses:          1) Parkinsonism, likely iPD (vs. DLB given the early neuropsychiatric features, but those have resolved)  2) Neurogenic claudication 2/2 L5    - sending to [facility]   - might consider surgical procedure  - PDism is stable now  - pain management and control  - pt/ot  - medi diet            Edward Ivey MD, MPH  Division of Movement and Memory Disorders  [facility]  [phone]

## 2019-02-26 ENCOUNTER — TELEPHONE (OUTPATIENT)
Dept: PAIN MEDICINE | Facility: CLINIC | Age: 74
End: 2019-02-26

## 2019-02-26 NOTE — TELEPHONE ENCOUNTER
----- Message from Soheila Perez sent at 2/26/2019  8:56 AM CST -----  Name of Who is Calling: #ALEXANDRO RENE [[id number]]    What is the request in detail: Pt has pain from lower back to calves and is requesting pain meds.       Can the clinic reply by [facility]:   No       What Number to Call Back if not in [facility]: #[phone]

## 2019-02-26 NOTE — TELEPHONE ENCOUNTER
Staff contacted and spoke with patient in regards to pain from lower back to calves and is requesting pain meds.    Staff reminded the patient that he has Gabapentin and Mobic, which are pain medications.      Staff informed the patient that he may tried either one of these pain medication and if the pain continues to give staff a call back.    Patient verbalized understanding.

## 2019-03-04 ENCOUNTER — TELEPHONE (OUTPATIENT)
Dept: PAIN MEDICINE | Facility: CLINIC | Age: 74
End: 2019-03-04

## 2019-03-04 NOTE — TELEPHONE ENCOUNTER
"Staff returned th patient's call regarding a sooner appointment than his already scheduled 3/11/19 2:30 am (2 week follow up after procedure appointment) with Dr. Crockett. Patient had a epidural steroid injection on 02/19/19.    Patient states, " After  My procedure I am still experiencing the same pain I was experiencing before it. Dr. Crockett told me to take gabapentin but the medication has not been working."    Staff informed the patient that it takes at least 2 weeks for the steroid to began working and for him to began getting relief. It has only been a little less than a week and he should give it more time to start working.     Patient states," Thank you so much for returning my call that was informative. I call several different doctors office and they do not call back or you cannot get a live person. So again thank you.'    Staff expressed thanks and informed the patient that her at [facility] we try our best to contact our patient's back in a timely matter.    Patient expressed thanks and wished staff happy Abram Roper.  "

## 2019-03-04 NOTE — TELEPHONE ENCOUNTER
----- Message from [facility] sent at 3/4/2019  9:09 AM CST -----  Name of Who is Calling:Self        What is the request in detail: Pt is requesting a call back from clinical staff regarding a sooner appt than 3/11 due to him having a lot of pain up and down his leg. Please contact to further discuss and advise.        Can the clinic reply by [facility]: No      What Number to Call Back if not in [facility]:

## 2019-03-11 ENCOUNTER — OFFICE VISIT (OUTPATIENT)
Dept: PSYCHIATRY | Facility: CLINIC | Age: 74
End: 2019-03-11
Payer: MEDICARE

## 2019-03-11 ENCOUNTER — OFFICE VISIT (OUTPATIENT)
Dept: PAIN MEDICINE | Facility: CLINIC | Age: 74
End: 2019-03-11
Attending: ANESTHESIOLOGY
Payer: MEDICARE

## 2019-03-11 ENCOUNTER — TELEPHONE (OUTPATIENT)
Dept: PAIN MEDICINE | Facility: CLINIC | Age: 74
End: 2019-03-11

## 2019-03-11 VITALS
HEIGHT: 70 IN | WEIGHT: 206.38 LBS | HEART RATE: 74 BPM | DIASTOLIC BLOOD PRESSURE: 102 MMHG | SYSTOLIC BLOOD PRESSURE: 159 MMHG | TEMPERATURE: 98 F | BODY MASS INDEX: 29.54 KG/M2

## 2019-03-11 DIAGNOSIS — M47.816 FACET ARTHROPATHY, LUMBAR: ICD-10-CM

## 2019-03-11 DIAGNOSIS — M51.36 DDD (DEGENERATIVE DISC DISEASE), LUMBAR: Primary | ICD-10-CM

## 2019-03-11 DIAGNOSIS — G89.29 OTHER CHRONIC PAIN: ICD-10-CM

## 2019-03-11 DIAGNOSIS — F41.1 GAD (GENERALIZED ANXIETY DISORDER): Primary | ICD-10-CM

## 2019-03-11 DIAGNOSIS — M54.16 LUMBAR RADICULOPATHY: ICD-10-CM

## 2019-03-11 DIAGNOSIS — M48.07 FORAMINAL STENOSIS OF LUMBOSACRAL REGION: ICD-10-CM

## 2019-03-11 PROCEDURE — 99214 OFFICE O/P EST MOD 30 MIN: CPT | Mod: HCNC,S$GLB,, | Performed by: ANESTHESIOLOGY

## 2019-03-11 PROCEDURE — 3080F PR MOST RECENT DIASTOLIC BLOOD PRESSURE >= 90 MM HG: ICD-10-PCS | Mod: HCNC,CPTII,S$GLB, | Performed by: ANESTHESIOLOGY

## 2019-03-11 PROCEDURE — 90834 PR PSYCHOTHERAPY W/PATIENT, 45 MIN: ICD-10-PCS | Mod: HCNC,S$GLB,, | Performed by: SOCIAL WORKER

## 2019-03-11 PROCEDURE — 1100F PTFALLS ASSESS-DOCD GE2>/YR: CPT | Mod: HCNC,CPTII,S$GLB, | Performed by: ANESTHESIOLOGY

## 2019-03-11 PROCEDURE — 99214 PR OFFICE/OUTPT VISIT, EST, LEVL IV, 30-39 MIN: ICD-10-PCS | Mod: HCNC,S$GLB,, | Performed by: ANESTHESIOLOGY

## 2019-03-11 PROCEDURE — 90785 PR INTERACTIVE COMPLEXITY: ICD-10-PCS | Mod: HCNC,S$GLB,, | Performed by: SOCIAL WORKER

## 2019-03-11 PROCEDURE — 3288F PR FALLS RISK ASSESSMENT DOCUMENTED: ICD-10-PCS | Mod: HCNC,CPTII,S$GLB, | Performed by: ANESTHESIOLOGY

## 2019-03-11 PROCEDURE — 99999 PR PBB SHADOW E&M-EST. PATIENT-LVL IV: CPT | Mod: PBBFAC,HCNC,, | Performed by: ANESTHESIOLOGY

## 2019-03-11 PROCEDURE — 90834 PSYTX W PT 45 MINUTES: CPT | Mod: HCNC,S$GLB,, | Performed by: SOCIAL WORKER

## 2019-03-11 PROCEDURE — 3077F SYST BP >= 140 MM HG: CPT | Mod: HCNC,CPTII,S$GLB, | Performed by: ANESTHESIOLOGY

## 2019-03-11 PROCEDURE — 90785 PSYTX COMPLEX INTERACTIVE: CPT | Mod: HCNC,S$GLB,, | Performed by: SOCIAL WORKER

## 2019-03-11 PROCEDURE — 99999 PR PBB SHADOW E&M-EST. PATIENT-LVL IV: ICD-10-PCS | Mod: PBBFAC,HCNC,, | Performed by: ANESTHESIOLOGY

## 2019-03-11 PROCEDURE — 3077F PR MOST RECENT SYSTOLIC BLOOD PRESSURE >= 140 MM HG: ICD-10-PCS | Mod: HCNC,CPTII,S$GLB, | Performed by: ANESTHESIOLOGY

## 2019-03-11 PROCEDURE — 1100F PR PT FALLS ASSESS DOC 2+ FALLS/FALL W/INJURY/YR: ICD-10-PCS | Mod: HCNC,CPTII,S$GLB, | Performed by: ANESTHESIOLOGY

## 2019-03-11 PROCEDURE — 3080F DIAST BP >= 90 MM HG: CPT | Mod: HCNC,CPTII,S$GLB, | Performed by: ANESTHESIOLOGY

## 2019-03-11 PROCEDURE — 3288F FALL RISK ASSESSMENT DOCD: CPT | Mod: HCNC,CPTII,S$GLB, | Performed by: ANESTHESIOLOGY

## 2019-03-11 RX ORDER — ATORVASTATIN CALCIUM 40 MG/1
40 TABLET, FILM COATED ORAL DAILY
Qty: 90 TABLET | Refills: 3 | Status: SHIPPED | OUTPATIENT
Start: 2019-03-11 | End: 2019-12-16 | Stop reason: SDUPTHER

## 2019-03-11 RX ORDER — HYDROCODONE BITARTRATE AND ACETAMINOPHEN 5; 325 MG/1; MG/1
1 TABLET ORAL EVERY 6 HOURS PRN
Qty: 30 TABLET | Refills: 0 | Status: SHIPPED | OUTPATIENT
Start: 2019-03-11 | End: 2019-03-27

## 2019-03-11 RX ORDER — GABAPENTIN 300 MG/1
CAPSULE ORAL
Qty: 180 CAPSULE | Refills: 11 | Status: SHIPPED | OUTPATIENT
Start: 2019-03-11 | End: 2020-03-03

## 2019-03-11 NOTE — TELEPHONE ENCOUNTER
----- Message from Chantel Higuera sent at 3/11/2019  4:22 PM CDT -----  Add on 3/12/19 w/ Dr. Crockett, please call pt. With procedure information

## 2019-03-11 NOTE — PROGRESS NOTES
Chronic Pain - Established Visit    Referring Physician: No ref. provider found    Chief Complaint:   Chief Complaint   Patient presents with    Follow-up     2 weeks after injection        SUBJECTIVE: Disclaimer: This note has been generated using voice-recognition software. There may be typographical errors that have been missed during proof-reading  Interval history 03/11/2019:  Since previous encounter patient is status post bilateral L5 transforaminal epidural steroid injections on 02/19/2019.  Previous x-rays performed on 01/23/2019 did not reveal any evidence of instability but previous MRI on 01/18/2019 that revealed facet arthropathy and moderate bilateral neural foraminal stenosis at L1-2 L3-4 L4-5 and L5-S1. Review of epidural dye spread after previous injection shows good dye spread into the epidural space bilaterally.  Patient reports no good relief after previous injection, is taking gabapentin 900 mg per day.  Interval History 1/29/2019:  The patient returns to clinic today for follow up. He reports increased low back pain that radiates down the posterior aspect of his legs to his mid calf bilaterally over the last month. This pain is shooting in nature. His pain is worse with prolonged standing and walking. He did try a medrol dose pack with limited relief. He is currently taking Robaxin with limited relief.  He is frustrated with this pain as it makes it difficult to exercise. He does have Parkinson's disease and follows an exercise program. He denies any other health changes. His pain today is 10/10.    Interval History 9/4/2018:  The patient returns to clinic today for follow up. He is s/p bilateral L4 TF AFTAB on 8/16/2018. He reports limited relief of his low back pain. He continues to report aching low back pain across his back. This pain is worse with prolonged walking and activity. He denies any radiating leg pain today. He continues to perform a home exercise routine. He is currently taking  Aleve with limited benefit. He denies any other health changes. He denies any bowel or bladder incontinence. His pain today is 1/10.    Interval history 08/01/2018:  Since previous encounter the patient had bilateral medial branch nerve blocks at the left L2, L3, L4, L5 without any improvement in his pain symptoms.  He has previously had much better success with bilateral L4 transforaminal epidural steroid injections and continues to have lower extremity radicular pain symptoms.    Interval History 11/20/2017  The patient returns to the clinic for a follow up visit, he is reporting back pain of 2/10 today.  His pain had significantly exacerbated on Friday when the patient may be appointment but states that over the weekend with conservative care and occasional ibuprofen his pain has gradually improved.  He denies any radicular symptoms but is continue to have significant lower back pain over the area of the facet joints of the lumbar spine.  Previous x-rays have revealed that the patient has significant facet arthropathy most severe at L4-5 in the area where the patient is having his most severe pain.  On previous encounter was discussed that in the future if his pain persists we can proceed with medial branch nerve blocks to be followed by radiofrequency ablation if diagnostic, the patient has been tolerating physical therapy and doing home stretches and states that overall his pain is better currently compared to what it was on Friday.    Interval History 10/26/2017:  The patient returns to clinic today for follow up. He is s/p bilateral L4 TF AFTAB on 9/6/2017 and 10/11/2017. He reports 50% relief of his low back pain. He does report intermittent low back pain that is aching in nature. He does endorse stiffness especially first this in the morning. The pain is worse with prolonged sitting and transitioning for sitting to standing. He denies any radiating leg pain. He does take Ibuprofen with benefit. He denies any  "other health changes. He denies any bowel or bladder incontinence. His pain today is 4/10.    Initial encounter:    Haja Marti presents to the clinic for the evaluation of back pain. The pain started 3 years ago  and symptoms have been worsening.  Pt reports doing back exercises from orthopedic office and didn't help.  Pt reports [facility]'s "[facility]" physical therapy program did not help.  Walks 2 mi daily at [facility]. Also used to run marathons. 15 total. Started swimming 2 years ago, stopped swimming 3 mo ago due to increased weakness from Parkinson's disease. Staying active helps the pain. Denies radiation. Cannot stand upright for longer than 10-15min before back pain gets more intense. Walking does not exacerbate the pain. Transitioning from sitting to standing causes pain. Pain is described as an ache.  Tightness in upper back. Not associated with trauma/fall or other inciting event.     Brief history:    Pain Description:    The pain is located in the back area     At BEST  6/10     At WORST  9/10 on the WORST day.      On average pain is rated as 7/10.     Today the pain is rated as 6/10    The pain is described as aching and tight band      Symptoms interfere with daily activity (pt wishes to be able to swim again, and do light weight-lifting exercises),  pain does not awaken pt during the night and does not hinder the patient the patient from falling asleep.     Exacerbating factors: Getting out of bed/chair.      Mitigating factors nothing.     Patient denies .  Patient denies any suicidal or homicidal ideations    Pain Medications:  Current:  Robaxin    Tried in Past:  NSAIDs - Advil prn   TCA -never   SNRI -Wellbutrin, lexapro  Anti-convulsants -Never  Muscle Relaxants -Never  Opioids- Never    Physical Therapy/Home Exercise:  yes       report:  Reviewed and consistent with medication use as prescribed.    Pain Procedures:   9/6/2017- Bilateral L4 TF AFTAB  10/11/2017 BL L4 TFESI  7/3/2018- " Bilateral L2,3,4,5 MBB- no relief  8/16/2018- Bilateral L4 TF AFTAB    Chiropractor - adjustment made pain worse in March  Acupuncture - no results, dry needling 2 months ago  TENS unit -never  Spinal decompression -never  Joint replacement -never    Imaging:  MRI Lumbar Spine 1/18/2019:  COMPARISON:  Lumbar spine radiograph 09/05/2017    FINDINGS:  Alignment: Grade 1 retrolisthesis of L1 on L2.    Vertebrae: Preservation of vertebral body heights with multilevel scattered degenerative changes and without evidence of acute fracture or infiltrative marrow replacement process.    Discs: Multilevel intervertebral disc height loss and degenerative signal change.    Cord: Normal.  Conus terminates at L1-L2.    Degenerative findings:    T12-L1: Ligamentum flavum thickening and facet arthropathy without significant spinal canal stenosis or neural foraminal narrowing.    L1-L2: Mild retrolisthesis, broad-based disc bulge, ligamentum flavum thickening, and facet arthropathy resulting in mild spinal canal stenosis, moderate left neural foraminal narrowing, and mild right neural foraminal narrowing    L2-L3: Broad-based disc bulge, ligamentum flavum thickening, facet arthropathy resulting in moderate spinal canal stenosis and mild bilateral neural foraminal narrowing.    L3-L4: Broad-based disc bulge, ligamentum flavum thickening, and facet arthropathy resulting in moderate spinal canal stenosis and moderate bilateral neural foraminal narrowing.    L4-L5: Broad-based disc bulge, ligamentum flavum thickening, and facet arthropathy resulting in mild central canal narrowing and moderate bilateral neural foraminal narrowing.    L5-S1: Facet arthropathy resulting in bilateral neural foraminal narrowing.    Paraspinal muscles & soft tissues: Large left renal cyst.      Impression       Multilevel degenerative change of the lumbar spine, most significant at L3-L4 which demonstrates moderate spinal canal stenosis and moderate bilateral  neural foraminal narrowing.     Xray Lumbar Spine 1/23/2019:  FINDINGS:  Bones are demineralized.  Degenerative change lower thoracic spine.  Disc space narrowing throughout the visualized lower thoracic and lumbar spine similar.  Vacuum disc phenomena and anterior osteophytes noted.  Facet arthropathy particularly lower lumbar levels.  No spondylolysis.  No convincing subluxation with flexion extension allowing for some rotation on the extension.  Minimal retrolisthesis L1 on L2 similar.      Impression       Degenerative changes above.       Outside MRI L SPINE 2/15/2016  Impression:   Multilevel lumbar spondylosis.   Multilevel, multifactorial spinal canal and neuroforaminal narrowing as above.  Patient has facet hypertrophy throughout the lumbar spine injury to right sided severe neuroforaminal stenosis at L4-5 and bilateral neuroforaminal stenosis at L5-S1 which is mild to moderate.      Xray Lumbar Spine 9/5/2017:  The lumbar vertebra are intact. AP view shows slight curvature convex to the left in the lower lumbar region. The lateral views show a grade 1 retrolisthesis at L1-2, stable between flexion and extension. Degenerative changes are present throughout the lumbar spine with disc space narrowing and marginal osteophytes. Mild degenerative facet arthropathy is present at lower levels with narrowing and sclerosis. No obvious paraspinal lesion is seen.      Impression      No acute abnormality. Multilevel degenerative spine changes.           Past Medical History:   Diagnosis Date    Anxiety     BPH (benign prostatic hypertrophy)     Cataract     OS    Depression     Epiretinal membrane, both eyes     Hx of psychiatric care     Hyperlipidemia     Hypertension     Kidney stones     Kidney stones     Parkinsons     Posterior vitreous detachment of left eye     Psychiatric problem     Retinal detachment 2004    OD    Retinoschisis, left eye     Sleep apnea     Vitreous hemorrhage of left eye       Past Surgical History:   Procedure Laterality Date    BLOCK, NERVE Bilateral 7/3/2018    [facility] at [facility]    CATARACT EXTRACTION      OD    EYE SURGERY      INJECTION, STEROID, EPIDURAL, TRANSFORAMINAL APPROACH, L5 Bilateral 2/19/2019    Per[facility]    INJECTION,STEROID,EPIDURAL,TRANSFORAMINAL APPROACH Bilateral 8/16/2018    Per[facility]    INJECTION-STEROID-EPIDURAL-TRANSFORAMINAL Bilateral 10/11/2017    [facility]    INJECTION-STEROID-EPIDURAL-TRANSFORAMINAL Bilateral 9/6/2017    [facility]    laser indirect  4/8/10    left eye    Laser Indirect Retinopexy  4/8/10    OS    PROSTATE SURGERY      RETINAL DETACHMENT SURGERY  2004    OD    ROTATOR CUFF REPAIR  11/13/2013    TONSILLECTOMY       Social History     Socioeconomic History    Marital status:      Spouse name: Trinidad    Number of children: 4    Years of education: Not on file    Highest education level: Not on file   Social Needs    Financial resource strain: Not on file    Food insecurity - worry: Not on file    Food insecurity - inability: Not on file    Transportation needs - medical: Not on file    Transportation needs - non-medical: Not on file   Occupational History     Employer: [employer]   Tobacco Use    Smoking status: Never Smoker    Smokeless tobacco: Never Used   Substance and Sexual Activity    Alcohol use: No    Drug use: No    Sexual activity: Not on file   Other Topics Concern    Patient feels they ought to cut down on drinking/drug use Not Asked    Patient annoyed by others criticizing their drinking/drug use Not Asked    Patient has felt bad or guilty about drinking/drug use Not Asked    Patient has had a drink/used drugs as an eye opener in the AM Not Asked   Social History Narrative    Not on file      Family History   Problem Relation Age of Onset    Cataracts Father     Cancer Father         lung    Diabetes Father     Cancer Mother         lung    Anxiety disorder Son     Depression Son     Anxiety disorder Daughter        Review of patient's allergies indicates:  No Known Allergies    Current Outpatient Medications   Medication Sig    atorvastatin (LIPITOR) 40 MG tablet Take 1 tablet (40 mg total) by mouth once daily.    carbidopa-levodopa  mg (SINEMET)  mg per tablet Take 1.5 tablets by mouth 3 (three) times daily.    citric acid-potassium citrate (POLYCITRA) 1,100-334 mg/5 mL solution Take 10 mLs by mouth.    clonazePAM (KLONOPIN) 0.5 MG tablet Take oral 1/2 to 1 tablet TID prn anxiety    ergocalciferol (VITAMIN D2) 50,000 unit Cap Take 50,000 Units by mouth every 30 days.     finasteride (PROSCAR) 5 mg tablet Take 1 tablet (5 mg total) by mouth once daily.    gabapentin (NEURONTIN) 300 MG capsule Take 1 capsule (300 mg total) by mouth 3 (three) times daily as needed.    losartan (COZAAR) 100 MG tablet TAKE 1 TABLET EVERY DAY    magnesium oxide (MAG-OX) 400 mg tablet Take 400 mg by mouth once daily.    potassium citrate (UROCIT-K) 10 mEq (1,080 mg) TbSR TAKE 1 TABLET THREE TIMES DAILY WITH MEALS    pyridoxine (VITAMIN B-6) 50 MG Tab Take 50 mg by mouth once daily.    sertraline (ZOLOFT) 100 MG tablet Take 2 tablets (200 mg total) by mouth once daily.    aspirin 81 MG Chew Take 81 mg by mouth. 1 Tablet, Chewable Oral Every day    meloxicam (MOBIC) 7.5 MG tablet Take 1 tablet (7.5 mg total) by mouth once daily.    methylphenidate HCl (RITALIN) 5 MG tablet Take 1 tablet (5 mg total) by mouth once daily.    predniSONE (DELTASONE) 20 MG tablet 3 tablets po daily for 5 days, then 2 tablets po daily for 5 days, then 1 tablets po daily for 5 days     No current facility-administered medications for this visit.        REVIEW OF SYSTEMS:    GENERAL:  weight loss 3-4 lb/month, no  "malaise or fevers.  HEENT:   No recent changes in vision or hearing  NECK:  Negative for lumps, no difficulty with swallowing.  RESPIRATORY:  Negative for cough, wheezing or shortness of breath, patient denies any recent URI.  CARDIOVASCULAR:  Negative for chest pain, leg swelling or palpitations.  GI: +N/V a/w pt's home medications. Negative for abdominal discomfort, blood in stools or black stools or change in bowel habits.  MUSCULOSKELETAL:  See HPI.  SKIN:  Negative for lesions, rash, and itching.  PSYCH:  No mood disorder or recent psychosocial stressors.  Patients sleep is not disturbed secondary to pain.  HEMATOLOGY/LYMPHOLOGY: + bruising easily   ENDO: No history of diabetes or thyroid dysfunction  NEURO:    Patient has Parkinson's with parkinsonian tremor and affect.  All other reviewed and negative other than HPI.    OBJECTIVE:    BP (!) 159/102   Pulse 74   Temp 98.1 °F (36.7 °C)   Ht 5' 10" (1.778 m)   Wt 93.6 kg (206 lb 5.6 oz)   BMI 29.61 kg/m²     PHYSICAL EXAMINATION:    GENERAL: Well appearing, in no acute distress, alert and oriented x3.  PSYCH:  Mood and affect appropriate.  SKIN: Skin color, texture, turgor normal, no rashes or lesions.  HEAD/FACE:  Normocephalic, atraumatic. Cranial nerves grossly intact.  CV: RRR with palpation of the radial artery.  PULM: No evidence of respiratory difficulty, symmetric chest rise.  BACK: Straight leg raising in the sitting position is positive to radicular pain bilaterally. There is pain with palpation over the facet joints of the lumbar spine. Limited ROM with pain on extension. Positive facet loading bilaterally.   EXTREMITIES: Peripheral joint ROM is full and pain free without obvious instability or laxity in all four extremities. No deformities, edema, or skin discoloration. Good capillary refill.  MUSCULOSKELETAL: Hip and knee provocative maneuvers are negative.  There is no pain with palpation over the sacroiliac joints bilaterally.  There is no " pain to palpation over the greater trochanteric bursa bilaterally.  FABERs test is negative.  FADIRs test is negative.   5/5 strength in right ankle with plantar and 4/5 dorsiflexion. 5/5 strength in left ankle with plantar and dorsiflexion, 5/5 strength with right knee flexion extension, 5/5 strength with knee flexion extension on the left  No atrophy or tone abnormalities are noted.  NEURO:  2+ right knee, 3+ left knee reflexes, absent S1 reflexes bilaterally.  Plantar response are downgoing. No clonus.  No loss of sensation is noted.  GAIT: Antalgic, ambulates without assistance     Lab Results   Component Value Date    WBC 7.53 08/22/2018    HGB 16.3 08/22/2018    HCT 48.8 08/22/2018    MCV 94 08/22/2018     08/22/2018     BMP  Lab Results   Component Value Date     08/22/2018    K 4.4 08/22/2018     08/22/2018    CO2 28 08/22/2018    BUN 24 (H) 08/22/2018    CREATININE 1.1 08/22/2018    CALCIUM 9.9 08/22/2018    ANIONGAP 9 08/22/2018    ESTGFRAFRICA >60.0 08/22/2018    EGFRNONAA >60.0 08/22/2018       ASSESSMENT: 73 y.o. year old male with pain, consistent with     Encounter Diagnoses   Name Primary?    DDD (degenerative disc disease), lumbar Yes    Lumbar radiculopathy     Other chronic pain     Facet arthropathy, lumbar     Foraminal stenosis of lumbosacral region        PLAN:     - Previous imaging was reviewed and discussed with the patient today.    - I will schedule the patient for an interlaminar lumbar epidural steroid injection at L5-S1    -I will schedule the patient for a neurosurgical evaluation to discuss surgical options    -if there is no improvement with epidural steroids and no surgical options we will consider spinal cord stimulation as treatment in the future I provided the patient with a DVD from [employer].      - If limited relief, we can consider repeat lumbar MBB.     - Continue Robaxin.     - Continue home exercise routine.     - RTC 2 weeks after above  procedure.     The above plan and management options were discussed at length with patient. Patient is in agreement with the above and verbalized understanding.     Laina Crockett MD  03/11/2019

## 2019-03-11 NOTE — PROGRESS NOTES
Individual Psychotherapy (PhD/LCSW)    3/11/2019    Site:  [facility]         Therapeutic Intervention: Met with patient and spouse.  Outpatient - Insight oriented psychotherapy 45 min - CPT code 28519    Chief complaint/reason for encounter: anxiety Wife Whitley  Pt with parkinsons with cognitive difficulties for which wife is needed to aid in therapeutic instructions including homework.       Interval history and content of current session:    He presents with wife and in a wheelchair.   He has lots of pain when stands up and specially when walking which explains his presentation on wheelchair.    We discussed overcoming his fears of being late for appointments and trying behavioral exposure.  He will leave house for his appointment this afternoon leaving 15 minutes extra rather than 30 minutes extra for timing.   Wife reports pt is better than he was prior to therapy with me.                   Treatment plan:  · Target symptoms: anxiety   · Why chosen therapy is appropriate versus another modality: relevant to diagnosis  · Outcome monitoring methods: self-report, observation  · Therapeutic intervention type: behavior modifying psychotherapy, supportive therapy.     Risk parameters:  Patient reports no suicidal ideation  Patient reports no homicidal ideation  Patient reports no self-injurious behavior  Patient reports no violent behavior      Verbal deficits: None    Patient's response to intervention:  The patient's response to intervention is accepting.    Progress toward goals and other mental status changes:  The patient's progress toward goals is fair .    Diagnosis:     ICD-10-CM ICD-9-CM   1. Generalized anxiety disorder F41.1 300.02       Plan:  individual psychotherapy    Return to clinic: 1 month    Length of Service (minutes): 45

## 2019-03-11 NOTE — TELEPHONE ENCOUNTER
Contacted and spoke to patient's wife to provide procedure time and instructions for tomorrow 03/12/19.    Mrs. Marti expressed understanding.

## 2019-03-11 NOTE — TELEPHONE ENCOUNTER
----- Message from Lola Shelby sent at 3/11/2019 10:24 AM CDT -----  Contact: self [phone]  Type: Rx    Name of medication(s): atorvastatin (LIPITOR) 40 MG tablet; losartan (COZAAR) 100 MG tablet;     Is this a refill? New rx?    Who prescribed medication? [facility]    Pharmacy Name, Phone, & Location: [facility] [address] [phone] (Phone)    Comments: Patient is requesting above refills    thanks

## 2019-03-12 ENCOUNTER — HOSPITAL ENCOUNTER (OUTPATIENT)
Facility: OTHER | Age: 74
Discharge: HOME | End: 2019-03-12
Attending: ANESTHESIOLOGY | Admitting: ANESTHESIOLOGY
Payer: MEDICARE

## 2019-03-12 VITALS
OXYGEN SATURATION: 97 % | DIASTOLIC BLOOD PRESSURE: 89 MMHG | RESPIRATION RATE: 18 BRPM | HEART RATE: 64 BPM | SYSTOLIC BLOOD PRESSURE: 187 MMHG

## 2019-03-12 DIAGNOSIS — M54.16 LUMBAR RADICULOPATHY: ICD-10-CM

## 2019-03-12 DIAGNOSIS — M51.36 DDD (DEGENERATIVE DISC DISEASE), LUMBAR: Primary | ICD-10-CM

## 2019-03-12 DIAGNOSIS — G89.29 CHRONIC PAIN: ICD-10-CM

## 2019-03-12 PROCEDURE — 25000003 PHARM REV CODE 250: Mod: HCNC | Performed by: ANESTHESIOLOGY

## 2019-03-12 PROCEDURE — 62323 PR INJ LUMBAR/SACRAL, W/IMAGING GUIDANCE: ICD-10-PCS | Mod: HCNC,,, | Performed by: PHYSICAL MEDICINE & REHABILITATION

## 2019-03-12 PROCEDURE — 63600175 PHARM REV CODE 636 W HCPCS: Mod: HCNC | Performed by: ANESTHESIOLOGY

## 2019-03-12 PROCEDURE — 25500020 PHARM REV CODE 255: Mod: HCNC | Performed by: ANESTHESIOLOGY

## 2019-03-12 PROCEDURE — 62323 NJX INTERLAMINAR LMBR/SAC: CPT | Mod: HCNC | Performed by: ANESTHESIOLOGY

## 2019-03-12 PROCEDURE — 62323 NJX INTERLAMINAR LMBR/SAC: CPT | Mod: HCNC,,, | Performed by: PHYSICAL MEDICINE & REHABILITATION

## 2019-03-12 RX ORDER — METHYLPREDNISOLONE ACETATE 80 MG/ML
INJECTION, SUSPENSION INTRA-ARTICULAR; INTRALESIONAL; INTRAMUSCULAR; SOFT TISSUE
Status: DISCONTINUED | OUTPATIENT
Start: 2019-03-12 | End: 2019-03-12 | Stop reason: HOSPADM

## 2019-03-12 RX ORDER — SODIUM CHLORIDE 9 MG/ML
500 INJECTION, SOLUTION INTRAVENOUS CONTINUOUS
Status: DISCONTINUED | OUTPATIENT
Start: 2019-03-12 | End: 2019-03-12 | Stop reason: HOSPADM

## 2019-03-12 RX ORDER — LIDOCAINE HYDROCHLORIDE 10 MG/ML
INJECTION INFILTRATION; PERINEURAL
Status: DISCONTINUED | OUTPATIENT
Start: 2019-03-12 | End: 2019-03-12 | Stop reason: HOSPADM

## 2019-03-12 RX ORDER — ALPRAZOLAM 0.5 MG/1
1 TABLET, ORALLY DISINTEGRATING ORAL NIGHTLY PRN
Status: DISCONTINUED | OUTPATIENT
Start: 2019-03-12 | End: 2019-03-12 | Stop reason: HOSPADM

## 2019-03-12 RX ORDER — BUPIVACAINE HYDROCHLORIDE 2.5 MG/ML
INJECTION, SOLUTION EPIDURAL; INFILTRATION; INTRACAUDAL
Status: DISCONTINUED | OUTPATIENT
Start: 2019-03-12 | End: 2019-03-12 | Stop reason: HOSPADM

## 2019-03-12 RX ADMIN — ALPRAZOLAM 0.5 MG: 0.5 TABLET, ORALLY DISINTEGRATING ORAL at 11:03

## 2019-03-12 NOTE — DISCHARGE INSTRUCTIONS
Thank you for allowing us to care for you today. You may receive a survey about the care we provided. Your feedback is valuable and helps us provide excellent care throughout the community.     Home Care Instructions for Pain Management:    1. DIET:   You may resume your normal diet today.   2. BATHING:   You may shower with luke warm water. No tub baths or anything that will soak injection sites under water for the next 24 hours.  3. DRESSING:   You may remove your bandage today.   4. ACTIVITY LEVEL:   You may resume your normal activities 24 hrs after your procedure. Nothing strenuous today.  5. MEDICATIONS:   You may resume your normal medications today. To restart blood thinners, ask your doctor.  6. DRIVING    If you have received any sedatives by mouth today, you may not drive for 12 hours.    If you have received any sedation through your IV, you may not drive for 24 hrs.   7. SPECIAL INSTRUCTIONS:   No heat to the injection site for 24 hrs including, hot bath or shower, heating pad, moist heat, or hot tubs.    Use ice pack to injection site for any pain or discomfort.  Apply ice packs for 20 minute intervals as needed.    IF you have diabetes, be sure to monitor your blood sugar more closely. IF your injection contained steroids your blood sugar levels may become higher than normal.    If you are still having pain upon discharge:  Your pain may improve over the next 48 hours. The anesthetic (numbing medication) works immediately to 48 hours. IF your injection contained a steroid (anti-inflammatory medication), it takes approximately 3 days to start feeling relief and 7-10 days to see your greatest results from the medication. It is possible you may need subsequent injections. This would be discussed at your follow up appointment with pain management or your referring doctor.      PLEASE CALL YOUR DOCTOR IF:  1. Redness or swelling around the injection site.  2. Fever of 101 degrees or more  3. Drainage  (pus) from the injection site.  4. For any continuous bleeding (some dried blood over the incision is normal.)    FOR EMERGENCIES:   If any unusual problems or difficulties occur during clinic hours, call [phone] or [phone].

## 2019-03-12 NOTE — OP NOTE
Lumbar Interlaminar Epidural Steroid Injection under Fluoroscopic Guidance.   Time-out taken to identify patient and procedure prior to starting the procedure.     03/12/2019    PROCEDURE: L5-S1 Interlaminar epidural steroid injection under fluoroscopic guidance.     Pre-Op diagnosis: Lumbar radiculopathy [M54.16]    Post-Op diagnosis: Lumbar radiculopathy [M54.16]    PHYSICIAN: JULISA MARTINEZ     ASSISTANTS: Denisha Loza MD    ESTIMATED BLOOD LOSS: none.     COMPLICATIONS: none.     SPECIMENS: none    TECHNIQUE: With the patient laying in a prone position, the area was prepped and draped in the usual sterile fashion using ChloraPrep and a fenestrated drape. 1% lidocaine was given using a 27-gauge needle by raising a wheal and going down to the hub of the needle over the L5/S1 interlaminar space.  The interlaminar space was then approached with a 3.5 inch 18-gauge Touhy needle was introduced under fluoroscopic guidance in the AP and Lateral view. Once the Ligamentum flavum was encountered loss of resistance to saline was used to enter the epidural space. With positive loss of resistance and negative CSF or Blood, 3mL contrast dye Omnipaque (300mg/ml) was injected to confirm placement and there was no vascular runoff. Then 1ml 40mg/ml Depomedrol + 1mL 0.25% Bupivicaine + 8mL preservative free normal saline was injected slowly. Displacement of the radio opaque contrast after injection of the medication confirmed that the medication went into the area of the epidural space.  The patient tolerated the procedure well.     The patient was monitored after the procedure.   They were given post-procedure and discharge instructions to follow at home.  The patient was discharged in a stable condition.

## 2019-03-18 RX ORDER — LOSARTAN POTASSIUM 100 MG/1
100 TABLET ORAL DAILY
Qty: 90 TABLET | Refills: 3 | Status: SHIPPED | OUTPATIENT
Start: 2019-03-18 | End: 2020-06-29 | Stop reason: SDUPTHER

## 2019-03-18 NOTE — TELEPHONE ENCOUNTER
----- Message from Chary Choi sent at 3/18/2019  8:14 AM CDT -----  Contact: patient [phone]  Patient called last Monday to request a new rx  For Losartan.   This has not been ordered. Please order this today and notify patient. He is almost out of medication.  RX request - refill or new RX.  Is this a refill or new RX:  refill  RX name and strength: losartan 100 mg  Directions: 1 tab daily  Is this a 30 day or 90 day RX:  90  Local pharmacy or mail order pharmacy:  Mail order  Pharmacy name and phone #[facility] - [place], [place] - [address] [phone]

## 2019-03-19 DIAGNOSIS — F41.9 ANXIETY DISORDER, UNSPECIFIED TYPE: ICD-10-CM

## 2019-03-21 ENCOUNTER — TELEPHONE (OUTPATIENT)
Dept: PAIN MEDICINE | Facility: CLINIC | Age: 74
End: 2019-03-21

## 2019-03-21 ENCOUNTER — NURSE TRIAGE (OUTPATIENT)
Dept: ADMINISTRATIVE | Facility: CLINIC | Age: 74
End: 2019-03-21

## 2019-03-21 DIAGNOSIS — M47.816 FACET ARTHRITIS OF LUMBAR REGION: ICD-10-CM

## 2019-03-21 DIAGNOSIS — M47.816 LUMBAR SPONDYLOSIS: ICD-10-CM

## 2019-03-21 DIAGNOSIS — M54.16 LUMBAR RADICULOPATHY: Primary | ICD-10-CM

## 2019-03-21 DIAGNOSIS — M48.07 FORAMINAL STENOSIS OF LUMBOSACRAL REGION: ICD-10-CM

## 2019-03-21 DIAGNOSIS — M51.36 DDD (DEGENERATIVE DISC DISEASE), LUMBAR: ICD-10-CM

## 2019-03-21 RX ORDER — CLONAZEPAM 0.5 MG/1
TABLET ORAL
Qty: 180 TABLET | Refills: 1 | Status: SHIPPED | OUTPATIENT
Start: 2019-03-21 | End: 2019-07-09 | Stop reason: SDUPTHER

## 2019-03-21 NOTE — TELEPHONE ENCOUNTER
----- Message from April [facility] sent at 3/21/2019  4:35 PM CDT -----  Name of Who is Calling:Self        What is the request in detail: Pt is requesting a call back from clinical staff in regards of a referral to Dr. Eamon Watt [facility] [phone]. Please contact to further discuss and advise.        Can the clinic reply by [facility]: No      What Number to Call Back [facility]: [phone]

## 2019-03-21 NOTE — TELEPHONE ENCOUNTER
Staff contacted and spoke with patient regarding him wanting a outside referral to Dr. Eamon Watt at [facility].    Please review and advise.  Thanks

## 2019-03-22 ENCOUNTER — TELEPHONE (OUTPATIENT)
Dept: PAIN MEDICINE | Facility: CLINIC | Age: 74
End: 2019-03-22

## 2019-03-22 NOTE — TELEPHONE ENCOUNTER
Contacted and spoke with pt regarding referral . Pt was informed referral was faxed over to bindu's office.     Pt verbalized understanding

## 2019-03-22 NOTE — TELEPHONE ENCOUNTER
----- Message from Shy Loera sent at 3/22/2019  8:39 AM CDT -----  Contact: pt  Name of Who is Calling: ALEXANDRO RENE [[id number]]    What is the request in detail: pt calling in regards to a referral a Neuro surgeon outside of [facility] Dr Watt [phone].. Please advise      Can the clinic reply by [facility]: no      [facility] [phone]

## 2019-03-25 ENCOUNTER — TELEPHONE (OUTPATIENT)
Dept: PAIN MEDICINE | Facility: CLINIC | Age: 74
End: 2019-03-25

## 2019-03-25 NOTE — TELEPHONE ENCOUNTER
----- Message from April [facility] sent at 3/25/2019 11:26 AM CDT -----  Name of Who is Calling:Self        What is the request in detail: Pt is requesting that the clinical staff resend the referral to Eamon Rhodes. Pt stated the office of [phone] Dr. Watt confirmed they have not received referral as of today. Please contact to further discuss and advise.        Can the clinic reply by [facility]: no      What Number to Call Back if not in [facility]: [phone]

## 2019-03-27 ENCOUNTER — OFFICE VISIT (OUTPATIENT)
Dept: PAIN MEDICINE | Facility: CLINIC | Age: 74
End: 2019-03-27
Payer: MEDICARE

## 2019-03-27 VITALS
HEART RATE: 77 BPM | TEMPERATURE: 97 F | SYSTOLIC BLOOD PRESSURE: 114 MMHG | HEIGHT: 70 IN | WEIGHT: 207.19 LBS | BODY MASS INDEX: 29.66 KG/M2 | DIASTOLIC BLOOD PRESSURE: 73 MMHG | RESPIRATION RATE: 18 BRPM

## 2019-03-27 DIAGNOSIS — M47.816 FACET ARTHRITIS OF LUMBAR REGION: ICD-10-CM

## 2019-03-27 DIAGNOSIS — M48.07 FORAMINAL STENOSIS OF LUMBOSACRAL REGION: ICD-10-CM

## 2019-03-27 DIAGNOSIS — M47.816 LUMBAR SPONDYLOSIS: ICD-10-CM

## 2019-03-27 DIAGNOSIS — M51.36 DDD (DEGENERATIVE DISC DISEASE), LUMBAR: ICD-10-CM

## 2019-03-27 DIAGNOSIS — M54.16 LUMBAR RADICULOPATHY: Primary | ICD-10-CM

## 2019-03-27 PROCEDURE — 3074F PR MOST RECENT SYSTOLIC BLOOD PRESSURE < 130 MM HG: ICD-10-PCS | Mod: HCNC,CPTII,S$GLB, | Performed by: NURSE PRACTITIONER

## 2019-03-27 PROCEDURE — 99999 PR PBB SHADOW E&M-EST. PATIENT-LVL III: CPT | Mod: PBBFAC,HCNC,, | Performed by: NURSE PRACTITIONER

## 2019-03-27 PROCEDURE — 3078F PR MOST RECENT DIASTOLIC BLOOD PRESSURE < 80 MM HG: ICD-10-PCS | Mod: HCNC,CPTII,S$GLB, | Performed by: NURSE PRACTITIONER

## 2019-03-27 PROCEDURE — 3078F DIAST BP <80 MM HG: CPT | Mod: HCNC,CPTII,S$GLB, | Performed by: NURSE PRACTITIONER

## 2019-03-27 PROCEDURE — 99213 OFFICE O/P EST LOW 20 MIN: CPT | Mod: HCNC,S$GLB,, | Performed by: NURSE PRACTITIONER

## 2019-03-27 PROCEDURE — 99999 PR PBB SHADOW E&M-EST. PATIENT-LVL III: ICD-10-PCS | Mod: PBBFAC,HCNC,, | Performed by: NURSE PRACTITIONER

## 2019-03-27 PROCEDURE — 1101F PT FALLS ASSESS-DOCD LE1/YR: CPT | Mod: HCNC,CPTII,S$GLB, | Performed by: NURSE PRACTITIONER

## 2019-03-27 PROCEDURE — 3074F SYST BP LT 130 MM HG: CPT | Mod: HCNC,CPTII,S$GLB, | Performed by: NURSE PRACTITIONER

## 2019-03-27 PROCEDURE — 99213 PR OFFICE/OUTPT VISIT, EST, LEVL III, 20-29 MIN: ICD-10-PCS | Mod: HCNC,S$GLB,, | Performed by: NURSE PRACTITIONER

## 2019-03-27 PROCEDURE — 99499 UNLISTED E&M SERVICE: CPT | Mod: HCNC,S$GLB,, | Performed by: NURSE PRACTITIONER

## 2019-03-27 PROCEDURE — 99499 RISK ADDL DX/OHS AUDIT: ICD-10-PCS | Mod: HCNC,S$GLB,, | Performed by: NURSE PRACTITIONER

## 2019-03-27 PROCEDURE — 1101F PR PT FALLS ASSESS DOC 0-1 FALLS W/OUT INJ PAST YR: ICD-10-PCS | Mod: HCNC,CPTII,S$GLB, | Performed by: NURSE PRACTITIONER

## 2019-03-27 NOTE — PROGRESS NOTES
Chronic Pain - Established Visit    Referring Physician: No ref. provider found    Chief Complaint:   Chief Complaint   Patient presents with    Low-back Pain        SUBJECTIVE: Disclaimer: This note has been generated using voice-recognition software. There may be typographical errors that have been missed during proof-reading    Interval History 3/27/2019:  The patient returns to clinic today for follow up. He is s/p L5-S1 IL AFTAB on 3/12/2019. He reports limited relief of his low back and leg pain. He continues to report low back pain that radiates down the back of his legs to his calf muscles bilaterally. His pain is worse with prolonged standing and walking. He is scheduled to see Neurosurgery in 2 weeks. He is currently taking Gabapentin 600 mg TID with limited relief. He is also taking Norco sparingly but does report constipation with this. He denies any other health changes. He denies any bowel or bladder incontinence. His pain today is 8/10.    Interval history 03/11/2019:  Since previous encounter patient is status post bilateral L5 transforaminal epidural steroid injections on 02/19/2019.  Previous x-rays performed on 01/23/2019 did not reveal any evidence of instability but previous MRI on 01/18/2019 that revealed facet arthropathy and moderate bilateral neural foraminal stenosis at L1-2 L3-4 L4-5 and L5-S1. Review of epidural dye spread after previous injection shows good dye spread into the epidural space bilaterally.  Patient reports no good relief after previous injection, is taking gabapentin 900 mg per day.    Interval History 1/29/2019:  The patient returns to clinic today for follow up. He reports increased low back pain that radiates down the posterior aspect of his legs to his mid calf bilaterally over the last month. This pain is shooting in nature. His pain is worse with prolonged standing and walking. He did try a medrol dose pack with limited relief. He is currently taking Robaxin with  limited relief.  He is frustrated with this pain as it makes it difficult to exercise. He does have Parkinson's disease and follows an exercise program. He denies any other health changes. His pain today is 10/10.    Interval History 9/4/2018:  The patient returns to clinic today for follow up. He is s/p bilateral L4 TF AFTAB on 8/16/2018. He reports limited relief of his low back pain. He continues to report aching low back pain across his back. This pain is worse with prolonged walking and activity. He denies any radiating leg pain today. He continues to perform a home exercise routine. He is currently taking Aleve with limited benefit. He denies any other health changes. He denies any bowel or bladder incontinence. His pain today is 1/10.    Interval history 08/01/2018:  Since previous encounter the patient had bilateral medial branch nerve blocks at the left L2, L3, L4, L5 without any improvement in his pain symptoms.  He has previously had much better success with bilateral L4 transforaminal epidural steroid injections and continues to have lower extremity radicular pain symptoms.    Interval History 11/20/2017  The patient returns to the clinic for a follow up visit, he is reporting back pain of 2/10 today.  His pain had significantly exacerbated on Friday when the patient may be appointment but states that over the weekend with conservative care and occasional ibuprofen his pain has gradually improved.  He denies any radicular symptoms but is continue to have significant lower back pain over the area of the facet joints of the lumbar spine.  Previous x-rays have revealed that the patient has significant facet arthropathy most severe at L4-5 in the area where the patient is having his most severe pain.  On previous encounter was discussed that in the future if his pain persists we can proceed with medial branch nerve blocks to be followed by radiofrequency ablation if diagnostic, the patient has been tolerating  "physical therapy and doing home stretches and states that overall his pain is better currently compared to what it was on Friday.    Interval History 10/26/2017:  The patient returns to clinic today for follow up. He is s/p bilateral L4 TF AFTAB on 9/6/2017 and 10/11/2017. He reports 50% relief of his low back pain. He does report intermittent low back pain that is aching in nature. He does endorse stiffness especially first this in the morning. The pain is worse with prolonged sitting and transitioning for sitting to standing. He denies any radiating leg pain. He does take Ibuprofen with benefit. He denies any other health changes. He denies any bowel or bladder incontinence. His pain today is 4/10.    Initial encounter:    Haja Mercermarion presents to the clinic for the evaluation of back pain. The pain started 3 years ago  and symptoms have been worsening.  Pt reports doing back exercises from orthopedic office and didn't help.  Pt reports [facility]" physical therapy program did not help.  Walks 2 mi daily at [facility]. Also used to run marathons. 15 total. Started swimming 2 years ago, stopped swimming 3 mo ago due to increased weakness from Parkinson's disease. Staying active helps the pain. Denies radiation. Cannot stand upright for longer than 10-15min before back pain gets more intense. Walking does not exacerbate the pain. Transitioning from sitting to standing causes pain. Pain is described as an ache.  Tightness in upper back. Not associated with trauma/fall or other inciting event.     Brief history:    Pain Description:    The pain is located in the back area     At BEST  6/10     At WORST  9/10 on the WORST day.      On average pain is rated as 7/10.     Today the pain is rated as 6/10    The pain is described as aching and tight band      Symptoms interfere with daily activity (pt wishes to be able to swim again, and do light weight-lifting exercises),  pain does not awaken pt during the night " and does not hinder the patient the patient from falling asleep.     Exacerbating factors: Getting out of bed/chair.      Mitigating factors nothing.     Patient denies .  Patient denies any suicidal or homicidal ideations    Pain Medications:  Current:  Robaxin  Gabapentin  Norco sparingly    Tried in Past:  NSAIDs - Advil prn   TCA -never   SNRI -Wellbutrin, lexapro  Anti-convulsants -Never  Muscle Relaxants -Never  Opioids- Never    Physical Therapy/Home Exercise:  yes       report:  Reviewed and consistent with medication use as prescribed.    Pain Procedures:   9/6/2017- Bilateral L4 TF AFTAB  10/11/2017 BL L4 TFESI  7/3/2018- Bilateral L2,3,4,5 MBB- no relief  8/16/2018- Bilateral L4 TF AFTAB  2/19/2019- Bilateral L5 TF AFTAB  3/12/2019- L5-S1 IL AFTAB    Chiropractor - adjustment made pain worse in March  Acupuncture - no results, dry needling 2 months ago  TENS unit -never  Spinal decompression -never  Joint replacement -never    Imaging:  MRI Lumbar Spine 1/18/2019:  COMPARISON:  Lumbar spine radiograph 09/05/2017    FINDINGS:  Alignment: Grade 1 retrolisthesis of L1 on L2.    Vertebrae: Preservation of vertebral body heights with multilevel scattered degenerative changes and without evidence of acute fracture or infiltrative marrow replacement process.    Discs: Multilevel intervertebral disc height loss and degenerative signal change.    Cord: Normal.  Conus terminates at L1-L2.    Degenerative findings:    T12-L1: Ligamentum flavum thickening and facet arthropathy without significant spinal canal stenosis or neural foraminal narrowing.    L1-L2: Mild retrolisthesis, broad-based disc bulge, ligamentum flavum thickening, and facet arthropathy resulting in mild spinal canal stenosis, moderate left neural foraminal narrowing, and mild right neural foraminal narrowing    L2-L3: Broad-based disc bulge, ligamentum flavum thickening, facet arthropathy resulting in moderate spinal canal stenosis and mild bilateral  neural foraminal narrowing.    L3-L4: Broad-based disc bulge, ligamentum flavum thickening, and facet arthropathy resulting in moderate spinal canal stenosis and moderate bilateral neural foraminal narrowing.    L4-L5: Broad-based disc bulge, ligamentum flavum thickening, and facet arthropathy resulting in mild central canal narrowing and moderate bilateral neural foraminal narrowing.    L5-S1: Facet arthropathy resulting in bilateral neural foraminal narrowing.    Paraspinal muscles & soft tissues: Large left renal cyst.      Impression       Multilevel degenerative change of the lumbar spine, most significant at L3-L4 which demonstrates moderate spinal canal stenosis and moderate bilateral neural foraminal narrowing.     Xray Lumbar Spine 1/23/2019:  FINDINGS:  Bones are demineralized.  Degenerative change lower thoracic spine.  Disc space narrowing throughout the visualized lower thoracic and lumbar spine similar.  Vacuum disc phenomena and anterior osteophytes noted.  Facet arthropathy particularly lower lumbar levels.  No spondylolysis.  No convincing subluxation with flexion extension allowing for some rotation on the extension.  Minimal retrolisthesis L1 on L2 similar.      Impression       Degenerative changes above.       Outside MRI L SPINE 2/15/2016  Impression:   Multilevel lumbar spondylosis.   Multilevel, multifactorial spinal canal and neuroforaminal narrowing as above.  Patient has facet hypertrophy throughout the lumbar spine injury to right sided severe neuroforaminal stenosis at L4-5 and bilateral neuroforaminal stenosis at L5-S1 which is mild to moderate.      Xray Lumbar Spine 9/5/2017:  The lumbar vertebra are intact. AP view shows slight curvature convex to the left in the lower lumbar region. The lateral views show a grade 1 retrolisthesis at L1-2, stable between flexion and extension. Degenerative changes are present throughout the lumbar spine with disc space narrowing and marginal  osteophytes. Mild degenerative facet arthropathy is present at lower levels with narrowing and sclerosis. No obvious paraspinal lesion is seen.      Impression      No acute abnormality. Multilevel degenerative spine changes.           Past Medical History:   Diagnosis Date    Anxiety     BPH (benign prostatic hypertrophy)     Cataract     OS    Depression     Epiretinal membrane, both eyes     Hx of psychiatric care     Hyperlipidemia     Hypertension     Kidney stones     Kidney stones     Parkinsons     Posterior vitreous detachment of left eye     Psychiatric problem     Retinal detachment 2004    OD    Retinoschisis, left eye     Sleep apnea     Vitreous hemorrhage of left eye      Past Surgical History:   Procedure Laterality Date    BLOCK, NERVE Bilateral 7/3/2018    [facility]    CATARACT EXTRACTION      OD    EYE SURGERY      INJECTION, STEROID, EPIDURAL, L5-S1 N/A 3/12/2019    [facility]    INJECTION, STEROID, EPIDURAL, TRANSFORAMINAL APPROACH, L5 Bilateral 2/19/2019    [facility]    INJECTION,STEROID,EPIDURAL,TRANSFORAMINAL APPROACH Bilateral 8/16/2018    [facility]    INJECTION-STEROID-EPIDURAL-TRANSFORAMINAL Bilateral 10/11/2017    [facility]    INJECTION-STEROID-EPIDURAL-TRANSFORAMINAL Bilateral 9/6/2017    [facility]    laser indirect  4/8/10    left eye    Laser Indirect Retinopexy  4/8/10    OS    PROSTATE SURGERY      RETINAL DETACHMENT SURGERY  2004    OD    ROTATOR CUFF REPAIR  11/13/2013    TONSILLECTOMY       Social History     Socioeconomic History    Marital status:      Spouse name: Trinidad    Number of children: 4    Years of education: Not on file    Highest education level: Not on file   Occupational  History     Employer: [employer]   Social Needs    Financial resource strain: Not on file    Food insecurity:     Worry: Not on file     Inability: Not on file    Transportation needs:     Medical: Not on file     Non-medical: Not on file   Tobacco Use    Smoking status: Never Smoker    Smokeless tobacco: Never Used   Substance and Sexual Activity    Alcohol use: No    Drug use: No    Sexual activity: Not on file   Lifestyle    Physical activity:     Days per week: Not on file     Minutes per session: Not on file    Stress: Not on file   Relationships    Social connections:     Talks on phone: Not on file     Gets together: Not on file     Attends Quaker service: Not on file     Active member of club or organization: Not on file     Attends meetings of clubs or organizations: Not on file     Relationship status: Not on file    Intimate partner violence:     Fear of current or ex partner: Not on file     Emotionally abused: Not on file     Physically abused: Not on file     Forced sexual activity: Not on file   Other Topics Concern    Patient feels they ought to cut down on drinking/drug use Not Asked    Patient annoyed by others criticizing their drinking/drug use Not Asked    Patient has felt bad or guilty about drinking/drug use Not Asked    Patient has had a drink/used drugs as an eye opener in the AM Not Asked   Social History Narrative    Not on file     Family History   Problem Relation Age of Onset    Cataracts Father     Cancer Father         lung    Diabetes Father     Cancer Mother         lung    Anxiety disorder Son     Depression Son     Anxiety disorder Daughter        Review of patient's allergies indicates:  No Known Allergies    Current Outpatient Medications   Medication Sig    atorvastatin (LIPITOR) 40 MG tablet Take 1 tablet (40 mg total) by mouth once daily.    carbidopa-levodopa  mg (SINEMET)  mg per tablet Take 1.5 tablets by mouth 3 (three) times  daily.    citric acid-potassium citrate (POLYCITRA) 1,100-334 mg/5 mL solution Take 10 mLs by mouth.    clonazePAM (KLONOPIN) 0.5 MG tablet Take oral 1/2 to 1 tablet TID prn anxiety    ergocalciferol (VITAMIN D2) 50,000 unit Cap Take 50,000 Units by mouth every 30 days.     finasteride (PROSCAR) 5 mg tablet Take 1 tablet (5 mg total) by mouth once daily.    gabapentin (NEURONTIN) 300 MG capsule Increase to 2 tablets at night, 1 in AM and 1 in afternoon for 3 days.  Then increase to 2 tablets at night and 2 in morning, 1 tablet in afternoon for 3 days.  Then increase to 2 tablets three times a day (1800mg)    losartan (COZAAR) 100 MG tablet Take 1 tablet (100 mg total) by mouth once daily.    magnesium oxide (MAG-OX) 400 mg tablet Take 400 mg by mouth once daily.    potassium citrate (UROCIT-K) 10 mEq (1,080 mg) TbSR TAKE 1 TABLET THREE TIMES DAILY WITH MEALS    pyridoxine (VITAMIN B-6) 50 MG Tab Take 50 mg by mouth once daily.    sertraline (ZOLOFT) 100 MG tablet Take 2 tablets (200 mg total) by mouth once daily.    aspirin 81 MG Chew Take 81 mg by mouth. 1 Tablet, Chewable Oral Every day    HYDROcodone-acetaminophen (NORCO) 5-325 mg per tablet Take 1 tablet by mouth every 6 (six) hours as needed for Pain.    meloxicam (MOBIC) 7.5 MG tablet Take 1 tablet (7.5 mg total) by mouth once daily.    methylphenidate HCl (RITALIN) 5 MG tablet Take 1 tablet (5 mg total) by mouth once daily.    predniSONE (DELTASONE) 20 MG tablet 3 tablets po daily for 5 days, then 2 tablets po daily for 5 days, then 1 tablets po daily for 5 days     No current facility-administered medications for this visit.        REVIEW OF SYSTEMS:    GENERAL:  weight loss 3-4 lb/month, no malaise or fevers.  HEENT:   No recent changes in vision or hearing  NECK:  Negative for lumps, no difficulty with swallowing.  RESPIRATORY:  Negative for cough, wheezing or shortness of breath, patient denies any recent URI.  CARDIOVASCULAR:  Negative for  "chest pain, leg swelling or palpitations.  GI: +N/V a/w pt's home medications. Negative for abdominal discomfort, blood in stools or black stools or change in bowel habits.  MUSCULOSKELETAL:  See HPI.  SKIN:  Negative for lesions, rash, and itching.  PSYCH:  No mood disorder or recent psychosocial stressors.  Patients sleep is not disturbed secondary to pain.  HEMATOLOGY/LYMPHOLOGY: + bruising easily   ENDO: No history of diabetes or thyroid dysfunction  NEURO:    Patient has Parkinson's with parkinsonian tremor and affect.  All other reviewed and negative other than HPI.    OBJECTIVE:    /73   Pulse 77   Temp 97 °F (36.1 °C) (Oral)   Resp 18   Ht 5' 10" (1.778 m)   Wt 94 kg (207 lb 3.2 oz)   BMI 29.73 kg/m²     PHYSICAL EXAMINATION:    GENERAL: Well appearing, in no acute distress, alert and oriented x3.  PSYCH:  Mood and affect appropriate.  SKIN: Skin color, texture, turgor normal, no rashes or lesions.  HEAD/FACE:  Normocephalic, atraumatic. Cranial nerves grossly intact.  CV: RRR with palpation of the radial artery.  PULM: No evidence of respiratory difficulty, symmetric chest rise.  BACK: Straight leg raising in the sitting position is positive to radicular pain bilaterally. There is pain with palpation over the facet joints of the lumbar spine. Limited ROM with pain on flexion and extension. Positive facet loading bilaterally.   EXTREMITIES: Peripheral joint ROM is full and pain free without obvious instability or laxity in all four extremities. No deformities, edema, or skin discoloration. Good capillary refill.  MUSCULOSKELETAL: Hip and knee provocative maneuvers are negative.  There is no pain with palpation over the sacroiliac joints bilaterally.  There is no pain to palpation over the greater trochanteric bursa bilaterally.  FABERs test is negative.  FADIRs test is negative.   5/5 strength in right ankle with plantar and 4/5 dorsiflexion. 5/5 strength in left ankle with plantar and " dorsiflexion, 5/5 strength with right knee flexion extension, 5/5 strength with knee flexion extension on the left  No atrophy or tone abnormalities are noted.  NEURO:  2+ right knee, 3+ left knee reflexes, absent S1 reflexes bilaterally.  Plantar response are downgoing. No clonus.  No loss of sensation is noted.  GAIT: Antalgic, ambulates without assistance     Lab Results   Component Value Date    WBC 7.53 08/22/2018    HGB 16.3 08/22/2018    HCT 48.8 08/22/2018    MCV 94 08/22/2018     08/22/2018     BMP  Lab Results   Component Value Date     08/22/2018    K 4.4 08/22/2018     08/22/2018    CO2 28 08/22/2018    BUN 24 (H) 08/22/2018    CREATININE 1.1 08/22/2018    CALCIUM 9.9 08/22/2018    ANIONGAP 9 08/22/2018    ESTGFRAFRICA >60.0 08/22/2018    EGFRNONAA >60.0 08/22/2018       ASSESSMENT: 73 y.o. year old male with pain, consistent with     Encounter Diagnoses   Name Primary?    Lumbar radiculopathy Yes    Foraminal stenosis of lumbosacral region     Lumbar spondylosis     Facet arthritis of lumbar region     DDD (degenerative disc disease), lumbar        PLAN:     - Previous imaging was reviewed and discussed with the patient today.    - He has had limited relief with injections. We will send him for a neurosurgical consult.    - If there is no surgical options,  we will consider spinal cord stimulation as treatment in the future. Previous provided with a DVD from [employer].      - If limited relief, we can consider repeat lumbar MBB.     - Continue Robaxin and Gabapentin.     - Continue home exercise routine.     - RTC PRN.     - Dr. Crockett was consulted on the patient and agrees with this plan.    The above plan and management options were discussed at length with patient. Patient is in agreement with the above and verbalized understanding.     Xochilt Arenas, FILI  03/27/2019

## 2019-03-28 ENCOUNTER — TELEPHONE (OUTPATIENT)
Dept: PAIN MEDICINE | Facility: CLINIC | Age: 74
End: 2019-03-28

## 2019-03-28 NOTE — TELEPHONE ENCOUNTER
----- Message from Shy Loera sent at 3/28/2019  9:59 AM CDT -----  Contact: Jeremie Lindsay  Name of Who is Calling: ALEXANDRO RENE [[id number]]      What is the request in detail: CN calling to have pt MRI report faxed to [phone].. Please advise      Can the clinic reply by [facility]: no      What Number to Call Back if not in [facility]: [phone]

## 2019-03-28 NOTE — TELEPHONE ENCOUNTER
----- Message from Shy Loera sent at 3/28/2019  9:59 AM CDT -----  Contact: Jeremie Lindsay  Name of Who is Calling: ALEXANDRO RENE [[id number]]      What is the request in detail: CN calling to have pt MRI report faxed to [phone].. Please advise      Can the clinic reply by [facility]: no      What Number to Call Back if not in [facility]: [phone]

## 2019-04-03 ENCOUNTER — OFFICE VISIT (OUTPATIENT)
Dept: NEUROSURGERY | Facility: CLINIC | Age: 74
End: 2019-04-03
Payer: MEDICARE

## 2019-04-03 VITALS
BODY MASS INDEX: 29.63 KG/M2 | SYSTOLIC BLOOD PRESSURE: 160 MMHG | TEMPERATURE: 98 F | WEIGHT: 207 LBS | HEART RATE: 63 BPM | DIASTOLIC BLOOD PRESSURE: 85 MMHG | HEIGHT: 70 IN

## 2019-04-03 DIAGNOSIS — M54.16 LUMBAR RADICULOPATHY: Primary | ICD-10-CM

## 2019-04-03 DIAGNOSIS — G20.A1 PARKINSON'S DISEASE: ICD-10-CM

## 2019-04-03 DIAGNOSIS — M48.061 SPINAL STENOSIS OF LUMBAR REGION, UNSPECIFIED WHETHER NEUROGENIC CLAUDICATION PRESENT: ICD-10-CM

## 2019-04-03 PROCEDURE — 1101F PR PT FALLS ASSESS DOC 0-1 FALLS W/OUT INJ PAST YR: ICD-10-PCS | Mod: HCNC,CPTII,S$GLB, | Performed by: NEUROLOGICAL SURGERY

## 2019-04-03 PROCEDURE — 3079F PR MOST RECENT DIASTOLIC BLOOD PRESSURE 80-89 MM HG: ICD-10-PCS | Mod: HCNC,CPTII,S$GLB, | Performed by: NEUROLOGICAL SURGERY

## 2019-04-03 PROCEDURE — 99204 PR OFFICE/OUTPT VISIT, NEW, LEVL IV, 45-59 MIN: ICD-10-PCS | Mod: HCNC,S$GLB,, | Performed by: NEUROLOGICAL SURGERY

## 2019-04-03 PROCEDURE — 3077F PR MOST RECENT SYSTOLIC BLOOD PRESSURE >= 140 MM HG: ICD-10-PCS | Mod: HCNC,CPTII,S$GLB, | Performed by: NEUROLOGICAL SURGERY

## 2019-04-03 PROCEDURE — 99204 OFFICE O/P NEW MOD 45 MIN: CPT | Mod: HCNC,S$GLB,, | Performed by: NEUROLOGICAL SURGERY

## 2019-04-03 PROCEDURE — 99999 PR PBB SHADOW E&M-EST. PATIENT-LVL IV: ICD-10-PCS | Mod: PBBFAC,HCNC,, | Performed by: NEUROLOGICAL SURGERY

## 2019-04-03 PROCEDURE — 3079F DIAST BP 80-89 MM HG: CPT | Mod: HCNC,CPTII,S$GLB, | Performed by: NEUROLOGICAL SURGERY

## 2019-04-03 PROCEDURE — 99999 PR PBB SHADOW E&M-EST. PATIENT-LVL IV: CPT | Mod: PBBFAC,HCNC,, | Performed by: NEUROLOGICAL SURGERY

## 2019-04-03 PROCEDURE — 1101F PT FALLS ASSESS-DOCD LE1/YR: CPT | Mod: HCNC,CPTII,S$GLB, | Performed by: NEUROLOGICAL SURGERY

## 2019-04-03 PROCEDURE — 3077F SYST BP >= 140 MM HG: CPT | Mod: HCNC,CPTII,S$GLB, | Performed by: NEUROLOGICAL SURGERY

## 2019-04-03 NOTE — LETTER
April 3, 2019      Laina Crockett MD  [address]  [address]  [facility] [address]           [facility] - Neurosurgery 7th Fl  [address]  [place] [address]  Phone: [phone]          Patient: Haja Marti   MR Number: [id number]   YOB: 1945   Date of Visit: 4/3/2019       Dear Dr. Laina Crockett:    Thank you for referring Haja Marti to me for evaluation. Attached you will find relevant portions of my assessment and plan of care.    If you have questions, please do not hesitate to call me. I look forward to following Haja Marti along with you.    Sincerely,    Ayde Eaton RN    Enclosure  CC:  No Recipients    If you would like to receive this communication electronically, please contact [email or web address] or [phone] to request more information on [employer] [facility] access.    For providers and/or their staff who would like to refer a patient to [facility], please contact us through our one-stop-shop provider referral line, [facility], at [phone].    If you feel you have received this communication in error or would no longer like to receive these types of communications, please e-mail [email or web address]

## 2019-04-03 NOTE — PROGRESS NOTES
Subjective:   I, Esperanza Godwin, attest that this documentation has been prepared under the direction and in the presence of Chiki Tellez MD.     Patient ID: Haja Marti is a 73 y.o. male     Chief Complaint: Lumbar Spine Pain (L-Spine)      HPI  Mr. Haja Marti is a pleasant 73 y.o. gentleman with parkinson's disease who was referred by Dr. Crockett in regards to low back pain. The pt has a chronic,15-year, hx of back pain which has been managed conservatively with OTC medication such as Ibuprofen. He had an acute, atraumatic onset of BLE pain 3 months ago which was deemed unrelated to his parkinsonism. He has been following up with pain management for treatment and management and has had several injections, including medical branch blocks and ESIs; most recently had a L5-S1 AFTAB (on 03/12/2019) with no clinical improvement. Review of his chart indicates he not had injections at the L3-4 level. He states the leg pain terminates in his bilateral calves, is worse in the morning and gradually lessens in intensity throughout the day, though it does not completely subside. It is exacerbated when moving/walking/standing and alleviated when sitting.  He is currently on Sinemet for his parkinson's and denies a change In his gait after taking the medication. He denies a hx of back surgery      Review of Systems   Constitutional: Positive for activity change (secondary to pain). Negative for appetite change, fatigue, fever and unexpected weight change.   HENT: Negative for facial swelling.    Eyes: Negative.    Respiratory: Negative.    Cardiovascular: Negative.    Gastrointestinal: Negative for diarrhea, nausea and vomiting.   Endocrine: Negative.    Genitourinary: Negative.    Musculoskeletal: Positive for back pain (lumbar). Negative for joint swelling, myalgias and neck pain.   Neurological: Negative for dizziness, weakness, numbness and headaches.        Positive for BLE pain.   Psychiatric/Behavioral: Negative.        Past Medical History:   Diagnosis Date    Anxiety     BPH (benign prostatic hypertrophy)     Cataract     OS    Depression     Epiretinal membrane, both eyes     Hx of psychiatric care     Hyperlipidemia     Hypertension     Kidney stones     Kidney stones     Parkinsons     Posterior vitreous detachment of left eye     Psychiatric problem     Retinal detachment 2004    OD    Retinoschisis, left eye     Sleep apnea     Vitreous hemorrhage of left eye        Objective:      Vitals:    04/03/19 0904   BP: (!) 160/85   Pulse: 63   Temp: 97.8 °F (36.6 °C)      Physical Exam   Constitutional: He is oriented to person, place, and time. He appears well-nourished.   HENT:   Head: Normocephalic and atraumatic.   Neck: Neck supple.   Neurological: He is alert and oriented to person, place, and time. He has normal strength. No cranial nerve deficit. He displays a negative Romberg sign. GCS eye subscore is 4. GCS verbal subscore is 5. GCS motor subscore is 6.   Pt exhibits pain in the L3 distribution.        IMAGING:  MRI Lumbar Spine Without Contrast (01/18/2019) shows extensive degenerative disc disease and lumbar spondylosis. There are disc herniations that appear chronic in nature at L3-4, L4-5, and L5-S1. There is mild central canal stenosis and severe foraminal stenosis at L4-5; and severe central canal and foraminal stenosis at L3-4.    I have personally reviewed the images with the pt.      I, Dr. Chiki Tellez, personally performed the services described in this documentation. All medical record entries made by the scribe, Esperanza Godwin, were at my direction and in my presence.  I have reviewed the chart and agree that the record reflects my personal performance and is accurate and complete. Chiki Tellez MD. 04/03/2019    Assessment:       1. Lumbar stenosis.  2. Lumbar spondylosis.  3. Lumbar radiculopathy.     Plan:   I have personally reviewed the MRI of lumbar spine with the pt which shows  extensive degenerative disc disease and lumbar spondylosis. There are disc herniations that appear chronic in nature at L3-4, L4-5, and L5-S1. There is mild central canal stenosis and severe foraminal stenosis at L4-5; and severe central canal and foraminal stenosis at L3-4.    I recommend he participate in a 6-week course of physical therapy to improve his strength and flexibility. I have placed the referral.    I will order an EMG and Nerve conduction study and have pt return to clinic after it is completed, in approximately one month.

## 2019-04-03 NOTE — PATIENT INSTRUCTIONS
I have personally reviewed the MRI of lumbar spine with the pt which shows extensive degenerative disc disease and lumbar spondylosis. There are disc herniations that appear chronic in nature at L3-4, L4-5, and L5-S1. There is mild central canal stenosis and severe foraminal stenosis at L4-5; and severe central canal and foraminal stenosis at L3-4.    I recommend he participate in a 6-week course of physical therapy to improve his strength and flexibility. I have placed the referral.    I will order an EMG and Nerve conduction study and have pt return to clinic after it is completed, in approximately one month.

## 2019-04-08 RX ORDER — CARBIDOPA AND LEVODOPA 25; 100 MG/1; MG/1
1.5 TABLET ORAL 3 TIMES DAILY
Qty: 135 TABLET | Refills: 5 | Status: SHIPPED | OUTPATIENT
Start: 2019-04-08 | End: 2019-08-29 | Stop reason: SDUPTHER

## 2019-04-10 ENCOUNTER — CLINICAL SUPPORT (OUTPATIENT)
Dept: REHABILITATION | Facility: HOSPITAL | Age: 74
End: 2019-04-10
Attending: NEUROLOGICAL SURGERY
Payer: MEDICARE

## 2019-04-10 DIAGNOSIS — G89.29 CHRONIC BILATERAL LOW BACK PAIN WITH BILATERAL SCIATICA: ICD-10-CM

## 2019-04-10 DIAGNOSIS — R53.1 DECREASED STRENGTH: ICD-10-CM

## 2019-04-10 DIAGNOSIS — M54.42 CHRONIC BILATERAL LOW BACK PAIN WITH BILATERAL SCIATICA: ICD-10-CM

## 2019-04-10 DIAGNOSIS — R26.89 DECREASED FUNCTIONAL MOBILITY: ICD-10-CM

## 2019-04-10 DIAGNOSIS — M54.41 CHRONIC BILATERAL LOW BACK PAIN WITH BILATERAL SCIATICA: ICD-10-CM

## 2019-04-10 PROBLEM — M51.36 DDD (DEGENERATIVE DISC DISEASE), LUMBAR: Status: RESOLVED | Noted: 2017-07-06 | Resolved: 2019-04-10

## 2019-04-10 PROCEDURE — G8979 MOBILITY GOAL STATUS: HCPCS | Mod: CK,HCNC,PN

## 2019-04-10 PROCEDURE — 97161 PT EVAL LOW COMPLEX 20 MIN: CPT | Mod: HCNC,PN

## 2019-04-10 PROCEDURE — G8978 MOBILITY CURRENT STATUS: HCPCS | Mod: CK,HCNC,PN

## 2019-04-10 NOTE — PLAN OF CARE
"[facility]  Physical Therapy Initial Evaluation    Name: Haja Marti  Clinic Number: [id number]    Therapy Diagnosis:   Encounter Diagnoses   Name Primary?    Chronic bilateral low back pain with bilateral sciatica     Decreased strength     Decreased functional mobility      Physician: Chiki Tellez MD    Physician Orders: PT Eval and Treat   Medical Diagnosis: Lumbar radiculopathy; Parkinson's disease; Spinal stenosis of lumbar region, unspecified whether neurogenic  Evaluation Date: 4/10/2019  Authorization Period Expiration: 12/31/2019  Plan of Care Certification Period: 4/10/2019 to 6/7/2019  Visit # / Visits authorized: 1/ 50  FOTO: 1/10  Gcode: 1/10    Time In: 0925  Time Out: 1000  Total Billable Time: 35 minutes (LCE-1)      Precautions: Standard, HTN, Parkinson's Disease    Subjective     Date of onset: November 2018    History of current condition - Haja reports: he has "exruciating" pain upon waking in the am that makes it difficult to walk. He has pain radiating anisha the back of his B buttocks to his B posterior thigh that makes it difficult to walk. Pain last approximately 3-4 hours before improving. Is currently taking medication for pain and improves pain "a little bit." Is able to sit for approximately 1 hour before experiencing pain and has pain and difficulty when he rises from sitting for an hour.        Past Medical History:   Diagnosis Date    Anxiety     BPH (benign prostatic hypertrophy)     Cataract     OS    Depression     Epiretinal membrane, both eyes     Hx of psychiatric care     Hyperlipidemia     Hypertension     Kidney stones     Kidney stones     Parkinsons     Posterior vitreous detachment of left eye     Psychiatric problem     Retinal detachment 2004    OD    Retinoschisis, left eye     Sleep apnea     Vitreous hemorrhage of left eye      Haja Marti  has a past surgical history that includes Tonsillectomy; Eye " surgery; Prostate surgery; laser indirect (4/8/10); Retinal detachment surgery (2004); Laser Indirect Retinopexy (4/8/10); Cataract extraction; Rotator cuff repair (11/13/2013); Injection of anesthetic agent around nerve (Bilateral, 7/3/2018); Transforaminal epidural injection of steroid (Bilateral, 2/19/2019); and Epidural steroid injection (N/A, 3/12/2019).    Haja has a current medication list which includes the following prescription(s): atorvastatin, carbidopa-levodopa  mg, citric acid-potassium citrate, clonazepam, ergocalciferol, finasteride, gabapentin, losartan, magnesium oxide, potassium citrate, pyridoxine (vitamin b6), and sertraline.    Review of patient's allergies indicates:  No Known Allergies     Imaging, MRI studies: 1/11/19: Multilevel degenerative change of the lumbar spine, most significant at L3-L4 which demonstrates moderate spinal canal stenosis and moderate bilateral neural foraminal narrowing.  Prior Therapy: Yes  Social History: lives with their spouse  Occupation: Retired  Prior Level of Function: Independent with all ADLs, IADLs, and ambulation  Current Level of Function: Pain and difficulty with all ADLs, IADLs, and ambulation    Pain:  Current 0/10, worst 10/10, best 0/10   Location: bilateral buttocks and upper legs  Description: Aching and Shooting  Aggravating Factors: Sitting, Walking, Morning and Getting out of bed/chair  Easing Factors: pain medication    Pts goals: would like to get back to boxing, swimming, and yoga    Objective       Range of Motion/Strength:     Thoracolumbar Pain/Dysfunction with Movement   Flexion Mod loss; pain upon return to extension   Extension Extreme loss   Right side bending Major loss; pain down L posterior thigh   Left side bending Major loss   Right rotation Mod loss   Left rotation Major loss; pain in L low back and quadratus lumborum         L/E MMT Right Left Pain/Dysfunction with Movement   Hip Flexion 4+/5 4+/5    Hip Extension 4+/5  4+/5 Pain in posterior proximal L thigh   Hip Abduction 4-/5 4-/5    Knee Flexion 4+/5 4+/5    Knee Extension 4/5 4/5          Flexibility: major decreased hamstring B      Gait Analysis:Without AD Deviations: forward trunk lean, posterior pelvic tilt, B toe out, B knee flexed throughout gait cycle, decreased ewa          CMS Impairment/Limitation/Restriction for FOTO Lumbar Spine Survey    Therapist reviewed FOTO scores for Haja Marti on 4/10/2019.   FOTO documents entered into [employer] - see Media section.    Limitation Score: 52%  Category: Mobility    Current : CK = at least 40% but < 60% impaired, limited or restricted  Goal: CK = at least 40% but < 60% impaired, limited or restricted           TREATMENT     Treatment Time In: 0  Treatment Time Out: 0  Total Treatment time separate from Evaluation time:0 minutes    Home Exercises and Patient Education Provided    Education provided:   - role of therapy    Written Home Exercises Provided: noKathya Smyth demonstrated good  understanding of the education provided.       Assessment     Haja is a 73 y.o. male referred to outpatient Physical Therapy with a medical diagnosis of Lumbar radiculopathy; Parkinson's disease; Spinal stenosis of lumbar region, unspecified whether neurogenic. Pt presents to PT with pain, decreased back - lumbar ROM, decreased strength, poor posture, impaired balance and gait, and functional deficits with standing and walking.        Pt prognosis is Good.   Pt will benefit from skilled outpatient Physical Therapy to address the deficits stated above and in the chart below, provide pt/family education, and to maximize pt's level of independence.     Plan of care discussed with patient: Yes  Pt's spiritual, cultural and educational needs considered and pt agreeable to plan of care and goals as stated below:     Anticipated Barriers for therapy: co-morbidities       Medical Necessity is demonstrated by the  "following  History  Co-morbidities and personal factors that may impact the plan of care Co-morbidities:   -Arthritis, Back pain, High Blood Pressure, Neurological Disease    Personal Factors:   --Depression, Anxiety or Panic Disorders     high   Examination  Body Structures and Functions, activity limitations and participation restrictions that may impact the plan of care Body Regions:   head  neck  back  lower extremities  upper extremities  trunk    Body Systems:    gross symmetry  ROM  strength  gross coordinated movement  balance  gait  transfers    Participation Restrictions:   Boxing, swimming, yoga    Activity limitations:   Learning and applying knowledge  no deficits    General Tasks and Commands  no deficits    Communication  no deficits    Mobility  lifting and carrying objects  walking    Self care  no deficits    Domestic Life  shopping    Interactions/Relationships  no deficits    Life Areas  no deficits    Community and Social Life  community life  recreation and leisure         high   Clinical Presentation stable and uncomplicated low   Decision Making/ Complexity Score: low         Goals:  Short Term Goals (4 Weeks):   1. Pt will be compliant with HEP to supplement PT in decreasing pain with functional mobility.  2. Pt will perform and hold TA contraction for 10x10" in dynamic sitting activities to demonstrate improved core strength  3. Pt will improve lumbar ROM to mod loss all deficit motions to improve functional mobility.  4. Pt will improve impaired LE MMTs to >/=4/5 to improve strength for functional tasks.    Long Term Goals (8 Weeks):   1. Pt will improve FOTO score to </= 42% limited to decrease perceived limitation with maintaining/changing body position  2. Pt will improve B 9090 HS length to minimally decreased improve flexibility for normal movement patterns.   3. Pt will perform and hold TA contraction for 10x10" in dynamic standing activities to demonstrate improved core strength  4. " Pt will improve impaired LE MMTs to >/=4+/5 to improve strength for functional tasks.  5. Pt will report pain </= 4/10 during lifting activities to promote functional QOL.  6. Pt will report pain </= 4/10 during lumbar ROM to promote functional mobility.         Plan     Certification Period: 4/10/2019 to 6/7/2019    Outpatient Physical Therapy 2 times weekly for 8 weeks to include the following interventions: Gait Training, Manual Therapy, Moist Heat/ Ice, Neuromuscular Re-ed, Patient Education, Therapeutic Activites and Therapeutic Exercise.     Moriah Villegas, PT, DPT

## 2019-04-11 ENCOUNTER — CLINICAL SUPPORT (OUTPATIENT)
Dept: REHABILITATION | Facility: HOSPITAL | Age: 74
End: 2019-04-11
Attending: NEUROLOGICAL SURGERY
Payer: MEDICARE

## 2019-04-11 DIAGNOSIS — M54.42 CHRONIC BILATERAL LOW BACK PAIN WITH BILATERAL SCIATICA: ICD-10-CM

## 2019-04-11 DIAGNOSIS — R53.1 DECREASED STRENGTH: ICD-10-CM

## 2019-04-11 DIAGNOSIS — R26.89 DECREASED FUNCTIONAL MOBILITY: ICD-10-CM

## 2019-04-11 DIAGNOSIS — M54.41 CHRONIC BILATERAL LOW BACK PAIN WITH BILATERAL SCIATICA: ICD-10-CM

## 2019-04-11 DIAGNOSIS — G89.29 CHRONIC BILATERAL LOW BACK PAIN WITH BILATERAL SCIATICA: ICD-10-CM

## 2019-04-11 PROCEDURE — 97110 THERAPEUTIC EXERCISES: CPT | Mod: HCNC,PN

## 2019-04-11 PROCEDURE — 97140 MANUAL THERAPY 1/> REGIONS: CPT | Mod: HCNC,PN

## 2019-04-11 NOTE — PROGRESS NOTES
"                            Physical Therapy Daily Treatment Note     Name: Haja Marti  Clinic Number: [id number]    Therapy Diagnosis:   Encounter Diagnoses   Name Primary?    Chronic bilateral low back pain with bilateral sciatica     Decreased strength     Decreased functional mobility      Physician: Chiki Tellez MD    Visit Date: 4/11/2019    Physician Orders: PT Eval and Treat   Medical Diagnosis: Lumbar radiculopathy; Parkinson's disease; Spinal stenosis of lumbar region, unspecified whether neurogenic  Evaluation Date: 4/10/2019  Authorization Period Expiration: 12/31/2019  Plan of Care Certification Period: 4/10/2019 to 6/7/2019  Visit # / Visits authorized: 2/ 50  FOTO: 2/10  Gcode: 2/10    Visit: 118.41  Total: 201.30       Time In: 0902  Time Out: 1000  Total Billable Time: 58 minutes (MT-1, TE-3)        Precautions: Standard, HTN, Parkinson's Disease      Subjective     Pt reports: he feels the same as he did yesterday    Response to previous treatment: n/a  Functional change: no change    Pain: 9/10  Location: bilateral back , buttocks  and upper legs     Objective     Haja received therapeutic exercises to develop strength, endurance, flexibility, posture and core stabilization for 48 minutes including:      Push/Pull       3x7"    Hamstring stretch with strap     3x30" B  Piriformis stretch      3x30" B manually    Gluteal set       20x5"  Supine Hip ADduction      20x5"  Bridge        2x10  Supine Hip ABduction      3x10 red theraband          Haja received the following manual therapy techniques: Joint mobilizations, Myofacial release and Soft tissue Mobilization were applied to the: back for 10 minutes, including:    -STM/MFR to B lumbar paraspinals and quadratus lumborum  -B quadratus lumborum stretch  -grade III-IV central posterior to anterior mobilizations to T8-L5  -grade III-IV unilateral posterior to anterior mobilizations to L1-5 B         Home Exercises Provided and " "Patient Education Provided     Education provided:   - trial performing prone pressups when experiencing radicular symptoms or when pain increases    Written Home Exercises Provided: yes.  Exercises were reviewed and Drake able to demonstrate them prior to the end of the session.  Haja demonstrated good  understanding of the education provided.     See EMR under Patient Instructions for exercises provided 4/11/2019      Assessment     Patient presented with R anteriorly/Lposteriorly rotated innominate that reversed with pelvic muscle energy technique. Patient with tightness in B hips with external and internal rotation. Decreased range of motion when performing bridge likely due to weakness in core and gluteal musculature.    Haja is progressing well towards his goals.   Pt prognosis is Good.     Pt will continue to benefit from skilled outpatient physical therapy to address the deficits listed in the problem list box on initial evaluation, provide pt/family education and to maximize pt's level of independence in the home and community environment.     Pt's spiritual, cultural and educational needs considered and pt agreeable to plan of care and goals.    Anticipated barriers to physical therapy: co-morbidities    Goals:     Short Term Goals (4 Weeks):   1. Pt will be compliant with HEP to supplement PT in decreasing pain with functional mobility. PROGRESSING, NOT MET 4/11/2019  2. Pt will perform and hold TA contraction for 10x10" in dynamic sitting activities to demonstrate improved core strength PROGRESSING, NOT MET 4/11/2019  3. Pt will improve lumbar ROM to mod loss all deficit motions to improve functional mobility. PROGRESSING, NOT MET 4/11/2019  4. Pt will improve impaired LE MMTs to >/=4/5 to improve strength for functional tasks. PROGRESSING, NOT MET 4/11/2019     Long Term Goals (8 Weeks):   1. Pt will improve FOTO score to </= 42% limited to decrease perceived limitation with " "maintaining/changing body position PROGRESSING, NOT MET 4/11/2019  2. Pt will improve B 9090 HS length to minimally decreased improve flexibility for normal movement patterns. PROGRESSING, NOT MET 4/11/2019  3. Pt will perform and hold TA contraction for 10x10" in dynamic standing activities to demonstrate improved core strength PROGRESSING, NOT MET 4/11/2019  4. Pt will improve impaired LE MMTs to >/=4+/5 to improve strength for functional tasks. PROGRESSING, NOT MET 4/11/2019  5. Pt will report pain </= 4/10 during lifting activities to promote functional QOL. PROGRESSING, NOT MET 4/11/2019  6. Pt will report pain </= 4/10 during lumbar ROM to promote functional mobility. PROGRESSING, NOT MET 4/11/2019      Plan     Continue plan of care. Assess response to HEP next visit    Moriah Villegas, PT, DPT    "

## 2019-04-12 ENCOUNTER — OFFICE VISIT (OUTPATIENT)
Dept: PSYCHIATRY | Facility: CLINIC | Age: 74
End: 2019-04-12
Payer: MEDICARE

## 2019-04-12 DIAGNOSIS — F41.1 GAD (GENERALIZED ANXIETY DISORDER): Primary | ICD-10-CM

## 2019-04-12 PROCEDURE — 90834 PR PSYCHOTHERAPY W/PATIENT, 45 MIN: ICD-10-PCS | Mod: HCNC,S$GLB,, | Performed by: SOCIAL WORKER

## 2019-04-12 PROCEDURE — 90785 PSYTX COMPLEX INTERACTIVE: CPT | Mod: HCNC,S$GLB,, | Performed by: SOCIAL WORKER

## 2019-04-12 PROCEDURE — 90834 PSYTX W PT 45 MINUTES: CPT | Mod: HCNC,S$GLB,, | Performed by: SOCIAL WORKER

## 2019-04-12 PROCEDURE — 90785 PR INTERACTIVE COMPLEXITY: ICD-10-PCS | Mod: HCNC,S$GLB,, | Performed by: SOCIAL WORKER

## 2019-04-12 NOTE — PROGRESS NOTES
Individual Psychotherapy (PhD/LCSW)    4/12/2019    Site:  [facility]         Therapeutic Intervention: Met with patient and spouse.  Outpatient - Insight oriented psychotherapy 45 min - CPT code 62314    Chief complaint/reason for encounter: anxiety Wife Whitley  Pt with parkinsons with cognitive difficulties for which wife is needed to aid in therapeutic instructions including homework.       Interval history and content of current session:    Body scan meditation.  Worries and how to modulate this.  Pt getting a third opinion on treatments for his back pain.  Wife present.            Treatment plan:  · Target symptoms: anxiety   · Why chosen therapy is appropriate versus another modality: relevant to diagnosis  · Outcome monitoring methods: self-report, observation  · Therapeutic intervention type: behavior modifying psychotherapy    Risk parameters:  Patient reports no suicidal ideation  Patient reports no homicidal ideation  Patient reports no self-injurious behavior  Patient reports no violent behavior      Verbal deficits: None    Patient's response to intervention:  The patient's response to intervention is accepting.    Progress toward goals and other mental status changes:  The patient's progress toward goals is fair .    Diagnosis:     ICD-10-CM ICD-9-CM   1. Generalized anxiety disorder F41.1 300.02       Plan:  individual psychotherapy    Return to clinic: 2 weeks    Length of Service (minutes): 45

## 2019-04-16 ENCOUNTER — CLINICAL SUPPORT (OUTPATIENT)
Dept: REHABILITATION | Facility: HOSPITAL | Age: 74
End: 2019-04-16
Attending: NEUROLOGICAL SURGERY
Payer: MEDICARE

## 2019-04-16 DIAGNOSIS — G89.29 CHRONIC BILATERAL LOW BACK PAIN WITH BILATERAL SCIATICA: ICD-10-CM

## 2019-04-16 DIAGNOSIS — R26.89 DECREASED FUNCTIONAL MOBILITY: ICD-10-CM

## 2019-04-16 DIAGNOSIS — M54.41 CHRONIC BILATERAL LOW BACK PAIN WITH BILATERAL SCIATICA: ICD-10-CM

## 2019-04-16 DIAGNOSIS — M54.42 CHRONIC BILATERAL LOW BACK PAIN WITH BILATERAL SCIATICA: ICD-10-CM

## 2019-04-16 DIAGNOSIS — R53.1 DECREASED STRENGTH: ICD-10-CM

## 2019-04-16 PROCEDURE — 97110 THERAPEUTIC EXERCISES: CPT | Mod: HCNC,PN

## 2019-04-16 PROCEDURE — 97140 MANUAL THERAPY 1/> REGIONS: CPT | Mod: HCNC,PN

## 2019-04-17 NOTE — PROGRESS NOTES
"                            Physical Therapy Daily Treatment Note     Name: Haja Marti  Clinic Number: [id number]    Therapy Diagnosis:   Encounter Diagnoses   Name Primary?    Chronic bilateral low back pain with bilateral sciatica     Decreased strength     Decreased functional mobility      Physician: Chiki Tellez MD    Visit Date: 4/16/2019    Physician Orders: PT Eval and Treat   Medical Diagnosis: Lumbar radiculopathy; Parkinson's disease; Spinal stenosis of lumbar region, unspecified whether neurogenic  Evaluation Date: 4/10/2019  Authorization Period Expiration: 12/31/2019  Plan of Care Certification Period: 4/10/2019 to 6/7/2019  Visit # / Visits authorized: 3/ 50  FOTO: 3/10  Gcode: 3/10     Visit: 118.42  Total: 319.72       Time In: 1400  Time Out: 1500  Total Billable Time: 55 minutes (MT-1, TE-3)  Precautions: Standard, HTN, Parkinson's Disease    Subjective     Pt reports: reports no improvement in his B lower pain in 5 months despite a prior round of physical therapy.   Response to previous treatment: n/a  Functional change: no change    Pain: 9/10 Location: posterior leg radiating up to his buttock area     Objective   O:  Significant gastrocnemius and hamstring stiffness. Stands with B hip and knee flexion with posterior pelvic tilting     Haja received therapeutic exercises to develop strength, endurance, flexibility, posture and core stabilization for 45 minutes including:  Push/Pull       Not performed today  Hamstring stretch with strap     3x30" B  Hamstring manuel stretch     10x10 second holds  Piriformis stretch      3x30" B manually  Fitter         5x30 seconds B  Gluteal set       20x5"  Supine Hip ADduction      20x5"  Bridge        2x10  Supine Hip ABduction      3x10 red theraband      Haja received the following manual therapy techniques: total time 10 minutes:  - manual stretching to B HS complexes   - STM/MFR to distal HS complexes    Home Exercises Provided and " "Patient Education Provided   Education provided:   - trial performing prone pressups when experiencing radicular symptoms or when pain increases    Written Home Exercises Provided: yes.  Exercises were reviewed and Drake able to demonstrate them prior to the end of the session.  Haja demonstrated good  understanding of the education provided.     See EMR under Patient Instructions for exercises provided 4/16/2019      Assessment   A:  Mr. Smyth has diagnosis of Parkinson's dz.  Significant posterior chain tightness with noted tone increase.     Haja is progressing well towards his goals.   Pt prognosis is Good.     Pt will continue to benefit from skilled outpatient physical therapy to address the deficits listed in the problem list box on initial evaluation, provide pt/family education and to maximize pt's level of independence in the home and community environment.     Pt's spiritual, cultural and educational needs considered and pt agreeable to plan of care and goals.    Anticipated barriers to physical therapy: co-morbidities    Goals:   Short Term Goals (4 Weeks):   1. Pt will be compliant with HEP to supplement PT in decreasing pain with functional mobility. PROGRESSING, NOT MET 4/16/2019  2. Pt will perform and hold TA contraction for 10x10" in dynamic sitting activities to demonstrate improved core strength PROGRESSING, NOT MET 4/16/2019  3. Pt will improve lumbar ROM to mod loss all deficit motions to improve functional mobility. PROGRESSING, NOT MET 4/16/2019  4. Pt will improve impaired LE MMTs to >/=4/5 to improve strength for functional tasks. PROGRESSING, NOT MET 4/16/2019     Long Term Goals (8 Weeks):   1. Pt will improve FOTO score to </= 42% limited to decrease perceived limitation with maintaining/changing body position PROGRESSING, NOT MET 4/16/2019  2. Pt will improve B 9090 HS length to minimally decreased improve flexibility for normal movement patterns. PROGRESSING, NOT MET " "4/16/2019  3. Pt will perform and hold TA contraction for 10x10" in dynamic standing activities to demonstrate improved core strength PROGRESSING, NOT MET 4/16/2019  4. Pt will improve impaired LE MMTs to >/=4+/5 to improve strength for functional tasks. PROGRESSING, NOT MET 4/16/2019  5. Pt will report pain </= 4/10 during lifting activities to promote functional QOL. PROGRESSING, NOT MET 4/16/2019  6. Pt will report pain </= 4/10 during lumbar ROM to promote functional mobility. PROGRESSING, NOT MET 4/16/2019      Plan     Continue plan of care. Emphasis on posterior chain stretching.     Kirt Fuchs, PT, DPT  "

## 2019-04-22 ENCOUNTER — TELEPHONE (OUTPATIENT)
Dept: INTERNAL MEDICINE | Facility: CLINIC | Age: 74
End: 2019-04-22

## 2019-04-22 NOTE — TELEPHONE ENCOUNTER
----- Message from Jeannine Rehman sent at 4/22/2019  4:28 PM CDT -----  Contact: [phone]  Type: Sooner appointment than  is able to schedule    When is the first available appointment?  04/29    What is the nature of the appointment? back surgery    What appointment type: pre op     Comments: need an appointment this week if possible . Please call and advise , Thanks

## 2019-04-24 NOTE — PROGRESS NOTES
PAST MEDICAL HISTORY:  Hypertension.  Hyperlipidemia.  Parkinsonism with cognitive deficit  Depression, anxiety.  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  sleep apnea.  Bilateral retinal detachment.  Adenomatous colon polyp in 2002.  Tonsillectomy.  Hemorrhoidectomy.  Appendectomy.  Left inguinal hernia repair.  Left knee surgery.  Lumbar degenerative disc disease, status post bilateral L4 transforaminal epidural steroid injection  BPH        SOCIAL HISTORY:  Tobacco and alcohol use - none.  , has four children.              REASON FOR VISIT:  This is a 73-year-old male who comes in for Internal Medicine   evaluation and preop evaluation.  He is scheduled to undergo lumbar surgery May   1st at [facility] in [place] by Dr. Lundberg.  He has   lumbar spondylosis with lumbar spinal stenosis and neurogenic claudication.    MEDICAL HISTORY:  Outlined above.    CURRENT MEDICINES:  1. Atorvastatin 40 mg daily.  2. Carbidopa levodopa  mg one and a half tablets three times a day.  3. Polycitra twice a day.  4. Lorazepam 0.5 mg one tablet twice a day as needed.  5. Vitamin D 50,000 unit once a month.  6. Finasteride 5 mg a day.  7. Gabapentin 300 mg three times a day.  8. Hydrocodone 5/325 one every six hours as needed.  9. Losartan 100 mg daily.  10. Magnesium oxide 100 mg daily.  11. Vitamin B6 50 mg daily.  12. Sertraline 100 mg two a day.    SYSTEMS REVIEW:  Reports no pains in the chest, palpitations, shortness of   breath or abdominal pain.  Tends to have chronic constipation, a bowel function   may be three days a week, takes MiraLax.  No difficulty urinating, no   incontinence, no dysuria.    PHYSICAL EXAMINATION:  VITAL SIGNS:  His weight is 207 pounds, pulse 64, blood pressure 122/90.  NECK:  No thyromegaly.  No masses.  LUNGS:  Clear breath sounds, good effort.  HEART:  Regular rate and rhythm.  No murmurs or gallops.  ABDOMEN:  Active bowel sounds, soft, nontender.  No  hepatosplenomegaly or   abdominal masses.  PULSES:  2+ carotid pulses, no bruits.  EXTREMITIES:  No edema.    IMPRESSION:  1. Hypertension.  2. Parkinson disease.  3. Depression and anxiety.  4. Lumbar spinal stenosis.  5. Preop clearance.    PLAN:  Today, we will get an electrocardiogram, chest x-ray, routine labs.  He   is cleared for anesthesia and surgery.  On the morning of surgery, he can take   his carbidopa, Losartan and then resume the others postop.    ADDENDUM:  Electrocardiogram revealed a normal sinus rhythm with right bundle   branch block, no change when compared to a 2D echo 2017.  Review of chart, 2D   echo was normal other than showing normal ejection fraction and diastolic   dysfunction.        JAM/HN  dd: 04/25/2019 11:23:09 (CDT)  td: 04/26/2019 00:32:51 (CDT)  Doc ID   #[id number]  Job ID #[id number]    CC:

## 2019-04-25 ENCOUNTER — OFFICE VISIT (OUTPATIENT)
Dept: INTERNAL MEDICINE | Facility: CLINIC | Age: 74
End: 2019-04-25
Payer: MEDICARE

## 2019-04-25 ENCOUNTER — HOSPITAL ENCOUNTER (OUTPATIENT)
Dept: RADIOLOGY | Facility: HOSPITAL | Age: 74
Discharge: HOME | End: 2019-04-25
Attending: INTERNAL MEDICINE
Payer: MEDICARE

## 2019-04-25 VITALS
SYSTOLIC BLOOD PRESSURE: 142 MMHG | DIASTOLIC BLOOD PRESSURE: 90 MMHG | HEIGHT: 70 IN | OXYGEN SATURATION: 98 % | HEART RATE: 64 BPM | WEIGHT: 207 LBS | BODY MASS INDEX: 29.63 KG/M2

## 2019-04-25 DIAGNOSIS — G20.C PARKINSONISM, UNSPECIFIED PARKINSONISM TYPE: ICD-10-CM

## 2019-04-25 DIAGNOSIS — I10 ESSENTIAL HYPERTENSION: ICD-10-CM

## 2019-04-25 DIAGNOSIS — Z01.818 PREOP EXAMINATION: ICD-10-CM

## 2019-04-25 DIAGNOSIS — M48.062 SPINAL STENOSIS OF LUMBAR REGION WITH NEUROGENIC CLAUDICATION: ICD-10-CM

## 2019-04-25 DIAGNOSIS — I10 ESSENTIAL HYPERTENSION: Primary | ICD-10-CM

## 2019-04-25 PROCEDURE — 93010 ELECTROCARDIOGRAM REPORT: CPT | Mod: HCNC,S$GLB,, | Performed by: INTERNAL MEDICINE

## 2019-04-25 PROCEDURE — 3077F PR MOST RECENT SYSTOLIC BLOOD PRESSURE >= 140 MM HG: ICD-10-PCS | Mod: HCNC,CPTII,S$GLB, | Performed by: INTERNAL MEDICINE

## 2019-04-25 PROCEDURE — 71046 X-RAY EXAM CHEST 2 VIEWS: CPT | Mod: 26,HCNC,, | Performed by: RADIOLOGY

## 2019-04-25 PROCEDURE — 3080F DIAST BP >= 90 MM HG: CPT | Mod: HCNC,CPTII,S$GLB, | Performed by: INTERNAL MEDICINE

## 2019-04-25 PROCEDURE — 93010 EKG 12-LEAD: ICD-10-PCS | Mod: HCNC,S$GLB,, | Performed by: INTERNAL MEDICINE

## 2019-04-25 PROCEDURE — 1101F PR PT FALLS ASSESS DOC 0-1 FALLS W/OUT INJ PAST YR: ICD-10-PCS | Mod: HCNC,CPTII,S$GLB, | Performed by: INTERNAL MEDICINE

## 2019-04-25 PROCEDURE — 71046 X-RAY EXAM CHEST 2 VIEWS: CPT | Mod: TC,HCNC,FY,PO

## 2019-04-25 PROCEDURE — 93005 EKG 12-LEAD: ICD-10-PCS | Mod: HCNC,S$GLB,, | Performed by: INTERNAL MEDICINE

## 2019-04-25 PROCEDURE — 99999 PR PBB SHADOW E&M-EST. PATIENT-LVL IV: ICD-10-PCS | Mod: PBBFAC,HCNC,, | Performed by: INTERNAL MEDICINE

## 2019-04-25 PROCEDURE — 71046 XR CHEST PA AND LATERAL: ICD-10-PCS | Mod: 26,HCNC,, | Performed by: RADIOLOGY

## 2019-04-25 PROCEDURE — 99214 OFFICE O/P EST MOD 30 MIN: CPT | Mod: HCNC,S$GLB,, | Performed by: INTERNAL MEDICINE

## 2019-04-25 PROCEDURE — 99999 PR PBB SHADOW E&M-EST. PATIENT-LVL IV: CPT | Mod: PBBFAC,HCNC,, | Performed by: INTERNAL MEDICINE

## 2019-04-25 PROCEDURE — 99214 PR OFFICE/OUTPT VISIT, EST, LEVL IV, 30-39 MIN: ICD-10-PCS | Mod: HCNC,S$GLB,, | Performed by: INTERNAL MEDICINE

## 2019-04-25 PROCEDURE — 1101F PT FALLS ASSESS-DOCD LE1/YR: CPT | Mod: HCNC,CPTII,S$GLB, | Performed by: INTERNAL MEDICINE

## 2019-04-25 PROCEDURE — 93005 ELECTROCARDIOGRAM TRACING: CPT | Mod: HCNC,S$GLB,, | Performed by: INTERNAL MEDICINE

## 2019-04-25 PROCEDURE — 3077F SYST BP >= 140 MM HG: CPT | Mod: HCNC,CPTII,S$GLB, | Performed by: INTERNAL MEDICINE

## 2019-04-25 PROCEDURE — 3080F PR MOST RECENT DIASTOLIC BLOOD PRESSURE >= 90 MM HG: ICD-10-PCS | Mod: HCNC,CPTII,S$GLB, | Performed by: INTERNAL MEDICINE

## 2019-04-25 RX ORDER — HYDROCODONE BITARTRATE AND ACETAMINOPHEN 5; 325 MG/1; MG/1
1 TABLET ORAL EVERY 6 HOURS PRN
COMMUNITY
End: 2020-03-02

## 2019-04-29 ENCOUNTER — DOCUMENTATION ONLY (OUTPATIENT)
Dept: INTERNAL MEDICINE | Facility: CLINIC | Age: 74
End: 2019-04-29

## 2019-04-29 NOTE — PROGRESS NOTES
PAST MEDICAL HISTORY:  Hypertension.  Hyperlipidemia.  Parkinsonism with cognitive deficit  Depression, anxiety.  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  sleep apnea.  Bilateral retinal detachment.  Adenomatous colon polyp in 2002.  Tonsillectomy.  Hemorrhoidectomy.  Appendectomy.  Left inguinal hernia repair.  Left knee surgery.  Lumbar degenerative disc disease, status post bilateral L4 transforaminal epidural steroid injection  BPH       CURRENT MEDICINES:  1. Atorvastatin 40 mg daily.  2. Carbidopa levodopa  mg one and a half tablets three times a day.  3. Polycitra twice a day.  4. Lorazepam 0.5 mg one tablet twice a day as needed.  5. Vitamin D 50,000 unit once a month.  6. Finasteride 5 mg a day.  7. Gabapentin 300 mg three times a day.  8. Hydrocodone 5/325 one every six hours as needed.  9. Losartan 100 mg daily.  10. Magnesium oxide 100 mg daily.  11. Vitamin B6 50 mg daily.  12. Sertraline 100 mg two a day.

## 2019-05-02 ENCOUNTER — TELEPHONE (OUTPATIENT)
Dept: SLEEP MEDICINE | Facility: CLINIC | Age: 74
End: 2019-05-02

## 2019-05-10 ENCOUNTER — OFFICE VISIT (OUTPATIENT)
Dept: NEUROLOGY | Facility: CLINIC | Age: 74
End: 2019-05-10
Payer: MEDICARE

## 2019-05-10 VITALS
HEART RATE: 72 BPM | DIASTOLIC BLOOD PRESSURE: 81 MMHG | SYSTOLIC BLOOD PRESSURE: 123 MMHG | HEIGHT: 70 IN | WEIGHT: 205 LBS | BODY MASS INDEX: 29.35 KG/M2

## 2019-05-10 DIAGNOSIS — G20.A1 PARKINSON DISEASE: Primary | ICD-10-CM

## 2019-05-10 PROCEDURE — 3074F SYST BP LT 130 MM HG: CPT | Mod: HCNC,CPTII,S$GLB, | Performed by: PSYCHIATRY & NEUROLOGY

## 2019-05-10 PROCEDURE — 99214 PR OFFICE/OUTPT VISIT, EST, LEVL IV, 30-39 MIN: ICD-10-PCS | Mod: HCNC,S$GLB,, | Performed by: PSYCHIATRY & NEUROLOGY

## 2019-05-10 PROCEDURE — 99999 PR PBB SHADOW E&M-EST. PATIENT-LVL III: ICD-10-PCS | Mod: PBBFAC,HCNC,, | Performed by: PSYCHIATRY & NEUROLOGY

## 2019-05-10 PROCEDURE — 99214 OFFICE O/P EST MOD 30 MIN: CPT | Mod: HCNC,S$GLB,, | Performed by: PSYCHIATRY & NEUROLOGY

## 2019-05-10 PROCEDURE — 99499 UNLISTED E&M SERVICE: CPT | Mod: HCNC,S$GLB,, | Performed by: PSYCHIATRY & NEUROLOGY

## 2019-05-10 PROCEDURE — 1101F PT FALLS ASSESS-DOCD LE1/YR: CPT | Mod: HCNC,CPTII,S$GLB, | Performed by: PSYCHIATRY & NEUROLOGY

## 2019-05-10 PROCEDURE — 3079F DIAST BP 80-89 MM HG: CPT | Mod: HCNC,CPTII,S$GLB, | Performed by: PSYCHIATRY & NEUROLOGY

## 2019-05-10 PROCEDURE — 3074F PR MOST RECENT SYSTOLIC BLOOD PRESSURE < 130 MM HG: ICD-10-PCS | Mod: HCNC,CPTII,S$GLB, | Performed by: PSYCHIATRY & NEUROLOGY

## 2019-05-10 PROCEDURE — 99999 PR PBB SHADOW E&M-EST. PATIENT-LVL III: CPT | Mod: PBBFAC,HCNC,, | Performed by: PSYCHIATRY & NEUROLOGY

## 2019-05-10 PROCEDURE — 99499 RISK ADDL DX/OHS AUDIT: ICD-10-PCS | Mod: HCNC,S$GLB,, | Performed by: PSYCHIATRY & NEUROLOGY

## 2019-05-10 PROCEDURE — 1101F PR PT FALLS ASSESS DOC 0-1 FALLS W/OUT INJ PAST YR: ICD-10-PCS | Mod: HCNC,CPTII,S$GLB, | Performed by: PSYCHIATRY & NEUROLOGY

## 2019-05-10 PROCEDURE — 3079F PR MOST RECENT DIASTOLIC BLOOD PRESSURE 80-89 MM HG: ICD-10-PCS | Mod: HCNC,CPTII,S$GLB, | Performed by: PSYCHIATRY & NEUROLOGY

## 2019-05-10 NOTE — PROGRESS NOTES
"Name: Hjaa Marti  MRN: [id number]   CSN: [id number]      Date: 05/10/2019    Chief Complaint / Interval History:   - had surgery on May 1, at [facility], multiple level L2-5  - had post-op confusion and sundowning  - now feeling better, a little confused at home  - taking very rare pain meds  - planning PT, right now has home health for 3 weeks, then see what he needs  - PD symptoms seem to be about    From Feb 22, 2019:  - recent back pain  - medrol dose mariel helped take from 10 --> 5  - then got B L5 injections from [facility] 9 now 2/10 at worst)  - worse in hte AM, better with time and stooping over- gabapentin not effective  - PD symptoms are doing   - Ibuprofen x 3- 4 tabs usually helps        - saw vipin in NOv    From Nov  - more fatigue  - exercising some  - feels pretty stable  -  Good support with family  - little off time  -  No dyskinesia  - exercising  - no bowel/bladder changes  - mild AM LH and non-motor complications of near-syncope    From Aug 2018:  - having much more fatigue, lays down to feel better  - balance and gait about the same  -  Still constipated   - watching his BP, has been more normal for other doctors    From April 2018:  - balance and gait is better   - taking miralax as needed, usually takes 2-3 days to move bowels  - not drinking much water    From January 2018:  - azilect had some insomnia  - restarted the cd/ld 1/2 tid - wife thinks all is a little better but not dramatic  - voice particularly better  - no recurrence of hallucinations on the low dose cd/ld  -  He thinks the new cd/ld has leveled out, but anxiety is worse, for nearly every event in the family  - he is ruminating about having to give a speech in the next month and he is very nervous    * might be worse since he started the aricept    History of Present Illness (HPI):  70 yo     2+ years of progressive stooping posture, shuffling feet, slower walking, struggles to get out of a chair.  Handwriting has gotten "terrible."  " Slower with dressing overall, and with most things.    Is a cautious , but has a tendency to pull to the R side of the road (correcting to the left).  More issues with parking, no issues moving backwards but uses side mirrors.  No accidents.      For about 6 months, there has been a progressive hoarseness of the voice.  Comes and goes, not clear timing.      No tremor.      Saw Dr. 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  RBD/sleep issues (Constitutional)?Yes - fights in his sleep, jumps out of bed, falls out over 5 years, talking for 30 years.  Depression/anxiety (Psychiatric)?Yes  Fatigue (Constitutional)?Yes  Constipation (GI)?Yes  Urinary issues ()?Yes  Sexual dysfunction ()?Yes  -ED  Orthostasis (Cardiovascular)?Yes  Leg swelling (Cardiovascular)? No  Falls (Musculoskeletal)?Yes - has had a couple of falls, more unsteady on a boat.    Cognitive impairment (Neurologic)?Yes - a little slower overall  Psychoses (Psychiatric)?Yes - recently had experience thinking he saw people in his den, non-descript faces; 2 years ago, had a experience of seeing someone in the bedroom.  He's had no clear delusions.   Pain/Paresthesia (Neurologic)?Yes - lumbar for years, some disability, helped with pain management.  Visual changes (Eyes)?No  Moles / skin changes (Skin)?No  Stridor / SOB (Pulm)?No  Bruising (Heme)?No    Past Medical History: The patient  has a past medical history of Anxiety, BPH (benign prostatic hypertrophy), Cataract, Depression, Epiretinal membrane, both eyes, psychiatric care, Hyperlipidemia, Hypertension, Kidney stones, Kidney stones, Parkinsons, Posterior vitreous detachment of left eye, Psychiatric problem, Retinal detachment (2004), Retinoschisis, left eye, Sleep apnea, and Vitreous hemorrhage of left eye.    Social  History: The patient  reports that he has never smoked. He has never used smokeless tobacco. He reports that he does not drink alcohol or use drugs.    Family History: Their family history includes Anxiety disorder in his daughter and son; Cancer in his father and mother; Cataracts in his father; Depression in his son; Diabetes in his father.    Allergies: Patient has no known allergies.     Meds:   Current Outpatient Medications on File Prior to Visit   Medication Sig Dispense Refill    atorvastatin (LIPITOR) 40 MG tablet Take 1 tablet (40 mg total) by mouth once daily. 90 tablet 3    carbidopa-levodopa  mg (SINEMET)  mg per tablet TAKE 1.5 TABLETS BY MOUTH 3 (THREE) TIMES DAILY. 135 tablet 5    citric acid-potassium citrate (POLYCITRA) 1,100-334 mg/5 mL solution Take 10 mLs by mouth.      clonazePAM (KLONOPIN) 0.5 MG tablet Take oral 1/2 to 1 tablet TID prn anxiety 180 tablet 1    ergocalciferol (VITAMIN D2) 50,000 unit Cap Take 50,000 Units by mouth every 30 days.       finasteride (PROSCAR) 5 mg tablet Take 1 tablet (5 mg total) by mouth once daily. 90 tablet 3    HYDROcodone-acetaminophen (NORCO) 5-325 mg per tablet Take 1 tablet by mouth every 6 (six) hours as needed.      L.acid-B.bifidum-B.animal-FOS 25 billion cell -100 mg Cap Take by mouth.      losartan (COZAAR) 100 MG tablet Take 1 tablet (100 mg total) by mouth once daily. 90 tablet 3    magnesium oxide (MAG-OX) 400 mg tablet Take 400 mg by mouth once daily.      potassium citrate (UROCIT-K) 10 mEq (1,080 mg) TbSR TAKE 1 TABLET THREE TIMES DAILY WITH MEALS 270 tablet 3    pyridoxine (VITAMIN B-6) 50 MG Tab Take 50 mg by mouth once daily.      sertraline (ZOLOFT) 100 MG tablet Take 2 tablets (200 mg total) by mouth once daily. 180 tablet 1    gabapentin (NEURONTIN) 300 MG capsule Increase to 2 tablets at night, 1 in AM and 1 in afternoon for 3 days.  Then increase to 2 tablets at night and 2 in morning, 1 tablet in afternoon for  "3 days.  Then increase to 2 tablets three times a day (1800mg) 180 capsule 11     No current facility-administered medications on file prior to visit.      Exam:  /81   Pulse 72   Ht 5' 10" (1.778 m)   Wt 93 kg (205 lb 0.4 oz)   BMI 29.42 kg/m²     * Specialized movement exam  Severe hypophonic speech.    ++ mouth sl open, facial masking.   L>R mild-mod cogwheel rigidity.     L>R mild-mod bradykinesia.   No tremor with rest, posture, kinesis, or intention.    No other dystonia, chorea, athetosis, myoclonus, or tics.   No motor impersistence.   Normal-based gait.   Mildly shortened stride length.  + abnormal arm swing.     No postural instability.      Laboratory/Radiological:  - Results:  Lab Visit on 04/25/2019   Component Date Value Ref Range Status    Sodium 04/25/2019 137  136 - 145 mmol/L Final    Potassium 04/25/2019 5.4* 3.5 - 5.1 mmol/L Final    Chloride 04/25/2019 101  95 - 110 mmol/L Final    CO2 04/25/2019 32* 23 - 29 mmol/L Final    Glucose 04/25/2019 93  70 - 110 mg/dL Final    BUN, Bld 04/25/2019 15  8 - 23 mg/dL Final    Creatinine 04/25/2019 1.1  0.5 - 1.4 mg/dL Final    Calcium 04/25/2019 10.5  8.7 - 10.5 mg/dL Final    Anion Gap 04/25/2019 4* 8 - 16 mmol/L Final    eGFR if African American 04/25/2019 >60.0  >60 mL/min/1.73 m^2 Final    eGFR if non African American 04/25/2019 >60.0  >60 mL/min/1.73 m^2 Final    WBC 04/25/2019 7.34  3.90 - 12.70 K/uL Final    RBC 04/25/2019 5.43  4.60 - 6.20 M/uL Final    Hemoglobin 04/25/2019 16.7  14.0 - 18.0 g/dL Final    Hematocrit 04/25/2019 50.0  40.0 - 54.0 % Final    Mean Corpuscular Volume 04/25/2019 92  82 - 98 fL Final    Mean Corpuscular Hemoglobin 04/25/2019 30.8  27.0 - 31.0 pg Final    Mean Corpuscular Hemoglobin Conc 04/25/2019 33.4  32.0 - 36.0 g/dL Final    RDW 04/25/2019 12.4  11.5 - 14.5 % Final    Platelets 04/25/2019 234  150 - 350 K/uL Final    MPV 04/25/2019 9.7  9.2 - 12.9 fL Final    Immature Granulocytes " 04/25/2019 1.2* 0.0 - 0.5 % Final    Gran # (ANC) 04/25/2019 5.5  1.8 - 7.7 K/uL Final    Immature Grans (Abs) 04/25/2019 0.09* 0.00 - 0.04 K/uL Final    Lymph # 04/25/2019 1.0  1.0 - 4.8 K/uL Final    Mono # 04/25/2019 0.6  0.3 - 1.0 K/uL Final    Eos # 04/25/2019 0.1  0.0 - 0.5 K/uL Final    Baso # 04/25/2019 0.05  0.00 - 0.20 K/uL Final    nRBC 04/25/2019 0  0 /100 WBC Final    Gran% 04/25/2019 74.3* 38.0 - 73.0 % Final    Lymph% 04/25/2019 13.6* 18.0 - 48.0 % Final    Mono% 04/25/2019 8.4  4.0 - 15.0 % Final    Eosinophil% 04/25/2019 1.8  0.0 - 8.0 % Final    Basophil% 04/25/2019 0.7  0.0 - 1.9 % Final    Differential Method 04/25/2019 Automated   Final     - Independent review of images:    Diagnoses:          1) Parkinsonism, likely iPD (vs. DLB given the early neuropsychiatric features, but those have resolved)  2) Neurogenic claudication 2/2 L5 --> had multiple levels done on May 1, 2019 (L2-5).      - pt/ot  - medi diet            Edward Ivey MD, MPH  Division of Movement and Memory Disorders  [facility]  [phone]

## 2019-05-13 ENCOUNTER — TELEPHONE (OUTPATIENT)
Dept: ADMINISTRATIVE | Facility: CLINIC | Age: 74
End: 2019-05-13

## 2019-05-15 ENCOUNTER — TELEPHONE (OUTPATIENT)
Dept: NEUROLOGY | Facility: CLINIC | Age: 74
End: 2019-05-15

## 2019-05-15 NOTE — TELEPHONE ENCOUNTER
----- Message from Karthikeyan Eaton sent at 5/15/2019  9:57 AM CDT -----  Contact: Patient @ [phone]  Patient calling to get an update on the 3mon f/u appt, pls call

## 2019-06-27 ENCOUNTER — OFFICE VISIT (OUTPATIENT)
Dept: PSYCHIATRY | Facility: CLINIC | Age: 74
End: 2019-06-27
Payer: MEDICARE

## 2019-06-27 DIAGNOSIS — F32.A DEPRESSION, UNSPECIFIED DEPRESSION TYPE: Primary | ICD-10-CM

## 2019-06-27 DIAGNOSIS — F41.1 GENERALIZED ANXIETY DISORDER: ICD-10-CM

## 2019-06-27 PROCEDURE — 90834 PR PSYCHOTHERAPY W/PATIENT, 45 MIN: ICD-10-PCS | Mod: HCNC,S$GLB,, | Performed by: SOCIAL WORKER

## 2019-06-27 PROCEDURE — 90834 PSYTX W PT 45 MINUTES: CPT | Mod: HCNC,S$GLB,, | Performed by: SOCIAL WORKER

## 2019-06-27 NOTE — PROGRESS NOTES
Individual Psychotherapy (PhD/LCSW)    6/27/2019    Site:  [facility]         Therapeutic Intervention: Met with patient and spouse.  Outpatient - Insight oriented psychotherapy 45 min - CPT code 26949    Chief complaint/reason for encounter: anxiety Wife Whitley  Pt with parkinsons with cognitive difficulties      Interval history and content of current session:    Pt shows up alone.  He had back surgery and his pain is much better.  His anxiety has been stable.  We did some exposure therapy to learn to not panic over the feeling of anxiety.              Treatment plan:  · Target symptoms: anxiety   · Why chosen therapy is appropriate versus another modality: relevant to diagnosis  · Outcome monitoring methods: self-report, observation  · Therapeutic intervention type: behavior modifying psychotherapy    Risk parameters:  Patient reports no suicidal ideation  Patient reports no homicidal ideation  Patient reports no self-injurious behavior  Patient reports no violent behavior      Verbal deficits: None    Patient's response to intervention:      The patient's response to intervention is accepting.    Progress toward goals and other mental status changes:  The patient's progress toward goals is fair .    Diagnosis:     ICD-10-CM ICD-9-CM   1. Generalized anxiety disorder F41.1 300.02       Plan:  individual psychotherapy    Return to clinic: 2 weeks    Length of Service (minutes): 45

## 2019-07-09 ENCOUNTER — OFFICE VISIT (OUTPATIENT)
Dept: PSYCHIATRY | Facility: CLINIC | Age: 74
End: 2019-07-09
Payer: MEDICARE

## 2019-07-09 VITALS
HEART RATE: 78 BPM | BODY MASS INDEX: 29.31 KG/M2 | WEIGHT: 204.25 LBS | SYSTOLIC BLOOD PRESSURE: 134 MMHG | DIASTOLIC BLOOD PRESSURE: 74 MMHG

## 2019-07-09 DIAGNOSIS — F41.9 ANXIETY DISORDER, UNSPECIFIED TYPE: ICD-10-CM

## 2019-07-09 DIAGNOSIS — F41.1 GENERALIZED ANXIETY DISORDER: Primary | ICD-10-CM

## 2019-07-09 DIAGNOSIS — F32.A DEPRESSION, UNSPECIFIED DEPRESSION TYPE: ICD-10-CM

## 2019-07-09 DIAGNOSIS — F41.1 GAD (GENERALIZED ANXIETY DISORDER): ICD-10-CM

## 2019-07-09 DIAGNOSIS — G89.29 OTHER CHRONIC PAIN: ICD-10-CM

## 2019-07-09 PROCEDURE — 3078F DIAST BP <80 MM HG: CPT | Mod: HCNC,CPTII,S$GLB, | Performed by: PSYCHIATRY & NEUROLOGY

## 2019-07-09 PROCEDURE — 1101F PT FALLS ASSESS-DOCD LE1/YR: CPT | Mod: HCNC,CPTII,S$GLB, | Performed by: PSYCHIATRY & NEUROLOGY

## 2019-07-09 PROCEDURE — 99999 PR PBB SHADOW E&M-EST. PATIENT-LVL II: ICD-10-PCS | Mod: PBBFAC,HCNC,, | Performed by: PSYCHIATRY & NEUROLOGY

## 2019-07-09 PROCEDURE — 3075F PR MOST RECENT SYSTOLIC BLOOD PRESS GE 130-139MM HG: ICD-10-PCS | Mod: HCNC,CPTII,S$GLB, | Performed by: PSYCHIATRY & NEUROLOGY

## 2019-07-09 PROCEDURE — 99999 PR PBB SHADOW E&M-EST. PATIENT-LVL II: CPT | Mod: PBBFAC,HCNC,, | Performed by: PSYCHIATRY & NEUROLOGY

## 2019-07-09 PROCEDURE — 3075F SYST BP GE 130 - 139MM HG: CPT | Mod: HCNC,CPTII,S$GLB, | Performed by: PSYCHIATRY & NEUROLOGY

## 2019-07-09 PROCEDURE — 99213 PR OFFICE/OUTPT VISIT, EST, LEVL III, 20-29 MIN: ICD-10-PCS | Mod: HCNC,S$GLB,, | Performed by: PSYCHIATRY & NEUROLOGY

## 2019-07-09 PROCEDURE — 3078F PR MOST RECENT DIASTOLIC BLOOD PRESSURE < 80 MM HG: ICD-10-PCS | Mod: HCNC,CPTII,S$GLB, | Performed by: PSYCHIATRY & NEUROLOGY

## 2019-07-09 PROCEDURE — 1101F PR PT FALLS ASSESS DOC 0-1 FALLS W/OUT INJ PAST YR: ICD-10-PCS | Mod: HCNC,CPTII,S$GLB, | Performed by: PSYCHIATRY & NEUROLOGY

## 2019-07-09 PROCEDURE — 99213 OFFICE O/P EST LOW 20 MIN: CPT | Mod: HCNC,S$GLB,, | Performed by: PSYCHIATRY & NEUROLOGY

## 2019-07-09 RX ORDER — SERTRALINE HYDROCHLORIDE 100 MG/1
200 TABLET, FILM COATED ORAL DAILY
Qty: 180 TABLET | Refills: 1 | Status: SHIPPED | OUTPATIENT
Start: 2019-07-09 | End: 2020-03-03 | Stop reason: SDUPTHER

## 2019-07-09 RX ORDER — CLONAZEPAM 0.5 MG/1
TABLET ORAL
Qty: 180 TABLET | Refills: 1 | Status: SHIPPED | OUTPATIENT
Start: 2019-07-09 | End: 2020-03-03 | Stop reason: SDUPTHER

## 2019-07-09 NOTE — PROGRESS NOTES
"Outpatient Psychiatry Follow-Up Visit (MD/NP)  7/9/2019    Session Length: 30 minutes (E&M)    Clinical Status of Patient:  Outpatient (Ambulatory)    Chief Complaint:  Haja Marti is a 73 y.o. male who presents today for follow-up of depression and anxiety.  Met with patient and spouse.      Interval History and Content of Current Session:  Interim Events/Subjective Report/Content of Current Session:  First appt with me since 1/10/2019.    He had back surgery on May 1, 2019.  He had the surgery done in [place].  Surgeon was Kingston Lundberg MD.  He had the vertebrae (L2 - L5) scraped; he had NO hardware placed.    He had a reaction to anesthesia -- was delirious, visually hallucinating, confused for several days post op.       PAIN today -- 0/10.  He just has stiffness when sitting for periods of time.    He was able to start walking again.    He just completed PT.    He has been going to the gym.  He joined [employer] and kept his membership at the [employer] since it is close to their house.      He has lost a couple of pounds in the past few months.    He realizes he traditionally worries about everything.    He continues to have problems from the Parkinson's Disease.  He is moving slower overall, but walked unassisted to my office.   He is having bilateral leg pain -- he rates it as "7 - 8" on 1 - 10 scale, 10 worst.    He tries to walk in the AM, despite his pain being the worst in the AM upon rising.    He tries to go to an exercise class three times a week to try to help with the pain and for physical endurance.       He last saw Dr. Ivey in Neurology on 5/10/2019.        His wife thinks his anxiety has been better since he has been taking Klonopin.  He has been taking a 0.5 mg tablet twice daily -- he usually takes 1 tablet around 10 AM and another around 5 - 6 PM.    Pt agrees that the Klonopin seems to help with anxiety.     He has some depressed mood, but denies hopelessness or SI.    He " has interests, mainly in spending time with family, including grandchildren, on days he feels physically better.      Psychotherapy:  · Target symptoms: depression, adjustment  · Why chosen therapy is appropriate versus another modality: relevant to diagnosis, patient responds to this modality  · Outcome monitoring methods: self-report, lab data, observation, feedback from family  · Therapeutic intervention type: supportive psychotherapy  · Topics discussed/themes: stress related to medical comorbidities, life stage transitional issues  · The patient's response to the intervention is accepting.   · The patient's progress toward treatment goals is fair.   · Duration of intervention: 12 minutes.    Review of Systems   · PSYCHIATRIC: Pertinant items are noted in the narrative.  · CONSTITUTIONAL:  No recent changes in wt.    · MUSCULOSKELETAL: No pain or stiffness of the joints.  · NEUROLOGIC: + tremors and shuffling gait; No weakness, sensory changes, seizures, memory loss, confusion, or other abnormal movements.  · Memory Loss: no  · ENDOCRINE: No polydipsia or polyuria.  · INTEGUMENTARY: No rashes or lacerations.  · EYES: No exophthalmos, jaundice or blindness.  · ENT: No dizziness, tinnitus or hearing loss.  · RESPIRATORY: No shortness of breath.  · CARDIOVASCULAR: No tachycardia or chest pain.  · GASTROINTESTINAL: No nausea, vomiting, pain, constipation or diarrhea.  · GENITOURINARY: No frequency, dysuria or sexual dysfunction.    The following portions of the patient's history were reviewed and updated as appropriate: allergies, current medications, past family history, past medical history, past social history, past surgical history and problem list.      Current Outpatient Medications:     atorvastatin (LIPITOR) 40 MG tablet, Take 1 tablet (40 mg total) by mouth once daily., Disp: 90 tablet, Rfl: 3    carbidopa-levodopa  mg (SINEMET)  mg per tablet, TAKE 1.5 TABLETS BY MOUTH 3 (THREE) TIMES DAILY.,  Disp: 135 tablet, Rfl: 5    citric acid-potassium citrate (POLYCITRA) 1,100-334 mg/5 mL solution, Take 10 mLs by mouth., Disp: , Rfl:     clonazePAM (KLONOPIN) 0.5 MG tablet, Take oral 1/2 to 1 tablet TID prn anxiety, Disp: 180 tablet, Rfl: 1    ergocalciferol (VITAMIN D2) 50,000 unit Cap, Take 50,000 Units by mouth every 30 days. , Disp: , Rfl:     finasteride (PROSCAR) 5 mg tablet, Take 1 tablet (5 mg total) by mouth once daily., Disp: 90 tablet, Rfl: 3    gabapentin (NEURONTIN) 300 MG capsule, Increase to 2 tablets at night, 1 in AM and 1 in afternoon for 3 days.  Then increase to 2 tablets at night and 2 in morning, 1 tablet in afternoon for 3 days.  Then increase to 2 tablets three times a day (1800mg), Disp: 180 capsule, Rfl: 11    HYDROcodone-acetaminophen (NORCO) 5-325 mg per tablet, Take 1 tablet by mouth every 6 (six) hours as needed., Disp: , Rfl:     L.acid-B.bifidum-B.animal-FOS 25 billion cell -100 mg Cap, Take by mouth., Disp: , Rfl:     losartan (COZAAR) 100 MG tablet, Take 1 tablet (100 mg total) by mouth once daily., Disp: 90 tablet, Rfl: 3    magnesium oxide (MAG-OX) 400 mg tablet, Take 400 mg by mouth once daily., Disp: , Rfl:     potassium citrate (UROCIT-K) 10 mEq (1,080 mg) TbSR, TAKE 1 TABLET THREE TIMES DAILY WITH MEALS, Disp: 270 tablet, Rfl: 3    pyridoxine (VITAMIN B-6) 50 MG Tab, Take 50 mg by mouth once daily., Disp: , Rfl:     sertraline (ZOLOFT) 100 MG tablet, Take 2 tablets (200 mg total) by mouth once daily., Disp: 180 tablet, Rfl: 1     Compliance: yes    Side effects: None    Risk Parameters:  Patient reports no suicidal ideation  Patient reports no homicidal ideation  Patient reports no self-injurious behavior  Patient reports no violent behavior    Exam (detailed: at least 9 elements; comprehensive: all 15 elements)   Constitutional  Vitals - 1 value per visit 3/11/2019 3/12/2019 3/27/2019 4/3/2019 4/25/2019 5/10/2019 7/9/2019   SYSTOLIC 159 187 114 160 142 123 134  "  DIASTOLIC 102 89 73 85 90 81 74   PULSE 74 64 77 63 64 72 78   TEMPERATURE 98.1 - 97 97.8 - - -   RESPIRATIONS - 18 18 - - - -   SPO2 - 97 - - 98 - -   Weight (lb) 206.35 - 207.2 207 207 205.03 204.26   Weight (kg) 93.6 - 93.985 93.895 93.895 93 92.65   HEIGHT 5' 10" - 5' 10" 5' 10" 5' 10" 5' 10" -   BODY MASS INDEX 29.61 - 29.73 29.7 29.7 29.42 29.31   VISIT REPORT - - - - - - -   Pain Score  10 - 8 1 4 0 -   Some recent data might be hidden       General:  unremarkable, age appropriate, casually dressed, neatly groomed, overweight     Musculoskeletal  Muscle Strength/Tone:  not assessed today; cogwheeling had been noted in BUE's in past   Gait & Station:  parkinsonian, slow, stooped but steady     Psychiatric  Speech:  slowed, non-spontaneous, normal volume   Behavior: friendly and cooperative, eye contact normal   Mood & Affect:  "I'm in pain"  dysthymic, constricted range, conguent, appropriate   Thought Process:  goal-directed, logical   Associations:  intact   Thought Content:  normal, no suicidality, no homicidality, delusions, or paranoia   Insight:  intact, has awareness of illness   Judgement: behavior is adequate to circumstances   Orientation:  grossly intact   Memory: intact for content of interview   Language: grossly intact   Attention Span & Concentration:  able to focus   Fund of Knowledge:  intact and appropriate to age and level of education     Lab Results   Component Value Date    WBC 7.34 04/25/2019    HGB 16.7 04/25/2019    HCT 50.0 04/25/2019    MCV 92 04/25/2019     04/25/2019     04/25/2019    K 5.4 (H) 04/25/2019     04/25/2019    CO2 32 (H) 04/25/2019    GLU 93 04/25/2019    BUN 15 04/25/2019    CREATININE 1.1 04/25/2019    CALCIUM 10.5 04/25/2019    PROT 7.2 08/22/2018    ALBUMIN 3.9 08/22/2018    BILITOT 0.5 08/22/2018    ALKPHOS 79 08/22/2018    AST 22 08/22/2018    ALT 29 08/22/2018    ANIONGAP 4 (L) 04/25/2019    ESTGFRAFRICA >60.0 04/25/2019    EGFRNONAA >60.0 " 04/25/2019    CHOL 166 08/22/2018    HDL 44 08/22/2018    LDLCALC 94.0 08/22/2018    TRIG 140 08/22/2018    CHOLHDL 26.5 08/22/2018    TSH 3.241 08/22/2018       Imaging:    Exam: MRI brain without contrast    History: Dizziness, extrapyramidal movement disorder    Findings: MRI is performed with routine sequences.  No mass, hemorrhage, infarction, subdural collection, hydrocephalus or other acute finding is seen on this cranial MR examination.  There is volume loss consistent with the patient's age.  No diffusion abnormality is seen.      Impression     Normal noncontrast MRI of the brain     Electronically signed by: FLORI NEWMAN MD  Date: 07/15/17  Time: 15:51        Assessment and Diagnosis   Status/Progress: Based on the examination today, the patient's problem(s) is/are adequately but not ideally controlled.  New problems have been presented today.   Co-morbidities (chronic pain) are complicating management of the primary condition.    There are not active rule-out diagnoses for this patient at this time.      General Impression:  POLINA (generalized anxiety disorder)  Depression, unspecified depression type  Other chronic pain    Intervention/Counseling/Treatment Plan   Medication Management:  Continue current medications.  No changes were made today.     Follow-up plan for depression was discussed with patient and spouse    Return to Clinic: Follow up in about 6 months (around 1/9/2020).    Sudhir Elliott MD

## 2019-07-24 ENCOUNTER — OFFICE VISIT (OUTPATIENT)
Dept: PSYCHIATRY | Facility: CLINIC | Age: 74
End: 2019-07-24
Payer: MEDICARE

## 2019-07-24 DIAGNOSIS — F41.1 GENERALIZED ANXIETY DISORDER: Primary | ICD-10-CM

## 2019-07-24 DIAGNOSIS — F32.A DEPRESSION, UNSPECIFIED DEPRESSION TYPE: ICD-10-CM

## 2019-07-24 PROCEDURE — 90834 PSYTX W PT 45 MINUTES: CPT | Mod: HCNC,S$GLB,, | Performed by: SOCIAL WORKER

## 2019-07-24 PROCEDURE — 90834 PR PSYCHOTHERAPY W/PATIENT, 45 MIN: ICD-10-PCS | Mod: HCNC,S$GLB,, | Performed by: SOCIAL WORKER

## 2019-07-24 NOTE — PROGRESS NOTES
Individual Psychotherapy (PhD/LCSW)    7/24/2019    Site:  [facility]         Therapeutic Intervention: Met with patient and spouse.  Outpatient - Insight oriented psychotherapy 45 min - CPT code 80996    Chief complaint/reason for encounter: anxiety Wife Whitley  Pt with parkinsons with cognitive difficulties      Interval history and content of current session:   Arrives with wife.   Discussion over her belief that he was going to be late as wife was not ready to present early for the appointment.   CBT.   Wife reports pt has been better than his baseline state.            Treatment plan:  · Target symptoms: anxiety   · Why chosen therapy is appropriate versus another modality: relevant to diagnosis  · Outcome monitoring methods: self-report, observation  · Therapeutic intervention type: behavior modifying psychotherapy    Risk parameters:  Patient reports no suicidal ideation  Patient reports no homicidal ideation  Patient reports no self-injurious behavior  Patient reports no violent behavior      Verbal deficits: None    Patient's response to intervention:      The patient's response to intervention is accepting.    Progress toward goals and other mental status changes:  The patient's progress toward goals is fair .    Diagnosis:     ICD-10-CM ICD-9-CM   1. Generalized anxiety disorder F41.1 300.02       Plan:  individual psychotherapy    Return to clinic: 2 weeks    Length of Service (minutes): 45

## 2019-08-05 DIAGNOSIS — N40.0 ENLARGED PROSTATE: ICD-10-CM

## 2019-08-05 DIAGNOSIS — R39.15 URINARY URGENCY: Primary | ICD-10-CM

## 2019-08-05 RX ORDER — FINASTERIDE 5 MG/1
5 TABLET, FILM COATED ORAL DAILY
Qty: 90 TABLET | Refills: 0 | Status: SHIPPED | OUTPATIENT
Start: 2019-08-05 | End: 2019-10-29 | Stop reason: SDUPTHER

## 2019-08-05 NOTE — TELEPHONE ENCOUNTER
----- Message from Krysta Fischer sent at 8/5/2019  9:43 AM CDT -----  Contact: Self   Refill : finasteride (PROSCAR) 5 mg tablet ( 90day)    [facility] - [place], [place] - [address]  [address]  [place] [address]  Phone: [phone] Fax: [phone]

## 2019-08-05 NOTE — TELEPHONE ENCOUNTER
LMOR to call to make follow up appt with Mag Perez after 8/26/19.  LM that would refill for 3 months.

## 2019-08-16 ENCOUNTER — HOSPITAL ENCOUNTER (OUTPATIENT)
Dept: RADIOLOGY | Facility: HOSPITAL | Age: 74
Discharge: HOME | End: 2019-08-16
Attending: UROLOGY
Payer: MEDICARE

## 2019-08-16 DIAGNOSIS — N20.0 KIDNEY STONES: Primary | ICD-10-CM

## 2019-08-16 DIAGNOSIS — N40.0 ENLARGED PROSTATE: ICD-10-CM

## 2019-08-16 DIAGNOSIS — N20.0 KIDNEY STONES: ICD-10-CM

## 2019-08-16 PROCEDURE — 74018 RADEX ABDOMEN 1 VIEW: CPT | Mod: 26,HCNC,, | Performed by: RADIOLOGY

## 2019-08-16 PROCEDURE — 74018 XR ABDOMEN AP 1 VIEW: ICD-10-PCS | Mod: 26,HCNC,, | Performed by: RADIOLOGY

## 2019-08-16 PROCEDURE — 74018 RADEX ABDOMEN 1 VIEW: CPT | Mod: TC,HCNC,PO

## 2019-08-19 NOTE — PROGRESS NOTES
Dr Oquendo forwarded these results to the patient via [employer].  He will discuss the results with the patient in person at the next appointment.  The patient was instructed to make an appointment if he does not already have one scheduled.

## 2019-08-21 ENCOUNTER — PATIENT OUTREACH (OUTPATIENT)
Dept: ADMINISTRATIVE | Facility: OTHER | Age: 74
End: 2019-08-21

## 2019-08-21 ENCOUNTER — OFFICE VISIT (OUTPATIENT)
Dept: NEUROLOGY | Facility: CLINIC | Age: 74
End: 2019-08-21
Payer: MEDICARE

## 2019-08-21 VITALS
BODY MASS INDEX: 28.85 KG/M2 | SYSTOLIC BLOOD PRESSURE: 136 MMHG | DIASTOLIC BLOOD PRESSURE: 87 MMHG | HEIGHT: 70 IN | HEART RATE: 65 BPM | WEIGHT: 201.5 LBS

## 2019-08-21 DIAGNOSIS — G20.A1 PARKINSON DISEASE: Primary | ICD-10-CM

## 2019-08-21 PROCEDURE — 99214 OFFICE O/P EST MOD 30 MIN: CPT | Mod: HCNC,S$GLB,, | Performed by: PSYCHIATRY & NEUROLOGY

## 2019-08-21 PROCEDURE — 3079F DIAST BP 80-89 MM HG: CPT | Mod: HCNC,CPTII,S$GLB, | Performed by: PSYCHIATRY & NEUROLOGY

## 2019-08-21 PROCEDURE — 99214 PR OFFICE/OUTPT VISIT, EST, LEVL IV, 30-39 MIN: ICD-10-PCS | Mod: HCNC,S$GLB,, | Performed by: PSYCHIATRY & NEUROLOGY

## 2019-08-21 PROCEDURE — 1101F PR PT FALLS ASSESS DOC 0-1 FALLS W/OUT INJ PAST YR: ICD-10-PCS | Mod: HCNC,CPTII,S$GLB, | Performed by: PSYCHIATRY & NEUROLOGY

## 2019-08-21 PROCEDURE — 3075F PR MOST RECENT SYSTOLIC BLOOD PRESS GE 130-139MM HG: ICD-10-PCS | Mod: HCNC,CPTII,S$GLB, | Performed by: PSYCHIATRY & NEUROLOGY

## 2019-08-21 PROCEDURE — 1101F PT FALLS ASSESS-DOCD LE1/YR: CPT | Mod: HCNC,CPTII,S$GLB, | Performed by: PSYCHIATRY & NEUROLOGY

## 2019-08-21 PROCEDURE — 99999 PR PBB SHADOW E&M-EST. PATIENT-LVL III: CPT | Mod: PBBFAC,HCNC,, | Performed by: PSYCHIATRY & NEUROLOGY

## 2019-08-21 PROCEDURE — 3079F PR MOST RECENT DIASTOLIC BLOOD PRESSURE 80-89 MM HG: ICD-10-PCS | Mod: HCNC,CPTII,S$GLB, | Performed by: PSYCHIATRY & NEUROLOGY

## 2019-08-21 PROCEDURE — 3075F SYST BP GE 130 - 139MM HG: CPT | Mod: HCNC,CPTII,S$GLB, | Performed by: PSYCHIATRY & NEUROLOGY

## 2019-08-21 PROCEDURE — 99999 PR PBB SHADOW E&M-EST. PATIENT-LVL III: ICD-10-PCS | Mod: PBBFAC,HCNC,, | Performed by: PSYCHIATRY & NEUROLOGY

## 2019-08-21 NOTE — PROGRESS NOTES
Name: Haja Marti  MRN: [id number]   CSN: [id number]      Date: 08/21/2019    Chief Complaint / Interval History:   - back feels better, some stiffness, but no back pain  - surgery was a success!  - no issues with the meds, even cut back the klonopin  - not too anxious  - sleeping well  - might go to   -  -  -  -        From May 2019:  - had surgery on May 1, at [facility], multiple level L2-5  - had post-op confusion and sundowning  - now feeling better, a little confused at home  - taking very rare pain meds  - planning PT, right now has home health for 3 weeks, then see what he needs  - PD symptoms seem to be about    From Feb 22, 2019:  - recent back pain  - medrol dose mariel helped take from 10 --> 5  - then got B L5 injections from [facility] 9 now 2/10 at worst)  - worse in hte AM, better with time and stooping over- gabapentin not effective  - PD symptoms are doing   - Ibuprofen x 3- 4 tabs usually helps        - saw vipin in NOv    From Nov  - more fatigue  - exercising some  - feels pretty stable  -  Good support with family  - little off time  -  No dyskinesia  - exercising  - no bowel/bladder changes  - mild AM LH and non-motor complications of near-syncope    From Aug 2018:  - having much more fatigue, lays down to feel better  - balance and gait about the same  -  Still constipated   - watching his BP, has been more normal for other doctors    From April 2018:  - balance and gait is better   - taking miralax as needed, usually takes 2-3 days to move bowels  - not drinking much water    From January 2018:  - azilect had some insomnia  - restarted the cd/ld 1/2 tid - wife thinks all is a little better but not dramatic  - voice particularly better  - no recurrence of hallucinations on the low dose cd/ld  -  He thinks the new cd/ld has leveled out, but anxiety is worse, for nearly every event in the family  - he is ruminating about having to give a speech in the next month and he is very nervous    * might be worse  "since he started the aricept    History of Present Illness (HPI):  70 yo     2+ years of progressive stooping posture, shuffling feet, slower walking, struggles to get out of a chair.  Handwriting has gotten "terrible."  Slower with dressing overall, and with most things.    Is a cautious , but has a tendency to pull to the R side of the road (correcting to the left).  More issues with parking, no issues moving backwards but uses side mirrors.  No accidents.      For about 6 months, there has been a progressive hoarseness of the voice.  Comes and goes, not clear timing.      No tremor.      Saw Dr. 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  RBD/sleep issues (Constitutional)?Yes - fights in his sleep, jumps out of bed, falls out over 5 years, talking for 30 years.  Depression/anxiety (Psychiatric)?Yes  Fatigue (Constitutional)?Yes  Constipation (GI)?Yes  Urinary issues ()?Yes  Sexual dysfunction ()?Yes  -ED  Orthostasis (Cardiovascular)?Yes  Leg swelling (Cardiovascular)? No  Falls (Musculoskeletal)?Yes - has had a couple of falls, more unsteady on a boat.    Cognitive impairment (Neurologic)?Yes - a little slower overall  Psychoses (Psychiatric)?Yes - recently had experience thinking he saw people in his den, non-descript faces; 2 years ago, had a experience of seeing someone in the bedroom.  He's had no clear delusions.   Pain/Paresthesia (Neurologic)?Yes - lumbar for years, some disability, helped with pain management.  Visual changes (Eyes)?No  Moles / skin changes (Skin)?No  Stridor / SOB (Pulm)?No  Bruising (Heme)?No    Past Medical History: The patient  has a past medical history of Anxiety, BPH (benign prostatic hypertrophy), Cataract, Depression, Epiretinal membrane, both eyes, psychiatric care, Hyperlipidemia, Hypertension, " Kidney stones, Kidney stones, Parkinsons, Posterior vitreous detachment of left eye, Psychiatric problem, Retinal detachment (2004), Retinoschisis, left eye, Sleep apnea, and Vitreous hemorrhage of left eye.    Social History: The patient  reports that he has never smoked. He has never used smokeless tobacco. He reports that he does not drink alcohol or use drugs.    Family History: Their family history includes Anxiety disorder in his daughter and son; Cancer in his father and mother; Cataracts in his father; Depression in his son; Diabetes in his father.    Allergies: Patient has no known allergies.     Meds:   Current Outpatient Medications on File Prior to Visit   Medication Sig Dispense Refill    atorvastatin (LIPITOR) 40 MG tablet Take 1 tablet (40 mg total) by mouth once daily. 90 tablet 3    carbidopa-levodopa  mg (SINEMET)  mg per tablet TAKE 1.5 TABLETS BY MOUTH 3 (THREE) TIMES DAILY. 135 tablet 5    citric acid-potassium citrate (POLYCITRA) 1,100-334 mg/5 mL solution Take 10 mLs by mouth.      clonazePAM (KLONOPIN) 0.5 MG tablet Take oral 1/2 to 1 tablet TID prn anxiety 180 tablet 1    ergocalciferol (VITAMIN D2) 50,000 unit Cap Take 50,000 Units by mouth every 30 days.       finasteride (PROSCAR) 5 mg tablet Take 1 tablet (5 mg total) by mouth once daily. 90 tablet 0    L.acid-B.bifidum-B.animal-FOS 25 billion cell -100 mg Cap Take by mouth.      losartan (COZAAR) 100 MG tablet Take 1 tablet (100 mg total) by mouth once daily. 90 tablet 3    magnesium oxide (MAG-OX) 400 mg tablet Take 400 mg by mouth once daily.      potassium citrate (UROCIT-K) 10 mEq (1,080 mg) TbSR TAKE 1 TABLET THREE TIMES DAILY WITH MEALS 270 tablet 3    pyridoxine (VITAMIN B-6) 50 MG Tab Take 50 mg by mouth once daily.      sertraline (ZOLOFT) 100 MG tablet Take 2 tablets (200 mg total) by mouth once daily. 180 tablet 1    gabapentin (NEURONTIN) 300 MG capsule Increase to 2 tablets at night, 1 in AM and 1  "in afternoon for 3 days.  Then increase to 2 tablets at night and 2 in morning, 1 tablet in afternoon for 3 days.  Then increase to 2 tablets three times a day (1800mg) 180 capsule 11    HYDROcodone-acetaminophen (NORCO) 5-325 mg per tablet Take 1 tablet by mouth every 6 (six) hours as needed.       No current facility-administered medications on file prior to visit.      Exam:  /87   Pulse 65   Ht 5' 10" (1.778 m)   Wt 91.4 kg (201 lb 8 oz)   BMI 28.91 kg/m²     * Specialized movement exam  Severe hypophonic speech.    ++ mouth sl open, facial masking.   L>R mild-mod cogwheel rigidity.     L>R mild-mod bradykinesia.   No tremor with rest, posture, kinesis, or intention.    No other dystonia, chorea, athetosis, myoclonus, or tics.   No motor impersistence.   Normal-based gait.   Mildly shortened stride length.  + abnormal arm swing.     No postural instability.      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 DLB given the early neuropsychiatric features, but those have resolved)  2) Neurogenic claudication 2/2 L5 --> had multiple levels done on May 1, 2019 (L2-5).      - pt/ot  - medi diet            Edward Ivey MD, MPH  Division of Movement and Memory Disorders  [facility]  [phone]  "

## 2019-08-23 ENCOUNTER — OFFICE VISIT (OUTPATIENT)
Dept: UROLOGY | Facility: CLINIC | Age: 74
End: 2019-08-23
Payer: MEDICARE

## 2019-08-23 VITALS
HEIGHT: 70 IN | WEIGHT: 201.5 LBS | DIASTOLIC BLOOD PRESSURE: 77 MMHG | HEART RATE: 69 BPM | BODY MASS INDEX: 28.85 KG/M2 | SYSTOLIC BLOOD PRESSURE: 126 MMHG

## 2019-08-23 DIAGNOSIS — R39.15 URINARY URGENCY: ICD-10-CM

## 2019-08-23 DIAGNOSIS — N20.0 KIDNEY STONES: Primary | ICD-10-CM

## 2019-08-23 DIAGNOSIS — N40.0 ENLARGED PROSTATE: ICD-10-CM

## 2019-08-23 LAB
BILIRUB SERPL-MCNC: ABNORMAL MG/DL
BLOOD URINE, POC: ABNORMAL
COLOR, POC UA: ABNORMAL
GLUCOSE UR QL STRIP: ABNORMAL
KETONES UR QL STRIP: ABNORMAL
LEUKOCYTE ESTERASE URINE, POC: ABNORMAL
NITRITE, POC UA: ABNORMAL
PH, POC UA: 7
PROTEIN, POC: 30
SPECIFIC GRAVITY, POC UA: 1.02
UROBILINOGEN, POC UA: ABNORMAL

## 2019-08-23 PROCEDURE — 1101F PR PT FALLS ASSESS DOC 0-1 FALLS W/OUT INJ PAST YR: ICD-10-PCS | Mod: HCNC,CPTII,S$GLB, | Performed by: UROLOGY

## 2019-08-23 PROCEDURE — 3078F DIAST BP <80 MM HG: CPT | Mod: HCNC,CPTII,S$GLB, | Performed by: UROLOGY

## 2019-08-23 PROCEDURE — 99214 PR OFFICE/OUTPT VISIT, EST, LEVL IV, 30-39 MIN: ICD-10-PCS | Mod: HCNC,25,S$GLB, | Performed by: UROLOGY

## 2019-08-23 PROCEDURE — 3078F PR MOST RECENT DIASTOLIC BLOOD PRESSURE < 80 MM HG: ICD-10-PCS | Mod: HCNC,CPTII,S$GLB, | Performed by: UROLOGY

## 2019-08-23 PROCEDURE — 3074F PR MOST RECENT SYSTOLIC BLOOD PRESSURE < 130 MM HG: ICD-10-PCS | Mod: HCNC,CPTII,S$GLB, | Performed by: UROLOGY

## 2019-08-23 PROCEDURE — 1101F PT FALLS ASSESS-DOCD LE1/YR: CPT | Mod: HCNC,CPTII,S$GLB, | Performed by: UROLOGY

## 2019-08-23 PROCEDURE — 81002 URINALYSIS NONAUTO W/O SCOPE: CPT | Mod: HCNC,S$GLB,, | Performed by: UROLOGY

## 2019-08-23 PROCEDURE — 3074F SYST BP LT 130 MM HG: CPT | Mod: HCNC,CPTII,S$GLB, | Performed by: UROLOGY

## 2019-08-23 PROCEDURE — 81002 POCT URINE DIPSTICK WITHOUT MICROSCOPE: ICD-10-PCS | Mod: HCNC,S$GLB,, | Performed by: UROLOGY

## 2019-08-23 PROCEDURE — 99214 OFFICE O/P EST MOD 30 MIN: CPT | Mod: HCNC,25,S$GLB, | Performed by: UROLOGY

## 2019-08-23 NOTE — PROGRESS NOTES
"Subjective:      Haja Marti is a 73 y.o. male who returns today regarding his     No  complaints.  He does have some nocturia but he has been drinking a large amount of water to do constipation.    The following portions of the patient's history were reviewed and updated as appropriate: allergies, current medications, past family history, past medical history, past social history, past surgical history and problem list.    Review of Systems  Pertinent items are noted in HPI.  A comprehensive multipoint review of systems was negative except as otherwise stated in the HPI.     Objective:   Vitals: /77 (BP Location: Left arm, Patient Position: Sitting, BP Method: X-Large (Automatic))   Pulse 69   Ht 5' 10" (1.778 m)   Wt 91.4 kg (201 lb 8 oz)   BMI 28.91 kg/m²     Physical Exam   General: alert and oriented, no acute distress  Respiratory: Symmetric expansion, non-labored breathing  Cardiovascular: normal to inspection  Abdomen: non distended   Skin: normal coloration and turgor, no rashes, no suspicious skin lesions noted  Neuro: no gross deficits  Psych: normal judgment and insight, normal mood/affect and non-anxious  Prostate 35-40 g no nodules    Physical Exam    Lab Review   Urinalysis demonstrates trace protein otherwise negative    Lab Results   Component Value Date    PSA 1.1 08/22/2018    PSADIAG 0.61 08/16/2019       Lab Results   Component Value Date    WBC 7.34 04/25/2019    HGB 16.7 04/25/2019    HCT 50.0 04/25/2019    MCV 92 04/25/2019     04/25/2019     Lab Results   Component Value Date    CREATININE 1.1 04/25/2019    BUN 15 04/25/2019       Imaging  KUB no stones visible    Assessment and Plan:   Kidney stones stone free by KUB  Continue potassium citrate  High fluid, low salt diet  Avoid coffee, tea and cola  Drink water and diet lemonade      Enlarged prostate doing well on pros  Continue proscar  We discussed PSA and the patient would like to continue checking PSA " yearly    Urinary urgency  Decreased p.m. fluids    Return clinic 1 year with PSA    He questions about colonoscopy.  I asked him to address these with his primary physician, Dr. Rubio

## 2019-08-28 ENCOUNTER — OFFICE VISIT (OUTPATIENT)
Dept: PSYCHIATRY | Facility: CLINIC | Age: 74
End: 2019-08-28
Payer: MEDICARE

## 2019-08-28 DIAGNOSIS — F32.A DEPRESSION, UNSPECIFIED DEPRESSION TYPE: ICD-10-CM

## 2019-08-28 DIAGNOSIS — F41.1 GENERALIZED ANXIETY DISORDER: Primary | ICD-10-CM

## 2019-08-28 PROCEDURE — 90847 PR FAMILY PSYCHOTHERAPY W/ PT, 50 MIN: ICD-10-PCS | Mod: HCNC,S$GLB,, | Performed by: SOCIAL WORKER

## 2019-08-28 PROCEDURE — 90847 FAMILY PSYTX W/PT 50 MIN: CPT | Mod: HCNC,S$GLB,, | Performed by: SOCIAL WORKER

## 2019-08-28 NOTE — PROGRESS NOTES
Family Psychotherapy (PhD/LCSW)    8/28/2019    Site: [facility]    Length of service: 45    Therapeutic intervention: 90725-Family therapy with patient; needed because inclusion of wife in healing process.     Persons present: spouse     Interval history:     Pt worrying excessively about 51 anniversary wedding celebration.  Fears expenses.   Cognitive therapy.     Target symptoms: depression, anxiety      Patient's interpersonal/verbal exchanges: 25049-Family therapy with patient:  active listening and self-disclosure    Progress toward goals: progressing well    Diagnosis: generalized anxiety disorder    Plan: individual psychotherapy  family psychotherapy    Return to clinic: as scheduled

## 2019-08-29 RX ORDER — CARBIDOPA AND LEVODOPA 25; 100 MG/1; MG/1
1.5 TABLET ORAL 3 TIMES DAILY
Qty: 450 TABLET | Refills: 3 | Status: SHIPPED | OUTPATIENT
Start: 2019-08-29 | End: 2020-07-31

## 2019-08-29 NOTE — TELEPHONE ENCOUNTER
----- Message from Meg Henry sent at 8/29/2019  8:01 AM CDT -----  Rx Refill/Request     Is this a Refill or New Rx:  Refill (90 day supply requested per his insurance company)    Rx Name and Strength:  carbidopa-levodopa  mg (SINEMET)  mg per tablet    Preferred Pharmacy with phone number:     [facility] - [place], [place] - [address]  [address] [address]  Phone: [phone] Fax: [phone]      Communication Preference:pt@ [phone]  Additional Information:

## 2019-10-10 ENCOUNTER — OFFICE VISIT (OUTPATIENT)
Dept: PSYCHIATRY | Facility: CLINIC | Age: 74
End: 2019-10-10
Payer: MEDICARE

## 2019-10-10 DIAGNOSIS — F41.1 GENERALIZED ANXIETY DISORDER: Primary | ICD-10-CM

## 2019-10-10 PROCEDURE — 90834 PSYTX W PT 45 MINUTES: CPT | Mod: HCNC,S$GLB,, | Performed by: SOCIAL WORKER

## 2019-10-10 PROCEDURE — 90785 PR INTERACTIVE COMPLEXITY: ICD-10-PCS | Mod: HCNC,S$GLB,, | Performed by: SOCIAL WORKER

## 2019-10-10 PROCEDURE — 90834 PR PSYCHOTHERAPY W/PATIENT, 45 MIN: ICD-10-PCS | Mod: HCNC,S$GLB,, | Performed by: SOCIAL WORKER

## 2019-10-10 PROCEDURE — 90785 PSYTX COMPLEX INTERACTIVE: CPT | Mod: HCNC,S$GLB,, | Performed by: SOCIAL WORKER

## 2019-10-10 NOTE — PROGRESS NOTES
Family Psychotherapy (PhD/LCSW)    10/10/2019    Site: [facility]    Length of service: 45    Therapeutic intervention: 90337-Family therapy with patient; needed because inclusion of wife in healing process.     Persons present: spouse     Interval history:     Pt continues to worry about the anniversary party in Nov.  He was encouraged to observe one worry for a duration of 10 minutes at which point the fear dissipated significantly.  They are instructed to write down a specific worry and look at it for 10 minutes and rank the anxiety from beginning to end and have wife facilitate the process and extend by 10 more minutes if there is no desensitization.   We will see if this helps with his anxiety.   He had a hard time noticing the difference between observing a worry and being in the worry.    He is wanting for wife to stay with him in bed in the morning when his anxiety is higher.  But wife likes to do things in the morning.  Can he follow her in the house instead (as a compromise).                 Target symptoms: depression, anxiety      Patient's interpersonal/verbal exchanges: 90368-Family therapy with patient:  active listening and self-disclosure    Progress toward goals: progressing well    Diagnosis: generalized anxiety disorder    Plan: individual psychotherapy  family psychotherapy    Return to clinic: as scheduled

## 2019-10-14 ENCOUNTER — TELEPHONE (OUTPATIENT)
Dept: INTERNAL MEDICINE | Facility: CLINIC | Age: 74
End: 2019-10-14

## 2019-10-14 DIAGNOSIS — Z00.00 ANNUAL PHYSICAL EXAM: Primary | ICD-10-CM

## 2019-10-14 DIAGNOSIS — Z79.899 OTHER LONG TERM (CURRENT) DRUG THERAPY: ICD-10-CM

## 2019-10-14 DIAGNOSIS — I10 ESSENTIAL HYPERTENSION: ICD-10-CM

## 2019-10-14 DIAGNOSIS — E78.5 HYPERLIPIDEMIA, UNSPECIFIED HYPERLIPIDEMIA TYPE: ICD-10-CM

## 2019-10-14 DIAGNOSIS — M47.816 LUMBAR SPONDYLOSIS: ICD-10-CM

## 2019-10-14 DIAGNOSIS — G20.C PARKINSONISM, UNSPECIFIED PARKINSONISM TYPE: ICD-10-CM

## 2019-10-14 NOTE — TELEPHONE ENCOUNTER
----- Message from Tone Gonzales sent at 10/14/2019 11:03 AM CDT -----  Contact: self/[phone]  Type: Orders Request    What orders/ testing are being requested? Routine Lab     Is there a future appointment scheduled for the patient with PCP? Yes    When? 10/16/19    Would you prefer a response via [employer]? No     Comments: Please call and advise.        Thank You

## 2019-10-15 ENCOUNTER — LAB VISIT (OUTPATIENT)
Dept: LAB | Facility: HOSPITAL | Age: 74
End: 2019-10-15
Attending: INTERNAL MEDICINE
Payer: MEDICARE

## 2019-10-15 DIAGNOSIS — M47.816 LUMBAR SPONDYLOSIS: ICD-10-CM

## 2019-10-15 DIAGNOSIS — I10 ESSENTIAL HYPERTENSION: ICD-10-CM

## 2019-10-15 DIAGNOSIS — E78.5 HYPERLIPIDEMIA, UNSPECIFIED HYPERLIPIDEMIA TYPE: ICD-10-CM

## 2019-10-15 DIAGNOSIS — G20.C PARKINSONISM, UNSPECIFIED PARKINSONISM TYPE: ICD-10-CM

## 2019-10-15 DIAGNOSIS — Z00.00 ANNUAL PHYSICAL EXAM: ICD-10-CM

## 2019-10-15 DIAGNOSIS — Z79.899 OTHER LONG TERM (CURRENT) DRUG THERAPY: ICD-10-CM

## 2019-10-15 LAB
BASOPHILS # BLD AUTO: 0.03 K/UL (ref 0–0.2)
BASOPHILS NFR BLD: 0.4 % (ref 0–1.9)
DIFFERENTIAL METHOD: ABNORMAL
EOSINOPHIL # BLD AUTO: 0.2 K/UL (ref 0–0.5)
EOSINOPHIL NFR BLD: 2.5 % (ref 0–8)
ERYTHROCYTE [DISTWIDTH] IN BLOOD BY AUTOMATED COUNT: 12.8 % (ref 11.5–14.5)
HCT VFR BLD AUTO: 47.1 % (ref 40–54)
HGB BLD-MCNC: 15.5 G/DL (ref 14–18)
IMM GRANULOCYTES # BLD AUTO: 0.02 K/UL (ref 0–0.04)
IMM GRANULOCYTES NFR BLD AUTO: 0.3 % (ref 0–0.5)
LYMPHOCYTES # BLD AUTO: 1.1 K/UL (ref 1–4.8)
LYMPHOCYTES NFR BLD: 16.6 % (ref 18–48)
MCH RBC QN AUTO: 30.9 PG (ref 27–31)
MCHC RBC AUTO-ENTMCNC: 32.9 G/DL (ref 32–36)
MCV RBC AUTO: 94 FL (ref 82–98)
MONOCYTES # BLD AUTO: 0.6 K/UL (ref 0.3–1)
MONOCYTES NFR BLD: 8.5 % (ref 4–15)
NEUTROPHILS # BLD AUTO: 4.8 K/UL (ref 1.8–7.7)
NEUTROPHILS NFR BLD: 71.7 % (ref 38–73)
NRBC BLD-RTO: 0 /100 WBC
PLATELET # BLD AUTO: 231 K/UL (ref 150–350)
PMV BLD AUTO: 10.1 FL (ref 9.2–12.9)
RBC # BLD AUTO: 5.01 M/UL (ref 4.6–6.2)
WBC # BLD AUTO: 6.73 K/UL (ref 3.9–12.7)

## 2019-10-15 PROCEDURE — 85025 COMPLETE CBC W/AUTO DIFF WBC: CPT | Mod: HCNC

## 2019-10-15 PROCEDURE — 84443 ASSAY THYROID STIM HORMONE: CPT | Mod: HCNC

## 2019-10-15 PROCEDURE — 36415 COLL VENOUS BLD VENIPUNCTURE: CPT | Mod: HCNC

## 2019-10-15 PROCEDURE — 80053 COMPREHEN METABOLIC PANEL: CPT | Mod: HCNC

## 2019-10-15 PROCEDURE — 82306 VITAMIN D 25 HYDROXY: CPT | Mod: HCNC

## 2019-10-15 PROCEDURE — 80061 LIPID PANEL: CPT | Mod: HCNC

## 2019-10-15 NOTE — PROGRESS NOTES
PAST MEDICAL HISTORY:  Hypertension.  Hyperlipidemia.  Parkinsonism with cognitive deficit  Depression, anxiety.  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  SHU  Bilateral retinal detachment.  Adenomatous colon polyp in .  Tonsillectomy.  Hemorrhoidectomy.  Appendectomy.  Left inguinal hernia repair.  Left knee surgery.  Lumbar degenerative disc disease, status post bilateral L4 transforaminal epidural steroid injection   /   lumbar laminectomy and facetectomy L2-L5  BPH        SOCIAL HISTORY:  Tobacco and alcohol use - none.  , has four children.      FAMILY HISTORY:  Father is , lung cancer, but had diabetes.  Mother is   , lung cancer.  One brother is alive, neuralgia.     SCREENING:  Normal colonoscopy in .    CURRENT MEDICINES:  Atorvastatin 40 mg daily.  Carbidopa levodopa  mg one and a half tablets three times a day.  Polycitra twice a day.  Lorazepam 0.5 mg one tablet twice a day as needed.  Vitamin D 50,000 unit once a month.  Finasteride 5 mg a day..  Losartan 100 mg daily.  Magnesium oxide 400 mg daily.  Vitamin B6 50 mg daily.  Sertraline 100 mg two a day.        REASON FOR VISIT:  This is a 73-year-old male who comes in for an annual routine   visit.    In May in [place], he underwent lumbar decompressive laminectomy and   facetectomy from L2 to L5 and responded well.  He may have a mild low back pain,   but the pain overall significantly improved.    Because of such, he is not on hydrocodone.  I do not believe he is on   gabapentin.    He has followed up with Neurology and Urology regarding Parkinson's disease and   BPH.  A diagnostic PSA on 2019, was 0.61.  On recent labs, vitamin D 23   and TSH normal.  Cholesterol was 149 with LDL 79.  Comprehensive metabolic   profile and CBC were normal.    REVIEW OF SYSTEMS:  He has no pains in the chest, palpitations, shortness of   breath or abdominal pain.  Bowel function is 3 to 4 days a week.  He takes    MiraLax.  Urination is fine.  Urine flow is good.  He has nocturia x1.  No   chronic headaches, indigestion or heartburn.  No other arthralgias.    It was noted that in November, he did have a sleep study test that did reveal   sleep apnea, but he feels that he sleeps well.  He does not wake up tired, maybe   a little bit fatigued toward the end of the day.    PHYSICAL EXAMINATION:  VITAL SIGNS:  Weight is 196 pounds, pulse is 64 and blood pressure is 130/72.  HEENT:  Tympanic membranes are normal.  Nasal mucosa is clear.  Oropharynx, no   abnormal findings.  NECK:  No thyromegaly.  No masses.  CHEST:  Clear breath sounds.  Good effort.  CARDIAC:  Regular rate and rhythm.  No murmurs or gallops.  ABDOMEN:  Active bowel sounds, soft and nontender.  No hepatosplenomegaly or   abdominal masses.  VASCULAR:  2+ carotid pulses and 2+ pedal pulses.  EXTREMITIES:  No edema.    IMPRESSION:  1. General exam.  2. Hypertension.  3. Hyperlipidemia.  4. Parkinson's disease with cognitive effect, doing well with carbidopa.  5. Depression and anxiety.  6. BPH.  7. Lumbar spondylosis, doing well with his surgeries.    PLAN:  The vitamin D level was mentioned.  He can do this may be every two   weeks.  Maintain physical activity.  Screening colonoscopy.  Return in six   months.              JAM/IN  dd: 10/16/2019 14:48:47 (CDT)  td: 10/17/2019 10:01:54 (CDT)  Doc ID   #[id number]  Job ID #[id number]    CC:

## 2019-10-16 ENCOUNTER — OFFICE VISIT (OUTPATIENT)
Dept: INTERNAL MEDICINE | Facility: CLINIC | Age: 74
End: 2019-10-16
Payer: MEDICARE

## 2019-10-16 ENCOUNTER — DOCUMENTATION ONLY (OUTPATIENT)
Dept: INTERNAL MEDICINE | Facility: CLINIC | Age: 74
End: 2019-10-16

## 2019-10-16 VITALS
OXYGEN SATURATION: 98 % | HEIGHT: 70 IN | BODY MASS INDEX: 28.06 KG/M2 | DIASTOLIC BLOOD PRESSURE: 78 MMHG | WEIGHT: 196 LBS | SYSTOLIC BLOOD PRESSURE: 128 MMHG | HEART RATE: 60 BPM

## 2019-10-16 DIAGNOSIS — E78.5 HYPERLIPIDEMIA, UNSPECIFIED HYPERLIPIDEMIA TYPE: ICD-10-CM

## 2019-10-16 DIAGNOSIS — F32.A DEPRESSION, UNSPECIFIED DEPRESSION TYPE: ICD-10-CM

## 2019-10-16 DIAGNOSIS — I10 ESSENTIAL HYPERTENSION: ICD-10-CM

## 2019-10-16 DIAGNOSIS — Z00.00 ANNUAL PHYSICAL EXAM: Primary | ICD-10-CM

## 2019-10-16 DIAGNOSIS — G20.C PARKINSONISM, UNSPECIFIED PARKINSONISM TYPE: ICD-10-CM

## 2019-10-16 DIAGNOSIS — M47.816 LUMBAR SPONDYLOSIS: ICD-10-CM

## 2019-10-16 DIAGNOSIS — Z12.11 SCREEN FOR COLON CANCER: ICD-10-CM

## 2019-10-16 DIAGNOSIS — N40.0 BENIGN PROSTATIC HYPERPLASIA WITHOUT LOWER URINARY TRACT SYMPTOMS: ICD-10-CM

## 2019-10-16 LAB
25(OH)D3+25(OH)D2 SERPL-MCNC: 23 NG/ML (ref 30–96)
ALBUMIN SERPL BCP-MCNC: 4.2 G/DL (ref 3.5–5.2)
ALP SERPL-CCNC: 84 U/L (ref 55–135)
ALT SERPL W/O P-5'-P-CCNC: 17 U/L (ref 10–44)
ANION GAP SERPL CALC-SCNC: 13 MMOL/L (ref 8–16)
AST SERPL-CCNC: 32 U/L (ref 10–40)
BILIRUB SERPL-MCNC: 0.6 MG/DL (ref 0.1–1)
BUN SERPL-MCNC: 23 MG/DL (ref 8–23)
CALCIUM SERPL-MCNC: 10 MG/DL (ref 8.7–10.5)
CHLORIDE SERPL-SCNC: 102 MMOL/L (ref 95–110)
CHOLEST SERPL-MCNC: 149 MG/DL (ref 120–199)
CHOLEST/HDLC SERPL: 3.9 {RATIO} (ref 2–5)
CO2 SERPL-SCNC: 21 MMOL/L (ref 23–29)
CREAT SERPL-MCNC: 1.1 MG/DL (ref 0.5–1.4)
EST. GFR  (AFRICAN AMERICAN): >60 ML/MIN/1.73 M^2
EST. GFR  (NON AFRICAN AMERICAN): >60 ML/MIN/1.73 M^2
GLUCOSE SERPL-MCNC: 77 MG/DL (ref 70–110)
HDLC SERPL-MCNC: 38 MG/DL (ref 40–75)
HDLC SERPL: 25.5 % (ref 20–50)
LDLC SERPL CALC-MCNC: 79.4 MG/DL (ref 63–159)
NONHDLC SERPL-MCNC: 111 MG/DL
POTASSIUM SERPL-SCNC: 4.2 MMOL/L (ref 3.5–5.1)
PROT SERPL-MCNC: 7.4 G/DL (ref 6–8.4)
SODIUM SERPL-SCNC: 136 MMOL/L (ref 136–145)
TRIGL SERPL-MCNC: 158 MG/DL (ref 30–150)
TSH SERPL DL<=0.005 MIU/L-ACNC: 2.13 UIU/ML (ref 0.4–4)

## 2019-10-16 PROCEDURE — 99397 PR PREVENTIVE VISIT,EST,65 & OVER: ICD-10-PCS | Mod: HCNC,S$GLB,, | Performed by: INTERNAL MEDICINE

## 2019-10-16 PROCEDURE — 3074F PR MOST RECENT SYSTOLIC BLOOD PRESSURE < 130 MM HG: ICD-10-PCS | Mod: HCNC,CPTII,S$GLB, | Performed by: INTERNAL MEDICINE

## 2019-10-16 PROCEDURE — 3074F SYST BP LT 130 MM HG: CPT | Mod: HCNC,CPTII,S$GLB, | Performed by: INTERNAL MEDICINE

## 2019-10-16 PROCEDURE — 3078F PR MOST RECENT DIASTOLIC BLOOD PRESSURE < 80 MM HG: ICD-10-PCS | Mod: HCNC,CPTII,S$GLB, | Performed by: INTERNAL MEDICINE

## 2019-10-16 PROCEDURE — 99397 PER PM REEVAL EST PAT 65+ YR: CPT | Mod: HCNC,S$GLB,, | Performed by: INTERNAL MEDICINE

## 2019-10-16 PROCEDURE — 99999 PR PBB SHADOW E&M-EST. PATIENT-LVL IV: CPT | Mod: PBBFAC,HCNC,, | Performed by: INTERNAL MEDICINE

## 2019-10-16 PROCEDURE — 3078F DIAST BP <80 MM HG: CPT | Mod: HCNC,CPTII,S$GLB, | Performed by: INTERNAL MEDICINE

## 2019-10-16 PROCEDURE — 99999 PR PBB SHADOW E&M-EST. PATIENT-LVL IV: ICD-10-PCS | Mod: PBBFAC,HCNC,, | Performed by: INTERNAL MEDICINE

## 2019-10-18 DIAGNOSIS — Z12.11 SPECIAL SCREENING FOR MALIGNANT NEOPLASMS, COLON: Primary | ICD-10-CM

## 2019-10-18 RX ORDER — SODIUM, POTASSIUM,MAG SULFATES 17.5-3.13G
1 SOLUTION, RECONSTITUTED, ORAL ORAL DAILY
Qty: 1 KIT | Refills: 0 | Status: SHIPPED | OUTPATIENT
Start: 2019-10-18 | End: 2019-10-20

## 2019-10-25 ENCOUNTER — TELEPHONE (OUTPATIENT)
Dept: UROLOGY | Facility: CLINIC | Age: 74
End: 2019-10-25

## 2019-10-25 NOTE — TELEPHONE ENCOUNTER
----- Message from Barbie Mckinney RN sent at 10/25/2019 10:19 AM CDT -----  YULY Capps RN Jessie     Have you seen any insurance paperwork regarding this medication for this patient     Thanks   Christine Betancourt Staff   Caller: ALEXANDRO RENE (Today,  9:45 AM)       Name of Who is Calling: ALEXANDRO RENE     What is the request in detail:Patient states his insurance faxed paperwork regarding his finasteride (PROSCAR) 5 mg tablet medication. Please contact to further advise.   Can the clinic reply by [facility]: NO   [facility]: [phone]       I have not seen anything on him

## 2019-10-29 DIAGNOSIS — N40.0 ENLARGED PROSTATE: ICD-10-CM

## 2019-10-29 DIAGNOSIS — R39.15 URINARY URGENCY: ICD-10-CM

## 2019-10-29 RX ORDER — FINASTERIDE 5 MG/1
5 TABLET, FILM COATED ORAL DAILY
Qty: 90 TABLET | Refills: 3 | Status: SHIPPED | OUTPATIENT
Start: 2019-10-29 | End: 2020-04-30 | Stop reason: SDUPTHER

## 2019-10-29 NOTE — TELEPHONE ENCOUNTER
----- Message from Gladys Carlson sent at 10/28/2019 11:20 AM CDT -----  Contact: Felicia with [facility]  Type: RX Refill Request    Who Called: Felicia with [facility]    RX Name and Strength: finasteride (PROSCAR) 5 mg tablet    Preferred Pharmacy with phone number: [facility] - [facility] [address]    Best Call Back Number: [phone]    Additional Information: N/A

## 2019-11-06 ENCOUNTER — TELEPHONE (OUTPATIENT)
Dept: NEUROLOGY | Facility: CLINIC | Age: 74
End: 2019-11-06

## 2019-11-06 NOTE — TELEPHONE ENCOUNTER
----- Message from Alisa Velez MA sent at 11/6/2019 11:04 AM CST -----  Contact: [phone]  Pt is needing a follow up with the doctor for Lumbosacral radiculopathy at L5.     I do not have access to book on this schedule (backand spine)

## 2019-11-19 ENCOUNTER — OFFICE VISIT (OUTPATIENT)
Dept: PSYCHIATRY | Facility: CLINIC | Age: 74
End: 2019-11-19
Payer: MEDICARE

## 2019-11-19 DIAGNOSIS — F41.1 GENERALIZED ANXIETY DISORDER: Primary | ICD-10-CM

## 2019-11-19 PROCEDURE — 90834 PR PSYCHOTHERAPY W/PATIENT, 45 MIN: ICD-10-PCS | Mod: HCNC,S$GLB,, | Performed by: SOCIAL WORKER

## 2019-11-19 PROCEDURE — 90834 PSYTX W PT 45 MINUTES: CPT | Mod: HCNC,S$GLB,, | Performed by: SOCIAL WORKER

## 2019-11-19 PROCEDURE — 99499 RISK ADDL DX/OHS AUDIT: ICD-10-PCS | Mod: HCNC,S$GLB,, | Performed by: SOCIAL WORKER

## 2019-11-19 PROCEDURE — 99499 UNLISTED E&M SERVICE: CPT | Mod: HCNC,S$GLB,, | Performed by: SOCIAL WORKER

## 2019-11-19 PROCEDURE — 90785 PSYTX COMPLEX INTERACTIVE: CPT | Mod: HCNC,S$GLB,, | Performed by: SOCIAL WORKER

## 2019-11-19 PROCEDURE — 90785 PR INTERACTIVE COMPLEXITY: ICD-10-PCS | Mod: HCNC,S$GLB,, | Performed by: SOCIAL WORKER

## 2019-11-19 NOTE — PROGRESS NOTES
Family Psychotherapy (PhD/LCSW)    11/19/2019    Site: [facility]    Length of service: 45    Therapeutic intervention: 34067-Family therapy with patient; needed because inclusion of wife in healing process per pt parkinson's disease.     Persons present: spouse and pt    Interval history:     Anniversary went quite well.  We role played irrational vs rational response.   We practice mindfulness and psychogenic relaxation.   We discussed the uncertainty principal.                  Target symptoms: depression, anxiety      Patient's interpersonal/verbal exchanges: 13553-Family therapy with patient:  active listening and self-disclosure    Progress toward goals: progressing well    Diagnosis: generalized anxiety disorder    Plan: individual psychotherapy  family psychotherapy    Return to clinic: as scheduled

## 2019-12-03 ENCOUNTER — ANESTHESIA (OUTPATIENT)
Dept: ENDOSCOPY | Facility: HOSPITAL | Age: 74
End: 2019-12-03
Payer: MEDICARE

## 2019-12-03 ENCOUNTER — HOSPITAL ENCOUNTER (OUTPATIENT)
Facility: HOSPITAL | Age: 74
Discharge: HOME | End: 2019-12-03
Attending: INTERNAL MEDICINE | Admitting: INTERNAL MEDICINE
Payer: MEDICARE

## 2019-12-03 ENCOUNTER — ANESTHESIA EVENT (OUTPATIENT)
Dept: ENDOSCOPY | Facility: HOSPITAL | Age: 74
End: 2019-12-03
Payer: MEDICARE

## 2019-12-03 VITALS
RESPIRATION RATE: 18 BRPM | TEMPERATURE: 98 F | BODY MASS INDEX: 28.88 KG/M2 | DIASTOLIC BLOOD PRESSURE: 77 MMHG | WEIGHT: 195 LBS | HEIGHT: 69 IN | OXYGEN SATURATION: 98 % | SYSTOLIC BLOOD PRESSURE: 117 MMHG | HEART RATE: 64 BPM

## 2019-12-03 DIAGNOSIS — K63.5 COLON POLYPS: ICD-10-CM

## 2019-12-03 DIAGNOSIS — Z86.010 HISTORY OF COLONIC POLYPS: Primary | ICD-10-CM

## 2019-12-03 PROCEDURE — 63600175 PHARM REV CODE 636 W HCPCS: Mod: HCNC | Performed by: NURSE ANESTHETIST, CERTIFIED REGISTERED

## 2019-12-03 PROCEDURE — 45385 COLONOSCOPY W/LESION REMOVAL: CPT | Mod: HCNC | Performed by: INTERNAL MEDICINE

## 2019-12-03 PROCEDURE — 37000009 HC ANESTHESIA EA ADD 15 MINS: Mod: HCNC | Performed by: INTERNAL MEDICINE

## 2019-12-03 PROCEDURE — 45385 COLONOSCOPY W/LESION REMOVAL: CPT | Mod: PT,HCNC,, | Performed by: INTERNAL MEDICINE

## 2019-12-03 PROCEDURE — 45385 PR COLONOSCOPY,REMV LESN,SNARE: ICD-10-PCS | Mod: PT,HCNC,, | Performed by: INTERNAL MEDICINE

## 2019-12-03 PROCEDURE — 37000008 HC ANESTHESIA 1ST 15 MINUTES: Mod: HCNC | Performed by: INTERNAL MEDICINE

## 2019-12-03 PROCEDURE — 88305 TISSUE EXAM BY PATHOLOGIST: ICD-10-PCS | Mod: 26,HCNC,, | Performed by: PATHOLOGY

## 2019-12-03 PROCEDURE — E9220 PRA ENDO ANESTHESIA: HCPCS | Mod: PT,HCNC,, | Performed by: NURSE ANESTHETIST, CERTIFIED REGISTERED

## 2019-12-03 PROCEDURE — E9220 PRA ENDO ANESTHESIA: ICD-10-PCS | Mod: PT,HCNC,, | Performed by: NURSE ANESTHETIST, CERTIFIED REGISTERED

## 2019-12-03 PROCEDURE — 63600175 PHARM REV CODE 636 W HCPCS: Mod: HCNC | Performed by: INTERNAL MEDICINE

## 2019-12-03 PROCEDURE — 27201089 HC SNARE, DISP (ANY): Mod: HCNC | Performed by: INTERNAL MEDICINE

## 2019-12-03 PROCEDURE — 88305 TISSUE EXAM BY PATHOLOGIST: CPT | Mod: 26,HCNC,, | Performed by: PATHOLOGY

## 2019-12-03 PROCEDURE — 88305 TISSUE EXAM BY PATHOLOGIST: CPT | Mod: HCNC | Performed by: PATHOLOGY

## 2019-12-03 RX ORDER — PROPOFOL 10 MG/ML
VIAL (ML) INTRAVENOUS CONTINUOUS PRN
Status: DISCONTINUED | OUTPATIENT
Start: 2019-12-03 | End: 2019-12-03

## 2019-12-03 RX ORDER — SODIUM CHLORIDE 9 MG/ML
INJECTION, SOLUTION INTRAVENOUS CONTINUOUS
Status: DISCONTINUED | OUTPATIENT
Start: 2019-12-03 | End: 2019-12-03 | Stop reason: HOSPADM

## 2019-12-03 RX ORDER — PHENYLEPHRINE HYDROCHLORIDE 10 MG/ML
INJECTION INTRAVENOUS
Status: DISCONTINUED | OUTPATIENT
Start: 2019-12-03 | End: 2019-12-03

## 2019-12-03 RX ORDER — LIDOCAINE HCL/PF 100 MG/5ML
SYRINGE (ML) INTRAVENOUS
Status: DISCONTINUED | OUTPATIENT
Start: 2019-12-03 | End: 2019-12-03

## 2019-12-03 RX ORDER — PROPOFOL 10 MG/ML
VIAL (ML) INTRAVENOUS
Status: DISCONTINUED | OUTPATIENT
Start: 2019-12-03 | End: 2019-12-03

## 2019-12-03 RX ADMIN — PHENYLEPHRINE HYDROCHLORIDE 200 MCG: 10 INJECTION INTRAVENOUS at 10:12

## 2019-12-03 RX ADMIN — PROPOFOL 20 MG: 10 INJECTION, EMULSION INTRAVENOUS at 09:12

## 2019-12-03 RX ADMIN — LIDOCAINE HYDROCHLORIDE 50 MG: 20 INJECTION, SOLUTION INTRAVENOUS at 09:12

## 2019-12-03 RX ADMIN — PROPOFOL 50 MG: 10 INJECTION, EMULSION INTRAVENOUS at 09:12

## 2019-12-03 RX ADMIN — PROPOFOL 200 MCG/KG/MIN: 10 INJECTION, EMULSION INTRAVENOUS at 09:12

## 2019-12-03 RX ADMIN — SODIUM CHLORIDE: 0.9 INJECTION, SOLUTION INTRAVENOUS at 09:12

## 2019-12-03 NOTE — H&P
Short Stay Endoscopy History and Physical    PCP - Andrez Jackson MD    Procedure - Colonoscopy  ASA - per anesthesia  Mallampati - per anesthesia  History of Anesthesia problems - no  Family history Anesthesia problems - no   Plan of anesthesia - General    HPI:  This is a 74 y.o. male here for evaluation of : personal history of colon polyps      ROS:  Constitutional: No fevers, chills, No weight loss  CV: No chest pain  Pulm: No cough, No shortness of breath  GI: see HPI  Derm: No rash    Medical History:  has a past medical history of Anxiety, BPH (benign prostatic hypertrophy), Cataract, Colon polyp (2002), Depression, Epiretinal membrane, both eyes, psychiatric care, Hyperlipidemia, Hypertension, Kidney stones, Kidney stones, Parkinsons, Posterior vitreous detachment of left eye, Psychiatric problem, Retinal detachment (2004), Retinoschisis, left eye, Sleep apnea, and Vitreous hemorrhage of left eye.    Surgical History:  has a past surgical history that includes Tonsillectomy; Eye surgery; Prostate surgery; laser indirect (4/8/10); Retinal detachment surgery (2004); Laser Indirect Retinopexy (4/8/10); Cataract extraction; Rotator cuff repair (11/13/2013); Injection of anesthetic agent around nerve (Bilateral, 7/3/2018); Transforaminal epidural injection of steroid (Bilateral, 2/19/2019); and Epidural steroid injection (N/A, 3/12/2019).    Family History: family history includes Anxiety disorder in his daughter and son; Cancer in his father and mother; Cataracts in his father; Depression in his son; Diabetes in his father.. Otherwise no colon cancer, inflammatory bowel disease, or GI malignancies.    Social History:  reports that he has never smoked. He has never used smokeless tobacco. He reports that he does not drink alcohol or use drugs.    Review of patient's allergies indicates:  No Known Allergies    Medications:   Medications Prior to Admission   Medication Sig Dispense Refill Last Dose     atorvastatin (LIPITOR) 40 MG tablet Take 1 tablet (40 mg total) by mouth once daily. 90 tablet 3 12/3/2019 at Unknown time    carbidopa-levodopa  mg (SINEMET)  mg per tablet Take 1.5 tablets by mouth 3 (three) times daily. 450 tablet 3 12/3/2019 at Unknown time    citric acid-potassium citrate (POLYCITRA) 1,100-334 mg/5 mL solution Take 10 mLs by mouth.   12/3/2019 at Unknown time    clonazePAM (KLONOPIN) 0.5 MG tablet Take oral 1/2 to 1 tablet TID prn anxiety 180 tablet 1 12/3/2019 at Unknown time    ergocalciferol (VITAMIN D2) 50,000 unit Cap Take 50,000 Units by mouth every 30 days.    Past Week at Unknown time    finasteride (PROSCAR) 5 mg tablet Take 1 tablet (5 mg total) by mouth once daily. 90 tablet 3 12/3/2019 at Unknown time    L.acid-B.bifidum-B.animal-FOS 25 billion cell -100 mg Cap Take by mouth.   Past Week at Unknown time    losartan (COZAAR) 100 MG tablet Take 1 tablet (100 mg total) by mouth once daily. 90 tablet 3 12/3/2019 at Unknown time    magnesium oxide (MAG-OX) 400 mg tablet Take 400 mg by mouth once daily.   12/3/2019 at Unknown time    potassium citrate (UROCIT-K) 10 mEq (1,080 mg) TbSR TAKE 1 TABLET THREE TIMES DAILY WITH MEALS (Patient taking differently: Take 10 mEq by mouth 2 (two) times daily with meals. ) 270 tablet 3 Past Week at Unknown time    pyridoxine (VITAMIN B-6) 50 MG Tab Take 50 mg by mouth once daily.   12/3/2019 at Unknown time    sertraline (ZOLOFT) 100 MG tablet Take 2 tablets (200 mg total) by mouth once daily. 180 tablet 1 12/3/2019 at Unknown time    FLUAD 7310-1730, 65 YR UP,,PF, 45 mcg (15 mcg x 3)/0.5 mL Syrg ADM 0.5ML IM UTD  0     gabapentin (NEURONTIN) 300 MG capsule Increase to 2 tablets at night, 1 in AM and 1 in afternoon for 3 days.  Then increase to 2 tablets at night and 2 in morning, 1 tablet in afternoon for 3 days.  Then increase to 2 tablets three times a day (1800mg) 180 capsule 11 More than a month at Unknown time     HYDROcodone-acetaminophen (NORCO) 5-325 mg per tablet Take 1 tablet by mouth every 6 (six) hours as needed.   More than a month at Unknown time         Physical Exam:    Vital Signs:   Vitals:    12/03/19 0931   BP: (!) 154/86   Pulse: 74   Resp: 15   Temp: 97.7 °F (36.5 °C)       General Appearance: Well appearing in no acute distress  Eyes:    No scleral icterus  ENT: Neck supple, Lips, mucosa, and tongue normal; teeth and gums normal  Lungs: CTA bilaterally  Heart:  S1, S2 normal, no murmurs heard  Abdomen: Soft, non tender, non distended with positive bowel sounds. No hepatosplenomegaly, ascites, or mass.  Extremities: 2+ pulses, no clubbing, cyanosis or edema  Skin: No rash      Labs:  Lab Results   Component Value Date    WBC 6.73 10/15/2019    HGB 15.5 10/15/2019    HCT 47.1 10/15/2019     10/15/2019    CHOL 149 10/15/2019    TRIG 158 (H) 10/15/2019    HDL 38 (L) 10/15/2019    ALT 17 10/15/2019    AST 32 10/15/2019     10/15/2019    K 4.2 10/15/2019     10/15/2019    CREATININE 1.1 10/15/2019    BUN 23 10/15/2019    CO2 21 (L) 10/15/2019    TSH 2.134 10/15/2019    PSA 1.1 08/22/2018    INR 1.1 12/15/2011       I have explained the risks and benefits of endoscopy procedures to the patient including but not limited to bleeding, perforation, infection, and death.  The patient was asked if they understand and allowed to ask any further questions to their satisfaction.    Ceferino Oliveira MD

## 2019-12-03 NOTE — ANESTHESIA POSTPROCEDURE EVALUATION
Anesthesia Post Evaluation    Patient: Haja Marti    Procedure(s) Performed: Procedure(s) (LRB):  COLONOSCOPY (N/A)    Final Anesthesia Type: general    Patient location during evaluation: PACU  Patient participation: Yes- Able to Participate  Level of consciousness: awake and alert and oriented  Post-procedure vital signs: reviewed and stable  Pain management: adequate  Airway patency: patent    PONV status at discharge: No PONV  Anesthetic complications: no      Cardiovascular status: hemodynamically stable  Respiratory status: unassisted  Hydration status: euvolemic  Follow-up not needed.          Vitals Value Taken Time   /77 12/3/2019 10:35 AM   Temp 36.7 °C (98 °F) 12/3/2019 10:15 AM   Pulse 64 12/3/2019 10:35 AM   Resp 18 12/3/2019 10:35 AM   SpO2 98 % 12/3/2019 10:35 AM         Event Time     Out of Recovery 10:45:23          Pain/Francisco Score: Francisco Score: 9 (12/3/2019 10:35 AM)

## 2019-12-03 NOTE — TRANSFER OF CARE
"Anesthesia Transfer of Care Note    Patient: Haja Marti    Procedure(s) Performed: Procedure(s) (LRB):  COLONOSCOPY (N/A)    Patient location: OPS    Anesthesia Type: general    Transport from OR: Transported from OR on room air with adequate spontaneous ventilation    Post pain: adequate analgesia    Post assessment: no apparent anesthetic complications and tolerated procedure well    Post vital signs: stable    Level of consciousness: awake, alert and oriented    Nausea/Vomiting: no nausea/vomiting    Complications: none    Transfer of care protocol was followed      Last vitals:   Visit Vitals  BP (!) 154/86 (BP Location: Left arm, Patient Position: Sitting)   Pulse 74   Temp 36.5 °C (97.7 °F) (Temporal)   Resp 15   Ht 5' 9" (1.753 m)   Wt 88.5 kg (195 lb)   SpO2 99%   BMI 28.80 kg/m²     "

## 2019-12-03 NOTE — DISCHARGE INSTRUCTIONS
Colonoscopy     A camera attached to a flexible tube with a viewing lens is used to take video pictures.     Colonoscopy is a test to view the inside of your lower digestive tract (colon and rectum). Sometimes it can show the last part of the small intestine (ileum). During the test, small pieces of tissue may be removed for testing. This is called a biopsy. Small growths, such as polyps, may also be removed.   Why is colonoscopy done?  The test is done to help look for colon cancer. And it can help find the source of abdominal pain, bleeding, and changes in bowel habits. It may be needed once a year, depending on factors such as your:  · Age  · Health history  · Family health history  · Symptoms  · Results from any prior colonoscopy  Risks and possible complications  These include:  · Bleeding               · A puncture or tear in the colon   · Risks of anesthesia  · A cancer lesion not being seen  Getting ready   To prepare for the test:  · Talk with your healthcare provider about the risks of the test (see below). Also ask your healthcare provider about alternatives to the test.  · Tell your healthcare provider about any medicines you take. Also tell him or her about any health conditions you may have.  · Make sure your rectum and colon are empty for the test. Follow the diet and bowel prep instructions exactly. If you dont, the test may need to be rescheduled.  · Plan for a friend or family member to drive you home after the test.     Colonoscopy provides an inside view of the entire colon.     You may discuss the results with your doctor right away or at a future visit.  During the test   The test is usually done in the hospital on an outpatient basis. This means you go home the same day. The procedure takes about 30 minutes. During that time:  · You are given relaxing (sedating) medicine through an IV line. You may be drowsy, or fully asleep.  · The healthcare provider will first give you a physical exam to  check for anal and rectal problems.  · Then the anus is lubricated and the scope inserted.  · If you are awake, you may have a feeling similar to needing to have a bowel movement. You may also feel pressure as air is pumped into the colon. Its OK to pass gas during the procedure.  · Biopsy, polyp removal, or other treatments may be done during the test.  After the test   You may have gas right after the test. It can help to try to pass it to help prevent later bloating. Your healthcare provider may discuss the results with you right away. Or you may need to schedule a follow-up visit to talk about the results. After the test, you can go back to your normal eating and other activities. You may be tired from the sedation and need to rest for a few hours.  Date Last Reviewed: 11/1/2016 © 2000-2017 [employer] [address] [place] [address], [place] [address]. All rights reserved. This information is not intended as a substitute for professional medical care. Always follow your healthcare professional's instructions.

## 2019-12-03 NOTE — ANESTHESIA PREPROCEDURE EVALUATION
2019  Haja Marti is a 74 y.o., male.  Pre-operative evaluation for COLONOSCOPY (N/A)    Chief Complaint:colon screen    PMH:  Hypertension.  Hyperlipidemia.  Parkinsonism with cognitive deficit, gait shuffling  Depression, anxiety  SHU        Past Surgical History:   Procedure Laterality Date    CATARACT EXTRACTION      OD    EPIDURAL STEROID INJECTION N/A 3/12/2019    Procedure: INJECTION, STEROID, EPIDURAL, L5-S1;  Surgeon: Laina Crockett MD;  Location: [facility];  Service: Pain Management;  Laterality: N/A;    EYE SURGERY      INJECTION OF ANESTHETIC AGENT AROUND NERVE Bilateral 7/3/2018    Procedure: BLOCK, NERVE;  Surgeon: Laina Crockett MD;  Location: [facility];  Service: Pain Management;  Laterality: Bilateral;  Bilateral Lumbar L2,L3,L4,L5 MBB      NEEDS CONSENT    laser indirect  4/8/10    left eye    Laser Indirect Retinopexy  4/8/10    OS    PROSTATE SURGERY      RETINAL DETACHMENT SURGERY      OD    ROTATOR CUFF REPAIR  2013    TONSILLECTOMY      TRANSFORAMINAL EPIDURAL INJECTION OF STEROID Bilateral 2019    Procedure: INJECTION, STEROID, EPIDURAL, TRANSFORAMINAL APPROACH, L5;  Surgeon: Laina Crockett MD;  Location: [facility];  Service: Pain Management;  Laterality: Bilateral;         Vital Signs Range (Last 24H):         CBC:   No results for input(s): WBC, RBC, HGB, HCT, PLT, MCV, MCH, MCHC in the last 720 hours.    CMP: No results for input(s): NA, K, CL, CO2, BUN, CREATININE, GLU, MG, PHOS, CALCIUM, ALBUMIN, PROT, ALKPHOS, ALT, AST, BILITOT in the last 720 hours.    INR:  No results for input(s): PT, INR, PROTIME, APTT in the last 720 hours.      Diagnostic Studies:      EKD Echo:    Anesthesia Evaluation    I have reviewed the Patient Summary Reports.    I have reviewed the Nursing Notes.   I have reviewed the  Medications.     Review of Systems  Anesthesia Hx:  Hx of Anesthetic complications Hallucinations after back surgery in May   Pulmonary:  Pulmonary Normal        Physical Exam  General:  Well nourished    Airway/Jaw/Neck:  Airway Findings: Mouth Opening: Normal Tongue: Normal  General Airway Assessment: Good  Mallampati: I  TM Distance: Normal, at least 6 cm  Jaw/Neck Findings:  Neck ROM: Normal ROM      Dental:  Dental Findings: In tact   Chest/Lungs:  Chest/Lungs Findings: Normal Respiratory Rate     Heart/Vascular:  Heart Findings:       Mental Status:  Mental Status Findings:  Cooperative, Alert and Oriented         Anesthesia Plan  Type of Anesthesia, risks & benefits discussed:  Anesthesia Type:  general  Patient's Preference:   Intra-op Monitoring Plan: standard ASA monitors  Intra-op Monitoring Plan Comments:   Post Op Pain Control Plan:   Post Op Pain Control Plan Comments:   Induction:   IV  Beta Blocker:  Patient is not currently on a Beta-Blocker (No further documentation required).       Informed Consent: Patient understands risks and agrees with Anesthesia plan.  Questions answered. Anesthesia consent signed with patient.  ASA Score: 3     Day of Surgery Review of History & Physical:    H&P update referred to the surgeon.         Ready For Surgery From Anesthesia Perspective.

## 2019-12-03 NOTE — [employer] PATIENT INSTRUCTIONS
Discharge Summary/Instructions after an Endoscopic Procedure  Patient Name: Haja Marti  Patient MRN: [id number]  Patient YOB: 1945  Tuesday, December 03, 2019  Ceferino Oliveira MD  RESTRICTIONS:  During your procedure today, you received medications for sedation.  These   medications may affect your judgment, balance and coordination.  Therefore,   for 24 hours, you have the following restrictions:   - DO NOT drive a car, operate machinery, make legal/financial decisions,   sign important papers or drink alcohol.    ACTIVITY:  Today: no heavy lifting, straining or running due to procedural   sedation/anesthesia.  The following day: return to full activity including work.  DIET:  Eat and drink normally unless instructed otherwise.     TREATMENT FOR COMMON SIDE EFFECTS:  - Mild abdominal pain, nausea, belching, bloating or excessive gas:  rest,   eat lightly and use a heating pad.  - Sore Throat: treat with throat lozenges and/or gargle with warm salt   water.  - Because air was used during the procedure, expelling large amounts of air   from your rectum or belching is normal.  - If a bowel prep was taken, you may not have a bowel movement for 1-3 days.    This is normal.  SYMPTOMS TO WATCH FOR AND REPORT TO YOUR PHYSICIAN:  1. Abdominal pain or bloating, other than gas cramps.  2. Chest pain.  3. Back pain.  4. Signs of infection such as: chills or fever occurring within 24 hours   after the procedure.  5. Rectal bleeding, which would show as bright red, maroon, or black stools.   (A tablespoon of blood from the rectum is not serious, especially if   hemorrhoids are present.)  6. Vomiting.  7. Weakness or dizziness.  GO DIRECTLY TO THE NEAREST EMERGENCY ROOM IF YOU HAVE ANY OF THE FOLLOWING:      Difficulty breathing              Chills and/or fever over 101 F   Persistent vomiting and/or vomiting blood   Severe abdominal pain   Severe chest pain   Black, tarry stools   Bleeding- more than one  tablespoon   Any other symptom or condition that you feel may need urgent attention  Your doctor recommends these additional instructions:  If any biopsies were taken, your doctors clinic will contact you in 1 to 2   weeks with any results.  - Patient has a contact number available for emergencies.  The signs and   symptoms of potential delayed complications were discussed with the   patient.  Return to normal activities tomorrow.  Written discharge   instructions were provided to the patient.   - Discharge patient to home.   - Await pathology results.   - Repeat colonoscopy in 3 years for surveillance.   - The findings and recommendations were discussed with the designated   responsible adult.  For questions, problems or results please call your physician - Ceferino Oliveira MD at Work:  [phone].  [facility], EMERGENCY ROOM PHONE NUMBER: [phone]  IF A COMPLICATION OR EMERGENCY SITUATION ARISES AND YOU ARE UNABLE TO REACH   YOUR PHYSICIAN - GO DIRECTLY TO THE EMERGENCY ROOM.  Ceferino Oliveira MD  12/3/2019 10:11:14 AM  This report has been verified and signed electronically.  [employer]

## 2019-12-05 ENCOUNTER — OFFICE VISIT (OUTPATIENT)
Dept: PSYCHIATRY | Facility: CLINIC | Age: 74
End: 2019-12-05
Payer: MEDICARE

## 2019-12-05 DIAGNOSIS — F41.1 GENERALIZED ANXIETY DISORDER: Primary | ICD-10-CM

## 2019-12-05 PROCEDURE — 90847 PR FAMILY PSYCHOTHERAPY W/ PT, 50 MIN: ICD-10-PCS | Mod: HCNC,S$GLB,, | Performed by: SOCIAL WORKER

## 2019-12-05 PROCEDURE — 90847 FAMILY PSYTX W/PT 50 MIN: CPT | Mod: HCNC,S$GLB,, | Performed by: SOCIAL WORKER

## 2019-12-05 NOTE — PROGRESS NOTES
Family Psychotherapy (PhD/LCSW)    12/5/2019    Site: [facility]    Length of service: 45    Therapeutic intervention: 09018-Family therapy with patient; needed because inclusion of wife in healing process per pt parkinson's disease.     Persons present: spouse and pt  [place]ley    Interval history:     Exercise observing distractions on leaf.   Followed by metaphor of miguel vs weather.  Followed by discussion.  He is relatively stable.               Target symptoms: depression, anxiety      Patient's interpersonal/verbal exchanges: 48421-Family therapy with patient:  active listening and self-disclosure    Progress toward goals: progressing well    Diagnosis: generalized anxiety disorder    Plan: individual psychotherapy  family psychotherapy      Return to clinic: as scheduled

## 2019-12-10 ENCOUNTER — TELEPHONE (OUTPATIENT)
Dept: ENDOSCOPY | Facility: HOSPITAL | Age: 74
End: 2019-12-10

## 2019-12-10 LAB
FINAL PATHOLOGIC DIAGNOSIS: NORMAL
GROSS: NORMAL

## 2020-01-13 ENCOUNTER — OFFICE VISIT (OUTPATIENT)
Dept: PSYCHIATRY | Facility: CLINIC | Age: 75
End: 2020-01-13
Payer: MEDICARE

## 2020-01-13 DIAGNOSIS — F41.1 GENERALIZED ANXIETY DISORDER: Primary | ICD-10-CM

## 2020-01-13 PROCEDURE — 90834 PSYTX W PT 45 MINUTES: CPT | Mod: HCNC,S$GLB,, | Performed by: SOCIAL WORKER

## 2020-01-13 PROCEDURE — 99499 UNLISTED E&M SERVICE: CPT | Mod: HCNC,S$GLB,, | Performed by: SOCIAL WORKER

## 2020-01-13 PROCEDURE — 99499 RISK ADDL DX/OHS AUDIT: ICD-10-PCS | Mod: HCNC,S$GLB,, | Performed by: SOCIAL WORKER

## 2020-01-13 PROCEDURE — 90834 PR PSYCHOTHERAPY W/PATIENT, 45 MIN: ICD-10-PCS | Mod: HCNC,S$GLB,, | Performed by: SOCIAL WORKER

## 2020-01-13 NOTE — PROGRESS NOTES
Family Psychotherapy (PhD/LCSW)    1/13/2020    Site: [facility]    Length of service: 45    Therapeutic intervention: 17209-Family therapy with patient; needed because inclusion of wife in healing process per pt parkinson's disease.     Persons present: spouse and pt  Whitley    Interval history:    Creative hopelessness, metaphor of hole in the field.   Demonstration of paradox of trying to get rid of something in the mind.    Pt has been stable              Target symptoms: depression, anxiety      Patient's interpersonal/verbal exchanges: 22731-Family therapy with patient:  active listening and self-disclosure    Progress toward goals: progressing well    Diagnosis: generalized anxiety disorder    Plan: individual psychotherapy  family psychotherapy      Return to clinic: as scheduled

## 2020-01-17 ENCOUNTER — PATIENT MESSAGE (OUTPATIENT)
Dept: NEUROLOGY | Facility: CLINIC | Age: 75
End: 2020-01-17

## 2020-02-03 ENCOUNTER — OFFICE VISIT (OUTPATIENT)
Dept: PSYCHIATRY | Facility: CLINIC | Age: 75
End: 2020-02-03
Payer: MEDICARE

## 2020-02-03 DIAGNOSIS — F41.1 GENERALIZED ANXIETY DISORDER: Primary | ICD-10-CM

## 2020-02-03 PROCEDURE — 99499 UNLISTED E&M SERVICE: CPT | Mod: HCNC,S$GLB,, | Performed by: SOCIAL WORKER

## 2020-02-03 PROCEDURE — 90785 PSYTX COMPLEX INTERACTIVE: CPT | Mod: HCNC,S$GLB,, | Performed by: SOCIAL WORKER

## 2020-02-03 PROCEDURE — 90834 PSYTX W PT 45 MINUTES: CPT | Mod: HCNC,S$GLB,, | Performed by: SOCIAL WORKER

## 2020-02-03 PROCEDURE — 99499 RISK ADDL DX/OHS AUDIT: ICD-10-PCS | Mod: HCNC,S$GLB,, | Performed by: SOCIAL WORKER

## 2020-02-03 PROCEDURE — 90785 PR INTERACTIVE COMPLEXITY: ICD-10-PCS | Mod: HCNC,S$GLB,, | Performed by: SOCIAL WORKER

## 2020-02-03 PROCEDURE — 90834 PR PSYCHOTHERAPY W/PATIENT, 45 MIN: ICD-10-PCS | Mod: HCNC,S$GLB,, | Performed by: SOCIAL WORKER

## 2020-02-10 ENCOUNTER — TELEPHONE (OUTPATIENT)
Dept: UROLOGY | Facility: CLINIC | Age: 75
End: 2020-02-10

## 2020-02-10 NOTE — TELEPHONE ENCOUNTER
----- Message from Mimi Langford MA sent at 2/10/2020 10:08 AM CST -----  Contact: ALEXANDRO RENE [[id number]]      ----- Message -----  From: Simona Kimbrough  Sent: 2/10/2020  10:02 AM CST  To: Jere Betancourt Staff    Name of Who is Calling: ALEXANDRO RENE [[id number]]      What is the request in detail: Pt is requesting a call back from clinical team in regard to getting schedule for a appt. Pt states he only want to see Dr Oquendo     Please contact to further discuss and advise.          Can the clinic reply by [facility]:       What Number to Call Back if not in [facility]: [phone]

## 2020-02-13 ENCOUNTER — TELEPHONE (OUTPATIENT)
Dept: UROLOGY | Facility: CLINIC | Age: 75
End: 2020-02-13

## 2020-02-13 NOTE — TELEPHONE ENCOUNTER
----- Message from Mimi Langford MA sent at 2/13/2020  9:27 AM CST -----  Contact: ALEXANDRO RENE [[id number]]  PLEASE OFFER PATIENT N.P   ----- Message -----  From: Rae Danielle  Sent: 2/13/2020   8:47 AM CST  To: Jere Betancourt Staff    Name of Who is Calling:ALEXANDRO RENE [[id number]]       What is the request in detail: ALEXANDRO RENE [[id number]] Patient would like a call back regarding a sooner appointment first available. Patient states he can  Not wait until appointment he is in pain .... Please contact to further discuss and advise       Can the clinic reply by [facility]: no       What Number to Call Back if not in [facility]: [phone]

## 2020-02-27 ENCOUNTER — TELEPHONE (OUTPATIENT)
Dept: NEUROLOGY | Facility: CLINIC | Age: 75
End: 2020-02-27

## 2020-02-27 NOTE — TELEPHONE ENCOUNTER
Spoke to patient . Informed him of the month of the symposium and that they have not yet picked a date.

## 2020-02-27 NOTE — TELEPHONE ENCOUNTER
----- Message from Leonora Bonner sent at 2/27/2020  9:01 AM CST -----  Contact: Self [phone]  Pt states is their any seminars coming up for Zelda wolf's  Please call back to discuss

## 2020-03-02 ENCOUNTER — OFFICE VISIT (OUTPATIENT)
Dept: NEUROLOGY | Facility: CLINIC | Age: 75
End: 2020-03-02
Payer: MEDICARE

## 2020-03-02 VITALS
DIASTOLIC BLOOD PRESSURE: 80 MMHG | HEART RATE: 72 BPM | HEIGHT: 69 IN | WEIGHT: 195 LBS | BODY MASS INDEX: 28.88 KG/M2 | SYSTOLIC BLOOD PRESSURE: 113 MMHG

## 2020-03-02 DIAGNOSIS — G20.A1 PARKINSON DISEASE: Primary | ICD-10-CM

## 2020-03-02 PROCEDURE — 3288F PR FALLS RISK ASSESSMENT DOCUMENTED: ICD-10-PCS | Mod: HCNC,CPTII,S$GLB, | Performed by: PSYCHIATRY & NEUROLOGY

## 2020-03-02 PROCEDURE — 99499 RISK ADDL DX/OHS AUDIT: ICD-10-PCS | Mod: HCNC,S$GLB,, | Performed by: PSYCHIATRY & NEUROLOGY

## 2020-03-02 PROCEDURE — 99499 UNLISTED E&M SERVICE: CPT | Mod: HCNC,S$GLB,, | Performed by: PSYCHIATRY & NEUROLOGY

## 2020-03-02 PROCEDURE — 1159F PR MEDICATION LIST DOCUMENTED IN MEDICAL RECORD: ICD-10-PCS | Mod: HCNC,S$GLB,, | Performed by: PSYCHIATRY & NEUROLOGY

## 2020-03-02 PROCEDURE — 3288F FALL RISK ASSESSMENT DOCD: CPT | Mod: HCNC,CPTII,S$GLB, | Performed by: PSYCHIATRY & NEUROLOGY

## 2020-03-02 PROCEDURE — 3079F DIAST BP 80-89 MM HG: CPT | Mod: HCNC,CPTII,S$GLB, | Performed by: PSYCHIATRY & NEUROLOGY

## 2020-03-02 PROCEDURE — 1126F AMNT PAIN NOTED NONE PRSNT: CPT | Mod: HCNC,S$GLB,, | Performed by: PSYCHIATRY & NEUROLOGY

## 2020-03-02 PROCEDURE — 3079F PR MOST RECENT DIASTOLIC BLOOD PRESSURE 80-89 MM HG: ICD-10-PCS | Mod: HCNC,CPTII,S$GLB, | Performed by: PSYCHIATRY & NEUROLOGY

## 2020-03-02 PROCEDURE — 1100F PR PT FALLS ASSESS DOC 2+ FALLS/FALL W/INJURY/YR: ICD-10-PCS | Mod: HCNC,CPTII,S$GLB, | Performed by: PSYCHIATRY & NEUROLOGY

## 2020-03-02 PROCEDURE — 99999 PR PBB SHADOW E&M-EST. PATIENT-LVL III: ICD-10-PCS | Mod: PBBFAC,HCNC,, | Performed by: PSYCHIATRY & NEUROLOGY

## 2020-03-02 PROCEDURE — 1159F MED LIST DOCD IN RCRD: CPT | Mod: HCNC,S$GLB,, | Performed by: PSYCHIATRY & NEUROLOGY

## 2020-03-02 PROCEDURE — 99214 PR OFFICE/OUTPT VISIT, EST, LEVL IV, 30-39 MIN: ICD-10-PCS | Mod: HCNC,S$GLB,, | Performed by: PSYCHIATRY & NEUROLOGY

## 2020-03-02 PROCEDURE — 99214 OFFICE O/P EST MOD 30 MIN: CPT | Mod: HCNC,S$GLB,, | Performed by: PSYCHIATRY & NEUROLOGY

## 2020-03-02 PROCEDURE — 3074F PR MOST RECENT SYSTOLIC BLOOD PRESSURE < 130 MM HG: ICD-10-PCS | Mod: HCNC,CPTII,S$GLB, | Performed by: PSYCHIATRY & NEUROLOGY

## 2020-03-02 PROCEDURE — 1126F PR PAIN SEVERITY QUANTIFIED, NO PAIN PRESENT: ICD-10-PCS | Mod: HCNC,S$GLB,, | Performed by: PSYCHIATRY & NEUROLOGY

## 2020-03-02 PROCEDURE — 99999 PR PBB SHADOW E&M-EST. PATIENT-LVL III: CPT | Mod: PBBFAC,HCNC,, | Performed by: PSYCHIATRY & NEUROLOGY

## 2020-03-02 PROCEDURE — 3074F SYST BP LT 130 MM HG: CPT | Mod: HCNC,CPTII,S$GLB, | Performed by: PSYCHIATRY & NEUROLOGY

## 2020-03-02 PROCEDURE — 1100F PTFALLS ASSESS-DOCD GE2>/YR: CPT | Mod: HCNC,CPTII,S$GLB, | Performed by: PSYCHIATRY & NEUROLOGY

## 2020-03-02 RX ORDER — DONEPEZIL HYDROCHLORIDE 10 MG/1
10 TABLET, FILM COATED ORAL NIGHTLY
Qty: 90 TABLET | Refills: 3 | Status: SHIPPED | OUTPATIENT
Start: 2020-03-02 | End: 2020-07-20

## 2020-03-02 NOTE — LETTER
March 10, 2020      Andrez Jackson MD  [address]  [place] [address]           [facility]  [address]  [facility] [address]  Phone: [phone]  Fax: [phone]          Patient: Haja Marti   MR Number: [id number]   YOB: 1945   Date of Visit: 3/2/2020       Dear Dr. Andrez Jackson:    Thank you for referring Haja Marti to me for evaluation. Attached you will find relevant portions of my assessment and plan of care.    If you have questions, please do not hesitate to call me. I look forward to following Haja Marti along with you.    Sincerely,    Edward Ivey MD    Enclosure  CC:  No Recipients    If you would like to receive this communication electronically, please contact [email or web address] or [phone] to request more information on [employer] Link access.    For providers and/or their staff who would like to refer a patient to [facility], please contact us through our one-stop-shop provider referral line, [facility], at [phone].    If you feel you have received this communication in error or would no longer like to receive these types of communications, please e-mail [email or web address]

## 2020-03-03 ENCOUNTER — OFFICE VISIT (OUTPATIENT)
Dept: PSYCHIATRY | Facility: CLINIC | Age: 75
End: 2020-03-03
Payer: MEDICARE

## 2020-03-03 VITALS
WEIGHT: 203.25 LBS | HEART RATE: 74 BPM | DIASTOLIC BLOOD PRESSURE: 76 MMHG | SYSTOLIC BLOOD PRESSURE: 120 MMHG | BODY MASS INDEX: 30.02 KG/M2

## 2020-03-03 DIAGNOSIS — F41.1 GAD (GENERALIZED ANXIETY DISORDER): Primary | ICD-10-CM

## 2020-03-03 DIAGNOSIS — G89.4 CHRONIC PAIN SYNDROME: ICD-10-CM

## 2020-03-03 DIAGNOSIS — F41.9 ANXIETY DISORDER, UNSPECIFIED TYPE: ICD-10-CM

## 2020-03-03 DIAGNOSIS — F32.89 OTHER DEPRESSION: ICD-10-CM

## 2020-03-03 DIAGNOSIS — F41.1 GENERALIZED ANXIETY DISORDER: ICD-10-CM

## 2020-03-03 PROCEDURE — 3074F SYST BP LT 130 MM HG: CPT | Mod: HCNC,CPTII,S$GLB, | Performed by: PSYCHIATRY & NEUROLOGY

## 2020-03-03 PROCEDURE — 99499 RISK ADDL DX/OHS AUDIT: ICD-10-PCS | Mod: HCNC,S$GLB,, | Performed by: PSYCHIATRY & NEUROLOGY

## 2020-03-03 PROCEDURE — 99214 OFFICE O/P EST MOD 30 MIN: CPT | Mod: HCNC,S$GLB,, | Performed by: PSYCHIATRY & NEUROLOGY

## 2020-03-03 PROCEDURE — 99214 PR OFFICE/OUTPT VISIT, EST, LEVL IV, 30-39 MIN: ICD-10-PCS | Mod: HCNC,S$GLB,, | Performed by: PSYCHIATRY & NEUROLOGY

## 2020-03-03 PROCEDURE — 99999 PR PBB SHADOW E&M-EST. PATIENT-LVL III: ICD-10-PCS | Mod: PBBFAC,HCNC,, | Performed by: PSYCHIATRY & NEUROLOGY

## 2020-03-03 PROCEDURE — 3078F PR MOST RECENT DIASTOLIC BLOOD PRESSURE < 80 MM HG: ICD-10-PCS | Mod: HCNC,CPTII,S$GLB, | Performed by: PSYCHIATRY & NEUROLOGY

## 2020-03-03 PROCEDURE — 3078F DIAST BP <80 MM HG: CPT | Mod: HCNC,CPTII,S$GLB, | Performed by: PSYCHIATRY & NEUROLOGY

## 2020-03-03 PROCEDURE — 1159F PR MEDICATION LIST DOCUMENTED IN MEDICAL RECORD: ICD-10-PCS | Mod: HCNC,S$GLB,, | Performed by: PSYCHIATRY & NEUROLOGY

## 2020-03-03 PROCEDURE — 99999 PR PBB SHADOW E&M-EST. PATIENT-LVL III: CPT | Mod: PBBFAC,HCNC,, | Performed by: PSYCHIATRY & NEUROLOGY

## 2020-03-03 PROCEDURE — 1159F MED LIST DOCD IN RCRD: CPT | Mod: HCNC,S$GLB,, | Performed by: PSYCHIATRY & NEUROLOGY

## 2020-03-03 PROCEDURE — 99499 UNLISTED E&M SERVICE: CPT | Mod: HCNC,S$GLB,, | Performed by: PSYCHIATRY & NEUROLOGY

## 2020-03-03 PROCEDURE — 3288F FALL RISK ASSESSMENT DOCD: CPT | Mod: HCNC,CPTII,S$GLB, | Performed by: PSYCHIATRY & NEUROLOGY

## 2020-03-03 PROCEDURE — 3288F PR FALLS RISK ASSESSMENT DOCUMENTED: ICD-10-PCS | Mod: HCNC,CPTII,S$GLB, | Performed by: PSYCHIATRY & NEUROLOGY

## 2020-03-03 PROCEDURE — 3074F PR MOST RECENT SYSTOLIC BLOOD PRESSURE < 130 MM HG: ICD-10-PCS | Mod: HCNC,CPTII,S$GLB, | Performed by: PSYCHIATRY & NEUROLOGY

## 2020-03-03 PROCEDURE — 1100F PTFALLS ASSESS-DOCD GE2>/YR: CPT | Mod: HCNC,CPTII,S$GLB, | Performed by: PSYCHIATRY & NEUROLOGY

## 2020-03-03 PROCEDURE — 1100F PR PT FALLS ASSESS DOC 2+ FALLS/FALL W/INJURY/YR: ICD-10-PCS | Mod: HCNC,CPTII,S$GLB, | Performed by: PSYCHIATRY & NEUROLOGY

## 2020-03-03 RX ORDER — CLONAZEPAM 0.5 MG/1
TABLET ORAL
Qty: 180 TABLET | Refills: 1 | Status: SHIPPED | OUTPATIENT
Start: 2020-03-03 | End: 2020-11-05 | Stop reason: SDUPTHER

## 2020-03-03 RX ORDER — SERTRALINE HYDROCHLORIDE 100 MG/1
200 TABLET, FILM COATED ORAL DAILY
Qty: 180 TABLET | Refills: 2 | Status: SHIPPED | OUTPATIENT
Start: 2020-03-03 | End: 2020-12-10

## 2020-03-03 RX ORDER — CLONAZEPAM 0.5 MG/1
0.5 TABLET ORAL 2 TIMES DAILY PRN
Qty: 60 TABLET | Refills: 2 | Status: SHIPPED | OUTPATIENT
Start: 2020-03-03 | End: 2020-07-20

## 2020-03-03 NOTE — PROGRESS NOTES
"Outpatient Psychiatry Follow-Up Visit (MD/NP)  3/3/2020    Session Length: 30 minutes (E&M)    Clinical Status of Patient:  Outpatient (Ambulatory)    Chief Complaint:  Haja Marti is a 74 y.o. male who presents today for follow-up of depression and anxiety.  Met with patient and spouse.      Interval History and Content of Current Session:  Interim Events/Subjective Report/Content of Current Session:  First appt with me since 7/9/2019.    He has had several appts with Mr. Galvan for therapy in the meantime.      He states he is doing "pretty good".    He denies depressed mood today.    He generally goes to sleep around 9 PM, then awakens around 3 AM to use the restroom, then he must urinate at least 3 more times afterwards.    He has an appt with a urologist (Dr. Oquendo; [facility]) soon.      He had a couple of falls in the past 2 months.  One was a few months ago; the second was more recently.  No significant injury noted.    He is still going to sessions of boxing for Parkinson's patients.    He also had joined [employer] [employer] [facility] [employer] and kept his membership at the [facility] since it is close to their house.   He saw Dr. Ivey yesterday -- I reviewed his note in session.      He Rx pt a new med: donepezil for ST memory problems.    Pt has been taking C/L for several years.      He stated he has had periods of hallucinations.  These are formed visual hallucinations; they can last for a few seconds, then disappear.  He notes seeing a "fish in the toilet" that tends to last longer than other images.    He states the fish looks very real.  However, it does not bother him that he sees it.      He has stated he traditionally worries about everything.    His wife has mentioned she thought his anxiety has been better since he has been taking Klonopin.  He has been taking a 0.5 mg tablet twice daily -- he usually takes 1 tablet around 10 AM and another around 5 - 6 PM.    Pt agrees that the Klonopin " seems to help with anxiety.     He has interests, mainly in spending time with family, including grandchildren, on days he feels physically better.      Psychotherapy:  · Target symptoms: depression, adjustment  · Why chosen therapy is appropriate versus another modality: relevant to diagnosis, patient responds to this modality  · Outcome monitoring methods: self-report, lab data, observation, feedback from family  · Therapeutic intervention type: supportive psychotherapy  · Topics discussed/themes: stress related to medical comorbidities, life stage transitional issues  · The patient's response to the intervention is accepting.   · The patient's progress toward treatment goals is fair.   · Duration of intervention: 10 minutes.    Review of Systems   · PSYCHIATRIC: Pertinant items are noted in the narrative.  · CONSTITUTIONAL:  No recent changes in wt.    · MUSCULOSKELETAL: No pain or stiffness of the joints.  · NEUROLOGIC: + tremors and shuffling gait; No weakness, sensory changes, seizures, memory loss, confusion, or other abnormal movements.  · Memory Loss: no  · ENDOCRINE: No polydipsia or polyuria.  · INTEGUMENTARY: No rashes or lacerations.  · EYES: No exophthalmos, jaundice or blindness.  · ENT: No dizziness, tinnitus or hearing loss.  · RESPIRATORY: No shortness of breath.  · CARDIOVASCULAR: No tachycardia or chest pain.  · GASTROINTESTINAL: No nausea, vomiting, pain, constipation or diarrhea.  · GENITOURINARY: No frequency, dysuria.    The following portions of the patient's history were reviewed and updated as appropriate: allergies, current medications, past family history, past medical history, past social history, past surgical history and problem list.      Current Outpatient Medications:     atorvastatin (LIPITOR) 40 MG tablet, Take 1 tablet (40 mg total) by mouth once daily., Disp: 90 tablet, Rfl: 3    carbidopa-levodopa  mg (SINEMET)  mg per tablet, Take 1.5 tablets by mouth 3 (three)  times daily., Disp: 450 tablet, Rfl: 3    citric acid-potassium citrate (POLYCITRA) 1,100-334 mg/5 mL solution, Take 10 mLs by mouth., Disp: , Rfl:     clonazePAM (KLONOPIN) 0.5 MG tablet, Take oral 1/2 to 1 tablet TID prn anxiety, Disp: 180 tablet, Rfl: 1    donepezil (ARICEPT) 10 MG tablet, Take 1 tablet (10 mg total) by mouth every evening. Start on 1/2 tab at bedtime for the first month., Disp: 90 tablet, Rfl: 3    ergocalciferol (VITAMIN D2) 50,000 unit Cap, Take 50,000 Units by mouth every 30 days. , Disp: , Rfl:     finasteride (PROSCAR) 5 mg tablet, Take 1 tablet (5 mg total) by mouth once daily., Disp: 90 tablet, Rfl: 3    FLUAD 5653-1015, 65 YR UP,,PF, 45 mcg (15 mcg x 3)/0.5 mL Syrg, ADM 0.5ML IM UTD, Disp: , Rfl: 0    gabapentin (NEURONTIN) 300 MG capsule, Increase to 2 tablets at night, 1 in AM and 1 in afternoon for 3 days.  Then increase to 2 tablets at night and 2 in morning, 1 tablet in afternoon for 3 days.  Then increase to 2 tablets three times a day (1800mg), Disp: 180 capsule, Rfl: 11    L.acid-B.bifidum-B.animal-FOS 25 billion cell -100 mg Cap, Take by mouth., Disp: , Rfl:     losartan (COZAAR) 100 MG tablet, Take 1 tablet (100 mg total) by mouth once daily., Disp: 90 tablet, Rfl: 3    magnesium oxide (MAG-OX) 400 mg tablet, Take 400 mg by mouth once daily., Disp: , Rfl:     potassium citrate (UROCIT-K) 10 mEq (1,080 mg) TbSR, TAKE 1 TABLET THREE TIMES DAILY WITH MEALS (Patient taking differently: Take 10 mEq by mouth 2 (two) times daily with meals. ), Disp: 270 tablet, Rfl: 3    pyridoxine (VITAMIN B-6) 50 MG Tab, Take 50 mg by mouth once daily., Disp: , Rfl:     sertraline (ZOLOFT) 100 MG tablet, Take 2 tablets (200 mg total) by mouth once daily., Disp: 180 tablet, Rfl: 1     Compliance: yes    Side effects: None    Risk Parameters:  Patient reports no suicidal ideation  Patient reports no homicidal ideation  Patient reports no self-injurious behavior  Patient reports no violent  "behavior    Exam (detailed: at least 9 elements; comprehensive: all 15 elements)   Constitutional  Vitals - 1 value per visit 7/9/2019 8/21/2019 8/23/2019 10/16/2019 12/3/2019 3/2/2020 3/3/2020   SYSTOLIC 134 136 126 128 117 113 120   DIASTOLIC 74 87 77 78 77 80 76   PULSE 78 65 69 60 64 72 74   TEMPERATURE - - - - 98 - -   RESPIRATIONS - - - - 18 - -   SPO2 - - - 98 98 - -   Weight (lb) 204.26 201.5 201.5 196 195 195 203.26   Weight (kg) 92.65 91.4 91.4 88.905 88.451 88.451 92.2   HEIGHT - 5' 10" 5' 10" 5' 10" 5' 9" 5' 9" -   BODY MASS INDEX 29.31 28.91 28.91 28.12 28.8 28.8 30.02   VISIT REPORT - - - - - - -   Pain Score  - 0 0 0 - 0 -   Some recent data might be hidden       General:  unremarkable, age appropriate, casually dressed, neatly groomed, overweight     Musculoskeletal  Muscle Strength/Tone:  not assessed today; cogwheeling had been noted in BUE's in past   Gait & Station:  parkinsonian, slow, steady     Psychiatric  Speech:  slowed, non-spontaneous, normal volume   Behavior: friendly and cooperative, eye contact normal   Mood & Affect:  "OK"  euthymic, constricted range, conguent, appropriate   Thought Process:  goal-directed, logical   Associations:  intact   Thought Content:  normal, no suicidality, no homicidality, delusions, or paranoia   Insight:  intact, has awareness of illness   Judgement: behavior is adequate to circumstances   Orientation:  grossly intact   Memory: intact for content of interview   Language: grossly intact   Attention Span & Concentration:  able to focus   Fund of Knowledge:  intact and appropriate to age and level of education     Lab Results   Component Value Date    WBC 6.73 10/15/2019    HGB 15.5 10/15/2019    HCT 47.1 10/15/2019    MCV 94 10/15/2019     10/15/2019     10/15/2019    K 4.2 10/15/2019     10/15/2019    CO2 21 (L) 10/15/2019    GLU 77 10/15/2019    BUN 23 10/15/2019    CREATININE 1.1 10/15/2019    CALCIUM 10.0 10/15/2019    PROT 7.4 " 10/15/2019    ALBUMIN 4.2 10/15/2019    BILITOT 0.6 10/15/2019    ALKPHOS 84 10/15/2019    AST 32 10/15/2019    ALT 17 10/15/2019    ANIONGAP 13 10/15/2019    ESTGFRAFRICA >60.0 10/15/2019    EGFRNONAA >60.0 10/15/2019    CHOL 149 10/15/2019    HDL 38 (L) 10/15/2019    LDLCALC 79.4 10/15/2019    TRIG 158 (H) 10/15/2019    CHOLHDL 25.5 10/15/2019    TSH 2.134 10/15/2019       Imaging:    Exam: MRI brain without contrast    History: Dizziness, extrapyramidal movement disorder    Findings: MRI is performed with routine sequences.  No mass, hemorrhage, infarction, subdural collection, hydrocephalus or other acute finding is seen on this cranial MR examination.  There is volume loss consistent with the patient's age.  No diffusion abnormality is seen.      Impression     Normal noncontrast MRI of the brain     Electronically signed by: FLORI NEWMAN MD  Date: 07/15/17  Time: 15:51        Assessment and Diagnosis   Status/Progress: Based on the examination today, the patient's problem(s) is/are adequately but not ideally controlled.  New problems have been presented today.   Co-morbidities (chronic pain) are complicating management of the primary condition.    There are not active rule-out diagnoses for this patient at this time.      General Impression:  POLINA (generalized anxiety disorder)  Depression, unspecified depression type  Other chronic pain    Intervention/Counseling/Treatment Plan   Medication Management:  Continue current medications.  No changes were made today.     Follow-up plan for depression was discussed with patient and spouse    Return to Clinic: Follow up in about 6 months (around 9/3/2020).    Sudhir Elliott MD

## 2020-03-03 NOTE — PATIENT INSTRUCTIONS
Continue all current medications with refills as noted.  Return to clinic in 6 months for follow up appt.  CALL IN MAY FOR APPT IN SEPT.

## 2020-03-25 ENCOUNTER — PATIENT MESSAGE (OUTPATIENT)
Dept: PSYCHIATRY | Facility: CLINIC | Age: 75
End: 2020-03-25

## 2020-03-26 ENCOUNTER — PATIENT MESSAGE (OUTPATIENT)
Dept: NEUROLOGY | Facility: CLINIC | Age: 75
End: 2020-03-26

## 2020-03-26 ENCOUNTER — DOCUMENTATION ONLY (OUTPATIENT)
Dept: REHABILITATION | Facility: HOSPITAL | Age: 75
End: 2020-03-26

## 2020-03-26 DIAGNOSIS — R53.1 DECREASED STRENGTH: ICD-10-CM

## 2020-03-26 DIAGNOSIS — G89.29 CHRONIC BILATERAL LOW BACK PAIN WITH BILATERAL SCIATICA: ICD-10-CM

## 2020-03-26 DIAGNOSIS — M54.42 CHRONIC BILATERAL LOW BACK PAIN WITH BILATERAL SCIATICA: ICD-10-CM

## 2020-03-26 DIAGNOSIS — M54.41 CHRONIC BILATERAL LOW BACK PAIN WITH BILATERAL SCIATICA: ICD-10-CM

## 2020-03-26 DIAGNOSIS — R26.89 DECREASED FUNCTIONAL MOBILITY: ICD-10-CM

## 2020-03-26 NOTE — PROGRESS NOTES
Pt was evaluated on 4/10/2019 and was seen 3 times for PT. Pt has not attended PT since 4/16/2019. Pt was given HEP. Current status is unknown. Pt to be d/c'd at this time.

## 2020-03-30 ENCOUNTER — TELEPHONE (OUTPATIENT)
Dept: NEUROLOGY | Facility: CLINIC | Age: 75
End: 2020-03-30

## 2020-03-30 NOTE — TELEPHONE ENCOUNTER
Pt stated that he tried the donepezil 1/2 tablet following last visit in attempt to reduce the hallucinations but he has not had any luck. He has been holding the med for the last 3-4 days. Still having very brief hallucinations (1-2 per day for a few seconds at a time). Does not feel threatened by the hallucinations. Pt spoke with Dr. Elliott (in [facility]) and he advised to touch base with us on D/Cing the med.

## 2020-03-30 NOTE — TELEPHONE ENCOUNTER
----- Message from Mildred Augustine sent at 3/30/2020 10:17 AM CDT -----  Contact: self @ [phone]  Pt says he is still waiting on a return call form Dr Ivey concerning his prescription for Donepezil.  Pls call asap.

## 2020-04-02 ENCOUNTER — TELEPHONE (OUTPATIENT)
Dept: NEUROLOGY | Facility: CLINIC | Age: 75
End: 2020-04-02

## 2020-04-04 NOTE — TELEPHONE ENCOUNTER
Yes, he can stop the donepezil  now.  WOuld be inclined not to treat relatively benign hallucinations at this time.

## 2020-04-13 ENCOUNTER — PATIENT MESSAGE (OUTPATIENT)
Dept: PSYCHIATRY | Facility: CLINIC | Age: 75
End: 2020-04-13

## 2020-04-13 ENCOUNTER — TELEPHONE (OUTPATIENT)
Dept: NEUROLOGY | Facility: CLINIC | Age: 75
End: 2020-04-13

## 2020-04-13 NOTE — TELEPHONE ENCOUNTER
----- Message from Estrada Velazco sent at 4/13/2020 10:30 AM CDT -----  Contact: pt   Please call pt at [phone]    Patient has questions about the medications he is taking for hallucinations (still having hallucinations)    Patient has called several times with no response    Thank you

## 2020-04-14 NOTE — TELEPHONE ENCOUNTER
"Spoke with pt. Verbalizes being upset that we have not called him back before now. Offered apologies.     Gave [facility] instructions: that, "he can stop the donepezil  now.  Would be inclined not to treat relatively benign hallucinations at this time."    Verbalizes understanding.  "

## 2020-04-16 ENCOUNTER — OFFICE VISIT (OUTPATIENT)
Dept: PSYCHIATRY | Facility: CLINIC | Age: 75
End: 2020-04-16
Payer: MEDICARE

## 2020-04-16 DIAGNOSIS — F41.1 GENERALIZED ANXIETY DISORDER: Primary | ICD-10-CM

## 2020-04-16 PROCEDURE — 90785 PSYTX COMPLEX INTERACTIVE: CPT | Mod: HCNC,95,, | Performed by: SOCIAL WORKER

## 2020-04-16 PROCEDURE — 90834 PR PSYCHOTHERAPY W/PATIENT, 45 MIN: ICD-10-PCS | Mod: HCNC,95,, | Performed by: SOCIAL WORKER

## 2020-04-16 PROCEDURE — 90785 PR INTERACTIVE COMPLEXITY: ICD-10-PCS | Mod: HCNC,95,, | Performed by: SOCIAL WORKER

## 2020-04-16 PROCEDURE — 90834 PSYTX W PT 45 MINUTES: CPT | Mod: HCNC,95,, | Performed by: SOCIAL WORKER

## 2020-04-16 NOTE — PROGRESS NOTES
Individual Psychotherapy (PhD/LCSW)    4/16/2020    Site:  [facility]         Therapeutic Intervention: Met with patient and spouse.  Outpatient - Insight oriented psychotherapy 45 min - CPT code 27150    Chief complaint/reason for encounter: depression and anxiety     Interval history and content of current session:  4/16/2020    Site: [facility]    Length of service: 45         Therapeutic intervention:     Persons present: spouse and pt  Whitley (needed to help with interpretation of intervention due to pt cognitive decline due to Parkinsons).    Interval history:      The patient location is: home   The chief complaint leading to consultation is: anxiety/depression  Visit type: audiovisual  Total time spent with patient: 45 min  Each patient to whom he or she provides medical services by telemedicine is:  (1) informed of the relationship between the physician and patient and the respective role of any other health care provider with respect to management of the patient; and (2) notified that he or she may decline to receive medical services by telemedicine and may withdraw from such care at any time.    Notes:     Discussion over how to process the visual hallucinations he has in order to lessen any anxiety over it.                 Target symptoms: depression, anxiety        Progress toward goals: progressing well    Diagnosis: generalized anxiety disorder      Treatment plan:  · Target symptoms: anxiety   · Why chosen therapy is appropriate versus another modality: relevant to diagnosis, evidence based practice  · Outcome monitoring methods: self-report, observation, feedback from family  · Therapeutic intervention type: behavior modifying psychotherapy    Risk parameters:  Patient reports no suicidal ideation  Patient reports no homicidal ideation  Patient reports no self-injurious behavior  Patient reports no violent behavior    Verbal deficits: None    Patient's response to intervention:  The  patient's response to intervention is accepting, motivated.    Progress toward goals and other mental status changes:  The patient's progress toward goals is fair .    Diagnosis:     ICD-10-CM ICD-9-CM   1. Generalized anxiety disorder F41.1 300.02       Plan:  individual psychotherapy and family psychotherapy    Return to clinic: as scheduled    Length of Service (minutes): 45

## 2020-04-30 ENCOUNTER — TELEPHONE (OUTPATIENT)
Dept: UROLOGY | Facility: CLINIC | Age: 75
End: 2020-04-30

## 2020-04-30 DIAGNOSIS — N40.0 ENLARGED PROSTATE: ICD-10-CM

## 2020-04-30 DIAGNOSIS — R39.15 URINARY URGENCY: ICD-10-CM

## 2020-04-30 RX ORDER — FINASTERIDE 5 MG/1
5 TABLET, FILM COATED ORAL DAILY
Qty: 90 TABLET | Refills: 3 | Status: SHIPPED | OUTPATIENT
Start: 2020-04-30 | End: 2020-08-14 | Stop reason: SDUPTHER

## 2020-04-30 NOTE — TELEPHONE ENCOUNTER
Spoke with the pt, pt requesting a refill for Finasteride 5 mg sent to [facility] mail Delivery. Please advise

## 2020-04-30 NOTE — TELEPHONE ENCOUNTER
Spoke with the pt, pt requesting refill on Finasteride 5 mg and sent to [facility]. Pt scheduled under recall for 8/2020 and made nurse aware PSA will be ordered by PCP Dr. Jackson.          Thanks Enriqueta

## 2020-04-30 NOTE — TELEPHONE ENCOUNTER
Left detail message to call to schedule appointment ,v/v audio or in person in June .patient last seen 8/2019   Order placed for O2 at night

## 2020-04-30 NOTE — TELEPHONE ENCOUNTER
----- Message from Mahesh Moralez, Patient Care Assistant sent at 4/30/2020  9:39 AM CDT -----  Contact: ALEXANDRO RENE [[id number]]  Can the clinic reply in [facility]: No    Please refill the medication(s) listed below. The patient can be reached at this phone number once it is called into the pharmacy.[phone]    Medication #1finasteride (PROSCAR) 5 mg tablet (Requesting  90 day supply)    Medication #2    Preferred Pharmacy:   [facility] - [facility] [address]

## 2020-05-11 ENCOUNTER — OFFICE VISIT (OUTPATIENT)
Dept: PSYCHIATRY | Facility: CLINIC | Age: 75
End: 2020-05-11
Payer: MEDICARE

## 2020-05-11 DIAGNOSIS — F41.1 GENERALIZED ANXIETY DISORDER: Primary | ICD-10-CM

## 2020-05-11 PROCEDURE — 90834 PR PSYCHOTHERAPY W/PATIENT, 45 MIN: ICD-10-PCS | Mod: HCNC,95,, | Performed by: SOCIAL WORKER

## 2020-05-11 PROCEDURE — 99499 UNLISTED E&M SERVICE: CPT | Mod: HCNC,95,, | Performed by: SOCIAL WORKER

## 2020-05-11 PROCEDURE — 90834 PSYTX W PT 45 MINUTES: CPT | Mod: HCNC,95,, | Performed by: SOCIAL WORKER

## 2020-05-11 PROCEDURE — 99499 RISK ADDL DX/OHS AUDIT: ICD-10-PCS | Mod: HCNC,95,, | Performed by: SOCIAL WORKER

## 2020-05-11 NOTE — PROGRESS NOTES
Individual Psychotherapy (PhD/LCSW)    5/11/2020    Site:  [facility]         Therapeutic Intervention: Met with patient and spouse.  Outpatient - Insight oriented psychotherapy 45 min - CPT code 38786    Chief complaint/reason for encounter: depression and anxiety     Interval history and content of current session:  4/16/2020    Site: [facility]    Length of service: 45         Therapeutic intervention:     Persons present: spouse and pt  Whitley (needed to help with interpretation of intervention due to pt cognitive decline due to Parkinsons).    Interval history:      The patient location is: home   The chief complaint leading to consultation is: anxiety/depression  Visit type: audiovisual  Total time spent with patient: 45 min  Each patient to whom he or she provides medical services by telemedicine is:  (1) informed of the relationship between the physician and patient and the respective role of any other health care provider with respect to management of the patient; and (2) notified that he or she may decline to receive medical services by telemedicine and may withdraw from such care at any time.    Notes:     Discussion over the corona virus effect on his mood and anxiety.  Coping well given his illness.   Support from family.               Target symptoms: depression, anxiety        Progress toward goals: progressing well    Diagnosis: generalized anxiety disorder      Treatment plan:  · Target symptoms: anxiety   · Why chosen therapy is appropriate versus another modality: relevant to diagnosis, evidence based practice  · Outcome monitoring methods: self-report, observation, feedback from family  · Therapeutic intervention type: behavior modifying psychotherapy    Risk parameters:  Patient reports no suicidal ideation  Patient reports no homicidal ideation  Patient reports no self-injurious behavior  Patient reports no violent behavior    Verbal deficits: None    Patient's response to  intervention:  The patient's response to intervention is accepting, motivated.       Progress toward goals and other mental status changes:  The patient's progress toward goals is fair .    Diagnosis:   No diagnosis found.    Plan:  individual psychotherapy and family psychotherapy    Return to clinic: as scheduled    Length of Service (minutes): 45

## 2020-06-08 ENCOUNTER — OFFICE VISIT (OUTPATIENT)
Dept: PSYCHIATRY | Facility: CLINIC | Age: 75
End: 2020-06-08
Payer: MEDICARE

## 2020-06-08 DIAGNOSIS — F41.1 GENERALIZED ANXIETY DISORDER: Primary | ICD-10-CM

## 2020-06-08 PROCEDURE — 90785 PR INTERACTIVE COMPLEXITY: ICD-10-PCS | Mod: HCNC,95,, | Performed by: SOCIAL WORKER

## 2020-06-08 PROCEDURE — 90834 PSYTX W PT 45 MINUTES: CPT | Mod: HCNC,95,, | Performed by: SOCIAL WORKER

## 2020-06-08 PROCEDURE — 90834 PR PSYCHOTHERAPY W/PATIENT, 45 MIN: ICD-10-PCS | Mod: HCNC,95,, | Performed by: SOCIAL WORKER

## 2020-06-08 PROCEDURE — 90785 PSYTX COMPLEX INTERACTIVE: CPT | Mod: HCNC,95,, | Performed by: SOCIAL WORKER

## 2020-06-08 NOTE — PROGRESS NOTES
Individual Psychotherapy (PhD/LCSW)    6/8/2020    Site:  [facility]         Therapeutic Intervention: Met with patient and spouse.  Outpatient - Insight oriented psychotherapy 45 min - CPT code 05924    Chief complaint/reason for encounter: depression and anxiety     Interval history and content of current session:      Site: [facility]    Length of service: 45         Therapeutic intervention:     Persons present: spouse and pt  Whitley (needed to help with interpretation of intervention due to pt cognitive decline due to Parkinsons).    Interval history:      The patient location is: home   The chief complaint leading to consultation is: anxiety/depression  Visit type: audiovisual  Total time spent with patient: 45 min  Each patient to whom he or she provides medical services by telemedicine is:  (1) informed of the relationship between the physician and patient and the respective role of any other health care provider with respect to management of the patient; and (2) notified that he or she may decline to receive medical services by telemedicine and may withdraw from such care at any time.    Notes:     cbt for insomnia.   Wife present.  Facilitates communication.               Target symptoms: depression, anxiety        Progress toward goals: progressing well    Diagnosis: generalized anxiety disorder      Treatment plan:  · Target symptoms: anxiety   · Why chosen therapy is appropriate versus another modality: relevant to diagnosis, evidence based practice  · Outcome monitoring methods: self-report, observation, feedback from family  · Therapeutic intervention type: behavior modifying psychotherapy    Risk parameters:  Patient reports no suicidal ideation  Patient reports no homicidal ideation  Patient reports no self-injurious behavior  Patient reports no violent behavior    Verbal deficits: None    Patient's response to intervention:  The patient's response to intervention is accepting,  motivated.        Progress toward goals and other mental status changes:  The patient's progress toward goals is fair .    Diagnosis:     ICD-10-CM ICD-9-CM   1. Generalized anxiety disorder F41.1 300.02       Plan:  individual psychotherapy and family psychotherapy    Return to clinic: as scheduled    Length of Service (minutes): 45

## 2020-06-29 RX ORDER — LOSARTAN POTASSIUM 100 MG/1
100 TABLET ORAL DAILY
Qty: 90 TABLET | Refills: 1 | Status: SHIPPED | OUTPATIENT
Start: 2020-06-29 | End: 2020-12-12

## 2020-06-29 NOTE — TELEPHONE ENCOUNTER
----- Message from Chary Chavez sent at 6/29/2020 10:40 AM CDT -----  Contact: self/[phone]  Patient is calling for an RX refill or new RX.  Is this a refill or new RX:  refill  RX name and strength:losartan (COZAAR) 100 MG tablet 90 tablet  Directions (copy/paste from chart):   Sig - Route: Take 1 tablet (100 mg total) by mouth once daily.  Is this a 30 day or 90 day RX:  90  Local pharmacy or mail order pharmacy:  Mail order  Pharmacy name and phone # (copy/paste from chart): [facility] - [facility] [address]        [phone] (Phone)  [phone] (Fax)    Comments:     Please advise

## 2020-07-20 ENCOUNTER — OFFICE VISIT (OUTPATIENT)
Dept: URGENT CARE | Facility: CLINIC | Age: 75
End: 2020-07-20
Payer: MEDICARE

## 2020-07-20 VITALS
DIASTOLIC BLOOD PRESSURE: 88 MMHG | WEIGHT: 200 LBS | RESPIRATION RATE: 19 BRPM | OXYGEN SATURATION: 97 % | SYSTOLIC BLOOD PRESSURE: 139 MMHG | HEART RATE: 71 BPM | BODY MASS INDEX: 29.62 KG/M2 | HEIGHT: 69 IN | TEMPERATURE: 97 F

## 2020-07-20 DIAGNOSIS — R11.0 NAUSEA: Primary | ICD-10-CM

## 2020-07-20 DIAGNOSIS — U07.1 COVID-19: ICD-10-CM

## 2020-07-20 PROCEDURE — 99214 OFFICE O/P EST MOD 30 MIN: CPT | Mod: S$GLB,,, | Performed by: FAMILY MEDICINE

## 2020-07-20 PROCEDURE — U0003 INFECTIOUS AGENT DETECTION BY NUCLEIC ACID (DNA OR RNA); SEVERE ACUTE RESPIRATORY SYNDROME CORONAVIRUS 2 (SARS-COV-2) (CORONAVIRUS DISEASE [COVID-19]), AMPLIFIED PROBE TECHNIQUE, MAKING USE OF HIGH THROUGHPUT TECHNOLOGIES AS DESCRIBED BY CMS-2020-01-R: HCPCS | Mod: HCNC

## 2020-07-20 PROCEDURE — 99214 PR OFFICE/OUTPT VISIT, EST, LEVL IV, 30-39 MIN: ICD-10-PCS | Mod: S$GLB,,, | Performed by: FAMILY MEDICINE

## 2020-07-20 RX ORDER — TRIAMCINOLONE ACETONIDE 1 MG/G
CREAM TOPICAL
Status: ON HOLD | COMMUNITY
Start: 2020-06-02 | End: 2021-02-12 | Stop reason: HOSPADM

## 2020-07-20 RX ORDER — ONDANSETRON 4 MG/1
4 TABLET, ORALLY DISINTEGRATING ORAL EVERY 8 HOURS PRN
Qty: 20 TABLET | Refills: 0 | Status: SHIPPED | OUTPATIENT
Start: 2020-07-20 | End: 2020-07-27

## 2020-07-20 NOTE — PROGRESS NOTES
"Subjective:       Patient ID: Haja Marti is a 74 y.o. male.    Vitals:  height is 5' 9" (1.753 m) and weight is 90.7 kg (200 lb). His temperature is 96.8 °F (36 °C). His blood pressure is 139/88 and his pulse is 71. His respiration is 19 and oxygen saturation is 97%.     Chief Complaint: URI and COVID-19 Concerns    No abdominal pain only nausea , no taste or smell    URI   This is a new problem. The current episode started yesterday. The problem has been unchanged. There has been no fever. Associated symptoms include abdominal pain, nausea and vomiting. Pertinent negatives include no chest pain, congestion, coughing, diarrhea, dysuria, ear pain, headaches, joint pain, joint swelling, neck pain, plugged ear sensation, rash, rhinorrhea, sinus pain, sneezing, sore throat, swollen glands or wheezing. Treatments tried: pepdo. The treatment provided mild relief.       Constitution: Positive for appetite change. Negative for chills, sweating, fatigue and fever.   HENT: Negative for ear pain, congestion, sinus pain, sinus pressure, sore throat and voice change.    Neck: Negative for neck pain and painful lymph nodes.   Cardiovascular: Negative for chest pain.   Eyes: Negative for eye redness.   Respiratory: Negative for chest tightness, cough, sputum production, bloody sputum, COPD, shortness of breath, stridor, wheezing and asthma.    Gastrointestinal: Positive for abdominal pain, nausea and vomiting. Negative for diarrhea.   Genitourinary: Negative for dysuria.   Musculoskeletal: Negative for muscle ache.   Skin: Negative for rash.   Allergic/Immunologic: Negative for seasonal allergies, asthma and sneezing.   Neurological: Negative for headaches.   Hematologic/Lymphatic: Negative for swollen lymph nodes.       Objective:      Physical Exam   Constitutional: He is oriented to person, place, and time. He appears well-developed. He is cooperative.  Non-toxic appearance. He does not appear ill. No distress.   HENT: "   Head: Normocephalic and atraumatic.   Ears:   Right Ear: Hearing, tympanic membrane, external ear and ear canal normal.   Left Ear: Hearing, tympanic membrane, external ear and ear canal normal.   Nose: Nose normal. No mucosal edema, rhinorrhea or nasal deformity. No epistaxis. Right sinus exhibits no maxillary sinus tenderness and no frontal sinus tenderness. Left sinus exhibits no maxillary sinus tenderness and no frontal sinus tenderness.   Mouth/Throat: Uvula is midline, oropharynx is clear and moist and mucous membranes are normal. No trismus in the jaw. Normal dentition. No uvula swelling. No posterior oropharyngeal erythema.   Eyes: Conjunctivae and lids are normal. Right eye exhibits no discharge. Left eye exhibits no discharge. No scleral icterus.   Neck: Trachea normal, normal range of motion, full passive range of motion without pain and phonation normal. Neck supple.   Cardiovascular: Normal rate, regular rhythm, normal heart sounds and normal pulses.   Pulmonary/Chest: Effort normal and breath sounds normal. No respiratory distress.   Abdominal: Soft. Normal appearance and bowel sounds are normal. He exhibits no distension, no pulsatile midline mass and no mass. There is no abdominal tenderness.   Musculoskeletal: Normal range of motion.         General: No deformity.   Neurological: He is alert and oriented to person, place, and time. He exhibits normal muscle tone. Coordination normal.   Skin: Skin is warm, dry, intact, not diaphoretic and not pale. Psychiatric: His speech is normal and behavior is normal. Judgment and thought content normal.   Nursing note and vitals reviewed.        Assessment:       1. Nausea    2. COVID-19        Plan:         Nausea  -     COVID-19 Routine Screening  -     ondansetron (ZOFRAN-ODT) 4 MG TbDL; Take 1 tablet (4 mg total) by mouth every 8 (eight) hours as needed (nausea/vomit).  Dispense: 20 tablet; Refill: 0    COVID-19            [email or web address],DirectAddress_Unknown

## 2020-07-20 NOTE — PATIENT INSTRUCTIONS
Instructions for Patients with Confirmed or Suspected COVID-19    If you are awaiting your test result, you will either be called or it will be released to the patient portal.  If you have any questions about your test, please visit [email or web address] or call our COVID-19 information line at [phone].      Instructions for non-hospitalized or discharged patients with confirmed or suspected COVID-19:       Stay home except to get medical care.    Separate yourself from other people and animals in your home.    Call ahead before visiting your doctor.    Wear a face mask.    Cover your coughs and sneezes.    Clean your hands often.    Avoid sharing personal household items.    Clean all high-touch surfaces every day.    Monitor your symptoms. Seek prompt medical attention if your illness is worsening (e.g., difficulty breathing). Before seeking care, call your healthcare provider.    If you have a medical emergency and must call 911, notify the dispatcher that you have or are being evaluated for COVID-19. If possible, put on a face mask before emergency medical services arrive.    Use the following symptom-based strategy to return to normal activity following a suspected or confirmed case of COVID-19. Continue isolation until:   o At least 3 days (72 hours) have passed since recovery defined as resolution of fever without the use of fever-reducing medications and improvement in respiratory symptoms (e.g. cough, shortness of breath), and   o At least 10 days have passed since the first positive test.   Adding for Contact Tracing:    As one of the next steps, you will receive a call or text from the [place] Department of Health ([facility]) COVID-19 Tracing Team. See the contact information below so you know not to ignore the health departments call. It is important that you contact them back immediately so they can help.     Contact Tracer Number:  [phone]  Caller ID for most carriers: [place]  [facility]    What is contact tracing?   Contact tracing is a process that helps identify everyone who has been in close contact with an infected person. Contact tracers let those people know they may have been exposed and guide them on next steps. Confidentiality is important for everyone; no one will be told who may have exposed them to the virus.   Your involvement is important. The more we know about where and how this virus is spreading, the better chance we have at stopping it from spreading further.  What does exposure mean?   Exposure means you have been within 6 feet for more than 15 minutes with a person who has or had COVID-19.  What kind of questions do the contact tracers ask?   A contact tracer will confirm your basic contact information including name, address, phone number, and next of kin, as well as asking about any symptoms you may have had. Theyll also ask you how you think you may have gotten sick, such as places where you may have been exposed to the virus, and people you were with. Those names will never be shared with anyone outside of that call, and will only be used to help trace and stop the spread of the virus.   I have privacy concerns. How will the state use my information?   Your privacy about your health is important. All calls are completed using call centers that use the appropriate health privacy protection measures (HIPAA compliance), meaning that your patient information is safe. No one will ever ask you any questions related to immigration status. Your health comes first.   Do I have to participate?   You do not have to participate, but we strongly encourage you to. Contact tracing can help us catch and control new outbreaks as theyre developing to keep your friends and family safe.   What if I dont hear from anyone?   If you dont receive a call within 24 hours, you can call the number above right away to inquire about your status. That line is open from 8:00 am -  8:00 p.m., 7 days a week.  Contact tracing saves lives! Together, we have the power to beat this virus and keep our loved ones and neighbors safe.       Instructions for household members, intimate partners and caregivers in a non-healthcare setting of a patient with confirmed or suspected COVID-19:         Close contacts should monitor their health and call their healthcare provider right away if they develop symptoms suggestive of COVID-19 (e.g., fever, cough, shortness of breath).    Stay home except to get medical care. Separate yourself from other people and animals in the home.   Monitor the patients symptoms. If the patient is getting sicker, call his or her healthcare provider. If the patient has a medical emergency and you need to call 911, notify the dispatch personnel that the patient has or is being evaluated for COVID-19.    Wear a facemask when around other people such as sharing a room or vehicle and before entering a healthcare provider's office.   Cover coughs and sneezes with a tissue. Throw used tissues in a lined trash can immediately and wash hands.   Clean hands often with soap and water for at least 20 seconds or with an alcohol-based hand , rubbing hands together until they feel dry. Avoid touching your eyes, nose, and mouth with unwashed hands.   Clean all high-touch; surfaces every day, including counters, tabletops, doorknobs, bathroom fixtures, toilets, phones, keyboards, tablets, bedside tables, etc. Use a household cleaning spray or wipe according to label instructions.   Avoid sharing personal household items such as dishes, drinking glasses, cups, towels, bedding, etc. After these items are used, they should be washed thoroughly with soap and water.   Continue isolation until:   At least 3 days (72 hours) have passed since recovery defined as resolution of fever without the use of fever-reducing medications and improvement in respiratory symptoms (e.g. cough,  shortness of breath), and    At least 10 days have passed since the patients first positive test.    https://www.cdc.gov/coronavirus/2019-ncov/your-health/index.htm

## 2020-07-21 ENCOUNTER — OFFICE VISIT (OUTPATIENT)
Dept: PSYCHIATRY | Facility: CLINIC | Age: 75
End: 2020-07-21
Payer: MEDICARE

## 2020-07-21 DIAGNOSIS — F41.1 GENERALIZED ANXIETY DISORDER: Primary | ICD-10-CM

## 2020-07-21 PROCEDURE — 90832 PR PSYCHOTHERAPY W/PATIENT, 30 MIN: ICD-10-PCS | Mod: HCNC,95,, | Performed by: SOCIAL WORKER

## 2020-07-21 PROCEDURE — 90785 PSYTX COMPLEX INTERACTIVE: CPT | Mod: HCNC,95,, | Performed by: SOCIAL WORKER

## 2020-07-21 PROCEDURE — 90785 PR INTERACTIVE COMPLEXITY: ICD-10-PCS | Mod: HCNC,95,, | Performed by: SOCIAL WORKER

## 2020-07-21 PROCEDURE — 90832 PSYTX W PT 30 MINUTES: CPT | Mod: HCNC,95,, | Performed by: SOCIAL WORKER

## 2020-07-21 NOTE — PROGRESS NOTES
Individual Psychotherapy (PhD/LCSW)    7/21/2020    Site:  [facility]         Therapeutic Intervention: Met with patient and spouse.  Outpatient - Insight oriented psychotherapy 45 min - CPT code 25393    Chief complaint/reason for encounter: depression and anxiety     Interval history and content of current session:      Site: [facility]    Length of service: 45         Therapeutic intervention:     Persons present: spouse and pt  Whitley (needed to help with interpretation of intervention due to pt cognitive decline due to Parkinsons).    Interval history:      The patient location is: home   The chief complaint leading to consultation is: anxiety/depression  Visit type: audiovisual  Total time spent with patient: 45 min  Each patient to whom he or she provides medical services by telemedicine is:  (1) informed of the relationship between the physician and patient and the respective role of any other health care provider with respect to management of the patient; and (2) notified that he or she may decline to receive medical services by telemedicine and may withdraw from such care at any time.    Notes:     cbt for insomnia.   Wife present.  Facilitates communication.               Target symptoms: depression, anxiety        Progress toward goals: progressing well    Diagnosis: generalized anxiety disorder      Treatment plan:  · Target symptoms: anxiety   · Why chosen therapy is appropriate versus another modality: relevant to diagnosis, evidence based practice  · Outcome monitoring methods: self-report, observation, feedback from family  · Therapeutic intervention type: behavior modifying psychotherapy    Risk parameters:  Patient reports no suicidal ideation  Patient reports no homicidal ideation  Patient reports no self-injurious behavior  Patient reports no violent behavior    Verbal deficits: None    Patient's response to intervention:  The patient's response to intervention is accepting,  motivated.        Progress toward goals and other mental status changes:  The patient's progress toward goals is fair .    Diagnosis:     ICD-10-CM ICD-9-CM   1. Generalized anxiety disorder F41.1 300.02       Plan:  individual psychotherapy and family psychotherapy    Return to clinic: as scheduled    Length of Service (minutes): 45

## 2020-07-21 NOTE — PROGRESS NOTES
Individual Psychotherapy (PhD/LCSW)    7/21/2020    Site:  [facility]         Therapeutic Intervention: Met with patient and spouse.  Outpatient - Insight oriented psychotherapy 30 min - CPT code 93182    Chief complaint/reason for encounter: depression and anxiety     Interval history and content of current session: he has been sick for the last 3 days.  Went to urgent care and awaiting corona results.  He looks weak and feels sick.  Spouse at his side.  Supportive therapy.  Session made shorter than the usual 45 minutes due to his physical state.       Treatment plan:  · Target symptoms: depression, anxiety   · Why chosen therapy is appropriate versus another modality: relevant to diagnosis  · Outcome monitoring methods: self-report, observation, feedback from family  · Therapeutic intervention type: behavior modifying psychotherapy, supportive psychotherapy    Risk parameters:  Patient reports no suicidal ideation  Patient reports no homicidal ideation  Patient reports no self-injurious behavior  Patient reports no violent behavior    Verbal deficits: None    Patient's response to intervention:  The patient's response to intervention is accepting.    Progress toward goals and other mental status changes:  The patient's progress toward goals is fair .    Diagnosis:     ICD-10-CM ICD-9-CM   1. Generalized anxiety disorder  F41.1 300.02       Plan:  individual psychotherapy    Return to clinic: 1 month    Length of Service (minutes): 30

## 2020-07-23 ENCOUNTER — TELEPHONE (OUTPATIENT)
Dept: URGENT CARE | Facility: CLINIC | Age: 75
End: 2020-07-23

## 2020-07-23 LAB — SARS-COV-2 RNA RESP QL NAA+PROBE: NOT DETECTED

## 2020-07-23 NOTE — TELEPHONE ENCOUNTER
Pt called requesting lab results for his COVID 19 swab test he did during his 07/20/2020 Urgent Care visit.    Pt informed results has not been final; still in process.

## 2020-07-24 ENCOUNTER — TELEPHONE (OUTPATIENT)
Dept: URGENT CARE | Facility: CLINIC | Age: 75
End: 2020-07-24

## 2020-07-24 NOTE — TELEPHONE ENCOUNTER
----- Message from Adalberto Oquendo MD sent at 7/23/2020  6:22 PM CDT -----  Call and let know covid swab negative

## 2020-07-29 ENCOUNTER — TELEPHONE (OUTPATIENT)
Dept: URGENT CARE | Facility: CLINIC | Age: 75
End: 2020-07-29

## 2020-07-31 NOTE — TELEPHONE ENCOUNTER
----- Message from Estrada Velazco sent at 7/31/2020  2:17 PM CDT -----  Contact: pt  Please call pt at [phone]    Refill request for carbidopa-levodopa  mg (SINEMET)  mg per tablet    Use [facility] - [place], [place] - [address] [phone] (Phone)  [phone] (Fax)    Patient has 1 week  supply left     Thank you

## 2020-08-04 ENCOUNTER — LAB VISIT (OUTPATIENT)
Dept: LAB | Facility: HOSPITAL | Age: 75
End: 2020-08-04
Attending: UROLOGY
Payer: MEDICARE

## 2020-08-04 DIAGNOSIS — N40.0 ENLARGED PROSTATE: ICD-10-CM

## 2020-08-04 PROCEDURE — 84153 ASSAY OF PSA TOTAL: CPT | Mod: HCNC

## 2020-08-04 PROCEDURE — 36415 COLL VENOUS BLD VENIPUNCTURE: CPT | Mod: HCNC,PO

## 2020-08-04 RX ORDER — CARBIDOPA AND LEVODOPA 25; 100 MG/1; MG/1
TABLET ORAL
Qty: 450 TABLET | Refills: 3 | Status: SHIPPED | OUTPATIENT
Start: 2020-08-04 | End: 2020-11-09 | Stop reason: SDUPTHER

## 2020-08-05 LAB — COMPLEXED PSA SERPL-MCNC: 0.43 NG/ML (ref 0–4)

## 2020-08-14 ENCOUNTER — OFFICE VISIT (OUTPATIENT)
Dept: UROLOGY | Facility: CLINIC | Age: 75
End: 2020-08-14
Payer: MEDICARE

## 2020-08-14 VITALS
DIASTOLIC BLOOD PRESSURE: 68 MMHG | SYSTOLIC BLOOD PRESSURE: 124 MMHG | HEIGHT: 69 IN | HEART RATE: 75 BPM | BODY MASS INDEX: 29.61 KG/M2 | WEIGHT: 199.94 LBS

## 2020-08-14 DIAGNOSIS — N40.0 ENLARGED PROSTATE: Primary | ICD-10-CM

## 2020-08-14 DIAGNOSIS — R39.15 URINARY URGENCY: ICD-10-CM

## 2020-08-14 DIAGNOSIS — N20.0 KIDNEY STONES: ICD-10-CM

## 2020-08-14 PROCEDURE — 1100F PTFALLS ASSESS-DOCD GE2>/YR: CPT | Mod: HCNC,CPTII,S$GLB, | Performed by: UROLOGY

## 2020-08-14 PROCEDURE — 1159F PR MEDICATION LIST DOCUMENTED IN MEDICAL RECORD: ICD-10-PCS | Mod: HCNC,S$GLB,, | Performed by: UROLOGY

## 2020-08-14 PROCEDURE — 3078F DIAST BP <80 MM HG: CPT | Mod: HCNC,CPTII,S$GLB, | Performed by: UROLOGY

## 2020-08-14 PROCEDURE — 3008F BODY MASS INDEX DOCD: CPT | Mod: HCNC,CPTII,S$GLB, | Performed by: UROLOGY

## 2020-08-14 PROCEDURE — 3074F PR MOST RECENT SYSTOLIC BLOOD PRESSURE < 130 MM HG: ICD-10-PCS | Mod: HCNC,CPTII,S$GLB, | Performed by: UROLOGY

## 2020-08-14 PROCEDURE — 3074F SYST BP LT 130 MM HG: CPT | Mod: HCNC,CPTII,S$GLB, | Performed by: UROLOGY

## 2020-08-14 PROCEDURE — 1159F MED LIST DOCD IN RCRD: CPT | Mod: HCNC,S$GLB,, | Performed by: UROLOGY

## 2020-08-14 PROCEDURE — 99214 OFFICE O/P EST MOD 30 MIN: CPT | Mod: HCNC,S$GLB,, | Performed by: UROLOGY

## 2020-08-14 PROCEDURE — 3288F FALL RISK ASSESSMENT DOCD: CPT | Mod: HCNC,CPTII,S$GLB, | Performed by: UROLOGY

## 2020-08-14 PROCEDURE — 1126F PR PAIN SEVERITY QUANTIFIED, NO PAIN PRESENT: ICD-10-PCS | Mod: HCNC,S$GLB,, | Performed by: UROLOGY

## 2020-08-14 PROCEDURE — 3288F PR FALLS RISK ASSESSMENT DOCUMENTED: ICD-10-PCS | Mod: HCNC,CPTII,S$GLB, | Performed by: UROLOGY

## 2020-08-14 PROCEDURE — 3078F PR MOST RECENT DIASTOLIC BLOOD PRESSURE < 80 MM HG: ICD-10-PCS | Mod: HCNC,CPTII,S$GLB, | Performed by: UROLOGY

## 2020-08-14 PROCEDURE — 1126F AMNT PAIN NOTED NONE PRSNT: CPT | Mod: HCNC,S$GLB,, | Performed by: UROLOGY

## 2020-08-14 PROCEDURE — 3008F PR BODY MASS INDEX (BMI) DOCUMENTED: ICD-10-PCS | Mod: HCNC,CPTII,S$GLB, | Performed by: UROLOGY

## 2020-08-14 PROCEDURE — 1100F PR PT FALLS ASSESS DOC 2+ FALLS/FALL W/INJURY/YR: ICD-10-PCS | Mod: HCNC,CPTII,S$GLB, | Performed by: UROLOGY

## 2020-08-14 PROCEDURE — 99214 PR OFFICE/OUTPT VISIT, EST, LEVL IV, 30-39 MIN: ICD-10-PCS | Mod: HCNC,S$GLB,, | Performed by: UROLOGY

## 2020-08-14 RX ORDER — FINASTERIDE 5 MG/1
5 TABLET, FILM COATED ORAL DAILY
Qty: 90 TABLET | Refills: 3 | Status: SHIPPED | OUTPATIENT
Start: 2020-08-14 | End: 2021-05-03

## 2020-08-14 RX ORDER — DONEPEZIL HYDROCHLORIDE 10 MG/1
TABLET, FILM COATED ORAL
COMMUNITY
Start: 2020-08-01 | End: 2020-10-27

## 2020-08-14 NOTE — PROGRESS NOTES
Subjective:      Haja Marti is a 74 y.o. male who returns today regarding his     Follow-up for enlarged prostate, overactive bladder and history of kidney stones.  No new complaints.    Dealing with parkinson's    Severe nighttime freq but drinks large amounts of water around bedtime  Restricting fluid at bedtime helps    Minimal daytime symptoms but occasional urgency incontinence    The following portions of the patient's history were reviewed and updated as appropriate: allergies, current medications, past family history, past medical history, past social history, past surgical history and problem list.    Review of Systems  Pertinent items are noted in HPI.  A comprehensive multipoint review of systems was negative except as otherwise stated in the HPI.     Objective:   Vitals: There were no vitals taken for this visit.    Physical Exam   General: alert and oriented, no acute distress  Respiratory: Symmetric expansion, non-labored breathing  Cardiovascular: normal to inspection  Abdomen: non distended   Skin: normal coloration and turgor, no rashes, no suspicious skin lesions noted  Neuro: no gross deficits  Psych: normal judgment and insight, normal mood/affect and non-anxious  DEVI 35g no nodules    Physical Exam    Lab Review   Urinalysis demonstrates negative for all components  Lab Results   Component Value Date    WBC 6.73 10/15/2019    HGB 15.5 10/15/2019    HCT 47.1 10/15/2019    MCV 94 10/15/2019     10/15/2019     Lab Results   Component Value Date    CREATININE 1.1 10/15/2019    BUN 23 10/15/2019     Lab Results   Component Value Date    PSA 1.1 08/22/2018    PSA 1.4 06/07/2017    PSA 0.63 06/02/2016    PSADIAG 0.43 08/04/2020    PSADIAG 0.61 08/16/2019    PSADIAG 0.67 06/15/2015         Imaging  pvr 11cc    Assessment and Plan:   Enlarged prostate  Continue Proscar  Patient prefers to continue checking PSA yearly    Urinary urgency  Avoid pm fluids  Avoid caffeine and alcohol  Timed  voiding    Discussed anticholergics and similar meds  Discussed referral to Dr Butler for eval for possible neurogenic bladder; pt prefers not to have any additional eval at this time      Kidney stones stone free on most recent KUB  Cont K citrate  Follow up with Dr Wright      Return to clinic 1 year with PSA for DEVI

## 2020-08-17 ENCOUNTER — OFFICE VISIT (OUTPATIENT)
Dept: NEUROLOGY | Facility: CLINIC | Age: 75
End: 2020-08-17
Payer: MEDICARE

## 2020-08-17 VITALS
HEIGHT: 69 IN | BODY MASS INDEX: 29.78 KG/M2 | SYSTOLIC BLOOD PRESSURE: 99 MMHG | DIASTOLIC BLOOD PRESSURE: 65 MMHG | HEART RATE: 81 BPM | WEIGHT: 201.06 LBS

## 2020-08-17 DIAGNOSIS — G20.A1 PARKINSON DISEASE: Primary | ICD-10-CM

## 2020-08-17 PROCEDURE — 3074F PR MOST RECENT SYSTOLIC BLOOD PRESSURE < 130 MM HG: ICD-10-PCS | Mod: HCNC,CPTII,S$GLB, | Performed by: PSYCHIATRY & NEUROLOGY

## 2020-08-17 PROCEDURE — 1159F PR MEDICATION LIST DOCUMENTED IN MEDICAL RECORD: ICD-10-PCS | Mod: HCNC,S$GLB,, | Performed by: PSYCHIATRY & NEUROLOGY

## 2020-08-17 PROCEDURE — 1100F PR PT FALLS ASSESS DOC 2+ FALLS/FALL W/INJURY/YR: ICD-10-PCS | Mod: HCNC,CPTII,S$GLB, | Performed by: PSYCHIATRY & NEUROLOGY

## 2020-08-17 PROCEDURE — 1126F PR PAIN SEVERITY QUANTIFIED, NO PAIN PRESENT: ICD-10-PCS | Mod: HCNC,S$GLB,, | Performed by: PSYCHIATRY & NEUROLOGY

## 2020-08-17 PROCEDURE — 1100F PTFALLS ASSESS-DOCD GE2>/YR: CPT | Mod: HCNC,CPTII,S$GLB, | Performed by: PSYCHIATRY & NEUROLOGY

## 2020-08-17 PROCEDURE — 1159F MED LIST DOCD IN RCRD: CPT | Mod: HCNC,S$GLB,, | Performed by: PSYCHIATRY & NEUROLOGY

## 2020-08-17 PROCEDURE — 1126F AMNT PAIN NOTED NONE PRSNT: CPT | Mod: HCNC,S$GLB,, | Performed by: PSYCHIATRY & NEUROLOGY

## 2020-08-17 PROCEDURE — 99999 PR PBB SHADOW E&M-EST. PATIENT-LVL III: ICD-10-PCS | Mod: PBBFAC,HCNC,, | Performed by: PSYCHIATRY & NEUROLOGY

## 2020-08-17 PROCEDURE — 3078F PR MOST RECENT DIASTOLIC BLOOD PRESSURE < 80 MM HG: ICD-10-PCS | Mod: HCNC,CPTII,S$GLB, | Performed by: PSYCHIATRY & NEUROLOGY

## 2020-08-17 PROCEDURE — 3074F SYST BP LT 130 MM HG: CPT | Mod: HCNC,CPTII,S$GLB, | Performed by: PSYCHIATRY & NEUROLOGY

## 2020-08-17 PROCEDURE — 3288F PR FALLS RISK ASSESSMENT DOCUMENTED: ICD-10-PCS | Mod: HCNC,CPTII,S$GLB, | Performed by: PSYCHIATRY & NEUROLOGY

## 2020-08-17 PROCEDURE — 3078F DIAST BP <80 MM HG: CPT | Mod: HCNC,CPTII,S$GLB, | Performed by: PSYCHIATRY & NEUROLOGY

## 2020-08-17 PROCEDURE — 3288F FALL RISK ASSESSMENT DOCD: CPT | Mod: HCNC,CPTII,S$GLB, | Performed by: PSYCHIATRY & NEUROLOGY

## 2020-08-17 PROCEDURE — 99999 PR PBB SHADOW E&M-EST. PATIENT-LVL III: CPT | Mod: PBBFAC,HCNC,, | Performed by: PSYCHIATRY & NEUROLOGY

## 2020-08-17 PROCEDURE — 99214 PR OFFICE/OUTPT VISIT, EST, LEVL IV, 30-39 MIN: ICD-10-PCS | Mod: HCNC,S$GLB,, | Performed by: PSYCHIATRY & NEUROLOGY

## 2020-08-17 PROCEDURE — 99214 OFFICE O/P EST MOD 30 MIN: CPT | Mod: HCNC,S$GLB,, | Performed by: PSYCHIATRY & NEUROLOGY

## 2020-08-17 PROCEDURE — 3008F BODY MASS INDEX DOCD: CPT | Mod: HCNC,CPTII,S$GLB, | Performed by: PSYCHIATRY & NEUROLOGY

## 2020-08-17 PROCEDURE — 3008F PR BODY MASS INDEX (BMI) DOCUMENTED: ICD-10-PCS | Mod: HCNC,CPTII,S$GLB, | Performed by: PSYCHIATRY & NEUROLOGY

## 2020-08-17 NOTE — PROGRESS NOTES
"  Name: Haja Marti  MRN: [id number]   CSN: [id number]      Date: 08/17/2020    Chief Complaint / Interval History:  - stopped donepezil after having more benefit and more hallucinations  - still going to Boxing class [employer], now reopened  - more fatigued now, wonders if getting into shape and thinks its "more than that"  - not taking BPs at home regularly, doesn't recall    From March 2020:  - MORE TROUBLE WITH GAIT NOW, cannot complete Boxing classes as frequently  - more aching legs, but no other sxs of neurogenic claudication  - voice is lower and more raspy  - more episodes of confusion, has had visual hallucinations of arms in the hallway, sees fish in the toilet  - more issues with balancing checkbook, more decision making issues  - wife is a helping a lot  - slower decision making    From Aug 2019:   - back feels better, some stiffness, but no back pain  - surgery was a success!  - no issues with the meds, even cut back the klonopin  - not too anxious  - sleeping well  - might go to     From May 2019:  - had surgery on May 1, at [facility], multiple level L2-5  - had post-op confusion and sundowning  - now feeling better, a little confused at home  - taking very rare pain meds  - planning PT, right now has home health for 3 weeks, then see what he needs  - PD symptoms seem to be about    From Feb 22, 2019:  - recent back pain  - medrol dose mariel helped take from 10 --> 5  - [facility]  - worse in hte AM, better with time and stooping over- gabapentin not effective  - PD symptoms are doing   - Ibuprofen x 3- 4 tabs usually helps        - saw vipin in NOv    From Nov  - more fatigue  - exercising some  - feels pretty stable  -  Good support with family  - little off time  -  No dyskinesia  - exercising  - no bowel/bladder changes  - mild AM LH and non-motor complications of near-syncope    From Aug 2018:  - having much more fatigue, lays down to feel better  - balance " "and gait about the same  -  Still constipated   - watching his BP, has been more normal for other doctors    From April 2018:  - balance and gait is better   - taking miralax as needed, usually takes 2-3 days to move bowels  - not drinking much water    From January 2018:  - azilect had some insomnia  - restarted the cd/ld 1/2 tid - wife thinks all is a little better but not dramatic  - voice particularly better  - no recurrence of hallucinations on the low dose cd/ld  -  He thinks the new cd/ld has leveled out, but anxiety is worse, for nearly every event in the family  - he is ruminating about having to give a speech in the next month and he is very nervous    History of Present Illness (HPI):  70 yo     2+ years of progressive stooping posture, shuffling feet, slower walking, struggles to get out of a chair.  Handwriting has gotten "terrible."  Slower with dressing overall, and with most things.    Is a cautious , but has a tendency to pull to the R side of the road (correcting to the left).  More issues with parking, no issues moving backwards but uses side mirrors.  No accidents.      For about 6 months, there has been a progressive hoarseness of the voice.  Comes and goes, not clear timing.      No tremor.      Saw Dr. 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  RBD/sleep issues (Constitutional)?Yes - fights in his sleep, jumps out of bed, falls out over 5 years, talking for 30 years.  Depression/anxiety (Psychiatric)?Yes  Fatigue (Constitutional)?Yes  Constipation (GI)?Yes  Urinary issues ()?Yes  Sexual dysfunction ()?Yes  -ED  Orthostasis (Cardiovascular)?Yes  Leg swelling (Cardiovascular)? No  Falls (Musculoskeletal)?Yes - has had a couple of falls, more unsteady on a boat.    Cognitive impairment (Neurologic)?Yes - a little " slower overall  Psychoses (Psychiatric)?Yes - recently had experience thinking he saw people in his den, non-descript faces; 2 years ago, had a experience of seeing someone in the bedroom.  He's had no clear delusions.   Pain/Paresthesia (Neurologic)?Yes - lumbar for years, some disability, helped with pain management.  Visual changes (Eyes)?No  Moles / skin changes (Skin)?No  Stridor / SOB (Pulm)?No  Bruising (Heme)?No    Past Medical History: The patient  has a past medical history of Anxiety, BPH (benign prostatic hypertrophy), Cataract, Colon polyp (2002), Depression, Epiretinal membrane, both eyes, psychiatric care, Hyperlipidemia, Hypertension, Kidney stones, Kidney stones, Parkinsons, Posterior vitreous detachment of left eye, Psychiatric problem, Retinal detachment (2004), Retinoschisis, left eye, Sleep apnea, and Vitreous hemorrhage of left eye.    Social History: The patient  reports that he has never smoked. He has never used smokeless tobacco. He reports that he does not drink alcohol or use drugs.    Family History: Their family history includes Anxiety disorder in his daughter and son; Cancer in his father and mother; Cataracts in his father; Depression in his son; Diabetes in his father.    Allergies: Patient has no known allergies.     Meds:   Current Outpatient Medications on File Prior to Visit   Medication Sig Dispense Refill    atorvastatin (LIPITOR) 40 MG tablet Take 1 tablet (40 mg total) by mouth once daily. 90 tablet 3    carbidopa-levodopa  mg (SINEMET)  mg per tablet TAKE 1 AND 1/2 TABLETS THREE TIMES DAILY 450 tablet 3    clonazePAM (KLONOPIN) 0.5 MG tablet Take oral 1/2 to 1 tablet TID prn anxiety 180 tablet 1    donepeziL (ARICEPT) 10 MG tablet       ergocalciferol (VITAMIN D2) 50,000 unit Cap Take 50,000 Units by mouth every 30 days.       finasteride (PROSCAR) 5 mg tablet Take 1 tablet (5 mg total) by mouth once daily. 90 tablet 3    FLUAD 4319-9702, 65 YR UP,,PF, 45  "mcg (15 mcg x 3)/0.5 mL Syrg ADM 0.5ML IM UTD  0    L.acid-B.bifidum-B.animal-FOS 25 billion cell -100 mg Cap Take by mouth.      losartan (COZAAR) 100 MG tablet Take 1 tablet (100 mg total) by mouth once daily. 90 tablet 1    magnesium oxide (MAG-OX) 400 mg tablet Take 400 mg by mouth once daily.      potassium citrate (UROCIT-K) 10 mEq (1,080 mg) TbSR TAKE 1 TABLET THREE TIMES DAILY WITH MEALS (Patient taking differently: Take 10 mEq by mouth 2 (two) times daily with meals. ) 270 tablet 3    pyridoxine (VITAMIN B-6) 50 MG Tab Take 50 mg by mouth once daily.      sertraline (ZOLOFT) 100 MG tablet Take 2 tablets (200 mg total) by mouth once daily. 180 tablet 2    triamcinolone acetonide 0.1% (KENALOG) 0.1 % cream APPLY TO AFFECTED AREA ONCE OR TWICE DAILY.       No current facility-administered medications on file prior to visit.      Exam:  BP 99/65   Pulse 81   Ht 5' 9" (1.753 m)   Wt 91.2 kg (201 lb 1 oz)   BMI 29.69 kg/m²     * Specialized movement exam  Severe hypophonic speech.    ++ mouth sl open, facial masking.   L>R mild-mod cogwheel rigidity.     L>R mild-mod bradykinesia.   No tremor with rest, posture, kinesis, or intention.    No other dystonia, chorea, athetosis, myoclonus, or tics.   No motor impersistence.   Normal-based gait.   Mildly shortened stride length.  + abnormal arm swing.     No postural instability.      Laboratory/Radiological:  - Results:  Lab Visit on 08/04/2020   Component Date Value Ref Range Status    PSA DIAGNOSTIC 08/04/2020 0.43  0.00 - 4.00 ng/mL Final   Office Visit on 07/20/2020   Component Date Value Ref Range Status    SARS-CoV2 (COVID-19) Qualitative P* 07/20/2020 Not Detected  Not Detected Final     - Independent review of images:    Diagnoses:          1) Parkinsonism, likely iPD (vs. DLB given the early neuropsychiatric features, but those have resolved)  2) Neurogenic claudication 2/2 L5 --> had multiple levels done on May 1, 2019 (L2-5).    A) need to get " his NOH under control, sent message to Ramiro DIALLO) may need to boost ldopa, but needs to be aware of visual hallucinations and worsened orthostasis          Edward Ivey MD, MPH  Division of Movement and Memory Disorders  [facility]  [phone]

## 2020-08-24 ENCOUNTER — OFFICE VISIT (OUTPATIENT)
Dept: PSYCHIATRY | Facility: CLINIC | Age: 75
End: 2020-08-24
Payer: MEDICARE

## 2020-08-24 DIAGNOSIS — F32.A DEPRESSION, UNSPECIFIED DEPRESSION TYPE: ICD-10-CM

## 2020-08-24 DIAGNOSIS — F41.1 GENERALIZED ANXIETY DISORDER: Primary | ICD-10-CM

## 2020-08-24 PROCEDURE — 90834 PSYTX W PT 45 MINUTES: CPT | Mod: HCNC,95,, | Performed by: SOCIAL WORKER

## 2020-08-24 PROCEDURE — 90834 PR PSYCHOTHERAPY W/PATIENT, 45 MIN: ICD-10-PCS | Mod: HCNC,95,, | Performed by: SOCIAL WORKER

## 2020-08-24 NOTE — PROGRESS NOTES
Individual Psychotherapy (PhD/LCSW)    8/24/2020    Site:  [facility]         Therapeutic Intervention: Met with patient and spouse.  Outpatient - Insight oriented psychotherapy 45 min - CPT code 46127    Chief complaint/reason for encounter: depression and anxiety     Interval history and content of current session:      Site: [facility]    Length of service: 45         Therapeutic intervention:     Persons present: spouse and pt  Whitley (needed to help with interpretation of intervention due to pt cognitive decline due to Parkinsons).    Interval history:      The patient location is: home   The chief complaint leading to consultation is: anxiety/depression  Visit type: audiovisual  Total time spent with patient: 45 min  Each patient to whom he or she provides medical services by telemedicine is:  (1) informed of the relationship between the physician and patient and the respective role of any other health care provider with respect to management of the patient; and (2) notified that he or she may decline to receive medical services by telemedicine and may withdraw from such care at any time.    Notes:     First cousin committed suicide.  No one knew cousin was feeling this way.  Grief therapy.  Pt denies suicidal ideation.  His guns have no bullets.  He is [employer] and does not believe suicide is a liable option.  He is angry at cousin for doing this.  He has a natural reaction of sadness to what happened.  Wife reports she thinks pt has handled what happened really well.  Pt did not have corona visus.               Target symptoms: depression, anxiety        Progress toward goals: progressing well    Diagnosis: generalized anxiety disorder      Treatment plan:  · Target symptoms: anxiety   · Why chosen therapy is appropriate versus another modality: relevant to diagnosis, evidence based practice  · Outcome monitoring methods: self-report, observation, feedback from family  · Therapeutic  intervention type: behavior modifying psychotherapy    Risk parameters:  Patient reports no suicidal ideation  Patient reports no homicidal ideation  Patient reports no self-injurious behavior  Patient reports no violent behavior    Verbal deficits: None    Patient's response to intervention:  The patient's response to intervention is accepting, motivated.        Progress toward goals and other mental status changes:  The patient's progress toward goals is fair .    Diagnosis:     ICD-10-CM ICD-9-CM   1. Generalized anxiety disorder F41.1 300.02       Plan:  individual psychotherapy and family psychotherapy    Return to clinic: as scheduled    Length of Service (minutes): 45

## 2020-09-16 ENCOUNTER — PATIENT MESSAGE (OUTPATIENT)
Dept: NEUROLOGY | Facility: CLINIC | Age: 75
End: 2020-09-16

## 2020-09-22 ENCOUNTER — OFFICE VISIT (OUTPATIENT)
Dept: PSYCHIATRY | Facility: CLINIC | Age: 75
End: 2020-09-22
Payer: MEDICARE

## 2020-09-22 DIAGNOSIS — F41.1 GENERALIZED ANXIETY DISORDER: Primary | ICD-10-CM

## 2020-09-22 PROCEDURE — 90834 PR PSYCHOTHERAPY W/PATIENT, 45 MIN: ICD-10-PCS | Mod: HCNC,95,, | Performed by: SOCIAL WORKER

## 2020-09-22 PROCEDURE — 90834 PSYTX W PT 45 MINUTES: CPT | Mod: HCNC,95,, | Performed by: SOCIAL WORKER

## 2020-09-22 PROCEDURE — 90785 PSYTX COMPLEX INTERACTIVE: CPT | Mod: HCNC,95,, | Performed by: SOCIAL WORKER

## 2020-09-22 PROCEDURE — 90785 PR INTERACTIVE COMPLEXITY: ICD-10-PCS | Mod: HCNC,95,, | Performed by: SOCIAL WORKER

## 2020-09-22 NOTE — PROGRESS NOTES
Individual Psychotherapy (PhD/LCSW)    9/22/2020    Site:  [facility]         Therapeutic Intervention: Met with patient and spouse.  Outpatient - Insight oriented psychotherapy 45 min - CPT code 48065    Chief complaint/reason for encounter: depression and anxiety     Interval history and content of current session:      Site: [facility]    Length of service: 45         Therapeutic intervention:     Persons present: spouse and pt  Whitley (needed to help with interpretation of intervention due to pt cognitive decline due to Parkinsons).    Interval history:      The patient location is: home   The chief complaint leading to consultation is: anxiety/depression  Visit type: audiovisual  Total time spent with patient: 45 min  Each patient to whom he or she provides medical services by telemedicine is:  (1) informed of the relationship between the physician and patient and the respective role of any other health care provider with respect to management of the patient; and (2) notified that he or she may decline to receive medical services by telemedicine and may withdraw from such care at any time.    Notes:     First cousin committed suicide, shot self.   Had alcohol use disorder.    Pt has been worrying about son as he had lost his job.   Son's wife still employed.    Mindfulness of acceptance of anxiety.    Pt and spouse very involved in process as usual.  His anxiety has been worse in the mornings.               Target symptoms: depression, anxiety        Progress toward goals: progressing well    Diagnosis: generalized anxiety disorder       Treatment plan:  · Target symptoms: anxiety   · Why chosen therapy is appropriate versus another modality: relevant to diagnosis, evidence based practice  · Outcome monitoring methods: self-report, observation, feedback from family  · Therapeutic intervention type: behavior modifying psychotherapy    Risk parameters:  Patient reports no suicidal  ideation  Patient reports no homicidal ideation  Patient reports no self-injurious behavior  Patient reports no violent behavior    Verbal deficits: None           Patient's response to intervention:  The patient's response to intervention is accepting, motivated.        Progress toward goals and other mental status changes:  The patient's progress toward goals is fair .    Diagnosis:     ICD-10-CM ICD-9-CM   1. Generalized anxiety disorder F41.1 300.02       Plan:  individual psychotherapy and family psychotherapy    Return to clinic: as scheduled    Length of Service (minutes): 45

## 2020-09-29 ENCOUNTER — PATIENT MESSAGE (OUTPATIENT)
Dept: OTHER | Facility: OTHER | Age: 75
End: 2020-09-29

## 2020-10-13 ENCOUNTER — LAB VISIT (OUTPATIENT)
Dept: LAB | Facility: HOSPITAL | Age: 75
End: 2020-10-13
Payer: MEDICARE

## 2020-10-13 DIAGNOSIS — F32.A DEPRESSION, UNSPECIFIED DEPRESSION TYPE: ICD-10-CM

## 2020-10-13 DIAGNOSIS — Z12.11 SCREEN FOR COLON CANCER: ICD-10-CM

## 2020-10-13 DIAGNOSIS — N40.0 ENLARGED PROSTATE: ICD-10-CM

## 2020-10-13 DIAGNOSIS — N40.0 BENIGN PROSTATIC HYPERPLASIA WITHOUT LOWER URINARY TRACT SYMPTOMS: ICD-10-CM

## 2020-10-13 DIAGNOSIS — M47.816 LUMBAR SPONDYLOSIS: ICD-10-CM

## 2020-10-13 DIAGNOSIS — I10 ESSENTIAL HYPERTENSION: ICD-10-CM

## 2020-10-13 DIAGNOSIS — Z00.00 ANNUAL PHYSICAL EXAM: ICD-10-CM

## 2020-10-13 DIAGNOSIS — G20.C PARKINSONISM, UNSPECIFIED PARKINSONISM TYPE: ICD-10-CM

## 2020-10-13 DIAGNOSIS — E78.5 HYPERLIPIDEMIA, UNSPECIFIED HYPERLIPIDEMIA TYPE: ICD-10-CM

## 2020-10-13 DIAGNOSIS — R39.15 URINARY URGENCY: ICD-10-CM

## 2020-10-13 DIAGNOSIS — N20.0 KIDNEY STONES: ICD-10-CM

## 2020-10-13 LAB
ALBUMIN SERPL BCP-MCNC: 4.3 G/DL (ref 3.5–5.2)
ALP SERPL-CCNC: 81 U/L (ref 55–135)
ALT SERPL W/O P-5'-P-CCNC: 42 U/L (ref 10–44)
ANION GAP SERPL CALC-SCNC: 8 MMOL/L (ref 8–16)
AST SERPL-CCNC: 33 U/L (ref 10–40)
BASOPHILS # BLD AUTO: 0.04 K/UL (ref 0–0.2)
BASOPHILS NFR BLD: 0.7 % (ref 0–1.9)
BILIRUB SERPL-MCNC: 0.6 MG/DL (ref 0.1–1)
BUN SERPL-MCNC: 23 MG/DL (ref 8–23)
CALCIUM SERPL-MCNC: 9.9 MG/DL (ref 8.7–10.5)
CHLORIDE SERPL-SCNC: 102 MMOL/L (ref 95–110)
CHOLEST SERPL-MCNC: 157 MG/DL (ref 120–199)
CHOLEST/HDLC SERPL: 4.1 {RATIO} (ref 2–5)
CO2 SERPL-SCNC: 29 MMOL/L (ref 23–29)
COMPLEXED PSA SERPL-MCNC: 0.45 NG/ML (ref 0–4)
CREAT SERPL-MCNC: 1.2 MG/DL (ref 0.5–1.4)
DIFFERENTIAL METHOD: ABNORMAL
EOSINOPHIL # BLD AUTO: 0.1 K/UL (ref 0–0.5)
EOSINOPHIL NFR BLD: 2 % (ref 0–8)
ERYTHROCYTE [DISTWIDTH] IN BLOOD BY AUTOMATED COUNT: 12.7 % (ref 11.5–14.5)
EST. GFR  (AFRICAN AMERICAN): >60 ML/MIN/1.73 M^2
EST. GFR  (NON AFRICAN AMERICAN): 59.6 ML/MIN/1.73 M^2
GLUCOSE SERPL-MCNC: 104 MG/DL (ref 70–110)
HCT VFR BLD AUTO: 51.8 % (ref 40–54)
HDLC SERPL-MCNC: 38 MG/DL (ref 40–75)
HDLC SERPL: 24.2 % (ref 20–50)
HGB BLD-MCNC: 16.8 G/DL (ref 14–18)
IMM GRANULOCYTES # BLD AUTO: 0.05 K/UL (ref 0–0.04)
IMM GRANULOCYTES NFR BLD AUTO: 0.8 % (ref 0–0.5)
LDLC SERPL CALC-MCNC: 93.2 MG/DL (ref 63–159)
LYMPHOCYTES # BLD AUTO: 0.8 K/UL (ref 1–4.8)
LYMPHOCYTES NFR BLD: 13.7 % (ref 18–48)
MCH RBC QN AUTO: 30.7 PG (ref 27–31)
MCHC RBC AUTO-ENTMCNC: 32.4 G/DL (ref 32–36)
MCV RBC AUTO: 95 FL (ref 82–98)
MONOCYTES # BLD AUTO: 0.5 K/UL (ref 0.3–1)
MONOCYTES NFR BLD: 7.7 % (ref 4–15)
NEUTROPHILS # BLD AUTO: 4.5 K/UL (ref 1.8–7.7)
NEUTROPHILS NFR BLD: 75.1 % (ref 38–73)
NONHDLC SERPL-MCNC: 119 MG/DL
NRBC BLD-RTO: 0 /100 WBC
PLATELET # BLD AUTO: 212 K/UL (ref 150–350)
PMV BLD AUTO: 9.9 FL (ref 9.2–12.9)
POTASSIUM SERPL-SCNC: 4.9 MMOL/L (ref 3.5–5.1)
PROT SERPL-MCNC: 7.7 G/DL (ref 6–8.4)
RBC # BLD AUTO: 5.48 M/UL (ref 4.6–6.2)
SODIUM SERPL-SCNC: 139 MMOL/L (ref 136–145)
TRIGL SERPL-MCNC: 129 MG/DL (ref 30–150)
WBC # BLD AUTO: 5.99 K/UL (ref 3.9–12.7)

## 2020-10-13 PROCEDURE — 80053 COMPREHEN METABOLIC PANEL: CPT | Mod: HCNC

## 2020-10-13 PROCEDURE — 84153 ASSAY OF PSA TOTAL: CPT | Mod: HCNC

## 2020-10-13 PROCEDURE — 80061 LIPID PANEL: CPT | Mod: HCNC

## 2020-10-13 PROCEDURE — 36415 COLL VENOUS BLD VENIPUNCTURE: CPT | Mod: HCNC

## 2020-10-13 PROCEDURE — 85025 COMPLETE CBC W/AUTO DIFF WBC: CPT | Mod: HCNC

## 2020-10-21 ENCOUNTER — OFFICE VISIT (OUTPATIENT)
Dept: PSYCHIATRY | Facility: CLINIC | Age: 75
End: 2020-10-21
Payer: MEDICARE

## 2020-10-21 DIAGNOSIS — F41.1 GENERALIZED ANXIETY DISORDER: Primary | ICD-10-CM

## 2020-10-21 PROCEDURE — 90834 PR PSYCHOTHERAPY W/PATIENT, 45 MIN: ICD-10-PCS | Mod: HCNC,95,, | Performed by: SOCIAL WORKER

## 2020-10-21 PROCEDURE — 90785 PSYTX COMPLEX INTERACTIVE: CPT | Mod: HCNC,95,, | Performed by: SOCIAL WORKER

## 2020-10-21 PROCEDURE — 90834 PSYTX W PT 45 MINUTES: CPT | Mod: HCNC,95,, | Performed by: SOCIAL WORKER

## 2020-10-21 PROCEDURE — 90785 PR INTERACTIVE COMPLEXITY: ICD-10-PCS | Mod: HCNC,95,, | Performed by: SOCIAL WORKER

## 2020-10-21 NOTE — PROGRESS NOTES
Individual Psychotherapy (PhD/LCSW)    10/21/2020    Site:  [facility]         Therapeutic Intervention: Met with patient and spouse.  Outpatient - Insight oriented psychotherapy 45 min - CPT code 03411    Chief complaint/reason for encounter: depression and anxiety     Interval history and content of current session:      Site: [facility]    Length of service: 45         Therapeutic intervention:     Persons present: spouse and pt  Whitley (needed to help with interpretation of intervention due to pt cognitive decline due to Parkinsons).    Interval history:      The patient location is: home   The chief complaint leading to consultation is: anxiety/depression  Visit type: audiovisual  Total time spent with patient: 45 min  Each patient to whom he or she provides medical services by telemedicine is:  (1) informed of the relationship between the physician and patient and the respective role of any other health care provider with respect to management of the patient; and (2) notified that he or she may decline to receive medical services by telemedicine and may withdraw from such care at any time.       Notes:     He has been stable.  The anxiety has been manageable.   Mindful meditation.  They are feeling more comfortable with the loss.                Target symptoms: depression, anxiety        Progress toward goals: progressing well    Diagnosis: generalized anxiety disorder       Treatment plan:  · Target symptoms: anxiety   · Why chosen therapy is appropriate versus another modality: relevant to diagnosis, evidence based practice  · Outcome monitoring methods: self-report, observation, feedback from family  · Therapeutic intervention type: behavior modifying psychotherapy    Risk parameters:  Patient reports no suicidal ideation  Patient reports no homicidal ideation  Patient reports no self-injurious behavior  Patient reports no violent behavior    Verbal deficits: None           Patient's  response to intervention:  The patient's response to intervention is accepting, motivated.        Progress toward goals and other mental status changes:  The patient's progress toward goals is fair .    Diagnosis:     ICD-10-CM ICD-9-CM   1. Generalized anxiety disorder F41.1 300.02       Plan:  individual psychotherapy and family psychotherapy    Return to clinic: as scheduled    Length of Service (minutes): 45

## 2020-10-27 ENCOUNTER — OFFICE VISIT (OUTPATIENT)
Dept: INTERNAL MEDICINE | Facility: CLINIC | Age: 75
End: 2020-10-27
Payer: MEDICARE

## 2020-10-27 VITALS
DIASTOLIC BLOOD PRESSURE: 70 MMHG | BODY MASS INDEX: 29.92 KG/M2 | WEIGHT: 202 LBS | HEIGHT: 69 IN | HEART RATE: 75 BPM | SYSTOLIC BLOOD PRESSURE: 118 MMHG | OXYGEN SATURATION: 98 %

## 2020-10-27 DIAGNOSIS — F32.A DEPRESSION, UNSPECIFIED DEPRESSION TYPE: ICD-10-CM

## 2020-10-27 DIAGNOSIS — N40.1 BENIGN PROSTATIC HYPERPLASIA WITH NOCTURIA: ICD-10-CM

## 2020-10-27 DIAGNOSIS — E78.5 HYPERLIPIDEMIA, UNSPECIFIED HYPERLIPIDEMIA TYPE: ICD-10-CM

## 2020-10-27 DIAGNOSIS — M47.816 LUMBAR SPONDYLOSIS: ICD-10-CM

## 2020-10-27 DIAGNOSIS — I10 ESSENTIAL HYPERTENSION: ICD-10-CM

## 2020-10-27 DIAGNOSIS — Z00.00 ANNUAL PHYSICAL EXAM: Primary | ICD-10-CM

## 2020-10-27 DIAGNOSIS — G20.C PARKINSONISM, UNSPECIFIED PARKINSONISM TYPE: ICD-10-CM

## 2020-10-27 DIAGNOSIS — R35.1 BENIGN PROSTATIC HYPERPLASIA WITH NOCTURIA: ICD-10-CM

## 2020-10-27 PROCEDURE — 99999 PR PBB SHADOW E&M-EST. PATIENT-LVL IV: CPT | Mod: PBBFAC,HCNC,, | Performed by: INTERNAL MEDICINE

## 2020-10-27 PROCEDURE — 3074F SYST BP LT 130 MM HG: CPT | Mod: HCNC,CPTII,S$GLB, | Performed by: INTERNAL MEDICINE

## 2020-10-27 PROCEDURE — 3078F DIAST BP <80 MM HG: CPT | Mod: HCNC,CPTII,S$GLB, | Performed by: INTERNAL MEDICINE

## 2020-10-27 PROCEDURE — 99397 PR PREVENTIVE VISIT,EST,65 & OVER: ICD-10-PCS | Mod: HCNC,S$GLB,, | Performed by: INTERNAL MEDICINE

## 2020-10-27 PROCEDURE — 3074F PR MOST RECENT SYSTOLIC BLOOD PRESSURE < 130 MM HG: ICD-10-PCS | Mod: HCNC,CPTII,S$GLB, | Performed by: INTERNAL MEDICINE

## 2020-10-27 PROCEDURE — 99397 PER PM REEVAL EST PAT 65+ YR: CPT | Mod: HCNC,S$GLB,, | Performed by: INTERNAL MEDICINE

## 2020-10-27 PROCEDURE — 99999 PR PBB SHADOW E&M-EST. PATIENT-LVL IV: ICD-10-PCS | Mod: PBBFAC,HCNC,, | Performed by: INTERNAL MEDICINE

## 2020-10-27 PROCEDURE — 3078F PR MOST RECENT DIASTOLIC BLOOD PRESSURE < 80 MM HG: ICD-10-PCS | Mod: HCNC,CPTII,S$GLB, | Performed by: INTERNAL MEDICINE

## 2020-10-27 RX ORDER — A/SINGAPORE/GP1908/2015 IVR-180 (AN A/MICHIGAN/45/2015 (H1N1)PDM09-LIKE VIRUS, A/HONG KONG/4801/2014, NYMC X-263B (H3N2) (AN A/HONG KONG/4801/2014-LIKE VIRUS), AND B/BRISBANE/60/2008, WILD TYPE (A B/BRISBANE/60/2008-LIKE VIRUS) 15; 15; 15 UG/.5ML; UG/.5ML; UG/.5ML
INJECTION, SUSPENSION INTRAMUSCULAR
Status: ON HOLD | COMMUNITY
Start: 2020-10-02 | End: 2021-02-12 | Stop reason: HOSPADM

## 2020-10-27 RX ORDER — TAMSULOSIN HYDROCHLORIDE 0.4 MG/1
0.4 CAPSULE ORAL NIGHTLY
Qty: 30 CAPSULE | Refills: 1 | Status: SHIPPED | OUTPATIENT
Start: 2020-10-27 | End: 2020-11-18

## 2020-10-27 RX ORDER — SODIUM BICARBONATE 650 MG/1
650 TABLET ORAL DAILY
COMMUNITY
Start: 2020-10-12 | End: 2022-06-15 | Stop reason: SDUPTHER

## 2020-10-27 RX ORDER — POTASSIUM CITRATE 15 MEQ/1
TABLET, EXTENDED RELEASE ORAL
COMMUNITY
Start: 2020-10-12 | End: 2021-02-11

## 2020-10-27 NOTE — PROGRESS NOTES
MEDICAL HISTORY:  Hypertension.  Hyperlipidemia.  Parkinsonism with cognitive deficit  Depression, anxiety.  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  SHU  Bilateral retinal detachment.  Adenomatous colon polyp in .  Tonsillectomy.  Hemorrhoidectomy.  Appendectomy.  Left inguinal hernia repair.  Left knee surgery.  Lumbar degenerative disc disease, status post bilateral L4 transforaminal epidural steroid injection   /   lumbar laminectomy and facetectomy L2-L5  BPH        SOCIAL HISTORY:  Tobacco and alcohol use - none.  , has four children.       FAMILY HISTORY:  Father is , lung cancer, but had diabetes.  Mother is   , lung cancer.  One brother is alive, neuralgia.     SCREENING:  N colonoscopy  reveal adenomatous colon polyp   CURRENT MEDICINES:  Atorvastatin 40 mg daily.  Carbidopa levodopa  mg one and a half tablets three times a day.  Urine sec a twice a day or sodium bicarbonate twice a day  Clonazepam 0.5 mg one tablet twice a day as needed.  Vitamin D 50,000 unit once a month.  Finasteride 5 mg a day..  Losartan 100 mg daily.  Magnesium oxide 400 mg daily.  Vitamin B6 50 mg daily.  Sertraline 100 mg two a day.    Component      Latest Ref Rng & Units 10/13/2020   RBC      4.60 - 6.20 M/uL 5.48   Hemoglobin      14.0 - 18.0 g/dL 16.8   Hematocrit      40.0 - 54.0 % 51.8   MCV      82 - 98 fL 95   MCH      27.0 - 31.0 pg 30.7   MCHC      32.0 - 36.0 g/dL 32.4   RDW      11.5 - 14.5 % 12.7   Platelets      150 - 350 K/uL 212   MPV      9.2 - 12.9 fL 9.9   Immature Granulocytes      0.0 - 0.5 % 0.8 (H)   Gran # (ANC)      1.8 - 7.7 K/uL 4.5   Immature Grans (Abs)      0.00 - 0.04 K/uL 0.05 (H)   Lymph #      1.0 - 4.8 K/uL 0.8 (L)   Mono #      0.3 - 1.0 K/uL 0.5   Eos #      0.0 - 0.5 K/uL 0.1   Baso #      0.00 - 0.20 K/uL 0.04   nRBC      0 /100 WBC 0   Gran%      38.0 - 73.0 % 75.1 (H)   Lymph%      18.0 - 48.0 % 13.7 (L)   Mono%      4.0 - 15.0 % 7.7    Eosinophil%      0.0 - 8.0 % 2.0   Basophil%      0.0 - 1.9 % 0.7   Differential Method       Automated   Sodium      136 - 145 mmol/L 139   Potassium      3.5 - 5.1 mmol/L 4.9   Chloride      95 - 110 mmol/L 102   CO2      23 - 29 mmol/L 29   Glucose      70 - 110 mg/dL 104   BUN, Bld      8 - 23 mg/dL 23   Creatinine      0.5 - 1.4 mg/dL 1.2   Calcium      8.7 - 10.5 mg/dL 9.9   PROTEIN TOTAL      6.0 - 8.4 g/dL 7.7   Albumin      3.5 - 5.2 g/dL 4.3   BILIRUBIN TOTAL      0.1 - 1.0 mg/dL 0.6   Alkaline Phosphatase      55 - 135 U/L 81   AST      10 - 40 U/L 33   ALT      10 - 44 U/L 42   Anion Gap      8 - 16 mmol/L 8   eGFR if African American      >60 mL/min/1.73 m:2 >60.0   eGFR if non African American      >60 mL/min/1.73 m:2 59.6 (A)   Cholesterol      120 - 199 mg/dL 157   Triglycerides      30 - 150 mg/dL 129   HDL      40 - 75 mg/dL 38 (L)   LDL Cholesterol External      63.0 - 159.0 mg/dL 93.2   Hdl/Cholesterol Ratio      20.0 - 50.0 % 24.2   Total Cholesterol/HDL Ratio      2.0 - 5.0 4.1   Non-HDL Cholesterol      mg/dL 119   PSA DIAGNOSTIC      0.00 - 4.00 ng/mL 0.45       74-year-old male  Routine exam  His main complaint is feeling tired fatigued particularly in the afternoon and particularly on days he goes the boxing class, particularly after the the boxing class which is 3 days a week.  At times he is somnolent and sleepy  He reports no shortness of breath palpitations or chest pain  He has been diagnosed with sleep apnea in the past but has not been able to tolerate the CPAP  He has nocturia times 3-4  Was seen by urology in August, prostate exam revealed a 35 g prostate, no nodules in recent PSA was normal    Review of symptoms  No abdominal pain  Tendency toward constipation but takes MiraLax every day  No indigestion heartburn  Occasional low back pain upon awakening but this is much improved compared to before  No chronic headaches    Examination  Weight 202 lb  Pulse 72  Blood pressure 140  over 88  HEENT exam no abnormal findings  Neck no thyromegaly no masses  Chest clear breath sounds  Heart regular rate rhythm  Abdominal exam is bowel sounds soft nontender no hepatosplenomegaly abdominal masses  Pulses 2+ carotid pulses 2+ pedal pulses no bruits  Extremities no edema    Impression  General exam  Hypertension  Hyperlipidemia  Parkinson's disease with cognitive deficits  Depression anxiety  Lumbar spondylosis  BPH with nocturia  Sleep disturbance with history of obstructive sleep apnea    Plan  trial of Flomax 0.4 mg once a day to taking the evening,See if this reduces is nocturia in fatigue    Maintain proper diet physical activity and then return in 6 months            Addendum  He is followed by nephrologist and it is possible that he is change in urine sick a to that of sodium bicarbonate

## 2020-11-09 ENCOUNTER — OFFICE VISIT (OUTPATIENT)
Dept: NEUROLOGY | Facility: CLINIC | Age: 75
End: 2020-11-09
Payer: MEDICARE

## 2020-11-09 DIAGNOSIS — G20.A1 PARKINSON DISEASE: Primary | ICD-10-CM

## 2020-11-09 DIAGNOSIS — R41.89 COGNITIVE CHANGE: ICD-10-CM

## 2020-11-09 PROCEDURE — 99214 PR OFFICE/OUTPT VISIT, EST, LEVL IV, 30-39 MIN: ICD-10-PCS | Mod: 95,,, | Performed by: PSYCHIATRY & NEUROLOGY

## 2020-11-09 PROCEDURE — 1159F MED LIST DOCD IN RCRD: CPT | Mod: 95,,, | Performed by: PSYCHIATRY & NEUROLOGY

## 2020-11-09 PROCEDURE — 1159F PR MEDICATION LIST DOCUMENTED IN MEDICAL RECORD: ICD-10-PCS | Mod: 95,,, | Performed by: PSYCHIATRY & NEUROLOGY

## 2020-11-09 PROCEDURE — 99214 OFFICE O/P EST MOD 30 MIN: CPT | Mod: 95,,, | Performed by: PSYCHIATRY & NEUROLOGY

## 2020-11-09 RX ORDER — CARBIDOPA AND LEVODOPA 25; 100 MG/1; MG/1
TABLET ORAL
Qty: 600 TABLET | Refills: 3 | Status: SHIPPED | OUTPATIENT
Start: 2020-11-09 | End: 2020-11-30 | Stop reason: SDUPTHER

## 2020-11-09 RX ORDER — METHYLPREDNISOLONE 4 MG/1
TABLET ORAL
Qty: 1 PACKAGE | Refills: 0 | Status: SHIPPED | OUTPATIENT
Start: 2020-11-09 | End: 2020-11-30

## 2020-11-09 NOTE — PROGRESS NOTES
"Neurology Telemedicine Note     Name: Haja Marti  MRN: [id number]   CSN: [id number]      Date: 11/09/2020    The patient location is: Home  The chief complaint leading to consultation is: f/u PD  Visit type: Virtual visit with synchronous audio and video    TOTAL TIME SPENT: 3:16 - 3:43    Each patient to whom I provide medical services by telemedicine is:  (1) informed of the relationship between the physician and patient and the respective role of any other health care provider with respect to management of the patient; and (2) notified that they may decline to receive medical services by telemedicine and may withdraw from such care at any time.    History of Present Illness (HPI):   - more anxiety now, taking klonopin 1/2 tab every morning - feels generalized anxiety  - still doing Boxing TIW, and walks for exercise --> but really hurts his low back the next day ("tolerates it"), always there a little, rarely good days  - thinks overall better than before surgery last year  - says steroids would be reasonable, had some improvement last year with oral before back surgery  -       Nonmotor/Premotor ROS: as per HPI, and all other systems are negative    Past Medical History: The patient  has a past medical history of Anxiety, BPH (benign prostatic hypertrophy), Cataract, Colon polyp (2002), Depression, Epiretinal membrane, both eyes, psychiatric care, Hyperlipidemia, Hypertension, Kidney stones, Kidney stones, Parkinsons, Posterior vitreous detachment of left eye, Psychiatric problem, Retinal detachment (2004), Retinoschisis, left eye, Sleep apnea, and Vitreous hemorrhage of left eye.    Social History: The patient  reports that he has never smoked. He has never used smokeless tobacco. He reports that he does not drink alcohol or use drugs.    Family History: Their family history includes Anxiety disorder in his daughter and son; Cancer in his father and mother; Cataracts in his father; Depression in his son; " Diabetes in his father.    Allergies: Patient has no known allergies.     Meds:   Current Outpatient Medications on File Prior to Visit   Medication Sig Dispense Refill    atorvastatin (LIPITOR) 40 MG tablet Take 1 tablet (40 mg total) by mouth once daily. 90 tablet 3    carbidopa-levodopa  mg (SINEMET)  mg per tablet TAKE 1 AND 1/2 TABLETS THREE TIMES DAILY 450 tablet 3    clonazePAM (KLONOPIN) 0.5 MG tablet Take oral 1/2 to 1 tablet TID prn anxiety 180 tablet 1    ergocalciferol (VITAMIN D2) 50,000 unit Cap Take 50,000 Units by mouth every 30 days.       finasteride (PROSCAR) 5 mg tablet Take 1 tablet (5 mg total) by mouth once daily. 90 tablet 3    FLUAD QUAD 2020-21,65Y UP,,PF, 60 mcg (15 mcg x 4)/0.5 mL Syrg ADM 0.5ML IM UTD      L.acid-B.bifidum-B.animal-FOS 25 billion cell -100 mg Cap Take by mouth.      losartan (COZAAR) 100 MG tablet Take 1 tablet (100 mg total) by mouth once daily. 90 tablet 1    magnesium oxide (MAG-OX) 400 mg tablet Take 400 mg by mouth once daily.      potassium citrate (UROCIT-K 15) 15 mEq TbSR       potassium citrate (UROCIT-K) 10 mEq (1,080 mg) TbSR TAKE 1 TABLET THREE TIMES DAILY WITH MEALS (Patient taking differently: Take 10 mEq by mouth 2 (two) times daily with meals. ) 270 tablet 3    pyridoxine (VITAMIN B-6) 50 MG Tab Take 50 mg by mouth once daily.      sertraline (ZOLOFT) 100 MG tablet Take 2 tablets (200 mg total) by mouth once daily. 180 tablet 2    sodium bicarbonate 650 MG tablet Take 650 mg by mouth once daily.       tamsulosin (FLOMAX) 0.4 mg Cap Take 1 capsule (0.4 mg total) by mouth nightly. 30 capsule 1    triamcinolone acetonide 0.1% (KENALOG) 0.1 % cream APPLY TO AFFECTED AREA ONCE OR TWICE DAILY.       No current facility-administered medications on file prior to visit.        Exam:  Vital Signs deferred with home visit    Constitutional  Well-developed, well-nourished, appears stated age   Eyes  No scleral icterus   ENT  Moist oral  mucosa   Cardiovascular  No lower extremity edema    Respiratory  No labored breathing    Skin  No rashes   Hematologic  No bruising   Psychiatric  Normal mood and affect   Other  GI/ deferred    Neurological     * Mental status  Alert and oriented to person, place, time, and situation; no dysarthria; no aphasia; 3/3 --> 0/3; follows commands, makes 40 cents, RUTHIE backwards from Dec--> Jan (left out March), 2020,    * Cranial nerves     - CN II  Pupils midposition and symmetric   - CN III, IV, VI  Extraocular movements full, no nystagmus visualized   - CN V  Unable to test   - CN VII  Face strong and symmetric bilaterally    - CN VIII  Hearing grossly intact with conversation    - CN IX, X  Palate raises midline and symmetric    - CN XI  Strong shoulder shrug B    - CN XII  Tongue appears midline    * Motor  Normal bulk by appearance, no drift    * Sensory  Not tested objectively, no changes described by the patient   * Deep tendon reflexes  Not tested   * Coord/Movemt/Gait No hypophonic speech.  No facial masking.  No B bradykinesia.  No tremor with rest, posture, kinesis, or intention.   No chorea, athetosis, dystonia, myoclonus, or tics.   No motor impersistence.  Gait is deferred for safety.       Medical Record Review:  - Lab Results:  Lab Visit on 10/13/2020   Component Date Value Ref Range Status    WBC 10/13/2020 5.99  3.90 - 12.70 K/uL Final    RBC 10/13/2020 5.48  4.60 - 6.20 M/uL Final    Hemoglobin 10/13/2020 16.8  14.0 - 18.0 g/dL Final    Hematocrit 10/13/2020 51.8  40.0 - 54.0 % Final    MCV 10/13/2020 95  82 - 98 fL Final    MCH 10/13/2020 30.7  27.0 - 31.0 pg Final    MCHC 10/13/2020 32.4  32.0 - 36.0 g/dL Final    RDW 10/13/2020 12.7  11.5 - 14.5 % Final    Platelets 10/13/2020 212  150 - 350 K/uL Final    MPV 10/13/2020 9.9  9.2 - 12.9 fL Final    Immature Granulocytes 10/13/2020 0.8* 0.0 - 0.5 % Final    Gran # (ANC) 10/13/2020 4.5  1.8 - 7.7 K/uL Final    Immature Grans (Abs)  10/13/2020 0.05* 0.00 - 0.04 K/uL Final    Lymph # 10/13/2020 0.8* 1.0 - 4.8 K/uL Final    Mono # 10/13/2020 0.5  0.3 - 1.0 K/uL Final    Eos # 10/13/2020 0.1  0.0 - 0.5 K/uL Final    Baso # 10/13/2020 0.04  0.00 - 0.20 K/uL Final    nRBC 10/13/2020 0  0 /100 WBC Final    Gran % 10/13/2020 75.1* 38.0 - 73.0 % Final    Lymph % 10/13/2020 13.7* 18.0 - 48.0 % Final    Mono % 10/13/2020 7.7  4.0 - 15.0 % Final    Eosinophil % 10/13/2020 2.0  0.0 - 8.0 % Final    Basophil % 10/13/2020 0.7  0.0 - 1.9 % Final    Differential Method 10/13/2020 Automated   Final    Sodium 10/13/2020 139  136 - 145 mmol/L Final    Potassium 10/13/2020 4.9  3.5 - 5.1 mmol/L Final    Chloride 10/13/2020 102  95 - 110 mmol/L Final    CO2 10/13/2020 29  23 - 29 mmol/L Final    Glucose 10/13/2020 104  70 - 110 mg/dL Final    BUN 10/13/2020 23  8 - 23 mg/dL Final    Creatinine 10/13/2020 1.2  0.5 - 1.4 mg/dL Final    Calcium 10/13/2020 9.9  8.7 - 10.5 mg/dL Final    Total Protein 10/13/2020 7.7  6.0 - 8.4 g/dL Final    Albumin 10/13/2020 4.3  3.5 - 5.2 g/dL Final    Total Bilirubin 10/13/2020 0.6  0.1 - 1.0 mg/dL Final    Alkaline Phosphatase 10/13/2020 81  55 - 135 U/L Final    AST 10/13/2020 33  10 - 40 U/L Final    ALT 10/13/2020 42  10 - 44 U/L Final    Anion Gap 10/13/2020 8  8 - 16 mmol/L Final    eGFR if African American 10/13/2020 >60.0  >60 mL/min/1.73 m^2 Final    eGFR if non  10/13/2020 59.6* >60 mL/min/1.73 m^2 Final    Cholesterol 10/13/2020 157  120 - 199 mg/dL Final    Triglycerides 10/13/2020 129  30 - 150 mg/dL Final    HDL 10/13/2020 38* 40 - 75 mg/dL Final    LDL Cholesterol 10/13/2020 93.2  63.0 - 159.0 mg/dL Final    HDL/Cholesterol Ratio 10/13/2020 24.2  20.0 - 50.0 % Final    Total Cholesterol/HDL Ratio 10/13/2020 4.1  2.0 - 5.0 Final    Non-HDL Cholesterol 10/13/2020 119  mg/dL Final    PSA Diagnostic 10/13/2020 0.45  0.00 - 4.00 ng/mL Final       Diagnoses:          1)  Parkinsonism, likely iPD (vs. DLB given the early neuropsychiatric features, but those have resolved)  2) Back pain, more when off  3) Cognitive changes more noticeable    = increase cd/ld to 2 tabs in AM every day, and add 1.5  = NP testing  = keep exercising!            Edward Ivey MD, MPH  Division of Movement and Memory Disorders  [facility]  [phone]

## 2020-11-21 ENCOUNTER — PATIENT MESSAGE (OUTPATIENT)
Dept: INTERNAL MEDICINE | Facility: CLINIC | Age: 75
End: 2020-11-21

## 2020-11-24 ENCOUNTER — OFFICE VISIT (OUTPATIENT)
Dept: PSYCHIATRY | Facility: CLINIC | Age: 75
End: 2020-11-24
Payer: MEDICARE

## 2020-11-24 DIAGNOSIS — F41.1 GENERALIZED ANXIETY DISORDER: Primary | ICD-10-CM

## 2020-11-24 PROCEDURE — 90834 PSYTX W PT 45 MINUTES: CPT | Mod: HCNC,95,, | Performed by: SOCIAL WORKER

## 2020-11-24 PROCEDURE — 90834 PR PSYCHOTHERAPY W/PATIENT, 45 MIN: ICD-10-PCS | Mod: HCNC,95,, | Performed by: SOCIAL WORKER

## 2020-11-24 NOTE — PROGRESS NOTES
Individual Psychotherapy (PhD/LCSW)    11/24/2020    Site:  [facility]         Therapeutic Intervention: Met with patient and spouse.  Outpatient - Insight oriented psychotherapy 45 min - CPT code 34748    Chief complaint/reason for encounter: depression and anxiety     Interval history and content of current session:      Site: [facility]    Length of service: 45         Therapeutic intervention:     Persons present: spouse and pt  Whitley (needed to help with interpretation of intervention due to pt cognitive decline due to Parkinsons).    Interval history:      The patient location is: home   The chief complaint leading to consultation is: anxiety/depression  Visit type: audiovisual  Total time spent with patient: 45 min  Each patient to whom he or she provides medical services by telemedicine is:  (1) informed of the relationship between the physician and patient and the respective role of any other health care provider with respect to management of the patient; and (2) notified that he or she may decline to receive medical services by telemedicine and may withdraw from such care at any time.       Notes:     Grief counseling regarding loss of cousin.  Overall his anxiety has been stable (baseline for him).                Target symptoms: depression, anxiety        Progress toward goals: progressing well    Diagnosis: generalized anxiety disorder       Treatment plan:  · Target symptoms: anxiety   · Why chosen therapy is appropriate versus another modality: relevant to diagnosis, evidence based practice  · Outcome monitoring methods: self-report, observation, feedback from family  · Therapeutic intervention type: behavior modifying psychotherapy    Risk parameters:  Patient reports no suicidal ideation  Patient reports no homicidal ideation  Patient reports no self-injurious behavior  Patient reports no violent behavior    Verbal deficits: None           Patient's response to intervention:  The  patient's response to intervention is accepting, motivated.        Progress toward goals and other mental status changes:  The patient's progress toward goals is fair .    Diagnosis:     ICD-10-CM ICD-9-CM   1. Generalized anxiety disorder F41.1 300.02       Plan:  individual psychotherapy and family psychotherapy    Return to clinic: as scheduled    Length of Service (minutes): 45

## 2020-11-27 ENCOUNTER — TELEPHONE (OUTPATIENT)
Dept: NEUROLOGY | Facility: CLINIC | Age: 75
End: 2020-11-27

## 2020-11-27 NOTE — TELEPHONE ENCOUNTER
----- Message from Mildred Augustine sent at 11/27/2020  3:07 PM CST -----  Contact: self @ [phone]  Pt had a Virtual appt on 11-9-20.  He is calling to schedule his 3-6 month f/u.  Pls call.

## 2020-11-28 RX ORDER — ATORVASTATIN CALCIUM 40 MG/1
40 TABLET, FILM COATED ORAL DAILY
Qty: 90 TABLET | Refills: 3 | Status: SHIPPED | OUTPATIENT
Start: 2020-11-28 | End: 2022-01-11

## 2020-12-10 RX ORDER — CARBIDOPA AND LEVODOPA 25; 100 MG/1; MG/1
TABLET ORAL
Qty: 315 TABLET | Refills: 3 | Status: SHIPPED | OUTPATIENT
Start: 2020-12-10 | End: 2020-12-15 | Stop reason: DRUGHIGH

## 2020-12-11 ENCOUNTER — PATIENT MESSAGE (OUTPATIENT)
Dept: OTHER | Facility: OTHER | Age: 75
End: 2020-12-11

## 2020-12-15 ENCOUNTER — OFFICE VISIT (OUTPATIENT)
Dept: PSYCHIATRY | Facility: CLINIC | Age: 75
End: 2020-12-15
Payer: MEDICARE

## 2020-12-15 VITALS — DIASTOLIC BLOOD PRESSURE: 86 MMHG | SYSTOLIC BLOOD PRESSURE: 147 MMHG | HEART RATE: 69 BPM

## 2020-12-15 DIAGNOSIS — F41.9 ANXIETY DISORDER, UNSPECIFIED TYPE: ICD-10-CM

## 2020-12-15 DIAGNOSIS — F41.1 GAD (GENERALIZED ANXIETY DISORDER): Primary | ICD-10-CM

## 2020-12-15 DIAGNOSIS — F41.1 GENERALIZED ANXIETY DISORDER: ICD-10-CM

## 2020-12-15 DIAGNOSIS — F32.A DEPRESSION, UNSPECIFIED DEPRESSION TYPE: ICD-10-CM

## 2020-12-15 PROCEDURE — 99999 PR PBB SHADOW E&M-EST. PATIENT-LVL II: CPT | Mod: PBBFAC,HCNC,, | Performed by: PSYCHIATRY & NEUROLOGY

## 2020-12-15 PROCEDURE — 3077F PR MOST RECENT SYSTOLIC BLOOD PRESSURE >= 140 MM HG: ICD-10-PCS | Mod: HCNC,CPTII,S$GLB, | Performed by: PSYCHIATRY & NEUROLOGY

## 2020-12-15 PROCEDURE — 3077F SYST BP >= 140 MM HG: CPT | Mod: HCNC,CPTII,S$GLB, | Performed by: PSYCHIATRY & NEUROLOGY

## 2020-12-15 PROCEDURE — 3079F DIAST BP 80-89 MM HG: CPT | Mod: HCNC,CPTII,S$GLB, | Performed by: PSYCHIATRY & NEUROLOGY

## 2020-12-15 PROCEDURE — 1159F PR MEDICATION LIST DOCUMENTED IN MEDICAL RECORD: ICD-10-PCS | Mod: HCNC,S$GLB,, | Performed by: PSYCHIATRY & NEUROLOGY

## 2020-12-15 PROCEDURE — 3079F PR MOST RECENT DIASTOLIC BLOOD PRESSURE 80-89 MM HG: ICD-10-PCS | Mod: HCNC,CPTII,S$GLB, | Performed by: PSYCHIATRY & NEUROLOGY

## 2020-12-15 PROCEDURE — 99213 PR OFFICE/OUTPT VISIT, EST, LEVL III, 20-29 MIN: ICD-10-PCS | Mod: HCNC,S$GLB,, | Performed by: PSYCHIATRY & NEUROLOGY

## 2020-12-15 PROCEDURE — 99213 OFFICE O/P EST LOW 20 MIN: CPT | Mod: HCNC,S$GLB,, | Performed by: PSYCHIATRY & NEUROLOGY

## 2020-12-15 PROCEDURE — 1159F MED LIST DOCD IN RCRD: CPT | Mod: HCNC,S$GLB,, | Performed by: PSYCHIATRY & NEUROLOGY

## 2020-12-15 PROCEDURE — 99999 PR PBB SHADOW E&M-EST. PATIENT-LVL II: ICD-10-PCS | Mod: PBBFAC,HCNC,, | Performed by: PSYCHIATRY & NEUROLOGY

## 2020-12-15 RX ORDER — SERTRALINE HYDROCHLORIDE 100 MG/1
200 TABLET, FILM COATED ORAL DAILY
Qty: 180 TABLET | Refills: 3 | Status: SHIPPED | OUTPATIENT
Start: 2020-12-15 | End: 2022-01-19 | Stop reason: SDUPTHER

## 2020-12-15 NOTE — PROGRESS NOTES
"Outpatient Psychiatry Follow-Up Visit (MD/NP)  12/15/2020    Session Length: 30 minutes (E&M)    Clinical Status of Patient:  Outpatient (Ambulatory)    Chief Complaint:  Haja Marti is a 75 y.o. male who presents today for follow-up of depression and anxiety.  Met with patient and spouse.      Interval History and Content of Current Session:  Interim Events/Subjective Report/Content of Current Session:  First appt with me since 3/3/2020.    He has had several appts with Mr. Galvan for therapy in the meantime.      He states he is doing "pretty good".    He denies depressed mood today.    He generally goes to sleep around 9 PM, then awakens around 3 AM to use the restroom, then he must urinate at least 3 more times afterwards.  He usually gets up about 6:30 AM.    He is able to get out of bed, get moving in about 10 - 15 minutes after awakening.    He f/u with a urologist (Dr. Oquendo; [facility]).      He gets extremely tired during the day.    He is still going to sessions of boxing for Parkinson's patients.   They last about 1 hour, 3 times a week.    He is very tired after this, so he goes home and takes a nap.  Then, he feels better.    He gave up his membership that he had at a recent gym because he was too sore after working out.    He also feels sore upon awakening.      Once he tripped over something and fell in his yard (fell on grass).  No significant injury noted.       He saw Dr. Ivey last month -- I reviewed his note in session.      Pt has been taking C/L for several years.      He stated he has had periods of hallucinations.  These are formed visual hallucinations; they can last for a few seconds, then disappear.    He still mentions that he often sees a "fish in the toilet" that tends to last longer than other images.    He states the fish is white (blends in the the ceramic toilet) looks very real -- it appears to be real to him and he believes it's real.    "When I flush it, he don't " "go down".     He denies seeing the fish in any other toilets in his house or in public.      Despite everything, he has been less anxious with the combination of meds that he has been taking.    He traditionally worries about everything.    His wife has mentioned she thought his anxiety has been better since he has been taking Klonopin.  He has been taking a 0.5 mg tablet twice daily -- he usually takes 1 tablet around 10 AM and another around 5 - 6 PM.    Pt agrees that the Klonopin seems to help with anxiety.   Their grandsons will come over every morning to visit them.    He has interests, mainly in spending time with family, including grandchildren, on days he feels physically better.      They are NOT having any large gatherings this year.      Psychotherapy:  · Target symptoms: depression, adjustment  · Why chosen therapy is appropriate versus another modality: relevant to diagnosis, patient responds to this modality  · Outcome monitoring methods: self-report, lab data, observation, feedback from family  · Therapeutic intervention type: supportive psychotherapy  · Topics discussed/themes: stress related to medical comorbidities, life stage transitional issues  · The patient's response to the intervention is accepting.   · The patient's progress toward treatment goals is fair.   · Duration of intervention: 10 minutes.    Review of Systems   · PSYCHIATRIC: Pertinant items are noted in the narrative.  · CONSTITUTIONAL:  No recent changes in wt.    · MUSCULOSKELETAL: No pain or stiffness of the joints.  · NEUROLOGIC: + tremors and shuffling gait; No weakness, sensory changes, seizures, memory loss, confusion, or other abnormal movements.  · Memory Loss: no  · ENDOCRINE: No polydipsia or polyuria.  · INTEGUMENTARY: No rashes or lacerations.  · EYES: No exophthalmos, jaundice or blindness.  · ENT: No dizziness, tinnitus or hearing loss.  · RESPIRATORY: No shortness of breath.  · CARDIOVASCULAR: No tachycardia or " chest pain.  · GASTROINTESTINAL: No nausea, vomiting, pain, constipation or diarrhea.  · GENITOURINARY: No frequency, dysuria.    The following portions of the patient's history were reviewed and updated as appropriate: allergies, current medications, past family history, past medical history, past social history, past surgical history and problem list.      Current Outpatient Medications:     atorvastatin (LIPITOR) 40 MG tablet, TAKE 1 TABLET EVERY DAY, Disp: 90 tablet, Rfl: 3    atorvastatin (LIPITOR) 40 MG tablet, Take 1 tablet (40 mg total) by mouth once daily., Disp: 90 tablet, Rfl: 3    carbidopa-levodopa  mg (SINEMET)  mg per tablet, TAKE 2 tabs in AM and 1/2 tab three times thereafter, Disp: 315 tablet, Rfl: 3    clonazePAM (KLONOPIN) 0.5 MG tablet, Take oral 1/2 to 1 tablet TID prn anxiety, Disp: 180 tablet, Rfl: 1    ergocalciferol (VITAMIN D2) 50,000 unit Cap, Take 50,000 Units by mouth every 30 days. , Disp: , Rfl:     finasteride (PROSCAR) 5 mg tablet, Take 1 tablet (5 mg total) by mouth once daily., Disp: 90 tablet, Rfl: 3    FLUAD QUAD 2020-21,65Y UP,,PF, 60 mcg (15 mcg x 4)/0.5 mL Syrg, ADM 0.5ML IM UTD, Disp: , Rfl:     L.acid-B.bifidum-B.animal-FOS 25 billion cell -100 mg Cap, Take by mouth., Disp: , Rfl:     losartan (COZAAR) 100 MG tablet, TAKE 1 TABLET EVERY DAY, Disp: 90 tablet, Rfl: 1    magnesium oxide (MAG-OX) 400 mg tablet, Take 400 mg by mouth once daily., Disp: , Rfl:     potassium citrate (UROCIT-K 15) 15 mEq TbSR, , Disp: , Rfl:     potassium citrate (UROCIT-K) 10 mEq (1,080 mg) TbSR, TAKE 1 TABLET THREE TIMES DAILY WITH MEALS (Patient taking differently: Take 10 mEq by mouth 2 (two) times daily with meals. ), Disp: 270 tablet, Rfl: 3    pyridoxine (VITAMIN B-6) 50 MG Tab, Take 50 mg by mouth once daily., Disp: , Rfl:     sertraline (ZOLOFT) 100 MG tablet, TAKE 2 TABLETS (200 MG TOTAL) BY MOUTH ONCE DAILY., Disp: 180 tablet, Rfl: 0    sodium bicarbonate 650 MG  "tablet, Take 650 mg by mouth once daily. , Disp: , Rfl:     tamsulosin (FLOMAX) 0.4 mg Cap, Take 1 capsule (0.4 mg total) by mouth every evening., Disp: 90 capsule, Rfl: 1    triamcinolone acetonide 0.1% (KENALOG) 0.1 % cream, APPLY TO AFFECTED AREA ONCE OR TWICE DAILY., Disp: , Rfl:    He has been taking 1/2 tablet of Klonopin daily, usually in the AM.    He and his wife state he has been taking c/l 2 tabs in AM and 1.5 tabs TID; however, record indicates 1/2 tab TID -- I told pt to check with Dr. Thompson's nurse about this discrepancy, as it could affect the quantity he obtains from [facility].    Compliance: yes    Side effects: None    Risk Parameters:  Patient reports no suicidal ideation  Patient reports no homicidal ideation  Patient reports no self-injurious behavior  Patient reports no violent behavior    Exam (detailed: at least 9 elements; comprehensive: all 15 elements)   Constitutional  Vitals - 1 value per visit 3/2/2020 3/3/2020 7/20/2020 8/14/2020 8/17/2020 10/27/2020 12/15/2020   SYSTOLIC 113 120 139 124 99 118 147   DIASTOLIC 80 76 88 68 65 70 86   PULSE 72 74 71 75 81 75 69   TEMPERATURE - - 96.8 - - - -   RESPIRATIONS - - 19 - - - -   SPO2 - - 97 - - 98 -   Weight (lb) 195 203.26 200 199.96 201.06 202 -   Weight (kg) 88.451 92.2 90.719 90.7 91.2 91.627 -   HEIGHT 5' 9" - 5' 9" 5' 9" 5' 9" 5' 9" -   BODY MASS INDEX 28.8 30.02 29.53 29.53 29.69 29.83 -   VISIT REPORT - - - - - - -   Pain Score  0 - - 0 0 0 -   Some recent data might be hidden       General:  unremarkable, age appropriate, casually dressed, neatly groomed, overweight     Musculoskeletal  Muscle Strength/Tone:  not assessed today; cogwheeling had been noted in BUE's in past   Gait & Station:  parkinsonian, slow, steady     Psychiatric  Speech:  slowed, non-spontaneous, normal volume   Behavior: friendly and cooperative, eye contact normal   Mood & Affect:  "OK"  euthymic, constricted range, conguent, appropriate   Thought " Process:  goal-directed, logical   Associations:  intact   Thought Content:  normal, no suicidality, no homicidality, delusions, or paranoia   Insight:  intact, has awareness of illness   Judgement: behavior is adequate to circumstances   Orientation:  grossly intact   Memory: intact for content of interview   Language: grossly intact   Attention Span & Concentration:  able to focus   Fund of Knowledge:  intact and appropriate to age and level of education     Lab Results   Component Value Date    WBC 5.99 10/13/2020    HGB 16.8 10/13/2020    HCT 51.8 10/13/2020    MCV 95 10/13/2020     10/13/2020     10/13/2020    K 4.9 10/13/2020     10/13/2020    CO2 29 10/13/2020     10/13/2020    BUN 23 10/13/2020    CREATININE 1.2 10/13/2020    CALCIUM 9.9 10/13/2020    PROT 7.7 10/13/2020    ALBUMIN 4.3 10/13/2020    BILITOT 0.6 10/13/2020    ALKPHOS 81 10/13/2020    AST 33 10/13/2020    ALT 42 10/13/2020    ANIONGAP 8 10/13/2020    ESTGFRAFRICA >60.0 10/13/2020    EGFRNONAA 59.6 (A) 10/13/2020    CHOL 157 10/13/2020    HDL 38 (L) 10/13/2020    LDLCALC 93.2 10/13/2020    TRIG 129 10/13/2020    CHOLHDL 24.2 10/13/2020    TSH 2.134 10/15/2019       Imaging:    Exam: MRI brain without contrast    History: Dizziness, extrapyramidal movement disorder    Findings: MRI is performed with routine sequences.  No mass, hemorrhage, infarction, subdural collection, hydrocephalus or other acute finding is seen on this cranial MR examination.  There is volume loss consistent with the patient's age.  No diffusion abnormality is seen.      Impression     Normal noncontrast MRI of the brain     Electronically signed by: FLORI NEWMAN MD  Date: 07/15/17  Time: 15:51        Assessment and Diagnosis   Status/Progress: Based on the examination today, the patient's problem(s) is/are adequately but not ideally controlled.  New problems have been presented today.   Co-morbidities (chronic pain) are complicating management of  the primary condition.    There are not active rule-out diagnoses for this patient at this time.      General Impression:  POLINA (generalized anxiety disorder)  Depression, unspecified depression type  Also, Parkinson's disease and chronic pain    Intervention/Counseling/Treatment Plan   Medication Management:  Continue current medications.  No changes were made today.     Follow-up plan for depression was discussed with patient and spouse    Return to Clinic: Follow up in about 4 months (around 4/15/2021).    Sudhir Elliott MD

## 2020-12-21 ENCOUNTER — OFFICE VISIT (OUTPATIENT)
Dept: PSYCHIATRY | Facility: CLINIC | Age: 75
End: 2020-12-21
Payer: MEDICARE

## 2020-12-21 DIAGNOSIS — F41.1 GAD (GENERALIZED ANXIETY DISORDER): Primary | ICD-10-CM

## 2020-12-21 PROCEDURE — 90834 PR PSYCHOTHERAPY W/PATIENT, 45 MIN: ICD-10-PCS | Mod: HCNC,95,, | Performed by: SOCIAL WORKER

## 2020-12-21 PROCEDURE — 90834 PSYTX W PT 45 MINUTES: CPT | Mod: HCNC,95,, | Performed by: SOCIAL WORKER

## 2020-12-21 NOTE — PROGRESS NOTES
Individual Psychotherapy (PhD/LCSW)    12/21/2020    Site:  [facility]         Therapeutic Intervention: Met with patient and spouse.  Outpatient - Insight oriented psychotherapy 45 min - CPT code 45485    Chief complaint/reason for encounter: depression and anxiety     Interval history and content of current session:      Site: [facility]    Length of service: 45         Therapeutic intervention:     Persons present: spouse and pt  Luz dumont (needed to help with interpretation of intervention due to pt cognitive decline due to Parkinsons).    Interval history:      The patient location is: home   The chief complaint leading to consultation is: anxiety/depression  Visit type: audiovisual  Total time spent with patient: 45 min  Each patient to whom he or she provides medical services by telemedicine is:  (1) informed of the relationship between the physician and patient and the respective role of any other health care provider with respect to management of the patient; and (2) notified that he or she may decline to receive medical services by telemedicine and may withdraw from such care at any time.       Notes:     He is glad he woke up without back pain this morning.   We did mindfulness.   Wife is happy with the [place] decorations.   Worries are tamed.                Target symptoms: depression, anxiety        Progress toward goals: progressing well    Diagnosis: generalized anxiety disorder       Treatment plan:  · Target symptoms: anxiety   · Why chosen therapy is appropriate versus another modality: relevant to diagnosis, evidence based practice  · Outcome monitoring methods: self-report, observation, feedback from family  · Therapeutic intervention type: behavior modifying psychotherapy    Risk parameters:  Patient reports no suicidal ideation  Patient reports no homicidal ideation  Patient reports no self-injurious behavior  Patient reports no violent behavior    Verbal deficits: None            Patient's response to intervention:  The patient's response to intervention is accepting, motivated.        Progress toward goals and other mental status changes:  The patient's progress toward goals is fair .    Diagnosis:     ICD-10-CM ICD-9-CM   1. Generalized anxiety disorder F41.1 300.02       Plan:  individual psychotherapy and family psychotherapy    Return to clinic: as scheduled    Length of Service (minutes): 45

## 2021-01-09 ENCOUNTER — IMMUNIZATION (OUTPATIENT)
Dept: INTERNAL MEDICINE | Facility: CLINIC | Age: 76
End: 2021-01-09
Payer: MEDICARE

## 2021-01-09 DIAGNOSIS — Z23 NEED FOR VACCINATION: ICD-10-CM

## 2021-01-09 PROCEDURE — 91300 COVID-19, MRNA, LNP-S, PF, 30 MCG/0.3 ML DOSE VACCINE: CPT | Mod: PBBFAC | Performed by: FAMILY MEDICINE

## 2021-01-25 ENCOUNTER — OFFICE VISIT (OUTPATIENT)
Dept: PSYCHIATRY | Facility: CLINIC | Age: 76
End: 2021-01-25
Payer: MEDICARE

## 2021-01-25 DIAGNOSIS — F41.1 GAD (GENERALIZED ANXIETY DISORDER): Primary | ICD-10-CM

## 2021-01-25 PROCEDURE — 90834 PSYTX W PT 45 MINUTES: CPT | Mod: HCNC,95,, | Performed by: SOCIAL WORKER

## 2021-01-25 PROCEDURE — 90785 PR INTERACTIVE COMPLEXITY: ICD-10-PCS | Mod: HCNC,95,, | Performed by: SOCIAL WORKER

## 2021-01-25 PROCEDURE — 90834 PR PSYCHOTHERAPY W/PATIENT, 45 MIN: ICD-10-PCS | Mod: HCNC,95,, | Performed by: SOCIAL WORKER

## 2021-01-25 PROCEDURE — 99499 UNLISTED E&M SERVICE: CPT | Mod: HCNC,95,, | Performed by: SOCIAL WORKER

## 2021-01-25 PROCEDURE — 90785 PSYTX COMPLEX INTERACTIVE: CPT | Mod: HCNC,95,, | Performed by: SOCIAL WORKER

## 2021-01-25 PROCEDURE — 99499 RISK ADDL DX/OHS AUDIT: ICD-10-PCS | Mod: HCNC,95,, | Performed by: SOCIAL WORKER

## 2021-01-30 ENCOUNTER — IMMUNIZATION (OUTPATIENT)
Dept: INTERNAL MEDICINE | Facility: CLINIC | Age: 76
End: 2021-01-30
Payer: MEDICARE

## 2021-01-30 DIAGNOSIS — Z23 NEED FOR VACCINATION: Primary | ICD-10-CM

## 2021-01-30 PROCEDURE — 91300 COVID-19, MRNA, LNP-S, PF, 30 MCG/0.3 ML DOSE VACCINE: CPT | Mod: PBBFAC | Performed by: FAMILY MEDICINE

## 2021-01-30 PROCEDURE — 0002A COVID-19, MRNA, LNP-S, PF, 30 MCG/0.3 ML DOSE VACCINE: CPT | Mod: PBBFAC | Performed by: FAMILY MEDICINE

## 2021-02-11 ENCOUNTER — OFFICE VISIT (OUTPATIENT)
Dept: SURGERY | Facility: CLINIC | Age: 76
End: 2021-02-11
Payer: MEDICARE

## 2021-02-11 ENCOUNTER — HOSPITAL ENCOUNTER (OUTPATIENT)
Dept: RADIOLOGY | Facility: HOSPITAL | Age: 76
Discharge: HOME | End: 2021-02-11
Attending: STUDENT IN AN ORGANIZED HEALTH CARE EDUCATION/TRAINING PROGRAM
Payer: MEDICARE

## 2021-02-11 ENCOUNTER — OFFICE VISIT (OUTPATIENT)
Dept: INTERNAL MEDICINE | Facility: CLINIC | Age: 76
End: 2021-02-11
Payer: MEDICARE

## 2021-02-11 ENCOUNTER — TELEPHONE (OUTPATIENT)
Dept: SURGERY | Facility: CLINIC | Age: 76
End: 2021-02-11

## 2021-02-11 ENCOUNTER — LAB VISIT (OUTPATIENT)
Dept: LAB | Facility: HOSPITAL | Age: 76
End: 2021-02-11
Attending: STUDENT IN AN ORGANIZED HEALTH CARE EDUCATION/TRAINING PROGRAM
Payer: MEDICARE

## 2021-02-11 VITALS
BODY MASS INDEX: 30.21 KG/M2 | SYSTOLIC BLOOD PRESSURE: 94 MMHG | HEART RATE: 81 BPM | WEIGHT: 203.94 LBS | DIASTOLIC BLOOD PRESSURE: 54 MMHG | HEIGHT: 69 IN

## 2021-02-11 VITALS
WEIGHT: 202 LBS | DIASTOLIC BLOOD PRESSURE: 88 MMHG | HEART RATE: 82 BPM | HEIGHT: 69 IN | OXYGEN SATURATION: 99 % | SYSTOLIC BLOOD PRESSURE: 130 MMHG | BODY MASS INDEX: 29.92 KG/M2

## 2021-02-11 DIAGNOSIS — L02.31 LEFT BUTTOCK ABSCESS: Primary | ICD-10-CM

## 2021-02-11 DIAGNOSIS — K62.89 PERIANAL INFECTION: ICD-10-CM

## 2021-02-11 DIAGNOSIS — K62.89 PERIANAL INFECTION: Primary | ICD-10-CM

## 2021-02-11 DIAGNOSIS — L02.31 LEFT BUTTOCK ABSCESS: ICD-10-CM

## 2021-02-11 LAB
ANION GAP SERPL CALC-SCNC: 11 MMOL/L (ref 8–16)
BASOPHILS # BLD AUTO: 0.03 K/UL (ref 0–0.2)
BASOPHILS NFR BLD: 0.2 % (ref 0–1.9)
BUN SERPL-MCNC: 25 MG/DL (ref 8–23)
CALCIUM SERPL-MCNC: 9 MG/DL (ref 8.7–10.5)
CHLORIDE SERPL-SCNC: 100 MMOL/L (ref 95–110)
CO2 SERPL-SCNC: 25 MMOL/L (ref 23–29)
CREAT SERPL-MCNC: 1.1 MG/DL (ref 0.5–1.4)
CREAT SERPL-MCNC: 1.1 MG/DL (ref 0.5–1.4)
DIFFERENTIAL METHOD: ABNORMAL
EOSINOPHIL # BLD AUTO: 0 K/UL (ref 0–0.5)
EOSINOPHIL NFR BLD: 0.2 % (ref 0–8)
ERYTHROCYTE [DISTWIDTH] IN BLOOD BY AUTOMATED COUNT: 12.5 % (ref 11.5–14.5)
EST. GFR  (AFRICAN AMERICAN): >60 ML/MIN/1.73 M^2
EST. GFR  (AFRICAN AMERICAN): >60 ML/MIN/1.73 M^2
EST. GFR  (NON AFRICAN AMERICAN): >60 ML/MIN/1.73 M^2
EST. GFR  (NON AFRICAN AMERICAN): >60 ML/MIN/1.73 M^2
GLUCOSE SERPL-MCNC: 107 MG/DL (ref 70–110)
HCT VFR BLD AUTO: 45.7 % (ref 40–54)
HGB BLD-MCNC: 15 G/DL (ref 14–18)
IMM GRANULOCYTES # BLD AUTO: 0.13 K/UL (ref 0–0.04)
IMM GRANULOCYTES NFR BLD AUTO: 0.8 % (ref 0–0.5)
LYMPHOCYTES # BLD AUTO: 0.9 K/UL (ref 1–4.8)
LYMPHOCYTES NFR BLD: 5.5 % (ref 18–48)
MCH RBC QN AUTO: 30.8 PG (ref 27–31)
MCHC RBC AUTO-ENTMCNC: 32.8 G/DL (ref 32–36)
MCV RBC AUTO: 94 FL (ref 82–98)
MONOCYTES # BLD AUTO: 1.5 K/UL (ref 0.3–1)
MONOCYTES NFR BLD: 9.2 % (ref 4–15)
NEUTROPHILS # BLD AUTO: 13.6 K/UL (ref 1.8–7.7)
NEUTROPHILS NFR BLD: 84.1 % (ref 38–73)
NRBC BLD-RTO: 0 /100 WBC
PLATELET # BLD AUTO: 203 K/UL (ref 150–350)
PMV BLD AUTO: 9.6 FL (ref 9.2–12.9)
POTASSIUM SERPL-SCNC: 4 MMOL/L (ref 3.5–5.1)
RBC # BLD AUTO: 4.87 M/UL (ref 4.6–6.2)
SODIUM SERPL-SCNC: 136 MMOL/L (ref 136–145)
WBC # BLD AUTO: 16.19 K/UL (ref 3.9–12.7)

## 2021-02-11 PROCEDURE — 1100F PR PT FALLS ASSESS DOC 2+ FALLS/FALL W/INJURY/YR: ICD-10-PCS | Mod: CPTII,S$GLB,, | Performed by: STUDENT IN AN ORGANIZED HEALTH CARE EDUCATION/TRAINING PROGRAM

## 2021-02-11 PROCEDURE — 99203 OFFICE O/P NEW LOW 30 MIN: CPT | Mod: S$GLB,,, | Performed by: STUDENT IN AN ORGANIZED HEALTH CARE EDUCATION/TRAINING PROGRAM

## 2021-02-11 PROCEDURE — 3288F FALL RISK ASSESSMENT DOCD: CPT | Mod: CPTII,S$GLB,, | Performed by: STUDENT IN AN ORGANIZED HEALTH CARE EDUCATION/TRAINING PROGRAM

## 2021-02-11 PROCEDURE — 85025 COMPLETE CBC W/AUTO DIFF WBC: CPT

## 2021-02-11 PROCEDURE — 1159F PR MEDICATION LIST DOCUMENTED IN MEDICAL RECORD: ICD-10-PCS | Mod: S$GLB,,, | Performed by: STUDENT IN AN ORGANIZED HEALTH CARE EDUCATION/TRAINING PROGRAM

## 2021-02-11 PROCEDURE — 3078F PR MOST RECENT DIASTOLIC BLOOD PRESSURE < 80 MM HG: ICD-10-PCS | Mod: CPTII,S$GLB,, | Performed by: STUDENT IN AN ORGANIZED HEALTH CARE EDUCATION/TRAINING PROGRAM

## 2021-02-11 PROCEDURE — 3079F DIAST BP 80-89 MM HG: CPT | Mod: CPTII,S$GLB,, | Performed by: INTERNAL MEDICINE

## 2021-02-11 PROCEDURE — 99999 PR PBB SHADOW E&M-EST. PATIENT-LVL III: CPT | Mod: PBBFAC,,, | Performed by: STUDENT IN AN ORGANIZED HEALTH CARE EDUCATION/TRAINING PROGRAM

## 2021-02-11 PROCEDURE — 3078F DIAST BP <80 MM HG: CPT | Mod: CPTII,S$GLB,, | Performed by: STUDENT IN AN ORGANIZED HEALTH CARE EDUCATION/TRAINING PROGRAM

## 2021-02-11 PROCEDURE — 1101F PT FALLS ASSESS-DOCD LE1/YR: CPT | Mod: CPTII,S$GLB,, | Performed by: INTERNAL MEDICINE

## 2021-02-11 PROCEDURE — 3008F BODY MASS INDEX DOCD: CPT | Mod: CPTII,S$GLB,, | Performed by: INTERNAL MEDICINE

## 2021-02-11 PROCEDURE — 99203 PR OFFICE/OUTPT VISIT, NEW, LEVL III, 30-44 MIN: ICD-10-PCS | Mod: S$GLB,,, | Performed by: STUDENT IN AN ORGANIZED HEALTH CARE EDUCATION/TRAINING PROGRAM

## 2021-02-11 PROCEDURE — 80048 BASIC METABOLIC PNL TOTAL CA: CPT

## 2021-02-11 PROCEDURE — 3288F PR FALLS RISK ASSESSMENT DOCUMENTED: ICD-10-PCS | Mod: CPTII,S$GLB,, | Performed by: INTERNAL MEDICINE

## 2021-02-11 PROCEDURE — 1159F MED LIST DOCD IN RCRD: CPT | Mod: S$GLB,,, | Performed by: STUDENT IN AN ORGANIZED HEALTH CARE EDUCATION/TRAINING PROGRAM

## 2021-02-11 PROCEDURE — 3288F FALL RISK ASSESSMENT DOCD: CPT | Mod: CPTII,S$GLB,, | Performed by: INTERNAL MEDICINE

## 2021-02-11 PROCEDURE — 1101F PR PT FALLS ASSESS DOC 0-1 FALLS W/OUT INJ PAST YR: ICD-10-PCS | Mod: CPTII,S$GLB,, | Performed by: INTERNAL MEDICINE

## 2021-02-11 PROCEDURE — 99213 PR OFFICE/OUTPT VISIT, EST, LEVL III, 20-29 MIN: ICD-10-PCS | Mod: S$GLB,,, | Performed by: INTERNAL MEDICINE

## 2021-02-11 PROCEDURE — 99999 PR PBB SHADOW E&M-EST. PATIENT-LVL III: ICD-10-PCS | Mod: PBBFAC,,, | Performed by: STUDENT IN AN ORGANIZED HEALTH CARE EDUCATION/TRAINING PROGRAM

## 2021-02-11 PROCEDURE — 3074F PR MOST RECENT SYSTOLIC BLOOD PRESSURE < 130 MM HG: ICD-10-PCS | Mod: CPTII,S$GLB,, | Performed by: STUDENT IN AN ORGANIZED HEALTH CARE EDUCATION/TRAINING PROGRAM

## 2021-02-11 PROCEDURE — 3079F PR MOST RECENT DIASTOLIC BLOOD PRESSURE 80-89 MM HG: ICD-10-PCS | Mod: CPTII,S$GLB,, | Performed by: INTERNAL MEDICINE

## 2021-02-11 PROCEDURE — 99999 PR PBB SHADOW E&M-EST. PATIENT-LVL IV: ICD-10-PCS | Mod: PBBFAC,,, | Performed by: INTERNAL MEDICINE

## 2021-02-11 PROCEDURE — 1125F PR PAIN SEVERITY QUANTIFIED, PAIN PRESENT: ICD-10-PCS | Mod: S$GLB,,, | Performed by: STUDENT IN AN ORGANIZED HEALTH CARE EDUCATION/TRAINING PROGRAM

## 2021-02-11 PROCEDURE — 3074F SYST BP LT 130 MM HG: CPT | Mod: CPTII,S$GLB,, | Performed by: STUDENT IN AN ORGANIZED HEALTH CARE EDUCATION/TRAINING PROGRAM

## 2021-02-11 PROCEDURE — 36415 COLL VENOUS BLD VENIPUNCTURE: CPT

## 2021-02-11 PROCEDURE — 3008F BODY MASS INDEX DOCD: CPT | Mod: CPTII,S$GLB,, | Performed by: STUDENT IN AN ORGANIZED HEALTH CARE EDUCATION/TRAINING PROGRAM

## 2021-02-11 PROCEDURE — 74177 CT ABDOMEN PELVIS WITH CONTRAST: ICD-10-PCS | Mod: 26,,, | Performed by: RADIOLOGY

## 2021-02-11 PROCEDURE — 25500020 PHARM REV CODE 255: Performed by: STUDENT IN AN ORGANIZED HEALTH CARE EDUCATION/TRAINING PROGRAM

## 2021-02-11 PROCEDURE — 3075F SYST BP GE 130 - 139MM HG: CPT | Mod: CPTII,S$GLB,, | Performed by: INTERNAL MEDICINE

## 2021-02-11 PROCEDURE — 1159F PR MEDICATION LIST DOCUMENTED IN MEDICAL RECORD: ICD-10-PCS | Mod: S$GLB,,, | Performed by: INTERNAL MEDICINE

## 2021-02-11 PROCEDURE — 3008F PR BODY MASS INDEX (BMI) DOCUMENTED: ICD-10-PCS | Mod: CPTII,S$GLB,, | Performed by: STUDENT IN AN ORGANIZED HEALTH CARE EDUCATION/TRAINING PROGRAM

## 2021-02-11 PROCEDURE — 1100F PTFALLS ASSESS-DOCD GE2>/YR: CPT | Mod: CPTII,S$GLB,, | Performed by: STUDENT IN AN ORGANIZED HEALTH CARE EDUCATION/TRAINING PROGRAM

## 2021-02-11 PROCEDURE — 3288F PR FALLS RISK ASSESSMENT DOCUMENTED: ICD-10-PCS | Mod: CPTII,S$GLB,, | Performed by: STUDENT IN AN ORGANIZED HEALTH CARE EDUCATION/TRAINING PROGRAM

## 2021-02-11 PROCEDURE — 99213 OFFICE O/P EST LOW 20 MIN: CPT | Mod: S$GLB,,, | Performed by: INTERNAL MEDICINE

## 2021-02-11 PROCEDURE — 1125F AMNT PAIN NOTED PAIN PRSNT: CPT | Mod: S$GLB,,, | Performed by: STUDENT IN AN ORGANIZED HEALTH CARE EDUCATION/TRAINING PROGRAM

## 2021-02-11 PROCEDURE — 74177 CT ABD & PELVIS W/CONTRAST: CPT | Mod: 26,,, | Performed by: RADIOLOGY

## 2021-02-11 PROCEDURE — 1159F MED LIST DOCD IN RCRD: CPT | Mod: S$GLB,,, | Performed by: INTERNAL MEDICINE

## 2021-02-11 PROCEDURE — 99999 PR PBB SHADOW E&M-EST. PATIENT-LVL IV: CPT | Mod: PBBFAC,,, | Performed by: INTERNAL MEDICINE

## 2021-02-11 PROCEDURE — 3008F PR BODY MASS INDEX (BMI) DOCUMENTED: ICD-10-PCS | Mod: CPTII,S$GLB,, | Performed by: INTERNAL MEDICINE

## 2021-02-11 PROCEDURE — 3075F PR MOST RECENT SYSTOLIC BLOOD PRESS GE 130-139MM HG: ICD-10-PCS | Mod: CPTII,S$GLB,, | Performed by: INTERNAL MEDICINE

## 2021-02-11 PROCEDURE — 74177 CT ABD & PELVIS W/CONTRAST: CPT | Mod: TC

## 2021-02-11 RX ORDER — AMOXICILLIN AND CLAVULANATE POTASSIUM 875; 125 MG/1; MG/1
1 TABLET, FILM COATED ORAL 2 TIMES DAILY
Qty: 20 TABLET | Refills: 0 | Status: SHIPPED | OUTPATIENT
Start: 2021-02-11 | End: 2021-03-04

## 2021-02-11 RX ORDER — SULFAMETHOXAZOLE AND TRIMETHOPRIM 800; 160 MG/1; MG/1
1 TABLET ORAL 2 TIMES DAILY
Qty: 20 TABLET | Refills: 0 | Status: SHIPPED | OUTPATIENT
Start: 2021-02-11 | End: 2021-02-11

## 2021-02-11 RX ADMIN — IOHEXOL 100 ML: 350 INJECTION, SOLUTION INTRAVENOUS at 05:02

## 2021-02-12 ENCOUNTER — HOSPITAL ENCOUNTER (OUTPATIENT)
Facility: HOSPITAL | Age: 76
Discharge: HOME | End: 2021-02-12
Attending: STUDENT IN AN ORGANIZED HEALTH CARE EDUCATION/TRAINING PROGRAM | Admitting: STUDENT IN AN ORGANIZED HEALTH CARE EDUCATION/TRAINING PROGRAM
Payer: MEDICARE

## 2021-02-12 ENCOUNTER — ANESTHESIA (OUTPATIENT)
Dept: SURGERY | Facility: HOSPITAL | Age: 76
End: 2021-02-12
Payer: MEDICARE

## 2021-02-12 ENCOUNTER — ANESTHESIA EVENT (OUTPATIENT)
Dept: SURGERY | Facility: HOSPITAL | Age: 76
End: 2021-02-12
Payer: MEDICARE

## 2021-02-12 VITALS
HEIGHT: 69 IN | RESPIRATION RATE: 20 BRPM | TEMPERATURE: 99 F | WEIGHT: 200 LBS | HEART RATE: 81 BPM | DIASTOLIC BLOOD PRESSURE: 68 MMHG | OXYGEN SATURATION: 97 % | BODY MASS INDEX: 29.62 KG/M2 | SYSTOLIC BLOOD PRESSURE: 139 MMHG

## 2021-02-12 DIAGNOSIS — K60.3 FISTULA-IN-ANO: ICD-10-CM

## 2021-02-12 DIAGNOSIS — K61.1 PERIRECTAL ABSCESS: Primary | ICD-10-CM

## 2021-02-12 LAB
GRAM STN SPEC: NORMAL
SARS-COV-2 RDRP RESP QL NAA+PROBE: NEGATIVE

## 2021-02-12 PROCEDURE — D9220A PRA ANESTHESIA: Mod: CRNA,,, | Performed by: STUDENT IN AN ORGANIZED HEALTH CARE EDUCATION/TRAINING PROGRAM

## 2021-02-12 PROCEDURE — 36000704 HC OR TIME LEV I 1ST 15 MIN: Performed by: STUDENT IN AN ORGANIZED HEALTH CARE EDUCATION/TRAINING PROGRAM

## 2021-02-12 PROCEDURE — 71000015 HC POSTOP RECOV 1ST HR: Performed by: STUDENT IN AN ORGANIZED HEALTH CARE EDUCATION/TRAINING PROGRAM

## 2021-02-12 PROCEDURE — 25000003 PHARM REV CODE 250: Performed by: STUDENT IN AN ORGANIZED HEALTH CARE EDUCATION/TRAINING PROGRAM

## 2021-02-12 PROCEDURE — 87102 FUNGUS ISOLATION CULTURE: CPT

## 2021-02-12 PROCEDURE — 87205 SMEAR GRAM STAIN: CPT

## 2021-02-12 PROCEDURE — 46020 PLACEMENT OF SETON: CPT | Mod: ,,, | Performed by: STUDENT IN AN ORGANIZED HEALTH CARE EDUCATION/TRAINING PROGRAM

## 2021-02-12 PROCEDURE — 87075 CULTR BACTERIA EXCEPT BLOOD: CPT | Mod: 59

## 2021-02-12 PROCEDURE — S0030 INJECTION, METRONIDAZOLE: HCPCS | Performed by: STUDENT IN AN ORGANIZED HEALTH CARE EDUCATION/TRAINING PROGRAM

## 2021-02-12 PROCEDURE — 46020 PR PLACEMENT,SETON: ICD-10-PCS | Mod: ,,, | Performed by: STUDENT IN AN ORGANIZED HEALTH CARE EDUCATION/TRAINING PROGRAM

## 2021-02-12 PROCEDURE — 71000044 HC DOSC ROUTINE RECOVERY FIRST HOUR: Performed by: STUDENT IN AN ORGANIZED HEALTH CARE EDUCATION/TRAINING PROGRAM

## 2021-02-12 PROCEDURE — 36000705 HC OR TIME LEV I EA ADD 15 MIN: Performed by: STUDENT IN AN ORGANIZED HEALTH CARE EDUCATION/TRAINING PROGRAM

## 2021-02-12 PROCEDURE — 63600175 PHARM REV CODE 636 W HCPCS: Performed by: STUDENT IN AN ORGANIZED HEALTH CARE EDUCATION/TRAINING PROGRAM

## 2021-02-12 PROCEDURE — D9220A PRA ANESTHESIA: ICD-10-PCS | Mod: ANES,,, | Performed by: ANESTHESIOLOGY

## 2021-02-12 PROCEDURE — 37000009 HC ANESTHESIA EA ADD 15 MINS: Performed by: STUDENT IN AN ORGANIZED HEALTH CARE EDUCATION/TRAINING PROGRAM

## 2021-02-12 PROCEDURE — U0002 COVID-19 LAB TEST NON-CDC: HCPCS

## 2021-02-12 PROCEDURE — 87206 SMEAR FLUORESCENT/ACID STAI: CPT

## 2021-02-12 PROCEDURE — 87116 MYCOBACTERIA CULTURE: CPT

## 2021-02-12 PROCEDURE — 46050 I&D PERIANAL ABSCESS SUPFC: CPT | Mod: 51,,, | Performed by: STUDENT IN AN ORGANIZED HEALTH CARE EDUCATION/TRAINING PROGRAM

## 2021-02-12 PROCEDURE — 87070 CULTURE OTHR SPECIMN AEROBIC: CPT | Mod: 59

## 2021-02-12 PROCEDURE — D9220A PRA ANESTHESIA: Mod: ANES,,, | Performed by: ANESTHESIOLOGY

## 2021-02-12 PROCEDURE — 87076 CULTURE ANAEROBE IDENT EACH: CPT | Mod: 59

## 2021-02-12 PROCEDURE — 87015 SPECIMEN INFECT AGNT CONCNTJ: CPT | Mod: 59

## 2021-02-12 PROCEDURE — 37000008 HC ANESTHESIA 1ST 15 MINUTES: Performed by: STUDENT IN AN ORGANIZED HEALTH CARE EDUCATION/TRAINING PROGRAM

## 2021-02-12 PROCEDURE — 46050 PR I&D PERIANAL ABSCESS,SUPERFICIAL: ICD-10-PCS | Mod: 51,,, | Performed by: STUDENT IN AN ORGANIZED HEALTH CARE EDUCATION/TRAINING PROGRAM

## 2021-02-12 PROCEDURE — D9220A PRA ANESTHESIA: ICD-10-PCS | Mod: CRNA,,, | Performed by: STUDENT IN AN ORGANIZED HEALTH CARE EDUCATION/TRAINING PROGRAM

## 2021-02-12 RX ORDER — LIDOCAINE HYDROCHLORIDE 10 MG/ML
INJECTION, SOLUTION EPIDURAL; INFILTRATION; INTRACAUDAL; PERINEURAL
Status: DISCONTINUED | OUTPATIENT
Start: 2021-02-12 | End: 2021-02-12 | Stop reason: HOSPADM

## 2021-02-12 RX ORDER — MUPIROCIN 20 MG/G
OINTMENT TOPICAL
Status: DISCONTINUED | OUTPATIENT
Start: 2021-02-12 | End: 2021-02-12 | Stop reason: HOSPADM

## 2021-02-12 RX ORDER — BUPIVACAINE HYDROCHLORIDE AND EPINEPHRINE 2.5; 5 MG/ML; UG/ML
INJECTION, SOLUTION EPIDURAL; INFILTRATION; INTRACAUDAL; PERINEURAL
Status: DISCONTINUED | OUTPATIENT
Start: 2021-02-12 | End: 2021-02-12 | Stop reason: HOSPADM

## 2021-02-12 RX ORDER — PROPOFOL 10 MG/ML
VIAL (ML) INTRAVENOUS CONTINUOUS PRN
Status: DISCONTINUED | OUTPATIENT
Start: 2021-02-12 | End: 2021-02-12

## 2021-02-12 RX ORDER — DEXMEDETOMIDINE HYDROCHLORIDE 100 UG/ML
INJECTION, SOLUTION INTRAVENOUS
Status: DISCONTINUED | OUTPATIENT
Start: 2021-02-12 | End: 2021-02-12

## 2021-02-12 RX ORDER — CEFAZOLIN SODIUM 1 G/3ML
INJECTION, POWDER, FOR SOLUTION INTRAMUSCULAR; INTRAVENOUS
Status: DISCONTINUED | OUTPATIENT
Start: 2021-02-12 | End: 2021-02-12

## 2021-02-12 RX ORDER — METRONIDAZOLE 500 MG/100ML
INJECTION, SOLUTION INTRAVENOUS
Status: DISCONTINUED | OUTPATIENT
Start: 2021-02-12 | End: 2021-02-12

## 2021-02-12 RX ORDER — PROPOFOL 10 MG/ML
VIAL (ML) INTRAVENOUS
Status: DISCONTINUED | OUTPATIENT
Start: 2021-02-12 | End: 2021-02-12

## 2021-02-12 RX ORDER — FENTANYL CITRATE 50 UG/ML
INJECTION, SOLUTION INTRAMUSCULAR; INTRAVENOUS
Status: DISCONTINUED | OUTPATIENT
Start: 2021-02-12 | End: 2021-02-12

## 2021-02-12 RX ORDER — PHENYLEPHRINE HCL IN 0.9% NACL 1 MG/10 ML
SYRINGE (ML) INTRAVENOUS
Status: DISCONTINUED | OUTPATIENT
Start: 2021-02-12 | End: 2021-02-12

## 2021-02-12 RX ORDER — OXYCODONE HYDROCHLORIDE 5 MG/1
5 TABLET ORAL EVERY 4 HOURS PRN
Qty: 15 TABLET | Refills: 0 | Status: SHIPPED | OUTPATIENT
Start: 2021-02-12 | End: 2021-03-04

## 2021-02-12 RX ORDER — EPHEDRINE SULFATE 50 MG/ML
INJECTION, SOLUTION INTRAVENOUS
Status: DISCONTINUED | OUTPATIENT
Start: 2021-02-12 | End: 2021-02-12

## 2021-02-12 RX ORDER — ONDANSETRON 2 MG/ML
INJECTION INTRAMUSCULAR; INTRAVENOUS
Status: DISCONTINUED | OUTPATIENT
Start: 2021-02-12 | End: 2021-02-12

## 2021-02-12 RX ORDER — LIDOCAINE HYDROCHLORIDE 20 MG/ML
INJECTION, SOLUTION EPIDURAL; INFILTRATION; INTRACAUDAL; PERINEURAL
Status: DISCONTINUED | OUTPATIENT
Start: 2021-02-12 | End: 2021-02-12

## 2021-02-12 RX ORDER — SODIUM CHLORIDE 9 MG/ML
INJECTION, SOLUTION INTRAVENOUS CONTINUOUS
Status: DISCONTINUED | OUTPATIENT
Start: 2021-02-12 | End: 2021-02-12 | Stop reason: HOSPADM

## 2021-02-12 RX ORDER — DEXAMETHASONE SODIUM PHOSPHATE 4 MG/ML
INJECTION, SOLUTION INTRA-ARTICULAR; INTRALESIONAL; INTRAMUSCULAR; INTRAVENOUS; SOFT TISSUE
Status: DISCONTINUED | OUTPATIENT
Start: 2021-02-12 | End: 2021-02-12

## 2021-02-12 RX ORDER — SODIUM CHLORIDE 0.9 % (FLUSH) 0.9 %
3 SYRINGE (ML) INJECTION
Status: DISCONTINUED | OUTPATIENT
Start: 2021-02-12 | End: 2021-02-12 | Stop reason: HOSPADM

## 2021-02-12 RX ADMIN — EPHEDRINE SULFATE 15 MG: 50 INJECTION INTRAVENOUS at 11:02

## 2021-02-12 RX ADMIN — CEFAZOLIN 2 G: 330 INJECTION, POWDER, FOR SOLUTION INTRAMUSCULAR; INTRAVENOUS at 11:02

## 2021-02-12 RX ADMIN — LIDOCAINE HYDROCHLORIDE 50 MG: 20 INJECTION, SOLUTION EPIDURAL; INFILTRATION; INTRACAUDAL at 11:02

## 2021-02-12 RX ADMIN — Medication 100 MCG: at 11:02

## 2021-02-12 RX ADMIN — FENTANYL CITRATE 25 MCG: 50 INJECTION, SOLUTION INTRAMUSCULAR; INTRAVENOUS at 11:02

## 2021-02-12 RX ADMIN — DEXAMETHASONE SODIUM PHOSPHATE 4 MG: 4 INJECTION INTRA-ARTICULAR; INTRALESIONAL; INTRAMUSCULAR; INTRAVENOUS; SOFT TISSUE at 11:02

## 2021-02-12 RX ADMIN — ONDANSETRON 4 MG: 2 INJECTION INTRAMUSCULAR; INTRAVENOUS at 11:02

## 2021-02-12 RX ADMIN — MUPIROCIN: 20 OINTMENT TOPICAL at 09:02

## 2021-02-12 RX ADMIN — DEXMEDETOMIDINE HYDROCHLORIDE 8 MCG: 100 INJECTION, SOLUTION INTRAVENOUS at 11:02

## 2021-02-12 RX ADMIN — SODIUM CHLORIDE: 9 INJECTION, SOLUTION INTRAVENOUS at 11:02

## 2021-02-12 RX ADMIN — METRONIDAZOLE 500 MG: 500 INJECTION, SOLUTION INTRAVENOUS at 11:02

## 2021-02-12 RX ADMIN — PROPOFOL 40 MG: 10 INJECTION, EMULSION INTRAVENOUS at 11:02

## 2021-02-12 RX ADMIN — EPHEDRINE SULFATE 5 MG: 50 INJECTION INTRAVENOUS at 12:02

## 2021-02-12 RX ADMIN — PROPOFOL 150 MCG/KG/MIN: 10 INJECTION, EMULSION INTRAVENOUS at 11:02

## 2021-02-15 ENCOUNTER — TELEPHONE (OUTPATIENT)
Dept: SURGERY | Facility: CLINIC | Age: 76
End: 2021-02-15

## 2021-02-15 ENCOUNTER — TELEPHONE (OUTPATIENT)
Dept: NEUROLOGY | Facility: CLINIC | Age: 76
End: 2021-02-15

## 2021-02-15 ENCOUNTER — TELEPHONE (OUTPATIENT)
Dept: UROLOGY | Facility: CLINIC | Age: 76
End: 2021-02-15

## 2021-02-15 LAB
BACTERIA SPEC AEROBE CULT: ABNORMAL
BACTERIA SPEC AEROBE CULT: NORMAL

## 2021-02-17 LAB
BACTERIA SPEC ANAEROBE CULT: ABNORMAL
BACTERIA SPEC ANAEROBE CULT: ABNORMAL

## 2021-02-18 ENCOUNTER — HOSPITAL ENCOUNTER (OUTPATIENT)
Facility: HOSPITAL | Age: 76
Discharge: HOME | End: 2021-02-18
Attending: STUDENT IN AN ORGANIZED HEALTH CARE EDUCATION/TRAINING PROGRAM | Admitting: STUDENT IN AN ORGANIZED HEALTH CARE EDUCATION/TRAINING PROGRAM
Payer: MEDICARE

## 2021-02-18 ENCOUNTER — ANESTHESIA (OUTPATIENT)
Dept: SURGERY | Facility: HOSPITAL | Age: 76
End: 2021-02-18
Payer: MEDICARE

## 2021-02-18 ENCOUNTER — TELEPHONE (OUTPATIENT)
Dept: SURGERY | Facility: CLINIC | Age: 76
End: 2021-02-18

## 2021-02-18 ENCOUNTER — ANESTHESIA EVENT (OUTPATIENT)
Dept: SURGERY | Facility: HOSPITAL | Age: 76
End: 2021-02-18
Payer: MEDICARE

## 2021-02-18 ENCOUNTER — OFFICE VISIT (OUTPATIENT)
Dept: SURGERY | Facility: CLINIC | Age: 76
End: 2021-02-18
Payer: MEDICARE

## 2021-02-18 ENCOUNTER — HOSPITAL ENCOUNTER (OUTPATIENT)
Dept: RADIOLOGY | Facility: HOSPITAL | Age: 76
Discharge: HOME | End: 2021-02-18
Attending: STUDENT IN AN ORGANIZED HEALTH CARE EDUCATION/TRAINING PROGRAM
Payer: MEDICARE

## 2021-02-18 VITALS
BODY MASS INDEX: 29.62 KG/M2 | HEART RATE: 81 BPM | OXYGEN SATURATION: 95 % | TEMPERATURE: 98 F | RESPIRATION RATE: 20 BRPM | WEIGHT: 200 LBS | SYSTOLIC BLOOD PRESSURE: 126 MMHG | HEIGHT: 69 IN | DIASTOLIC BLOOD PRESSURE: 71 MMHG

## 2021-02-18 VITALS
HEIGHT: 69 IN | SYSTOLIC BLOOD PRESSURE: 140 MMHG | WEIGHT: 203.94 LBS | HEART RATE: 74 BPM | DIASTOLIC BLOOD PRESSURE: 68 MMHG | BODY MASS INDEX: 30.21 KG/M2

## 2021-02-18 DIAGNOSIS — K60.3 FISTULA-IN-ANO: Primary | ICD-10-CM

## 2021-02-18 DIAGNOSIS — K61.1 PERIRECTAL ABSCESS: ICD-10-CM

## 2021-02-18 DIAGNOSIS — K61.1 PERIRECTAL ABSCESS: Primary | ICD-10-CM

## 2021-02-18 LAB — SARS-COV-2 RDRP RESP QL NAA+PROBE: NEGATIVE

## 2021-02-18 PROCEDURE — 1100F PR PT FALLS ASSESS DOC 2+ FALLS/FALL W/INJURY/YR: ICD-10-PCS | Mod: CPTII,S$GLB,, | Performed by: STUDENT IN AN ORGANIZED HEALTH CARE EDUCATION/TRAINING PROGRAM

## 2021-02-18 PROCEDURE — 36000705 HC OR TIME LEV I EA ADD 15 MIN: Performed by: STUDENT IN AN ORGANIZED HEALTH CARE EDUCATION/TRAINING PROGRAM

## 2021-02-18 PROCEDURE — S0030 INJECTION, METRONIDAZOLE: HCPCS | Performed by: NURSE ANESTHETIST, CERTIFIED REGISTERED

## 2021-02-18 PROCEDURE — D9220A PRA ANESTHESIA: ICD-10-PCS | Mod: CRNA,,, | Performed by: NURSE ANESTHETIST, CERTIFIED REGISTERED

## 2021-02-18 PROCEDURE — 1100F PTFALLS ASSESS-DOCD GE2>/YR: CPT | Mod: CPTII,S$GLB,, | Performed by: STUDENT IN AN ORGANIZED HEALTH CARE EDUCATION/TRAINING PROGRAM

## 2021-02-18 PROCEDURE — 99999 PR PBB SHADOW E&M-EST. PATIENT-LVL III: ICD-10-PCS | Mod: PBBFAC,,, | Performed by: STUDENT IN AN ORGANIZED HEALTH CARE EDUCATION/TRAINING PROGRAM

## 2021-02-18 PROCEDURE — D9220A PRA ANESTHESIA: Mod: CRNA,,, | Performed by: NURSE ANESTHETIST, CERTIFIED REGISTERED

## 2021-02-18 PROCEDURE — 3288F PR FALLS RISK ASSESSMENT DOCUMENTED: ICD-10-PCS | Mod: CPTII,S$GLB,, | Performed by: STUDENT IN AN ORGANIZED HEALTH CARE EDUCATION/TRAINING PROGRAM

## 2021-02-18 PROCEDURE — 37000009 HC ANESTHESIA EA ADD 15 MINS: Performed by: STUDENT IN AN ORGANIZED HEALTH CARE EDUCATION/TRAINING PROGRAM

## 2021-02-18 PROCEDURE — 25500020 PHARM REV CODE 255: Performed by: STUDENT IN AN ORGANIZED HEALTH CARE EDUCATION/TRAINING PROGRAM

## 2021-02-18 PROCEDURE — 99999 PR PBB SHADOW E&M-EST. PATIENT-LVL III: CPT | Mod: PBBFAC,,, | Performed by: STUDENT IN AN ORGANIZED HEALTH CARE EDUCATION/TRAINING PROGRAM

## 2021-02-18 PROCEDURE — 37000008 HC ANESTHESIA 1ST 15 MINUTES: Performed by: STUDENT IN AN ORGANIZED HEALTH CARE EDUCATION/TRAINING PROGRAM

## 2021-02-18 PROCEDURE — 99024 PR POST-OP FOLLOW-UP VISIT: ICD-10-PCS | Mod: S$GLB,,, | Performed by: STUDENT IN AN ORGANIZED HEALTH CARE EDUCATION/TRAINING PROGRAM

## 2021-02-18 PROCEDURE — 71000015 HC POSTOP RECOV 1ST HR: Performed by: STUDENT IN AN ORGANIZED HEALTH CARE EDUCATION/TRAINING PROGRAM

## 2021-02-18 PROCEDURE — 36000704 HC OR TIME LEV I 1ST 15 MIN: Performed by: STUDENT IN AN ORGANIZED HEALTH CARE EDUCATION/TRAINING PROGRAM

## 2021-02-18 PROCEDURE — D9220A PRA ANESTHESIA: Mod: ANES,,, | Performed by: ANESTHESIOLOGY

## 2021-02-18 PROCEDURE — 3288F FALL RISK ASSESSMENT DOCD: CPT | Mod: CPTII,S$GLB,, | Performed by: STUDENT IN AN ORGANIZED HEALTH CARE EDUCATION/TRAINING PROGRAM

## 2021-02-18 PROCEDURE — 74177 CT ABD & PELVIS W/CONTRAST: CPT | Mod: 26,,, | Performed by: RADIOLOGY

## 2021-02-18 PROCEDURE — 1125F PR PAIN SEVERITY QUANTIFIED, PAIN PRESENT: ICD-10-PCS | Mod: S$GLB,,, | Performed by: STUDENT IN AN ORGANIZED HEALTH CARE EDUCATION/TRAINING PROGRAM

## 2021-02-18 PROCEDURE — 25000003 PHARM REV CODE 250: Performed by: NURSE ANESTHETIST, CERTIFIED REGISTERED

## 2021-02-18 PROCEDURE — 99024 POSTOP FOLLOW-UP VISIT: CPT | Mod: S$GLB,,, | Performed by: STUDENT IN AN ORGANIZED HEALTH CARE EDUCATION/TRAINING PROGRAM

## 2021-02-18 PROCEDURE — 74177 CT ABDOMEN PELVIS WITH CONTRAST: ICD-10-PCS | Mod: 26,,, | Performed by: RADIOLOGY

## 2021-02-18 PROCEDURE — 71000044 HC DOSC ROUTINE RECOVERY FIRST HOUR: Performed by: STUDENT IN AN ORGANIZED HEALTH CARE EDUCATION/TRAINING PROGRAM

## 2021-02-18 PROCEDURE — 63600175 PHARM REV CODE 636 W HCPCS: Performed by: NURSE ANESTHETIST, CERTIFIED REGISTERED

## 2021-02-18 PROCEDURE — U0002 COVID-19 LAB TEST NON-CDC: HCPCS

## 2021-02-18 PROCEDURE — 46040 PR I&D PERIRECTAL ABSCESS: ICD-10-PCS | Mod: 22,58,, | Performed by: STUDENT IN AN ORGANIZED HEALTH CARE EDUCATION/TRAINING PROGRAM

## 2021-02-18 PROCEDURE — 46040 I&D ISCHIORCT&/PERIRCT ABSC: CPT | Mod: 22,58,, | Performed by: STUDENT IN AN ORGANIZED HEALTH CARE EDUCATION/TRAINING PROGRAM

## 2021-02-18 PROCEDURE — 3008F PR BODY MASS INDEX (BMI) DOCUMENTED: ICD-10-PCS | Mod: CPTII,S$GLB,, | Performed by: STUDENT IN AN ORGANIZED HEALTH CARE EDUCATION/TRAINING PROGRAM

## 2021-02-18 PROCEDURE — D9220A PRA ANESTHESIA: ICD-10-PCS | Mod: ANES,,, | Performed by: ANESTHESIOLOGY

## 2021-02-18 PROCEDURE — 25000003 PHARM REV CODE 250: Performed by: STUDENT IN AN ORGANIZED HEALTH CARE EDUCATION/TRAINING PROGRAM

## 2021-02-18 PROCEDURE — 74177 CT ABD & PELVIS W/CONTRAST: CPT | Mod: TC

## 2021-02-18 PROCEDURE — 1125F AMNT PAIN NOTED PAIN PRSNT: CPT | Mod: S$GLB,,, | Performed by: STUDENT IN AN ORGANIZED HEALTH CARE EDUCATION/TRAINING PROGRAM

## 2021-02-18 PROCEDURE — 3008F BODY MASS INDEX DOCD: CPT | Mod: CPTII,S$GLB,, | Performed by: STUDENT IN AN ORGANIZED HEALTH CARE EDUCATION/TRAINING PROGRAM

## 2021-02-18 RX ORDER — IBUPROFEN 800 MG/1
800 TABLET ORAL EVERY 6 HOURS PRN
COMMUNITY
End: 2021-03-04

## 2021-02-18 RX ORDER — LIDOCAINE HYDROCHLORIDE 10 MG/ML
INJECTION, SOLUTION EPIDURAL; INFILTRATION; INTRACAUDAL; PERINEURAL
Status: DISCONTINUED | OUTPATIENT
Start: 2021-02-18 | End: 2021-02-18 | Stop reason: HOSPADM

## 2021-02-18 RX ORDER — SODIUM CHLORIDE 0.9 % (FLUSH) 0.9 %
3 SYRINGE (ML) INJECTION
Status: DISCONTINUED | OUTPATIENT
Start: 2021-02-18 | End: 2021-02-18 | Stop reason: HOSPADM

## 2021-02-18 RX ORDER — HYDROMORPHONE HYDROCHLORIDE 1 MG/ML
0.2 INJECTION, SOLUTION INTRAMUSCULAR; INTRAVENOUS; SUBCUTANEOUS EVERY 5 MIN PRN
Status: DISCONTINUED | OUTPATIENT
Start: 2021-02-18 | End: 2021-02-18 | Stop reason: HOSPADM

## 2021-02-18 RX ORDER — ACETAMINOPHEN 10 MG/ML
INJECTION, SOLUTION INTRAVENOUS
Status: DISCONTINUED | OUTPATIENT
Start: 2021-02-18 | End: 2021-02-18

## 2021-02-18 RX ORDER — DIPHENHYDRAMINE HYDROCHLORIDE 50 MG/ML
25 INJECTION INTRAMUSCULAR; INTRAVENOUS EVERY 6 HOURS PRN
Status: DISCONTINUED | OUTPATIENT
Start: 2021-02-18 | End: 2021-02-18 | Stop reason: HOSPADM

## 2021-02-18 RX ORDER — DEXMEDETOMIDINE HYDROCHLORIDE 100 UG/ML
INJECTION, SOLUTION INTRAVENOUS
Status: DISCONTINUED | OUTPATIENT
Start: 2021-02-18 | End: 2021-02-18

## 2021-02-18 RX ORDER — FENTANYL CITRATE 50 UG/ML
25 INJECTION, SOLUTION INTRAMUSCULAR; INTRAVENOUS EVERY 5 MIN PRN
Status: DISCONTINUED | OUTPATIENT
Start: 2021-02-18 | End: 2021-02-18 | Stop reason: HOSPADM

## 2021-02-18 RX ORDER — METRONIDAZOLE 500 MG/100ML
INJECTION, SOLUTION INTRAVENOUS
Status: DISCONTINUED | OUTPATIENT
Start: 2021-02-18 | End: 2021-02-18

## 2021-02-18 RX ORDER — PROPOFOL 10 MG/ML
VIAL (ML) INTRAVENOUS
Status: DISCONTINUED | OUTPATIENT
Start: 2021-02-18 | End: 2021-02-18

## 2021-02-18 RX ORDER — ONDANSETRON 2 MG/ML
4 INJECTION INTRAMUSCULAR; INTRAVENOUS ONCE AS NEEDED
Status: DISCONTINUED | OUTPATIENT
Start: 2021-02-18 | End: 2021-02-18 | Stop reason: HOSPADM

## 2021-02-18 RX ORDER — SUCCINYLCHOLINE CHLORIDE 20 MG/ML
INJECTION INTRAMUSCULAR; INTRAVENOUS
Status: DISCONTINUED | OUTPATIENT
Start: 2021-02-18 | End: 2021-02-18

## 2021-02-18 RX ORDER — LIDOCAINE HYDROCHLORIDE 20 MG/ML
INJECTION, SOLUTION EPIDURAL; INFILTRATION; INTRACAUDAL; PERINEURAL
Status: DISCONTINUED | OUTPATIENT
Start: 2021-02-18 | End: 2021-02-18

## 2021-02-18 RX ORDER — ONDANSETRON 2 MG/ML
INJECTION INTRAMUSCULAR; INTRAVENOUS
Status: DISCONTINUED | OUTPATIENT
Start: 2021-02-18 | End: 2021-02-18

## 2021-02-18 RX ORDER — EPHEDRINE SULFATE 50 MG/ML
INJECTION, SOLUTION INTRAVENOUS
Status: DISCONTINUED | OUTPATIENT
Start: 2021-02-18 | End: 2021-02-18

## 2021-02-18 RX ORDER — ROCURONIUM BROMIDE 10 MG/ML
INJECTION, SOLUTION INTRAVENOUS
Status: DISCONTINUED | OUTPATIENT
Start: 2021-02-18 | End: 2021-02-18

## 2021-02-18 RX ORDER — BUPIVACAINE HYDROCHLORIDE 5 MG/ML
INJECTION, SOLUTION EPIDURAL; INTRACAUDAL
Status: DISCONTINUED | OUTPATIENT
Start: 2021-02-18 | End: 2021-02-18 | Stop reason: HOSPADM

## 2021-02-18 RX ORDER — FENTANYL CITRATE 50 UG/ML
INJECTION, SOLUTION INTRAMUSCULAR; INTRAVENOUS
Status: DISCONTINUED | OUTPATIENT
Start: 2021-02-18 | End: 2021-02-18

## 2021-02-18 RX ADMIN — DEXMEDETOMIDINE HYDROCHLORIDE 4 MCG: 100 INJECTION, SOLUTION INTRAVENOUS at 06:02

## 2021-02-18 RX ADMIN — FENTANYL CITRATE 100 MCG: 50 INJECTION INTRAMUSCULAR; INTRAVENOUS at 06:02

## 2021-02-18 RX ADMIN — EPHEDRINE SULFATE 10 MG: 50 INJECTION INTRAVENOUS at 06:02

## 2021-02-18 RX ADMIN — ROCURONIUM BROMIDE 5 MG: 10 INJECTION, SOLUTION INTRAVENOUS at 06:02

## 2021-02-18 RX ADMIN — METRONIDAZOLE 500 MG: 500 INJECTION, SOLUTION INTRAVENOUS at 06:02

## 2021-02-18 RX ADMIN — SODIUM CHLORIDE: 0.9 INJECTION, SOLUTION INTRAVENOUS at 06:02

## 2021-02-18 RX ADMIN — IOHEXOL 100 ML: 350 INJECTION, SOLUTION INTRAVENOUS at 02:02

## 2021-02-18 RX ADMIN — ACETAMINOPHEN 1000 MG: 10 INJECTION, SOLUTION INTRAVENOUS at 06:02

## 2021-02-18 RX ADMIN — LIDOCAINE HYDROCHLORIDE 100 MG: 20 INJECTION, SOLUTION EPIDURAL; INFILTRATION; INTRACAUDAL at 06:02

## 2021-02-18 RX ADMIN — PROPOFOL 150 MG: 10 INJECTION, EMULSION INTRAVENOUS at 06:02

## 2021-02-18 RX ADMIN — SUCCINYLCHOLINE CHLORIDE 120 MG: 20 INJECTION, SOLUTION INTRAMUSCULAR; INTRAVENOUS; PARENTERAL at 06:02

## 2021-02-18 RX ADMIN — EPHEDRINE SULFATE 5 MG: 50 INJECTION INTRAVENOUS at 06:02

## 2021-02-18 RX ADMIN — EPHEDRINE SULFATE 15 MG: 50 INJECTION INTRAVENOUS at 06:02

## 2021-02-18 RX ADMIN — ONDANSETRON 4 MG: 2 INJECTION INTRAMUSCULAR; INTRAVENOUS at 06:02

## 2021-02-26 ENCOUNTER — OFFICE VISIT (OUTPATIENT)
Dept: SURGERY | Facility: CLINIC | Age: 76
End: 2021-02-26
Payer: MEDICARE

## 2021-02-26 ENCOUNTER — TELEPHONE (OUTPATIENT)
Dept: NEUROLOGY | Facility: CLINIC | Age: 76
End: 2021-02-26

## 2021-02-26 VITALS
BODY MASS INDEX: 29.03 KG/M2 | DIASTOLIC BLOOD PRESSURE: 82 MMHG | SYSTOLIC BLOOD PRESSURE: 146 MMHG | HEIGHT: 69 IN | HEART RATE: 80 BPM | WEIGHT: 196 LBS

## 2021-02-26 DIAGNOSIS — K61.1 PERIRECTAL ABSCESS: Primary | ICD-10-CM

## 2021-02-26 PROCEDURE — 99024 POSTOP FOLLOW-UP VISIT: CPT | Mod: S$GLB,,, | Performed by: STUDENT IN AN ORGANIZED HEALTH CARE EDUCATION/TRAINING PROGRAM

## 2021-02-26 PROCEDURE — 3008F PR BODY MASS INDEX (BMI) DOCUMENTED: ICD-10-PCS | Mod: CPTII,S$GLB,, | Performed by: STUDENT IN AN ORGANIZED HEALTH CARE EDUCATION/TRAINING PROGRAM

## 2021-02-26 PROCEDURE — 1126F AMNT PAIN NOTED NONE PRSNT: CPT | Mod: S$GLB,,, | Performed by: STUDENT IN AN ORGANIZED HEALTH CARE EDUCATION/TRAINING PROGRAM

## 2021-02-26 PROCEDURE — 3008F BODY MASS INDEX DOCD: CPT | Mod: CPTII,S$GLB,, | Performed by: STUDENT IN AN ORGANIZED HEALTH CARE EDUCATION/TRAINING PROGRAM

## 2021-02-26 PROCEDURE — 99999 PR PBB SHADOW E&M-EST. PATIENT-LVL III: CPT | Mod: PBBFAC,,, | Performed by: STUDENT IN AN ORGANIZED HEALTH CARE EDUCATION/TRAINING PROGRAM

## 2021-02-26 PROCEDURE — 3288F PR FALLS RISK ASSESSMENT DOCUMENTED: ICD-10-PCS | Mod: CPTII,S$GLB,, | Performed by: STUDENT IN AN ORGANIZED HEALTH CARE EDUCATION/TRAINING PROGRAM

## 2021-02-26 PROCEDURE — 99999 PR PBB SHADOW E&M-EST. PATIENT-LVL III: ICD-10-PCS | Mod: PBBFAC,,, | Performed by: STUDENT IN AN ORGANIZED HEALTH CARE EDUCATION/TRAINING PROGRAM

## 2021-02-26 PROCEDURE — 99024 PR POST-OP FOLLOW-UP VISIT: ICD-10-PCS | Mod: S$GLB,,, | Performed by: STUDENT IN AN ORGANIZED HEALTH CARE EDUCATION/TRAINING PROGRAM

## 2021-02-26 PROCEDURE — 1101F PT FALLS ASSESS-DOCD LE1/YR: CPT | Mod: CPTII,S$GLB,, | Performed by: STUDENT IN AN ORGANIZED HEALTH CARE EDUCATION/TRAINING PROGRAM

## 2021-02-26 PROCEDURE — 1126F PR PAIN SEVERITY QUANTIFIED, NO PAIN PRESENT: ICD-10-PCS | Mod: S$GLB,,, | Performed by: STUDENT IN AN ORGANIZED HEALTH CARE EDUCATION/TRAINING PROGRAM

## 2021-02-26 PROCEDURE — 3288F FALL RISK ASSESSMENT DOCD: CPT | Mod: CPTII,S$GLB,, | Performed by: STUDENT IN AN ORGANIZED HEALTH CARE EDUCATION/TRAINING PROGRAM

## 2021-02-26 PROCEDURE — 1101F PR PT FALLS ASSESS DOC 0-1 FALLS W/OUT INJ PAST YR: ICD-10-PCS | Mod: CPTII,S$GLB,, | Performed by: STUDENT IN AN ORGANIZED HEALTH CARE EDUCATION/TRAINING PROGRAM

## 2021-03-03 ENCOUNTER — PES CALL (OUTPATIENT)
Dept: ADMINISTRATIVE | Facility: CLINIC | Age: 76
End: 2021-03-03

## 2021-03-04 ENCOUNTER — TELEPHONE (OUTPATIENT)
Dept: NEUROLOGY | Facility: CLINIC | Age: 76
End: 2021-03-04

## 2021-03-04 ENCOUNTER — OFFICE VISIT (OUTPATIENT)
Dept: NEUROLOGY | Facility: CLINIC | Age: 76
End: 2021-03-04
Payer: MEDICARE

## 2021-03-04 DIAGNOSIS — G20.A1 PARKINSON'S DISEASE: Primary | ICD-10-CM

## 2021-03-04 PROCEDURE — 99214 PR OFFICE/OUTPT VISIT, EST, LEVL IV, 30-39 MIN: ICD-10-PCS | Mod: 95,,, | Performed by: PSYCHIATRY & NEUROLOGY

## 2021-03-04 PROCEDURE — 1159F PR MEDICATION LIST DOCUMENTED IN MEDICAL RECORD: ICD-10-PCS | Mod: 95,,, | Performed by: PSYCHIATRY & NEUROLOGY

## 2021-03-04 PROCEDURE — 1159F MED LIST DOCD IN RCRD: CPT | Mod: 95,,, | Performed by: PSYCHIATRY & NEUROLOGY

## 2021-03-04 PROCEDURE — 99499 UNLISTED E&M SERVICE: CPT | Mod: 95,,, | Performed by: PSYCHIATRY & NEUROLOGY

## 2021-03-04 PROCEDURE — 99214 OFFICE O/P EST MOD 30 MIN: CPT | Mod: 95,,, | Performed by: PSYCHIATRY & NEUROLOGY

## 2021-03-04 PROCEDURE — 99499 RISK ADDL DX/OHS AUDIT: ICD-10-PCS | Mod: 95,,, | Performed by: PSYCHIATRY & NEUROLOGY

## 2021-03-04 RX ORDER — QUETIAPINE FUMARATE 25 MG/1
25 TABLET, FILM COATED ORAL NIGHTLY PRN
Qty: 30 TABLET | Refills: 11 | Status: SHIPPED | OUTPATIENT
Start: 2021-03-04 | End: 2021-09-13

## 2021-03-05 ENCOUNTER — TELEPHONE (OUTPATIENT)
Dept: NEUROLOGY | Facility: CLINIC | Age: 76
End: 2021-03-05

## 2021-03-08 ENCOUNTER — TELEPHONE (OUTPATIENT)
Dept: NEUROLOGY | Facility: CLINIC | Age: 76
End: 2021-03-08

## 2021-03-16 ENCOUNTER — OFFICE VISIT (OUTPATIENT)
Dept: SURGERY | Facility: CLINIC | Age: 76
End: 2021-03-16
Payer: MEDICARE

## 2021-03-16 ENCOUNTER — OFFICE VISIT (OUTPATIENT)
Dept: PSYCHIATRY | Facility: CLINIC | Age: 76
End: 2021-03-16
Payer: MEDICARE

## 2021-03-16 ENCOUNTER — TELEPHONE (OUTPATIENT)
Dept: UROLOGY | Facility: CLINIC | Age: 76
End: 2021-03-16

## 2021-03-16 VITALS
HEIGHT: 70 IN | WEIGHT: 198.13 LBS | BODY MASS INDEX: 28.36 KG/M2 | SYSTOLIC BLOOD PRESSURE: 159 MMHG | HEART RATE: 74 BPM | DIASTOLIC BLOOD PRESSURE: 84 MMHG

## 2021-03-16 DIAGNOSIS — F41.1 GAD (GENERALIZED ANXIETY DISORDER): Primary | ICD-10-CM

## 2021-03-16 DIAGNOSIS — K61.1 PERIRECTAL ABSCESS: Primary | ICD-10-CM

## 2021-03-16 LAB
FUNGUS SPEC CULT: NORMAL
FUNGUS SPEC CULT: NORMAL

## 2021-03-16 PROCEDURE — 1159F MED LIST DOCD IN RCRD: CPT | Mod: S$GLB,,, | Performed by: STUDENT IN AN ORGANIZED HEALTH CARE EDUCATION/TRAINING PROGRAM

## 2021-03-16 PROCEDURE — 99212 PR OFFICE/OUTPT VISIT, EST, LEVL II, 10-19 MIN: ICD-10-PCS | Mod: 25,S$GLB,, | Performed by: STUDENT IN AN ORGANIZED HEALTH CARE EDUCATION/TRAINING PROGRAM

## 2021-03-16 PROCEDURE — 90785 PSYTX COMPLEX INTERACTIVE: CPT | Mod: S$GLB,,, | Performed by: SOCIAL WORKER

## 2021-03-16 PROCEDURE — 1126F AMNT PAIN NOTED NONE PRSNT: CPT | Mod: S$GLB,,, | Performed by: STUDENT IN AN ORGANIZED HEALTH CARE EDUCATION/TRAINING PROGRAM

## 2021-03-16 PROCEDURE — 3079F PR MOST RECENT DIASTOLIC BLOOD PRESSURE 80-89 MM HG: ICD-10-PCS | Mod: CPTII,S$GLB,, | Performed by: STUDENT IN AN ORGANIZED HEALTH CARE EDUCATION/TRAINING PROGRAM

## 2021-03-16 PROCEDURE — 3077F SYST BP >= 140 MM HG: CPT | Mod: CPTII,S$GLB,, | Performed by: STUDENT IN AN ORGANIZED HEALTH CARE EDUCATION/TRAINING PROGRAM

## 2021-03-16 PROCEDURE — 90834 PR PSYCHOTHERAPY W/PATIENT, 45 MIN: ICD-10-PCS | Mod: S$GLB,,, | Performed by: SOCIAL WORKER

## 2021-03-16 PROCEDURE — 1101F PR PT FALLS ASSESS DOC 0-1 FALLS W/OUT INJ PAST YR: ICD-10-PCS | Mod: CPTII,S$GLB,, | Performed by: STUDENT IN AN ORGANIZED HEALTH CARE EDUCATION/TRAINING PROGRAM

## 2021-03-16 PROCEDURE — 3008F PR BODY MASS INDEX (BMI) DOCUMENTED: ICD-10-PCS | Mod: CPTII,S$GLB,, | Performed by: STUDENT IN AN ORGANIZED HEALTH CARE EDUCATION/TRAINING PROGRAM

## 2021-03-16 PROCEDURE — 3077F PR MOST RECENT SYSTOLIC BLOOD PRESSURE >= 140 MM HG: ICD-10-PCS | Mod: CPTII,S$GLB,, | Performed by: STUDENT IN AN ORGANIZED HEALTH CARE EDUCATION/TRAINING PROGRAM

## 2021-03-16 PROCEDURE — 3079F DIAST BP 80-89 MM HG: CPT | Mod: CPTII,S$GLB,, | Performed by: STUDENT IN AN ORGANIZED HEALTH CARE EDUCATION/TRAINING PROGRAM

## 2021-03-16 PROCEDURE — 99999 PR PBB SHADOW E&M-EST. PATIENT-LVL II: ICD-10-PCS | Mod: PBBFAC,,, | Performed by: STUDENT IN AN ORGANIZED HEALTH CARE EDUCATION/TRAINING PROGRAM

## 2021-03-16 PROCEDURE — 1101F PT FALLS ASSESS-DOCD LE1/YR: CPT | Mod: CPTII,S$GLB,, | Performed by: STUDENT IN AN ORGANIZED HEALTH CARE EDUCATION/TRAINING PROGRAM

## 2021-03-16 PROCEDURE — 99999 PR PBB SHADOW E&M-EST. PATIENT-LVL II: CPT | Mod: PBBFAC,,, | Performed by: STUDENT IN AN ORGANIZED HEALTH CARE EDUCATION/TRAINING PROGRAM

## 2021-03-16 PROCEDURE — 90834 PSYTX W PT 45 MINUTES: CPT | Mod: S$GLB,,, | Performed by: SOCIAL WORKER

## 2021-03-16 PROCEDURE — 3288F PR FALLS RISK ASSESSMENT DOCUMENTED: ICD-10-PCS | Mod: CPTII,S$GLB,, | Performed by: STUDENT IN AN ORGANIZED HEALTH CARE EDUCATION/TRAINING PROGRAM

## 2021-03-16 PROCEDURE — 1159F PR MEDICATION LIST DOCUMENTED IN MEDICAL RECORD: ICD-10-PCS | Mod: S$GLB,,, | Performed by: STUDENT IN AN ORGANIZED HEALTH CARE EDUCATION/TRAINING PROGRAM

## 2021-03-16 PROCEDURE — 1126F PR PAIN SEVERITY QUANTIFIED, NO PAIN PRESENT: ICD-10-PCS | Mod: S$GLB,,, | Performed by: STUDENT IN AN ORGANIZED HEALTH CARE EDUCATION/TRAINING PROGRAM

## 2021-03-16 PROCEDURE — 99212 OFFICE O/P EST SF 10 MIN: CPT | Mod: 25,S$GLB,, | Performed by: STUDENT IN AN ORGANIZED HEALTH CARE EDUCATION/TRAINING PROGRAM

## 2021-03-16 PROCEDURE — 3288F FALL RISK ASSESSMENT DOCD: CPT | Mod: CPTII,S$GLB,, | Performed by: STUDENT IN AN ORGANIZED HEALTH CARE EDUCATION/TRAINING PROGRAM

## 2021-03-16 PROCEDURE — 90785 PR INTERACTIVE COMPLEXITY: ICD-10-PCS | Mod: S$GLB,,, | Performed by: SOCIAL WORKER

## 2021-03-16 PROCEDURE — 3008F BODY MASS INDEX DOCD: CPT | Mod: CPTII,S$GLB,, | Performed by: STUDENT IN AN ORGANIZED HEALTH CARE EDUCATION/TRAINING PROGRAM

## 2021-03-18 ENCOUNTER — OFFICE VISIT (OUTPATIENT)
Dept: UROLOGY | Facility: CLINIC | Age: 76
End: 2021-03-18
Payer: MEDICARE

## 2021-03-18 ENCOUNTER — PATIENT MESSAGE (OUTPATIENT)
Dept: RESEARCH | Facility: HOSPITAL | Age: 76
End: 2021-03-18

## 2021-03-18 VITALS
BODY MASS INDEX: 28.35 KG/M2 | WEIGHT: 198 LBS | SYSTOLIC BLOOD PRESSURE: 180 MMHG | DIASTOLIC BLOOD PRESSURE: 101 MMHG | HEART RATE: 73 BPM | HEIGHT: 70 IN

## 2021-03-18 DIAGNOSIS — N40.1 BPH WITH OBSTRUCTION/LOWER URINARY TRACT SYMPTOMS: Primary | ICD-10-CM

## 2021-03-18 DIAGNOSIS — N39.41 URGE INCONTINENCE: ICD-10-CM

## 2021-03-18 DIAGNOSIS — N13.8 BPH WITH OBSTRUCTION/LOWER URINARY TRACT SYMPTOMS: Primary | ICD-10-CM

## 2021-03-18 DIAGNOSIS — R35.1 NOCTURIA: ICD-10-CM

## 2021-03-18 PROCEDURE — 99214 PR OFFICE/OUTPT VISIT, EST, LEVL IV, 30-39 MIN: ICD-10-PCS | Mod: 25,S$GLB,, | Performed by: NURSE PRACTITIONER

## 2021-03-18 PROCEDURE — 1159F MED LIST DOCD IN RCRD: CPT | Mod: S$GLB,,, | Performed by: NURSE PRACTITIONER

## 2021-03-18 PROCEDURE — 1126F AMNT PAIN NOTED NONE PRSNT: CPT | Mod: S$GLB,,, | Performed by: NURSE PRACTITIONER

## 2021-03-18 PROCEDURE — 3080F PR MOST RECENT DIASTOLIC BLOOD PRESSURE >= 90 MM HG: ICD-10-PCS | Mod: CPTII,S$GLB,, | Performed by: NURSE PRACTITIONER

## 2021-03-18 PROCEDURE — 1159F PR MEDICATION LIST DOCUMENTED IN MEDICAL RECORD: ICD-10-PCS | Mod: S$GLB,,, | Performed by: NURSE PRACTITIONER

## 2021-03-18 PROCEDURE — 81002 URINALYSIS NONAUTO W/O SCOPE: CPT | Mod: S$GLB,,, | Performed by: NURSE PRACTITIONER

## 2021-03-18 PROCEDURE — 3288F PR FALLS RISK ASSESSMENT DOCUMENTED: ICD-10-PCS | Mod: CPTII,S$GLB,, | Performed by: NURSE PRACTITIONER

## 2021-03-18 PROCEDURE — 1126F PR PAIN SEVERITY QUANTIFIED, NO PAIN PRESENT: ICD-10-PCS | Mod: S$GLB,,, | Performed by: NURSE PRACTITIONER

## 2021-03-18 PROCEDURE — 3008F BODY MASS INDEX DOCD: CPT | Mod: CPTII,S$GLB,, | Performed by: NURSE PRACTITIONER

## 2021-03-18 PROCEDURE — 3080F DIAST BP >= 90 MM HG: CPT | Mod: CPTII,S$GLB,, | Performed by: NURSE PRACTITIONER

## 2021-03-18 PROCEDURE — 1101F PR PT FALLS ASSESS DOC 0-1 FALLS W/OUT INJ PAST YR: ICD-10-PCS | Mod: CPTII,S$GLB,, | Performed by: NURSE PRACTITIONER

## 2021-03-18 PROCEDURE — 3077F PR MOST RECENT SYSTOLIC BLOOD PRESSURE >= 140 MM HG: ICD-10-PCS | Mod: CPTII,S$GLB,, | Performed by: NURSE PRACTITIONER

## 2021-03-18 PROCEDURE — 81002 POCT URINE DIPSTICK WITHOUT MICROSCOPE: ICD-10-PCS | Mod: S$GLB,,, | Performed by: NURSE PRACTITIONER

## 2021-03-18 PROCEDURE — 3288F FALL RISK ASSESSMENT DOCD: CPT | Mod: CPTII,S$GLB,, | Performed by: NURSE PRACTITIONER

## 2021-03-18 PROCEDURE — 3077F SYST BP >= 140 MM HG: CPT | Mod: CPTII,S$GLB,, | Performed by: NURSE PRACTITIONER

## 2021-03-18 PROCEDURE — 3008F PR BODY MASS INDEX (BMI) DOCUMENTED: ICD-10-PCS | Mod: CPTII,S$GLB,, | Performed by: NURSE PRACTITIONER

## 2021-03-18 PROCEDURE — 99214 OFFICE O/P EST MOD 30 MIN: CPT | Mod: 25,S$GLB,, | Performed by: NURSE PRACTITIONER

## 2021-03-18 PROCEDURE — 1101F PT FALLS ASSESS-DOCD LE1/YR: CPT | Mod: CPTII,S$GLB,, | Performed by: NURSE PRACTITIONER

## 2021-03-18 RX ORDER — TAMSULOSIN HYDROCHLORIDE 0.4 MG/1
0.4 CAPSULE ORAL DAILY
Qty: 30 CAPSULE | Refills: 0 | Status: SHIPPED | OUTPATIENT
Start: 2021-03-18 | End: 2021-04-09

## 2021-03-18 RX ORDER — MIRABEGRON 25 MG/1
25 TABLET, FILM COATED, EXTENDED RELEASE ORAL DAILY
Qty: 30 TABLET | Refills: 11 | Status: SHIPPED | OUTPATIENT
Start: 2021-03-18 | End: 2021-04-05 | Stop reason: SDUPTHER

## 2021-03-18 RX ORDER — TAMSULOSIN HYDROCHLORIDE 0.4 MG/1
0.4 CAPSULE ORAL DAILY
Qty: 90 CAPSULE | Refills: 3 | Status: SHIPPED | OUTPATIENT
Start: 2021-03-18 | End: 2021-09-22

## 2021-03-19 LAB
BILIRUB SERPL-MCNC: NEGATIVE MG/DL
BLOOD URINE, POC: NEGATIVE
CLARITY, POC UA: CLEAR
COLOR, POC UA: YELLOW
GLUCOSE UR QL STRIP: NORMAL
KETONES UR QL STRIP: NEGATIVE
LEUKOCYTE ESTERASE URINE, POC: NEGATIVE
NITRITE, POC UA: NEGATIVE
PH, POC UA: 6
PROTEIN, POC: NEGATIVE
SPECIFIC GRAVITY, POC UA: 1.01
UROBILINOGEN, POC UA: NORMAL

## 2021-03-22 ENCOUNTER — TELEPHONE (OUTPATIENT)
Dept: NEUROLOGY | Facility: CLINIC | Age: 76
End: 2021-03-22

## 2021-03-22 NOTE — TELEPHONE ENCOUNTER
----- Message from Breanna Hgigins sent at 3/22/2021  9:58 AM CDT -----  Contact: Dima @ [phone]  Pt is needing a callback to schedule his f/u appt w/ the dr.

## 2021-03-26 ENCOUNTER — PATIENT MESSAGE (OUTPATIENT)
Dept: RESEARCH | Facility: HOSPITAL | Age: 76
End: 2021-03-26

## 2021-04-05 ENCOUNTER — TELEPHONE (OUTPATIENT)
Dept: NEUROLOGY | Facility: CLINIC | Age: 76
End: 2021-04-05

## 2021-04-05 DIAGNOSIS — N39.41 URGE INCONTINENCE: ICD-10-CM

## 2021-04-05 RX ORDER — MIRABEGRON 25 MG/1
25 TABLET, FILM COATED, EXTENDED RELEASE ORAL DAILY
Qty: 30 TABLET | Refills: 11 | Status: SHIPPED | OUTPATIENT
Start: 2021-04-05 | End: 2022-01-10

## 2021-04-05 NOTE — TELEPHONE ENCOUNTER
----- Message from Tamar Portillo sent at 4/5/2021  1:43 PM CDT -----  Regarding: schedule an appointment  Contact: Tee  Mr. Marti need to schedule a 3 month follow up appointment. No one contacted to schedule. He can be reached at [phone]

## 2021-04-15 ENCOUNTER — OFFICE VISIT (OUTPATIENT)
Dept: PSYCHIATRY | Facility: CLINIC | Age: 76
End: 2021-04-15
Payer: MEDICARE

## 2021-04-15 DIAGNOSIS — F41.1 GENERALIZED ANXIETY DISORDER: Primary | ICD-10-CM

## 2021-04-15 DIAGNOSIS — F32.89 OTHER DEPRESSION: ICD-10-CM

## 2021-04-15 DIAGNOSIS — G20.A1 PARKINSON'S DISEASE: ICD-10-CM

## 2021-04-15 PROCEDURE — 99213 OFFICE O/P EST LOW 20 MIN: CPT | Mod: S$GLB,,, | Performed by: PSYCHIATRY & NEUROLOGY

## 2021-04-15 PROCEDURE — 1159F PR MEDICATION LIST DOCUMENTED IN MEDICAL RECORD: ICD-10-PCS | Mod: S$GLB,,, | Performed by: PSYCHIATRY & NEUROLOGY

## 2021-04-15 PROCEDURE — 99999 PR PBB SHADOW E&M-EST. PATIENT-LVL I: ICD-10-PCS | Mod: PBBFAC,,, | Performed by: PSYCHIATRY & NEUROLOGY

## 2021-04-15 PROCEDURE — 99499 RISK ADDL DX/OHS AUDIT: ICD-10-PCS | Mod: HCNC,S$GLB,, | Performed by: PSYCHIATRY & NEUROLOGY

## 2021-04-15 PROCEDURE — 99999 PR PBB SHADOW E&M-EST. PATIENT-LVL I: CPT | Mod: PBBFAC,,, | Performed by: PSYCHIATRY & NEUROLOGY

## 2021-04-15 PROCEDURE — 1159F MED LIST DOCD IN RCRD: CPT | Mod: S$GLB,,, | Performed by: PSYCHIATRY & NEUROLOGY

## 2021-04-15 PROCEDURE — 99213 PR OFFICE/OUTPT VISIT, EST, LEVL III, 20-29 MIN: ICD-10-PCS | Mod: S$GLB,,, | Performed by: PSYCHIATRY & NEUROLOGY

## 2021-04-15 PROCEDURE — 99499 UNLISTED E&M SERVICE: CPT | Mod: HCNC,S$GLB,, | Performed by: PSYCHIATRY & NEUROLOGY

## 2021-04-16 LAB
ACID FAST MOD KINY STN SPEC: NORMAL
ACID FAST MOD KINY STN SPEC: NORMAL
MYCOBACTERIUM SPEC QL CULT: NORMAL
MYCOBACTERIUM SPEC QL CULT: NORMAL

## 2021-04-27 ENCOUNTER — OFFICE VISIT (OUTPATIENT)
Dept: SURGERY | Facility: CLINIC | Age: 76
End: 2021-04-27
Payer: MEDICARE

## 2021-04-27 VITALS
DIASTOLIC BLOOD PRESSURE: 92 MMHG | BODY MASS INDEX: 29.02 KG/M2 | SYSTOLIC BLOOD PRESSURE: 161 MMHG | HEIGHT: 70 IN | HEART RATE: 69 BPM | WEIGHT: 202.69 LBS

## 2021-04-27 DIAGNOSIS — L02.31 LEFT BUTTOCK ABSCESS: ICD-10-CM

## 2021-04-27 PROCEDURE — 1126F PR PAIN SEVERITY QUANTIFIED, NO PAIN PRESENT: ICD-10-PCS | Mod: S$GLB,,, | Performed by: STUDENT IN AN ORGANIZED HEALTH CARE EDUCATION/TRAINING PROGRAM

## 2021-04-27 PROCEDURE — 99024 PR POST-OP FOLLOW-UP VISIT: ICD-10-PCS | Mod: S$GLB,,, | Performed by: STUDENT IN AN ORGANIZED HEALTH CARE EDUCATION/TRAINING PROGRAM

## 2021-04-27 PROCEDURE — 1126F AMNT PAIN NOTED NONE PRSNT: CPT | Mod: S$GLB,,, | Performed by: STUDENT IN AN ORGANIZED HEALTH CARE EDUCATION/TRAINING PROGRAM

## 2021-04-27 PROCEDURE — 3288F PR FALLS RISK ASSESSMENT DOCUMENTED: ICD-10-PCS | Mod: CPTII,S$GLB,, | Performed by: STUDENT IN AN ORGANIZED HEALTH CARE EDUCATION/TRAINING PROGRAM

## 2021-04-27 PROCEDURE — 1159F MED LIST DOCD IN RCRD: CPT | Mod: S$GLB,,, | Performed by: STUDENT IN AN ORGANIZED HEALTH CARE EDUCATION/TRAINING PROGRAM

## 2021-04-27 PROCEDURE — 1159F PR MEDICATION LIST DOCUMENTED IN MEDICAL RECORD: ICD-10-PCS | Mod: S$GLB,,, | Performed by: STUDENT IN AN ORGANIZED HEALTH CARE EDUCATION/TRAINING PROGRAM

## 2021-04-27 PROCEDURE — 99999 PR PBB SHADOW E&M-EST. PATIENT-LVL III: ICD-10-PCS | Mod: PBBFAC,,, | Performed by: STUDENT IN AN ORGANIZED HEALTH CARE EDUCATION/TRAINING PROGRAM

## 2021-04-27 PROCEDURE — 3008F BODY MASS INDEX DOCD: CPT | Mod: CPTII,S$GLB,, | Performed by: STUDENT IN AN ORGANIZED HEALTH CARE EDUCATION/TRAINING PROGRAM

## 2021-04-27 PROCEDURE — 1101F PR PT FALLS ASSESS DOC 0-1 FALLS W/OUT INJ PAST YR: ICD-10-PCS | Mod: CPTII,S$GLB,, | Performed by: STUDENT IN AN ORGANIZED HEALTH CARE EDUCATION/TRAINING PROGRAM

## 2021-04-27 PROCEDURE — 99999 PR PBB SHADOW E&M-EST. PATIENT-LVL III: CPT | Mod: PBBFAC,,, | Performed by: STUDENT IN AN ORGANIZED HEALTH CARE EDUCATION/TRAINING PROGRAM

## 2021-04-27 PROCEDURE — 99024 POSTOP FOLLOW-UP VISIT: CPT | Mod: S$GLB,,, | Performed by: STUDENT IN AN ORGANIZED HEALTH CARE EDUCATION/TRAINING PROGRAM

## 2021-04-27 PROCEDURE — 1101F PT FALLS ASSESS-DOCD LE1/YR: CPT | Mod: CPTII,S$GLB,, | Performed by: STUDENT IN AN ORGANIZED HEALTH CARE EDUCATION/TRAINING PROGRAM

## 2021-04-27 PROCEDURE — 3008F PR BODY MASS INDEX (BMI) DOCUMENTED: ICD-10-PCS | Mod: CPTII,S$GLB,, | Performed by: STUDENT IN AN ORGANIZED HEALTH CARE EDUCATION/TRAINING PROGRAM

## 2021-04-27 PROCEDURE — 3288F FALL RISK ASSESSMENT DOCD: CPT | Mod: CPTII,S$GLB,, | Performed by: STUDENT IN AN ORGANIZED HEALTH CARE EDUCATION/TRAINING PROGRAM

## 2021-05-04 NOTE — PROGRESS NOTES
Individual Psychotherapy (PhD/LCSW)    6/27/2018    Site:  [facility]         Therapeutic Intervention: Met with patient and spouse.  Outpatient - Insight oriented psychotherapy 45 min - CPT code 85747    Chief complaint/reason for encounter: anxiety Wife Whitley  Pt with parkinsons with cognitive difficulties for which wife is needed to aid in therapeutic instructions including homework.       Interval history and content of current session:    Read chapter 3 and 4.   Mildly improved anxiety.    Reviewed his worry that he would not be able to contact nurse.  Used pie to discuss probability.  Reviewed leaky faucet as well.   Continue book and record thought (with help of wife).              Treatment plan:  · Target symptoms: anxiety   · Why chosen therapy is appropriate versus another modality: relevant to diagnosis  · Outcome monitoring methods: self-report, observation  · Therapeutic intervention type: behavior modifying psychotherapy    Risk parameters:  Patient reports no suicidal ideation  Patient reports no homicidal ideation  Patient reports no self-injurious behavior  Patient reports no violent behavior      Verbal deficits: None    Patient's response to intervention:  The patient's response to intervention is accepting.    Progress toward goals and other mental status changes:  The patient's progress toward goals is fair .    Diagnosis:     ICD-10-CM ICD-9-CM   1. Generalized anxiety disorder F41.1 300.02       Plan:  individual psychotherapy    Return to clinic: 2 weeks    Length of Service (minutes): 45   Referred by: Tyra Juarez DO; Medical Diagnosis (from order):    Diagnosis Information      Diagnosis    724.3 (ICD-9-CM) - M54.32 (ICD-10-CM) - Left sciatic nerve pain                Initial Evaluation -  Physical Therapy    Visit:  1   Treatment Diagnosis: lumbar, symptoms with increased pain/symptoms, impaired posture, impaired strength, impaired activity tolerance, impaired joint play/mobility, impaired gait, impaired balance  Date of onset: 4 months ago, L sided sciatica pain  Chart reviewed at time of initial evaluation (relevant co-morbidities, allergies, tests and medications listed): Blood pressure and HTN  Diagnostic Tests: No diagnostic tests were performed    SUBJECTIVE                                                                                                             Pt reports that she woke up one day 4 months ago and had pain that was located on the L side. Cannot think of what may have caused it and has been taking over the counter medication to help. Pt says that the pain can be in the lower back and in the L glute. When walking prolong distances she can feel the pain may radiate down the front of the L thigh. Pt says that she can feel her pain at about 100 yards of walking. She has been using the heating pad to help decrease pain. Has had L hip pain in the past with nothing remarkable from imaging. Pt says she does some yoga and water exercises that help decrease some pain. Pt also reports that if she sneezes or coughs that it can cause some tingling in the front of her thighs. Noble any changes in bowel or bladder habits.     Pain / Symptoms:  Pain/symptom is: intermittent  Pain rating (out of 10): Current: 2 ; Best: 0; Worst: 8Location: L buttock and L Low back   Quality / Description: pins and needles, tingling, radiating.  Alleviating Factors: avoiding movement in involved area, heat.   Function:   Limitations / Exacerbation Factors: pain, difficulty, bed mobility, lower body dressing,  house/yard work, standing tasks, grocery shopping, driving/riding in a vehicle, bending/squatting/lifting, lifting/carrying, squatting/lifting, standing, kneeling and walking, car transfers and low transfer (toilet/couch), community distances, household distances, stairs, walking quickly as required to cross a street/exit a building rapidly  Prior Level of Function: pain free ADLs and IADLs,  Patient Goals: decreased pain, increased strength, independence with ADLs/IADLs and return to sport/leisure activities, Be pain free    Prior treatment: no therapies  Discharged from hospital, home health, or skilled nursing facility in last 30 days: no    Home Environment:   Patient lives with significant other  Type of home: multiple level home  Assistance available: consistent  Feels safe at home/work/school.  2 or more falls or an unexplained fall with injury in the last year:  No    OBJECTIVE                                                                                                                     Observed Gait:   Assistive Device: no assistive device  Weight bearing: Left: full  Right: full    Analysis: narrow base of support;    • Left: toe out    Range of Motion (ROM)   (degrees unless noted; active unless noted; norms in ( ); negative=lacking to 0, positive=beyond 0)   Lumbar WNL, except  Lumbar:    - Side Bend (25-35):        • Left:  50°  pain     Strength  (out of 5 unless noted, standard test position unless noted, lbs tested with hand held dynamometer)   Hip:    - Flexion:        • Left: 4-        • Right: 4-    - Extension:        • Left: 4-        • Right: 4-    - Abduction:        • Left: 4-        • Right: 4-  Knee:    - Flexion:        • Left: 4+        • Right: 4+    - Extension:        • Left: 5        • Right: 5  Ankle:    - Dorsiflexion:        • Left: 5        • Right: 5    - Plantar Flexion:        • Left: 5        • Right: 5  Lumbar:    - Upper Abdominals: 3+     - Lower Abdominals: 3+      Deep Tendon Reflexes:   Patellar (L3/4): Left: 3+; Right: 3+   Achilles (L5/S1):  Left: 2+;  Right: 2+     Palpation:   Left Lower Extremity: Gluteus Bang:tenderness; Piriformis:tenderness;     Special Tests:  Straight Leg Raise:     Passive supine: Left: negative Right: negative  Lateral Shift Correction: Left: positive Right: negative  Slump Test: Left: negative Right: negative    Outcome Measures:   OSWESTRY Total Scored: 6  OSWESTRY Total Possible Score: 45  OSWESTRY Score Calculated: 13.33 %  (0-20% = minimal disability; 20-40% = moderate disability; 40-60% = severe disability; 60-80% = crippled; % = bed bound) see flowsheet for additional documentation    TREATMENT                                                                                                                  Therapeutic Exercise:  Performed HEP (see below)    Skilled input: verbal instruction/cues and tactile instruction/cues    Writer verbally educated and received verbal consent for hand placement, positioning of patient, and techniques to be performed today from patient for clothing adjustments for techniques, therapist position for techniques and hand placement and palpation for techniques as described above and how they are pertinent to the patient's plan of care.    Home Exercise Program:   Access Code: [email or web address]  URL: [email or web address]  Date: 05/04/2021  Prepared by: Lorenzo Leonard    Exercises  Supine Bridge - 2-3 x daily - 3-5 x weekly - 15 reps - 2 sets - 2-3 secs hold  Supine Lower Trunk Rotation - 2-3 x daily - 3-5 x weekly - 10 reps - 3 sets - 3-5 sec holds hold  Sidelying Hip Abduction - 2-3 x daily - 3-5 x weekly - 15 reps - 2 sets  Clamshell - 2-3 x daily - 3-5 x weekly - 15 reps - 2 sets  Supine March - 2-3 x daily - 3-5 x weekly - 2 sets - 10 reps  Supine Hip Adduction Isometric with Ball - 2-3 x daily - 3-5 x weekly - 2 sets - 10 reps - 5 hold  Supine Hamstring Stretch - 2-3 x daily  - 3-5 x weekly - 2 sets - 10 reps  Lateral Shift Correction at Wall - 2-3 x daily - 3-5 x weekly - 1 sets - 10 reps       ASSESSMENT                                                                                                           73 year old female patient has reported functional limitations listed above impacted by signs and symptoms consistent with below   • Involved: lumbar   • Symptoms/impairments: increased pain/symptoms, impaired posture, impaired strength, impaired activity tolerance, impaired joint play/mobility, impaired gait and impaired balance  Pt is a 73 year old female who presents to physical therapy with radiculopathy of lumbar spine. Pt demonstrated decreased abdominal and B LE strength during examination and demonstrated difficulty with L lumbar side bending due to reproduction of pain. Pt's LE flexibility was unremarkable and lumbar joint play was negative for reproducing symptoms. Pt responded well to L lateral pelvic shift with no pain reported during exercise. Pt may have foraminal involvement due to nature of symptoms and reproduction of pain with compression. Will continue to monitor symptoms.     Prognosis: patient will benefit from skilled therapy  Rehabilitative potential is: excellent  Predicted patient presentation: Low (stable) - Patient comorbidities and complexities, as defined above, will have little effect on progress for prescribed plan of care.  Patient Education:   Who will be receiving education: patient  Are they ready to learn: yes  Preferred learning style: written, verbal and demonstration  Barriers to learning: no barriers apparent at this time  Results of above outlined education: Verbalizes understanding and Demonstrates understanding      PLAN                                                                                                                         The following skilled interventions to be implemented to achieve goals listed below:  Activities of  Daily Living/Self Care (90874)  Gait Training (77556)  Manual Therapy (87037)  Neuromuscular Re-Education (54634)  Therapeutic Activity (98153)  Therapeutic Exercise (90307)  Electrical Stimulation (08158//67938)  Heat/Cold (27937)  Mechanical Traction (36387)    Frequency / Duration: 1 times per week tapering as patient progresses for an estimated total of 6 visits for 6 weeks    Patient involved in and agreed to plan of care and goals.  Patient given attendance policy at time of initial evaluation.  Suggestions for next session as indicated: Progress per plan of care      GOALS                                                                                                                           Long Term Goals: to be met by end of plan of care  1. Patient will bend/squat with correct mechanics for completion of household tasks such as cleaning and sweeping floors.   2. Patient will complete sit-stand transfer for getting in and out of the car.   3. Oswestry: Patient will complete form to reflect an improved score to less than or equal to 0% to indicate patient reported improvement in function/disability/impairment (minimal detectable change: 12%).  4. Patient will be independent with progressed and modified home exercise program.  5. Pt will be able to walk greater than 30 minutes without increased pain in order to ambulate community distances      Therapy procedure time and total treatment time can be found documented on the Time Entry flowsheet

## 2021-06-05 ENCOUNTER — OFFICE VISIT (OUTPATIENT)
Dept: URGENT CARE | Facility: CLINIC | Age: 76
End: 2021-06-05
Payer: MEDICARE

## 2021-06-05 VITALS
TEMPERATURE: 97 F | RESPIRATION RATE: 14 BRPM | HEART RATE: 70 BPM | DIASTOLIC BLOOD PRESSURE: 85 MMHG | HEIGHT: 70 IN | SYSTOLIC BLOOD PRESSURE: 131 MMHG | OXYGEN SATURATION: 98 % | BODY MASS INDEX: 28.63 KG/M2 | WEIGHT: 200 LBS

## 2021-06-05 DIAGNOSIS — Z48.02 ENCOUNTER FOR REMOVAL OF SUTURES: Primary | ICD-10-CM

## 2021-06-05 PROCEDURE — S0630 PR REMOVAL OF SUTURES: ICD-10-PCS | Mod: S$GLB,,, | Performed by: INTERNAL MEDICINE

## 2021-06-05 PROCEDURE — 3008F BODY MASS INDEX DOCD: CPT | Mod: CPTII,S$GLB,, | Performed by: INTERNAL MEDICINE

## 2021-06-05 PROCEDURE — 3008F PR BODY MASS INDEX (BMI) DOCUMENTED: ICD-10-PCS | Mod: CPTII,S$GLB,, | Performed by: INTERNAL MEDICINE

## 2021-06-05 PROCEDURE — S0630 REMOVAL OF SUTURES: HCPCS | Mod: S$GLB,,, | Performed by: INTERNAL MEDICINE

## 2021-06-06 NOTE — TELEPHONE ENCOUNTER
Okay   will put the referral      however when he meets with the pain specialist, he needs to bring a copy and report of the MRI that was done by Dr. Shoaib Kimball   daughter/cup/spoon/straw/self fed/fed by clinician

## 2021-06-15 ENCOUNTER — TELEPHONE (OUTPATIENT)
Dept: SURGERY | Facility: CLINIC | Age: 76
End: 2021-06-15

## 2021-06-15 ENCOUNTER — OFFICE VISIT (OUTPATIENT)
Dept: SURGERY | Facility: CLINIC | Age: 76
End: 2021-06-15
Payer: MEDICARE

## 2021-06-15 VITALS
WEIGHT: 200.5 LBS | HEIGHT: 70 IN | DIASTOLIC BLOOD PRESSURE: 79 MMHG | BODY MASS INDEX: 28.71 KG/M2 | HEART RATE: 72 BPM | SYSTOLIC BLOOD PRESSURE: 127 MMHG

## 2021-06-15 DIAGNOSIS — L02.31 LEFT BUTTOCK ABSCESS: Primary | ICD-10-CM

## 2021-06-15 PROCEDURE — 99999 PR PBB SHADOW E&M-EST. PATIENT-LVL III: CPT | Mod: PBBFAC,,, | Performed by: STUDENT IN AN ORGANIZED HEALTH CARE EDUCATION/TRAINING PROGRAM

## 2021-06-15 PROCEDURE — 1159F PR MEDICATION LIST DOCUMENTED IN MEDICAL RECORD: ICD-10-PCS | Mod: S$GLB,,, | Performed by: STUDENT IN AN ORGANIZED HEALTH CARE EDUCATION/TRAINING PROGRAM

## 2021-06-15 PROCEDURE — 1159F MED LIST DOCD IN RCRD: CPT | Mod: S$GLB,,, | Performed by: STUDENT IN AN ORGANIZED HEALTH CARE EDUCATION/TRAINING PROGRAM

## 2021-06-15 PROCEDURE — 99214 OFFICE O/P EST MOD 30 MIN: CPT | Mod: 25,S$GLB,, | Performed by: STUDENT IN AN ORGANIZED HEALTH CARE EDUCATION/TRAINING PROGRAM

## 2021-06-15 PROCEDURE — 1126F PR PAIN SEVERITY QUANTIFIED, NO PAIN PRESENT: ICD-10-PCS | Mod: S$GLB,,, | Performed by: STUDENT IN AN ORGANIZED HEALTH CARE EDUCATION/TRAINING PROGRAM

## 2021-06-15 PROCEDURE — 1126F AMNT PAIN NOTED NONE PRSNT: CPT | Mod: S$GLB,,, | Performed by: STUDENT IN AN ORGANIZED HEALTH CARE EDUCATION/TRAINING PROGRAM

## 2021-06-15 PROCEDURE — 99999 PR PBB SHADOW E&M-EST. PATIENT-LVL III: ICD-10-PCS | Mod: PBBFAC,,, | Performed by: STUDENT IN AN ORGANIZED HEALTH CARE EDUCATION/TRAINING PROGRAM

## 2021-06-15 PROCEDURE — 46020 PR PLACEMENT,SETON: ICD-10-PCS | Mod: S$GLB,,, | Performed by: STUDENT IN AN ORGANIZED HEALTH CARE EDUCATION/TRAINING PROGRAM

## 2021-06-15 PROCEDURE — 1100F PTFALLS ASSESS-DOCD GE2>/YR: CPT | Mod: CPTII,S$GLB,, | Performed by: STUDENT IN AN ORGANIZED HEALTH CARE EDUCATION/TRAINING PROGRAM

## 2021-06-15 PROCEDURE — 99214 PR OFFICE/OUTPT VISIT, EST, LEVL IV, 30-39 MIN: ICD-10-PCS | Mod: 25,S$GLB,, | Performed by: STUDENT IN AN ORGANIZED HEALTH CARE EDUCATION/TRAINING PROGRAM

## 2021-06-15 PROCEDURE — 3288F FALL RISK ASSESSMENT DOCD: CPT | Mod: CPTII,S$GLB,, | Performed by: STUDENT IN AN ORGANIZED HEALTH CARE EDUCATION/TRAINING PROGRAM

## 2021-06-15 PROCEDURE — 3008F PR BODY MASS INDEX (BMI) DOCUMENTED: ICD-10-PCS | Mod: CPTII,S$GLB,, | Performed by: STUDENT IN AN ORGANIZED HEALTH CARE EDUCATION/TRAINING PROGRAM

## 2021-06-15 PROCEDURE — 1100F PR PT FALLS ASSESS DOC 2+ FALLS/FALL W/INJURY/YR: ICD-10-PCS | Mod: CPTII,S$GLB,, | Performed by: STUDENT IN AN ORGANIZED HEALTH CARE EDUCATION/TRAINING PROGRAM

## 2021-06-15 PROCEDURE — 3288F PR FALLS RISK ASSESSMENT DOCUMENTED: ICD-10-PCS | Mod: CPTII,S$GLB,, | Performed by: STUDENT IN AN ORGANIZED HEALTH CARE EDUCATION/TRAINING PROGRAM

## 2021-06-15 PROCEDURE — 3008F BODY MASS INDEX DOCD: CPT | Mod: CPTII,S$GLB,, | Performed by: STUDENT IN AN ORGANIZED HEALTH CARE EDUCATION/TRAINING PROGRAM

## 2021-06-15 PROCEDURE — 46020 PLACEMENT OF SETON: CPT | Mod: S$GLB,,, | Performed by: STUDENT IN AN ORGANIZED HEALTH CARE EDUCATION/TRAINING PROGRAM

## 2021-06-22 ENCOUNTER — HOSPITAL ENCOUNTER (OUTPATIENT)
Dept: RADIOLOGY | Facility: HOSPITAL | Age: 76
Discharge: HOME | End: 2021-06-22
Attending: STUDENT IN AN ORGANIZED HEALTH CARE EDUCATION/TRAINING PROGRAM
Payer: MEDICARE

## 2021-06-22 ENCOUNTER — OFFICE VISIT (OUTPATIENT)
Dept: PSYCHIATRY | Facility: CLINIC | Age: 76
End: 2021-06-22
Payer: MEDICARE

## 2021-06-22 DIAGNOSIS — L02.31 LEFT BUTTOCK ABSCESS: ICD-10-CM

## 2021-06-22 DIAGNOSIS — F41.1 GENERALIZED ANXIETY DISORDER: Primary | ICD-10-CM

## 2021-06-22 PROCEDURE — 90834 PSYTX W PT 45 MINUTES: CPT | Mod: 95,S$GLB,, | Performed by: SOCIAL WORKER

## 2021-06-22 PROCEDURE — 72197 MRI ANAL SPHINCTER W W/O CONTRAST: ICD-10-PCS | Mod: 26,,, | Performed by: RADIOLOGY

## 2021-06-22 PROCEDURE — 72197 MRI PELVIS W/O & W/DYE: CPT | Mod: TC

## 2021-06-22 PROCEDURE — 25500020 PHARM REV CODE 255: Performed by: STUDENT IN AN ORGANIZED HEALTH CARE EDUCATION/TRAINING PROGRAM

## 2021-06-22 PROCEDURE — 90834 PR PSYCHOTHERAPY W/PATIENT, 45 MIN: ICD-10-PCS | Mod: 95,S$GLB,, | Performed by: SOCIAL WORKER

## 2021-06-22 PROCEDURE — 72197 MRI PELVIS W/O & W/DYE: CPT | Mod: 26,,, | Performed by: RADIOLOGY

## 2021-06-22 PROCEDURE — A9585 GADOBUTROL INJECTION: HCPCS | Performed by: STUDENT IN AN ORGANIZED HEALTH CARE EDUCATION/TRAINING PROGRAM

## 2021-06-22 RX ORDER — GADOBUTROL 604.72 MG/ML
10 INJECTION INTRAVENOUS
Status: COMPLETED | OUTPATIENT
Start: 2021-06-22 | End: 2021-06-22

## 2021-06-22 RX ADMIN — GADOBUTROL 10 ML: 604.72 INJECTION INTRAVENOUS at 01:06

## 2021-06-23 ENCOUNTER — TELEPHONE (OUTPATIENT)
Dept: SURGERY | Facility: CLINIC | Age: 76
End: 2021-06-23

## 2021-06-29 ENCOUNTER — OFFICE VISIT (OUTPATIENT)
Dept: SURGERY | Facility: CLINIC | Age: 76
End: 2021-06-29
Payer: MEDICARE

## 2021-06-29 VITALS
SYSTOLIC BLOOD PRESSURE: 143 MMHG | BODY MASS INDEX: 28.6 KG/M2 | HEIGHT: 70 IN | HEART RATE: 68 BPM | WEIGHT: 199.75 LBS | DIASTOLIC BLOOD PRESSURE: 90 MMHG

## 2021-06-29 DIAGNOSIS — L02.31 LEFT BUTTOCK ABSCESS: Primary | ICD-10-CM

## 2021-06-29 PROCEDURE — 3008F BODY MASS INDEX DOCD: CPT | Mod: CPTII,S$GLB,, | Performed by: STUDENT IN AN ORGANIZED HEALTH CARE EDUCATION/TRAINING PROGRAM

## 2021-06-29 PROCEDURE — 99999 PR PBB SHADOW E&M-EST. PATIENT-LVL III: CPT | Mod: PBBFAC,,, | Performed by: STUDENT IN AN ORGANIZED HEALTH CARE EDUCATION/TRAINING PROGRAM

## 2021-06-29 PROCEDURE — 1159F MED LIST DOCD IN RCRD: CPT | Mod: S$GLB,,, | Performed by: STUDENT IN AN ORGANIZED HEALTH CARE EDUCATION/TRAINING PROGRAM

## 2021-06-29 PROCEDURE — 99212 PR OFFICE/OUTPT VISIT, EST, LEVL II, 10-19 MIN: ICD-10-PCS | Mod: S$GLB,,, | Performed by: STUDENT IN AN ORGANIZED HEALTH CARE EDUCATION/TRAINING PROGRAM

## 2021-06-29 PROCEDURE — 3008F PR BODY MASS INDEX (BMI) DOCUMENTED: ICD-10-PCS | Mod: CPTII,S$GLB,, | Performed by: STUDENT IN AN ORGANIZED HEALTH CARE EDUCATION/TRAINING PROGRAM

## 2021-06-29 PROCEDURE — 3288F PR FALLS RISK ASSESSMENT DOCUMENTED: ICD-10-PCS | Mod: CPTII,S$GLB,, | Performed by: STUDENT IN AN ORGANIZED HEALTH CARE EDUCATION/TRAINING PROGRAM

## 2021-06-29 PROCEDURE — 1126F AMNT PAIN NOTED NONE PRSNT: CPT | Mod: S$GLB,,, | Performed by: STUDENT IN AN ORGANIZED HEALTH CARE EDUCATION/TRAINING PROGRAM

## 2021-06-29 PROCEDURE — 1101F PR PT FALLS ASSESS DOC 0-1 FALLS W/OUT INJ PAST YR: ICD-10-PCS | Mod: CPTII,S$GLB,, | Performed by: STUDENT IN AN ORGANIZED HEALTH CARE EDUCATION/TRAINING PROGRAM

## 2021-06-29 PROCEDURE — 1159F PR MEDICATION LIST DOCUMENTED IN MEDICAL RECORD: ICD-10-PCS | Mod: S$GLB,,, | Performed by: STUDENT IN AN ORGANIZED HEALTH CARE EDUCATION/TRAINING PROGRAM

## 2021-06-29 PROCEDURE — 3288F FALL RISK ASSESSMENT DOCD: CPT | Mod: CPTII,S$GLB,, | Performed by: STUDENT IN AN ORGANIZED HEALTH CARE EDUCATION/TRAINING PROGRAM

## 2021-06-29 PROCEDURE — 99212 OFFICE O/P EST SF 10 MIN: CPT | Mod: S$GLB,,, | Performed by: STUDENT IN AN ORGANIZED HEALTH CARE EDUCATION/TRAINING PROGRAM

## 2021-06-29 PROCEDURE — 1101F PT FALLS ASSESS-DOCD LE1/YR: CPT | Mod: CPTII,S$GLB,, | Performed by: STUDENT IN AN ORGANIZED HEALTH CARE EDUCATION/TRAINING PROGRAM

## 2021-06-29 PROCEDURE — 1126F PR PAIN SEVERITY QUANTIFIED, NO PAIN PRESENT: ICD-10-PCS | Mod: S$GLB,,, | Performed by: STUDENT IN AN ORGANIZED HEALTH CARE EDUCATION/TRAINING PROGRAM

## 2021-06-29 PROCEDURE — 99999 PR PBB SHADOW E&M-EST. PATIENT-LVL III: ICD-10-PCS | Mod: PBBFAC,,, | Performed by: STUDENT IN AN ORGANIZED HEALTH CARE EDUCATION/TRAINING PROGRAM

## 2021-07-08 ENCOUNTER — TELEPHONE (OUTPATIENT)
Dept: RHEUMATOLOGY | Facility: CLINIC | Age: 76
End: 2021-07-08

## 2021-07-12 ENCOUNTER — TELEPHONE (OUTPATIENT)
Dept: NEUROLOGY | Facility: CLINIC | Age: 76
End: 2021-07-12

## 2021-07-13 ENCOUNTER — OFFICE VISIT (OUTPATIENT)
Dept: PSYCHIATRY | Facility: CLINIC | Age: 76
End: 2021-07-13
Payer: MEDICARE

## 2021-07-13 DIAGNOSIS — F41.1 GENERALIZED ANXIETY DISORDER: Primary | ICD-10-CM

## 2021-07-13 DIAGNOSIS — F32.89 OTHER DEPRESSION: ICD-10-CM

## 2021-07-13 PROCEDURE — 99499 RISK ADDL DX/OHS AUDIT: ICD-10-PCS | Mod: HCNC,95,, | Performed by: SOCIAL WORKER

## 2021-07-13 PROCEDURE — 99499 UNLISTED E&M SERVICE: CPT | Mod: HCNC,95,, | Performed by: SOCIAL WORKER

## 2021-07-13 PROCEDURE — 90834 PR PSYCHOTHERAPY W/PATIENT, 45 MIN: ICD-10-PCS | Mod: 95,,, | Performed by: SOCIAL WORKER

## 2021-07-13 PROCEDURE — 90834 PSYTX W PT 45 MINUTES: CPT | Mod: 95,,, | Performed by: SOCIAL WORKER

## 2021-07-13 RX ORDER — LOSARTAN POTASSIUM 100 MG/1
TABLET ORAL
Qty: 90 TABLET | Refills: 1 | Status: SHIPPED | OUTPATIENT
Start: 2021-07-13 | End: 2021-11-02

## 2021-07-26 ENCOUNTER — OFFICE VISIT (OUTPATIENT)
Dept: NEUROLOGY | Facility: CLINIC | Age: 76
End: 2021-07-26
Payer: MEDICARE

## 2021-07-26 DIAGNOSIS — G20.A1 PARKINSON'S DISEASE: Primary | ICD-10-CM

## 2021-07-26 PROCEDURE — 99212 OFFICE O/P EST SF 10 MIN: CPT | Mod: HCNC,95,, | Performed by: PSYCHIATRY & NEUROLOGY

## 2021-07-26 PROCEDURE — 4010F ACE/ARB THERAPY RXD/TAKEN: CPT | Mod: HCNC,CPTII,95, | Performed by: PSYCHIATRY & NEUROLOGY

## 2021-07-26 PROCEDURE — 99212 PR OFFICE/OUTPT VISIT, EST, LEVL II, 10-19 MIN: ICD-10-PCS | Mod: HCNC,95,, | Performed by: PSYCHIATRY & NEUROLOGY

## 2021-07-26 PROCEDURE — 4010F PR ACE/ARB THEARPY RXD/TAKEN: ICD-10-PCS | Mod: HCNC,CPTII,95, | Performed by: PSYCHIATRY & NEUROLOGY

## 2021-08-14 ENCOUNTER — PATIENT OUTREACH (OUTPATIENT)
Dept: ADMINISTRATIVE | Facility: HOSPITAL | Age: 76
End: 2021-08-14

## 2021-08-20 ENCOUNTER — LAB VISIT (OUTPATIENT)
Dept: LAB | Facility: HOSPITAL | Age: 76
End: 2021-08-20
Attending: INTERNAL MEDICINE
Payer: MEDICARE

## 2021-08-20 DIAGNOSIS — M47.816 LUMBAR SPONDYLOSIS: ICD-10-CM

## 2021-08-20 DIAGNOSIS — I10 ESSENTIAL HYPERTENSION: ICD-10-CM

## 2021-08-20 DIAGNOSIS — E78.5 HYPERLIPIDEMIA, UNSPECIFIED HYPERLIPIDEMIA TYPE: ICD-10-CM

## 2021-08-20 DIAGNOSIS — Z00.00 ANNUAL PHYSICAL EXAM: ICD-10-CM

## 2021-08-20 DIAGNOSIS — R35.1 BENIGN PROSTATIC HYPERPLASIA WITH NOCTURIA: ICD-10-CM

## 2021-08-20 DIAGNOSIS — G20.C PARKINSONISM, UNSPECIFIED PARKINSONISM TYPE: ICD-10-CM

## 2021-08-20 DIAGNOSIS — N40.1 BENIGN PROSTATIC HYPERPLASIA WITH NOCTURIA: ICD-10-CM

## 2021-08-20 DIAGNOSIS — F32.A DEPRESSION, UNSPECIFIED DEPRESSION TYPE: ICD-10-CM

## 2021-08-20 LAB
ALBUMIN SERPL BCP-MCNC: 3.9 G/DL (ref 3.5–5.2)
ALP SERPL-CCNC: 78 U/L (ref 55–135)
ALT SERPL W/O P-5'-P-CCNC: 34 U/L (ref 10–44)
ANION GAP SERPL CALC-SCNC: 7 MMOL/L (ref 8–16)
AST SERPL-CCNC: 27 U/L (ref 10–40)
BILIRUB SERPL-MCNC: 0.7 MG/DL (ref 0.1–1)
BUN SERPL-MCNC: 20 MG/DL (ref 8–23)
CALCIUM SERPL-MCNC: 9.7 MG/DL (ref 8.7–10.5)
CHLORIDE SERPL-SCNC: 106 MMOL/L (ref 95–110)
CHOLEST SERPL-MCNC: 129 MG/DL (ref 120–199)
CHOLEST/HDLC SERPL: 3.3 {RATIO} (ref 2–5)
CO2 SERPL-SCNC: 27 MMOL/L (ref 23–29)
CREAT SERPL-MCNC: 1 MG/DL (ref 0.5–1.4)
EST. GFR  (AFRICAN AMERICAN): >60 ML/MIN/1.73 M^2
EST. GFR  (NON AFRICAN AMERICAN): >60 ML/MIN/1.73 M^2
GLUCOSE SERPL-MCNC: 95 MG/DL (ref 70–110)
HDLC SERPL-MCNC: 39 MG/DL (ref 40–75)
HDLC SERPL: 30.2 % (ref 20–50)
LDLC SERPL CALC-MCNC: 73 MG/DL (ref 63–159)
NONHDLC SERPL-MCNC: 90 MG/DL
POTASSIUM SERPL-SCNC: 4.4 MMOL/L (ref 3.5–5.1)
PROT SERPL-MCNC: 7 G/DL (ref 6–8.4)
SODIUM SERPL-SCNC: 140 MMOL/L (ref 136–145)
TRIGL SERPL-MCNC: 85 MG/DL (ref 30–150)
TSH SERPL DL<=0.005 MIU/L-ACNC: 3.53 UIU/ML (ref 0.4–4)

## 2021-08-20 PROCEDURE — 80061 LIPID PANEL: CPT | Performed by: INTERNAL MEDICINE

## 2021-08-20 PROCEDURE — 84443 ASSAY THYROID STIM HORMONE: CPT | Performed by: INTERNAL MEDICINE

## 2021-08-20 PROCEDURE — 36415 COLL VENOUS BLD VENIPUNCTURE: CPT | Performed by: INTERNAL MEDICINE

## 2021-08-20 PROCEDURE — 80053 COMPREHEN METABOLIC PANEL: CPT | Performed by: INTERNAL MEDICINE

## 2021-08-24 ENCOUNTER — OFFICE VISIT (OUTPATIENT)
Dept: PSYCHIATRY | Facility: CLINIC | Age: 76
End: 2021-08-24
Payer: MEDICARE

## 2021-08-24 DIAGNOSIS — F41.1 GENERALIZED ANXIETY DISORDER: Primary | ICD-10-CM

## 2021-08-24 DIAGNOSIS — G20.A1 PARKINSON'S DISEASE: ICD-10-CM

## 2021-08-24 DIAGNOSIS — F32.89 OTHER DEPRESSION: ICD-10-CM

## 2021-08-24 PROCEDURE — 90834 PR PSYCHOTHERAPY W/PATIENT, 45 MIN: ICD-10-PCS | Mod: 95,,, | Performed by: SOCIAL WORKER

## 2021-08-24 PROCEDURE — 90785 PSYTX COMPLEX INTERACTIVE: CPT | Mod: 95,,, | Performed by: SOCIAL WORKER

## 2021-08-24 PROCEDURE — 90834 PSYTX W PT 45 MINUTES: CPT | Mod: 95,,, | Performed by: SOCIAL WORKER

## 2021-08-24 PROCEDURE — 90785 PR INTERACTIVE COMPLEXITY: ICD-10-PCS | Mod: 95,,, | Performed by: SOCIAL WORKER

## 2021-08-26 ENCOUNTER — PATIENT OUTREACH (OUTPATIENT)
Dept: ADMINISTRATIVE | Facility: OTHER | Age: 76
End: 2021-08-26

## 2021-09-04 ENCOUNTER — PATIENT MESSAGE (OUTPATIENT)
Dept: NEUROLOGY | Facility: CLINIC | Age: 76
End: 2021-09-04

## 2021-09-10 ENCOUNTER — TELEPHONE (OUTPATIENT)
Dept: SURGERY | Facility: CLINIC | Age: 76
End: 2021-09-10

## 2021-09-13 ENCOUNTER — LAB VISIT (OUTPATIENT)
Dept: LAB | Facility: HOSPITAL | Age: 76
End: 2021-09-13
Attending: INTERNAL MEDICINE
Payer: MEDICARE

## 2021-09-13 ENCOUNTER — OFFICE VISIT (OUTPATIENT)
Dept: INTERNAL MEDICINE | Facility: CLINIC | Age: 76
End: 2021-09-13
Payer: MEDICARE

## 2021-09-13 VITALS
HEART RATE: 74 BPM | HEIGHT: 70 IN | BODY MASS INDEX: 28.35 KG/M2 | DIASTOLIC BLOOD PRESSURE: 68 MMHG | OXYGEN SATURATION: 98 % | SYSTOLIC BLOOD PRESSURE: 120 MMHG | WEIGHT: 198 LBS

## 2021-09-13 DIAGNOSIS — E78.5 HYPERLIPIDEMIA, UNSPECIFIED HYPERLIPIDEMIA TYPE: ICD-10-CM

## 2021-09-13 DIAGNOSIS — N40.1 BENIGN PROSTATIC HYPERPLASIA WITH NOCTURIA: ICD-10-CM

## 2021-09-13 DIAGNOSIS — Z79.899 OTHER LONG TERM (CURRENT) DRUG THERAPY: ICD-10-CM

## 2021-09-13 DIAGNOSIS — G20.C PARKINSONISM, UNSPECIFIED PARKINSONISM TYPE: ICD-10-CM

## 2021-09-13 DIAGNOSIS — R35.1 BENIGN PROSTATIC HYPERPLASIA WITH NOCTURIA: ICD-10-CM

## 2021-09-13 DIAGNOSIS — M47.816 LUMBAR SPONDYLOSIS: ICD-10-CM

## 2021-09-13 DIAGNOSIS — K60.4 RECTAL FISTULA: ICD-10-CM

## 2021-09-13 DIAGNOSIS — R29.898 WEAKNESS OF BOTH LOWER EXTREMITIES: ICD-10-CM

## 2021-09-13 DIAGNOSIS — Z00.00 ANNUAL PHYSICAL EXAM: ICD-10-CM

## 2021-09-13 DIAGNOSIS — Z00.00 ANNUAL PHYSICAL EXAM: Primary | ICD-10-CM

## 2021-09-13 DIAGNOSIS — I10 ESSENTIAL HYPERTENSION: ICD-10-CM

## 2021-09-13 PROCEDURE — 36415 COLL VENOUS BLD VENIPUNCTURE: CPT | Mod: HCNC | Performed by: INTERNAL MEDICINE

## 2021-09-13 PROCEDURE — 4010F ACE/ARB THERAPY RXD/TAKEN: CPT | Mod: HCNC,CPTII,S$GLB, | Performed by: INTERNAL MEDICINE

## 2021-09-13 PROCEDURE — 82607 VITAMIN B-12: CPT | Mod: HCNC | Performed by: INTERNAL MEDICINE

## 2021-09-13 PROCEDURE — 1101F PT FALLS ASSESS-DOCD LE1/YR: CPT | Mod: HCNC,CPTII,S$GLB, | Performed by: INTERNAL MEDICINE

## 2021-09-13 PROCEDURE — 1101F PR PT FALLS ASSESS DOC 0-1 FALLS W/OUT INJ PAST YR: ICD-10-PCS | Mod: HCNC,CPTII,S$GLB, | Performed by: INTERNAL MEDICINE

## 2021-09-13 PROCEDURE — 99999 PR PBB SHADOW E&M-EST. PATIENT-LVL IV: CPT | Mod: PBBFAC,HCNC,, | Performed by: INTERNAL MEDICINE

## 2021-09-13 PROCEDURE — 3078F DIAST BP <80 MM HG: CPT | Mod: HCNC,CPTII,S$GLB, | Performed by: INTERNAL MEDICINE

## 2021-09-13 PROCEDURE — 1160F PR REVIEW ALL MEDS BY PRESCRIBER/CLIN PHARMACIST DOCUMENTED: ICD-10-PCS | Mod: HCNC,CPTII,S$GLB, | Performed by: INTERNAL MEDICINE

## 2021-09-13 PROCEDURE — 3008F PR BODY MASS INDEX (BMI) DOCUMENTED: ICD-10-PCS | Mod: HCNC,CPTII,S$GLB, | Performed by: INTERNAL MEDICINE

## 2021-09-13 PROCEDURE — 99999 PR PBB SHADOW E&M-EST. PATIENT-LVL IV: ICD-10-PCS | Mod: PBBFAC,HCNC,, | Performed by: INTERNAL MEDICINE

## 2021-09-13 PROCEDURE — 1159F PR MEDICATION LIST DOCUMENTED IN MEDICAL RECORD: ICD-10-PCS | Mod: HCNC,CPTII,S$GLB, | Performed by: INTERNAL MEDICINE

## 2021-09-13 PROCEDURE — 1125F PR PAIN SEVERITY QUANTIFIED, PAIN PRESENT: ICD-10-PCS | Mod: HCNC,CPTII,S$GLB, | Performed by: INTERNAL MEDICINE

## 2021-09-13 PROCEDURE — 3074F SYST BP LT 130 MM HG: CPT | Mod: HCNC,CPTII,S$GLB, | Performed by: INTERNAL MEDICINE

## 2021-09-13 PROCEDURE — 1160F RVW MEDS BY RX/DR IN RCRD: CPT | Mod: HCNC,CPTII,S$GLB, | Performed by: INTERNAL MEDICINE

## 2021-09-13 PROCEDURE — 1159F MED LIST DOCD IN RCRD: CPT | Mod: HCNC,CPTII,S$GLB, | Performed by: INTERNAL MEDICINE

## 2021-09-13 PROCEDURE — 3078F PR MOST RECENT DIASTOLIC BLOOD PRESSURE < 80 MM HG: ICD-10-PCS | Mod: HCNC,CPTII,S$GLB, | Performed by: INTERNAL MEDICINE

## 2021-09-13 PROCEDURE — 85025 COMPLETE CBC W/AUTO DIFF WBC: CPT | Mod: HCNC | Performed by: INTERNAL MEDICINE

## 2021-09-13 PROCEDURE — 3008F BODY MASS INDEX DOCD: CPT | Mod: HCNC,CPTII,S$GLB, | Performed by: INTERNAL MEDICINE

## 2021-09-13 PROCEDURE — 1125F AMNT PAIN NOTED PAIN PRSNT: CPT | Mod: HCNC,CPTII,S$GLB, | Performed by: INTERNAL MEDICINE

## 2021-09-13 PROCEDURE — 82306 VITAMIN D 25 HYDROXY: CPT | Mod: HCNC | Performed by: INTERNAL MEDICINE

## 2021-09-13 PROCEDURE — 4010F PR ACE/ARB THEARPY RXD/TAKEN: ICD-10-PCS | Mod: HCNC,CPTII,S$GLB, | Performed by: INTERNAL MEDICINE

## 2021-09-13 PROCEDURE — 82550 ASSAY OF CK (CPK): CPT | Mod: HCNC | Performed by: INTERNAL MEDICINE

## 2021-09-13 PROCEDURE — 99397 PR PREVENTIVE VISIT,EST,65 & OVER: ICD-10-PCS | Mod: HCNC,S$GLB,, | Performed by: INTERNAL MEDICINE

## 2021-09-13 PROCEDURE — 3288F FALL RISK ASSESSMENT DOCD: CPT | Mod: HCNC,CPTII,S$GLB, | Performed by: INTERNAL MEDICINE

## 2021-09-13 PROCEDURE — 3074F PR MOST RECENT SYSTOLIC BLOOD PRESSURE < 130 MM HG: ICD-10-PCS | Mod: HCNC,CPTII,S$GLB, | Performed by: INTERNAL MEDICINE

## 2021-09-13 PROCEDURE — 82746 ASSAY OF FOLIC ACID SERUM: CPT | Mod: HCNC | Performed by: INTERNAL MEDICINE

## 2021-09-13 PROCEDURE — 99397 PER PM REEVAL EST PAT 65+ YR: CPT | Mod: HCNC,S$GLB,, | Performed by: INTERNAL MEDICINE

## 2021-09-13 PROCEDURE — 3288F PR FALLS RISK ASSESSMENT DOCUMENTED: ICD-10-PCS | Mod: HCNC,CPTII,S$GLB, | Performed by: INTERNAL MEDICINE

## 2021-09-13 RX ORDER — MELOXICAM 7.5 MG/1
7.5 TABLET ORAL DAILY
Qty: 30 TABLET | Refills: 0 | Status: SHIPPED | OUTPATIENT
Start: 2021-09-13 | End: 2021-10-13

## 2021-09-13 RX ORDER — LOSARTAN POTASSIUM 25 MG/1
25 TABLET ORAL DAILY
Qty: 30 TABLET | Refills: 0 | Status: SHIPPED | OUTPATIENT
Start: 2021-09-13 | End: 2021-10-13

## 2021-09-14 LAB
25(OH)D3+25(OH)D2 SERPL-MCNC: 33 NG/ML (ref 30–96)
BASOPHILS # BLD AUTO: 0.04 K/UL (ref 0–0.2)
BASOPHILS NFR BLD: 0.6 % (ref 0–1.9)
CK SERPL-CCNC: 118 U/L (ref 20–200)
DIFFERENTIAL METHOD: ABNORMAL
EOSINOPHIL # BLD AUTO: 0.2 K/UL (ref 0–0.5)
EOSINOPHIL NFR BLD: 2.2 % (ref 0–8)
ERYTHROCYTE [DISTWIDTH] IN BLOOD BY AUTOMATED COUNT: 12.8 % (ref 11.5–14.5)
FOLATE SERPL-MCNC: 3.5 NG/ML (ref 4–24)
HCT VFR BLD AUTO: 43.6 % (ref 40–54)
HGB BLD-MCNC: 15.1 G/DL (ref 14–18)
IMM GRANULOCYTES # BLD AUTO: 0.04 K/UL (ref 0–0.04)
IMM GRANULOCYTES NFR BLD AUTO: 0.6 % (ref 0–0.5)
LYMPHOCYTES # BLD AUTO: 1.1 K/UL (ref 1–4.8)
LYMPHOCYTES NFR BLD: 16 % (ref 18–48)
MCH RBC QN AUTO: 32.1 PG (ref 27–31)
MCHC RBC AUTO-ENTMCNC: 34.6 G/DL (ref 32–36)
MCV RBC AUTO: 93 FL (ref 82–98)
MONOCYTES # BLD AUTO: 0.6 K/UL (ref 0.3–1)
MONOCYTES NFR BLD: 9.5 % (ref 4–15)
NEUTROPHILS # BLD AUTO: 4.8 K/UL (ref 1.8–7.7)
NEUTROPHILS NFR BLD: 71.1 % (ref 38–73)
NRBC BLD-RTO: 0 /100 WBC
PLATELET # BLD AUTO: 232 K/UL (ref 150–450)
PMV BLD AUTO: 9.8 FL (ref 9.2–12.9)
RBC # BLD AUTO: 4.7 M/UL (ref 4.6–6.2)
VIT B12 SERPL-MCNC: 312 PG/ML (ref 210–950)
WBC # BLD AUTO: 6.75 K/UL (ref 3.9–12.7)

## 2021-09-21 ENCOUNTER — LAB VISIT (OUTPATIENT)
Dept: SURGERY | Facility: CLINIC | Age: 76
End: 2021-09-21
Payer: MEDICARE

## 2021-09-21 ENCOUNTER — OFFICE VISIT (OUTPATIENT)
Dept: SURGERY | Facility: CLINIC | Age: 76
End: 2021-09-21
Payer: MEDICARE

## 2021-09-21 VITALS
HEART RATE: 75 BPM | DIASTOLIC BLOOD PRESSURE: 77 MMHG | WEIGHT: 197.19 LBS | SYSTOLIC BLOOD PRESSURE: 137 MMHG | BODY MASS INDEX: 28.23 KG/M2 | HEIGHT: 70 IN

## 2021-09-21 DIAGNOSIS — K61.1 PERIRECTAL ABSCESS: Primary | ICD-10-CM

## 2021-09-21 DIAGNOSIS — Z01.818 PRE-OP TESTING: ICD-10-CM

## 2021-09-21 LAB
SARS-COV-2 RNA RESP QL NAA+PROBE: NOT DETECTED
SARS-COV-2- CYCLE NUMBER: NORMAL

## 2021-09-21 PROCEDURE — 3008F BODY MASS INDEX DOCD: CPT | Mod: HCNC,CPTII,S$GLB, | Performed by: STUDENT IN AN ORGANIZED HEALTH CARE EDUCATION/TRAINING PROGRAM

## 2021-09-21 PROCEDURE — 3075F PR MOST RECENT SYSTOLIC BLOOD PRESS GE 130-139MM HG: ICD-10-PCS | Mod: HCNC,CPTII,S$GLB, | Performed by: STUDENT IN AN ORGANIZED HEALTH CARE EDUCATION/TRAINING PROGRAM

## 2021-09-21 PROCEDURE — 1126F AMNT PAIN NOTED NONE PRSNT: CPT | Mod: HCNC,CPTII,S$GLB, | Performed by: STUDENT IN AN ORGANIZED HEALTH CARE EDUCATION/TRAINING PROGRAM

## 2021-09-21 PROCEDURE — 99213 OFFICE O/P EST LOW 20 MIN: CPT | Mod: HCNC,S$GLB,, | Performed by: STUDENT IN AN ORGANIZED HEALTH CARE EDUCATION/TRAINING PROGRAM

## 2021-09-21 PROCEDURE — 99999 PR PBB SHADOW E&M-EST. PATIENT-LVL III: ICD-10-PCS | Mod: PBBFAC,HCNC,, | Performed by: STUDENT IN AN ORGANIZED HEALTH CARE EDUCATION/TRAINING PROGRAM

## 2021-09-21 PROCEDURE — 3078F DIAST BP <80 MM HG: CPT | Mod: HCNC,CPTII,S$GLB, | Performed by: STUDENT IN AN ORGANIZED HEALTH CARE EDUCATION/TRAINING PROGRAM

## 2021-09-21 PROCEDURE — 1101F PR PT FALLS ASSESS DOC 0-1 FALLS W/OUT INJ PAST YR: ICD-10-PCS | Mod: HCNC,CPTII,S$GLB, | Performed by: STUDENT IN AN ORGANIZED HEALTH CARE EDUCATION/TRAINING PROGRAM

## 2021-09-21 PROCEDURE — 4010F PR ACE/ARB THEARPY RXD/TAKEN: ICD-10-PCS | Mod: HCNC,CPTII,S$GLB, | Performed by: STUDENT IN AN ORGANIZED HEALTH CARE EDUCATION/TRAINING PROGRAM

## 2021-09-21 PROCEDURE — 3288F PR FALLS RISK ASSESSMENT DOCUMENTED: ICD-10-PCS | Mod: HCNC,CPTII,S$GLB, | Performed by: STUDENT IN AN ORGANIZED HEALTH CARE EDUCATION/TRAINING PROGRAM

## 2021-09-21 PROCEDURE — 4010F ACE/ARB THERAPY RXD/TAKEN: CPT | Mod: HCNC,CPTII,S$GLB, | Performed by: STUDENT IN AN ORGANIZED HEALTH CARE EDUCATION/TRAINING PROGRAM

## 2021-09-21 PROCEDURE — 3008F PR BODY MASS INDEX (BMI) DOCUMENTED: ICD-10-PCS | Mod: HCNC,CPTII,S$GLB, | Performed by: STUDENT IN AN ORGANIZED HEALTH CARE EDUCATION/TRAINING PROGRAM

## 2021-09-21 PROCEDURE — 99999 PR PBB SHADOW E&M-EST. PATIENT-LVL III: CPT | Mod: PBBFAC,HCNC,, | Performed by: STUDENT IN AN ORGANIZED HEALTH CARE EDUCATION/TRAINING PROGRAM

## 2021-09-21 PROCEDURE — 3078F PR MOST RECENT DIASTOLIC BLOOD PRESSURE < 80 MM HG: ICD-10-PCS | Mod: HCNC,CPTII,S$GLB, | Performed by: STUDENT IN AN ORGANIZED HEALTH CARE EDUCATION/TRAINING PROGRAM

## 2021-09-21 PROCEDURE — 1159F PR MEDICATION LIST DOCUMENTED IN MEDICAL RECORD: ICD-10-PCS | Mod: HCNC,CPTII,S$GLB, | Performed by: STUDENT IN AN ORGANIZED HEALTH CARE EDUCATION/TRAINING PROGRAM

## 2021-09-21 PROCEDURE — 1101F PT FALLS ASSESS-DOCD LE1/YR: CPT | Mod: HCNC,CPTII,S$GLB, | Performed by: STUDENT IN AN ORGANIZED HEALTH CARE EDUCATION/TRAINING PROGRAM

## 2021-09-21 PROCEDURE — U0005 INFEC AGEN DETEC AMPLI PROBE: HCPCS | Performed by: STUDENT IN AN ORGANIZED HEALTH CARE EDUCATION/TRAINING PROGRAM

## 2021-09-21 PROCEDURE — 99213 PR OFFICE/OUTPT VISIT, EST, LEVL III, 20-29 MIN: ICD-10-PCS | Mod: HCNC,S$GLB,, | Performed by: STUDENT IN AN ORGANIZED HEALTH CARE EDUCATION/TRAINING PROGRAM

## 2021-09-21 PROCEDURE — 1159F MED LIST DOCD IN RCRD: CPT | Mod: HCNC,CPTII,S$GLB, | Performed by: STUDENT IN AN ORGANIZED HEALTH CARE EDUCATION/TRAINING PROGRAM

## 2021-09-21 PROCEDURE — U0003 INFECTIOUS AGENT DETECTION BY NUCLEIC ACID (DNA OR RNA); SEVERE ACUTE RESPIRATORY SYNDROME CORONAVIRUS 2 (SARS-COV-2) (CORONAVIRUS DISEASE [COVID-19]), AMPLIFIED PROBE TECHNIQUE, MAKING USE OF HIGH THROUGHPUT TECHNOLOGIES AS DESCRIBED BY CMS-2020-01-R: HCPCS | Mod: HCNC | Performed by: STUDENT IN AN ORGANIZED HEALTH CARE EDUCATION/TRAINING PROGRAM

## 2021-09-21 PROCEDURE — 3288F FALL RISK ASSESSMENT DOCD: CPT | Mod: HCNC,CPTII,S$GLB, | Performed by: STUDENT IN AN ORGANIZED HEALTH CARE EDUCATION/TRAINING PROGRAM

## 2021-09-21 PROCEDURE — 1126F PR PAIN SEVERITY QUANTIFIED, NO PAIN PRESENT: ICD-10-PCS | Mod: HCNC,CPTII,S$GLB, | Performed by: STUDENT IN AN ORGANIZED HEALTH CARE EDUCATION/TRAINING PROGRAM

## 2021-09-21 PROCEDURE — 3075F SYST BP GE 130 - 139MM HG: CPT | Mod: HCNC,CPTII,S$GLB, | Performed by: STUDENT IN AN ORGANIZED HEALTH CARE EDUCATION/TRAINING PROGRAM

## 2021-09-23 ENCOUNTER — ANESTHESIA EVENT (OUTPATIENT)
Dept: SURGERY | Facility: HOSPITAL | Age: 76
End: 2021-09-23
Payer: MEDICARE

## 2021-09-23 ENCOUNTER — TELEPHONE (OUTPATIENT)
Dept: SURGERY | Facility: CLINIC | Age: 76
End: 2021-09-23

## 2021-09-24 ENCOUNTER — ANESTHESIA (OUTPATIENT)
Dept: SURGERY | Facility: HOSPITAL | Age: 76
End: 2021-09-24
Payer: MEDICARE

## 2021-09-24 ENCOUNTER — HOSPITAL ENCOUNTER (OUTPATIENT)
Facility: HOSPITAL | Age: 76
Discharge: HOME | End: 2021-09-24
Attending: STUDENT IN AN ORGANIZED HEALTH CARE EDUCATION/TRAINING PROGRAM | Admitting: STUDENT IN AN ORGANIZED HEALTH CARE EDUCATION/TRAINING PROGRAM
Payer: MEDICARE

## 2021-09-24 VITALS
HEIGHT: 70 IN | TEMPERATURE: 98 F | HEART RATE: 69 BPM | SYSTOLIC BLOOD PRESSURE: 177 MMHG | DIASTOLIC BLOOD PRESSURE: 86 MMHG | OXYGEN SATURATION: 99 % | WEIGHT: 197.06 LBS | RESPIRATION RATE: 20 BRPM | BODY MASS INDEX: 28.21 KG/M2

## 2021-09-24 DIAGNOSIS — K60.3 ANAL FISTULA: ICD-10-CM

## 2021-09-24 DIAGNOSIS — K60.3 FISTULA-IN-ANO: Primary | ICD-10-CM

## 2021-09-24 PROCEDURE — 25000003 PHARM REV CODE 250: Mod: HCNC | Performed by: STUDENT IN AN ORGANIZED HEALTH CARE EDUCATION/TRAINING PROGRAM

## 2021-09-24 PROCEDURE — 63600175 PHARM REV CODE 636 W HCPCS: Mod: HCNC | Performed by: NURSE ANESTHETIST, CERTIFIED REGISTERED

## 2021-09-24 PROCEDURE — 36000705 HC OR TIME LEV I EA ADD 15 MIN: Mod: HCNC | Performed by: STUDENT IN AN ORGANIZED HEALTH CARE EDUCATION/TRAINING PROGRAM

## 2021-09-24 PROCEDURE — 36000704 HC OR TIME LEV I 1ST 15 MIN: Mod: HCNC | Performed by: STUDENT IN AN ORGANIZED HEALTH CARE EDUCATION/TRAINING PROGRAM

## 2021-09-24 PROCEDURE — 25000003 PHARM REV CODE 250: Mod: HCNC | Performed by: NURSE ANESTHETIST, CERTIFIED REGISTERED

## 2021-09-24 PROCEDURE — 46020 PLACEMENT OF SETON: CPT | Mod: HCNC,,, | Performed by: STUDENT IN AN ORGANIZED HEALTH CARE EDUCATION/TRAINING PROGRAM

## 2021-09-24 PROCEDURE — D9220A PRA ANESTHESIA: ICD-10-PCS | Mod: HCNC,ANES,, | Performed by: ANESTHESIOLOGY

## 2021-09-24 PROCEDURE — 71000015 HC POSTOP RECOV 1ST HR: Mod: HCNC | Performed by: STUDENT IN AN ORGANIZED HEALTH CARE EDUCATION/TRAINING PROGRAM

## 2021-09-24 PROCEDURE — D9220A PRA ANESTHESIA: Mod: HCNC,ANES,, | Performed by: ANESTHESIOLOGY

## 2021-09-24 PROCEDURE — 71000044 HC DOSC ROUTINE RECOVERY FIRST HOUR: Mod: HCNC | Performed by: STUDENT IN AN ORGANIZED HEALTH CARE EDUCATION/TRAINING PROGRAM

## 2021-09-24 PROCEDURE — D9220A PRA ANESTHESIA: Mod: HCNC,CRNA,, | Performed by: NURSE ANESTHETIST, CERTIFIED REGISTERED

## 2021-09-24 PROCEDURE — D9220A PRA ANESTHESIA: ICD-10-PCS | Mod: HCNC,CRNA,, | Performed by: NURSE ANESTHETIST, CERTIFIED REGISTERED

## 2021-09-24 PROCEDURE — 37000009 HC ANESTHESIA EA ADD 15 MINS: Mod: HCNC | Performed by: STUDENT IN AN ORGANIZED HEALTH CARE EDUCATION/TRAINING PROGRAM

## 2021-09-24 PROCEDURE — 37000008 HC ANESTHESIA 1ST 15 MINUTES: Mod: HCNC | Performed by: STUDENT IN AN ORGANIZED HEALTH CARE EDUCATION/TRAINING PROGRAM

## 2021-09-24 PROCEDURE — 46020 PR PLACEMENT,SETON: ICD-10-PCS | Mod: HCNC,,, | Performed by: STUDENT IN AN ORGANIZED HEALTH CARE EDUCATION/TRAINING PROGRAM

## 2021-09-24 RX ORDER — FENTANYL CITRATE 50 UG/ML
25 INJECTION, SOLUTION INTRAMUSCULAR; INTRAVENOUS EVERY 5 MIN PRN
Status: CANCELLED | OUTPATIENT
Start: 2021-09-24

## 2021-09-24 RX ORDER — ONDANSETRON 2 MG/ML
4 INJECTION INTRAMUSCULAR; INTRAVENOUS DAILY PRN
Status: CANCELLED | OUTPATIENT
Start: 2021-09-24

## 2021-09-24 RX ORDER — SODIUM CHLORIDE 9 MG/ML
INJECTION, SOLUTION INTRAVENOUS CONTINUOUS PRN
Status: DISCONTINUED | OUTPATIENT
Start: 2021-09-24 | End: 2021-09-24

## 2021-09-24 RX ORDER — LIDOCAINE HYDROCHLORIDE 10 MG/ML
INJECTION, SOLUTION EPIDURAL; INFILTRATION; INTRACAUDAL; PERINEURAL
Status: DISCONTINUED | OUTPATIENT
Start: 2021-09-24 | End: 2021-09-24 | Stop reason: HOSPADM

## 2021-09-24 RX ORDER — SODIUM CHLORIDE 0.9 % (FLUSH) 0.9 %
10 SYRINGE (ML) INJECTION
Status: DISCONTINUED | OUTPATIENT
Start: 2021-09-24 | End: 2021-09-24 | Stop reason: HOSPADM

## 2021-09-24 RX ORDER — PROPOFOL 10 MG/ML
VIAL (ML) INTRAVENOUS CONTINUOUS PRN
Status: DISCONTINUED | OUTPATIENT
Start: 2021-09-24 | End: 2021-09-24

## 2021-09-24 RX ORDER — HALOPERIDOL 5 MG/ML
0.5 INJECTION INTRAMUSCULAR EVERY 10 MIN PRN
Status: CANCELLED | OUTPATIENT
Start: 2021-09-24

## 2021-09-24 RX ORDER — LIDOCAINE HYDROCHLORIDE 20 MG/ML
INJECTION INTRAVENOUS
Status: DISCONTINUED | OUTPATIENT
Start: 2021-09-24 | End: 2021-09-24

## 2021-09-24 RX ORDER — MUPIROCIN 20 MG/G
OINTMENT TOPICAL
Status: DISCONTINUED | OUTPATIENT
Start: 2021-09-24 | End: 2021-09-24 | Stop reason: HOSPADM

## 2021-09-24 RX ORDER — FENTANYL CITRATE 50 UG/ML
25 INJECTION, SOLUTION INTRAMUSCULAR; INTRAVENOUS EVERY 5 MIN PRN
Status: DISCONTINUED | OUTPATIENT
Start: 2021-09-24 | End: 2021-09-24 | Stop reason: HOSPADM

## 2021-09-24 RX ORDER — ONDANSETRON 2 MG/ML
INJECTION INTRAMUSCULAR; INTRAVENOUS
Status: DISCONTINUED | OUTPATIENT
Start: 2021-09-24 | End: 2021-09-24

## 2021-09-24 RX ORDER — SODIUM CHLORIDE 9 MG/ML
INJECTION, SOLUTION INTRAVENOUS CONTINUOUS
Status: DISCONTINUED | OUTPATIENT
Start: 2021-09-24 | End: 2021-09-24 | Stop reason: HOSPADM

## 2021-09-24 RX ORDER — PROPOFOL 10 MG/ML
VIAL (ML) INTRAVENOUS
Status: DISCONTINUED | OUTPATIENT
Start: 2021-09-24 | End: 2021-09-24

## 2021-09-24 RX ORDER — SODIUM CHLORIDE 0.9 % (FLUSH) 0.9 %
10 SYRINGE (ML) INJECTION
Status: CANCELLED | OUTPATIENT
Start: 2021-09-24

## 2021-09-24 RX ORDER — ACETAMINOPHEN 500 MG
1000 TABLET ORAL EVERY 6 HOURS
Qty: 80 TABLET | Refills: 0
Start: 2021-09-24 | End: 2021-10-04

## 2021-09-24 RX ORDER — FENTANYL CITRATE 50 UG/ML
INJECTION, SOLUTION INTRAMUSCULAR; INTRAVENOUS
Status: DISCONTINUED | OUTPATIENT
Start: 2021-09-24 | End: 2021-09-24

## 2021-09-24 RX ORDER — BUPIVACAINE HYDROCHLORIDE AND EPINEPHRINE 2.5; 5 MG/ML; UG/ML
INJECTION, SOLUTION EPIDURAL; INFILTRATION; INTRACAUDAL; PERINEURAL
Status: DISCONTINUED | OUTPATIENT
Start: 2021-09-24 | End: 2021-09-24 | Stop reason: HOSPADM

## 2021-09-24 RX ADMIN — PROPOFOL 20 MG: 10 INJECTION, EMULSION INTRAVENOUS at 03:09

## 2021-09-24 RX ADMIN — ONDANSETRON 4 MG: 2 INJECTION INTRAMUSCULAR; INTRAVENOUS at 02:09

## 2021-09-24 RX ADMIN — SODIUM CHLORIDE: 9 INJECTION, SOLUTION INTRAVENOUS at 02:09

## 2021-09-24 RX ADMIN — FENTANYL CITRATE 25 MCG: 50 INJECTION, SOLUTION INTRAMUSCULAR; INTRAVENOUS at 02:09

## 2021-09-24 RX ADMIN — LIDOCAINE HYDROCHLORIDE 50 MG: 20 INJECTION, SOLUTION INTRAVENOUS at 03:09

## 2021-09-24 RX ADMIN — FENTANYL CITRATE 50 MCG: 50 INJECTION, SOLUTION INTRAMUSCULAR; INTRAVENOUS at 03:09

## 2021-09-24 RX ADMIN — PROPOFOL 30 MG: 10 INJECTION, EMULSION INTRAVENOUS at 03:09

## 2021-09-24 RX ADMIN — FENTANYL CITRATE 25 MCG: 50 INJECTION, SOLUTION INTRAMUSCULAR; INTRAVENOUS at 03:09

## 2021-09-24 RX ADMIN — PROPOFOL 75 MCG/KG/MIN: 10 INJECTION, EMULSION INTRAVENOUS at 03:09

## 2021-09-29 ENCOUNTER — PATIENT OUTREACH (OUTPATIENT)
Dept: ADMINISTRATIVE | Facility: OTHER | Age: 76
End: 2021-09-29

## 2021-09-30 ENCOUNTER — OFFICE VISIT (OUTPATIENT)
Dept: PSYCHIATRY | Facility: CLINIC | Age: 76
End: 2021-09-30
Payer: MEDICARE

## 2021-09-30 DIAGNOSIS — F32.89 OTHER DEPRESSION: ICD-10-CM

## 2021-09-30 DIAGNOSIS — F41.1 GENERALIZED ANXIETY DISORDER: Primary | ICD-10-CM

## 2021-09-30 PROCEDURE — 4010F PR ACE/ARB THEARPY RXD/TAKEN: ICD-10-PCS | Mod: HCNC,CPTII,95, | Performed by: SOCIAL WORKER

## 2021-09-30 PROCEDURE — 90834 PR PSYCHOTHERAPY W/PATIENT, 45 MIN: ICD-10-PCS | Mod: HCNC,95,, | Performed by: SOCIAL WORKER

## 2021-09-30 PROCEDURE — 4010F ACE/ARB THERAPY RXD/TAKEN: CPT | Mod: HCNC,CPTII,95, | Performed by: SOCIAL WORKER

## 2021-09-30 PROCEDURE — 90834 PSYTX W PT 45 MINUTES: CPT | Mod: HCNC,95,, | Performed by: SOCIAL WORKER

## 2021-10-11 ENCOUNTER — TELEPHONE (OUTPATIENT)
Dept: SURGERY | Facility: CLINIC | Age: 76
End: 2021-10-11

## 2021-10-12 ENCOUNTER — OFFICE VISIT (OUTPATIENT)
Dept: SURGERY | Facility: CLINIC | Age: 76
End: 2021-10-12
Payer: MEDICARE

## 2021-10-12 VITALS
HEART RATE: 69 BPM | HEIGHT: 70 IN | SYSTOLIC BLOOD PRESSURE: 131 MMHG | BODY MASS INDEX: 27.36 KG/M2 | DIASTOLIC BLOOD PRESSURE: 84 MMHG | WEIGHT: 191.13 LBS

## 2021-10-12 DIAGNOSIS — L02.31 LEFT BUTTOCK ABSCESS: Primary | ICD-10-CM

## 2021-10-12 PROCEDURE — 1159F PR MEDICATION LIST DOCUMENTED IN MEDICAL RECORD: ICD-10-PCS | Mod: HCNC,CPTII,S$GLB, | Performed by: STUDENT IN AN ORGANIZED HEALTH CARE EDUCATION/TRAINING PROGRAM

## 2021-10-12 PROCEDURE — 4010F PR ACE/ARB THEARPY RXD/TAKEN: ICD-10-PCS | Mod: HCNC,CPTII,S$GLB, | Performed by: STUDENT IN AN ORGANIZED HEALTH CARE EDUCATION/TRAINING PROGRAM

## 2021-10-12 PROCEDURE — 3008F PR BODY MASS INDEX (BMI) DOCUMENTED: ICD-10-PCS | Mod: HCNC,CPTII,S$GLB, | Performed by: STUDENT IN AN ORGANIZED HEALTH CARE EDUCATION/TRAINING PROGRAM

## 2021-10-12 PROCEDURE — 99024 POSTOP FOLLOW-UP VISIT: CPT | Mod: HCNC,S$GLB,, | Performed by: STUDENT IN AN ORGANIZED HEALTH CARE EDUCATION/TRAINING PROGRAM

## 2021-10-12 PROCEDURE — 4010F ACE/ARB THERAPY RXD/TAKEN: CPT | Mod: HCNC,CPTII,S$GLB, | Performed by: STUDENT IN AN ORGANIZED HEALTH CARE EDUCATION/TRAINING PROGRAM

## 2021-10-12 PROCEDURE — 3079F PR MOST RECENT DIASTOLIC BLOOD PRESSURE 80-89 MM HG: ICD-10-PCS | Mod: HCNC,CPTII,S$GLB, | Performed by: STUDENT IN AN ORGANIZED HEALTH CARE EDUCATION/TRAINING PROGRAM

## 2021-10-12 PROCEDURE — 1101F PR PT FALLS ASSESS DOC 0-1 FALLS W/OUT INJ PAST YR: ICD-10-PCS | Mod: HCNC,CPTII,S$GLB, | Performed by: STUDENT IN AN ORGANIZED HEALTH CARE EDUCATION/TRAINING PROGRAM

## 2021-10-12 PROCEDURE — 1126F AMNT PAIN NOTED NONE PRSNT: CPT | Mod: HCNC,CPTII,S$GLB, | Performed by: STUDENT IN AN ORGANIZED HEALTH CARE EDUCATION/TRAINING PROGRAM

## 2021-10-12 PROCEDURE — 3075F PR MOST RECENT SYSTOLIC BLOOD PRESS GE 130-139MM HG: ICD-10-PCS | Mod: HCNC,CPTII,S$GLB, | Performed by: STUDENT IN AN ORGANIZED HEALTH CARE EDUCATION/TRAINING PROGRAM

## 2021-10-12 PROCEDURE — 99999 PR PBB SHADOW E&M-EST. PATIENT-LVL III: ICD-10-PCS | Mod: PBBFAC,HCNC,, | Performed by: STUDENT IN AN ORGANIZED HEALTH CARE EDUCATION/TRAINING PROGRAM

## 2021-10-12 PROCEDURE — 3079F DIAST BP 80-89 MM HG: CPT | Mod: HCNC,CPTII,S$GLB, | Performed by: STUDENT IN AN ORGANIZED HEALTH CARE EDUCATION/TRAINING PROGRAM

## 2021-10-12 PROCEDURE — 99999 PR PBB SHADOW E&M-EST. PATIENT-LVL III: CPT | Mod: PBBFAC,HCNC,, | Performed by: STUDENT IN AN ORGANIZED HEALTH CARE EDUCATION/TRAINING PROGRAM

## 2021-10-12 PROCEDURE — 3075F SYST BP GE 130 - 139MM HG: CPT | Mod: HCNC,CPTII,S$GLB, | Performed by: STUDENT IN AN ORGANIZED HEALTH CARE EDUCATION/TRAINING PROGRAM

## 2021-10-12 PROCEDURE — 99024 PR POST-OP FOLLOW-UP VISIT: ICD-10-PCS | Mod: HCNC,S$GLB,, | Performed by: STUDENT IN AN ORGANIZED HEALTH CARE EDUCATION/TRAINING PROGRAM

## 2021-10-12 PROCEDURE — 1126F PR PAIN SEVERITY QUANTIFIED, NO PAIN PRESENT: ICD-10-PCS | Mod: HCNC,CPTII,S$GLB, | Performed by: STUDENT IN AN ORGANIZED HEALTH CARE EDUCATION/TRAINING PROGRAM

## 2021-10-12 PROCEDURE — 3288F PR FALLS RISK ASSESSMENT DOCUMENTED: ICD-10-PCS | Mod: HCNC,CPTII,S$GLB, | Performed by: STUDENT IN AN ORGANIZED HEALTH CARE EDUCATION/TRAINING PROGRAM

## 2021-10-12 PROCEDURE — 3288F FALL RISK ASSESSMENT DOCD: CPT | Mod: HCNC,CPTII,S$GLB, | Performed by: STUDENT IN AN ORGANIZED HEALTH CARE EDUCATION/TRAINING PROGRAM

## 2021-10-12 PROCEDURE — 1159F MED LIST DOCD IN RCRD: CPT | Mod: HCNC,CPTII,S$GLB, | Performed by: STUDENT IN AN ORGANIZED HEALTH CARE EDUCATION/TRAINING PROGRAM

## 2021-10-12 PROCEDURE — 3008F BODY MASS INDEX DOCD: CPT | Mod: HCNC,CPTII,S$GLB, | Performed by: STUDENT IN AN ORGANIZED HEALTH CARE EDUCATION/TRAINING PROGRAM

## 2021-10-12 PROCEDURE — 1101F PT FALLS ASSESS-DOCD LE1/YR: CPT | Mod: HCNC,CPTII,S$GLB, | Performed by: STUDENT IN AN ORGANIZED HEALTH CARE EDUCATION/TRAINING PROGRAM

## 2021-11-02 ENCOUNTER — OFFICE VISIT (OUTPATIENT)
Dept: SURGERY | Facility: CLINIC | Age: 76
End: 2021-11-02
Payer: MEDICARE

## 2021-11-02 VITALS
WEIGHT: 196.13 LBS | BODY MASS INDEX: 28.14 KG/M2 | DIASTOLIC BLOOD PRESSURE: 81 MMHG | SYSTOLIC BLOOD PRESSURE: 127 MMHG | HEART RATE: 73 BPM

## 2021-11-02 DIAGNOSIS — K60.3 PERIANAL FISTULA: Primary | ICD-10-CM

## 2021-11-02 PROCEDURE — 1101F PT FALLS ASSESS-DOCD LE1/YR: CPT | Mod: HCNC,CPTII,S$GLB, | Performed by: STUDENT IN AN ORGANIZED HEALTH CARE EDUCATION/TRAINING PROGRAM

## 2021-11-02 PROCEDURE — 1126F AMNT PAIN NOTED NONE PRSNT: CPT | Mod: HCNC,CPTII,S$GLB, | Performed by: STUDENT IN AN ORGANIZED HEALTH CARE EDUCATION/TRAINING PROGRAM

## 2021-11-02 PROCEDURE — 1159F MED LIST DOCD IN RCRD: CPT | Mod: HCNC,CPTII,S$GLB, | Performed by: STUDENT IN AN ORGANIZED HEALTH CARE EDUCATION/TRAINING PROGRAM

## 2021-11-02 PROCEDURE — 3288F PR FALLS RISK ASSESSMENT DOCUMENTED: ICD-10-PCS | Mod: HCNC,CPTII,S$GLB, | Performed by: STUDENT IN AN ORGANIZED HEALTH CARE EDUCATION/TRAINING PROGRAM

## 2021-11-02 PROCEDURE — 3008F BODY MASS INDEX DOCD: CPT | Mod: HCNC,CPTII,S$GLB, | Performed by: STUDENT IN AN ORGANIZED HEALTH CARE EDUCATION/TRAINING PROGRAM

## 2021-11-02 PROCEDURE — 3074F PR MOST RECENT SYSTOLIC BLOOD PRESSURE < 130 MM HG: ICD-10-PCS | Mod: HCNC,CPTII,S$GLB, | Performed by: STUDENT IN AN ORGANIZED HEALTH CARE EDUCATION/TRAINING PROGRAM

## 2021-11-02 PROCEDURE — 4010F ACE/ARB THERAPY RXD/TAKEN: CPT | Mod: HCNC,CPTII,S$GLB, | Performed by: STUDENT IN AN ORGANIZED HEALTH CARE EDUCATION/TRAINING PROGRAM

## 2021-11-02 PROCEDURE — 99213 PR OFFICE/OUTPT VISIT, EST, LEVL III, 20-29 MIN: ICD-10-PCS | Mod: HCNC,S$GLB,, | Performed by: STUDENT IN AN ORGANIZED HEALTH CARE EDUCATION/TRAINING PROGRAM

## 2021-11-02 PROCEDURE — 99999 PR PBB SHADOW E&M-EST. PATIENT-LVL III: CPT | Mod: PBBFAC,HCNC,, | Performed by: STUDENT IN AN ORGANIZED HEALTH CARE EDUCATION/TRAINING PROGRAM

## 2021-11-02 PROCEDURE — 3008F PR BODY MASS INDEX (BMI) DOCUMENTED: ICD-10-PCS | Mod: HCNC,CPTII,S$GLB, | Performed by: STUDENT IN AN ORGANIZED HEALTH CARE EDUCATION/TRAINING PROGRAM

## 2021-11-02 PROCEDURE — 3074F SYST BP LT 130 MM HG: CPT | Mod: HCNC,CPTII,S$GLB, | Performed by: STUDENT IN AN ORGANIZED HEALTH CARE EDUCATION/TRAINING PROGRAM

## 2021-11-02 PROCEDURE — 99999 PR PBB SHADOW E&M-EST. PATIENT-LVL III: ICD-10-PCS | Mod: PBBFAC,HCNC,, | Performed by: STUDENT IN AN ORGANIZED HEALTH CARE EDUCATION/TRAINING PROGRAM

## 2021-11-02 PROCEDURE — 1101F PR PT FALLS ASSESS DOC 0-1 FALLS W/OUT INJ PAST YR: ICD-10-PCS | Mod: HCNC,CPTII,S$GLB, | Performed by: STUDENT IN AN ORGANIZED HEALTH CARE EDUCATION/TRAINING PROGRAM

## 2021-11-02 PROCEDURE — 1126F PR PAIN SEVERITY QUANTIFIED, NO PAIN PRESENT: ICD-10-PCS | Mod: HCNC,CPTII,S$GLB, | Performed by: STUDENT IN AN ORGANIZED HEALTH CARE EDUCATION/TRAINING PROGRAM

## 2021-11-02 PROCEDURE — 3288F FALL RISK ASSESSMENT DOCD: CPT | Mod: HCNC,CPTII,S$GLB, | Performed by: STUDENT IN AN ORGANIZED HEALTH CARE EDUCATION/TRAINING PROGRAM

## 2021-11-02 PROCEDURE — 99213 OFFICE O/P EST LOW 20 MIN: CPT | Mod: HCNC,S$GLB,, | Performed by: STUDENT IN AN ORGANIZED HEALTH CARE EDUCATION/TRAINING PROGRAM

## 2021-11-02 PROCEDURE — 4010F PR ACE/ARB THEARPY RXD/TAKEN: ICD-10-PCS | Mod: HCNC,CPTII,S$GLB, | Performed by: STUDENT IN AN ORGANIZED HEALTH CARE EDUCATION/TRAINING PROGRAM

## 2021-11-02 PROCEDURE — 1159F PR MEDICATION LIST DOCUMENTED IN MEDICAL RECORD: ICD-10-PCS | Mod: HCNC,CPTII,S$GLB, | Performed by: STUDENT IN AN ORGANIZED HEALTH CARE EDUCATION/TRAINING PROGRAM

## 2021-11-02 PROCEDURE — 3079F DIAST BP 80-89 MM HG: CPT | Mod: HCNC,CPTII,S$GLB, | Performed by: STUDENT IN AN ORGANIZED HEALTH CARE EDUCATION/TRAINING PROGRAM

## 2021-11-02 PROCEDURE — 3079F PR MOST RECENT DIASTOLIC BLOOD PRESSURE 80-89 MM HG: ICD-10-PCS | Mod: HCNC,CPTII,S$GLB, | Performed by: STUDENT IN AN ORGANIZED HEALTH CARE EDUCATION/TRAINING PROGRAM

## 2021-11-02 RX ORDER — MELOXICAM 7.5 MG/1
7.5 TABLET ORAL DAILY
Qty: 90 TABLET | Refills: 0 | Status: SHIPPED | OUTPATIENT
Start: 2021-11-02 | End: 2022-02-20

## 2021-11-02 RX ORDER — LOSARTAN POTASSIUM 25 MG/1
25 TABLET ORAL DAILY
Qty: 90 TABLET | Refills: 1 | Status: SHIPPED | OUTPATIENT
Start: 2021-11-02 | End: 2022-04-01 | Stop reason: SDUPTHER

## 2021-11-02 RX ORDER — LOSARTAN POTASSIUM 100 MG/1
100 TABLET ORAL DAILY
Qty: 90 TABLET | Refills: 1 | OUTPATIENT
Start: 2021-11-02

## 2021-11-04 DIAGNOSIS — F41.9 ANXIETY DISORDER, UNSPECIFIED TYPE: ICD-10-CM

## 2021-11-04 RX ORDER — CLONAZEPAM 0.5 MG/1
TABLET ORAL
Qty: 180 TABLET | Refills: 1 | Status: CANCELLED | OUTPATIENT
Start: 2021-11-04

## 2021-11-05 ENCOUNTER — PATIENT MESSAGE (OUTPATIENT)
Dept: PSYCHIATRY | Facility: CLINIC | Age: 76
End: 2021-11-05
Payer: MEDICARE

## 2021-11-05 DIAGNOSIS — F41.9 ANXIETY DISORDER, UNSPECIFIED TYPE: ICD-10-CM

## 2021-11-05 RX ORDER — CLONAZEPAM 0.5 MG/1
TABLET ORAL
Qty: 180 TABLET | Refills: 1 | Status: CANCELLED | OUTPATIENT
Start: 2021-11-05

## 2021-11-09 ENCOUNTER — TELEPHONE (OUTPATIENT)
Dept: SURGERY | Facility: CLINIC | Age: 76
End: 2021-11-09
Payer: MEDICARE

## 2021-11-09 RX ORDER — LOSARTAN POTASSIUM 25 MG/1
TABLET ORAL
Qty: 90 TABLET | Refills: 1 | OUTPATIENT
Start: 2021-11-09

## 2021-11-09 RX ORDER — FOLIC ACID 1 MG/1
1 TABLET ORAL DAILY
Qty: 30 TABLET | Refills: 0 | Status: SHIPPED | OUTPATIENT
Start: 2021-11-09 | End: 2021-11-29 | Stop reason: SDUPTHER

## 2021-11-10 ENCOUNTER — TELEPHONE (OUTPATIENT)
Dept: SURGERY | Facility: CLINIC | Age: 76
End: 2021-11-10
Payer: MEDICARE

## 2021-11-11 ENCOUNTER — PATIENT MESSAGE (OUTPATIENT)
Dept: SURGERY | Facility: CLINIC | Age: 76
End: 2021-11-11
Payer: MEDICARE

## 2021-11-11 ENCOUNTER — TELEPHONE (OUTPATIENT)
Dept: SURGERY | Facility: CLINIC | Age: 76
End: 2021-11-11
Payer: MEDICARE

## 2021-11-12 ENCOUNTER — HOSPITAL ENCOUNTER (OUTPATIENT)
Facility: HOSPITAL | Age: 76
Discharge: HOME | End: 2021-11-12
Attending: STUDENT IN AN ORGANIZED HEALTH CARE EDUCATION/TRAINING PROGRAM | Admitting: STUDENT IN AN ORGANIZED HEALTH CARE EDUCATION/TRAINING PROGRAM
Payer: MEDICARE

## 2021-11-12 ENCOUNTER — ANESTHESIA EVENT (OUTPATIENT)
Dept: SURGERY | Facility: HOSPITAL | Age: 76
End: 2021-11-12
Payer: MEDICARE

## 2021-11-12 ENCOUNTER — ANESTHESIA (OUTPATIENT)
Dept: SURGERY | Facility: HOSPITAL | Age: 76
End: 2021-11-12
Payer: MEDICARE

## 2021-11-12 VITALS
BODY MASS INDEX: 27.26 KG/M2 | OXYGEN SATURATION: 98 % | WEIGHT: 190 LBS | TEMPERATURE: 98 F | HEART RATE: 67 BPM | RESPIRATION RATE: 18 BRPM | DIASTOLIC BLOOD PRESSURE: 60 MMHG | SYSTOLIC BLOOD PRESSURE: 129 MMHG

## 2021-11-12 DIAGNOSIS — K61.1 PERIRECTAL ABSCESS: Primary | ICD-10-CM

## 2021-11-12 DIAGNOSIS — K60.3 FISTULA-IN-ANO: ICD-10-CM

## 2021-11-12 PROCEDURE — 71000015 HC POSTOP RECOV 1ST HR: Mod: HCNC | Performed by: STUDENT IN AN ORGANIZED HEALTH CARE EDUCATION/TRAINING PROGRAM

## 2021-11-12 PROCEDURE — 46280 PR REMOVAL ANAL FISTULA,TRANS/SUPRA/EXTRAPHINCT, +/-SETON: ICD-10-PCS | Mod: HCNC,,, | Performed by: STUDENT IN AN ORGANIZED HEALTH CARE EDUCATION/TRAINING PROGRAM

## 2021-11-12 PROCEDURE — 36000706: Mod: HCNC | Performed by: STUDENT IN AN ORGANIZED HEALTH CARE EDUCATION/TRAINING PROGRAM

## 2021-11-12 PROCEDURE — 71000044 HC DOSC ROUTINE RECOVERY FIRST HOUR: Mod: HCNC | Performed by: STUDENT IN AN ORGANIZED HEALTH CARE EDUCATION/TRAINING PROGRAM

## 2021-11-12 PROCEDURE — 36000707: Mod: HCNC | Performed by: STUDENT IN AN ORGANIZED HEALTH CARE EDUCATION/TRAINING PROGRAM

## 2021-11-12 PROCEDURE — 37000008 HC ANESTHESIA 1ST 15 MINUTES: Mod: HCNC | Performed by: STUDENT IN AN ORGANIZED HEALTH CARE EDUCATION/TRAINING PROGRAM

## 2021-11-12 PROCEDURE — 37000009 HC ANESTHESIA EA ADD 15 MINS: Mod: HCNC | Performed by: STUDENT IN AN ORGANIZED HEALTH CARE EDUCATION/TRAINING PROGRAM

## 2021-11-12 PROCEDURE — 25000003 PHARM REV CODE 250: Mod: HCNC | Performed by: NURSE ANESTHETIST, CERTIFIED REGISTERED

## 2021-11-12 PROCEDURE — D9220A PRA ANESTHESIA: Mod: HCNC,CRNA,, | Performed by: NURSE ANESTHETIST, CERTIFIED REGISTERED

## 2021-11-12 PROCEDURE — D9220A PRA ANESTHESIA: Mod: HCNC,ANES,, | Performed by: SURGERY

## 2021-11-12 PROCEDURE — D9220A PRA ANESTHESIA: ICD-10-PCS | Mod: HCNC,CRNA,, | Performed by: NURSE ANESTHETIST, CERTIFIED REGISTERED

## 2021-11-12 PROCEDURE — 63600175 PHARM REV CODE 636 W HCPCS: Mod: HCNC | Performed by: NURSE ANESTHETIST, CERTIFIED REGISTERED

## 2021-11-12 PROCEDURE — D9220A PRA ANESTHESIA: ICD-10-PCS | Mod: HCNC,ANES,, | Performed by: SURGERY

## 2021-11-12 PROCEDURE — 46030 REMOVAL ANAL SETON OTH MRK: CPT | Mod: 51,HCNC,, | Performed by: STUDENT IN AN ORGANIZED HEALTH CARE EDUCATION/TRAINING PROGRAM

## 2021-11-12 PROCEDURE — 46030 PR REMOVAL OF RECTAL MARKER: ICD-10-PCS | Mod: 51,HCNC,, | Performed by: STUDENT IN AN ORGANIZED HEALTH CARE EDUCATION/TRAINING PROGRAM

## 2021-11-12 PROCEDURE — 25000003 PHARM REV CODE 250: Mod: HCNC | Performed by: NURSE PRACTITIONER

## 2021-11-12 PROCEDURE — 25000003 PHARM REV CODE 250: Mod: HCNC | Performed by: STUDENT IN AN ORGANIZED HEALTH CARE EDUCATION/TRAINING PROGRAM

## 2021-11-12 PROCEDURE — 46280 REMOVE ANAL FIST COMPLEX: CPT | Mod: HCNC,,, | Performed by: STUDENT IN AN ORGANIZED HEALTH CARE EDUCATION/TRAINING PROGRAM

## 2021-11-12 RX ORDER — MUPIROCIN 20 MG/G
OINTMENT TOPICAL
Status: DISPENSED | OUTPATIENT
Start: 2021-11-12

## 2021-11-12 RX ORDER — OXYCODONE HYDROCHLORIDE 5 MG/1
5 TABLET ORAL EVERY 4 HOURS PRN
Qty: 15 TABLET | Refills: 0 | Status: SHIPPED | OUTPATIENT
Start: 2021-11-12 | End: 2022-03-07

## 2021-11-12 RX ORDER — PROPOFOL 10 MG/ML
VIAL (ML) INTRAVENOUS CONTINUOUS PRN
Status: DISCONTINUED | OUTPATIENT
Start: 2021-11-12 | End: 2021-11-12

## 2021-11-12 RX ORDER — FENTANYL CITRATE 50 UG/ML
25 INJECTION, SOLUTION INTRAMUSCULAR; INTRAVENOUS EVERY 5 MIN PRN
Status: DISCONTINUED | OUTPATIENT
Start: 2021-11-12 | End: 2021-11-12 | Stop reason: HOSPADM

## 2021-11-12 RX ORDER — PROPOFOL 10 MG/ML
VIAL (ML) INTRAVENOUS
Status: DISCONTINUED | OUTPATIENT
Start: 2021-11-12 | End: 2021-11-12

## 2021-11-12 RX ORDER — ONDANSETRON 2 MG/ML
4 INJECTION INTRAMUSCULAR; INTRAVENOUS DAILY PRN
Status: DISCONTINUED | OUTPATIENT
Start: 2021-11-12 | End: 2021-11-12 | Stop reason: HOSPADM

## 2021-11-12 RX ORDER — LIDOCAINE HYDROCHLORIDE 20 MG/ML
INJECTION INTRAVENOUS
Status: DISCONTINUED | OUTPATIENT
Start: 2021-11-12 | End: 2021-11-12

## 2021-11-12 RX ORDER — SODIUM CHLORIDE 0.9 % (FLUSH) 0.9 %
10 SYRINGE (ML) INJECTION
Status: DISCONTINUED | OUTPATIENT
Start: 2021-11-12 | End: 2021-11-12 | Stop reason: HOSPADM

## 2021-11-12 RX ORDER — LIDOCAINE HYDROCHLORIDE 10 MG/ML
1 INJECTION, SOLUTION EPIDURAL; INFILTRATION; INTRACAUDAL; PERINEURAL ONCE
Status: COMPLETED | OUTPATIENT
Start: 2021-11-12 | End: 2021-11-12

## 2021-11-12 RX ORDER — SODIUM CHLORIDE 9 MG/ML
INJECTION, SOLUTION INTRAVENOUS CONTINUOUS
Status: ACTIVE | OUTPATIENT
Start: 2021-11-12

## 2021-11-12 RX ORDER — LIDOCAINE HYDROCHLORIDE 10 MG/ML
INJECTION, SOLUTION EPIDURAL; INFILTRATION; INTRACAUDAL; PERINEURAL
Status: DISCONTINUED | OUTPATIENT
Start: 2021-11-12 | End: 2021-11-12 | Stop reason: HOSPADM

## 2021-11-12 RX ORDER — BUPIVACAINE HYDROCHLORIDE AND EPINEPHRINE 2.5; 5 MG/ML; UG/ML
INJECTION, SOLUTION EPIDURAL; INFILTRATION; INTRACAUDAL; PERINEURAL
Status: DISCONTINUED | OUTPATIENT
Start: 2021-11-12 | End: 2021-11-12 | Stop reason: HOSPADM

## 2021-11-12 RX ORDER — FENTANYL CITRATE 50 UG/ML
INJECTION, SOLUTION INTRAMUSCULAR; INTRAVENOUS
Status: DISCONTINUED | OUTPATIENT
Start: 2021-11-12 | End: 2021-11-12

## 2021-11-12 RX ORDER — HYDROMORPHONE HYDROCHLORIDE 1 MG/ML
0.2 INJECTION, SOLUTION INTRAMUSCULAR; INTRAVENOUS; SUBCUTANEOUS EVERY 5 MIN PRN
Status: DISCONTINUED | OUTPATIENT
Start: 2021-11-12 | End: 2021-11-12 | Stop reason: HOSPADM

## 2021-11-12 RX ADMIN — LIDOCAINE HYDROCHLORIDE 50 MG: 20 INJECTION, SOLUTION INTRAVENOUS at 12:11

## 2021-11-12 RX ADMIN — SODIUM CHLORIDE: 0.9 INJECTION, SOLUTION INTRAVENOUS at 12:11

## 2021-11-12 RX ADMIN — LIDOCAINE HYDROCHLORIDE 10 MG: 10 INJECTION, SOLUTION EPIDURAL; INFILTRATION; INTRACAUDAL at 12:11

## 2021-11-12 RX ADMIN — FENTANYL CITRATE 25 MCG: 50 INJECTION, SOLUTION INTRAMUSCULAR; INTRAVENOUS at 12:11

## 2021-11-12 RX ADMIN — Medication 30 MG: at 12:11

## 2021-11-12 RX ADMIN — MUPIROCIN: 20 OINTMENT TOPICAL at 12:11

## 2021-11-12 RX ADMIN — PROPOFOL 150 MCG/KG/MIN: 10 INJECTION, EMULSION INTRAVENOUS at 12:11

## 2021-11-17 ENCOUNTER — OFFICE VISIT (OUTPATIENT)
Dept: PSYCHIATRY | Facility: CLINIC | Age: 76
End: 2021-11-17
Payer: MEDICARE

## 2021-11-17 DIAGNOSIS — F41.1 GENERALIZED ANXIETY DISORDER: Primary | ICD-10-CM

## 2021-11-17 PROCEDURE — 90785 PSYTX COMPLEX INTERACTIVE: CPT | Mod: HCNC,95,, | Performed by: SOCIAL WORKER

## 2021-11-17 PROCEDURE — 90834 PR PSYCHOTHERAPY W/PATIENT, 45 MIN: ICD-10-PCS | Mod: HCNC,95,, | Performed by: SOCIAL WORKER

## 2021-11-17 PROCEDURE — 90834 PSYTX W PT 45 MINUTES: CPT | Mod: HCNC,95,, | Performed by: SOCIAL WORKER

## 2021-11-17 PROCEDURE — 4010F PR ACE/ARB THEARPY RXD/TAKEN: ICD-10-PCS | Mod: HCNC,CPTII,95, | Performed by: SOCIAL WORKER

## 2021-11-17 PROCEDURE — 4010F ACE/ARB THERAPY RXD/TAKEN: CPT | Mod: HCNC,CPTII,95, | Performed by: SOCIAL WORKER

## 2021-11-17 PROCEDURE — 90785 PR INTERACTIVE COMPLEXITY: ICD-10-PCS | Mod: HCNC,95,, | Performed by: SOCIAL WORKER

## 2021-11-18 ENCOUNTER — TELEPHONE (OUTPATIENT)
Dept: SURGERY | Facility: CLINIC | Age: 76
End: 2021-11-18
Payer: MEDICARE

## 2021-11-19 ENCOUNTER — OFFICE VISIT (OUTPATIENT)
Dept: SURGERY | Facility: CLINIC | Age: 76
End: 2021-11-19
Payer: MEDICARE

## 2021-11-19 VITALS
HEIGHT: 70 IN | DIASTOLIC BLOOD PRESSURE: 85 MMHG | SYSTOLIC BLOOD PRESSURE: 154 MMHG | BODY MASS INDEX: 27.86 KG/M2 | WEIGHT: 194.63 LBS | HEART RATE: 78 BPM

## 2021-11-19 DIAGNOSIS — K60.3 PERIANAL FISTULA: Primary | ICD-10-CM

## 2021-11-19 PROCEDURE — 4010F PR ACE/ARB THEARPY RXD/TAKEN: ICD-10-PCS | Mod: HCNC,CPTII,S$GLB, | Performed by: STUDENT IN AN ORGANIZED HEALTH CARE EDUCATION/TRAINING PROGRAM

## 2021-11-19 PROCEDURE — 3288F FALL RISK ASSESSMENT DOCD: CPT | Mod: HCNC,CPTII,S$GLB, | Performed by: STUDENT IN AN ORGANIZED HEALTH CARE EDUCATION/TRAINING PROGRAM

## 2021-11-19 PROCEDURE — 3077F SYST BP >= 140 MM HG: CPT | Mod: HCNC,CPTII,S$GLB, | Performed by: STUDENT IN AN ORGANIZED HEALTH CARE EDUCATION/TRAINING PROGRAM

## 2021-11-19 PROCEDURE — 1126F AMNT PAIN NOTED NONE PRSNT: CPT | Mod: HCNC,CPTII,S$GLB, | Performed by: STUDENT IN AN ORGANIZED HEALTH CARE EDUCATION/TRAINING PROGRAM

## 2021-11-19 PROCEDURE — 3079F DIAST BP 80-89 MM HG: CPT | Mod: HCNC,CPTII,S$GLB, | Performed by: STUDENT IN AN ORGANIZED HEALTH CARE EDUCATION/TRAINING PROGRAM

## 2021-11-19 PROCEDURE — 99999 PR PBB SHADOW E&M-EST. PATIENT-LVL III: ICD-10-PCS | Mod: PBBFAC,HCNC,, | Performed by: STUDENT IN AN ORGANIZED HEALTH CARE EDUCATION/TRAINING PROGRAM

## 2021-11-19 PROCEDURE — 99024 POSTOP FOLLOW-UP VISIT: CPT | Mod: HCNC,S$GLB,, | Performed by: STUDENT IN AN ORGANIZED HEALTH CARE EDUCATION/TRAINING PROGRAM

## 2021-11-19 PROCEDURE — 3288F PR FALLS RISK ASSESSMENT DOCUMENTED: ICD-10-PCS | Mod: HCNC,CPTII,S$GLB, | Performed by: STUDENT IN AN ORGANIZED HEALTH CARE EDUCATION/TRAINING PROGRAM

## 2021-11-19 PROCEDURE — 1100F PTFALLS ASSESS-DOCD GE2>/YR: CPT | Mod: HCNC,CPTII,S$GLB, | Performed by: STUDENT IN AN ORGANIZED HEALTH CARE EDUCATION/TRAINING PROGRAM

## 2021-11-19 PROCEDURE — 99999 PR PBB SHADOW E&M-EST. PATIENT-LVL III: CPT | Mod: PBBFAC,HCNC,, | Performed by: STUDENT IN AN ORGANIZED HEALTH CARE EDUCATION/TRAINING PROGRAM

## 2021-11-19 PROCEDURE — 3077F PR MOST RECENT SYSTOLIC BLOOD PRESSURE >= 140 MM HG: ICD-10-PCS | Mod: HCNC,CPTII,S$GLB, | Performed by: STUDENT IN AN ORGANIZED HEALTH CARE EDUCATION/TRAINING PROGRAM

## 2021-11-19 PROCEDURE — 4010F ACE/ARB THERAPY RXD/TAKEN: CPT | Mod: HCNC,CPTII,S$GLB, | Performed by: STUDENT IN AN ORGANIZED HEALTH CARE EDUCATION/TRAINING PROGRAM

## 2021-11-19 PROCEDURE — 1100F PR PT FALLS ASSESS DOC 2+ FALLS/FALL W/INJURY/YR: ICD-10-PCS | Mod: HCNC,CPTII,S$GLB, | Performed by: STUDENT IN AN ORGANIZED HEALTH CARE EDUCATION/TRAINING PROGRAM

## 2021-11-19 PROCEDURE — 1159F MED LIST DOCD IN RCRD: CPT | Mod: HCNC,CPTII,S$GLB, | Performed by: STUDENT IN AN ORGANIZED HEALTH CARE EDUCATION/TRAINING PROGRAM

## 2021-11-19 PROCEDURE — 1126F PR PAIN SEVERITY QUANTIFIED, NO PAIN PRESENT: ICD-10-PCS | Mod: HCNC,CPTII,S$GLB, | Performed by: STUDENT IN AN ORGANIZED HEALTH CARE EDUCATION/TRAINING PROGRAM

## 2021-11-19 PROCEDURE — 1159F PR MEDICATION LIST DOCUMENTED IN MEDICAL RECORD: ICD-10-PCS | Mod: HCNC,CPTII,S$GLB, | Performed by: STUDENT IN AN ORGANIZED HEALTH CARE EDUCATION/TRAINING PROGRAM

## 2021-11-19 PROCEDURE — 3079F PR MOST RECENT DIASTOLIC BLOOD PRESSURE 80-89 MM HG: ICD-10-PCS | Mod: HCNC,CPTII,S$GLB, | Performed by: STUDENT IN AN ORGANIZED HEALTH CARE EDUCATION/TRAINING PROGRAM

## 2021-11-19 PROCEDURE — 99024 PR POST-OP FOLLOW-UP VISIT: ICD-10-PCS | Mod: HCNC,S$GLB,, | Performed by: STUDENT IN AN ORGANIZED HEALTH CARE EDUCATION/TRAINING PROGRAM

## 2021-12-02 ENCOUNTER — TELEPHONE (OUTPATIENT)
Dept: SURGERY | Facility: CLINIC | Age: 76
End: 2021-12-02
Payer: MEDICARE

## 2021-12-03 ENCOUNTER — OFFICE VISIT (OUTPATIENT)
Dept: SURGERY | Facility: CLINIC | Age: 76
End: 2021-12-03
Payer: MEDICARE

## 2021-12-03 VITALS
SYSTOLIC BLOOD PRESSURE: 168 MMHG | HEART RATE: 74 BPM | HEIGHT: 70 IN | WEIGHT: 197.06 LBS | BODY MASS INDEX: 28.21 KG/M2 | DIASTOLIC BLOOD PRESSURE: 97 MMHG

## 2021-12-03 DIAGNOSIS — K60.3 PERIANAL FISTULA: Primary | ICD-10-CM

## 2021-12-03 PROCEDURE — 4010F PR ACE/ARB THEARPY RXD/TAKEN: ICD-10-PCS | Mod: HCNC,CPTII,S$GLB, | Performed by: STUDENT IN AN ORGANIZED HEALTH CARE EDUCATION/TRAINING PROGRAM

## 2021-12-03 PROCEDURE — 99024 PR POST-OP FOLLOW-UP VISIT: ICD-10-PCS | Mod: HCNC,S$GLB,, | Performed by: STUDENT IN AN ORGANIZED HEALTH CARE EDUCATION/TRAINING PROGRAM

## 2021-12-03 PROCEDURE — 99999 PR PBB SHADOW E&M-EST. PATIENT-LVL III: ICD-10-PCS | Mod: PBBFAC,HCNC,, | Performed by: STUDENT IN AN ORGANIZED HEALTH CARE EDUCATION/TRAINING PROGRAM

## 2021-12-03 PROCEDURE — 99024 POSTOP FOLLOW-UP VISIT: CPT | Mod: HCNC,S$GLB,, | Performed by: STUDENT IN AN ORGANIZED HEALTH CARE EDUCATION/TRAINING PROGRAM

## 2021-12-03 PROCEDURE — 99999 PR PBB SHADOW E&M-EST. PATIENT-LVL III: CPT | Mod: PBBFAC,HCNC,, | Performed by: STUDENT IN AN ORGANIZED HEALTH CARE EDUCATION/TRAINING PROGRAM

## 2021-12-03 PROCEDURE — 4010F ACE/ARB THERAPY RXD/TAKEN: CPT | Mod: HCNC,CPTII,S$GLB, | Performed by: STUDENT IN AN ORGANIZED HEALTH CARE EDUCATION/TRAINING PROGRAM

## 2021-12-18 NOTE — TELEPHONE ENCOUNTER
No new care gaps identified.  Powered by [email or web address]. Reference number: [id number].   12/18/2021 10:36:04 AM CST

## 2021-12-28 DIAGNOSIS — N32.81 OAB (OVERACTIVE BLADDER): Primary | ICD-10-CM

## 2021-12-28 RX ORDER — OXYBUTYNIN CHLORIDE 10 MG/1
10 TABLET, EXTENDED RELEASE ORAL DAILY
Qty: 90 TABLET | Refills: 3 | Status: SHIPPED | OUTPATIENT
Start: 2021-12-28 | End: 2022-01-10

## 2021-12-28 NOTE — TELEPHONE ENCOUNTER
This Rx Request does not qualify for assessment with the OR.  Please review protocol details and the Care Due Message for extra clinical information    Reasons Rx Request may be deferred:  1. Labs/Vitals overdue  2. Labs/Vitals abnormal  3. Patient has been seen in the ED/Hospital since the last PCP visit  4. Medication is outside of scope

## 2022-01-03 ENCOUNTER — TELEPHONE (OUTPATIENT)
Dept: UROLOGY | Facility: CLINIC | Age: 77
End: 2022-01-03
Payer: MEDICARE

## 2022-01-03 NOTE — TELEPHONE ENCOUNTER
----- Message from Riddhi Inman sent at 1/3/2022  2:01 PM CST -----  Regarding: Jpyd/ [phone]  Type: Patient Call Back    Who called:  Patient    What is the request in detail:  Patient is needing a call back from staff to schedule an appt.  Only with Dr. Oquendo.  He stated no one but Dr. Oquendo can answer his question.   Thank you    Would the patient rather a call back or a response via [facility]?  Call back    Best call back number:  [phone] (home)             Thank you

## 2022-01-03 NOTE — TELEPHONE ENCOUNTER
----- Message from Donald Moreno sent at 12/30/2021  3:51 PM CST -----      Name of Who is Calling: ALEXANDRO RENE [[id number]]      What is the request in detail: Pt called to confirm side affects for oxybutynin (DITROPAN-XL) 10 MG 24 hr tablet.Please contact to further discuss and advise.          Can the clinic reply by [facility]: N      What Number to Call Back [facility]: [phone]

## 2022-01-04 ENCOUNTER — OFFICE VISIT (OUTPATIENT)
Dept: PSYCHIATRY | Facility: CLINIC | Age: 77
End: 2022-01-04
Payer: MEDICARE

## 2022-01-04 DIAGNOSIS — F41.1 GENERALIZED ANXIETY DISORDER: Primary | ICD-10-CM

## 2022-01-04 DIAGNOSIS — G20.A1 PARKINSON'S DISEASE: ICD-10-CM

## 2022-01-04 PROCEDURE — 90834 PSYTX W PT 45 MINUTES: CPT | Mod: HCNC,95,, | Performed by: SOCIAL WORKER

## 2022-01-04 PROCEDURE — 90834 PR PSYCHOTHERAPY W/PATIENT, 45 MIN: ICD-10-PCS | Mod: HCNC,95,, | Performed by: SOCIAL WORKER

## 2022-01-04 NOTE — PROGRESS NOTES
Individual Psychotherapy (PhD/LCSW)    1/4/2022    Site:  Telemed         Therapeutic Intervention: Met with patient and spouse.  Outpatient - Insight oriented psychotherapy 45 min - CPT code 39565    Chief complaint/reason for encounter: depression and anxiety     Interval history and content of current session:      Site: [facility]             Length of service: 45         Therapeutic intervention:     Persons present: spouse and pt  [place] (needed to help with interpretation of intervention due to pt cognitive decline due to Parkinsons).    Interval history:      The patient location is: home [place].   The chief complaint leading to consultation is: anxiety/depression  Visit type: audiovisual  Total time spent with patient: 45 min  Each patient to whom he or she provides medical services by telemedicine is:  (1) informed of the relationship between the physician and patient and the respective role of any other health care provider with respect to management of the patient; and (2) notified that he or she may decline to receive medical services by telemedicine and may withdraw from such care at any time.       Notes:     Various losses in the family.  Includes two closest friends of [place] and their brother in law.     In addition parkinsons in limiting pt mobility, communication, memory, volition.     [place] noticed subtle anger from him.     Opening lines of communication and mutual understanding.                Target symptoms: depression, anxiety        Progress toward goals: progressing well    Diagnosis: generalized anxiety disorder       Treatment plan:  · Target symptoms: anxiety   · Why chosen therapy is appropriate versus another modality: relevant to diagnosis, evidence based practice  · Outcome monitoring methods: self-report, observation, feedback from family  · Therapeutic intervention type: behavior modifying psychotherapy    Risk parameters:  Patient reports no suicidal ideation  Patient  reports no homicidal ideation  Patient reports no self-injurious behavior  Patient reports no violent behavior    Verbal deficits: None           Patient's response to intervention:   The patient's response to intervention is accepting, motivated.              Progress toward goals and other mental status changes:  The patient's progress toward goals is fair .    Diagnosis:     ICD-10-CM ICD-9-CM   1. Generalized anxiety disorder F41.1 300.02       Plan:  individual psychotherapy and family psychotherapy    Return to clinic: as scheduled    Length of Service (minutes): 45

## 2022-01-10 ENCOUNTER — OFFICE VISIT (OUTPATIENT)
Dept: UROLOGY | Facility: CLINIC | Age: 77
End: 2022-01-10
Payer: MEDICARE

## 2022-01-10 ENCOUNTER — TELEPHONE (OUTPATIENT)
Dept: UROLOGY | Facility: CLINIC | Age: 77
End: 2022-01-10

## 2022-01-10 VITALS
HEIGHT: 70 IN | DIASTOLIC BLOOD PRESSURE: 96 MMHG | HEART RATE: 75 BPM | SYSTOLIC BLOOD PRESSURE: 152 MMHG | BODY MASS INDEX: 27.87 KG/M2 | WEIGHT: 194.69 LBS

## 2022-01-10 DIAGNOSIS — N39.41 URGENCY INCONTINENCE: Primary | ICD-10-CM

## 2022-01-10 DIAGNOSIS — N39.41 URGENCY INCONTINENCE: ICD-10-CM

## 2022-01-10 PROCEDURE — 3077F PR MOST RECENT SYSTOLIC BLOOD PRESSURE >= 140 MM HG: ICD-10-PCS | Mod: HCNC,CPTII,S$GLB, | Performed by: UROLOGY

## 2022-01-10 PROCEDURE — 99215 PR OFFICE/OUTPT VISIT, EST, LEVL V, 40-54 MIN: ICD-10-PCS | Mod: HCNC,S$GLB,, | Performed by: UROLOGY

## 2022-01-10 PROCEDURE — 3080F DIAST BP >= 90 MM HG: CPT | Mod: HCNC,CPTII,S$GLB, | Performed by: UROLOGY

## 2022-01-10 PROCEDURE — 99999 PR PBB SHADOW E&M-EST. PATIENT-LVL II: CPT | Mod: PBBFAC,HCNC,, | Performed by: UROLOGY

## 2022-01-10 PROCEDURE — 1126F AMNT PAIN NOTED NONE PRSNT: CPT | Mod: HCNC,CPTII,S$GLB, | Performed by: UROLOGY

## 2022-01-10 PROCEDURE — 3080F PR MOST RECENT DIASTOLIC BLOOD PRESSURE >= 90 MM HG: ICD-10-PCS | Mod: HCNC,CPTII,S$GLB, | Performed by: UROLOGY

## 2022-01-10 PROCEDURE — 1126F PR PAIN SEVERITY QUANTIFIED, NO PAIN PRESENT: ICD-10-PCS | Mod: HCNC,CPTII,S$GLB, | Performed by: UROLOGY

## 2022-01-10 PROCEDURE — 3077F SYST BP >= 140 MM HG: CPT | Mod: HCNC,CPTII,S$GLB, | Performed by: UROLOGY

## 2022-01-10 PROCEDURE — 99215 OFFICE O/P EST HI 40 MIN: CPT | Mod: HCNC,S$GLB,, | Performed by: UROLOGY

## 2022-01-10 PROCEDURE — 99999 PR PBB SHADOW E&M-EST. PATIENT-LVL II: ICD-10-PCS | Mod: PBBFAC,HCNC,, | Performed by: UROLOGY

## 2022-01-10 RX ORDER — VIBEGRON 75 MG/1
75 TABLET, FILM COATED ORAL DAILY
Qty: 30 TABLET | Refills: 3 | Status: SHIPPED | OUTPATIENT
Start: 2022-01-10 | End: 2022-01-13 | Stop reason: SDUPTHER

## 2022-01-10 RX ORDER — TAMSULOSIN HYDROCHLORIDE 0.4 MG/1
0.4 CAPSULE ORAL DAILY
Status: DISCONTINUED | OUTPATIENT
Start: 2022-01-10 | End: 2023-08-08 | Stop reason: SDUPTHER

## 2022-01-10 NOTE — H&P (VIEW-ONLY)
[facility]      New Overactive Bladder (OAB) Note    1/10/2022    Referred by:  No ref. provider found    HPI: Haja Marti is a very pleasant 76 y.o. male who is a new patient to our department referred for evaluation of urinary incontinence of several years duration that has worsened in the last three months. He reports bother is associated without urinary daytime frequency, with nocturia (10-15x per night) and with urgency that results in urinary incontinence daily. He reports no stress urinary incontinence associated with exertion. He has not made changes to fluid intake.  He reports urinary incontinence is day and night but more predominant at night.    He reports symptoms of irritative voiding including frequency, incontinence and urgency. He reports symptoms of obstructive voiding including post void dribbling. Bladder scan PVR was 0 mL. His history includes urolithiasis and Parkinson's. He denies all other prior pelvic surgeries or neurologic diagnoses.He denies a history of constipation. He denies a history suggestive of glaucoma.  He denies a history of hypertension.     Previous incontinence therapies:   Medication: (Myrbetriq (provided no benefit)     A review of 10+ systems was conducted with pertinent positive and negative findings documented in HPI with all other systems reviewed and negative.    Past medical, family, surgical and social history reviewed as documented in chart with pertinent positive medical, family, surgical and social history detailed in HPI.    Exam Findings:    Const: no acute distress, conversant and alert  Eyes: anicteric, extraocular muscles intact  ENMT: normocephalic, Nl oral membranes  Cardio: no cyanosis, nl cap refill  Pulm: no tachypnea; no resp distress  Abd: soft, no tenderness  Musc: no laceration, no tenderness  Neuro: alert; oriented x 3  Skin: warm, dry; no petichiae  Psych: no anxiety; normal speech     Assessment/Plan:    Overactive  Bladder with Urgency Incontinence (new, addt'l workup): His history is suggestive of Overactive Bladder (OAB) with predominantly Urgency Urinary Incontinence (UUI). The presence or absence of incontinence as well as the relative contribution of stress or urgency incontinence can be further clarified with a 3-day volume-based voiding/incontinence diary provided today. This will also help to screen for polyuria and help to guide us on further counseling on behavioral therapy including timed voiding and bladder training. We will review this on his return visit.     His reported symptoms today are relatively severe and therefore, I believe it would be appropriate to begin pharmacologic therapy in addition to behavioral therapies.     1. Patient was given a 3-day voiding diary to complete before next visit. We will further discuss behavioral therapy at that time.   2. We will begin an appropriate OAB medication based medication history, comorbid conditions and formulary coverage.   3. Patient has been scheduled for urodynamic evaluation based upon concern for underlying pathology other than overactive bladder or in preparation for more invasive therapies.          Review of prior external note(s): outside records reviewed today and summarized in HPI   Review of test results: urinalysis (01/10/2022); PVR   Tests Ordered: Urodynamics   Radiology independently reviewed: none   Discussion with external physician/health care professional: Dr. Oquendo

## 2022-01-11 RX ORDER — ATORVASTATIN CALCIUM 40 MG/1
TABLET, FILM COATED ORAL
Qty: 90 TABLET | Refills: 3 | Status: SHIPPED | OUTPATIENT
Start: 2022-01-11 | End: 2022-12-06

## 2022-01-13 ENCOUNTER — TELEPHONE (OUTPATIENT)
Dept: UROLOGY | Facility: CLINIC | Age: 77
End: 2022-01-13
Payer: MEDICARE

## 2022-01-13 DIAGNOSIS — N39.41 URGENCY INCONTINENCE: ICD-10-CM

## 2022-01-13 RX ORDER — VIBEGRON 75 MG/1
75 TABLET, FILM COATED ORAL DAILY
Qty: 30 TABLET | Refills: 3 | Status: SHIPPED | OUTPATIENT
Start: 2022-01-13 | End: 2022-03-07

## 2022-01-13 NOTE — TELEPHONE ENCOUNTER
----- Message from Sanjeev Payne sent at 1/13/2022 12:27 PM CST -----  Patient called to speak w/ someone in regards to him experiencing frequent urination. Requesting callback to discuss treatment options  [phone]

## 2022-01-18 ENCOUNTER — PATIENT MESSAGE (OUTPATIENT)
Dept: NEUROLOGY | Facility: CLINIC | Age: 77
End: 2022-01-18
Payer: MEDICARE

## 2022-01-19 ENCOUNTER — OFFICE VISIT (OUTPATIENT)
Dept: PSYCHIATRY | Facility: CLINIC | Age: 77
End: 2022-01-19
Payer: MEDICARE

## 2022-01-19 VITALS
DIASTOLIC BLOOD PRESSURE: 104 MMHG | HEART RATE: 79 BPM | SYSTOLIC BLOOD PRESSURE: 143 MMHG | BODY MASS INDEX: 28.33 KG/M2 | WEIGHT: 197.44 LBS

## 2022-01-19 DIAGNOSIS — F41.1 GAD (GENERALIZED ANXIETY DISORDER): Primary | ICD-10-CM

## 2022-01-19 DIAGNOSIS — F41.1 GENERALIZED ANXIETY DISORDER: ICD-10-CM

## 2022-01-19 DIAGNOSIS — F32.A DEPRESSION, UNSPECIFIED DEPRESSION TYPE: ICD-10-CM

## 2022-01-19 DIAGNOSIS — G20.A1 PARKINSON'S DISEASE: ICD-10-CM

## 2022-01-19 DIAGNOSIS — F41.9 ANXIETY DISORDER, UNSPECIFIED TYPE: ICD-10-CM

## 2022-01-19 PROCEDURE — 99999 PR PBB SHADOW E&M-EST. PATIENT-LVL II: CPT | Mod: PBBFAC,HCNC,, | Performed by: PSYCHIATRY & NEUROLOGY

## 2022-01-19 PROCEDURE — 3080F PR MOST RECENT DIASTOLIC BLOOD PRESSURE >= 90 MM HG: ICD-10-PCS | Mod: HCNC,CPTII,S$GLB, | Performed by: PSYCHIATRY & NEUROLOGY

## 2022-01-19 PROCEDURE — 3080F DIAST BP >= 90 MM HG: CPT | Mod: HCNC,CPTII,S$GLB, | Performed by: PSYCHIATRY & NEUROLOGY

## 2022-01-19 PROCEDURE — 99214 OFFICE O/P EST MOD 30 MIN: CPT | Mod: HCNC,S$GLB,, | Performed by: PSYCHIATRY & NEUROLOGY

## 2022-01-19 PROCEDURE — 3077F SYST BP >= 140 MM HG: CPT | Mod: HCNC,CPTII,S$GLB, | Performed by: PSYCHIATRY & NEUROLOGY

## 2022-01-19 PROCEDURE — 99999 PR PBB SHADOW E&M-EST. PATIENT-LVL II: ICD-10-PCS | Mod: PBBFAC,HCNC,, | Performed by: PSYCHIATRY & NEUROLOGY

## 2022-01-19 PROCEDURE — 3077F PR MOST RECENT SYSTOLIC BLOOD PRESSURE >= 140 MM HG: ICD-10-PCS | Mod: HCNC,CPTII,S$GLB, | Performed by: PSYCHIATRY & NEUROLOGY

## 2022-01-19 PROCEDURE — 99214 PR OFFICE/OUTPT VISIT, EST, LEVL IV, 30-39 MIN: ICD-10-PCS | Mod: HCNC,S$GLB,, | Performed by: PSYCHIATRY & NEUROLOGY

## 2022-01-19 RX ORDER — SERTRALINE HYDROCHLORIDE 100 MG/1
200 TABLET, FILM COATED ORAL DAILY
Qty: 180 TABLET | Refills: 3 | Status: SHIPPED | OUTPATIENT
Start: 2022-01-19 | End: 2022-08-09 | Stop reason: SDUPTHER

## 2022-01-19 RX ORDER — HYDROXYZINE HYDROCHLORIDE 10 MG/1
10 TABLET, FILM COATED ORAL 3 TIMES DAILY PRN
Qty: 90 TABLET | Refills: 3 | Status: SHIPPED | OUTPATIENT
Start: 2022-01-19 | End: 2022-04-19

## 2022-01-19 RX ORDER — CLONAZEPAM 0.5 MG/1
TABLET ORAL
Qty: 90 TABLET | Refills: 5 | Status: SHIPPED | OUTPATIENT
Start: 2022-01-19 | End: 2022-11-15 | Stop reason: SDUPTHER

## 2022-01-19 NOTE — PROGRESS NOTES
"Outpatient Psychiatry Follow-Up Visit (MD/NP)  1/19/2022    Session Length: 30 minutes (E&M Level 4))    Clinical Status of Patient:  Outpatient (Ambulatory)    Chief Complaint:  Haja Marti is a 76 y.o. male who presents today for follow-up of depression and anxiety.  Met with patient and spouse.      Interval History and Content of Current Session:  Interim Events/Subjective Report/Content of Current Session:  First appt with me since 4/15/2021.    He has had several appts with Mr. Galvan for psychotherapy.      He states he has much more anxiety lately.    His wife notes it has been "rough" for the past couple of months.    He has not been walking as well.    However, his wife thinks he "perks up" more     They have had a lot of recent deaths: his wife's brother-in-law, several good friends, his god-mother, etc.   This, coupled with the advancement of the Parkinson's, is likely making him more anxious, depressed and, at times, angry.    He continues to drive his car.  He is careful and does not drive far.      He will occasionally miss doses of medication.  He will often forget the early AM dose (~ 5 - 6), then this throws off his schedule.    They sleep in separate rooms because he snores, moves around a lot in the bed, even "fights" in his sleep.    So, she is not there to remind him to take his medication at 6 AM.    She awakens around 8 AM.    She also babysits a child that her daughter fosters and one of her sons wants to adopt -- they're dealing with the state regulations now regarding this.      He also has had more problems with incontinence.   He is seeing Urology for this problem.      He states he is doing "pretty good".  He denies depressed mood today.    He generally goes to sleep around 8 - 9 PM, then awakens around 1 - 2 AM to use the restroom, then he must urinate at least 2 more times afterwards.  This also disrupts his sleep.  He usually gets up about 6 - 7 AM.    He is able to get out " "of bed, get moving in about 10 - 15 minutes after awakening.    He has difficulty walking any significant distance due to fatigue.    He will go to boxing classes on M,W,F -- they last about an hour.  "They wear me out; I have to take a nap".   Wife states he naps everyday, "a lot".    He has a Dx of at least mild sleep apnea.    He has tried to use CPAP, but has not been able to tolerate it.    He and his wife state they had his third sleep study didn't show he even had sleep apnea!   But he still snores.      He also has had more confusion lately.    For example, his wife says he has a hard time just measuring his urine (Urology wants to measure his liquid I/O's at home).    He sees a urologist (Dr. Oquendo; [facility]).      He still has occasional falls.  No significant falls recently.  No recent head injury.      He occasionally has hypnopompic hallucinations.   They last only seconds after awakening.   He also has had periods of formed hallucinations.  They can last for a few seconds, then disappear.    He had mentioned that he often sees a "fish in the toilet" (master bathroom) that tends to last longer than other images.    He had denied seeing the fish in any other toilets in his house or in public.      No recent delusions were noted by pt or his wife.          Psychotherapy:  · Target symptoms: depression, adjustment  · Why chosen therapy is appropriate versus another modality: relevant to diagnosis, patient responds to this modality  · Outcome monitoring methods: self-report, lab data, observation, feedback from family  · Therapeutic intervention type: supportive psychotherapy  · Topics discussed/themes: stress related to medical comorbidities, life stage transitional issues  · The patient's response to the intervention is accepting.   · The patient's progress toward treatment goals is fair.   · Duration of intervention: 0 minutes.    Review of Systems   · PSYCHIATRIC: Pertinant items are noted in the " narrative.  · CONSTITUTIONAL:  No recent changes in wt.    · MUSCULOSKELETAL: No pain or stiffness of the joints.  · NEUROLOGIC: + tremors and shuffling gait; No weakness, sensory changes, seizures, memory loss, confusion, or other abnormal movements.  · Memory Loss: no  · ENDOCRINE: No polydipsia or polyuria.  · INTEGUMENTARY: No rashes or lacerations.  · EYES: No exophthalmos, jaundice or blindness.  · ENT: No dizziness, tinnitus or hearing loss.  · RESPIRATORY: No shortness of breath.  · CARDIOVASCULAR: No tachycardia or chest pain.  · GASTROINTESTINAL: No nausea, vomiting, pain, constipation or diarrhea.  · GENITOURINARY: No frequency, dysuria.    The following portions of the patient's history were reviewed and updated as appropriate: allergies, current medications, past family history, past medical history, past social history, past surgical history and problem list.      Current Outpatient Medications:     atorvastatin (LIPITOR) 40 MG tablet, TAKE 1 TABLET EVERY DAY, Disp: 90 tablet, Rfl: 3    carbidopa-levodopa  mg (SINEMET)  mg per tablet, TAKE 2 TABLETS IN THE MORNING AND TAKE 1 AND 1/2 TABLETS THREE TIMES DAILY THEREAFTER, Disp: 585 tablet, Rfl: 3    clonazePAM (KLONOPIN) 0.5 MG tablet, TAKE 1/2 TO 1 TABLET BY MOUTH UP TO 3 TIMES DAILY AS NEEDED FOR ANXIETY, Disp: 90 tablet, Rfl: 2    ergocalciferol (ERGOCALCIFEROL) 50,000 unit Cap, Take 50,000 Units by mouth every 30 days. , Disp: , Rfl:     finasteride (PROSCAR) 5 mg tablet, TAKE 1 TABLET EVERY DAY, Disp: 90 tablet, Rfl: 3    folic acid (FOLVITE) 1 MG tablet, Take 1 tablet (1 mg total) by mouth once daily., Disp: 90 tablet, Rfl: 0    losartan (COZAAR) 25 MG tablet, Take 1 tablet (25 mg total) by mouth once daily., Disp: 90 tablet, Rfl: 1    magnesium oxide (MAG-OX) 400 mg tablet, Take 400 mg by mouth once daily., Disp: , Rfl:     meloxicam (MOBIC) 7.5 MG tablet, Take 1 tablet (7.5 mg total) by mouth once daily., Disp: 90 tablet,  Rfl: 0    oxyCODONE (ROXICODONE) 5 MG immediate release tablet, Take 1 tablet (5 mg total) by mouth every 4 (four) hours as needed for Pain., Disp: 15 tablet, Rfl: 0    potassium citrate (UROCIT-K) 10 mEq (1,080 mg) TbSR, TAKE 1 TABLET THREE TIMES DAILY WITH MEALS (Patient taking differently: Take 10 mEq by mouth 2 (two) times daily with meals.), Disp: 270 tablet, Rfl: 3    pyridoxine, vitamin B6, (B-6) 50 MG Tab, Take 50 mg by mouth once daily., Disp: , Rfl:     sertraline (ZOLOFT) 100 MG tablet, Take 2 tablets (200 mg total) by mouth once daily., Disp: 180 tablet, Rfl: 3    sodium bicarbonate 650 MG tablet, Take 650 mg by mouth once daily. , Disp: , Rfl:     vibegron (GEMTESA) 75 mg Tab, Take 75 mg by mouth once daily., Disp: 30 tablet, Rfl: 3    Current Facility-Administered Medications:     tamsulosin 24 hr capsule 0.4 mg, 0.4 mg, Oral, Daily, Mike Mtz MD    Facility-Administered Medications Ordered in Other Visits:     0.9%  NaCl infusion, , Intravenous, Continuous, Lizz Lowry NP, Last Rate: 70 mL/hr at 11/12/21 1227, New Bag at 11/12/21 1227    mupirocin 2 % ointment, , Nasal, On Call Procedure, Lizz Lowry NP, Given at 11/12/21 1227   He has been taking 1/2 tablet of Klonopin daily, usually in the AM.    Pt has not been taking the Seroquel at night -- he states he has not needed this medication.  He and his wife state he has been taking c/l 2 tabs in AM and 1.5 tabs TID; however, record indicates 1/2 tab TID -- I told pt to check with Dr. Thompson's nurse about this discrepancy, as it could affect the quantity he obtains from [facility].    Compliance: yes    Side effects: None    Risk Parameters:  Patient reports no suicidal ideation  Patient reports no homicidal ideation  Patient reports no self-injurious behavior  Patient reports no violent behavior    Exam (detailed: at least 9 elements; comprehensive: all 15 elements)   Constitutional  Vitals - 1 value per visit  "11/19/2021 11/19/2021 12/3/2021 12/3/2021 1/10/2022 1/10/2022 1/19/2022   SYSTOLIC - 154 - 168 - 152 143   DIASTOLIC - 85 - 97 - 96 104   Pulse - 78 - 74 - 75 79   Temp - - - - - - -   Resp - - - - - - -   SPO2 - - - - - - -   Weight (lb) - 194.6 - 197.09 - 194.67 197.42   Weight (kg) - 88.27 - 89.4 - 88.3 89.55   Height - 70 - 70 - 70 -   BMI (Calculated) - 27.9 - 28.3 - 27.9 -   VISIT REPORT - - - - - - -   Pain Score  0 - 0 - 0 - -   Some recent data might be hidden       General:  unremarkable, age appropriate, casually dressed, neatly groomed, overweight     Musculoskeletal  Muscle Strength/Tone:  not assessed today; cogwheeling had been noted in BUE's in past   Gait & Station:  parkinsonian, slow, steady     Psychiatric  Speech:  slowed, non-spontaneous, normal volume   Behavior: friendly and cooperative, eye contact normal   Mood & Affect:  "OK"  euthymic, constricted range, conguent, appropriate   Thought Process:  goal-directed, logical   Associations:  intact   Thought Content:  normal, no suicidality, no homicidality, delusions, or paranoia   Insight:  intact, has awareness of illness   Judgement: behavior is adequate to circumstances   Orientation:  grossly intact   Memory: intact for content of interview   Language: grossly intact   Attention Span & Concentration:  able to focus   Fund of Knowledge:  intact and appropriate to age and level of education     Lab Results   Component Value Date    WBC 6.75 09/13/2021    HGB 15.1 09/13/2021    HCT 43.6 09/13/2021    MCV 93 09/13/2021     09/13/2021     08/20/2021    K 4.4 08/20/2021     08/20/2021    CO2 27 08/20/2021    GLU 95 08/20/2021    BUN 20 08/20/2021    CREATININE 1.0 08/20/2021    CALCIUM 9.7 08/20/2021    PROT 7.0 08/20/2021    ALBUMIN 3.9 08/20/2021    BILITOT 0.7 08/20/2021    ALKPHOS 78 08/20/2021    AST 27 08/20/2021    ALT 34 08/20/2021    ANIONGAP 7 (L) 08/20/2021    ESTGFRAFRICA >60.0 08/20/2021    EGFRNONAA >60.0 " 08/20/2021    CHOL 129 08/20/2021    HDL 39 (L) 08/20/2021    LDLCALC 73.0 08/20/2021    TRIG 85 08/20/2021    CHOLHDL 30.2 08/20/2021    TSH 3.526 08/20/2021       Imaging:    Exam: MRI brain without contrast    History: Dizziness, extrapyramidal movement disorder    Findings: MRI is performed with routine sequences.  No mass, hemorrhage, infarction, subdural collection, hydrocephalus or other acute finding is seen on this cranial MR examination.  There is volume loss consistent with the patient's age.  No diffusion abnormality is seen.      Impression     Normal noncontrast MRI of the brain     Electronically signed by: FLORI NEWMAN MD  Date: 07/15/17  Time: 15:51        Assessment and Diagnosis   Status/Progress: Based on the examination today, the patient's problem(s) is/are adequately but not ideally controlled.  New problems have been presented today.   Co-morbidities (chronic pain) are complicating management of the primary condition.    There are not active rule-out diagnoses for this patient at this time.      General Impression:  POLINA (generalized anxiety disorder)  Depression, unspecified depression type  Parkinson's disease    Chronic pain (NOT CODED)    Intervention/Counseling/Treatment Plan   Medication Management:  Try hydroxyzine 10 mg TID prn anxiety.  Risks/benefits noted, including potential for sedation, dizziness and risk for falls.    Continue other current medications (sertraline, clonazepam).        Follow-up plan for depression was discussed with patient and spouse    Return to Clinic: Follow up in 7 weeks (on 3/8/2022).    Sudhir Elliott MD

## 2022-01-28 ENCOUNTER — TELEPHONE (OUTPATIENT)
Dept: UROLOGY | Facility: CLINIC | Age: 77
End: 2022-01-28
Payer: MEDICARE

## 2022-01-28 NOTE — TELEPHONE ENCOUNTER
Spoke to pt and confirmed 1115am arrival time for FUDS. Pt verbalized understanding.    No answer while trying to contact. Left v/m with 1115am arrival time for Mon Jan 31st

## 2022-01-29 ENCOUNTER — HOSPITAL ENCOUNTER (EMERGENCY)
Facility: HOSPITAL | Age: 77
Discharge: HOME | End: 2022-01-29
Attending: EMERGENCY MEDICINE
Payer: MEDICARE

## 2022-01-29 VITALS
SYSTOLIC BLOOD PRESSURE: 203 MMHG | HEART RATE: 60 BPM | WEIGHT: 195 LBS | TEMPERATURE: 98 F | OXYGEN SATURATION: 100 % | RESPIRATION RATE: 18 BRPM | BODY MASS INDEX: 27.98 KG/M2 | DIASTOLIC BLOOD PRESSURE: 96 MMHG

## 2022-01-29 DIAGNOSIS — R33.9 URINARY RETENTION: Primary | ICD-10-CM

## 2022-01-29 LAB
BILIRUB UR QL STRIP: NEGATIVE
CLARITY UR REFRACT.AUTO: CLEAR
COLOR UR AUTO: YELLOW
GLUCOSE UR QL STRIP: NEGATIVE
HGB UR QL STRIP: NEGATIVE
KETONES UR QL STRIP: ABNORMAL
LEUKOCYTE ESTERASE UR QL STRIP: NEGATIVE
NITRITE UR QL STRIP: NEGATIVE
PH UR STRIP: 5 [PH] (ref 5–8)
PROT UR QL STRIP: NEGATIVE
SP GR UR STRIP: 1.02 (ref 1–1.03)
URN SPEC COLLECT METH UR: ABNORMAL

## 2022-01-29 PROCEDURE — 51702 INSERT TEMP BLADDER CATH: CPT | Mod: 59,HCNC

## 2022-01-29 PROCEDURE — 99283 EMERGENCY DEPT VISIT LOW MDM: CPT | Mod: 25,HCNC

## 2022-01-29 PROCEDURE — 51798 US URINE CAPACITY MEASURE: CPT | Mod: HCNC

## 2022-01-29 PROCEDURE — 81003 URINALYSIS AUTO W/O SCOPE: CPT | Mod: HCNC | Performed by: EMERGENCY MEDICINE

## 2022-01-29 PROCEDURE — 99283 PR EMERGENCY DEPT VISIT,LEVEL III: ICD-10-PCS | Mod: 25,HCNC,, | Performed by: EMERGENCY MEDICINE

## 2022-01-29 PROCEDURE — 99283 EMERGENCY DEPT VISIT LOW MDM: CPT | Mod: 25,HCNC,, | Performed by: EMERGENCY MEDICINE

## 2022-01-29 NOTE — ED PROVIDER NOTES
Encounter Date: 1/29/2022       History     Chief Complaint   Patient presents with    Urinary Retention     Pt has been having trouble urinating for the past two days and tonight he woke up and he isnt able to pee at all. Also c/o pain around the bladder. Wife states he started having this problem when he was put on Gemtesa for incontinence.      76-year-old male with history of overactive bladder, hypertension, hyperlipidemia presenting to the ED with urinary retention.  Patient reports that he usually urinates approximately 10-12 times a day.  However, he has not urinated in over 24 hours.  He is on a new medication for his overactive bladder that he started two to three weeks ago.  Endorses abdominal pain around his suprapubic region and fullness.  Denies any fevers, chills, nausea, vomiting, diarrhea.  Patient follows with urology here at [facility]        Review of patient's allergies indicates:  No Known Allergies  Past Medical History:   Diagnosis Date    Anxiety     Arthritis     BPH (benign prostatic hypertrophy)     Cataract     OS    Colon polyp 2002    Depression     Epiretinal membrane, both eyes     Hx of psychiatric care     Hyperlipidemia     Hypertension     Kidney stones     Kidney stones     Parkinsons     Posterior vitreous detachment of left eye     Psychiatric problem     Retinal detachment 2004    OD    Retinoschisis, left eye     Sleep apnea     Vitreous hemorrhage of left eye      Past Surgical History:   Procedure Laterality Date    BIOPSY OF ADENOIDS      CATARACT EXTRACTION      OD    COLONOSCOPY N/A 12/3/2019    Procedure: COLONOSCOPY;  Surgeon: Ceferino Oliveira MD;  Location: [facility] ENDO ([address] [place] [address]);  Service: Endoscopy;  Laterality: N/A;  5/2019 laminectomy with post op confusion tb    EPIDURAL STEROID INJECTION N/A 3/12/2019    Procedure: INJECTION, STEROID, EPIDURAL, L5-S1;  Surgeon: Laina Crockett MD;  Location: [facility] PAIN T;  Service: Pain Management;  Laterality:  N/A;    EXAMINATION UNDER ANESTHESIA N/A 2/18/2021    Procedure: Exam under anesthesia;  Surgeon: West Clifton MD;  Location: [facility] OR 2ND FLR;  Service: Colon and Rectal;  Laterality: N/A;    EYE SURGERY      INCISION OF PERIRECTAL ABSCESS N/A 2/12/2021    Procedure: INCISION AND DRAINAGE  ABSCESS, PERIRECTAL;  Surgeon: West Clifton MD;  Location: [facility] OR 2ND FLR;  Service: Colon and Rectal;  Laterality: N/A;    INCISION OF PERIRECTAL ABSCESS Left 2/18/2021    Procedure: INCISION, ABSCESS, PERIRECTAL;  Surgeon: West Clifton MD;  Location: [facility] OR 2ND FLR;  Service: Colon and Rectal;  Laterality: Left;    INJECTION OF ANESTHETIC AGENT AROUND NERVE Bilateral 7/3/2018    Procedure: BLOCK, NERVE;  Surgeon: Laina Crockett MD;  Location: [facility] PAIN MGT;  Service: Pain Management;  Laterality: Bilateral;  Bilateral Lumbar L2,L3,L4,L5 MBB      NEEDS CONSENT    INSERTION OF SETON STITCH N/A 2/12/2021    Procedure: PLACEMENT, SETON STITCH;  Surgeon: West Clifton MD;  Location: [facility] OR 2ND FLR;  Service: Colon and Rectal;  Laterality: N/A;    INSERTION OF SETON STITCH Left 2/18/2021    Procedure: PLACEMENT, SETON;  Surgeon: West Clifton MD;  Location: [facility] OR 2ND FLR;  Service: Colon and Rectal;  Laterality: Left;    INSERTION OF SETON STITCH N/A 9/24/2021    Procedure: PLACEMENT, SETON STITCH;  Surgeon: West Clifton MD;  Location: [facility] OR 2ND FLR;  Service: Colon and Rectal;  Laterality: N/A;  seton exchange    laser indirect  4/8/10    left eye    Laser Indirect Retinopexy  4/8/10    OS    PROSTATE SURGERY      RECTAL FISTULOTOMY N/A 11/12/2021    Procedure: FISTULOTOMY, RECTUM;  Surgeon: West Clifton MD;  Location: [facility] OR 2ND FLR;  Service: Colon and Rectal;  Laterality: N/A;    RETINAL DETACHMENT SURGERY  2004    OD    ROTATOR CUFF REPAIR  11/13/2013    TONSILLECTOMY      TRANSFORAMINAL EPIDURAL INJECTION OF STEROID Bilateral 2/19/2019    Procedure:  INJECTION, STEROID, EPIDURAL, TRANSFORAMINAL APPROACH, L5;  Surgeon: Laina Crockett MD;  Location: [facility] PAIN T;  Service: Pain Management;  Laterality: Bilateral;     Family History   Problem Relation Age of Onset    Cataracts Father     Cancer Father         lung    Diabetes Father     Cancer Mother         lung    Anxiety disorder Son     Depression Son     Anxiety disorder Daughter      Social History     Tobacco Use    Smoking status: Never Smoker    Smokeless tobacco: Never Used   Substance Use Topics    Alcohol use: No    Drug use: No     Review of Systems   Constitutional: Negative for fever.   HENT: Negative for sore throat.    Respiratory: Negative for shortness of breath.    Cardiovascular: Negative for chest pain.   Gastrointestinal: Negative for nausea.   Genitourinary: Positive for decreased urine volume and difficulty urinating. Negative for dysuria.   Musculoskeletal: Negative for back pain.   Skin: Negative for rash.   Neurological: Negative for weakness.   Hematological: Does not bruise/bleed easily.       Physical Exam     Initial Vitals [01/29/22 0516]   BP Pulse Resp Temp SpO2   (!) 203/96 60 18 97.7 °F (36.5 °C) 100 %      MAP       --         Physical Exam    Nursing note and vitals reviewed.  Constitutional: Vital signs are normal. He appears well-developed and well-nourished. He is not diaphoretic. He does not appear ill. No distress.   HENT:   Head: Normocephalic and atraumatic.   Eyes: Conjunctivae and EOM are normal. Right conjunctiva is not injected. Left conjunctiva is not injected.   Neck:   Normal range of motion.  Cardiovascular: Normal rate, regular rhythm, S1 normal and S2 normal. Exam reveals no gallop and no friction rub.    No murmur heard.  Pulmonary/Chest: No respiratory distress. He has no wheezes. He has no rhonchi. He has no rales. He exhibits no tenderness.   Abdominal: Abdomen is soft. He exhibits no distension and no mass. There is abdominal tenderness.    Mild tenderness to palpation of suprapubic area There is no rebound and no guarding.   Musculoskeletal:         General: No edema.      Cervical back: Normal range of motion.     Neurological: He is alert.   Skin: Skin is warm and dry. No rash noted. No erythema. No pallor.         ED Course   Procedures  Labs Reviewed   URINALYSIS - Abnormal; Notable for the following components:       Result Value    Ketones, UA Trace (*)     All other components within normal limits          Imaging Results    None          Medications - No data to display  Medical Decision Making:   History:   Old Medical Records: I decided to obtain old medical records.  Initial Assessment:   76-year-old male with history of overactive bladder on a new medication therapy presenting to the ED with urinary retention.  He has some mild suprapubic tenderness to palpation as well as abdominal fullness.    Differential includes was not limited to iatrogenic, worsening BPH UTI    Bladder scan revealed greater than 400 cc of urine in bladder.  Pedraza placed.  UA showed no concern for UTI.  Patient discharged with Pedraza with close follow-up with Urology, return precautions to the emergency department.  Clinical Tests:   Lab Tests: Ordered and Reviewed            Attending Attestation:   Physician Attestation Statement for Resident:  As the supervising MD   Physician Attestation Statement: I have personally seen and examined this patient.   I agree with the above history. -:   As the supervising MD I agree with the above PE.    As the supervising MD I agree with the above treatment, course, plan, and disposition.   -:   76-year-old male presenting to emergency department with complaint of urinary retention.  Bladder scan shows retention, a Pedraza catheter placed with relief of discomfort.  Urinalysis without acute abnormality.  Discharged home, plan follow-up with Urology.                         Clinical Impression:   Final diagnoses:  [R33.9] Urinary  retention (Primary)          ED Disposition Condition    Discharge Stable        ED Prescriptions     None        Follow-up Information     Follow up With Specialties Details Why Contact Info Additional Information    Andrez Jackson MD Internal Medicine Schedule an appointment as soon as possible for a visit   [address]  [place] [address]  [phone]       [facility] [address] Urology Schedule an appointment as soon as possible for a visit   [address] [facility] [id number] [phone] Main Building, 4th Floor Please park in [facility] and take [facility] elevator           Romario Elizondo MD  Resident  01/29/22 0630       Parisa Purvis MD  02/01/22 0996

## 2022-01-29 NOTE — DISCHARGE INSTRUCTIONS
Diagnosis: Urinary retention, Pedraza catheter placement    Please read the provided information about Pderaza catheter placement.  If the catheter stops draining and you developed lower abdominal pain, you should return to the emergency department for a recheck.    Follow-Up Plan:  - Follow-up with primary care doctor within 3 - 5 days  - Additional testing and/or evaluation as directed by your primary doctor  - Follow-up with the the provided urology clinic, or urologist of your choice    Return to the Emergency Department for symptoms including but not limited to: worsening symptoms, shortness of breath or chest pain, vomiting with inability to hold down fluids, fevers greater than 100.4°F, passing out/fainting/unconsciousness, or other concerning symptoms.

## 2022-01-31 ENCOUNTER — HOSPITAL ENCOUNTER (OUTPATIENT)
Facility: HOSPITAL | Age: 77
Discharge: HOME | End: 2022-01-31
Attending: UROLOGY | Admitting: UROLOGY
Payer: MEDICARE

## 2022-01-31 VITALS
HEART RATE: 62 BPM | OXYGEN SATURATION: 98 % | DIASTOLIC BLOOD PRESSURE: 65 MMHG | SYSTOLIC BLOOD PRESSURE: 97 MMHG | TEMPERATURE: 98 F | RESPIRATION RATE: 18 BRPM

## 2022-01-31 DIAGNOSIS — N32.9 BLADDER DISORDER: ICD-10-CM

## 2022-01-31 PROCEDURE — 74455 PR X-RAY URETHROCYSTOGRAM+VOIDING: ICD-10-PCS | Mod: 26,HCNC,, | Performed by: UROLOGY

## 2022-01-31 PROCEDURE — 36000706: Mod: HCNC | Performed by: UROLOGY

## 2022-01-31 PROCEDURE — 51600 INJECTION FOR BLADDER X-RAY: CPT | Mod: 51,HCNC,, | Performed by: UROLOGY

## 2022-01-31 PROCEDURE — 51728 CYSTOMETROGRAM W/VP: CPT | Mod: 26,HCNC,, | Performed by: UROLOGY

## 2022-01-31 PROCEDURE — 51728 PR COMPLEX CYSTOMETROGRAM VOIDING PRESSURE STUDIES: ICD-10-PCS | Mod: 26,HCNC,, | Performed by: UROLOGY

## 2022-01-31 PROCEDURE — 51784 ANAL/URINARY MUSCLE STUDY: CPT | Mod: 26,51,HCNC, | Performed by: UROLOGY

## 2022-01-31 PROCEDURE — 51600 PR INJECTION FOR BLADDER X-RAY: ICD-10-PCS | Mod: 51,HCNC,, | Performed by: UROLOGY

## 2022-01-31 PROCEDURE — 51797 PR VOIDING PRESS STUDY INTRA-ABDOMINAL VOID: ICD-10-PCS | Mod: 26,HCNC,, | Performed by: UROLOGY

## 2022-01-31 PROCEDURE — 51741 ELECTRO-UROFLOWMETRY FIRST: CPT | Mod: 26,51,HCNC, | Performed by: UROLOGY

## 2022-01-31 PROCEDURE — 27200973 HC CYSTO SUPPLY IV (URODYNAMICS): Mod: HCNC | Performed by: UROLOGY

## 2022-01-31 PROCEDURE — 74455 X-RAY URETHRA/BLADDER: CPT | Mod: 26,HCNC,, | Performed by: UROLOGY

## 2022-01-31 PROCEDURE — 51797 INTRAABDOMINAL PRESSURE TEST: CPT | Mod: 26,HCNC,, | Performed by: UROLOGY

## 2022-01-31 PROCEDURE — 36000707: Mod: HCNC | Performed by: UROLOGY

## 2022-01-31 PROCEDURE — 51784 PR ANAL/URINARY MUSCLE STUDY: ICD-10-PCS | Mod: 26,51,HCNC, | Performed by: UROLOGY

## 2022-01-31 PROCEDURE — 51741 PR UROFLOWMETRY, COMPLEX: ICD-10-PCS | Mod: 26,51,HCNC, | Performed by: UROLOGY

## 2022-01-31 NOTE — INTERVAL H&P NOTE
The patient has been examined and the H&P has been reviewed:    Interval change that the patient developed urinary retention on most recent OAB medication.   
The patient has been examined and the H&P has been reviewed:    Urinary retention since last visit.     
Good (>75%)

## 2022-01-31 NOTE — OP NOTE
Urodynamic Report    [facility]       Referring Physician:  Devang Butler MD    YOB: 1945  Date of Exam: 1/31/2022    HPI: Haja Marti is a very pleasant 76 y.o. male who is a new patient to our department referred for evaluation of urinary incontinence of several years duration that has worsened in the last three months. He reports bother is associated without urinary daytime frequency, with nocturia (10-15x per night) and with urgency that results in urinary incontinence daily. He reports no stress urinary incontinence associated with exertion. He has not made changes to fluid intake.  He reports urinary incontinence is day and night but more predominant at night.     He reports symptoms of irritative voiding including frequency, incontinence and urgency. He reports symptoms of obstructive voiding including post void dribbling. Bladder scan PVR was 0 mL. His history includes urolithiasis and Parkinson's. He denies all other prior pelvic surgeries or neurologic diagnoses.He denies a history of constipation. He denies a history suggestive of glaucoma.  He denies a history of hypertension.      Previous incontinence therapies:  · Medication: (Myrbetriq (provided no benefit)     Patient developed urinary retention requiring catheterization on Gemtesa. Antimuscarinics are relatively contraindicated due to Parkinson's and medications.     Cystometrogram:    Position: Sitting  Filling Rate: 20 mL/sec   Catheter: 7F  Fluid:Conray       Cath PVR prior: N/A Voiding Diary Capacity: Not Available   First Sensation: 52 mL First Desire: 52 mL   Strong Desire: 58 mL Cystometric Capacity: 100 mL       Pdet at halfway: 0 cm H2O Compliance: Normal       Detrusor Overactivity (DO): Present Volume first DO: 58 mL   Max DO Pressure: 14 cmH2O Urgency Incontinence: Present       Leak Point Pressure Testing:        Volume Tested: 58 mL  Abdominal Leak Point Pressure: No Leak   Stress Induced  DO: Absent          Voiding Study:        Voided Volume: 100 mL PVR: 0 mL   Maximum Flow: 6 mL/sec Average Flow 3 mL/sec   Max Pdet:  18 cmH2O  Pdet at Max Flow: 15 cmH2O   EMG Storage: Normal Recruitment  EMG Voiding: Normal quiescence       Fluroscopic Imaging:        Bladder Contour: Mild trabeculation Vesicoureteral Reflux:No VUR   Bladder Neck at Rest: closed Bladder Neck Voiding:Patent       Impression:        Sensation:Early    Capacity: Small    Compliance:Normal    Detrusor Overactivity:Present - Urgency Incontinence    Continence: Urgency Incontinence    Contractility: Normal    Emptying:Satisfactory    Coordination:Coordinated Voiding          Summary:  This study was performed in accordance with the current AUA/SUFU Guideline on Adult Urodynamics. On filling phase he demonstrates early first and strong desire with a small bladder capacity. There is detrusor overactivity (DO) demonstrated on this exam (though absence of DO on urodynamic evaluation does not exclude it as causative agent of urgency symptoms). Bladder compliance at capacity is normal. On fluoroscopy, the bladder contour is mild trabeculation. There is no VUR demonstrated on this exam.     On stress testing, with adequate cough and valsalva effort, there is no NICOLE demonstrated and patient reports no clinical history of NICOLE.    On voiding phase, voiding pressures are within normal range and flow rate is normal. On VCUG, the urethra appears patent throughout voiding. The patient empties completely. The patient reports this is the normal voiding pattern.     In summary, patient with very low capacity and DO resulting in urgency incontinence. Pharmacologic therapy has resulted in urinary retention. He has very limited options which would not involve CISC (which would be very difficult for him). Sacral neuromodulation and tibial neuromodulation would appear to be his principle options.

## 2022-02-02 ENCOUNTER — OFFICE VISIT (OUTPATIENT)
Dept: PSYCHIATRY | Facility: CLINIC | Age: 77
End: 2022-02-02
Payer: MEDICARE

## 2022-02-02 DIAGNOSIS — F41.1 GAD (GENERALIZED ANXIETY DISORDER): Primary | ICD-10-CM

## 2022-02-02 PROCEDURE — 90837 PSYTX W PT 60 MINUTES: CPT | Mod: HCNC,95,, | Performed by: SOCIAL WORKER

## 2022-02-02 PROCEDURE — 90837 PR PSYCHOTHERAPY W/PATIENT, 60 MIN: ICD-10-PCS | Mod: HCNC,95,, | Performed by: SOCIAL WORKER

## 2022-02-02 NOTE — PROGRESS NOTES
Individual Psychotherapy (PhD/LCSW)    2022    Site:  Telemed         Therapeutic Intervention: Met with patient and spouse.  Outpatient - Insight oriented psychotherapy 60 min - CPT code 07199    Chief complaint/reason for encounter: depression and anxiety     Interval history and content of current session:      Site: [facility]             Length of service: 60         Therapeutic intervention:     Persons present: spouse and pt  [place] (needed to help with interpretation of intervention due to pt cognitive decline due to Parkinsons).    Interval history:      The patient location is: home [place].   The chief complaint leading to consultation is: anxiety/depression  Visit type: audiovisual  Total time spent with patient: 60 min  Each patient to whom he or she provides medical services by telemedicine is:  (1) informed of the relationship between the physician and patient and the respective role of any other health care provider with respect to management of the patient; and (2) notified that he or she may decline to receive medical services by telemedicine and may withdraw from such care at any time.       Notes:     Situation at home has deteriorated.  Another brother in law of wife .   Pt had urinary retention and spent a day in the er.   Now he has incontinence.   His parkinson's is progressing and suffered a fall again.  I encouraged him to use the walker which he has so far resisted.  [place] had sleepless nights as she helped him with the catheter.    Session centered on compassion and acceptance.                Target symptoms: depression, anxiety        Progress toward goals: progressing well    Diagnosis: generalized anxiety disorder       Treatment plan:  · Target symptoms: anxiety   · Why chosen therapy is appropriate versus another modality: relevant to diagnosis, evidence based practice  · Outcome monitoring methods: self-report, observation, feedback from family  · Therapeutic  intervention type: behavior modifying psychotherapy    Risk parameters:  Patient reports no suicidal ideation  Patient reports no homicidal ideation  Patient reports no self-injurious behavior  Patient reports no violent behavior        Verbal deficits: None           Patient's response to intervention:   The patient's response to intervention is accepting, motivated.              Progress toward goals and other mental status changes:  The patient's progress toward goals is fair .    Diagnosis:     ICD-10-CM ICD-9-CM   1. Generalized anxiety disorder F41.1 300.02       Plan:  individual psychotherapy and family psychotherapy    Return to clinic: as scheduled    Length of Service (minutes): 60

## 2022-02-03 ENCOUNTER — TELEPHONE (OUTPATIENT)
Dept: SURGERY | Facility: CLINIC | Age: 77
End: 2022-02-03
Payer: MEDICARE

## 2022-02-03 NOTE — PROGRESS NOTES
[facility]  Colon and Rectal Surgery    [address]  [place], [place]  Tel: [phone]  Fax: [phone]  [email or web address]   MD Edward Wright MD Brian Kann, MD W. Forrest Johnston, MD Matthew Giglia, MD Jennifer Paruch, MD William Kethman, MD     Patient name: Haja Marti   YOB: 1945   MRN: [id number]  Date of visit: 02/04/2022    It was a pleasure seeing Mr. Marti in the Colon and Rectal Surgery clinic here at [facility].     Prior visits  As you know, Mr. Marti is a 75 year old man with a history of HTN, HLD, depression, BPH, Parkinsons disease, and SHU who presents for evaluation of perianal pain. He was seen last week with approximately 2 days of pain and was found to have a perianal abscess for which he underwent incision and drainage with seton placement on 2/12/2021 - requiring repeat EUA for complex collection. He underwent placement of a second seton on 6/15/2021 and underwent MRI Anal Sphincter for further evaluation.    6/29/2021  Mr. Marti is feeling better since his last visit. He has a twinge of pain every once in a while but it is manageable. He has not noticed any new openings and his drainage has almost completely stopped. He is taking Mirilax and having bowel movements daily without straining. He had his MRI last week.     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 There has been interval improvement of associated fluid with no focal drainable collection identified.     9/21/2021  Here for follow-up. He has been having more drainage than normal and is here to discuss possible seton removal procedures. He denies any fevers or chills.    10/12/2021  Mr. Marti is here today after undergoing removal of an external seton with curetting of the tract on 9/24/2021. I noted  that he would likely be able to tolerate a fistulotomy. He has been doing well - no fecal incontinence and minimal drainage.    11/2/2021  Here today for wound assessment and discussion regarding fistulotomy. Still some minor drainage - no fevers or chills.    11/19/2021  Here for follow-up after completion fistulotomy on 11/12/2021. Healing well - no incontinence.    12/3/2021  Here for wound check after fistulotomy. Minimal if any drainage.    2/3/2022  Here for wound follow-up after fistulotomy in November. He has healed! No drainage, no incontinence.    The patient was informed of the availability of a certified  without charge. A certified  was not necessary for this visit.     Past Medical History:   Diagnosis Date    Anxiety     Arthritis     BPH (benign prostatic hypertrophy)     Cataract     OS    Colon polyp 2002    Depression     Epiretinal membrane, both eyes     Hx of psychiatric care     Hyperlipidemia     Hypertension     Kidney stones     Kidney stones     Parkinsons     Posterior vitreous detachment of left eye     Psychiatric problem     Retinal detachment 2004    OD    Retinoschisis, left eye     Sleep apnea     Vitreous hemorrhage of left eye      Past Surgical History:   Procedure Laterality Date    BIOPSY OF ADENOIDS      CATARACT EXTRACTION      OD    COLONOSCOPY N/A 12/3/2019    Procedure: COLONOSCOPY;  Surgeon: Ceferino Oliveira MD;  Location: [facility] (4TH FLR);  Service: Endoscopy;  Laterality: N/A;  5/2019 laminectomy with post op confusion tb    CYSTOSCOPY WITH URODYNAMIC TESTING N/A 1/31/2022    Procedure: CYSTOSCOPY, WITH URODYNAMIC TESTING FLOUROSCOPIC;  Surgeon: Devang Butler MD;  [facility];  Service: Urology;  Laterality: N/A;  1hr    EPIDURAL STEROID INJECTION N/A 3/12/2019    Procedure: INJECTION, STEROID, EPIDURAL, L5-S1;  Surgeon: Laina Crockett MD;  Location: [facility];  Service: Pain Management;   Laterality: N/A;    EXAMINATION UNDER ANESTHESIA N/A 2/18/2021    Procedure: Exam under anesthesia;  Surgeon: West Clifton MD;  Location: [facility] OR 2ND FLR;  Service: Colon and Rectal;  Laterality: N/A;    EYE SURGERY      INCISION OF PERIRECTAL ABSCESS N/A 2/12/2021    Procedure: INCISION AND DRAINAGE  ABSCESS, PERIRECTAL;  Surgeon: West Clifton MD;  Location: [facility] OR 2ND FLR;  Service: Colon and Rectal;  Laterality: N/A;    INCISION OF PERIRECTAL ABSCESS Left 2/18/2021    Procedure: INCISION, ABSCESS, PERIRECTAL;  Surgeon: West Clifton MD;  Location: [facility] OR 2ND FLR;  Service: Colon and Rectal;  Laterality: Left;    INJECTION OF ANESTHETIC AGENT AROUND NERVE Bilateral 7/3/2018    Procedure: BLOCK, NERVE;  Surgeon: Laina Crockett MD;  Location: [facility];  Service: Pain Management;  Laterality: Bilateral;  Bilateral Lumbar L2,L3,L4,L5 MBB      NEEDS CONSENT    INSERTION OF SETON STITCH N/A 2/12/2021    Procedure: PLACEMENT, SETON STITCH;  Surgeon: West Clifton MD;  Location: [facility] OR 2ND FLR;  Service: Colon and Rectal;  Laterality: N/A;    INSERTION OF SETON STITCH Left 2/18/2021    Procedure: PLACEMENT, SETON;  Surgeon: West Clifton MD;  Location: [facility] OR 2ND FLR;  Service: Colon and Rectal;  Laterality: Left;    INSERTION OF SETON STITCH N/A 9/24/2021    Procedure: PLACEMENT, SETON STITCH;  Surgeon: West Clifton MD;  Location: [facility] OR 2ND FLR;  Service: Colon and Rectal;  Laterality: N/A;  seton exchange    laser indirect  4/8/10    left eye    Laser Indirect Retinopexy  4/8/10    OS    PROSTATE SURGERY      RECTAL FISTULOTOMY N/A 11/12/2021    Procedure: FISTULOTOMY, RECTUM;  Surgeon: West Clifton MD;  Location: [facility] OR 2ND FLR;  Service: Colon and Rectal;  Laterality: N/A;    RETINAL DETACHMENT SURGERY  2004    OD    ROTATOR CUFF REPAIR  11/13/2013    TONSILLECTOMY      TRANSFORAMINAL EPIDURAL INJECTION OF STEROID Bilateral 2/19/2019     Procedure: INJECTION, STEROID, EPIDURAL, TRANSFORAMINAL APPROACH, L5;  Surgeon: Laina Crockett MD;  Location: [facility] PAIN T;  Service: Pain Management;  Laterality: Bilateral;     Family History   Problem Relation Age of Onset    Cataracts Father     Cancer Father         lung    Diabetes Father     Cancer Mother         lung    Anxiety disorder Son     Depression Son     Anxiety disorder Daughter      Social History     Tobacco Use    Smoking status: Never Smoker    Smokeless tobacco: Never Used   Substance Use Topics    Alcohol use: No    Drug use: No     Review of patient's allergies indicates:  No Known Allergies    Current Outpatient Medications on File Prior to Visit   Medication Sig Dispense Refill    atorvastatin (LIPITOR) 40 MG tablet TAKE 1 TABLET EVERY DAY 90 tablet 3    carbidopa-levodopa  mg (SINEMET)  mg per tablet TAKE 2 TABLETS IN THE MORNING AND TAKE 1 AND 1/2 TABLETS THREE TIMES DAILY THEREAFTER 585 tablet 3    clonazePAM (KLONOPIN) 0.5 MG tablet TAKE 1/2 TO 1 TABLET BY MOUTH UP TO 3 TIMES DAILY AS NEEDED FOR ANXIETY 90 tablet 5    finasteride (PROSCAR) 5 mg tablet TAKE 1 TABLET EVERY DAY 90 tablet 3    folic acid (FOLVITE) 1 MG tablet Take 1 tablet (1 mg total) by mouth once daily. 90 tablet 0    hydrOXYzine HCL (ATARAX) 10 MG Tab Take 1 tablet (10 mg total) by mouth 3 (three) times daily as needed (anxiety). 90 tablet 3    losartan (COZAAR) 25 MG tablet Take 1 tablet (25 mg total) by mouth once daily. 90 tablet 1    magnesium oxide (MAG-OX) 400 mg tablet Take 400 mg by mouth once daily.      meloxicam (MOBIC) 7.5 MG tablet Take 1 tablet (7.5 mg total) by mouth once daily. 90 tablet 0    potassium citrate (UROCIT-K) 10 mEq (1,080 mg) TbSR TAKE 1 TABLET THREE TIMES DAILY WITH MEALS (Patient taking differently: Take 10 mEq by mouth 2 (two) times daily with meals.) 270 tablet 3    pyridoxine, vitamin B6, (B-6) 50 MG Tab Take 50 mg by mouth once daily.       "sertraline (ZOLOFT) 100 MG tablet Take 2 tablets (200 mg total) by mouth once daily. 180 tablet 3    sodium bicarbonate 650 MG tablet Take 650 mg by mouth once daily.       ergocalciferol (ERGOCALCIFEROL) 50,000 unit Cap Take 50,000 Units by mouth every 30 days.       oxyCODONE (ROXICODONE) 5 MG immediate release tablet Take 1 tablet (5 mg total) by mouth every 4 (four) hours as needed for Pain. 15 tablet 0    vibegron (GEMTESA) 75 mg Tab Take 75 mg by mouth once daily. 30 tablet 3     Current Facility-Administered Medications on File Prior to Visit   Medication Dose Route Frequency Provider Last Rate Last Admin    0.9%  NaCl infusion   Intravenous Continuous Lizz Lowry NP 70 mL/hr at 11/12/21 1227 New Bag at 11/12/21 1227    mupirocin 2 % ointment   Nasal On Call Procedure Lizz Lowry NP   Given at 11/12/21 1227    tamsulosin 24 hr capsule 0.4 mg  0.4 mg Oral Daily Mike Mtz MD         Physical Examination  BP (!) 164/88 (BP Location: Left arm, Patient Position: Sitting, BP Method: Large (Automatic))   Pulse 74   Ht 5' 10" (1.778 m)   Wt 88.2 kg (194 lb 6.4 oz)   BMI 27.89 kg/m²      A chaperone was present for the physical examination.    Constitutional: well developed, no cough, no dyspnea, alert and no acute distress    Perianal Skin: Healed    Assessment and Plan of Care    Thank you again for referring Mr. Marti to my care. In summary, Mr. Marti is a 75 year old man here for post-operative follow-up after fistulotomy. We discussed treatment options and have provided the following recommendations:    1. Follow-up as needed  2. Message sent to Neurology for a more urgent appointment    Please do not hesitate to contact me if you have any questions.      West Clifton MD - Staff Surgeon  Department of Colon & Rectal Surgery  [facility]    "

## 2022-02-04 ENCOUNTER — OFFICE VISIT (OUTPATIENT)
Dept: SURGERY | Facility: CLINIC | Age: 77
End: 2022-02-04
Payer: MEDICARE

## 2022-02-04 VITALS
WEIGHT: 194.38 LBS | DIASTOLIC BLOOD PRESSURE: 88 MMHG | BODY MASS INDEX: 27.83 KG/M2 | HEART RATE: 74 BPM | SYSTOLIC BLOOD PRESSURE: 164 MMHG | HEIGHT: 70 IN

## 2022-02-04 DIAGNOSIS — K60.3 PERIANAL FISTULA: Primary | ICD-10-CM

## 2022-02-04 PROCEDURE — 1159F PR MEDICATION LIST DOCUMENTED IN MEDICAL RECORD: ICD-10-PCS | Mod: HCNC,CPTII,S$GLB, | Performed by: STUDENT IN AN ORGANIZED HEALTH CARE EDUCATION/TRAINING PROGRAM

## 2022-02-04 PROCEDURE — 3077F SYST BP >= 140 MM HG: CPT | Mod: HCNC,CPTII,S$GLB, | Performed by: STUDENT IN AN ORGANIZED HEALTH CARE EDUCATION/TRAINING PROGRAM

## 2022-02-04 PROCEDURE — 3288F PR FALLS RISK ASSESSMENT DOCUMENTED: ICD-10-PCS | Mod: HCNC,CPTII,S$GLB, | Performed by: STUDENT IN AN ORGANIZED HEALTH CARE EDUCATION/TRAINING PROGRAM

## 2022-02-04 PROCEDURE — 1126F AMNT PAIN NOTED NONE PRSNT: CPT | Mod: HCNC,CPTII,S$GLB, | Performed by: STUDENT IN AN ORGANIZED HEALTH CARE EDUCATION/TRAINING PROGRAM

## 2022-02-04 PROCEDURE — 3077F PR MOST RECENT SYSTOLIC BLOOD PRESSURE >= 140 MM HG: ICD-10-PCS | Mod: HCNC,CPTII,S$GLB, | Performed by: STUDENT IN AN ORGANIZED HEALTH CARE EDUCATION/TRAINING PROGRAM

## 2022-02-04 PROCEDURE — 3288F FALL RISK ASSESSMENT DOCD: CPT | Mod: HCNC,CPTII,S$GLB, | Performed by: STUDENT IN AN ORGANIZED HEALTH CARE EDUCATION/TRAINING PROGRAM

## 2022-02-04 PROCEDURE — 1126F PR PAIN SEVERITY QUANTIFIED, NO PAIN PRESENT: ICD-10-PCS | Mod: HCNC,CPTII,S$GLB, | Performed by: STUDENT IN AN ORGANIZED HEALTH CARE EDUCATION/TRAINING PROGRAM

## 2022-02-04 PROCEDURE — 3079F DIAST BP 80-89 MM HG: CPT | Mod: HCNC,CPTII,S$GLB, | Performed by: STUDENT IN AN ORGANIZED HEALTH CARE EDUCATION/TRAINING PROGRAM

## 2022-02-04 PROCEDURE — 1159F MED LIST DOCD IN RCRD: CPT | Mod: HCNC,CPTII,S$GLB, | Performed by: STUDENT IN AN ORGANIZED HEALTH CARE EDUCATION/TRAINING PROGRAM

## 2022-02-04 PROCEDURE — 99999 PR PBB SHADOW E&M-EST. PATIENT-LVL III: CPT | Mod: PBBFAC,HCNC,, | Performed by: STUDENT IN AN ORGANIZED HEALTH CARE EDUCATION/TRAINING PROGRAM

## 2022-02-04 PROCEDURE — 3079F PR MOST RECENT DIASTOLIC BLOOD PRESSURE 80-89 MM HG: ICD-10-PCS | Mod: HCNC,CPTII,S$GLB, | Performed by: STUDENT IN AN ORGANIZED HEALTH CARE EDUCATION/TRAINING PROGRAM

## 2022-02-04 PROCEDURE — 1101F PT FALLS ASSESS-DOCD LE1/YR: CPT | Mod: HCNC,CPTII,S$GLB, | Performed by: STUDENT IN AN ORGANIZED HEALTH CARE EDUCATION/TRAINING PROGRAM

## 2022-02-04 PROCEDURE — 99024 POSTOP FOLLOW-UP VISIT: CPT | Mod: HCNC,S$GLB,, | Performed by: STUDENT IN AN ORGANIZED HEALTH CARE EDUCATION/TRAINING PROGRAM

## 2022-02-04 PROCEDURE — 99999 PR PBB SHADOW E&M-EST. PATIENT-LVL III: ICD-10-PCS | Mod: PBBFAC,HCNC,, | Performed by: STUDENT IN AN ORGANIZED HEALTH CARE EDUCATION/TRAINING PROGRAM

## 2022-02-04 PROCEDURE — 99024 PR POST-OP FOLLOW-UP VISIT: ICD-10-PCS | Mod: HCNC,S$GLB,, | Performed by: STUDENT IN AN ORGANIZED HEALTH CARE EDUCATION/TRAINING PROGRAM

## 2022-02-04 PROCEDURE — 1101F PR PT FALLS ASSESS DOC 0-1 FALLS W/OUT INJ PAST YR: ICD-10-PCS | Mod: HCNC,CPTII,S$GLB, | Performed by: STUDENT IN AN ORGANIZED HEALTH CARE EDUCATION/TRAINING PROGRAM

## 2022-02-14 ENCOUNTER — PATIENT MESSAGE (OUTPATIENT)
Dept: RESEARCH | Facility: HOSPITAL | Age: 77
End: 2022-02-14
Payer: MEDICARE

## 2022-02-20 RX ORDER — MELOXICAM 7.5 MG/1
TABLET ORAL
Qty: 90 TABLET | Refills: 0 | Status: SHIPPED | OUTPATIENT
Start: 2022-02-20 | End: 2022-05-09

## 2022-02-20 RX ORDER — FOLIC ACID 1 MG/1
1 TABLET ORAL DAILY
Qty: 90 TABLET | Refills: 0 | Status: SHIPPED | OUTPATIENT
Start: 2022-02-20 | End: 2022-05-16

## 2022-03-02 ENCOUNTER — PATIENT OUTREACH (OUTPATIENT)
Dept: ADMINISTRATIVE | Facility: OTHER | Age: 77
End: 2022-03-02
Payer: MEDICARE

## 2022-03-03 ENCOUNTER — OFFICE VISIT (OUTPATIENT)
Dept: PSYCHIATRY | Facility: CLINIC | Age: 77
End: 2022-03-03
Payer: MEDICARE

## 2022-03-03 DIAGNOSIS — F41.1 GAD (GENERALIZED ANXIETY DISORDER): Primary | ICD-10-CM

## 2022-03-03 DIAGNOSIS — G20.A1 PARKINSON'S DISEASE: ICD-10-CM

## 2022-03-03 PROCEDURE — 90834 PSYTX W PT 45 MINUTES: CPT | Mod: 95,,, | Performed by: SOCIAL WORKER

## 2022-03-03 PROCEDURE — 90834 PR PSYCHOTHERAPY W/PATIENT, 45 MIN: ICD-10-PCS | Mod: 95,,, | Performed by: SOCIAL WORKER

## 2022-03-03 NOTE — PROGRESS NOTES
Health Maintenance Due   Topic Date Due    Shingles Vaccine (3 of 3) 09/02/2019    Influenza Vaccine (1) 09/01/2021     Updates were requested from care everywhere.  Chart was reviewed for overdue Proactive [facility] (IVONNE) topics (CRS, Breast Cancer Screening, Eye exam)  Health Maintenance has been updated.  LINKS immunization registry triggered.  Immunizations were reconciled.

## 2022-03-03 NOTE — PROGRESS NOTES
Individual Psychotherapy (PhD/LCSW)    3/3/2022    Site:  Telemed         Therapeutic Intervention: Met with patient and spouse.  Outpatient - Insight oriented psychotherapy 60 min - CPT code 49420    Chief complaint/reason for encounter: depression and anxiety     Interval history and content of current session:      Site: [facility]             Length of service: 60         Therapeutic intervention:     Persons present: spouse and pt  [place] (needed to help with interpretation of intervention due to pt cognitive decline due to Parkinsons).    Interval history:      The patient location is: home [place].   The chief complaint leading to consultation is: anxiety/depression  Visit type: audiovisual  Total time spent with patient: 60 min  Each patient to whom he or she provides medical services by telemedicine is:  (1) informed of the relationship between the physician and patient and the respective role of any other health care provider with respect to management of the patient; and (2) notified that he or she may decline to receive medical services by telemedicine and may withdraw from such care at any time.       Notes:     They went with family to [place].   Wife feels they had a good time.  Pt says he hopes they did.    He is trying to sit to use toilet.  No more incontinence and anus wound is heal.    They lost mother's cousin this morning.     Relaxation utilized.               Target symptoms: depression, anxiety        Progress toward goals: progressing well    Diagnosis: generalized anxiety disorder       Treatment plan:  · Target symptoms: anxiety   · Why chosen therapy is appropriate versus another modality: relevant to diagnosis, evidence based practice  · Outcome monitoring methods: self-report, observation, feedback from family  · Therapeutic intervention type: behavior modifying psychotherapy    Risk parameters:  Patient reports no suicidal ideation  Patient reports no homicidal ideation  Patient  reports no self-injurious behavior  Patient reports no violent behavior        Verbal deficits: None           Patient's response to intervention:   The patient's response to intervention is accepting, motivated.              Progress toward goals and other mental status changes:  The patient's progress toward goals is fair .    Diagnosis:     ICD-10-CM ICD-9-CM   1. Generalized anxiety disorder F41.1 300.02       Plan:  individual psychotherapy and family psychotherapy    Return to clinic: as scheduled    Length of Service (minutes): 60

## 2022-03-07 ENCOUNTER — OFFICE VISIT (OUTPATIENT)
Dept: NEUROLOGY | Facility: CLINIC | Age: 77
End: 2022-03-07
Payer: MEDICARE

## 2022-03-07 VITALS
WEIGHT: 197.31 LBS | HEART RATE: 80 BPM | BODY MASS INDEX: 28.25 KG/M2 | DIASTOLIC BLOOD PRESSURE: 82 MMHG | RESPIRATION RATE: 16 BRPM | SYSTOLIC BLOOD PRESSURE: 118 MMHG | HEIGHT: 70 IN

## 2022-03-07 DIAGNOSIS — M48.061 SPINAL STENOSIS OF LUMBAR REGION, UNSPECIFIED WHETHER NEUROGENIC CLAUDICATION PRESENT: ICD-10-CM

## 2022-03-07 DIAGNOSIS — R42 ORTHOSTATIC DIZZINESS: ICD-10-CM

## 2022-03-07 DIAGNOSIS — R41.89 COGNITIVE CHANGE: ICD-10-CM

## 2022-03-07 DIAGNOSIS — R29.6 FREQUENT FALLS: ICD-10-CM

## 2022-03-07 DIAGNOSIS — G20.A1 PARKINSON DISEASE: Primary | ICD-10-CM

## 2022-03-07 PROCEDURE — 1159F MED LIST DOCD IN RCRD: CPT | Mod: CPTII,S$GLB,, | Performed by: PSYCHIATRY & NEUROLOGY

## 2022-03-07 PROCEDURE — 99999 PR PBB SHADOW E&M-EST. PATIENT-LVL V: CPT | Mod: PBBFAC,,, | Performed by: PSYCHIATRY & NEUROLOGY

## 2022-03-07 PROCEDURE — 1160F RVW MEDS BY RX/DR IN RCRD: CPT | Mod: CPTII,S$GLB,, | Performed by: PSYCHIATRY & NEUROLOGY

## 2022-03-07 PROCEDURE — 99215 OFFICE O/P EST HI 40 MIN: CPT | Mod: S$GLB,,, | Performed by: PSYCHIATRY & NEUROLOGY

## 2022-03-07 PROCEDURE — 3074F SYST BP LT 130 MM HG: CPT | Mod: CPTII,S$GLB,, | Performed by: PSYCHIATRY & NEUROLOGY

## 2022-03-07 PROCEDURE — 3079F DIAST BP 80-89 MM HG: CPT | Mod: CPTII,S$GLB,, | Performed by: PSYCHIATRY & NEUROLOGY

## 2022-03-07 PROCEDURE — 3288F PR FALLS RISK ASSESSMENT DOCUMENTED: ICD-10-PCS | Mod: CPTII,S$GLB,, | Performed by: PSYCHIATRY & NEUROLOGY

## 2022-03-07 PROCEDURE — 99999 PR PBB SHADOW E&M-EST. PATIENT-LVL V: ICD-10-PCS | Mod: PBBFAC,,, | Performed by: PSYCHIATRY & NEUROLOGY

## 2022-03-07 PROCEDURE — 3079F PR MOST RECENT DIASTOLIC BLOOD PRESSURE 80-89 MM HG: ICD-10-PCS | Mod: CPTII,S$GLB,, | Performed by: PSYCHIATRY & NEUROLOGY

## 2022-03-07 PROCEDURE — 1126F AMNT PAIN NOTED NONE PRSNT: CPT | Mod: CPTII,S$GLB,, | Performed by: PSYCHIATRY & NEUROLOGY

## 2022-03-07 PROCEDURE — 1100F PTFALLS ASSESS-DOCD GE2>/YR: CPT | Mod: CPTII,S$GLB,, | Performed by: PSYCHIATRY & NEUROLOGY

## 2022-03-07 PROCEDURE — 1160F PR REVIEW ALL MEDS BY PRESCRIBER/CLIN PHARMACIST DOCUMENTED: ICD-10-PCS | Mod: CPTII,S$GLB,, | Performed by: PSYCHIATRY & NEUROLOGY

## 2022-03-07 PROCEDURE — 3288F FALL RISK ASSESSMENT DOCD: CPT | Mod: CPTII,S$GLB,, | Performed by: PSYCHIATRY & NEUROLOGY

## 2022-03-07 PROCEDURE — 1126F PR PAIN SEVERITY QUANTIFIED, NO PAIN PRESENT: ICD-10-PCS | Mod: CPTII,S$GLB,, | Performed by: PSYCHIATRY & NEUROLOGY

## 2022-03-07 PROCEDURE — 1100F PR PT FALLS ASSESS DOC 2+ FALLS/FALL W/INJURY/YR: ICD-10-PCS | Mod: CPTII,S$GLB,, | Performed by: PSYCHIATRY & NEUROLOGY

## 2022-03-07 PROCEDURE — 99215 PR OFFICE/OUTPT VISIT, EST, LEVL V, 40-54 MIN: ICD-10-PCS | Mod: S$GLB,,, | Performed by: PSYCHIATRY & NEUROLOGY

## 2022-03-07 PROCEDURE — 3074F PR MOST RECENT SYSTOLIC BLOOD PRESSURE < 130 MM HG: ICD-10-PCS | Mod: CPTII,S$GLB,, | Performed by: PSYCHIATRY & NEUROLOGY

## 2022-03-07 PROCEDURE — 1159F PR MEDICATION LIST DOCUMENTED IN MEDICAL RECORD: ICD-10-PCS | Mod: CPTII,S$GLB,, | Performed by: PSYCHIATRY & NEUROLOGY

## 2022-03-07 NOTE — PROGRESS NOTES
"  HPI: Haja Marti is a 76 y.o. male known to [facility] for parkinsonism.    He most recently saw Dr Alejandra in 7/2021. That last note is reviewed    No med changes were made at that visit    Patient has had symptoms for 7 years.    Symptoms started with slowness of movement first noted by the patient's son who is an MD (reduced arm swing).    He did not have any tremor. He still does not    He is slow with his walking. He used to be rather active with exercise and can't much of this over the past few months.     His voice has been hypophonic. Has had LSVT Loud therapy prior.  Swallowing is normal    He was more confused and slower with his PD movements in November. This was associated with a great deal of grief from loss of close acquaintances. Treating anxiety with psychiatry helped  With confusion and somewhat the parkinsons.   Wife had noted at this time, he was not consistent with his meds and was forgetting some. He is getting meds consistently now and wife monitors this with an alarm at this time.    He is on L-dopa. He is not sure if he has off periods currently. No clear on periods  He takes 2 sinemet in the am if he is up prior to 6am and lately this is not often. He takes 1.5 instead at 3 more times in the day (takes at wake time, 10am, 2pm, and 6pm)    His response to L-dopa has been clear. He does have better movements on this med per his wife. Now this is not very obvious.   Falls have increased this past year. No recent injury. Last fall was a few days. His falls are more "tripping" and not necessarily retropulsion    Having  hallucinations wife had thought these has stopped but patient was likely ignoring this. Patient states I just "tell them hi and they go away." He understands these are not real at times but not always. He is not very distressed by the hallucinations.     Wife states he still drives but she has concerns. NO accidents or incidents    Memory " is challenged. He will have some short term memory loss but not severe. Patient has long periods (a few days) of better cognition mixed with inattentiveness  Sees psychology/psychiatry for anxiety    Some light headedness at times but not fainting. Has been lowering anti-HTN over times    Back pain is improved since prior surgery     He does dream and speak in his sleep a great deal.    He does to boxing bag exercises and situps/other exercise 3 times per week    Does not drink alcohol    Review of Systems   Constitutional: Negative for fever.   HENT: Negative for nosebleeds.    Eyes: Negative for double vision.   Respiratory: Negative for hemoptysis.    Cardiovascular: Negative for leg swelling.   Genitourinary: Negative for hematuria.   Musculoskeletal: Positive for falls.   Skin: Negative for rash.   Neurological: Negative for loss of consciousness.   Psychiatric/Behavioral: Positive for memory loss.         I have reviewed all of this patient's past medical and surgical histories as well as family and social histories and active allergies and medications as documented in the electronic medical record.        Exam:  Gen Appearance, well developed/nourished in no apparent distress  CV: 2+ distal pulses with no edema or swelling  Neuro:  MS: Awake, alert, oriented to place, person, time, situation. Sustains attention. Recent recall 1/3 at 5 minutes recall is 2/3 at 15 minutes, improved to 3/3 with hints/remote memory intact, Language is full to spontaneous speech/repetition/naming/comprehension. Fund of Knowledge is full  Speech is hypophonic  CN: Optic discs are flat with normal vasculature, PERRL, Extraoccular movements and visual fields are full. Normal facial sensation and strength, Hearing symmetric, Tongue and Palate are midline and strong. Shoulder Shrug symmetric and strong.  Motor: Normal bulk, tone increased in the right more than left side, mild resting tremor in the hands, fleeting. 5/5 strength  bilateral upper/lower extremities with 2+ reflexes and no clonus  Prominent bradykinesia, more obvious on the right  Sensory: symmetric to emp, and vibration,  Romberg negative  Cerebellar: Finger-nose,Heal-shin, Rapid alternating movements intact  Gait: Normal stance, no ataxia but shuffling gain, stooped posture, and decreased arm swing on the right noted  Mild impairment of postural noted    Imagin MRI L spine: Multilevel degenerative change of the lumbar spine, most significant at L3-L4 which demonstrates moderate spinal canal stenosis and moderate bilateral neural foraminal narrowing.     MRI brain: Normal noncontrast MRI of the brain   Labs:  CMP,CBC, TSH, B12, CK unremarkable  Mild reduction in folate noted (given supplement per PCP)    Assessment/Plan: Haja Marti is a 76 y.o. 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 Over time, he has had persistent hallucinations and some cognitive decline    I recommend:     1. Sinemet 25/100m TABLETS IN THE MORNING (usually he skips this dose)) AND TAKES 1 AND 1/2 TABLETS THREE TIMES DAILY THEREAFTER  -Will avoid raising dose currently given his ongoing halluciations  -Has had LSVT Loud therapy prior which helps and he can use PRN  -Orthostatic symptoms noted. Anti-HTN med doses have been lowered appropriately as needed per PCP. Advised slow position changes.  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 Reviewed bed safety for patient. His wife no longer sleeps with him for her safety.     2. Previously, DLB was considered given some early neuropsychiatric features and those symptoms have persisted.   - I assume this is why Reyez have been avoided and will be avoided going forward  -He does see psychology/psychiatry  for mood  -Cognition seems to be worsening and he has some large fluctuations in attentiveness. Will check  Neuropsychological testing per orders to help confirm diagnosis and quantify symptoms  - Avoid Driving at this time given his hallucinations  -Some meds that could contribute further: Proscar and Atarax could contribute. Consider time off of each to look for improvement which if noted would require further review with prescribing providers.     3. Lumbar MRI 2019 showed moderate spinal stenosis  -he noted more back pain during off time. Follows with JUAQUIN (had surgery since)    RTC 4 months  Will have movement disorders neurology available here later this year as well    Total time spent on DOS including chart review: 60 minutes

## 2022-03-28 ENCOUNTER — TELEPHONE (OUTPATIENT)
Dept: INTERNAL MEDICINE | Facility: CLINIC | Age: 77
End: 2022-03-28
Payer: MEDICARE

## 2022-03-28 DIAGNOSIS — G20.C PARKINSONISM, UNSPECIFIED PARKINSONISM TYPE: ICD-10-CM

## 2022-03-28 DIAGNOSIS — I10 ESSENTIAL HYPERTENSION: Primary | ICD-10-CM

## 2022-03-28 DIAGNOSIS — E78.5 HYPERLIPIDEMIA, UNSPECIFIED HYPERLIPIDEMIA TYPE: ICD-10-CM

## 2022-03-28 RX ORDER — MELOXICAM 7.5 MG/1
TABLET ORAL
Qty: 90 TABLET | Refills: 0 | OUTPATIENT
Start: 2022-03-28

## 2022-03-28 RX ORDER — INFLUENZA A VIRUS A/VICTORIA/2570/2019 IVR-215 (H1N1) ANTIGEN (FORMALDEHYDE INACTIVATED), INFLUENZA A VIRUS A/TASMANIA/503/2020 IVR-221 (H3N2) ANTIGEN (FORMALDEHYDE INACTIVATED), INFLUENZA B VIRUS B/PHUKET/3073/2013 ANTIGEN (FORMALDEHYDE INACTIVATED), AND INFLUENZA B VIRUS B/WASHINGTON/02/2019 ANTIGEN (FORMALDEHYDE INACTIVATED) 60; 60; 60; 60 UG/.7ML; UG/.7ML; UG/.7ML; UG/.7ML
INJECTION, SUSPENSION INTRAMUSCULAR
COMMUNITY
Start: 2021-10-09 | End: 2022-08-04

## 2022-03-28 NOTE — TELEPHONE ENCOUNTER
----- Message from Gabby Lima sent at 3/28/2022  2:28 PM CDT -----  Contact: Pt mobile  Patient would like a call back in regards to him wanting to know if he need to have lab work done for his five month follow up that is schedule on 04/04/2022 please?

## 2022-03-31 ENCOUNTER — LAB VISIT (OUTPATIENT)
Dept: LAB | Facility: HOSPITAL | Age: 77
End: 2022-03-31
Attending: INTERNAL MEDICINE
Payer: MEDICARE

## 2022-03-31 DIAGNOSIS — I10 ESSENTIAL HYPERTENSION: ICD-10-CM

## 2022-03-31 DIAGNOSIS — G20.C PARKINSONISM, UNSPECIFIED PARKINSONISM TYPE: ICD-10-CM

## 2022-03-31 DIAGNOSIS — E78.5 HYPERLIPIDEMIA, UNSPECIFIED HYPERLIPIDEMIA TYPE: ICD-10-CM

## 2022-03-31 LAB
ALBUMIN SERPL BCP-MCNC: 4.1 G/DL (ref 3.5–5.2)
ALP SERPL-CCNC: 73 U/L (ref 55–135)
ALT SERPL W/O P-5'-P-CCNC: 34 U/L (ref 10–44)
ANION GAP SERPL CALC-SCNC: 7 MMOL/L (ref 8–16)
AST SERPL-CCNC: 28 U/L (ref 10–40)
BASOPHILS # BLD AUTO: 0.02 K/UL (ref 0–0.2)
BASOPHILS NFR BLD: 0.3 % (ref 0–1.9)
BILIRUB SERPL-MCNC: 0.9 MG/DL (ref 0.1–1)
BUN SERPL-MCNC: 24 MG/DL (ref 8–23)
CALCIUM SERPL-MCNC: 9.7 MG/DL (ref 8.7–10.5)
CHLORIDE SERPL-SCNC: 102 MMOL/L (ref 95–110)
CHOLEST SERPL-MCNC: 137 MG/DL (ref 120–199)
CHOLEST/HDLC SERPL: 3.2 {RATIO} (ref 2–5)
CO2 SERPL-SCNC: 29 MMOL/L (ref 23–29)
CREAT SERPL-MCNC: 1 MG/DL (ref 0.5–1.4)
DIFFERENTIAL METHOD: ABNORMAL
EOSINOPHIL # BLD AUTO: 0.1 K/UL (ref 0–0.5)
EOSINOPHIL NFR BLD: 1.8 % (ref 0–8)
ERYTHROCYTE [DISTWIDTH] IN BLOOD BY AUTOMATED COUNT: 12.8 % (ref 11.5–14.5)
EST. GFR  (AFRICAN AMERICAN): >60 ML/MIN/1.73 M^2
EST. GFR  (NON AFRICAN AMERICAN): >60 ML/MIN/1.73 M^2
GLUCOSE SERPL-MCNC: 91 MG/DL (ref 70–110)
HCT VFR BLD AUTO: 47.7 % (ref 40–54)
HDLC SERPL-MCNC: 43 MG/DL (ref 40–75)
HDLC SERPL: 31.4 % (ref 20–50)
HGB BLD-MCNC: 16.3 G/DL (ref 14–18)
IMM GRANULOCYTES # BLD AUTO: 0.04 K/UL (ref 0–0.04)
IMM GRANULOCYTES NFR BLD AUTO: 0.6 % (ref 0–0.5)
LDLC SERPL CALC-MCNC: 69 MG/DL (ref 63–159)
LYMPHOCYTES # BLD AUTO: 1 K/UL (ref 1–4.8)
LYMPHOCYTES NFR BLD: 15.5 % (ref 18–48)
MCH RBC QN AUTO: 32.1 PG (ref 27–31)
MCHC RBC AUTO-ENTMCNC: 34.2 G/DL (ref 32–36)
MCV RBC AUTO: 94 FL (ref 82–98)
MONOCYTES # BLD AUTO: 0.5 K/UL (ref 0.3–1)
MONOCYTES NFR BLD: 8.5 % (ref 4–15)
NEUTROPHILS # BLD AUTO: 4.6 K/UL (ref 1.8–7.7)
NEUTROPHILS NFR BLD: 73.3 % (ref 38–73)
NONHDLC SERPL-MCNC: 94 MG/DL
NRBC BLD-RTO: 0 /100 WBC
PLATELET # BLD AUTO: 209 K/UL (ref 150–450)
PMV BLD AUTO: 10.3 FL (ref 9.2–12.9)
POTASSIUM SERPL-SCNC: 4.4 MMOL/L (ref 3.5–5.1)
PROT SERPL-MCNC: 7.3 G/DL (ref 6–8.4)
RBC # BLD AUTO: 5.07 M/UL (ref 4.6–6.2)
SODIUM SERPL-SCNC: 138 MMOL/L (ref 136–145)
TRIGL SERPL-MCNC: 125 MG/DL (ref 30–150)
WBC # BLD AUTO: 6.21 K/UL (ref 3.9–12.7)

## 2022-03-31 PROCEDURE — 36415 COLL VENOUS BLD VENIPUNCTURE: CPT | Performed by: INTERNAL MEDICINE

## 2022-03-31 PROCEDURE — 80061 LIPID PANEL: CPT | Performed by: INTERNAL MEDICINE

## 2022-03-31 PROCEDURE — 85025 COMPLETE CBC W/AUTO DIFF WBC: CPT | Performed by: INTERNAL MEDICINE

## 2022-03-31 PROCEDURE — 80053 COMPREHEN METABOLIC PANEL: CPT | Performed by: INTERNAL MEDICINE

## 2022-03-31 NOTE — PROGRESS NOTES
NEUROPSYCHOLOGY CONSULT (TELEHEALTH)    Referral Information  Name: Haja Marti  MRN: [id number]  : 1945   Age: 76 y.o.  Gender: male  Referring Provider: Reji Hidalgo MD   Billing: See below for details as coding/billing has changed   Telemedicine:   Established Patient - Audio Only Telehealth Visit  The patient location is: [place]   [place] [facility] [place]  The chief complaint leading to consultation/medical necessity is: Cognitive concerns  Visit type:  Virtual visit with audio only (telephone)     Total time spent with patient: 65 minutes  The reason for the audio only service rather than synchronous audio and video virtual visit was related to technical difficulties or patient preference/necessity.  Each patient to whom I provide medical services by telemedicine is:  (1) informed of the relationship between the physician and patient and the respective role of any other health care provider with respect to management of the patient; and (2) notified that they may decline to receive medical services by telemedicine and may withdraw from such care at any time. Patient verbally consented to receive this service via voice-only telephone call.   This service was not originating from a related E/M service provided within the previous 7 days nor will  to an E/M service or procedure within the next 24 hours or my soonest available appointment.  Prevailing standard of care was able to be met in this audio-only visit.    Consent/Emergency Plan: The patient expressed an understanding of the purpose of the evaluation and consented to all procedures. I informed the patient of limits to confidentiality and discussed an emergency plan.    SUMMARY/TREATMENT PLAN   Results from the interview indicate the following likely diagnoses, which will be updated, along with treatment plan recommendations, following completion of testing. Results and recommendations will be discussed with the patient at  a separately scheduled feedback session. The patient will likely be able follow a treatment plan with help from family.    Diagnoses/Plan:  Problem List Items Addressed This Visit        Neuro    Major neurocognitive disorder    Current Assessment & Plan     Assessment:  -Onset of PD motor sxs 7 years ago; minimal benefit from Sinemet, reportedly  -Cognitive sxs within last 2 years, worsened over last year with some fluctuations   -Visual hallucinations for 5 years, worsening   -Longstanding hx of RBD sxs    -Dependent for most IADLs  -Hx of anxiety, worse (+depression) over last year in context of life stressors     Many aspects of clinical history support Lewy Body Dementia. Most notable exception is discrepant proximity of motor vs cognitive sx emergence (about 5 years apart, motor first). Additionally, visuospatial dysfunction, while present, is not as prominent as other cog sxs, and extent of fluctuations in attention/alertness is unclear. Primary dx differential is likely PDD vs. LBD.     Plan:  -Cognitive testing will likely improve diagnostic clarity, establish a baseline level of functioning, and help inform treatment planning. Full report with recommendations to follow completion of testing (5/2/22).               Psychiatric    Generalized anxiety disorder    Current Assessment & Plan     Assessment:  -Longstanding anxiety has worsened in the last year in the context of life stressors + deteriorating cognitive/physical functioning.   -Some hx of situational depression, more noticeable in last year  -Followed by Psychiatry and sees  monthly     Plan:  -Psychological functioning will be further assessed at testing appointment. Full report with recommendations to follow completion of testing (5/2/22).                Thank you for allowing us to participate in Mr. Marti's care. If you have any questions, please contact us.      Jc Dillard, Ph.D.  Clinical Neuropsychology  "Fellow  [facility] - Department of Neurology and Neurosciences    TIM Jenkins II, Ph.D., ABPP-CN  Board Certified Clinical Neuropsychologist  Co-Director, Cognitive Disorders and Brain Health Program  Department of Neurology and Neurosciences  [facility]      HPI  Per Neurology referral note (3/7/22)  "HPI: Haja Marti is a 76 y.o. male known to [facility] for parkinsonism.  ...has had symptoms for 7 years.  ...started with slowness of movement...  ...did not have any tremor. He still does not  ...slow with his walking...   ...voice has been hypophonic...     He was more confused and slower with his PD movements in November...associated with a great deal of grief from loss of close acquaintances. Treating anxiety with psychiatry helped  With confusion and somewhat the parkinsons.   Wife had noted at this time, he was not consistent with his meds and was forgetting some. He is getting meds consistently now and wife monitors this with an alarm at this time.     He is on L-dopa. He is not sure if he has off periods currently. No clear on periods...     His response to L-dopa has been clear. He does have better movements on this med per his wife. Now this is not very obvious.   Falls have increased this past year. No recent injury. Last fall was a few days. His falls are more "tripping" and not necessarily retropulsion     Having  hallucinations wife had thought these has stopped but patient was likely ignoring this. Patient states I just "tell them hi and they go away." He understands these are not real at times but not always. He is not very distressed by the hallucinations.      Wife states he still drives but she has concerns. NO accidents or incidents     Memory is challenged. He will have some short term memory loss but not severe. Patient has long periods (a few days) of better cognition mixed with inattentiveness  Sees psychology/psychiatry for " "anxiety...      He does dream and speak in his sleep a great deal."       CURRENT SYMPTOMS   Mr. Marti and his wife were interviewed together.     Active problems noted below.    Cognitive Symptoms:   Sxs: Confusion (e.g., how to operate iphone, TV), poor short-term memory, temporal disorientation, word-finding problems, losing train of thought, parking car crookedly     Onset: Approx. 1-2 years ago (noticeably worse over last year)   Course:   o Worsening overall, but periods of "days or weeks" struggling "then all the sudden he seems clearer"   o Notable increase in life stressors in last year (7 friends/family members  within 2 months at end 2021) -- anxiety/depression worsened in last year   o Denied any changes or on/off periods associated with Sinemet   o Reportedly discontinued hydroxyzine 2 weeks ago --> improved cognitive clarity somewhat     Sensory  · Trouble with vision: no major problems  · Trouble with hearing: no major problems  · Not colorblind    Current Functional Status/Needs:  ADLs  Self-Care Eating Safety   Bathing: Independent  Bathroom: Independent  Other: Some trouble putting on socks and pants due to physical limitations  Independent 6-7 falls in last year        Instrumental IADLs:   Driving Medications/Health Household Finances   Drives "a little bit" (not on highway)     -Cannot park straight anymore (used to be able to park in very tight spaces)  Wife manages for last year Puts out garbage, but wife does other chores   Wife manages for last year      Psychiatric/Behavioral Symptoms:  Mood:  Depression/Dysphoria Anxiety/Fearfulness Irritability   Lower mood over last year in context of increased life stressors    No major mood fluctuations  Longstanding "worrier"    -Worse in last year  Decreased frustration tolerance most noticeable when grandchildren are around     Behavior:  Agitation/Resistance Delusions/Paranoia Hallucinations   None None Visual hallucinations: " "    -well-formed; usually hypnopompic; not distressing     -Onset about 5 yrs ago ("fish in the toilet")    -Wife: "he believed they were there"    -Gradually worsening ("now sees groups of people in 20th century costumes")     Apathy/Motivation Repetitive/Restlessness Other   None None None     Neurovegetative:  Sleep/Nighttime  [employer]   RBD sxs:     -Beginning 40-50 years ago: sleep-talking and acting out dreams (e.g., motioning drawing out plays on chalkboard)      -For last 10 years (approx.) moving around more, punching, very active (sleeps in separate bed now)     -Frequent daytime napping  Decreased  Low    -Exercise: boxing 3x/week      Suicidal/Homicidal Ideation: None reported    PD Review of Symptoms:  Tremor: none reported  Gait change: slow  Rigidity: yes  Bradykinesia: yes  Masked facies: not reported  Anosmia: no taste or smell (for decades)   Dysarthria/Hypophonia: hypophonia   Dysphagia/Sialorrhea: no   Depression: yes  Cognitive slowing: yes  Fluctuating cognition: yes  Hallucinations: yes  Impulsivity: no  Obsessions/Compulsions: some obsessive worrying  Urinary changes: not reported  Constipation: not reported  Orthostasis: not reported  Falls: yes (6-7 in last year)  Freezing: not reported  Micrographia: not reported  Sleep issues:       -SHU: yes, per records      -RBD: yes  Vision: no problems reported    PERTINENT BACKGROUND INFORMATION     Family History   Problem Relation Age of Onset    Cataracts Father     Cancer Father         lung    Diabetes Father     Cancer Mother         lung    Anxiety disorder Son     Depression Son     Anxiety disorder Daughter      Family Neurologic History: Negative for heritable risk factors  Family Psychiatric History: Negative for heritable risk factors    Developmental/Academic Hx:  Developmental: No gestational or later developmental concerns.  Academic:  · Learning Difficulties: Suspected dyslexia, but never failed any classes or repeated " any grades   · Attention/Behavioral Difficulties: None reported   · Educational Attainment: MS in education     Social/Occupational Hx:  Social:  · Current Relationship/Family Status:  (52 years); 4 adult children; 12 grandchildren   · Primary Source of Support: Wife  · Socializing: Active with friends and family   · Current Hobbies: Boxing, spending time with family   · Stressors: Physical/cognitive sxs, loss of several friends and family    Occupational Hx:  · Occupational Status: Retired for 7 years  · 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       MEDICAL HISTORY     Patient Active Problem List   Diagnosis    Hypertension    Pure hypercholesterolemia    Obstructive sleep apnea    Depression    BPH (benign prostatic hypertrophy)    Retinal detachment    Vitreous hemorrhage    Retinoschisis    Epiretinal membrane    Posterior vitreous detachment    Pseudophakia of right eye    Age-related nuclear cataract of left eye    Constipation, chronic    Nephrolithiasis    Foraminal stenosis of lumbosacral region    Lumbar radiculopathy    Facet arthropathy, lumbar    Generalized anxiety disorder    Hypophonia    Dysarthria    Voice and resonance disorder    Parkinson's disease    Other chronic pain    Chronic pain    Colon polyps    Perirectal abscess    Fistula-in-ano    Anal fistula    Urgency incontinence    Major neurocognitive disorder       Past Medical History:   Diagnosis Date    Anxiety     Arthritis     BPH (benign prostatic hypertrophy)     Cataract     OS    Colon polyp 2002    Depression     Epiretinal membrane, both eyes     Hx of psychiatric care     Hyperlipidemia     Hypertension     Kidney stones     Kidney stones     Parkinsons     Posterior vitreous detachment of left eye     Psychiatric problem     Retinal detachment 2004    OD    Retinoschisis, left eye     Sleep apnea     Vitreous hemorrhage of left eye        Past Surgical  History:   Procedure Laterality Date    BIOPSY OF ADENOIDS      CATARACT EXTRACTION      OD    COLONOSCOPY N/A 12/3/2019    Procedure: COLONOSCOPY;  Surgeon: Ceferino Oliveira MD;  Location: [facility] ENDO (4TH FLR);  Service: Endoscopy;  Laterality: N/A;  5/2019 laminectomy with post op confusion tb    CYSTOSCOPY WITH URODYNAMIC TESTING N/A 1/31/2022    Procedure: CYSTOSCOPY, WITH URODYNAMIC TESTING FLOUROSCOPIC;  Surgeon: Devang Butler MD;  Location: [facility] OR 1ST FLR;  Service: Urology;  Laterality: N/A;  1hr    EPIDURAL STEROID INJECTION N/A 3/12/2019    Procedure: INJECTION, STEROID, EPIDURAL, L5-S1;  Surgeon: Laina Crockett MD;  Location: [facility] PAIN MGT;  Service: Pain Management;  Laterality: N/A;    EXAMINATION UNDER ANESTHESIA N/A 2/18/2021    Procedure: Exam under anesthesia;  Surgeon: West Clifton MD;  Location: [facility] OR 2ND FLR;  Service: Colon and Rectal;  Laterality: N/A;    EYE SURGERY      INCISION OF PERIRECTAL ABSCESS N/A 2/12/2021    Procedure: INCISION AND DRAINAGE  ABSCESS, PERIRECTAL;  Surgeon: West Clifton MD;  Location: [facility] OR 2ND FLR;  Service: Colon and Rectal;  Laterality: N/A;    INCISION OF PERIRECTAL ABSCESS Left 2/18/2021    Procedure: INCISION, ABSCESS, PERIRECTAL;  Surgeon: West Clifton MD;  Location: [facility] OR 2ND FLR;  Service: Colon and Rectal;  Laterality: Left;    INJECTION OF ANESTHETIC AGENT AROUND NERVE Bilateral 7/3/2018    Procedure: BLOCK, NERVE;  Surgeon: Laina Crockett MD;  Location: [facility] PAIN MGT;  Service: Pain Management;  Laterality: Bilateral;  Bilateral Lumbar L2,L3,L4,L5 MBB      NEEDS CONSENT    INSERTION OF SETON STITCH N/A 2/12/2021    Procedure: PLACEMENT, SETON STITCH;  Surgeon: West Clifton MD;  Location: [facility] OR 2ND FLR;  Service: Colon and Rectal;  Laterality: N/A;    INSERTION OF SETON STITCH Left 2/18/2021    Procedure: PLACEMENT, SETON;  Surgeon: West Clifton MD;  Location: [facility] OR 2ND FLR;  Service: Colon  and Rectal;  Laterality: Left;    INSERTION OF SETON STITCH N/A 9/24/2021    Procedure: PLACEMENT, SETON STITCH;  Surgeon: West Clifton MD;  Location: [facility] OR 2ND FLR;  Service: Colon and Rectal;  Laterality: N/A;  seton exchange    laser indirect  4/8/10    left eye    Laser Indirect Retinopexy  4/8/10    OS    PROSTATE SURGERY      RECTAL FISTULOTOMY N/A 11/12/2021    Procedure: FISTULOTOMY, RECTUM;  Surgeon: West Clifton MD;  Location: [facility] OR 2ND FLR;  Service: Colon and Rectal;  Laterality: N/A;    RETINAL DETACHMENT SURGERY  2004    OD    ROTATOR CUFF REPAIR  11/13/2013    TONSILLECTOMY      TRANSFORAMINAL EPIDURAL INJECTION OF STEROID Bilateral 2/19/2019    Procedure: INJECTION, STEROID, EPIDURAL, TRANSFORAMINAL APPROACH, L5;  Surgeon: Laina Crockett MD;  Location: [facility] PAIN MGT;  Service: Pain Management;  Laterality: Bilateral;       Pertinent Neurologic History  TBIs  None   Seizures  None   CVAs  None   Movement Dis.  Parkinson's    Other  None       Pertinent Labs Impacting Cognition  Lab Results   Component Value Date    PERBMKSX01 312 09/13/2021     No results found for: VITAMINB1  Lab Results   Component Value Date    RPR Non-reactive 07/13/2017     Lab Results   Component Value Date    FOLATE 34.9 (H) 04/04/2022     Lab Results   Component Value Date    TSH 1.947 04/04/2022     Lab Results   Component Value Date    CALCIUM 9.7 03/31/2022    PHOS 4.3 05/08/2004      No results found for: LABA1C, HGBA1C  Lab Results   Component Value Date    HIV1X2 Negative 02/09/2005     Lab Results   Component Value Date    CREATININE 1.0 03/31/2022    BUN 24 (H) 03/31/2022     03/31/2022    K 4.4 03/31/2022     03/31/2022    CO2 29 03/31/2022      Lab Results   Component Value Date    ALT 34 03/31/2022    AST 28 03/31/2022    ALKPHOS 73 03/31/2022    BILITOT 0.9 03/31/2022        Neurodiagnostics  Results for orders placed or performed during the hospital encounter of  07/15/17   MRI Brain Without Contrast    Narrative    Exam: MRI brain without contrast    History: Dizziness, extrapyramidal movement disorder    Findings: MRI is performed with routine sequences.  No mass, hemorrhage, infarction, subdural collection, hydrocephalus or other acute finding is seen on this cranial MR examination.  There is volume loss consistent with the patient's age.  No diffusion abnormality is seen.    Impression    Normal noncontrast MRI of the brain       Electronically signed by: FLORI NEWMAN MD  Date:     07/15/17  Time:    15:51    Results for orders placed or performed in visit on 06/20/11   CT Guidance Radiation Therapy Field    Narrative    DATE OF EXAM: Jun 20 2011      CT    0053  -  THORAX WITHOUT CONTRAST:     1321 HOURS  [id number]     CLINICAL HISTORY:   793.1    ABNORMAL FINDINGS LUNG FIELD     PROCEDURE COMMENT:        ICD 9 CODE(S):   ()     CPT 4 CODE(S)/MODIFIER(S):   ()     RESULTS: COMPARISON IS MADE TO CT THORAX DATED JUNE 21, 2010.      5 MM AXIAL IMAGES FROM THE LUNG APICES THROUGH THE COSTOPHRENIC ANGLES   WERE OBTAINED WITHOUT THE USE OF IV CONTRAST.       THE STRUCTURES AT THE BASE OF THE NECK DEMONSTRATE NO SIGNIFICANT   ABNORMALITY.       THERE IS A THREE BRANCH AORTIC ARCH AND THE AORTA MAINTAINS NORMAL   CALIBER, CONTOUR, AND COURSE WITH MILD ATHEROSCLEROTIC CALCIFICATION THAT   EXTENDS INTO THE REGION OF THE CORONARY ARTERIES MOST PROMINENTLY THE   LEFT ANTERIOR DESCENDING ARTERY.  THE HEART DOES NOT APPEAR TO BE   ENLARGED AND THERE IS NO SIGNIFICANT PERICARDIAL FLUID COLLECTION.    EXAMINATION OF THE MEDIASTINUM, HILUM, AND AXILLA DEMONSTRATE NUMEROUS   CALCIFIED BULKY LYMPH NODES IN THE MEDIASTINUM CONSISTENT WITH PRIOR   GRANULOMATOUS DISEASE.  THIS IS UNCHANGED FROM PRIOR EXAM.       THE TRACHEA AND PROXIMAL AIRWAYS ARE PATENT. THE LUNGS ARE SYMMETRICALLY   WELL EXPANDED WITH NO EVIDENCE OF LOBAR CONSOLIDATION, PLEURAL FLUID   COLLECTION, OR PLEURAL THICKENING.   ONCE AGAIN SEEN ARE MULTIPLE   NONCALCIFIED SOFT TISSUE PULMONARY NODULES ALL SIMILAR SIZE AND   APPEARANCE WHEN COMPARED TO PRIOR EXAM.  THE LARGEST OF THESE NODULES   MEASURES APPROXIMATELY 0.6 CM IN THE APICAL SEGMENT OF THE RIGHT UPPER   LOBE UNCHANGED FROM PRIOR EXAM.       THE ESOPHAGUS DEMONSTRATES NORMAL COURSE AND CALIBER WITHOUT EVIDENCE OF   HIATAL HERNIA.  THE VISUALIZED INTRAABDOMINAL STRUCTURES DEMONSTRATE NO   SIGNIFICANT ABNORMALITY.       EXAMINATION OF OSSEOUS AND SOFT TISSUE STRUCTURES DEMONSTRATE NO   SIGNIFICANT ABNORMALITY.  THERE IS OSSEOUS DEGENERATIVE CHANGE WITHOUT   EVIDENCE OF FRACTURE OR DESTRUCTIVE PROCESS.      IMPRESSION:   1. STABLE SIZE, NUMBER, AND DISTRIBUTION OF NONCALCIFIED PULMONARY   NODULES WHICH ARE MOST LIKELY A SEQUELA OF PRIOR GRANULOMATOUS DISEASE.    GIVEN THAT THESE NODULES HAVE BEEN STABLE OVER A PERIOD OF TWO YEARS, NO   FURTHER FOLLOW-UP IS NECESSARY.       2.  SEQUELA OF PRIOR GRANULOMATOUS DISEASE INCLUDING NUMEROUS CALCIFIED   PULMONARY GRANULOMATA AND EXTENSIVE MEDIASTINAL LYMPH NODE CALCIFICATION.      3.  CORONARY ARTERY CALCIFICATION.         : [place]   Transcribe Date/Time: Jun 21 2011  8:07A  Dictated by : JOSE SCHMITT MD  Read On: 6/20/2011  3:53P  tyrel Thorpe. M.D.  [id number]     Images were reviewed, findings were verified and document was   electronically  SIGNED BY: CLAUDIA THORPE MD On:                     Current Outpatient Medications:     atorvastatin (LIPITOR) 40 MG tablet, TAKE 1 TABLET EVERY DAY, Disp: 90 tablet, Rfl: 3    carbidopa-levodopa  mg (SINEMET)  mg per tablet, TAKE 2 TABLETS IN THE MORNING AND TAKE 1 AND 1/2 TABLETS THREE TIMES DAILY THEREAFTER, Disp: 585 tablet, Rfl: 3    clonazePAM (KLONOPIN) 0.5 MG tablet, TAKE 1/2 TO 1 TABLET BY MOUTH UP TO 3 TIMES DAILY AS NEEDED FOR ANXIETY (Patient not taking: Reported on 4/4/2022), Disp: 90 tablet, Rfl: 5    finasteride (PROSCAR) 5 mg tablet, TAKE 1  TABLET EVERY DAY, Disp: 90 tablet, Rfl: 3    FLUZONE HIGHDOSE QUAD 21-22  mcg/0.7 mL Syrg, , Disp: , Rfl:     folic acid (FOLVITE) 1 MG tablet, TAKE 1 TABLET (1 MG TOTAL) BY MOUTH ONCE DAILY., Disp: 90 tablet, Rfl: 0    hydrOXYzine HCL (ATARAX) 10 MG Tab, Take 1 tablet (10 mg total) by mouth 3 (three) times daily as needed (anxiety). (Patient not taking: Reported on 4/4/2022), Disp: 90 tablet, Rfl: 3    losartan (COZAAR) 25 MG tablet, Take 1 tablet (25 mg total) by mouth once daily., Disp: 90 tablet, Rfl: 1    magnesium oxide (MAG-OX) 400 mg tablet, Take 400 mg by mouth once daily., Disp: , Rfl:     meloxicam (MOBIC) 7.5 MG tablet, TAKE 1 TABLET EVERY DAY, Disp: 90 tablet, Rfl: 0    potassium citrate (UROCIT-K) 10 mEq (1,080 mg) TbSR, TAKE 1 TABLET THREE TIMES DAILY WITH MEALS (Patient taking differently: Take 10 mEq by mouth once daily.), Disp: 270 tablet, Rfl: 3    pyridoxine, vitamin B6, (B-6) 50 MG Tab, Take 50 mg by mouth once daily., Disp: , Rfl:     sertraline (ZOLOFT) 100 MG tablet, Take 2 tablets (200 mg total) by mouth once daily., Disp: 180 tablet, Rfl: 3    sodium bicarbonate 650 MG tablet, Take 650 mg by mouth once daily. , Disp: , Rfl:     tamsulosin (FLOMAX) 0.4 mg Cap, Take 0.4 mg by mouth once daily., Disp: , Rfl:     Current Facility-Administered Medications:     tamsulosin 24 hr capsule 0.4 mg, 0.4 mg, Oral, Daily, Mike Mtz MD    Facility-Administered Medications Ordered in Other Visits:     0.9%  NaCl infusion, , Intravenous, Continuous, Lizz Lowry NP, Last Rate: 70 mL/hr at 11/12/21 1227, New Bag at 11/12/21 1227    mupirocin 2 % ointment, , Nasal, On Call Procedure, Lizz Lowry NP, Given at 11/12/21 1227      Relevant Psychiatric History:  · Childhood:  · Psychiatrist in  for anxiety   · Strained relationship with father growing up   · Adult:  · Sees Psychiatry (Dr. Elliott) every 3 months for pharmacotherapy management (reportedly no longer taking  "clonazepam or hydroxyzine)   · Has seen  monthly for 3-4 years for anxiety - "sometimes helpful"   · Substance Use:    · Denied alcohol or drug use hx    Social History     Tobacco Use    Smoking status: Never Smoker    Smokeless tobacco: Never Used   Substance and Sexual Activity    Alcohol use: No    Drug use: No    Sexual activity: Not on file       Prior Neuropsychological Testing:  None    MENTAL STATUS AND OBSERVATIONS:     APPEARANCE: Not assessed   ALERTNESS/ORIENTATION: Attentive and alert. Fully oriented (x5) to time and place  GAIT: Not assessed  MOTOR MOVEMENTS/MANNERISMS: Not assessed  SPEECH/LANGUAGE: Normal in rate, rhythm, and tone. Voice was soft/hoarse. Some word finding difficulty and losing train of thought noted during interview. Otherwise, expressive and receptive language was normal.  STATED MOOD/AFFECT: The patient's stated mood was "not great." Affect was seemingly congruent with stated mood.   INTERPERSONAL BEHAVIOR: Rapport was quickly and easily established   SUICIDALITY/HOMICIDALITY: None reported  HALLUCINATIONS/DELUSIONS: No actively evidenced or endorsed; Visual hallucinations reported   THOUGHT PROCESSES/INSIGHT: Thoughts seemed logical and goal-directed.     BILLING  Service Description CPT Code Minutes Units   Psychiatric diagnostic evaluation by physician [id number] [phone]   Neurobehavioral status exam by physician [id number]  0   Each additional hour by physician [id number]  0       "

## 2022-04-01 NOTE — TELEPHONE ENCOUNTER
----- Message from Lis Matias sent at 4/1/2022  1:18 PM CDT -----  Contact: Loree mable Denise @  [phone]  Requesting an RX refill or new RX.  Is this a refill or new RX:Refill   RX name and strength (copy/paste from chart): losartan (COZAAR) 25 MG tablet   Is this a 30 day or 90 day RX: 90  Pharmacy name and phone # (copy/paste from chart): [facility]  [phone]  The doctors have asked that we provide their patients with the following 2 reminders -- prescription refills can take up to 72 hours, and a friendly reminder that in the future you can use your [facility] account to request refills:

## 2022-04-01 NOTE — TELEPHONE ENCOUNTER
No new care gaps identified.  Powered by [employer]. Reference number: [id number].   4/01/2022 3:02:54 PM CDT

## 2022-04-02 RX ORDER — LOSARTAN POTASSIUM 25 MG/1
25 TABLET ORAL DAILY
Qty: 90 TABLET | Refills: 1 | Status: ON HOLD | OUTPATIENT
Start: 2022-04-02 | End: 2023-08-10 | Stop reason: HOSPADM

## 2022-04-04 ENCOUNTER — LAB VISIT (OUTPATIENT)
Dept: LAB | Facility: HOSPITAL | Age: 77
End: 2022-04-04
Attending: INTERNAL MEDICINE
Payer: MEDICARE

## 2022-04-04 ENCOUNTER — OFFICE VISIT (OUTPATIENT)
Dept: INTERNAL MEDICINE | Facility: CLINIC | Age: 77
End: 2022-04-04
Payer: MEDICARE

## 2022-04-04 ENCOUNTER — PATIENT MESSAGE (OUTPATIENT)
Dept: INTERNAL MEDICINE | Facility: CLINIC | Age: 77
End: 2022-04-04

## 2022-04-04 ENCOUNTER — DOCUMENTATION ONLY (OUTPATIENT)
Dept: INTERNAL MEDICINE | Facility: CLINIC | Age: 77
End: 2022-04-04

## 2022-04-04 VITALS
OXYGEN SATURATION: 98 % | WEIGHT: 198 LBS | HEIGHT: 70 IN | BODY MASS INDEX: 28.35 KG/M2 | DIASTOLIC BLOOD PRESSURE: 84 MMHG | HEART RATE: 60 BPM | SYSTOLIC BLOOD PRESSURE: 128 MMHG

## 2022-04-04 DIAGNOSIS — R35.0 BENIGN PROSTATIC HYPERPLASIA WITH URINARY FREQUENCY: ICD-10-CM

## 2022-04-04 DIAGNOSIS — G20.C PARKINSONISM, UNSPECIFIED PARKINSONISM TYPE: Primary | ICD-10-CM

## 2022-04-04 DIAGNOSIS — G20.C PARKINSONISM, UNSPECIFIED PARKINSONISM TYPE: ICD-10-CM

## 2022-04-04 DIAGNOSIS — I10 ESSENTIAL HYPERTENSION: ICD-10-CM

## 2022-04-04 DIAGNOSIS — N40.1 BENIGN PROSTATIC HYPERPLASIA WITH URINARY FREQUENCY: ICD-10-CM

## 2022-04-04 DIAGNOSIS — M47.816 LUMBAR SPONDYLOSIS: ICD-10-CM

## 2022-04-04 DIAGNOSIS — R79.89 ELEVATED BRAIN NATRIURETIC PEPTIDE (BNP) LEVEL: ICD-10-CM

## 2022-04-04 DIAGNOSIS — R53.83 FATIGUE, UNSPECIFIED TYPE: ICD-10-CM

## 2022-04-04 DIAGNOSIS — I49.3 VENTRICULAR ECTOPY: ICD-10-CM

## 2022-04-04 DIAGNOSIS — I49.3 VENTRICULAR ECTOPY: Primary | ICD-10-CM

## 2022-04-04 DIAGNOSIS — R53.1 SPELL OF GENERALIZED WEAKNESS: ICD-10-CM

## 2022-04-04 DIAGNOSIS — E53.8 LOW FOLIC ACID: ICD-10-CM

## 2022-04-04 DIAGNOSIS — N32.81 OAB (OVERACTIVE BLADDER): ICD-10-CM

## 2022-04-04 DIAGNOSIS — E78.5 HYPERLIPIDEMIA, UNSPECIFIED HYPERLIPIDEMIA TYPE: ICD-10-CM

## 2022-04-04 LAB
BNP SERPL-MCNC: 231 PG/ML (ref 0–99)
FOLATE SERPL-MCNC: 34.9 NG/ML (ref 4–24)
T4 FREE SERPL-MCNC: 0.83 NG/DL (ref 0.71–1.51)
TSH SERPL DL<=0.005 MIU/L-ACNC: 1.95 UIU/ML (ref 0.4–4)

## 2022-04-04 PROCEDURE — 3288F PR FALLS RISK ASSESSMENT DOCUMENTED: ICD-10-PCS | Mod: CPTII,S$GLB,, | Performed by: INTERNAL MEDICINE

## 2022-04-04 PROCEDURE — 1160F RVW MEDS BY RX/DR IN RCRD: CPT | Mod: CPTII,S$GLB,, | Performed by: INTERNAL MEDICINE

## 2022-04-04 PROCEDURE — 3079F DIAST BP 80-89 MM HG: CPT | Mod: CPTII,S$GLB,, | Performed by: INTERNAL MEDICINE

## 2022-04-04 PROCEDURE — 3079F PR MOST RECENT DIASTOLIC BLOOD PRESSURE 80-89 MM HG: ICD-10-PCS | Mod: CPTII,S$GLB,, | Performed by: INTERNAL MEDICINE

## 2022-04-04 PROCEDURE — 36415 COLL VENOUS BLD VENIPUNCTURE: CPT | Performed by: INTERNAL MEDICINE

## 2022-04-04 PROCEDURE — 99214 PR OFFICE/OUTPT VISIT, EST, LEVL IV, 30-39 MIN: ICD-10-PCS | Mod: S$GLB,,, | Performed by: INTERNAL MEDICINE

## 2022-04-04 PROCEDURE — 99999 PR PBB SHADOW E&M-EST. PATIENT-LVL III: CPT | Mod: PBBFAC,,, | Performed by: INTERNAL MEDICINE

## 2022-04-04 PROCEDURE — 3288F FALL RISK ASSESSMENT DOCD: CPT | Mod: CPTII,S$GLB,, | Performed by: INTERNAL MEDICINE

## 2022-04-04 PROCEDURE — 1126F AMNT PAIN NOTED NONE PRSNT: CPT | Mod: CPTII,S$GLB,, | Performed by: INTERNAL MEDICINE

## 2022-04-04 PROCEDURE — 1101F PR PT FALLS ASSESS DOC 0-1 FALLS W/OUT INJ PAST YR: ICD-10-PCS | Mod: CPTII,S$GLB,, | Performed by: INTERNAL MEDICINE

## 2022-04-04 PROCEDURE — 84439 ASSAY OF FREE THYROXINE: CPT | Performed by: INTERNAL MEDICINE

## 2022-04-04 PROCEDURE — 1160F PR REVIEW ALL MEDS BY PRESCRIBER/CLIN PHARMACIST DOCUMENTED: ICD-10-PCS | Mod: CPTII,S$GLB,, | Performed by: INTERNAL MEDICINE

## 2022-04-04 PROCEDURE — 1159F MED LIST DOCD IN RCRD: CPT | Mod: CPTII,S$GLB,, | Performed by: INTERNAL MEDICINE

## 2022-04-04 PROCEDURE — 1126F PR PAIN SEVERITY QUANTIFIED, NO PAIN PRESENT: ICD-10-PCS | Mod: CPTII,S$GLB,, | Performed by: INTERNAL MEDICINE

## 2022-04-04 PROCEDURE — 1101F PT FALLS ASSESS-DOCD LE1/YR: CPT | Mod: CPTII,S$GLB,, | Performed by: INTERNAL MEDICINE

## 2022-04-04 PROCEDURE — 99999 PR PBB SHADOW E&M-EST. PATIENT-LVL III: ICD-10-PCS | Mod: PBBFAC,,, | Performed by: INTERNAL MEDICINE

## 2022-04-04 PROCEDURE — 83880 ASSAY OF NATRIURETIC PEPTIDE: CPT | Performed by: INTERNAL MEDICINE

## 2022-04-04 PROCEDURE — 1159F PR MEDICATION LIST DOCUMENTED IN MEDICAL RECORD: ICD-10-PCS | Mod: CPTII,S$GLB,, | Performed by: INTERNAL MEDICINE

## 2022-04-04 PROCEDURE — 3074F SYST BP LT 130 MM HG: CPT | Mod: CPTII,S$GLB,, | Performed by: INTERNAL MEDICINE

## 2022-04-04 PROCEDURE — 82746 ASSAY OF FOLIC ACID SERUM: CPT | Performed by: INTERNAL MEDICINE

## 2022-04-04 PROCEDURE — 3074F PR MOST RECENT SYSTOLIC BLOOD PRESSURE < 130 MM HG: ICD-10-PCS | Mod: CPTII,S$GLB,, | Performed by: INTERNAL MEDICINE

## 2022-04-04 PROCEDURE — 99214 OFFICE O/P EST MOD 30 MIN: CPT | Mod: S$GLB,,, | Performed by: INTERNAL MEDICINE

## 2022-04-04 PROCEDURE — 84443 ASSAY THYROID STIM HORMONE: CPT | Performed by: INTERNAL MEDICINE

## 2022-04-04 RX ORDER — TAMSULOSIN HYDROCHLORIDE 0.4 MG/1
0.4 CAPSULE ORAL DAILY
COMMUNITY
End: 2022-08-01

## 2022-04-04 RX ORDER — OXYBUTYNIN CHLORIDE 10 MG/1
10 TABLET, EXTENDED RELEASE ORAL DAILY
COMMUNITY
Start: 2022-01-14 | End: 2022-04-04

## 2022-04-04 NOTE — PROGRESS NOTES
Patient test results were reviewed    BNP was elevated because of the weakness in ectopy will arrange for 2D echo.  Results discussed with the patient.  Folic acid is elevated and feel that he can put a hold on his folic acid supplement

## 2022-04-04 NOTE — PROGRESS NOTES
MEDICAL HISTORY:  Hypertension.  Hyperlipidemia.  Parkinsonism with cognitive deficit  Depression, anxiety.  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  SHU  Bilateral retinal detachment.  Adenomatous colon polyp in 2002.  Tonsillectomy.  Hemorrhoidectomy.  Appendectomy.  Left inguinal hernia repair.  Left knee surgery.  Lumbar degenerative disc disease, status post bilateral L4 transforaminal epidural steroid injection   /   lumbar laminectomy and facetectomy L2-L5  BPH        SOCIAL HISTORY:  Tobacco and alcohol use - none.  , has four children.       MEDICINES:  Atorvastatin 40 mg daily.  Carbidopa levodopa  mg one and a half tablets three times a day.  Two tablets in the morning if needed    Vitamin D 50,000 unit once a month.  Finasteride 5 mg a day..  Losartan 100 mg daily.  Magnesium oxide 400 mg daily.  Vitamin B6 50 mg daily.  Sertraline 100 mg two a day.  Tamsulosin 0.4 mg  Urocit-K 10 mEq  Mobic 7.5 mg  Folic acid    Component      Latest Ref Rng & Units 3/31/2022   WBC      3.90 - 12.70 K/uL 6.21   RBC      4.60 - 6.20 M/uL 5.07   Hemoglobin      14.0 - 18.0 g/dL 16.3   Hematocrit      40.0 - 54.0 % 47.7   MCV      82 - 98 fL 94   MCH      27.0 - 31.0 pg 32.1 (H)   MCHC      32.0 - 36.0 g/dL 34.2   RDW      11.5 - 14.5 % 12.8   Platelets      150 - 450 K/uL 209   MPV      9.2 - 12.9 fL 10.3   Immature Granulocytes      0.0 - 0.5 % 0.6 (H)   Gran # (ANC)      1.8 - 7.7 K/uL 4.6   Immature Grans (Abs)      0.00 - 0.04 K/uL 0.04   Lymph #      1.0 - 4.8 K/uL 1.0   Mono #      0.3 - 1.0 K/uL 0.5   Eos #      0.0 - 0.5 K/uL 0.1   Baso #      0.00 - 0.20 K/uL 0.02   nRBC      0 /100 WBC 0   Gran %      38.0 - 73.0 % 73.3 (H)   Lymph %      18.0 - 48.0 % 15.5 (L)   Mono %      4.0 - 15.0 % 8.5   Eosinophil %      0.0 - 8.0 % 1.8   Basophil %      0.0 - 1.9 % 0.3   Differential Method       Automated   Sodium      136 - 145 mmol/L 138   Potassium      3.5 - 5.1 mmol/L 4.4   Chloride      95 -  110 mmol/L 102   CO2      23 - 29 mmol/L 29   Glucose      70 - 110 mg/dL 91   BUN      8 - 23 mg/dL 24 (H)   Creatinine      0.5 - 1.4 mg/dL 1.0   Calcium      8.7 - 10.5 mg/dL 9.7   PROTEIN TOTAL      6.0 - 8.4 g/dL 7.3   Albumin      3.5 - 5.2 g/dL 4.1   BILIRUBIN TOTAL      0.1 - 1.0 mg/dL 0.9   Alkaline Phosphatase      55 - 135 U/L 73   AST      10 - 40 U/L 28   ALT      10 - 44 U/L 34   Anion Gap      8 - 16 mmol/L 7 (L)   eGFR if African American      >60 mL/min/1.73 m:2 >60.0   eGFR if non African American      >60 mL/min/1.73 m:2 >60.0   Cholesterol      120 - 199 mg/dL 137   Triglycerides      30 - 150 mg/dL 125   HDL      40 - 75 mg/dL 43   LDL Cholesterol External      63.0 - 159.0 mg/dL 69.0   HDL/Cholesterol Ratio      20.0 - 50.0 % 31.4   Total Cholesterol/HDL Ratio      2.0 - 5.0 3.2   Non-HDL Cholesterol      mg/dL 94         76-year-old male  Here with his wife  Comes for follow-up visit      Recent meeting with neurology.  No change in medications.  He is mainly taking carbidopa levodopa 1/2 tablets 3 times a day.  Rarely does he take 2 tablets early in the morning.  Appointment has been set up with neuropsychology regarding confusion and hallucinations.  At present he is not on the medicine clonazepam anymore.  And wife feels he does better without it    He has met with Urology.  Had urodynamic study consistent with overactive bladder.  He has attempted medicines in the past which was not effective.  Treatment options were discussed mainly that of neurostimulator right now selected not to pursue this.  Is urination can be frequent he has nocturia 3-5 times a night.    He finds that he will get tired easily with walking.  And within the past week on 2 occasions while walking he developed a week spells if he might pass out but did    There is no reported shortness of breath, chest pain, abdominal pain.  Has regular bowel function.  Has chronic low back pain for which he takes  meloxicam    Examination  Weight 198 lb  Pulse 60 with pauses in ectopy  Blood pressure 120/72  Chest clear breath sounds  Neck no thyromegaly  Heart regular rate rhythm with ectopic beats with 1/6 systolic murmur  Abdominal exam is bowel sounds soft nontender no hepatosplenomegaly abdominal masses  2+ carotid pulses no bruits 2+ pedal pulses    Impression  Parkinson's disease [place] can easy and cognitive deficit  Hypertension  Hyperlipidemia  Fatigue  Weak spells/presyncopal spell  Ectopy  Lumbar spondylosis  BPH  Overactive bladder    Plan  TSH, BNP, repeat folic acid which was low before  48 hour Holter  Time voiding throughout the day

## 2022-04-07 ENCOUNTER — OFFICE VISIT (OUTPATIENT)
Dept: NEUROLOGY | Facility: CLINIC | Age: 77
End: 2022-04-07
Payer: MEDICARE

## 2022-04-07 DIAGNOSIS — F41.1 GENERALIZED ANXIETY DISORDER: ICD-10-CM

## 2022-04-07 DIAGNOSIS — F03.90 MAJOR NEUROCOGNITIVE DISORDER: ICD-10-CM

## 2022-04-07 PROCEDURE — 90791 PSYCH DIAGNOSTIC EVALUATION: CPT | Mod: 95,FQ,, | Performed by: CLINICAL NEUROPSYCHOLOGIST

## 2022-04-07 PROCEDURE — 99499 UNLISTED E&M SERVICE: CPT | Mod: 95,,, | Performed by: CLINICAL NEUROPSYCHOLOGIST

## 2022-04-07 PROCEDURE — 90791 PR PSYCHIATRIC DIAGNOSTIC EVALUATION: ICD-10-PCS | Mod: 95,FQ,, | Performed by: CLINICAL NEUROPSYCHOLOGIST

## 2022-04-07 PROCEDURE — 99499 NO LOS: ICD-10-PCS | Mod: 95,,, | Performed by: CLINICAL NEUROPSYCHOLOGIST

## 2022-04-07 NOTE — ASSESSMENT & PLAN NOTE
Assessment:  -Onset of PD motor sxs 7 years ago; minimal benefit from Sinemet, reportedly  -Cognitive sxs within last 2 years, worsened over last year with some fluctuations   -Visual hallucinations for 5 years, worsening   -Longstanding hx of RBD sxs    -Dependent for most IADLs  -Hx of anxiety, worse (+depression) over last year in context of life stressors     Many aspects of clinical history support Lewy Body Dementia. Most notable exception is discrepant proximity of motor vs cognitive sx emergence (about 5 years apart, motor first). Additionally, visuospatial dysfunction, while present, is not as prominent as other cog sxs, and extent of fluctuations in attention/alertness is unclear. Primary dx differential is likely PDD vs. LBD.     Plan:  -Cognitive testing will likely improve diagnostic clarity, establish a baseline level of functioning, and help inform treatment planning. Full report with recommendations to follow completion of testing (5/2/22).

## 2022-04-07 NOTE — ASSESSMENT & PLAN NOTE
Assessment:  -Longstanding anxiety has worsened in the last year in the context of life stressors + deteriorating cognitive/physical functioning.   -Some hx of situational depression, more noticeable in last year  -Followed by Psychiatry and sees  monthly     Plan:  -Psychological functioning will be further assessed at testing appointment. Full report with recommendations to follow completion of testing (5/2/22).

## 2022-04-08 ENCOUNTER — HOSPITAL ENCOUNTER (OUTPATIENT)
Dept: CARDIOLOGY | Facility: HOSPITAL | Age: 77
Discharge: HOME | End: 2022-04-08
Attending: INTERNAL MEDICINE
Payer: MEDICARE

## 2022-04-08 DIAGNOSIS — R53.83 FATIGUE, UNSPECIFIED TYPE: ICD-10-CM

## 2022-04-08 DIAGNOSIS — R53.1 SPELL OF GENERALIZED WEAKNESS: ICD-10-CM

## 2022-04-08 DIAGNOSIS — I49.3 VENTRICULAR ECTOPY: ICD-10-CM

## 2022-04-08 PROCEDURE — 93227 XTRNL ECG REC<48 HR R&I: CPT | Mod: ,,, | Performed by: INTERNAL MEDICINE

## 2022-04-08 PROCEDURE — 93227 HOLTER MONITOR - 48 HOUR (CUPID ONLY): ICD-10-PCS | Mod: ,,, | Performed by: INTERNAL MEDICINE

## 2022-04-08 PROCEDURE — 93226 XTRNL ECG REC<48 HR SCAN A/R: CPT

## 2022-04-12 LAB
OHS CV EVENT MONITOR DAY: 0
OHS CV HOLTER LENGTH DECIMAL HOURS: 48
OHS CV HOLTER LENGTH HOURS: 48
OHS CV HOLTER LENGTH MINUTES: 0
OHS CV HOLTER SINUS AVERAGE HR: 59
OHS CV HOLTER SINUS MAX HR: 91
OHS CV HOLTER SINUS MIN HR: 30

## 2022-04-22 ENCOUNTER — OFFICE VISIT (OUTPATIENT)
Dept: PSYCHIATRY | Facility: CLINIC | Age: 77
End: 2022-04-22
Payer: MEDICARE

## 2022-04-22 DIAGNOSIS — F41.1 GENERALIZED ANXIETY DISORDER: Primary | ICD-10-CM

## 2022-04-22 DIAGNOSIS — F03.90 MAJOR NEUROCOGNITIVE DISORDER: ICD-10-CM

## 2022-04-22 PROCEDURE — 90785 PSYTX COMPLEX INTERACTIVE: CPT | Mod: 95,,, | Performed by: SOCIAL WORKER

## 2022-04-22 PROCEDURE — 90785 PR INTERACTIVE COMPLEXITY: ICD-10-PCS | Mod: 95,,, | Performed by: SOCIAL WORKER

## 2022-04-22 PROCEDURE — 90834 PSYTX W PT 45 MINUTES: CPT | Mod: 95,,, | Performed by: SOCIAL WORKER

## 2022-04-22 PROCEDURE — 90834 PR PSYCHOTHERAPY W/PATIENT, 45 MIN: ICD-10-PCS | Mod: 95,,, | Performed by: SOCIAL WORKER

## 2022-04-25 NOTE — PROGRESS NOTES
Individual Psychotherapy (PhD/LCSW)    4/22/2022    Site:  Telemed         Therapeutic Intervention: Met with patient and spouse.  Outpatient - Insight oriented psychotherapy 45 min - CPT code 08427    Chief complaint/reason for encounter: depression and anxiety     Interval history and content of current session:      Site: [facility]             Length of service: 45         Therapeutic intervention:     Persons present: spouse and pt  [place] (needed to help with interpretation of intervention due to pt cognitive decline due to Parkinsons/dementia).    Interval history:      The patient location is: home [place].   The chief complaint leading to consultation is: anxiety/depression  Visit type: audiovisual  Total time spent with patient: 45 min  Each patient to whom he or she provides medical services by telemedicine is:  (1) informed of the relationship between the physician and patient and the respective role of any other health care provider with respect to management of the patient; and (2) notified that he or she may decline to receive medical services by telemedicine and may withdraw from such care at any time.       Notes:   Pt dementia advancing.  Finding harder to express self.  Feeling more tire.  Almost fell twice.  Mostly not using walker/cane.   Mindfulness used.  He is better able to urinate inside toilet.   Wife as always supportive.  They will go in cruise invited by relative.  As usual pt has anxiety over trip.                Target symptoms: depression, anxiety        Progress toward goals: progressing well    Diagnosis: generalized anxiety disorder       Treatment plan:  · Target symptoms: anxiety   · Why chosen therapy is appropriate versus another modality: relevant to diagnosis, evidence based practice  · Outcome monitoring methods: self-report, observation, feedback from family  · Therapeutic intervention type: behavior modifying psychotherapy    Risk parameters:  Patient reports no  suicidal ideation  Patient reports no homicidal ideation  Patient reports no self-injurious behavior  Patient reports no violent behavior        Verbal deficits: None           Patient's response to intervention:   The patient's response to intervention is accepting, motivated.              Progress toward goals and other mental status changes:  The patient's progress toward goals is fair .    Diagnosis:     ICD-10-CM ICD-9-CM   1. Generalized anxiety disorder F41.1 300.02       Plan:  individual psychotherapy and family psychotherapy    Return to clinic: as scheduled    Length of Service (minutes): 45

## 2022-05-03 ENCOUNTER — HOSPITAL ENCOUNTER (OUTPATIENT)
Dept: CARDIOLOGY | Facility: HOSPITAL | Age: 77
Discharge: HOME | End: 2022-05-03
Attending: INTERNAL MEDICINE
Payer: MEDICARE

## 2022-05-03 VITALS
HEART RATE: 69 BPM | BODY MASS INDEX: 28.35 KG/M2 | DIASTOLIC BLOOD PRESSURE: 94 MMHG | HEIGHT: 70 IN | WEIGHT: 198 LBS | SYSTOLIC BLOOD PRESSURE: 142 MMHG

## 2022-05-03 DIAGNOSIS — I49.3 VENTRICULAR ECTOPY: ICD-10-CM

## 2022-05-03 DIAGNOSIS — R79.89 ELEVATED BRAIN NATRIURETIC PEPTIDE (BNP) LEVEL: ICD-10-CM

## 2022-05-03 LAB
ASCENDING AORTA: 3.85 CM
AV INDEX (PROSTH): 0.65
AV MEAN GRADIENT: 5 MMHG
AV PEAK GRADIENT: 8 MMHG
AV VALVE AREA: 4.08 CM2
AV VELOCITY RATIO: 0.6
BSA FOR ECHO PROCEDURE: 2.11 M2
CV ECHO LV RWT: 0.46 CM
DOP CALC AO PEAK VEL: 1.43 M/S
DOP CALC AO VTI: 31.45 CM
DOP CALC LVOT AREA: 6.2 CM2
DOP CALC LVOT DIAMETER: 2.82 CM
DOP CALC LVOT PEAK VEL: 0.86 M/S
DOP CALC LVOT STROKE VOLUME: 128.16 CM3
DOP CALCLVOT PEAK VEL VTI: 20.53 CM
E WAVE DECELERATION TIME: 199.21 MSEC
E/A RATIO: 0.77
E/E' RATIO: 12.8 M/S
ECHO LV POSTERIOR WALL: 1.13 CM (ref 0.6–1.1)
EJECTION FRACTION: 65 %
FRACTIONAL SHORTENING: 31 % (ref 28–44)
INTERVENTRICULAR SEPTUM: 1.16 CM (ref 0.6–1.1)
IVRT: 91.34 MSEC
LA MAJOR: 5.74 CM
LA MINOR: 5.93 CM
LA WIDTH: 4.66 CM
LEFT ATRIUM SIZE: 4.26 CM
LEFT ATRIUM VOLUME INDEX MOD: 45.5 ML/M2
LEFT ATRIUM VOLUME INDEX: 47.3 ML/M2
LEFT ATRIUM VOLUME MOD: 94.6 CM3
LEFT ATRIUM VOLUME: 98.43 CM3
LEFT INTERNAL DIMENSION IN SYSTOLE: 3.35 CM (ref 2.1–4)
LEFT VENTRICLE DIASTOLIC VOLUME INDEX: 53.55 ML/M2
LEFT VENTRICLE DIASTOLIC VOLUME: 111.38 ML
LEFT VENTRICLE MASS INDEX: 101 G/M2
LEFT VENTRICLE SYSTOLIC VOLUME INDEX: 22.1 ML/M2
LEFT VENTRICLE SYSTOLIC VOLUME: 45.93 ML
LEFT VENTRICULAR INTERNAL DIMENSION IN DIASTOLE: 4.87 CM (ref 3.5–6)
LEFT VENTRICULAR MASS: 209.9 G
LV LATERAL E/E' RATIO: 10.67 M/S
LV SEPTAL E/E' RATIO: 16 M/S
MV PEAK A VEL: 0.83 M/S
MV PEAK E VEL: 0.64 M/S
MV STENOSIS PRESSURE HALF TIME: 57.77 MS
MV VALVE AREA P 1/2 METHOD: 3.81 CM2
PISA TR MAX VEL: 2.28 M/S
PULM VEIN S/D RATIO: 0.93
PV PEAK D VEL: 0.57 M/S
PV PEAK S VEL: 0.53 M/S
RA MAJOR: 5.74 CM
RA PRESSURE: 3 MMHG
RA WIDTH: 3.41 CM
RIGHT VENTRICULAR END-DIASTOLIC DIMENSION: 3.52 CM
SINUS: 4.09 CM
STJ: 3.66 CM
TDI LATERAL: 0.06 M/S
TDI SEPTAL: 0.04 M/S
TDI: 0.05 M/S
TR MAX PG: 21 MMHG
TRICUSPID ANNULAR PLANE SYSTOLIC EXCURSION: 2.08 CM
TV REST PULMONARY ARTERY PRESSURE: 24 MMHG

## 2022-05-03 PROCEDURE — 93306 ECHO (CUPID ONLY): ICD-10-PCS | Mod: 26,,, | Performed by: INTERNAL MEDICINE

## 2022-05-03 PROCEDURE — 93306 TTE W/DOPPLER COMPLETE: CPT

## 2022-05-03 PROCEDURE — 93306 TTE W/DOPPLER COMPLETE: CPT | Mod: 26,,, | Performed by: INTERNAL MEDICINE

## 2022-05-04 ENCOUNTER — OFFICE VISIT (OUTPATIENT)
Dept: PSYCHIATRY | Facility: CLINIC | Age: 77
End: 2022-05-04
Payer: MEDICARE

## 2022-05-04 VITALS
BODY MASS INDEX: 28.23 KG/M2 | DIASTOLIC BLOOD PRESSURE: 86 MMHG | WEIGHT: 196.75 LBS | HEART RATE: 74 BPM | SYSTOLIC BLOOD PRESSURE: 134 MMHG

## 2022-05-04 DIAGNOSIS — F32.A DEPRESSION, UNSPECIFIED DEPRESSION TYPE: ICD-10-CM

## 2022-05-04 DIAGNOSIS — G20.A1 PARKINSON'S DISEASE: ICD-10-CM

## 2022-05-04 DIAGNOSIS — F41.1 GENERALIZED ANXIETY DISORDER: Primary | ICD-10-CM

## 2022-05-04 PROCEDURE — 3079F DIAST BP 80-89 MM HG: CPT | Mod: CPTII,S$GLB,, | Performed by: PSYCHIATRY & NEUROLOGY

## 2022-05-04 PROCEDURE — 1160F RVW MEDS BY RX/DR IN RCRD: CPT | Mod: CPTII,S$GLB,, | Performed by: PSYCHIATRY & NEUROLOGY

## 2022-05-04 PROCEDURE — 1159F PR MEDICATION LIST DOCUMENTED IN MEDICAL RECORD: ICD-10-PCS | Mod: CPTII,S$GLB,, | Performed by: PSYCHIATRY & NEUROLOGY

## 2022-05-04 PROCEDURE — 99214 OFFICE O/P EST MOD 30 MIN: CPT | Mod: S$GLB,,, | Performed by: PSYCHIATRY & NEUROLOGY

## 2022-05-04 PROCEDURE — 3075F PR MOST RECENT SYSTOLIC BLOOD PRESS GE 130-139MM HG: ICD-10-PCS | Mod: CPTII,S$GLB,, | Performed by: PSYCHIATRY & NEUROLOGY

## 2022-05-04 PROCEDURE — 99999 PR PBB SHADOW E&M-EST. PATIENT-LVL II: ICD-10-PCS | Mod: PBBFAC,,, | Performed by: PSYCHIATRY & NEUROLOGY

## 2022-05-04 PROCEDURE — 3075F SYST BP GE 130 - 139MM HG: CPT | Mod: CPTII,S$GLB,, | Performed by: PSYCHIATRY & NEUROLOGY

## 2022-05-04 PROCEDURE — 1159F MED LIST DOCD IN RCRD: CPT | Mod: CPTII,S$GLB,, | Performed by: PSYCHIATRY & NEUROLOGY

## 2022-05-04 PROCEDURE — 99999 PR PBB SHADOW E&M-EST. PATIENT-LVL II: CPT | Mod: PBBFAC,,, | Performed by: PSYCHIATRY & NEUROLOGY

## 2022-05-04 PROCEDURE — 1160F PR REVIEW ALL MEDS BY PRESCRIBER/CLIN PHARMACIST DOCUMENTED: ICD-10-PCS | Mod: CPTII,S$GLB,, | Performed by: PSYCHIATRY & NEUROLOGY

## 2022-05-04 PROCEDURE — 3079F PR MOST RECENT DIASTOLIC BLOOD PRESSURE 80-89 MM HG: ICD-10-PCS | Mod: CPTII,S$GLB,, | Performed by: PSYCHIATRY & NEUROLOGY

## 2022-05-04 PROCEDURE — 99214 PR OFFICE/OUTPT VISIT, EST, LEVL IV, 30-39 MIN: ICD-10-PCS | Mod: S$GLB,,, | Performed by: PSYCHIATRY & NEUROLOGY

## 2022-05-04 NOTE — PROGRESS NOTES
"Outpatient Psychiatry Follow-Up Visit (MD/NP)  5/4/2022    Session Length: 30 minutes (E&M Level 4))    Clinical Status of Patient:  Outpatient (Ambulatory)    Chief Complaint:  Haja Marti is a 76 y.o. male who presents today for follow-up of depression and anxiety.  Met with patient and spouse.      Interval History and Content of Current Session:  Interim Events/Subjective Report/Content of Current Session:  First appt with me since 1/19/2022.    He has had several appts with Mr. Galvan for psychotherapy in the interim.      He has been taking hydroxyzine 10 mg one tablet in AM daily.      He still has occasional falls, but no significant falls in the interim.  No recent head injury.      He has had more problems expressing himself.  He loses track of his thoughts; forgets what he was doing.    He still has a lot of anxiety; this is worse compared to the depression.  His wife thinks his anxiety seems to be extreme.    They often disagree     They have been seeing Dr. Hidalgo in [place] for Neurologist.    They had a hard time scheduling with a neurolog[place] [facility]        He states he has much more anxiety lately.    His wife notes it has been "rough" for the past couple of months.    He has not been walking as well.    However, his wife thinks he "perks up" more     They have had a lot of recent deaths: his wife's brother-in-law, several good friends, his god-mother, etc.   This, coupled with the advancement of the Parkinson's, is likely making him more anxious, depressed and, at times, angry.    He continues to drive his car.  He is careful and does not drive far.      He will occasionally miss doses of medication.  He will often forget the early AM dose (~ 5 - 6), then this throws off his schedule.    They sleep in separate rooms because he snores, moves around a lot in the bed, even "fights" in his sleep.    So, she is not there to remind him to take his medication at 6 AM.    She awakens " "around 8 AM.    She also babysits a child that her daughter fosters and one of her sons wants to adopt -- they're dealing with the state regulations now regarding this.      He also has had more problems with incontinence.   He is seeing Urology for this problem.      He states he is doing "pretty good".  He denies depressed mood today.    He generally goes to sleep around 8 - 9 PM, then awakens around 1 - 2 AM to use the restroom, then he must urinate at least 2 more times afterwards.  This also disrupts his sleep.  He usually gets up about 6 - 7 AM.    He is able to get out of bed, get moving in about 10 - 15 minutes after awakening.    He has difficulty walking any significant distance due to fatigue.    He will go to boxing classes on M,W,F -- they last about an hour.  "They wear me out; I have to take a nap".   Wife states he naps everyday, "a lot".    He has a Dx of at least mild sleep apnea.    He has tried to use CPAP, but has not been able to tolerate it.    He and his wife state they had his third sleep study didn't show he even had sleep apnea!   But he still snores.      He also has had more confusion lately.    For example, his wife says he has a hard time just measuring his urine (Urology wants to measure his liquid I/O's at home).    He sees a urologist (Dr. Oquendo; [facility]).          He occasionally has hypnopompic hallucinations.   They last only seconds after awakening.   He also has had periods of formed hallucinations.  They can last for a few seconds, then disappear.    He had mentioned that he often sees a "fish in the toilet" (master bathroom) that tends to last longer than other images.    He had denied seeing the fish in any other toilets in his house or in public.      No recent delusions were noted by pt or his wife.          Psychotherapy:  · Target symptoms: depression, adjustment  · Why chosen therapy is appropriate versus another modality: relevant to diagnosis, patient responds " to this modality  · Outcome monitoring methods: self-report, lab data, observation, feedback from family  · Therapeutic intervention type: supportive psychotherapy  · Topics discussed/themes: stress related to medical comorbidities, life stage transitional issues  · The patient's response to the intervention is accepting.   · The patient's progress toward treatment goals is fair.   · Duration of intervention: 0 minutes.    Review of Systems   · PSYCHIATRIC: Pertinant items are noted in the narrative.  · CONSTITUTIONAL:  No recent changes in wt.    · MUSCULOSKELETAL: No pain or stiffness of the joints.  · NEUROLOGIC: + tremors and shuffling gait; No weakness, sensory changes, seizures, memory loss, confusion, or other abnormal movements.  · Memory Loss: no  · ENDOCRINE: No polydipsia or polyuria.  · INTEGUMENTARY: No rashes or lacerations.  · EYES: No exophthalmos, jaundice or blindness.  · ENT: No dizziness, tinnitus or hearing loss.  · RESPIRATORY: No shortness of breath.  · CARDIOVASCULAR: No tachycardia or chest pain.  · GASTROINTESTINAL: No nausea, vomiting, pain, constipation or diarrhea.  · GENITOURINARY: No frequency, dysuria.    The following portions of the patient's history were reviewed and updated as appropriate: allergies, current medications, past family history, past medical history, past social history, past surgical history and problem list.      Current Outpatient Medications:     atorvastatin (LIPITOR) 40 MG tablet, TAKE 1 TABLET EVERY DAY, Disp: 90 tablet, Rfl: 3    carbidopa-levodopa  mg (SINEMET)  mg per tablet, TAKE 2 TABLETS IN THE MORNING AND TAKE 1 AND 1/2 TABLETS THREE TIMES DAILY THEREAFTER, Disp: 585 tablet, Rfl: 3    clonazePAM (KLONOPIN) 0.5 MG tablet, TAKE 1/2 TO 1 TABLET BY MOUTH UP TO 3 TIMES DAILY AS NEEDED FOR ANXIETY (Patient not taking: Reported on 4/4/2022), Disp: 90 tablet, Rfl: 5    finasteride (PROSCAR) 5 mg tablet, TAKE 1 TABLET EVERY DAY, Disp: 90 tablet,  Rfl: 3    FLUZONE HIGHDOSE QUAD 21-22  mcg/0.7 mL Syrg, , Disp: , Rfl:     folic acid (FOLVITE) 1 MG tablet, TAKE 1 TABLET (1 MG TOTAL) BY MOUTH ONCE DAILY., Disp: 90 tablet, Rfl: 0    hydrOXYzine HCL (ATARAX) 10 MG Tab, TAKE 1 TABLET BY MOUTH 3 TIMES A DAY AS NEEDED ANXIETY, Disp: 270 tablet, Rfl: 1    losartan (COZAAR) 25 MG tablet, Take 1 tablet (25 mg total) by mouth once daily., Disp: 90 tablet, Rfl: 1    magnesium oxide (MAG-OX) 400 mg tablet, Take 400 mg by mouth once daily., Disp: , Rfl:     meloxicam (MOBIC) 7.5 MG tablet, TAKE 1 TABLET EVERY DAY, Disp: 90 tablet, Rfl: 0    potassium citrate (UROCIT-K) 10 mEq (1,080 mg) TbSR, TAKE 1 TABLET THREE TIMES DAILY WITH MEALS (Patient taking differently: Take 10 mEq by mouth once daily.), Disp: 270 tablet, Rfl: 3    pyridoxine, vitamin B6, (B-6) 50 MG Tab, Take 50 mg by mouth once daily., Disp: , Rfl:     sertraline (ZOLOFT) 100 MG tablet, Take 2 tablets (200 mg total) by mouth once daily., Disp: 180 tablet, Rfl: 3    sodium bicarbonate 650 MG tablet, Take 650 mg by mouth once daily. , Disp: , Rfl:     tamsulosin (FLOMAX) 0.4 mg Cap, Take 0.4 mg by mouth once daily., Disp: , Rfl:     Current Facility-Administered Medications:     tamsulosin 24 hr capsule 0.4 mg, 0.4 mg, Oral, Daily, Mike Mtz MD    Facility-Administered Medications Ordered in Other Visits:     0.9%  NaCl infusion, , Intravenous, Continuous, Lizz Lowry NP, Last Rate: 70 mL/hr at 11/12/21 1227, New Bag at 11/12/21 1227    mupirocin 2 % ointment, , Nasal, On Call Procedure, Lizz Lowry NP, Given at 11/12/21 1227   He has been taking 1/2 tablet of Klonopin daily, usually in the AM.    Pt has not been taking the Seroquel at night -- he states he has not needed this medication.  He and his wife state he has been taking c/l 2 tabs in AM and 1.5 tabs TID; however, record indicates 1/2 tab TID -- I told pt to check with Dr. Thompson's nurse about this discrepancy, as  "it could affect the quantity he obtains from [facility].    Compliance: yes    Side effects: None    Risk Parameters:  Patient reports no suicidal ideation  Patient reports no homicidal ideation  Patient reports no self-injurious behavior  Patient reports no violent behavior    Exam (detailed: at least 9 elements; comprehensive: all 15 elements)   Constitutional  Vitals - 1 value per visit 2/4/2022 2/4/2022 3/7/2022 3/7/2022 4/4/2022 4/4/2022 5/3/2022   SYSTOLIC - 164 - 118 - 128 142   DIASTOLIC - 88 - 82 - 84 94   Pulse - 74 - 80 - 60 69   Temp - - - - - - -   Resp - - - 16 - - -   SPO2 - - - - - 98 -   Weight (lb) - 194.4 - 197.31 - 198 198   Weight (kg) - 88.179 - 89.5 - 89.812 89.812   Height - 70 - 70 - 70 70   BMI (Calculated) - 27.9 - 28.3 - 28.4 28.4   VISIT REPORT - - - - - - -   Pain Score  0 - 0 - 0 - -   Some recent data might be hidden       General:  unremarkable, age appropriate, casually dressed, neatly groomed, overweight     Musculoskeletal  Muscle Strength/Tone:  not assessed today; cogwheeling had been noted in BUE's in past   Gait & Station:  parkinsonian, slow, steady     Psychiatric  Speech:  slowed, non-spontaneous, normal volume   Behavior: friendly and cooperative, eye contact normal   Mood & Affect:  "OK"  euthymic, constricted range, conguent, appropriate   Thought Process:  goal-directed, logical   Associations:  intact   Thought Content:  normal, no suicidality, no homicidality, delusions, or paranoia   Insight:  intact, has awareness of illness   Judgement: behavior is adequate to circumstances   Orientation:  grossly intact   Memory: intact for content of interview   Language: grossly intact   Attention Span & Concentration:  able to focus   Fund of Knowledge:  intact and appropriate to age and level of education     Lab Results   Component Value Date    WBC 6.21 03/31/2022    HGB 16.3 03/31/2022    HCT 47.7 03/31/2022    MCV 94 03/31/2022     03/31/2022     " 03/31/2022    K 4.4 03/31/2022     03/31/2022    CO2 29 03/31/2022    GLU 91 03/31/2022    BUN 24 (H) 03/31/2022    CREATININE 1.0 03/31/2022    CALCIUM 9.7 03/31/2022    PROT 7.3 03/31/2022    ALBUMIN 4.1 03/31/2022    BILITOT 0.9 03/31/2022    ALKPHOS 73 03/31/2022    AST 28 03/31/2022    ALT 34 03/31/2022    ANIONGAP 7 (L) 03/31/2022    ESTGFRAFRICA >60.0 03/31/2022    EGFRNONAA >60.0 03/31/2022    CHOL 137 03/31/2022    HDL 43 03/31/2022    LDLCALC 69.0 03/31/2022    TRIG 125 03/31/2022    CHOLHDL 31.4 03/31/2022    TSH 1.947 04/04/2022       Imaging:    Exam: MRI brain without contrast    History: Dizziness, extrapyramidal movement disorder    Findings: MRI is performed with routine sequences.  No mass, hemorrhage, infarction, subdural collection, hydrocephalus or other acute finding is seen on this cranial MR examination.  There is volume loss consistent with the patient's age.  No diffusion abnormality is seen.      Impression     Normal noncontrast MRI of the brain     Electronically signed by: FLORI NEWMAN MD  Date: 07/15/17  Time: 15:51        Assessment and Diagnosis   Status/Progress: Based on the examination today, the patient's problem(s) is/are adequately but not ideally controlled.  New problems have been presented today.   Co-morbidities (chronic pain) are complicating management of the primary condition.    There are not active rule-out diagnoses for this patient at this time.      General Impression:  POLINA (generalized anxiety disorder)  Depression, unspecified depression type  Parkinson's disease    Chronic pain (NOT CODED)    Intervention/Counseling/Treatment Plan   Medication Management:  Try hydroxyzine 10 mg TID prn anxiety.  Risks/benefits noted, including potential for sedation, dizziness and risk for falls.    Continue other current medications (sertraline, clonazepam).        Follow-up plan for depression was discussed with patient and spouse    Return to Clinic: No follow-ups on  file.    Sudhir Elliott MD

## 2022-05-10 ENCOUNTER — OFFICE VISIT (OUTPATIENT)
Dept: NEUROLOGY | Facility: CLINIC | Age: 77
End: 2022-05-10
Payer: MEDICARE

## 2022-05-10 DIAGNOSIS — G20.A1 PARKINSON'S DISEASE: ICD-10-CM

## 2022-05-10 DIAGNOSIS — R41.89 COGNITIVE CHANGE: ICD-10-CM

## 2022-05-10 DIAGNOSIS — F03.91: Primary | ICD-10-CM

## 2022-05-10 PROCEDURE — 96133 NRPSYC TST EVAL PHYS/QHP EA: CPT | Mod: S$GLB,,, | Performed by: CLINICAL NEUROPSYCHOLOGIST

## 2022-05-10 PROCEDURE — 96138 PR PSYCH/NEUROPSYCH TEST ADMIN/SCORING, BY TECH, 2+ TESTS, 1ST 30 MIN: ICD-10-PCS | Mod: S$GLB,,, | Performed by: CLINICAL NEUROPSYCHOLOGIST

## 2022-05-10 PROCEDURE — 96133 PR NEUROPSYCHOLOGIC TEST EVAL SVCS, EA ADDTL HR: ICD-10-PCS | Mod: S$GLB,,, | Performed by: CLINICAL NEUROPSYCHOLOGIST

## 2022-05-10 PROCEDURE — 99999 PR PBB SHADOW E&M-EST. PATIENT-LVL I: ICD-10-PCS | Mod: PBBFAC,,, | Performed by: CLINICAL NEUROPSYCHOLOGIST

## 2022-05-10 PROCEDURE — 96138 PSYCL/NRPSYC TECH 1ST: CPT | Mod: S$GLB,,, | Performed by: CLINICAL NEUROPSYCHOLOGIST

## 2022-05-10 PROCEDURE — 99999 PR PBB SHADOW E&M-EST. PATIENT-LVL I: CPT | Mod: PBBFAC,,, | Performed by: CLINICAL NEUROPSYCHOLOGIST

## 2022-05-10 PROCEDURE — 96132 PR NEUROPSYCHOLOGIC TEST EVAL SVCS, 1ST HR: ICD-10-PCS | Mod: S$GLB,,, | Performed by: CLINICAL NEUROPSYCHOLOGIST

## 2022-05-10 PROCEDURE — 96132 NRPSYC TST EVAL PHYS/QHP 1ST: CPT | Mod: S$GLB,,, | Performed by: CLINICAL NEUROPSYCHOLOGIST

## 2022-05-10 PROCEDURE — 96139 PR PSYCH/NEUROPSYCH TEST ADMIN/SCORING, BY TECH, 2+ TESTS, EA ADDTL 30 MIN: ICD-10-PCS | Mod: S$GLB,,, | Performed by: CLINICAL NEUROPSYCHOLOGIST

## 2022-05-10 PROCEDURE — 99499 UNLISTED E&M SERVICE: CPT | Mod: 95,S$GLB,, | Performed by: CLINICAL NEUROPSYCHOLOGIST

## 2022-05-10 PROCEDURE — 99499 NO LOS: ICD-10-PCS | Mod: 95,S$GLB,, | Performed by: CLINICAL NEUROPSYCHOLOGIST

## 2022-05-10 PROCEDURE — 96139 PSYCL/NRPSYC TST TECH EA: CPT | Mod: S$GLB,,, | Performed by: CLINICAL NEUROPSYCHOLOGIST

## 2022-05-13 ENCOUNTER — TELEPHONE (OUTPATIENT)
Dept: NEUROLOGY | Facility: CLINIC | Age: 77
End: 2022-05-13
Payer: MEDICARE

## 2022-05-20 ENCOUNTER — TELEPHONE (OUTPATIENT)
Dept: INTERNAL MEDICINE | Facility: CLINIC | Age: 77
End: 2022-05-20
Payer: MEDICARE

## 2022-05-20 NOTE — TELEPHONE ENCOUNTER
----- Message from Leia Gaston sent at 5/20/2022  9:22 AM CDT -----  Contact: self [phone]  Pt requesting a call will like to know if provider can prescribe him vitamin D2.    Please call and advise

## 2022-05-23 PROBLEM — F03.91: Status: ACTIVE | Noted: 2022-04-07

## 2022-05-23 RX ORDER — ERGOCALCIFEROL 1.25 MG/1
50000 CAPSULE ORAL
Qty: 12 CAPSULE | Refills: 1 | Status: SHIPPED | OUTPATIENT
Start: 2022-05-23 | End: 2022-12-07

## 2022-05-23 NOTE — PROGRESS NOTES
Outpatient Neuropsychological Evaluation  Referral Information  Name: Haja Marti  MRN: [id number]  : 1945   Age: 76 y.o.  Gender: male  Referring Provider: Reji Hidalgo MD   Billing: See below for details as coding/billing has changed     The chief complaint leading to consultation/medical necessity is: Question of Lewy Body Dementia vs PD-Dementia  Visit type: Testing, report, treatment plan with initial visit on 2022    SUMMARY/TREATMENT PLAN   Results  indicate the following  diagnoses and treatment plan recommendations.  Results and recommendations will be discussed with the patient at a separately scheduled feedback session.     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    Severe spatial dysfunction - even on easier measures.    While basic language skills are WNL (following one/two step instructions; basic communication; basic naming), complex language skills (fluency, lengthier naming measure, verbal reasoning) were impaired.       -Etiology/Severity/Present Concerns:  Etiology  1. Lewy Body Dementia seems most likely. While some discrepancy in records, earliest Neuro eval (2017) notes timing/onset of cognitive/ motor changes within a year accompanied by persistent well-formed but non-disturbing visual hallucinations. Also, very progressive functional/cognitive impairment for the past 5-years (more than we'd expect from iPD).    2. Certainly, could be PDD, but most data points to LBD. Part of the issue is discrepant report of sinemet  benefit. Regardless, he seems to benefit less so from sinement per family now.    Stage/Severity Level  Nearing or at Moderate stage dementia   Behavioral Issues  Visual hallucinations are non-disturbing so non-issue   Mood (anxiety, dysphoria, irritability) are worse and need improvement. Irritability triggered by feeling overwhelmed (e.g., too much going on)   Other Concerns  Must stop driving         Plan:  Neuropsychology Follow-up · Feedback session to review results/plan of care   · Re-evaluation/follow up in our memory clinic in 12-months   Neurology Follow-up  Given discrepancy about Sinemet benefit, recommend discussing whether this should be continued.   He was previously on Aricept but this was stopped due to side effects. Unclear if this was due to med vs another med vs something.    Mental Health Follow-up · Psychiatry/Medical Psychology: Continued follow-up and note above   · Psychology/Therapy: Recommend behaviorally-oriented strategies now given progressive dementia (e.g., wife managing environment to reduce irritability/anxiety triggers) along with distraction/redirection when upset.    Primary Care Follow-up  Per usual follow-up   Driving Evaluation  Undergoing a formal, on-the-road driving evaluation is recommended. This evaluation can be completed at the [facility] with Paul Mancini. If interested, I can place a referral and the family can contact Brionna Coates at [phone] for scheduling.        Recommendations for Mr. Marti and Caregivers/Family:   Brain Health: · Engage in regular exercise, which increases alertness and arousal and can improve attention and focus.    · Get a good nights sleep, as this can enhance alertness and cognition.  · Eat healthy foods and balanced meals. It is notable that research indicates certain nutrients may aid in brain function, such as B vitamins (especially B6, B12, and folic acid), antioxidants (such as vitamins C and E, and beta  "carotene), and Omega-3 fatty acids. Talk with your physician or nutritionist about whats right for you.   · Keep your brain active. Find activities to stay mentally active, such as reading, games (cards, checkers), puzzles (crosswords, Sudoku, jig saw), crafts (models, woodworking), gardening, or participating in activities in the community.  · Stay socially engaged. Continue staying active with your family and friends.   Sleep Tips · Poor sleep has a negative effect on cognition. Several strategies have been shown to improve sleep:   · Caffeine intake in the afternoon and evening, as well as stuffing oneself at supper, can decrease the quality of restful sleep throughout the night.   · Bedtime and wake-up times should be consistent every night and morning so the body becomes used to a single routine, even on the weekends.  · Engage in daily physical activity, but not 2-3 hours before bedtime.   · No technology use (television, computer, iPad) 1-2 hours before bed.   · Have a wind down routine (e.g., soft lights in the house, bath before bed, reduced fluid intake, songs, reading, less noise) to promote sleep readiness.   · Visit the www.sleepfoundation.org for more strategies.                Other Visit Diagnoses     Cognitive change            Thank you for allowing us to participate in Mr. Marti's care. If you have any questions, please contact us.     TIM Jenkins II, Ph.D., ABPP-CN  Board Certified Clinical Neuropsychologist  Co-Director, Cognitive Disorders and Brain Health Program  Department of Neurology and Neurosciences  [facility]      HPI  Per Neurology referral note (3/7/22)  "HPI: Haja Marti is a 76 y.o. male known to [facility] for parkinsonism.  ...has had symptoms for 7 years.  ...started with slowness of movement...  ...did not have any tremor. He still does not  ...slow with his walking...   ...voice has been hypophonic...     He was more " "confused and slower with his PD movements in November...associated with a great deal of grief from loss of close acquaintances. Treating anxiety with psychiatry helped  With confusion and somewhat the parkinsons.   Wife had noted at this time, he was not consistent with his meds and was forgetting some. He is getting meds consistently now and wife monitors this with an alarm at this time.     He is on L-dopa. He is not sure if he has off periods currently. No clear on periods...     His response to L-dopa has been clear. He does have better movements on this med per his wife. Now this is not very obvious.   Falls have increased this past year. No recent injury. Last fall was a few days. His falls are more "tripping" and not necessarily retropulsion     Having  hallucinations wife had thought these has stopped but patient was likely ignoring this. Patient states I just "tell them hi and they go away." He understands these are not real at times but not always. He is not very distressed by the hallucinations.      Wife states he still drives but she has concerns. NO accidents or incidents     Memory is challenged. He will have some short term memory loss but not severe. Patient has long periods (a few days) of better cognition mixed with inattentiveness  Sees psychology/psychiatry for anxiety...      He does dream and speak in his sleep a great deal."       CURRENT SYMPTOMS   Mr. Marti and his wife were interviewed together.     Active problems noted below.    Cognitive Symptoms:   Sxs: Confusion (e.g., how to operate iphone, TV), poor short-term memory, temporal disorientation, word-finding problems, losing train of thought, parking car crookedly     Onset: Approx. 1-2 years ago (noticeably worse over last year)   Course:   o Worsening overall, but periods of "days or weeks" struggling "then all the sudden he seems clearer"   o Notable increase in life stressors in last year (7 friends/family members  within " "2 months at end of 2021) -- anxiety/depression worsened in last year   o Denied any changes or on/off periods associated with Sinemet   o Reportedly discontinued hydroxyzine 2 weeks ago --> improved cognitive clarity somewhat     Sensory  · Trouble with vision: no major problems  · Trouble with hearing: no major problems  · Not colorblind    Current Functional Status/Needs:  ADLs  Self-Care Eating Safety   Bathing: Independent  Bathroom: Independent  Other: Some trouble putting on socks and pants due to physical limitations  Independent 6-7 falls in last year        Instrumental IADLs:   Driving Medications/Health Household Finances   Drives "a little bit" (not on highway)     -Cannot park straight anymore (used to be able to park in very tight spaces)  Wife manages for last year Puts out garbage, but wife does other chores   Wife manages for last year      Psychiatric/Behavioral Symptoms:  Mood:  Depression/Dysphoria Anxiety/Fearfulness Irritability   Lower mood over last year in context of increased life stressors    No major mood fluctuations  Longstanding "worrier"    -Worse in last year  Decreased frustration tolerance most noticeable when grandchildren are around     Behavior:  Agitation/Resistance Delusions/Paranoia Hallucinations   None None Visual hallucinations:     -well-formed; usually hypnopompic; not distressing     -Onset about 5 yrs ago ("fish in the toilet")    -Wife: "he believed they were there"    -Gradually worsening ("now sees groups of people in 20th century costumes")     Apathy/Motivation Repetitive/Restlessness Other   None None None     Neurovegetative:  Sleep/Nighttime  [employer]   RBD sxs:     -Beginning 40-50 years ago: sleep-talking and acting out dreams (e.g., motioning drawing out plays on chalkboard)      -For last 10 years (approx.) moving around more, punching, very active (sleeps in separate bed now)     -Frequent daytime napping  Decreased  Low    -Exercise: boxing 3x/week "      Suicidal/Homicidal Ideation: None reported    PD Review of Symptoms:  Tremor: none reported  Gait change: slow  Rigidity: yes  Bradykinesia: yes  Masked facies: not reported  Anosmia: no taste or smell (for decades)   Dysarthria/Hypophonia: hypophonia   Dysphagia/Sialorrhea: no   Depression: yes  Cognitive slowing: yes and onset around time of motor sxs  Fluctuating cognition: yes  Hallucinations: yes, onset around time of motor sxs (2017)  Impulsivity: no  Obsessions/Compulsions: some obsessive worrying  Urinary changes: not reported  Constipation: not reported  Orthostasis: not reported  Falls: yes (6-7 in last year)  Freezing: not reported  Micrographia: not reported  Sleep issues:       -SHU: yes, per records      -RBD: yes  Vision: no problems reported    PERTINENT BACKGROUND INFORMATION     Family History   Problem Relation Age of Onset    Cataracts Father     Cancer Father         lung    Diabetes Father     Cancer Mother         lung    Anxiety disorder Son     Depression Son     Anxiety disorder Daughter      Family Neurologic History: Negative for heritable risk factors  Family Psychiatric History: Negative for heritable risk factors    Developmental/Academic Hx:  Developmental: No gestational or later developmental concerns.  Academic:  · Learning Difficulties: Suspected dyslexia, but never failed any classes or repeated any grades   · Attention/Behavioral Difficulties: None reported   · Educational Attainment: MS in education     Social/Occupational Hx:  Social:  · Current Relationship/Family Status:  (52 years); 4 adult children; 12 grandchildren   · Primary Source of Support: Wife  · Socializing: Active with friends and family   · Current Hobbies: Boxing, spending time with family   · Stressors: Physical/cognitive sxs, loss of several friends and family    Occupational Hx:  · Occupational Status: Retired for 7 years  · 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        MEDICAL HISTORY     Patient Active Problem List   Diagnosis    Hypertension    Pure hypercholesterolemia    Obstructive sleep apnea    Depression    BPH (benign prostatic hypertrophy)    Retinal detachment    Vitreous hemorrhage    Retinoschisis    Epiretinal membrane    Posterior vitreous detachment    Pseudophakia of right eye    Age-related nuclear cataract of left eye    Constipation, chronic    Nephrolithiasis    Foraminal stenosis of lumbosacral region    Lumbar radiculopathy    Facet arthropathy, lumbar    Generalized anxiety disorder    Hypophonia    Dysarthria    Voice and resonance disorder    Parkinson's disease    Other chronic pain    Chronic pain    Colon polyps    Perirectal abscess    Fistula-in-ano    Anal fistula    Urgency incontinence    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, with behavioral disturbance       Past Medical History:   Diagnosis Date    Anxiety     Arthritis     BPH (benign prostatic hypertrophy)     Cataract     OS    Colon polyp 2002    Depression     Epiretinal membrane, both eyes     Hx of psychiatric care     Hyperlipidemia     Hypertension     Kidney stones     Kidney stones     Parkinsons     Posterior vitreous detachment of left eye     Psychiatric problem     Retinal detachment 2004    OD    Retinoschisis, left eye     Sleep apnea     Vitreous hemorrhage of left eye        Past Surgical History:   Procedure Laterality Date    BIOPSY OF ADENOIDS      CATARACT EXTRACTION      OD    COLONOSCOPY N/A 12/3/2019    Procedure: COLONOSCOPY;  Surgeon: Ceferino Oliveira MD;  Location: [facility] (4TH FLR);  Service: Endoscopy;  Laterality: N/A;  5/2019 laminectomy with post op confusion tb    CYSTOSCOPY WITH URODYNAMIC TESTING N/A 1/31/2022    Procedure: CYSTOSCOPY, WITH URODYNAMIC TESTING FLOUROSCOPIC;  Surgeon: Devang Butler MD;  [facility];  Service: Urology;  Laterality: N/A;  1hr     EPIDURAL STEROID INJECTION N/A 3/12/2019    Procedure: INJECTION, STEROID, EPIDURAL, L5-S1;  Surgeon: Laina Crockett MD;  Location: [facility] PAIN MGT;  Service: Pain Management;  Laterality: N/A;    EXAMINATION UNDER ANESTHESIA N/A 2/18/2021    Procedure: Exam under anesthesia;  Surgeon: West Clifton MD;  Location: [facility] OR 2ND FLR;  Service: Colon and Rectal;  Laterality: N/A;    EYE SURGERY      INCISION OF PERIRECTAL ABSCESS N/A 2/12/2021    Procedure: INCISION AND DRAINAGE  ABSCESS, PERIRECTAL;  Surgeon: West Clifton MD;  Location: [facility] OR 2ND FLR;  Service: Colon and Rectal;  Laterality: N/A;    INCISION OF PERIRECTAL ABSCESS Left 2/18/2021    Procedure: INCISION, ABSCESS, PERIRECTAL;  Surgeon: West Clifton MD;  Location: [facility] OR 2ND FLR;  Service: Colon and Rectal;  Laterality: Left;    INJECTION OF ANESTHETIC AGENT AROUND NERVE Bilateral 7/3/2018    Procedure: BLOCK, NERVE;  Surgeon: Laina Crockett MD;  Location: [facility] PAIN MGT;  Service: Pain Management;  Laterality: Bilateral;  Bilateral Lumbar L2,L3,L4,L5 MBB      NEEDS CONSENT    INSERTION OF SETON STITCH N/A 2/12/2021    Procedure: PLACEMENT, SETON STITCH;  Surgeon: West Clifton MD;  [facility];  Service: Colon and Rectal;  Laterality: N/A;    INSERTION OF SETON STITCH Left 2/18/2021    Procedure: PLACEMENT, SETON;  Surgeon: West Clifton MD;  Location: [facility] OR 2ND FLR;  Service: Colon and Rectal;  Laterality: Left;    INSERTION OF SETON STITCH N/A 9/24/2021    Procedure: PLACEMENT, SETON STITCH;  Surgeon: West Clifton MD;  Location: [facility] OR 2ND FLR;  Service: Colon and Rectal;  Laterality: N/A;  seton exchange    laser indirect  4/8/10    left eye    Laser Indirect Retinopexy  4/8/10    OS    PROSTATE SURGERY      RECTAL FISTULOTOMY N/A 11/12/2021    Procedure: FISTULOTOMY, RECTUM;  Surgeon: West Clifton MD;  Location: [facility] OR 2ND FLR;  Service: Colon and Rectal;  Laterality:  N/A;    RETINAL DETACHMENT SURGERY  2004    OD    ROTATOR CUFF REPAIR  11/13/2013    TONSILLECTOMY      TRANSFORAMINAL EPIDURAL INJECTION OF STEROID Bilateral 2/19/2019    Procedure: INJECTION, STEROID, EPIDURAL, TRANSFORAMINAL APPROACH, L5;  Surgeon: Laina Crockett MD;  [facility];  Service: Pain Management;  Laterality: Bilateral;       Pertinent Neurologic History  TBIs  None   Seizures  None   CVAs  None   Movement Dis.  Motor symptoms started approximately 7-years ago   2017: Saw outside neurologist who noted Parkinsonian symptoms, cognitive changes around the same time, r/o was iPD vs LBD vs PP+   2019-Current: Followed by [facility] (see notes)    Other  None       Pertinent Labs Impacting Cognition  Lab Results   Component Value Date    TGNPISQE59 312 09/13/2021     No results found for: VITAMINB1  Lab Results   Component Value Date    RPR Non-reactive 07/13/2017     Lab Results   Component Value Date    FOLATE 34.9 (H) 04/04/2022     Lab Results   Component Value Date    TSH 1.947 04/04/2022     Lab Results   Component Value Date    CALCIUM 9.7 03/31/2022    PHOS 4.3 05/08/2004      No results found for: LABA1C, HGBA1C  Lab Results   Component Value Date    HIV1X2 Negative 02/09/2005     Lab Results   Component Value Date    CREATININE 1.0 03/31/2022    BUN 24 (H) 03/31/2022     03/31/2022    K 4.4 03/31/2022     03/31/2022    CO2 29 03/31/2022      Lab Results   Component Value Date    ALT 34 03/31/2022    AST 28 03/31/2022    ALKPHOS 73 03/31/2022    BILITOT 0.9 03/31/2022        Neurodiagnostics  Results for orders placed or performed during the hospital encounter of 07/15/17   MRI Brain Without Contrast    Narrative    Exam: MRI brain without contrast    History: Dizziness, extrapyramidal movement disorder    Findings: MRI is performed with routine sequences.  No mass, hemorrhage, infarction, subdural collection, hydrocephalus or other acute finding is  seen on this cranial MR examination.  There is volume loss consistent with the patient's age.  No diffusion abnormality is seen.    Impression    Normal noncontrast MRI of the brain       Electronically signed by: FLORI NEWMAN MD  Date:     07/15/17  Time:    15:51    Results for orders placed or performed in visit on 06/20/11   CT Guidance Radiation Therapy Field    Narrative    DATE OF EXAM: Jun 20 2011      CT    0053  -  THORAX WITHOUT CONTRAST:     1321 HOURS  [id number]     CLINICAL HISTORY:   793.1    ABNORMAL FINDINGS LUNG FIELD     PROCEDURE COMMENT:        ICD 9 CODE(S):   ()     CPT 4 CODE(S)/MODIFIER(S):   ()     RESULTS: COMPARISON IS MADE TO CT THORAX DATED JUNE 21, 2010.      5 MM AXIAL IMAGES FROM THE LUNG APICES THROUGH THE COSTOPHRENIC ANGLES   WERE OBTAINED WITHOUT THE USE OF IV CONTRAST.       THE STRUCTURES AT THE BASE OF THE NECK DEMONSTRATE NO SIGNIFICANT   ABNORMALITY.       THERE IS A THREE BRANCH AORTIC ARCH AND THE AORTA MAINTAINS NORMAL   CALIBER, CONTOUR, AND COURSE WITH MILD ATHEROSCLEROTIC CALCIFICATION THAT   EXTENDS INTO THE REGION OF THE CORONARY ARTERIES MOST PROMINENTLY THE   LEFT ANTERIOR DESCENDING ARTERY.  THE HEART DOES NOT APPEAR TO BE   ENLARGED AND THERE IS NO SIGNIFICANT PERICARDIAL FLUID COLLECTION.    EXAMINATION OF THE MEDIASTINUM, HILUM, AND AXILLA DEMONSTRATE NUMEROUS   CALCIFIED BULKY LYMPH NODES IN THE MEDIASTINUM CONSISTENT WITH PRIOR   GRANULOMATOUS DISEASE.  THIS IS UNCHANGED FROM PRIOR EXAM.       THE TRACHEA AND PROXIMAL AIRWAYS ARE PATENT. THE LUNGS ARE SYMMETRICALLY   WELL EXPANDED WITH NO EVIDENCE OF LOBAR CONSOLIDATION, PLEURAL FLUID   COLLECTION, OR PLEURAL THICKENING.  ONCE AGAIN SEEN ARE MULTIPLE   NONCALCIFIED SOFT TISSUE PULMONARY NODULES ALL SIMILAR SIZE AND   APPEARANCE WHEN COMPARED TO PRIOR EXAM.  THE LARGEST OF THESE NODULES   MEASURES APPROXIMATELY 0.6 CM IN THE APICAL SEGMENT OF THE RIGHT UPPER   LOBE UNCHANGED FROM PRIOR EXAM.       THE  ESOPHAGUS DEMONSTRATES NORMAL COURSE AND CALIBER WITHOUT EVIDENCE OF   HIATAL HERNIA.  THE VISUALIZED INTRAABDOMINAL STRUCTURES DEMONSTRATE NO   SIGNIFICANT ABNORMALITY.       EXAMINATION OF OSSEOUS AND SOFT TISSUE STRUCTURES DEMONSTRATE NO   SIGNIFICANT ABNORMALITY.  THERE IS OSSEOUS DEGENERATIVE CHANGE WITHOUT   EVIDENCE OF FRACTURE OR DESTRUCTIVE PROCESS.      IMPRESSION:   1. STABLE SIZE, NUMBER, AND DISTRIBUTION OF NONCALCIFIED PULMONARY   NODULES WHICH ARE MOST LIKELY A SEQUELA OF PRIOR GRANULOMATOUS DISEASE.    GIVEN THAT THESE NODULES HAVE BEEN STABLE OVER A PERIOD OF TWO YEARS, NO   FURTHER FOLLOW-UP IS NECESSARY.       2.  SEQUELA OF PRIOR GRANULOMATOUS DISEASE INCLUDING NUMEROUS CALCIFIED   PULMONARY GRANULOMATA AND EXTENSIVE MEDIASTINAL LYMPH NODE CALCIFICATION.      3.  CORONARY ARTERY CALCIFICATION.         : [place]   Transcribe Date/Time: Jun 21 2011  8:07A  Dictated by : JOSE SCHMITT MD  Read On: 6/20/2011  3:53P  tyrel Jose M.D.  [id number]     Images were reviewed, findings were verified and document was   electronically  SIGNED BY: CLAUDIA TORRES MD On:                     Current Outpatient Medications:     atorvastatin (LIPITOR) 40 MG tablet, TAKE 1 TABLET EVERY DAY, Disp: 90 tablet, Rfl: 3    carbidopa-levodopa  mg (SINEMET)  mg per tablet, TAKE 2 TABLETS IN THE MORNING AND TAKE 1 AND 1/2 TABLETS THREE TIMES DAILY THEREAFTER, Disp: 585 tablet, Rfl: 3    clonazePAM (KLONOPIN) 0.5 MG tablet, TAKE 1/2 TO 1 TABLET BY MOUTH UP TO 3 TIMES DAILY AS NEEDED FOR ANXIETY (Patient not taking: Reported on 4/4/2022), Disp: 90 tablet, Rfl: 5    ergocalciferol (ERGOCALCIFEROL) 50,000 unit Cap, Take 1 capsule (50,000 Units total) by mouth every 7 days., Disp: 12 capsule, Rfl: 1    finasteride (PROSCAR) 5 mg tablet, TAKE 1 TABLET EVERY DAY, Disp: 90 tablet, Rfl: 3    FLUZONE HIGHDOSE QUAD 21-22  mcg/0.7 mL Syrg, , Disp: , Rfl:     folic acid (FOLVITE) 1 MG  "tablet, TAKE 1 TABLET EVERY DAY, Disp: 90 tablet, Rfl: 0    hydrOXYzine HCL (ATARAX) 10 MG Tab, TAKE 1 TABLET BY MOUTH 3 TIMES A DAY AS NEEDED ANXIETY, Disp: 270 tablet, Rfl: 1    losartan (COZAAR) 25 MG tablet, Take 1 tablet (25 mg total) by mouth once daily., Disp: 90 tablet, Rfl: 1    magnesium oxide (MAG-OX) 400 mg tablet, Take 400 mg by mouth once daily., Disp: , Rfl:     meloxicam (MOBIC) 7.5 MG tablet, TAKE 1 TABLET EVERY DAY, Disp: 90 tablet, Rfl: 0    potassium citrate (UROCIT-K) 10 mEq (1,080 mg) TbSR, TAKE 1 TABLET THREE TIMES DAILY WITH MEALS (Patient taking differently: Take 10 mEq by mouth once daily.), Disp: 270 tablet, Rfl: 3    pyridoxine, vitamin B6, (B-6) 50 MG Tab, Take 50 mg by mouth once daily., Disp: , Rfl:     sertraline (ZOLOFT) 100 MG tablet, Take 2 tablets (200 mg total) by mouth once daily., Disp: 180 tablet, Rfl: 3    sodium bicarbonate 650 MG tablet, Take 650 mg by mouth once daily. , Disp: , Rfl:     tamsulosin (FLOMAX) 0.4 mg Cap, Take 0.4 mg by mouth once daily., Disp: , Rfl:     Current Facility-Administered Medications:     tamsulosin 24 hr capsule 0.4 mg, 0.4 mg, Oral, Daily, Mike Mtz MD    Facility-Administered Medications Ordered in Other Visits:     0.9%  NaCl infusion, , Intravenous, Continuous, Lizz Lowry NP, Last Rate: 70 mL/hr at 11/12/21 1227, New Bag at 11/12/21 1227    mupirocin 2 % ointment, , Nasal, On Call Procedure, Lizz Lowry NP, Given at 11/12/21 1227      Relevant Psychiatric History:  · Childhood:  · Psychiatrist in  for anxiety   · Strained relationship with father growing up   · Adult:  · Sees Psychiatry (Dr. Elliott) every 3 months for pharmacotherapy management (reportedly no longer taking clonazepam or hydroxyzine)   · Has seen  monthly for 3-4 years for anxiety - "sometimes helpful"   · Substance Use:    · Denied alcohol or drug use hx    Social History     Tobacco Use    Smoking status: Never Smoker    " "Smokeless tobacco: Never Used   Substance and Sexual Activity    Alcohol use: No    Drug use: No    Sexual activity: Not on file       Prior Neuropsychological Testing:  None    MENTAL STATUS AND OBSERVATIONS:     APPEARANCE: Unremarkarable  ALERTNESS/ORIENTATION: Attentive and alert. See [facility]  GAIT: Slow shuffling gait   MOTOR MOVEMENTS/MANNERISMS:  Slight right hand tremor on action  SPEECH/LANGUAGE: Voice was soft/hoarse. Some word finding difficulty. For basic instructions, receptive language was normal. More confusion with multi-step instructions requiring repetition.simplication. No paraphasias.    STATED MOOD/AFFECT: The patient's stated mood was "fine."  Affect was flat and obvious hypomimia.  INTERPERSONAL BEHAVIOR: Rapport was quickly and easily established   SUICIDALITY/HOMICIDALITY: None reported  HALLUCINATIONS/DELUSIONS: No actively evidenced or endorsed; Visual hallucinations reported   THOUGHT PROCESSES/INSIGHT: Thoughts seemed logical and goal-directed.   TEST TAKING BEHAVIOR and VALIDITY: Freestanding and embedded performance validity measures and observation of effort were suggestive of adequate engagement. The current results, therefore, are likely a valid reflection of the patient's current functioning.     Note: Took medication at 2:00pm     PROCEDURES/TESTS ADMINISTERED   In addition to performing a review of pertinent medical records, reviewing limits to confidentiality, conducting a clinical interview, and explaining procedures, the following measures were administered:  Rajan Cognitive Assessment (MoCA), [employer] (ACS) Test of Pre-Morbid Functioning (TOPF), Green's MSVT, Wechsler Adult Intelligence Scale-Fourth Edition (WAIS-IV Digit Span), Trail Making Test (TMT-A&B; Scar, et al., 2004), Verbal Fluency Test(FAS/Animals; Scar, et al., 2004), Repeatable Battery for the Assessment of Neuropsychological Status (RBANS: A: Update); NAB Naming Test, POLINA-7/PHQ-9 Manual " norms were used unless otherwise indicated.  Review data Appendix Below for scores and percentiles.     BILLING     Service Description CPT Code Minutes Units   Test Evaluation Services   Neuropsychological testing evaluation services by physician [phone]   Each additional hour by physician [phone]   Test Administration and Scoring   Psychological or neuropsychological test administration and scoring by technician [phone]   Each additional 30 minutes by technician [phone]         DATA APPENDIX:   Note: It is important to note that scores/percentiles should only be interpreted by a neuropsychologist. It is common for healthy individuals to have 1-3 isolated low/unusual scores that are not indicative of any significant cognitive dysfunction.       Raw Score Type of Standardized Score Standardized Score   MSVT IR 95 - -   MSVT DR 95 - -   MSVT Cons 90 - -   MSVT PA 70 - -   MSVT FR 10 - -   ACS RDS 9 - -   RBANS EI 0 - -   PREMORBID FUNCTIONING Raw Score Type of Standardized Score Standardized Score   TOPF simple dem. eFSIQ -    TOPF pred. eFSIQ - SS 97   TOPF simple + pred. eFSIQ -    INTELLECTUAL FUNCTIONING Raw Score Type of Standardized Score Standardized Score   WAIS-IV   #REF!   Digit Span 19 ss 8         DS Forward 10 ss 10         DS Backward 7 ss 9         DS Sequence 2 ss 4         Longest Digit Forward 6 - -         Longest Digit Backward 4 - -         Longest Digit Sequence 3 - -   COGNITIVE SCREENING Raw Score Type of Standardized Score Standardized Score   MoCA 12/30 - -   Orientation - Place 2/2 - -   Orientation - Date 3/4 - -   RBANS      Immediate Memory - SS 53   VS/Construction - SS 64   Language - SS 74   Attention - SS 75   Delayed Memory - SS 48   Total Scale - SS 54   Subtests      List Learning 10 ss 2   Story Memory 7 ss 3   Figure Copy 10 ss 1   Line Orientation 13 ss -   Naming 10 ss -   Fluency 4 ss 1   Digit Span 10 ss 10   Coding 14 ss 2   List Recall 0 ss -    List Recognition 15 ss -   Story Recall 3 ss 4   Figure Recall 0 ss 1   Story Recognition  8 ss -   Figure Recognition 0 - -   LANGUAGE FUNCTIONING Raw Score Type of Standardized Score Standardized Score   RBANS Naming 10 ss -   RBANS Semantic Fluency 4 ss 1   TOPF Word Reading 32 SS 94   NAB Naming 23 Tscore 21   NAB Naming Percent Correct After Semantic Cuing 25 - -   NAB Naming Percent Correct After Phonemic Cuing 17 - -   FAS 11 Tscore 17   Letter F  5 Tscore    Animal Naming 6 Tscore 15   VISUOSPATIAL FUNCTIONING Raw Score Type of Standardized Score Standardized Score   RBANS Line Orientation  13 ss -   RBANS Figure Copy 10 ss 1   LEARNING & MEMORY Raw Score Type of Standardized Score Standardized Score   RBANS      Immediate Memory - SS 53   Delayed Memory - SS 48   List Learning 10 ss 2   List Recall 0 ss -   List Recognition 15 ss -   Story Memory 7 ss 3   Story Recall 3 ss 4   Story Recognition  8 - -   Figure Recall 0 ss 1   Figure Recognition 0 - -   ATTENTION/WORKING MEMORY Raw Score Type of Standardized Score Standardized Score   WAIS-IV Digit Span 19 ss 8         DS Forward 10 ss 10         DS Backward 7 ss 9         DS Sequence 2 ss 4         Longest Digit Forward 6 - -         Longest Digit Backward 4 - -         Longest Digit Sequence 3 - -   RBANS Digit Span  10 ss 10   MENTAL PROCESSING SPEED Raw Score Type of Standardized Score Standardized Score   RBANS Coding 14 ss 2   TMT A  150 Tscore 17   TMT A errors 1 - -   EXECUTIVE FUNCTIONING Raw Score Type of Standardized Score Standardized Score   TMT B D/C at Sample Tscore #N/A   TMT B errors D/C at Sample - -   MOOD & PERSONALITY Raw Score Type of Standardized Score Standardized Score   PHQ-9 14 - -   POLINA-7 8 - -

## 2022-05-23 NOTE — ASSESSMENT & PLAN NOTE
Assessment:  -Cognitive Testing:   Brief Screen  MoCA=12/30 (see items in report)   More Detailed Measures  Adequate basic attention and no waxing/waning of mental status   Very impaired memory (minimal cueing benefit) and executive functions (can't draw a clock).    Severe spatial dysfunction - even on easier measures.    While basic language skills are WNL (following one/two step instructions; basic communication; basic naming), complex language skills (fluency, lengthier naming measure, verbal reasoning) were impaired.       -Etiology/Severity/Present Concerns:  Etiology  1. Lewy Body Dementia seems most likely. While some discrepancy in records, earliest Neuro eval (2017) notes timing/onset of cognitive/ motor changes within a year accompanied by persistent well-formed but non-disturbing visual hallucinations. Also, very progressive functional/cognitive impairment for the past 5-years (more than we'd expect from iPD).    2. Certainly, could be PDD, but most data points to LBD. Part of the issue is discrepant report of sinemet benefit. Regardless, he seems to benefit less so from sinement per family now.    Stage/Severity Level  Nearing or at Moderate stage dementia   Behavioral Issues  Visual hallucinations are non-disturbing so non-issue   Mood (anxiety, dysphoria, irritability) are worse and need improvement. Irritability triggered by feeling overwhelmed (e.g., too much going on)   Other Concerns  Must stop driving         Plan:  Neuropsychology Follow-up · Feedback session to review results/plan of care   · Re-evaluation/follow up in our memory clinic in 12-months   Neurology Follow-up  Given discrepancy about Sinemet benefit, recommend discussing whether this should be continued.   He was previously on Aricept but this was stopped due to side effects. Unclear if this was due to med vs another med vs something.    Mental Health Follow-up · Psychiatry/Medical Psychology: Continued follow-up and  note above   · Psychology/Therapy: Recommend behaviorally-oriented strategies now given progressive dementia (e.g., wife managing environment to reduce irritability/anxiety triggers) along with distraction/redirection when upset.    Primary Care Follow-up  Per usual follow-up   Driving Evaluation  Undergoing a formal, on-the-road driving evaluation is recommended. This evaluation can be completed at the [facility] with Paul Mancini. If interested, I can place a referral and the family can contact Brionna Coates at [phone] for scheduling.        Recommendations for Mr. Marti and Caregivers/Family:   Brain Health: · Engage in regular exercise, which increases alertness and arousal and can improve attention and focus.    · Get a good nights sleep, as this can enhance alertness and cognition.  · Eat healthy foods and balanced meals. It is notable that research indicates certain nutrients may aid in brain function, such as B vitamins (especially B6, B12, and folic acid), antioxidants (such as vitamins C and E, and beta carotene), and Omega-3 fatty acids. Talk with your physician or nutritionist about whats right for you.   · Keep your brain active. Find activities to stay mentally active, such as reading, games (cards, checkers), puzzles (crosswords, Sudoku, jig saw), crafts (models, woodworking), gardening, or participating in activities in the community.  · Stay socially engaged. Continue staying active with your family and friends.   Sleep Tips · Poor sleep has a negative effect on cognition. Several strategies have been shown to improve sleep:   · Caffeine intake in the afternoon and evening, as well as stuffing oneself at supper, can decrease the quality of restful sleep throughout the night.   · Bedtime and wake-up times should be consistent every night and morning so the body becomes used to a single routine, even on the weekends.  · Engage in daily physical activity, but not 2-3 hours  before bedtime.   · No technology use (television, computer, iPad) 1-2 hours before bed.   · Have a wind down routine (e.g., soft lights in the house, bath before bed, reduced fluid intake, songs, reading, less noise) to promote sleep readiness.   · Visit the www.sleepfoundation.org for more strategies.

## 2022-05-24 ENCOUNTER — OFFICE VISIT (OUTPATIENT)
Dept: NEUROLOGY | Facility: CLINIC | Age: 77
End: 2022-05-24
Payer: MEDICARE

## 2022-05-24 DIAGNOSIS — F03.91: Primary | ICD-10-CM

## 2022-05-24 PROCEDURE — 99499 UNLISTED E&M SERVICE: CPT | Mod: 95,S$GLB,, | Performed by: CLINICAL NEUROPSYCHOLOGIST

## 2022-05-24 PROCEDURE — 99499 NO LOS: ICD-10-PCS | Mod: 95,S$GLB,, | Performed by: CLINICAL NEUROPSYCHOLOGIST

## 2022-05-24 NOTE — Clinical Note
He's for memory clinic but just with braulio for med review; they can't get in to see rody soon; he has lewy body dementia most likely; they want to review whether sinemet should remain and discuss aricept

## 2022-05-31 NOTE — ASSESSMENT & PLAN NOTE
-Feedback session completed to discuss results and plan. Review Neuropsychology Consult dated for 5/10/2022 for complete details of feedback discussion, diagnosis, treatment plan.  -Additional concerns:   >>referring to memory clinic for follow-up with braulio  -Follow-up: 12-months with neuropsych but within 3-months at memory clinic

## 2022-05-31 NOTE — PROGRESS NOTES
NEUROPSYCHOLOGY FEEDBACK   Referral Information  Name: Haja Marti  MRN: [id number]  : 1945  Age: 76 y.o.  Billing: Charges for Neuropsychology Feedback billed on 5/10/2022  The chief complaint leading to consultation/medical necessity is: Feedback session for results/treatment plan discussion  Visit type: In Clinic with wife  Total time spent with patient: 60-minutes    Problem List Items Addressed This Visit        Neuro    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, with behavioral disturbance - Primary    Current Assessment & Plan     -Feedback session completed to discuss results and plan. Review Neuropsychology Consult dated for 5/10/2022 for complete details of feedback discussion, diagnosis, treatment plan.  -Additional concerns:   >>referring to memory clinic for follow-up with braulio  -Follow-up: 12-months with neuropsych but within 3-months at memory clinic

## 2022-06-03 NOTE — PROGRESS NOTES
NEUROPSYCHOLOGY CONSULT (TELEHEALTH)    Referral Information  Name: Haja Marti  MRN: [id number]  : 1945   Age: 76 y.o.  Gender: male  Referring Provider: Reji Hidalgo MD   Billing: See below for details as coding/billing has changed   Telemedicine:   Established Patient - Audio Only Telehealth Visit  The patient location is: [place]   The provider location is: [facility] [place]  The chief complaint leading to consultation/medical necessity is: Cognitive concerns  Visit type:  Virtual visit with audio only (telephone)     Total time spent with patient: 65 minutes  The reason for the audio only service rather than synchronous audio and video virtual visit was related to technical difficulties or patient preference/necessity.  Each patient to whom I provide medical services by telemedicine is:  (1) informed of the relationship between the physician and patient and the respective role of any other health care provider with respect to management of the patient; and (2) notified that they may decline to receive medical services by telemedicine and may withdraw from such care at any time. Patient verbally consented to receive this service via voice-only telephone call.   This service was not originating from a related E/M service provided within the previous 7 days nor will  to an E/M service or procedure within the next 24 hours or my soonest available appointment.  Prevailing standard of care was able to be met in this audio-only visit.    Consent/Emergency Plan: The patient expressed an understanding of the purpose of the evaluation and consented to all procedures. I informed the patient of limits to confidentiality and discussed an emergency plan.    SUMMARY/TREATMENT PLAN   Results from the interview indicate the following likely diagnoses, which will be updated, along with treatment plan recommendations, following completion of testing. Results and recommendations will be discussed with the patient at  a separately scheduled feedback session. The patient will likely be able follow a treatment plan with help from family.    Diagnoses/Plan:  Problem List Items Addressed This Visit    None       Thank you for allowing us to participate in Mr. Marti's care. If you have any questions, please contact us.      Jc Dillard, Ph.D.  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    Severe spatial dysfunction - even on easier measures.    While basic language skills are WNL (following one/two step instructions; basic communication; basic naming), complex language skills (fluency, lengthier naming measure, verbal reasoning) were impaired.       -Etiology/Severity/Present Concerns:  Etiology  1. Lewy Body Dementia seems most likely. While some discrepancy in records, earliest Neuro eval (2017) notes timing/onset of cognitive/ motor changes within a year accompanied by persistent well-formed but non-disturbing visual hallucinations. Also, very progressive functional/cognitive impairment for the past 5-years (more than we'd expect from iPD).    2. Certainly, could be PDD, but most data points to LBD. Part of the issue is discrepant report of sinemet benefit. Regardless, he seems to benefit less so from sinement per  "family now.    Stage/Severity Level  Nearing or at Moderate stage dementia   Behavioral Issues  Visual hallucinations are non-disturbing so non-issue   Mood (anxiety, dysphoria, irritability) are worse and need improvement. Irritability triggered by feeling overwhelmed (e.g., too much going on)   Other Concerns  Must stop driving         Plan:  Neuropsychology Follow-up · Feedback session to review results/plan of care   · Re-evaluation/follow up in our memory clinic in 12-months   Neurology Follow-up  Given discrepancy about Sinemet benefit, recommend discussing whether this should be continued.   He was previously on Aricept but this was stopped due to side effects. Unclear if this was due to med vs another med vs something.    Mental Health Follow-up · Psychiatry/Medical Psychology: Continued follow-up and note above   · Psychology/Therapy: Recommend behaviorally-oriented strategies now given progressive dementia (e.g., wife managing environment to reduce irritability/anxiety triggers) along with distraction/redirection when upset.    Primary Care Follow-up  Per usual follow-up   Driving Evaluation  Undergoing a formal, on-the-road driving evaluation is recommended. This evaluation can be completed at the [facility] with Paul Mancini. If interested, I can place a referral and the family can contact Brionna Coates at [phone] for scheduling.             Per Neurology referral note (3/7/22)  "HPI: Haja Marti is a 76 y.o. male known to [facility] for parkinsonism.  ...has had symptoms for 7 years.  ...started with slowness of movement...  ...did not have any tremor. He still does not  ...slow with his walking...   ...voice has been hypophonic...     He was more confused and slower with his PD movements in November...associated with a great deal of grief from loss of close acquaintances. Treating anxiety with psychiatry helped  With confusion and somewhat " "the parkinsons.   Wife had noted at this time, he was not consistent with his meds and was forgetting some. He is getting meds consistently now and wife monitors this with an alarm at this time.     He is on L-dopa. He is not sure if he has off periods currently. No clear on periods...     His response to L-dopa has been clear. He does have better movements on this med per his wife. Now this is not very obvious.   Falls have increased this past year. No recent injury. Last fall was a few days. His falls are more "tripping" and not necessarily retropulsion     Having hallucinations wife had thought these has stopped but patient was likely ignoring this. Patient states I just "tell them hi and they go away." He understands these are not real at times but not always. He is not very distressed by the hallucinations.      Wife states he still drives but she has concerns. NO accidents or incidents     Memory is challenged. He will have some short term memory loss but not severe. Patient has long periods (a few days) of better cognition mixed with inattentiveness  Sees psychology/psychiatry for anxiety...      He does dream and speak in his sleep a great deal."       CURRENT SYMPTOMS   Mr. Marti and his wife were interviewed together.     Active problems noted below.    Cognitive Symptoms:   Sxs: Confusion (e.g., how to operate iphone, TV), poor short-term memory, temporal disorientation, word-finding problems, losing train of thought, parking car crookedly     Onset: Approx. 1-2 years ago (noticeably worse over last year)   Course:   o Worsening overall, but periods of "days or weeks" struggling "then all the sudden he seems clearer"   o Notable increase in life stressors in last year (7 friends/family members  within 2 months at end of ) -- anxiety/depression worsened in last year   o Denied any changes or on/off periods associated with Sinemet   o Reportedly discontinued hydroxyzine 2 weeks ago --> improved " "cognitive clarity somewhat     Sensory  · Trouble with vision: no major problems  · Trouble with hearing: no major problems  · Not colorblind    Current Functional Status/Needs:  ADLs  Self-Care Eating Safety   Bathing: Independent  Bathroom: Independent  Other: Some trouble putting on socks and pants due to physical limitations  Independent 6-7 falls in last year        Instrumental IADLs:   Driving Medications/Health Household Finances   Drives "a little bit" (not on highway)     -Cannot park straight anymore (used to be able to park in very tight spaces)  Wife manages for last year Puts out garbage, but wife does other chores   Wife manages for last year      Psychiatric/Behavioral Symptoms:  Mood:  Depression/Dysphoria Anxiety/Fearfulness Irritability   Lower mood over last year in context of increased life stressors    No major mood fluctuations  Longstanding "worrier"    -Worse in last year  Decreased frustration tolerance most noticeable when grandchildren are around     Behavior:  Agitation/Resistance Delusions/Paranoia Hallucinations   None None Visual hallucinations:     -well-formed; usually hypnopompic; not distressing     -Onset about 5 yrs ago ("fish in the toilet")    -Wife: "he believed they were there"    -Gradually worsening ("now sees groups of people in 20th century costumes")     Apathy/Motivation Repetitive/Restlessness Other   None None None     Neurovegetative:  Sleep/Nighttime  [employer]   RBD sxs:     -Beginning 40-50 years ago: sleep-talking and acting out dreams (e.g., motioning drawing out plays on chalkboard)      -For last 10 years (approx.) moving around more, punching, very active (sleeps in separate bed now)     -Frequent daytime napping  Decreased  Low    -Exercise: boxing 3x/week      Suicidal/Homicidal Ideation: None reported    PD Review of Symptoms:  Tremor: none reported  Gait change: slow  Rigidity: yes  Bradykinesia: yes  Masked facies: not reported  Anosmia: no taste " or smell (for decades)   Dysarthria/Hypophonia: hypophonia   Dysphagia/Sialorrhea: no   Depression: yes  Cognitive slowing: yes  Fluctuating cognition: yes  Hallucinations: yes  Impulsivity: no  Obsessions/Compulsions: some obsessive worrying  Urinary changes: not reported  Constipation: not reported  Orthostasis: not reported  Falls: yes (6-7 in last year)  Freezing: not reported  Micrographia: not reported  Sleep issues:       -SHU: yes, per records      -RBD: yes  Vision: no problems reported    PERTINENT BACKGROUND INFORMATION     Family History   Problem Relation Age of Onset    Cataracts Father     Cancer Father         lung    Diabetes Father     Cancer Mother         lung    Anxiety disorder Son     Depression Son     Anxiety disorder Daughter      Family Neurologic History: Negative for heritable risk factors  Family Psychiatric History: Negative for heritable risk factors    Developmental/Academic Hx:  Developmental: No gestational or later developmental concerns.  Academic:  · Learning Difficulties: Suspected dyslexia, but never failed any classes or repeated any grades   · Attention/Behavioral Difficulties: None reported   · Educational Attainment: MS in education     Social/Occupational Hx:  Social:  · Current Relationship/Family Status:  (52 years); 4 adult children; 12 grandchildren   · Primary Source of Support: Wife  · Socializing: Active with friends and family   · Current Hobbies: Boxing, spending time with family   · Stressors: Physical/cognitive sxs, loss of several friends and family    Occupational Hx:  · Occupational Status: Retired for 7 years  · 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       MEDICAL HISTORY     Patient Active Problem List   Diagnosis    Hypertension    Pure hypercholesterolemia    Obstructive sleep apnea    Depression    BPH (benign prostatic hypertrophy)    Retinal detachment    Vitreous hemorrhage    Retinoschisis    Epiretinal  membrane    Posterior vitreous detachment    Pseudophakia of right eye    Age-related nuclear cataract of left eye    Constipation, chronic    Nephrolithiasis    Foraminal stenosis of lumbosacral region    Lumbar radiculopathy    Facet arthropathy, lumbar    Generalized anxiety disorder    Hypophonia    Dysarthria    Voice and resonance disorder    Parkinson's disease    Other chronic pain    Chronic pain    Colon polyps    Perirectal abscess    Fistula-in-ano    Anal fistula    Urgency incontinence    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, with behavioral disturbance       Past Medical History:   Diagnosis Date    Anxiety     Arthritis     BPH (benign prostatic hypertrophy)     Cataract     OS    Colon polyp 2002    Depression     Epiretinal membrane, both eyes     Hx of psychiatric care     Hyperlipidemia     Hypertension     Kidney stones     Kidney stones     Parkinsons     Posterior vitreous detachment of left eye     Psychiatric problem     Retinal detachment 2004    OD    Retinoschisis, left eye     Sleep apnea     Vitreous hemorrhage of left eye        Past Surgical History:   Procedure Laterality Date    BIOPSY OF ADENOIDS      CATARACT EXTRACTION      OD    COLONOSCOPY N/A 12/3/2019    Procedure: COLONOSCOPY;  Surgeon: Ceferino Oliveira MD;  Location: [facility] (4TH FLR);  Service: Endoscopy;  Laterality: N/A;  5/2019 laminectomy with post op confusion tb    CYSTOSCOPY WITH URODYNAMIC TESTING N/A 1/31/2022    Procedure: CYSTOSCOPY, WITH URODYNAMIC TESTING FLOUROSCOPIC;  Surgeon: Devang Butler MD;  [facility];  Service: Urology;  Laterality: N/A;  1hr    EPIDURAL STEROID INJECTION N/A 3/12/2019    Procedure: INJECTION, STEROID, EPIDURAL, L5-S1;  Surgeon: Laina Crockett MD;  [facility];  Service: Pain Management;  Laterality: N/A;    EXAMINATION UNDER ANESTHESIA N/A 2/18/2021    Procedure: Exam under anesthesia;   Surgeon: West Clifton MD;  Location: [facility] OR 2ND FLR;  Service: Colon and Rectal;  Laterality: N/A;    EYE SURGERY      INCISION OF PERIRECTAL ABSCESS N/A 2/12/2021    Procedure: INCISION AND DRAINAGE  ABSCESS, PERIRECTAL;  Surgeon: West Clifton MD;  Location: [facility] OR 2ND FLR;  Service: Colon and Rectal;  Laterality: N/A;    INCISION OF PERIRECTAL ABSCESS Left 2/18/2021    Procedure: INCISION, ABSCESS, PERIRECTAL;  Surgeon: West Clifton MD;  Location: [facility] OR 2ND FLR;  Service: Colon and Rectal;  Laterality: Left;    INJECTION OF ANESTHETIC AGENT AROUND NERVE Bilateral 7/3/2018    Procedure: BLOCK, NERVE;  Surgeon: Laina Crockett MD;  Location: [facility] PAIN MGT;  Service: Pain Management;  Laterality: Bilateral;  Bilateral Lumbar L2,L3,L4,L5 MBB      NEEDS CONSENT    INSERTION OF SETON STITCH N/A 2/12/2021    Procedure: PLACEMENT, SETON STITCH;  Surgeon: West Clifton MD;  Location: [facility] OR 2ND FLR;  Service: Colon and Rectal;  Laterality: N/A;    INSERTION OF SETON STITCH Left 2/18/2021    Procedure: PLACEMENT, SETON;  Surgeon: West Clifton MD;  Location: [facility] OR 2ND FLR;  Service: Colon and Rectal;  Laterality: Left;    INSERTION OF SETON STITCH N/A 9/24/2021    Procedure: PLACEMENT, SETON STITCH;  Surgeon: West Clifton MD;  Location: [facility] OR [facility];  Service: Colon and Rectal;  Laterality: N/A;  seton exchange    laser indirect  4/8/10    left eye    Laser Indirect Retinopexy  4/8/10    OS    PROSTATE SURGERY      RECTAL FISTULOTOMY N/A 11/12/2021    Procedure: FISTULOTOMY, RECTUM;  Surgeon: West Clifton MD;  Location: [facility] OR 2ND FLR;  Service: Colon and Rectal;  Laterality: N/A;    RETINAL DETACHMENT SURGERY  2004    OD    ROTATOR CUFF REPAIR  11/13/2013    TONSILLECTOMY      TRANSFORAMINAL EPIDURAL INJECTION OF STEROID Bilateral 2/19/2019    Procedure: INJECTION, STEROID, EPIDURAL, TRANSFORAMINAL APPROACH, L5;  Surgeon: Laina Crockett MD;   [facility];  Service: Pain Management;  Laterality: Bilateral;       Pertinent Neurologic History  TBIs  None   Seizures  None   CVAs  None   Movement Dis.  Parkinson's    Other  None       Pertinent Labs Impacting Cognition  Lab Results   Component Value Date    INAJXYYB26 312 09/13/2021     No results found for: VITAMINB1  Lab Results   Component Value Date    RPR Non-reactive 07/13/2017     Lab Results   Component Value Date    FOLATE 34.9 (H) 04/04/2022     Lab Results   Component Value Date    TSH 1.947 04/04/2022     Lab Results   Component Value Date    CALCIUM 9.7 03/31/2022    PHOS 4.3 05/08/2004      No results found for: LABA1C, HGBA1C  Lab Results   Component Value Date    HIV1X2 Negative 02/09/2005     Lab Results   Component Value Date    CREATININE 1.0 03/31/2022    BUN 24 (H) 03/31/2022     03/31/2022    K 4.4 03/31/2022     03/31/2022    CO2 29 03/31/2022      Lab Results   Component Value Date    ALT 34 03/31/2022    AST 28 03/31/2022    ALKPHOS 73 03/31/2022    BILITOT 0.9 03/31/2022        Neurodiagnostics  Results for orders placed or performed during the hospital encounter of 07/15/17   MRI Brain Without Contrast    Narrative    Exam: MRI brain without contrast    History: Dizziness, extrapyramidal movement disorder    Findings: MRI is performed with routine sequences.  No mass, hemorrhage, infarction, subdural collection, hydrocephalus or other acute finding is seen on this cranial MR examination.  There is volume loss consistent with the patient's age.  No diffusion abnormality is seen.    Impression    Normal noncontrast MRI of the brain       Electronically signed by: FLORI NEWMAN MD  Date:     07/15/17  Time:    15:51    Results for orders placed or performed in visit on 06/20/11   CT Guidance Radiation Therapy Field    Narrative    DATE OF EXAM: Jun 20 2011      CT    0053  -  THORAX WITHOUT CONTRAST:     1321 HOURS  [id number]     CLINICAL HISTORY:   793.1     ABNORMAL FINDINGS LUNG FIELD     PROCEDURE COMMENT:        ICD 9 CODE(S):   ()     CPT 4 CODE(S)/MODIFIER(S):   ()     RESULTS: COMPARISON IS MADE TO CT THORAX DATED JUNE 21, 2010.      5 MM AXIAL IMAGES FROM THE LUNG APICES THROUGH THE COSTOPHRENIC ANGLES   WERE OBTAINED WITHOUT THE USE OF IV CONTRAST.       THE STRUCTURES AT THE BASE OF THE NECK DEMONSTRATE NO SIGNIFICANT   ABNORMALITY.       THERE IS A THREE BRANCH AORTIC ARCH AND THE AORTA MAINTAINS NORMAL   CALIBER, CONTOUR, AND COURSE WITH MILD ATHEROSCLEROTIC CALCIFICATION THAT   EXTENDS INTO THE REGION OF THE CORONARY ARTERIES MOST PROMINENTLY THE   LEFT ANTERIOR DESCENDING ARTERY.  THE HEART DOES NOT APPEAR TO BE   ENLARGED AND THERE IS NO SIGNIFICANT PERICARDIAL FLUID COLLECTION.    EXAMINATION OF THE MEDIASTINUM, HILUM, AND AXILLA DEMONSTRATE NUMEROUS   CALCIFIED BULKY LYMPH NODES IN THE MEDIASTINUM CONSISTENT WITH PRIOR   GRANULOMATOUS DISEASE.  THIS IS UNCHANGED FROM PRIOR EXAM.       THE TRACHEA AND PROXIMAL AIRWAYS ARE PATENT. THE LUNGS ARE SYMMETRICALLY   WELL EXPANDED WITH NO EVIDENCE OF LOBAR CONSOLIDATION, PLEURAL FLUID   COLLECTION, OR PLEURAL THICKENING.  ONCE AGAIN SEEN ARE MULTIPLE   NONCALCIFIED SOFT TISSUE PULMONARY NODULES ALL SIMILAR SIZE AND   APPEARANCE WHEN COMPARED TO PRIOR EXAM.  THE LARGEST OF THESE NODULES   MEASURES APPROXIMATELY 0.6 CM IN THE APICAL SEGMENT OF THE RIGHT UPPER   LOBE UNCHANGED FROM PRIOR EXAM.       THE ESOPHAGUS DEMONSTRATES NORMAL COURSE AND CALIBER WITHOUT EVIDENCE OF   HIATAL HERNIA.  THE VISUALIZED INTRAABDOMINAL STRUCTURES DEMONSTRATE NO   SIGNIFICANT ABNORMALITY.       EXAMINATION OF OSSEOUS AND SOFT TISSUE STRUCTURES DEMONSTRATE NO   SIGNIFICANT ABNORMALITY.  THERE IS OSSEOUS DEGENERATIVE CHANGE WITHOUT   EVIDENCE OF FRACTURE OR DESTRUCTIVE PROCESS.      IMPRESSION:   1. STABLE SIZE, NUMBER, AND DISTRIBUTION OF NONCALCIFIED PULMONARY   NODULES WHICH ARE MOST LIKELY A SEQUELA OF PRIOR GRANULOMATOUS DISEASE.     GIVEN THAT THESE NODULES HAVE BEEN STABLE OVER A PERIOD OF TWO YEARS, NO   FURTHER FOLLOW-UP IS NECESSARY.       2.  SEQUELA OF PRIOR GRANULOMATOUS DISEASE INCLUDING NUMEROUS CALCIFIED   PULMONARY GRANULOMATA AND EXTENSIVE MEDIASTINAL LYMPH NODE CALCIFICATION.      3.  CORONARY ARTERY CALCIFICATION.         : [place]   Transcribe Date/Time: Jun 21 2011  8:07A  Dictated by : JOSE SCHMITT MD  Read On: 6/20/2011  3:53P  tyrel Thorpe. M.D.  [id number]     Images were reviewed, findings were verified and document was   electronically  SIGNED BY: CLAUDIA THORPE MD On:                     Current Outpatient Medications:     atorvastatin (LIPITOR) 40 MG tablet, TAKE 1 TABLET EVERY DAY, Disp: 90 tablet, Rfl: 3    carbidopa-levodopa  mg (SINEMET)  mg per tablet, TAKE 2 TABLETS IN THE MORNING AND TAKE 1 AND 1/2 TABLETS THREE TIMES DAILY THEREAFTER, Disp: 585 tablet, Rfl: 3    clonazePAM (KLONOPIN) 0.5 MG tablet, TAKE 1/2 TO 1 TABLET BY MOUTH UP TO 3 TIMES DAILY AS NEEDED FOR ANXIETY (Patient not taking: Reported on 4/4/2022), Disp: 90 tablet, Rfl: 5    ergocalciferol (ERGOCALCIFEROL) 50,000 unit Cap, Take 1 capsule (50,000 Units total) by mouth every 7 days., Disp: 12 capsule, Rfl: 1    finasteride (PROSCAR) 5 mg tablet, TAKE 1 TABLET EVERY DAY, Disp: 90 tablet, Rfl: 3    FLUZONE HIGHDOSE QUAD 21-22  mcg/0.7 mL Syrg, , Disp: , Rfl:     folic acid (FOLVITE) 1 MG tablet, TAKE 1 TABLET EVERY DAY, Disp: 90 tablet, Rfl: 0    hydrOXYzine HCL (ATARAX) 10 MG Tab, TAKE 1 TABLET BY MOUTH 3 TIMES A DAY AS NEEDED ANXIETY, Disp: 270 tablet, Rfl: 1    losartan (COZAAR) 25 MG tablet, Take 1 tablet (25 mg total) by mouth once daily., Disp: 90 tablet, Rfl: 1    magnesium oxide (MAG-OX) 400 mg tablet, Take 400 mg by mouth once daily., Disp: , Rfl:     meloxicam (MOBIC) 7.5 MG tablet, TAKE 1 TABLET EVERY DAY, Disp: 90 tablet, Rfl: 0    potassium citrate (UROCIT-K) 10 mEq (1,080 mg) TbSR, TAKE  "1 TABLET THREE TIMES DAILY WITH MEALS (Patient taking differently: Take 10 mEq by mouth once daily.), Disp: 270 tablet, Rfl: 3    pyridoxine, vitamin B6, (B-6) 50 MG Tab, Take 50 mg by mouth once daily., Disp: , Rfl:     sertraline (ZOLOFT) 100 MG tablet, Take 2 tablets (200 mg total) by mouth once daily., Disp: 180 tablet, Rfl: 3    sodium bicarbonate 650 MG tablet, Take 650 mg by mouth once daily. , Disp: , Rfl:     tamsulosin (FLOMAX) 0.4 mg Cap, Take 0.4 mg by mouth once daily., Disp: , Rfl:     Current Facility-Administered Medications:     tamsulosin 24 hr capsule 0.4 mg, 0.4 mg, Oral, Daily, Mike Mtz MD    Facility-Administered Medications Ordered in Other Visits:     0.9%  NaCl infusion, , Intravenous, Continuous, Lizz Lowry NP, Last Rate: 70 mL/hr at 11/12/21 1227, New Bag at 11/12/21 1227    mupirocin 2 % ointment, , Nasal, On Call Procedure, Lizz Lowry NP, Given at 11/12/21 1227      Relevant Psychiatric History:  · Childhood:  · Psychiatrist in  for anxiety   · Strained relationship with father growing up   · Adult:  · Sees Psychiatry (Dr. Elliott) every 3 months for pharmacotherapy management (reportedly no longer taking clonazepam or hydroxyzine)   · Has seen  monthly for 3-4 years for anxiety - "sometimes helpful"   · Substance Use:    · Denied alcohol or drug use hx    Social History     Tobacco Use    Smoking status: Never Smoker    Smokeless tobacco: Never Used   Substance and Sexual Activity    Alcohol use: No    Drug use: No    Sexual activity: Not on file       Prior Neuropsychological Testing:  None    MENTAL STATUS AND OBSERVATIONS:     APPEARANCE: Not assessed   ALERTNESS/ORIENTATION: Attentive and alert. Fully oriented (x5) to time and place  GAIT: Not assessed  MOTOR MOVEMENTS/MANNERISMS: Not assessed  SPEECH/LANGUAGE: Normal in rate, rhythm, and tone. Voice was soft/hoarse. Some word finding difficulty and losing train of thought noted " "during interview. Otherwise, expressive and receptive language was normal.  STATED MOOD/AFFECT: The patient's stated mood was "not great." Affect was seemingly congruent with stated mood.   INTERPERSONAL BEHAVIOR: Rapport was quickly and easily established   SUICIDALITY/HOMICIDALITY: None reported  HALLUCINATIONS/DELUSIONS: No actively evidenced or endorsed; Visual hallucinations reported   THOUGHT PROCESSES/INSIGHT: Thoughts seemed logical and goal-directed.     BILLING  Service Description CPT Code Minutes Units   Psychiatric diagnostic evaluation by physician [id number]   Neurobehavioral status exam by physician [id number]  0   Each additional hour by physician [id number]  0         "

## 2022-06-08 ENCOUNTER — OFFICE VISIT (OUTPATIENT)
Dept: NEUROLOGY | Facility: CLINIC | Age: 77
End: 2022-06-08
Payer: MEDICARE

## 2022-06-08 ENCOUNTER — PATIENT MESSAGE (OUTPATIENT)
Dept: NEUROLOGY | Facility: CLINIC | Age: 77
End: 2022-06-08
Payer: MEDICARE

## 2022-06-08 DIAGNOSIS — R43.0 ANOSMIA: ICD-10-CM

## 2022-06-08 DIAGNOSIS — R82.90 URINE ABNORMALITY: ICD-10-CM

## 2022-06-08 DIAGNOSIS — R42 DIZZY: ICD-10-CM

## 2022-06-08 DIAGNOSIS — F03.90 MAJOR NEUROCOGNITIVE DISORDER: ICD-10-CM

## 2022-06-08 DIAGNOSIS — R43.2 AGEUSIA: ICD-10-CM

## 2022-06-08 DIAGNOSIS — G47.33 OSA (OBSTRUCTIVE SLEEP APNEA): ICD-10-CM

## 2022-06-08 DIAGNOSIS — G47.52 RBD (REM BEHAVIORAL DISORDER): ICD-10-CM

## 2022-06-08 DIAGNOSIS — R44.1 VISUAL HALLUCINATIONS: ICD-10-CM

## 2022-06-08 DIAGNOSIS — R26.89 PRIMARY FREEZING OF GAIT: ICD-10-CM

## 2022-06-08 DIAGNOSIS — K59.00 CONSTIPATION, UNSPECIFIED CONSTIPATION TYPE: ICD-10-CM

## 2022-06-08 DIAGNOSIS — G20.C PARKINSONISM, UNSPECIFIED PARKINSONISM TYPE: Primary | ICD-10-CM

## 2022-06-08 PROCEDURE — 99215 OFFICE O/P EST HI 40 MIN: CPT | Mod: S$GLB,,, | Performed by: PSYCHIATRY & NEUROLOGY

## 2022-06-08 PROCEDURE — 99499 RISK ADDL DX/OHS AUDIT: ICD-10-PCS | Mod: HCNC,S$GLB,, | Performed by: NURSE PRACTITIONER

## 2022-06-08 PROCEDURE — 1160F RVW MEDS BY RX/DR IN RCRD: CPT | Mod: CPTII,S$GLB,, | Performed by: PSYCHIATRY & NEUROLOGY

## 2022-06-08 PROCEDURE — 99499 UNLISTED E&M SERVICE: CPT | Mod: HCNC,S$GLB,, | Performed by: NURSE PRACTITIONER

## 2022-06-08 PROCEDURE — 1159F MED LIST DOCD IN RCRD: CPT | Mod: CPTII,S$GLB,, | Performed by: PSYCHIATRY & NEUROLOGY

## 2022-06-08 PROCEDURE — 99215 OFFICE O/P EST HI 40 MIN: CPT | Mod: S$GLB,,, | Performed by: NURSE PRACTITIONER

## 2022-06-08 PROCEDURE — 1159F PR MEDICATION LIST DOCUMENTED IN MEDICAL RECORD: ICD-10-PCS | Mod: CPTII,S$GLB,, | Performed by: PSYCHIATRY & NEUROLOGY

## 2022-06-08 PROCEDURE — 99417 PROLNG OP E/M EACH 15 MIN: CPT | Mod: S$GLB,,, | Performed by: NURSE PRACTITIONER

## 2022-06-08 PROCEDURE — 99417 PR PROLONGED SVC, OUTPT, W/WO DIRECT PT CONTACT,  EA ADDTL 15 MIN: ICD-10-PCS | Mod: S$GLB,,, | Performed by: PSYCHIATRY & NEUROLOGY

## 2022-06-08 PROCEDURE — 99417 PR PROLONGED SVC, OUTPT, W/WO DIRECT PT CONTACT,  EA ADDTL 15 MIN: ICD-10-PCS | Mod: S$GLB,,, | Performed by: NURSE PRACTITIONER

## 2022-06-08 PROCEDURE — 99215 PR OFFICE/OUTPT VISIT, EST, LEVL V, 40-54 MIN: ICD-10-PCS | Mod: S$GLB,,, | Performed by: PSYCHIATRY & NEUROLOGY

## 2022-06-08 PROCEDURE — 99999 PR PBB SHADOW E&M-EST. PATIENT-LVL II: ICD-10-PCS | Mod: PBBFAC,,,

## 2022-06-08 PROCEDURE — 99417 PROLNG OP E/M EACH 15 MIN: CPT | Mod: S$GLB,,, | Performed by: PSYCHIATRY & NEUROLOGY

## 2022-06-08 PROCEDURE — 99215 PR OFFICE/OUTPT VISIT, EST, LEVL V, 40-54 MIN: ICD-10-PCS | Mod: S$GLB,,, | Performed by: NURSE PRACTITIONER

## 2022-06-08 PROCEDURE — 1160F PR REVIEW ALL MEDS BY PRESCRIBER/CLIN PHARMACIST DOCUMENTED: ICD-10-PCS | Mod: CPTII,S$GLB,, | Performed by: PSYCHIATRY & NEUROLOGY

## 2022-06-08 PROCEDURE — 99999 PR PBB SHADOW E&M-EST. PATIENT-LVL II: CPT | Mod: PBBFAC,,,

## 2022-06-08 NOTE — PROGRESS NOTES
6-8-22    The patient location is: [place] (in clinic)  The chief complaint leading to consultation is: Parkinson's disease/DLB    Visit type: audiovisual    Face to Face time with patient: 135 minutes (90 min of this was face to face) of total time spent on the encounter, which includes face to face time and non-face to face time preparing to see the patient (eg, review of tests), Obtaining and/or reviewing separately obtained history, Documenting clinical information in the electronic or other health record, Independently interpreting results (not separately reported) and communicating results to the patient/family/caregiver, or Care coordination (not separately reported).         Each patient to whom he or she provides medical services by telemedicine is:  (1) informed of the relationship between the physician and patient and the respective role of any other health care provider with respect to management of the patient; and (2) notified that he or she may decline to receive medical services by telemedicine and may withdraw from such care at any time.    Notes:   75 yo male with HTN, HLD, POLINA, chronic back issues, diagnosed with Parkinson's disease presents for initial evaluation with me at request of Dr. Jenkins after recent Neuropsychological evaluation revealed most likely DLB, although PDD in differential. He requested that I review his current meds and discuss possible options for meds. He has seen Dr. Ivey and most recently saw Dr. Reji Hidalgo.  He is accompanied by his wife of 52 years.     They recall that his first symptom was in 2016. He was bending over when he walked. People would ask him why he was stooping. Son is a physician and also commented that he did not swing his arms and recommended he be seen. He first saw Dr. Bedoya, neuro, 7/2017.     Wife recalls that he when he retired 7 years ago, they knew something wasn't quite right with him but there was nothing specific. He was not happy in  his job nearing his USP. 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 He was never exposed to chemicals. He always had depression and anxiety but it was more notable around his USP and even more notable now. He is under the care of psychiatry.      They have never noted tremor until recently when Dr. Hidalgo mentioned seeing slight tremor in hands. Since then wife has noted in RH on rare occasion. He is really not aware of this. Does not bother him.     He notes that his memory is not great. He has noted changes for the past few years. She agrees. She is really not sure if he had memory problems at time of his USP.  Wife offers that he can have days where he is great, really clear. A few days or a week later, he is miserable and confused.       For the past year, he has had more trouble moving his feet but even worse the past couple of months. She says it is like his feet are tired and steps are tiny. This is not all the time. When it does happen, it does not stop once he gets going. He does shuffle.   He offers that he used to walk a lot but can't do anymore. Gets tired and can't move because he feels tight. Feet won't move. He will shuffle. Wife says it is like he is freezing in place. When this happens, he will rest/nap and then gets little better. He has not fallen but they worry about this. He does have a walker but prefers to hold onto her shoulder.     Takes carbidopa/levodopa 25/100 1.5 tabs @ 10A-2P-6P  Dr. Ivey todl him a year ago that he could tke a 2 tabs in the AM and then 1.5 tabs the other doses. He tried this but they never saw a difference.   When he was increased from one tab to 1.5 tabs, they never saw a difference.   They both agree that they have really never saw improvement in symptoms with carbidopa/levodopa. They have gone to support groups and heard them discuss OFF/ON and how the medicine helps. He has never identified with this.      On average, he  naps 4-5 hours per day, which then causes difficulty with maintaining sleep at night. He has acted out his dreams since they were first  but this has increased over the past 7-8 years. They now sleep in separate rooms because of this.   He does have SHU but does want to use CPAP.   Tired stratgesi to stay awke during day but theresa fall asleep in chair.     He does hallucinate. This started several years ago. Wife recalls him awaking her to show her a fish in the toilet 4-5 years ago. She could not convince him this was not real. He does see people. Never frightening and never hears them.   He had back surgery a couple of years ago and he did have auditory hallucinations shortly thereafter. This has not happened before or since.     ROS:  General: Appetite varies, weight stable  ENT: Smell /taste not been good since age 31 (cleaned midlew with clorox around this time, unsure if this was cause); +drooling (gottten little worse); hearing intact; swallowing intact; no diff chewing  Pulm: No coughing, choking of SOB  Cardio: No CP or leg swelling   GI: +constipation (takes miralax daily, helps), no incontinece  : +frequency, + urgency, +retention, +leakage and incontinence at times  Psych: +Sleep (trouble staying asleep); +active dreams, +depression, +anxiety, +hallucinations  Musculoskeletal: + gait disturbance  Neuro: +tremor (subtle RH per wife only), +memory trouble, periodic dizziness/lightheaded      LIMITED EXAM:   Examiner joined virtually 2/2 covid- pt and wife were in exam room  Awake, alert, pleasant and cooperative.   RR equal and unlabored. NAD  Masked facies, lips parted.  Moderate hypophonia but nearly always able to understand.   Hearing intact to conversation.   Mild to nearing moderate global bradykinesia.  No tremor observed today.   Seated.       Review of Dr. Bedoya note from 7-:Son felt he had some short term memory trouble, pt denied. Unremarkable MRI brain.   8-9-2017- Dr. Bedoya note  indicates he was taking carbidopa/levodopa BID and family reported improved walking but still not swinging arms. Started him on donepezil 5 mg at this visit. He called back reporting nausea and diarrhea.       Review of Dr. Ivey note from 10-23-17- in ROS section he mentions that he experienced thinking he saw people in his den (not clear if he was on l-dopa at this point or not as had stopped and then re-started it) and had an experience like this 2 years prior in his bedroom (when not taking l-dopa)     NEUROPSYCHOLOGICAL EVALUATION 5-10-22:  · 1. Lewy Body Dementia seems most likely. While some discrepancy in records, earliest Neuro eval (2017) notes timing/onset of cognitive/ motor changes within a year accompanied by persistent well-formed but non-disturbing visual hallucinations. Also, very progressive functional/cognitive impairment for the past 5-years (more than we'd expect from iPD).   · 2. Certainly, could be PDD, but most data points to LBD. Part of the issue is discrepant report of sinemet benefit. Regardless, he seems to benefit less so from sinement per family now.      IMPRESSION:  1) PDism, suspicious of DLB   -not convincing l-dopa response  2) Dementia   -likely DLB rather than PDD   -Early cognitive complaints (described in first visit in 2017 with Dr. Bedoya)   -wife describes fluctuations   3) RBD   - 50+ years, worse the past 7-8 years    4) Visual hallucinations   -early in disease course, even before l-dopa initiated per chart review  5) Anosmia / Ageusia    -x 4 decades, possibly related to heavy Clorox exposure while cleaning  mildew  6) Constipation   -Miralax helps  7) Urine Disturbance   -frequency, urgency,retention,leakage & incontinence (at times)  8) FOG    -by their description, did not observe gait today  9) SHU   -intolerant to CPAP  10) Intermittent lightheaded/dizzy    PLAN:   Suspicious for DLB. Discussed with them today. Reviewed timeline of symptom onset in DLB vs PDD.      They really do not think carbidopa/levodopa ever really provided benefit. Discussed option of increasing dose to see if dose just isn't high enough vs weaning off dose to see if indeed helpful. They opt for latter. I agree. I have reviewed the goals of carbidopa/levodopa. If symptoms get worse, go back to prev dose that was helpful and let me know. Reducing l-dopa may also reduce hallucinations. He is very clear these don't bother him.     Also discussed option of adding donepezil or rivastigmine. On chart review (after visit ended) it looks like he was briefly on low dose donepezil and experienced nausea and diarrhea. Will keep this in consideration.      Consider PT in near future for gait.    He is scheduled to see Dr Hidalgo in August for Follow up.     He will return to memory clinic in 3 months.     Message for problems or questions.    I will not dc l-dopa on med list until we have clearer answers if he will stay on this or not.      I will message them with instructions for weaning l-dopa.     ..  ..Asia Faye, GUILLERMO, NP-C

## 2022-06-13 NOTE — PROGRESS NOTES
MEDICAL HISTORY:  Hypertension.  Hyperlipidemia.  Parkinsonism with cognitive deficit  Depression, anxiety.  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  SHU  Bilateral retinal detachment.  Adenomatous colon polyp in 2002.  Tonsillectomy.  Hemorrhoidectomy.  Appendectomy.  Left inguinal hernia repair.  Left knee surgery.  Lumbar degenerative disc disease, status post bilateral L4 transforaminal epidural steroid injection   /   lumbar laminectomy and facetectomy L2-L5  BPH        SOCIAL HISTORY:  Tobacco and alcohol use - none.  , has four children.        MEDICINES:  Atorvastatin 40 mg daily.  Carbidopa levodopa  mg 1 tablet t.i.d.   Vitamin D 50,000 unit once a month.  Finasteride 5 mg a day..  Losartan 25 mg  Magnesium oxide 400 mg daily.  Vitamin B6 50 mg daily.  Sertraline 100 mg two a day.  Tamsulosin 0.4 mg  Urocit-K 10 mEq  Mobic 7.5 mg  Folic acid  Hydroxyzine 10 mg t.i.d. as needed  Sodium bicarbonate 650 mg daily      76-year-old male, who is here with his wife  Follow-up on blood pressure  Yesterday he was that [facility] in preparation for medial branch facet joint injection.  Blood pressure reading was noted be 222/118.  It is noted that on June 10th it was in a physician's office with the reading was 128/70.  He did not report any anxiety and there was no chest pain palpitations shortness of breath.  His wife gave him an additional losartan 25 mg afterwards.    He has not been checking blood pressure at home.    He has been recently followed by Neurology and has a question of the diagnosis of Parkinson's disease and may actually have that of Lewy bodies disease.  Because of such carbidopa is being weaned down, was not thought to be effective anyway.  Also other new medications is he is no longer on clonazepam but taking hydroxyzine for anxiety    Examination  Weight 197 lb  Pulse 72  Blood pressure 144/70  Chest clear breath sounds  Heart regular rate rhythm  Abdominal  exam is bowel sounds soft nontender no bruits      Addendum it is also noted that he has been on sodium bicarbonate once a day for quite awhile from his previous nephrologist who was managing history of nephrolithiasis      Impression  Hypertension with labile control  Neuro cognitive disorder    Plan  Today repeat basic metabolic profile in folic acid  Increase losartan 25 mg 2 tablets a day

## 2022-06-14 ENCOUNTER — OFFICE VISIT (OUTPATIENT)
Dept: INTERNAL MEDICINE | Facility: CLINIC | Age: 77
End: 2022-06-14
Payer: MEDICARE

## 2022-06-14 ENCOUNTER — LAB VISIT (OUTPATIENT)
Dept: LAB | Facility: HOSPITAL | Age: 77
End: 2022-06-14
Attending: INTERNAL MEDICINE
Payer: MEDICARE

## 2022-06-14 VITALS
OXYGEN SATURATION: 98 % | BODY MASS INDEX: 28.2 KG/M2 | HEART RATE: 60 BPM | DIASTOLIC BLOOD PRESSURE: 84 MMHG | WEIGHT: 197 LBS | SYSTOLIC BLOOD PRESSURE: 136 MMHG | HEIGHT: 70 IN

## 2022-06-14 DIAGNOSIS — G31.9 NEURODEGENERATIVE DISORDER: ICD-10-CM

## 2022-06-14 DIAGNOSIS — I10 ESSENTIAL HYPERTENSION: ICD-10-CM

## 2022-06-14 DIAGNOSIS — I10 ESSENTIAL HYPERTENSION: Primary | ICD-10-CM

## 2022-06-14 LAB
ANION GAP SERPL CALC-SCNC: 12 MMOL/L (ref 8–16)
BUN SERPL-MCNC: 23 MG/DL (ref 8–23)
CALCIUM SERPL-MCNC: 10.3 MG/DL (ref 8.7–10.5)
CHLORIDE SERPL-SCNC: 101 MMOL/L (ref 95–110)
CO2 SERPL-SCNC: 24 MMOL/L (ref 23–29)
CREAT SERPL-MCNC: 1.1 MG/DL (ref 0.5–1.4)
EST. GFR  (AFRICAN AMERICAN): >60 ML/MIN/1.73 M^2
EST. GFR  (NON AFRICAN AMERICAN): >60 ML/MIN/1.73 M^2
FOLATE SERPL-MCNC: 19.3 NG/ML (ref 4–24)
GLUCOSE SERPL-MCNC: 92 MG/DL (ref 70–110)
POTASSIUM SERPL-SCNC: 4.6 MMOL/L (ref 3.5–5.1)
SODIUM SERPL-SCNC: 137 MMOL/L (ref 136–145)

## 2022-06-14 PROCEDURE — 3075F SYST BP GE 130 - 139MM HG: CPT | Mod: CPTII,S$GLB,, | Performed by: INTERNAL MEDICINE

## 2022-06-14 PROCEDURE — 3288F PR FALLS RISK ASSESSMENT DOCUMENTED: ICD-10-PCS | Mod: CPTII,S$GLB,, | Performed by: INTERNAL MEDICINE

## 2022-06-14 PROCEDURE — 1101F PT FALLS ASSESS-DOCD LE1/YR: CPT | Mod: CPTII,S$GLB,, | Performed by: INTERNAL MEDICINE

## 2022-06-14 PROCEDURE — 3288F FALL RISK ASSESSMENT DOCD: CPT | Mod: CPTII,S$GLB,, | Performed by: INTERNAL MEDICINE

## 2022-06-14 PROCEDURE — 3079F PR MOST RECENT DIASTOLIC BLOOD PRESSURE 80-89 MM HG: ICD-10-PCS | Mod: CPTII,S$GLB,, | Performed by: INTERNAL MEDICINE

## 2022-06-14 PROCEDURE — 1126F AMNT PAIN NOTED NONE PRSNT: CPT | Mod: CPTII,S$GLB,, | Performed by: INTERNAL MEDICINE

## 2022-06-14 PROCEDURE — 1159F MED LIST DOCD IN RCRD: CPT | Mod: CPTII,S$GLB,, | Performed by: INTERNAL MEDICINE

## 2022-06-14 PROCEDURE — 1101F PR PT FALLS ASSESS DOC 0-1 FALLS W/OUT INJ PAST YR: ICD-10-PCS | Mod: CPTII,S$GLB,, | Performed by: INTERNAL MEDICINE

## 2022-06-14 PROCEDURE — 1126F PR PAIN SEVERITY QUANTIFIED, NO PAIN PRESENT: ICD-10-PCS | Mod: CPTII,S$GLB,, | Performed by: INTERNAL MEDICINE

## 2022-06-14 PROCEDURE — 99214 OFFICE O/P EST MOD 30 MIN: CPT | Mod: S$GLB,,, | Performed by: INTERNAL MEDICINE

## 2022-06-14 PROCEDURE — 99214 PR OFFICE/OUTPT VISIT, EST, LEVL IV, 30-39 MIN: ICD-10-PCS | Mod: S$GLB,,, | Performed by: INTERNAL MEDICINE

## 2022-06-14 PROCEDURE — 3079F DIAST BP 80-89 MM HG: CPT | Mod: CPTII,S$GLB,, | Performed by: INTERNAL MEDICINE

## 2022-06-14 PROCEDURE — 80048 BASIC METABOLIC PNL TOTAL CA: CPT | Performed by: INTERNAL MEDICINE

## 2022-06-14 PROCEDURE — 99999 PR PBB SHADOW E&M-EST. PATIENT-LVL IV: ICD-10-PCS | Mod: PBBFAC,,, | Performed by: INTERNAL MEDICINE

## 2022-06-14 PROCEDURE — 3075F PR MOST RECENT SYSTOLIC BLOOD PRESS GE 130-139MM HG: ICD-10-PCS | Mod: CPTII,S$GLB,, | Performed by: INTERNAL MEDICINE

## 2022-06-14 PROCEDURE — 1160F PR REVIEW ALL MEDS BY PRESCRIBER/CLIN PHARMACIST DOCUMENTED: ICD-10-PCS | Mod: CPTII,S$GLB,, | Performed by: INTERNAL MEDICINE

## 2022-06-14 PROCEDURE — 1159F PR MEDICATION LIST DOCUMENTED IN MEDICAL RECORD: ICD-10-PCS | Mod: CPTII,S$GLB,, | Performed by: INTERNAL MEDICINE

## 2022-06-14 PROCEDURE — 82746 ASSAY OF FOLIC ACID SERUM: CPT | Mod: GA | Performed by: INTERNAL MEDICINE

## 2022-06-14 PROCEDURE — 1160F RVW MEDS BY RX/DR IN RCRD: CPT | Mod: CPTII,S$GLB,, | Performed by: INTERNAL MEDICINE

## 2022-06-14 PROCEDURE — 36415 COLL VENOUS BLD VENIPUNCTURE: CPT | Performed by: INTERNAL MEDICINE

## 2022-06-14 PROCEDURE — 99999 PR PBB SHADOW E&M-EST. PATIENT-LVL IV: CPT | Mod: PBBFAC,,, | Performed by: INTERNAL MEDICINE

## 2022-06-15 ENCOUNTER — PATIENT MESSAGE (OUTPATIENT)
Dept: INTERNAL MEDICINE | Facility: CLINIC | Age: 77
End: 2022-06-15
Payer: MEDICARE

## 2022-06-15 RX ORDER — SODIUM BICARBONATE 650 MG/1
650 TABLET ORAL DAILY
Qty: 90 TABLET | Refills: 3 | Status: SHIPPED | OUTPATIENT
Start: 2022-06-15 | End: 2023-04-26

## 2022-06-20 ENCOUNTER — PATIENT MESSAGE (OUTPATIENT)
Dept: INTERNAL MEDICINE | Facility: CLINIC | Age: 77
End: 2022-06-20
Payer: MEDICARE

## 2022-06-20 ENCOUNTER — PATIENT MESSAGE (OUTPATIENT)
Dept: NEUROLOGY | Facility: CLINIC | Age: 77
End: 2022-06-20
Payer: MEDICARE

## 2022-06-23 ENCOUNTER — OFFICE VISIT (OUTPATIENT)
Dept: PSYCHIATRY | Facility: CLINIC | Age: 77
End: 2022-06-23
Payer: MEDICARE

## 2022-06-23 DIAGNOSIS — F41.1 GENERALIZED ANXIETY DISORDER: Primary | ICD-10-CM

## 2022-06-23 DIAGNOSIS — F03.91: ICD-10-CM

## 2022-06-23 PROCEDURE — 90834 PR PSYCHOTHERAPY W/PATIENT, 45 MIN: ICD-10-PCS | Mod: FQ,95,, | Performed by: SOCIAL WORKER

## 2022-06-23 PROCEDURE — 90785 PR INTERACTIVE COMPLEXITY: ICD-10-PCS | Mod: 95,,, | Performed by: SOCIAL WORKER

## 2022-06-23 PROCEDURE — 90785 PSYTX COMPLEX INTERACTIVE: CPT | Mod: 95,,, | Performed by: SOCIAL WORKER

## 2022-06-23 PROCEDURE — 90834 PSYTX W PT 45 MINUTES: CPT | Mod: FQ,95,, | Performed by: SOCIAL WORKER

## 2022-06-23 NOTE — PROGRESS NOTES
Individual Psychotherapy (PhD/LCSW)    6/23/2022    Site:  Telemed         Therapeutic Intervention: Met with patient and spouse.  Outpatient - Insight oriented psychotherapy 45 min - CPT code 56394    Chief complaint/reason for encounter: depression and anxiety     Interval history and content of current session:      Site: [facility]             Length of service: 45         Therapeutic intervention:     Persons present: spouse and pt  [place] (needed to help with interpretation of intervention due to pt cognitive decline due to dementia).    Interval history:      The patient location is: home [place].   The chief complaint leading to consultation is: anxiety/depression  Visit type: audio  Total time spent with patient: 45 min  Each patient to whom he or she provides medical services by telemedicine is:  (1) informed of the relationship between the physician and patient and the respective role of any other health care provider with respect to management of the patient; and (2) notified that he or she may decline to receive medical services by telemedicine and may withdraw from such care at any time.       Notes:   Guidance given to [place] regarding letting go of certain memory lapses of .  Of accommodating to the changes of .  Her travel in cruise recommended.  He does not want to and they have agreed him staying will be accepted.  He is increasingly losing more memory.  His concentration and communication is impaired as well.  He is encouraged to use the walker.                Target symptoms: depression, anxiety        Progress toward goals: progressing well    Diagnosis: generalized anxiety disorder       Treatment plan:  · Target symptoms: anxiety   · Why chosen therapy is appropriate versus another modality: relevant to diagnosis, evidence based practice  · Outcome monitoring methods: self-report, observation, feedback from family  · Therapeutic intervention type: behavior modifying  psychotherapy    Risk parameters:  Patient reports no suicidal ideation  Patient reports no homicidal ideation  Patient reports no self-injurious behavior  Patient reports no violent behavior        Verbal deficits: None           Patient's response to intervention:   The patient's response to intervention is accepting, motivated.              Progress toward goals and other mental status changes:  The patient's progress toward goals is fair .    Diagnosis:     ICD-10-CM ICD-9-CM   1. Generalized anxiety disorder F41.1 300.02       Plan:  individual psychotherapy and family psychotherapy    Return to clinic: as scheduled    Length of Service (minutes): 45

## 2022-07-18 ENCOUNTER — PATIENT MESSAGE (OUTPATIENT)
Dept: NEUROLOGY | Facility: CLINIC | Age: 77
End: 2022-07-18
Payer: MEDICARE

## 2022-07-22 ENCOUNTER — TELEPHONE (OUTPATIENT)
Dept: UROLOGY | Facility: CLINIC | Age: 77
End: 2022-07-22
Payer: MEDICARE

## 2022-07-22 NOTE — TELEPHONE ENCOUNTER
----- Message from Escobar Denny sent at 7/22/2022  8:53 AM CDT -----   Name of Who is Calling:     What is the request in detail:  patient request call back in reference to uncontrol bladder  / patient state want to schedule to be seen asap  Please contact to further discuss and advise      Can the clinic reply by [facility]:     What Number to Call Back if not in [facility]:  [phone]

## 2022-08-04 ENCOUNTER — OFFICE VISIT (OUTPATIENT)
Dept: NEUROLOGY | Facility: CLINIC | Age: 77
End: 2022-08-04
Payer: MEDICARE

## 2022-08-04 VITALS
HEIGHT: 70 IN | WEIGHT: 194 LBS | RESPIRATION RATE: 14 BRPM | BODY MASS INDEX: 27.77 KG/M2 | DIASTOLIC BLOOD PRESSURE: 80 MMHG | SYSTOLIC BLOOD PRESSURE: 128 MMHG | HEART RATE: 68 BPM

## 2022-08-04 DIAGNOSIS — G31.83 LEWY BODY PARKINSON'S DISEASE: Primary | ICD-10-CM

## 2022-08-04 DIAGNOSIS — F03.91: ICD-10-CM

## 2022-08-04 DIAGNOSIS — M48.061 SPINAL STENOSIS OF LUMBAR REGION, UNSPECIFIED WHETHER NEUROGENIC CLAUDICATION PRESENT: ICD-10-CM

## 2022-08-04 DIAGNOSIS — G47.52 REM BEHAVIORAL DISORDER: ICD-10-CM

## 2022-08-04 DIAGNOSIS — F02.80 LEWY BODY PARKINSON'S DISEASE: Primary | ICD-10-CM

## 2022-08-04 PROCEDURE — 3074F SYST BP LT 130 MM HG: CPT | Mod: CPTII,S$GLB,, | Performed by: PSYCHIATRY & NEUROLOGY

## 2022-08-04 PROCEDURE — 1160F PR REVIEW ALL MEDS BY PRESCRIBER/CLIN PHARMACIST DOCUMENTED: ICD-10-PCS | Mod: CPTII,S$GLB,, | Performed by: PSYCHIATRY & NEUROLOGY

## 2022-08-04 PROCEDURE — 1126F AMNT PAIN NOTED NONE PRSNT: CPT | Mod: CPTII,S$GLB,, | Performed by: PSYCHIATRY & NEUROLOGY

## 2022-08-04 PROCEDURE — 1159F MED LIST DOCD IN RCRD: CPT | Mod: CPTII,S$GLB,, | Performed by: PSYCHIATRY & NEUROLOGY

## 2022-08-04 PROCEDURE — 3074F PR MOST RECENT SYSTOLIC BLOOD PRESSURE < 130 MM HG: ICD-10-PCS | Mod: CPTII,S$GLB,, | Performed by: PSYCHIATRY & NEUROLOGY

## 2022-08-04 PROCEDURE — 3079F DIAST BP 80-89 MM HG: CPT | Mod: CPTII,S$GLB,, | Performed by: PSYCHIATRY & NEUROLOGY

## 2022-08-04 PROCEDURE — 99214 PR OFFICE/OUTPT VISIT, EST, LEVL IV, 30-39 MIN: ICD-10-PCS | Mod: S$GLB,,, | Performed by: PSYCHIATRY & NEUROLOGY

## 2022-08-04 PROCEDURE — 1160F RVW MEDS BY RX/DR IN RCRD: CPT | Mod: CPTII,S$GLB,, | Performed by: PSYCHIATRY & NEUROLOGY

## 2022-08-04 PROCEDURE — 99999 PR PBB SHADOW E&M-EST. PATIENT-LVL IV: CPT | Mod: PBBFAC,,, | Performed by: PSYCHIATRY & NEUROLOGY

## 2022-08-04 PROCEDURE — 1126F PR PAIN SEVERITY QUANTIFIED, NO PAIN PRESENT: ICD-10-PCS | Mod: CPTII,S$GLB,, | Performed by: PSYCHIATRY & NEUROLOGY

## 2022-08-04 PROCEDURE — 3079F PR MOST RECENT DIASTOLIC BLOOD PRESSURE 80-89 MM HG: ICD-10-PCS | Mod: CPTII,S$GLB,, | Performed by: PSYCHIATRY & NEUROLOGY

## 2022-08-04 PROCEDURE — 99214 OFFICE O/P EST MOD 30 MIN: CPT | Mod: S$GLB,,, | Performed by: PSYCHIATRY & NEUROLOGY

## 2022-08-04 PROCEDURE — 1159F PR MEDICATION LIST DOCUMENTED IN MEDICAL RECORD: ICD-10-PCS | Mod: CPTII,S$GLB,, | Performed by: PSYCHIATRY & NEUROLOGY

## 2022-08-04 PROCEDURE — 99999 PR PBB SHADOW E&M-EST. PATIENT-LVL IV: ICD-10-PCS | Mod: PBBFAC,,, | Performed by: PSYCHIATRY & NEUROLOGY

## 2022-08-04 RX ORDER — CARBIDOPA AND LEVODOPA 25; 100 MG/1; MG/1
TABLET ORAL
Qty: 360 TABLET | Refills: 3 | Status: SHIPPED | OUTPATIENT
Start: 2022-08-04

## 2022-08-04 NOTE — PROGRESS NOTES
HPI: Haja Marti is a 76 y.o. male known to [facility] for parkinsonism.    Here for scheduled follow up      Neuropsychological testing suggested LBD    No improvement with stopped atarax and proscar      Memory continues to worsen and this is bothersome.    Is more bothered by PD symptoms of slowness/ freezing and this happens daily    PT not helpful/ continues his boxing    Hallucinations are going but not bothersome to patient    Dream sleep remains active    Still no tremor except for a rare mild tremor    Taking sinemet 1 TID weaned by neurosych clinc NP and trouble with slowing walking started prior to that.     Falls continue/ 4 falls this year    No clear off times    Voice is still voice    Has had LSVT Loud therapy prior.    Swallowing remains normal    Light headedness is not very active    He did stop driving    Seeing pain management for lumbar spinal pain procedures    Poor sleep at night but often sleeps during the day    He does to boxing bag exercises and situps/other exercise 3 times per week        Review of Systems   Constitutional: Negative for fever.   HENT: Negative for nosebleeds.    Eyes: Negative for double vision.   Respiratory: Negative for hemoptysis.    Cardiovascular: Negative for leg swelling.   Genitourinary: Negative for hematuria.   Musculoskeletal: Positive for falls.   Skin: Negative for rash.   Neurological: Negative for loss of consciousness.   Psychiatric/Behavioral: Positive for memory loss.         I have reviewed all of this patient's past medical and surgical histories as well as family and social histories and active allergies and medications as documented in the electronic medical record.        Exam:  Gen Appearance, well developed/nourished in no apparent distress  CV: 2+ distal pulses with no edema or swelling  Neuro:  MS: Awake, alert, . Sustains attention. Recent recall impaired at times/ Language is full to spontaneous  speech/repetition/naming/comprehension. Fund of Knowledge is full  Speech is hypophonic  CN: Optic discs are flat with normal vasculature, PERRL, Extraoccular movements and visual fields are full. Normal facial sensation and strength, Hearing symmetric, Tongue and Palate are midline and strong. Shoulder Shrug symmetric and strong.  Motor: Normal bulk, tone increased in the right more than left side, mild resting tremor in the hands, fleeting but not seen today. 5/5 strength bilateral upper/lower extremities with 2+ reflexes and no clonus  Prominent bradykinesia, more obvious on the right  Sensory: symmetric to emp, and vibration,  Romberg negative  Cerebellar: Finger-nose,Rapid alternating movements intact  Gait: Normal stance, no ataxia but shuffling gait is moderately severe, stooped posture, and decreased arm swing on the right noted  Mild impairment of postural noted    Imagin MRI L spine: Multilevel degenerative change of the lumbar spine, most significant at L3-L4 which demonstrates moderate spinal canal stenosis and moderate bilateral neural foraminal narrowing.     MRI brain: Normal noncontrast MRI of the brain   Labs:  CMP,CBC, TSH, B12, CK unremarkable  Mild reduction in folate noted (given supplement per PCP)    Assessment/Plan: Haja Marti is a 76 y.o. 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 Over time, he has had persistent hallucinations and some cognitive decline    I recommend:     1. Sinemet 25/100mg: TID. Never took this QID.   -Had avoided raising dose currently given his ongoing hallucinations but these are not bothersome to him and bradykinesia is\  -Can try to take this QID now: Take at 8am, 11am, 2pm, and 5pm unless worse hallucinations and no benefit (reduce to TID if so)  -Has had LSVT Loud therapy prior which helps and he can use PRN  -Orthostatic symptoms noted. Anti-HTN med doses  have been lowered appropriately as needed per PCP. Advised slow position changes.  -PT tried prior.   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 Reviewed bed safety for patient prior. Can try OTC melatonin nightly unless sedation.Reviewed sleep hygiene/ needs less naps during the day    2. Previously, DLB was considered given some early neuropsychiatric features and those symptoms have persisted.   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 Lumbar MRI 2019 showed moderate spinal stenosis  -he noted more back pain during off time. Follows with [facility] (had surgery since) and now pain management    RTC with local movement disorders neurology when available in October

## 2022-08-09 ENCOUNTER — OFFICE VISIT (OUTPATIENT)
Dept: PSYCHIATRY | Facility: CLINIC | Age: 77
End: 2022-08-09
Payer: MEDICARE

## 2022-08-09 VITALS
WEIGHT: 194 LBS | SYSTOLIC BLOOD PRESSURE: 160 MMHG | HEART RATE: 72 BPM | DIASTOLIC BLOOD PRESSURE: 84 MMHG | HEIGHT: 70 IN | BODY MASS INDEX: 27.77 KG/M2

## 2022-08-09 DIAGNOSIS — F41.1 GAD (GENERALIZED ANXIETY DISORDER): Primary | ICD-10-CM

## 2022-08-09 DIAGNOSIS — G20.A1 PARKINSON'S DISEASE: ICD-10-CM

## 2022-08-09 DIAGNOSIS — F41.1 GENERALIZED ANXIETY DISORDER: ICD-10-CM

## 2022-08-09 DIAGNOSIS — R44.1 VISUAL HALLUCINATIONS: ICD-10-CM

## 2022-08-09 DIAGNOSIS — F32.A DEPRESSION, UNSPECIFIED DEPRESSION TYPE: ICD-10-CM

## 2022-08-09 PROCEDURE — 99214 PR OFFICE/OUTPT VISIT, EST, LEVL IV, 30-39 MIN: ICD-10-PCS | Mod: S$GLB,,, | Performed by: PSYCHIATRY & NEUROLOGY

## 2022-08-09 PROCEDURE — 99999 PR PBB SHADOW E&M-EST. PATIENT-LVL II: CPT | Mod: PBBFAC,,, | Performed by: PSYCHIATRY & NEUROLOGY

## 2022-08-09 PROCEDURE — 3077F SYST BP >= 140 MM HG: CPT | Mod: CPTII,S$GLB,, | Performed by: PSYCHIATRY & NEUROLOGY

## 2022-08-09 PROCEDURE — 99214 OFFICE O/P EST MOD 30 MIN: CPT | Mod: S$GLB,,, | Performed by: PSYCHIATRY & NEUROLOGY

## 2022-08-09 PROCEDURE — 3079F DIAST BP 80-89 MM HG: CPT | Mod: CPTII,S$GLB,, | Performed by: PSYCHIATRY & NEUROLOGY

## 2022-08-09 PROCEDURE — 3079F PR MOST RECENT DIASTOLIC BLOOD PRESSURE 80-89 MM HG: ICD-10-PCS | Mod: CPTII,S$GLB,, | Performed by: PSYCHIATRY & NEUROLOGY

## 2022-08-09 PROCEDURE — 3077F PR MOST RECENT SYSTOLIC BLOOD PRESSURE >= 140 MM HG: ICD-10-PCS | Mod: CPTII,S$GLB,, | Performed by: PSYCHIATRY & NEUROLOGY

## 2022-08-09 PROCEDURE — 99999 PR PBB SHADOW E&M-EST. PATIENT-LVL II: ICD-10-PCS | Mod: PBBFAC,,, | Performed by: PSYCHIATRY & NEUROLOGY

## 2022-08-09 RX ORDER — SERTRALINE HYDROCHLORIDE 100 MG/1
200 TABLET, FILM COATED ORAL DAILY
Qty: 180 TABLET | Refills: 3 | Status: SHIPPED | OUTPATIENT
Start: 2022-08-09 | End: 2023-03-15 | Stop reason: SDUPTHER

## 2022-08-09 RX ORDER — HYDROXYZINE HYDROCHLORIDE 10 MG/1
TABLET, FILM COATED ORAL
Qty: 270 TABLET | Refills: 1 | Status: SHIPPED | OUTPATIENT
Start: 2022-08-09 | End: 2023-06-07

## 2022-08-09 NOTE — PROGRESS NOTES
"Outpatient Psychiatry Follow-Up Visit (MD/NP)  8/9/2022    Session Length: 30 minutes (E&M Level 4)    Clinical Status of Patient:  Outpatient (Ambulatory)    Chief Complaint:  Haja Marti is a 76 y.o. male who presents today for follow-up of depression and anxiety.  Met with patient and spouse.      Interval History and Content of Current Session:  Interim Events/Subjective Report/Content of Current Session:  First appt with me since 5/4/2022.    He has had several appts with Mr. Galvan for psychotherapy in the interim.      He states he has had well-formed hallucinations lately.  He lately sees people, mostly during the day, sometimes at night upon awakening.    He generally cannot recognize people's faces and if they try to speak, it's only mumbling.    He realizes they are not real, so he is not afraid.    He has experienced hypnopompic hallucinations (e.g., "fish in the toilet") for some time now.   No recent delusions were noted by pt or his wife.       He still has good and bad days in regards to his mood.    His wife thinks his anxiety is worse than the depression.      He has seen Dr. Hidalgo in [place] -- last on 8/4/22.    Her note was reviewed with pt & wife in session.    He is now taking his Sinemet 25/100 QID (8a/11a/2p/5p).    Wife states he occasionally will roam around at night.    "He doesn't sleep well".    He has been sleeping more during the day.  However, he still may not sleep well at night even if up during the day.      He still has occasional falls, but no significant falls in the interim.  No recent head injury.    He uses a walker; he nearly tripped twice just walking to and from my office.    He still has a lot of rigidity, mostly at night, but sometimes during the day.    Often hard to get up and get going.      He still attends boxing classes 3 days a week (MWF); he spends about 2 hours there.  He doesn't think he is getting as much out of it lately.      But, he enjoys " "socializing at these classes.     His wife remarks that sometimes he gets confused about details.    Today is a good day.  Most days are not this good.    He has a fair amount of anxiety -- generally takes hydroxyzine 10 mg 1 in am and 1 in pm.    He has not been taking Klonopin very often.    He did take it prior to a back procedure -- had injections for chronic pain.    It helped with his BP, which spikes when he is very anxious.      He uses ibuprofen PRN for back pain.      He still has urinary incontinence.    He sees a urologist.   He wears Depends.    However, he has trouble getting to the toilet to urinate on time.    He also has been afraid about having a Urostem device placed.    He also has periods of confusion, day or night.      He stopped driving at the recommendation of Dr. Jenkins after recent neuropsychological testing.    His wife went on a cruise with a number family members (they had a family reunion).    Pt did not want to go.  His son Tod and Tod's wife were able to stay with him for the duration of the time his wife is gone.    Pt had a hard time with this -- he thinks they misled him about her leaving, the length of time she was gone, even that she went without him in the first place.    However, he flatly did not want to go and his wife needed to have this time and experience with the family.    Wife states "it was bittersweet".        Psychotherapy:  · Target symptoms: depression, adjustment  · Why chosen therapy is appropriate versus another modality: relevant to diagnosis, patient responds to this modality  · Outcome monitoring methods: self-report, lab data, observation, feedback from family  · Therapeutic intervention type: supportive psychotherapy  · Topics discussed/themes: stress related to medical comorbidities, life stage transitional issues  · The patient's response to the intervention is accepting.   · The patient's progress toward treatment goals is fair.   · Duration of " intervention: 0 minutes.    Review of Systems   · PSYCHIATRIC: Pertinant items are noted in the narrative.  · CONSTITUTIONAL:  No recent changes in wt.    · MUSCULOSKELETAL: No pain or stiffness of the joints.  · NEUROLOGIC: + tremors and shuffling gait; No weakness, sensory changes, seizures, memory loss, confusion, or other abnormal movements.  · Memory Loss: no  · ENDOCRINE: No polydipsia or polyuria.  · INTEGUMENTARY: No rashes or lacerations.  · EYES: No exophthalmos, jaundice or blindness.  · ENT: No dizziness, tinnitus or hearing loss.  · RESPIRATORY: No shortness of breath.  · CARDIOVASCULAR: No tachycardia or chest pain.  · GASTROINTESTINAL: No nausea, vomiting, pain, constipation or diarrhea.  · GENITOURINARY: No frequency, dysuria.    The following portions of the patient's history were reviewed and updated as appropriate: allergies, current medications, past family history, past medical history, past social history, past surgical history and problem list.      Current Outpatient Medications:     atorvastatin (LIPITOR) 40 MG tablet, TAKE 1 TABLET EVERY DAY, Disp: 90 tablet, Rfl: 3    carbidopa-levodopa  mg (SINEMET)  mg per tablet, TAKE 1 QID, Disp: 360 tablet, Rfl: 3    clonazePAM (KLONOPIN) 0.5 MG tablet, TAKE 1/2 TO 1 TABLET BY MOUTH UP TO 3 TIMES DAILY AS NEEDED FOR ANXIETY (Patient not taking: No sig reported), Disp: 90 tablet, Rfl: 5    ergocalciferol (ERGOCALCIFEROL) 50,000 unit Cap, Take 1 capsule (50,000 Units total) by mouth every 7 days., Disp: 12 capsule, Rfl: 1    finasteride (PROSCAR) 5 mg tablet, TAKE 1 TABLET EVERY DAY, Disp: 90 tablet, Rfl: 3    folic acid (FOLVITE) 1 MG tablet, TAKE 1 TABLET EVERY DAY, Disp: 90 tablet, Rfl: 0    hydrOXYzine HCL (ATARAX) 10 MG Tab, TAKE 1 TABLET BY MOUTH 3 TIMES A DAY AS NEEDED ANXIETY, Disp: 270 tablet, Rfl: 1    losartan (COZAAR) 25 MG tablet, Take 1 tablet (25 mg total) by mouth once daily. (Patient taking differently: Take 50 mg by  mouth once daily.), Disp: 90 tablet, Rfl: 1    magnesium oxide (MAG-OX) 400 mg tablet, Take 400 mg by mouth once daily., Disp: , Rfl:     meloxicam (MOBIC) 7.5 MG tablet, TAKE 1 TABLET EVERY DAY, Disp: 90 tablet, Rfl: 0    potassium citrate (UROCIT-K) 10 mEq (1,080 mg) TbSR, TAKE 1 TABLET THREE TIMES DAILY WITH MEALS (Patient taking differently: Take 10 mEq by mouth once daily.), Disp: 270 tablet, Rfl: 3    pyridoxine, vitamin B6, (B-6) 50 MG Tab, Take 50 mg by mouth once daily., Disp: , Rfl:     sertraline (ZOLOFT) 100 MG tablet, Take 2 tablets (200 mg total) by mouth once daily., Disp: 180 tablet, Rfl: 3    sodium bicarbonate 650 MG tablet, Take 1 tablet (650 mg total) by mouth once daily., Disp: 90 tablet, Rfl: 3    tamsulosin (FLOMAX) 0.4 mg Cap, TAKE 1 CAPSULE EVERY DAY, Disp: 90 capsule, Rfl: 0    Current Facility-Administered Medications:     tamsulosin 24 hr capsule 0.4 mg, 0.4 mg, Oral, Daily, Mike Mtz MD    Facility-Administered Medications Ordered in Other Visits:     0.9%  NaCl infusion, , Intravenous, Continuous, Lizz Lowry NP, Last Rate: 70 mL/hr at 11/12/21 1227, New Bag at 11/12/21 1227    mupirocin 2 % ointment, , Nasal, On Call Procedure, Lizz Lowry NP, Given at 11/12/21 1227   He has been taking 1/2 tablet of Klonopin daily, usually in the AM.    Pt has not been taking the Seroquel at night -- he states he has not needed this medication.  He and his wife state he has been taking c/l 2 tabs in AM and 1.5 tabs TID; however, record indicates 1/2 tab TID -- I told pt to check with Dr. Thompson's nurse about this discrepancy, as it could affect the quantity he obtains from [facility] pharmacy.    Compliance: yes    Side effects: None    Risk Parameters:  Patient reports no suicidal ideation  Patient reports no homicidal ideation  Patient reports no self-injurious behavior  Patient reports no violent behavior    Exam (detailed: at least 9 elements; comprehensive: all 15  "elements)   Constitutional  Vitals - 1 value per visit 5/3/2022 5/4/2022 6/14/2022 6/14/2022 8/4/2022 8/4/2022 8/9/2022   SYSTOLIC 142 134 - 136 - 128 160   DIASTOLIC 94 86 - 84 - 80 84   Pulse 69 74 - 60 - 68 72   Temp - - - - - - -   Resp - - - - - 14 -   SPO2 - - - 98 - - -   Weight (lb) 198 196.76 - 197 - 194.01 194.01   Weight (kg) 89.812 89.25 - 89.359 - 88 88   Height 70 - - 70 - 70 70   BMI (Calculated) 28.4 28.2 - 28.3 - 27.8 27.8   VISIT REPORT - - - - - - -   Pain Score  - - 0 - 0 - -   Some recent data might be hidden       General:  unremarkable, age appropriate, casually dressed, neatly groomed, overweight     Musculoskeletal  Muscle Strength/Tone:  not assessed today; cogwheeling had been noted in BUE's in past   Gait & Station:  parkinsonian, slow, steady     Psychiatric  Speech:  slowed, non-spontaneous, normal volume   Behavior: friendly and cooperative, eye contact normal   Mood & Affect:  "OK"  euthymic, constricted range, conguent, appropriate   Thought Process:  goal-directed, logical   Associations:  intact   Thought Content:  normal, no suicidality, no homicidality, delusions, or paranoia   Insight:  intact, has awareness of illness   Judgement: behavior is adequate to circumstances   Orientation:  grossly intact   Memory: intact for content of interview   Language: grossly intact   Attention Span & Concentration:  able to focus   Fund of Knowledge:  intact and appropriate to age and level of education     Lab Results   Component Value Date    WBC 6.21 03/31/2022    HGB 16.3 03/31/2022    HCT 47.7 03/31/2022    MCV 94 03/31/2022     03/31/2022     06/14/2022    K 4.6 06/14/2022     06/14/2022    CO2 24 06/14/2022    GLU 92 06/14/2022    BUN 23 06/14/2022    CREATININE 1.1 06/14/2022    CALCIUM 10.3 06/14/2022    PROT 7.3 03/31/2022    ALBUMIN 4.1 03/31/2022    BILITOT 0.9 03/31/2022    ALKPHOS 73 03/31/2022    AST 28 03/31/2022    ALT 34 03/31/2022    ANIONGAP 12 06/14/2022 "    ESTGFRAFRICA >60.0 06/14/2022    EGFRNONAA >60.0 06/14/2022    CHOL 137 03/31/2022    HDL 43 03/31/2022    LDLCALC 69.0 03/31/2022    TRIG 125 03/31/2022    CHOLHDL 31.4 03/31/2022    TSH 1.947 04/04/2022       Imaging:    Exam: MRI brain without contrast    History: Dizziness, extrapyramidal movement disorder    Findings: MRI is performed with routine sequences.  No mass, hemorrhage, infarction, subdural collection, hydrocephalus or other acute finding is seen on this cranial MR examination.  There is volume loss consistent with the patient's age.  No diffusion abnormality is seen.      Impression     Normal noncontrast MRI of the brain     Electronically signed by: FLORI NEWMAN MD  Date: 07/15/17  Time: 15:51        Assessment and Diagnosis   Status/Progress: Based on the examination today, the patient's problem(s) is/are adequately but not ideally controlled.  New problems have been presented today.   Co-morbidities (chronic pain) are complicating management of the primary condition.    There are not active rule-out diagnoses for this patient at this time.      General Impression:  POLINA (generalized anxiety disorder)  Depression, unspecified depression type  Parkinson's disease    Visual hallucinations  Chronic pain (NOT CODED)    Intervention/Counseling/Treatment Plan   Medication Management:  Continue hydroxyzine 10 mg TID prn anxiety.  Risks/benefits noted, including potential for sedation, dizziness and risk for falls.    If hydroxyzine is not effective, pt can take 1/2 to 1 tab of 0.5 mg Klonopin -- can use prior to medical procedures.    Continue other current medications (sertraline, PRN clonazepam).        Follow-up plan for depression was discussed with patient and spouse.      Return to Clinic: Follow up in 14 weeks (on 11/15/2022).    Sudhir Elliott MD

## 2022-08-24 PROBLEM — R44.1 VISUAL HALLUCINATIONS: Status: ACTIVE | Noted: 2022-08-24

## 2022-09-01 ENCOUNTER — TELEPHONE (OUTPATIENT)
Dept: SURGERY | Facility: CLINIC | Age: 77
End: 2022-09-01
Payer: MEDICARE

## 2022-09-01 ENCOUNTER — PATIENT MESSAGE (OUTPATIENT)
Dept: SURGERY | Facility: CLINIC | Age: 77
End: 2022-09-01
Payer: MEDICARE

## 2022-09-01 ENCOUNTER — TELEPHONE (OUTPATIENT)
Dept: NEUROLOGY | Facility: CLINIC | Age: 77
End: 2022-09-01
Payer: MEDICARE

## 2022-09-01 NOTE — TELEPHONE ENCOUNTER
Notify: Last month, I had increased his carbidopa/levadopa to QID so maybe this is not agreeing with him. He should try reducing to TID to see if that helps.   Also, Dr. Jenkins is in Neuropsychology where those dementia resources are used for families. Please give her that number to reach out for that service.

## 2022-09-01 NOTE — TELEPHONE ENCOUNTER
Spoke with wife regarding bleeding from post surgical site. States that bleeding has stopped but is concerned about the area. Instructed wife to send a picture of area to patient portal. Wife verbalizes understanding.

## 2022-09-01 NOTE — TELEPHONE ENCOUNTER
Patient's wife calling very worried about patient.  She states that he has been really upset and down lately because of his dementia.  She stated that they had him taking his carvedopa-levedopa at 8am, 11am, 2pm, and 5pm but he wasn't sleeping good so they switched it to 9am,12pm,3pm,and 6pm.  He is waking up 3 times a night to use the restroom but can't remember where the bathroom is or how to use it.  He is okay one minute and the next he is frozen and can't move at times.   She also stated that she was told by Dr. Jenkins that there is a program at [facility] for families of dementia patients.  She would like info on that.  Please advise.

## 2022-09-01 NOTE — TELEPHONE ENCOUNTER
----- Message from Karey Boudreaux MA sent at 2022  3:01 PM CDT -----  Contact: Whitley / spouse  Haja Marti  MRN: [id number]  : 1945  PCP: Andrez Jackson  Home Phone      [phone]  Work Phone      Not on file.  Mobile          [phone]      MESSAGE: Would like to speak to nurse regarding patients dementia getting worse.    Phone:  [phone]

## 2022-09-01 NOTE — TELEPHONE ENCOUNTER
----- Message from Stacey Maguire sent at 9/1/2022  8:52 AM CDT -----  Contact: Pt wife  Pt wife requesting call back RE: Pt wife states that last night the area where he had his procedure was bleeding. Please call to speak with pt wife, she clean it and she believes the bleeding has stopped        Confirmed contact below:  Contact Name:Haja Marti  Phone Number: [phone]

## 2022-09-14 ENCOUNTER — PATIENT MESSAGE (OUTPATIENT)
Dept: NEUROLOGY | Facility: CLINIC | Age: 77
End: 2022-09-14
Payer: MEDICARE

## 2022-09-15 NOTE — PROGRESS NOTES
[facility]  Colon and Rectal Surgery    [address]  [place], [place]  Tel: [phone]  Fax: [phone]  [email or web address]   MD Edward Wright MD Brian Kann, MD W. Forrest Johnston, MD Matthew Giglia, MD Jennifer Paruch, MD William Kethman, MD     Patient name: Haja Marti   YOB: 1945   MRN: [id number]  Date of visit: 09/16/2022    It was a pleasure seeing Mr. Marti in the Colon and Rectal Surgery clinic here at [facility].     Prior visits  As you know, Mr. Marti is a 75 year old man with a history of HTN, HLD, depression, BPH, Parkinsons disease, and SHU who presents for evaluation of perianal pain. He was seen last week with approximately 2 days of pain and was found to have a perianal abscess for which he underwent incision and drainage with seton placement on 2/12/2021 - requiring repeat EUA for complex collection. He underwent placement of a second seton on 6/15/2021 and underwent MRI Anal Sphincter for further evaluation.    6/29/2021  Mr. Marti is feeling better since his last visit. He has a twinge of pain every once in a while but it is manageable. He has not noticed any new openings and his drainage has almost completely stopped. He is taking Mirilax and having bowel movements daily without straining. He had his MRI last week.     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 There has been interval improvement of associated fluid with no focal drainable collection identified.     9/21/2021  Here for follow-up. He has been having more drainage than normal and is here to discuss possible seton removal procedures. He denies any fevers or chills.    10/12/2021  Mr. Marti is here today after undergoing removal of an external seton with curetting of the tract on 9/24/2021. I noted  that he would likely be able to tolerate a fistulotomy. He has been doing well - no fecal incontinence and minimal drainage.    11/2/2021  Here today for wound assessment and discussion regarding fistulotomy. Still some minor drainage - no fevers or chills.    11/19/2021  Here for follow-up after completion fistulotomy on 11/12/2021. Healing well - no incontinence.    12/3/2021  Here for wound check after fistulotomy. Minimal if any drainage.    2/3/2022  Here for wound follow-up after fistulotomy in November. No drainage, no incontinence.    9/15/2022  Here for wound check - concern for possible perianal wound. He is doing well - just had a SNM placed for urinary incontinence. He is not seeing significant improvements.    The patient was informed of the availability of a certified  without charge. A certified  was not necessary for this visit.     Past Medical History:   Diagnosis Date    Anxiety     Arthritis     BPH (benign prostatic hypertrophy)     Cataract     OS    Colon polyp 2002    Depression     Epiretinal membrane, both eyes     Hx of psychiatric care     Hyperlipidemia     Hypertension     Kidney stones     Kidney stones     Parkinsons     Posterior vitreous detachment of left eye     Psychiatric problem     Retinal detachment 2004    OD    Retinoschisis, left eye     Sleep apnea     Vitreous hemorrhage of left eye      Past Surgical History:   Procedure Laterality Date    BIOPSY OF ADENOIDS      CATARACT EXTRACTION      OD    COLONOSCOPY N/A 12/3/2019    Procedure: COLONOSCOPY;  Surgeon: Ceferino Oliveira MD;  Location: [facility] (4TH FLR);  Service: Endoscopy;  Laterality: N/A;  5/2019 laminectomy with post op confusion tb    CYSTOSCOPY WITH URODYNAMIC TESTING N/A 1/31/2022    Procedure: CYSTOSCOPY, WITH URODYNAMIC TESTING FLOUROSCOPIC;  Surgeon: Devang Butler MD;  Location: [address];  Service: Urology;  Laterality: N/A;  1hr    EPIDURAL STEROID INJECTION N/A  3/12/2019    Procedure: INJECTION, STEROID, EPIDURAL, L5-S1;  Surgeon: Laina Crockett MD;  Location: [facility] PAIN MGT;  Service: Pain Management;  Laterality: N/A;    EXAMINATION UNDER ANESTHESIA N/A 2/18/2021    Procedure: Exam under anesthesia;  Surgeon: West Clifton MD;  Location: [facility] OR 2ND FLR;  Service: Colon and Rectal;  Laterality: N/A;    EYE SURGERY      INCISION OF PERIRECTAL ABSCESS N/A 2/12/2021    Procedure: INCISION AND DRAINAGE  ABSCESS, PERIRECTAL;  Surgeon: West Clifton MD;  Location: [facility] OR 2ND FLR;  Service: Colon and Rectal;  Laterality: N/A;    INCISION OF PERIRECTAL ABSCESS Left 2/18/2021    Procedure: INCISION, ABSCESS, PERIRECTAL;  Surgeon: West Clifton MD;  Location: [facility] OR 2ND FLR;  Service: Colon and Rectal;  Laterality: Left;    INJECTION OF ANESTHETIC AGENT AROUND NERVE Bilateral 7/3/2018    Procedure: BLOCK, NERVE;  Surgeon: Laina Crockett MD;  Location: [facility] PAIN MGT;  Service: Pain Management;  Laterality: Bilateral;  Bilateral Lumbar L2,L3,L4,L5 MBB      NEEDS CONSENT    INSERTION OF SETON STITCH N/A 2/12/2021    Procedure: PLACEMENT, SETON STITCH;  Surgeon: West Clifton MD;  Location: [facility] OR 2ND FLR;  Service: Colon and Rectal;  Laterality: N/A;    INSERTION OF SETON STITCH Left 2/18/2021    Procedure: PLACEMENT, SETON;  Surgeon: West Clifton MD;  Location: [facility] OR 2ND FLR;  Service: Colon and Rectal;  Laterality: Left;    INSERTION OF SETON STITCH N/A 9/24/2021    Procedure: PLACEMENT, SETON STITCH;  Surgeon: West Clifton MD;  Location: [facility] OR 2ND FLR;  Service: Colon and Rectal;  Laterality: N/A;  seton exchange    laser indirect  4/8/10    left eye    Laser Indirect Retinopexy  4/8/10    OS    PROSTATE SURGERY      RECTAL FISTULOTOMY N/A 11/12/2021    Procedure: FISTULOTOMY, RECTUM;  Surgeon: West Clifton MD;  Location: [facility] OR 2ND FLR;  Service: Colon and Rectal;  Laterality: N/A;    RETINAL DETACHMENT SURGERY  2004     OD    ROTATOR CUFF REPAIR  11/13/2013    TONSILLECTOMY      TRANSFORAMINAL EPIDURAL INJECTION OF STEROID Bilateral 2/19/2019    Procedure: INJECTION, STEROID, EPIDURAL, TRANSFORAMINAL APPROACH, L5;  Surgeon: Laina Crockett MD;  Location: [facility];  Service: Pain Management;  Laterality: Bilateral;     Family History   Problem Relation Age of Onset    Cataracts Father     Cancer Father         lung    Diabetes Father     Cancer Mother         lung    Anxiety disorder Son     Depression Son     Anxiety disorder Daughter      Social History     Tobacco Use    Smoking status: Never    Smokeless tobacco: Never   Substance Use Topics    Alcohol use: No    Drug use: No     Review of patient's allergies indicates:  No Known Allergies    Current Outpatient Medications on File Prior to Visit   Medication Sig Dispense Refill    atorvastatin (LIPITOR) 40 MG tablet TAKE 1 TABLET EVERY DAY 90 tablet 3    carbidopa-levodopa  mg (SINEMET)  mg per tablet TAKE 1  tablet 3    ergocalciferol (ERGOCALCIFEROL) 50,000 unit Cap Take 1 capsule (50,000 Units total) by mouth every 7 days. 12 capsule 1    finasteride (PROSCAR) 5 mg tablet TAKE 1 TABLET EVERY DAY 90 tablet 3    folic acid (FOLVITE) 1 MG tablet TAKE 1 TABLET EVERY DAY 90 tablet 0    hydrOXYzine HCL (ATARAX) 10 MG Tab TAKE 1 TABLET BY MOUTH 3 TIMES A DAY AS NEEDED ANXIETY 270 tablet 1    losartan (COZAAR) 25 MG tablet Take 1 tablet (25 mg total) by mouth once daily. (Patient taking differently: Take 50 mg by mouth once daily.) 90 tablet 1    magnesium oxide (MAG-OX) 400 mg tablet Take 400 mg by mouth once daily.      meloxicam (MOBIC) 7.5 MG tablet TAKE 1 TABLET EVERY DAY 90 tablet 0    potassium citrate (UROCIT-K) 10 mEq (1,080 mg) TbSR TAKE 1 TABLET THREE TIMES DAILY WITH MEALS (Patient taking differently: Take 10 mEq by mouth once daily.) 270 tablet 3    pyridoxine, vitamin B6, (B-6) 50 MG Tab Take 50 mg by mouth once daily.      sertraline (ZOLOFT)  "100 MG tablet Take 2 tablets (200 mg total) by mouth once daily. 180 tablet 3    sodium bicarbonate 650 MG tablet Take 1 tablet (650 mg total) by mouth once daily. 90 tablet 3    tamsulosin (FLOMAX) 0.4 mg Cap TAKE 1 CAPSULE EVERY DAY 90 capsule 0    clonazePAM (KLONOPIN) 0.5 MG tablet TAKE 1/2 TO 1 TABLET BY MOUTH UP TO 3 TIMES DAILY AS NEEDED FOR ANXIETY (Patient not taking: No sig reported) 90 tablet 5     Current Facility-Administered Medications on File Prior to Visit   Medication Dose Route Frequency Provider Last Rate Last Admin    0.9%  NaCl infusion   Intravenous Continuous Lizz Lowry NP 70 mL/hr at 11/12/21 1227 New Bag at 11/12/21 1227    mupirocin 2 % ointment   Nasal On Call Procedure iLzz Lowry NP   Given at 11/12/21 1227    tamsulosin 24 hr capsule 0.4 mg  0.4 mg Oral Daily Mike tMz MD         Physical Examination  BP (!) 170/92 (BP Location: Left arm, Patient Position: Sitting, BP Method: Small (Automatic))   Pulse 65   Ht 5' 10" (1.778 m)   Wt 86.6 kg (191 lb)   BMI 27.41 kg/m²      A chaperone was present for the physical examination.    Constitutional: well developed, no cough, no dyspnea, alert and no acute distress    Perianal Skin: external wound appears to be a scar - no inflammation, erythema, induration, or cord appreciated          Assessment and Plan of Care    Thank you again for referring Mr. Marti to my care. In summary, Mr. Marti is a 75 year old man here for post-operative follow-up after fistulotomy - here for wound check. We discussed treatment options and have provided the following recommendations:    Discussed warning signs - no evidence of fistula on today's exam but could be - no indication for intervention at this time    Please do not hesitate to contact me if you have any questions.      West Clifton MD - Staff Surgeon  Department of Colon & Rectal Surgery  [facility]      "

## 2022-09-16 ENCOUNTER — OFFICE VISIT (OUTPATIENT)
Dept: SURGERY | Facility: CLINIC | Age: 77
End: 2022-09-16
Payer: MEDICARE

## 2022-09-16 VITALS
DIASTOLIC BLOOD PRESSURE: 92 MMHG | SYSTOLIC BLOOD PRESSURE: 170 MMHG | HEIGHT: 70 IN | BODY MASS INDEX: 27.35 KG/M2 | HEART RATE: 65 BPM | WEIGHT: 191 LBS

## 2022-09-16 DIAGNOSIS — K60.3 PERIANAL FISTULA: Primary | ICD-10-CM

## 2022-09-16 PROCEDURE — 3077F PR MOST RECENT SYSTOLIC BLOOD PRESSURE >= 140 MM HG: ICD-10-PCS | Mod: CPTII,S$GLB,, | Performed by: STUDENT IN AN ORGANIZED HEALTH CARE EDUCATION/TRAINING PROGRAM

## 2022-09-16 PROCEDURE — 3080F PR MOST RECENT DIASTOLIC BLOOD PRESSURE >= 90 MM HG: ICD-10-PCS | Mod: CPTII,S$GLB,, | Performed by: STUDENT IN AN ORGANIZED HEALTH CARE EDUCATION/TRAINING PROGRAM

## 2022-09-16 PROCEDURE — 1159F PR MEDICATION LIST DOCUMENTED IN MEDICAL RECORD: ICD-10-PCS | Mod: CPTII,S$GLB,, | Performed by: STUDENT IN AN ORGANIZED HEALTH CARE EDUCATION/TRAINING PROGRAM

## 2022-09-16 PROCEDURE — 99999 PR PBB SHADOW E&M-EST. PATIENT-LVL III: CPT | Mod: PBBFAC,,, | Performed by: STUDENT IN AN ORGANIZED HEALTH CARE EDUCATION/TRAINING PROGRAM

## 2022-09-16 PROCEDURE — 1101F PR PT FALLS ASSESS DOC 0-1 FALLS W/OUT INJ PAST YR: ICD-10-PCS | Mod: CPTII,S$GLB,, | Performed by: STUDENT IN AN ORGANIZED HEALTH CARE EDUCATION/TRAINING PROGRAM

## 2022-09-16 PROCEDURE — 1126F AMNT PAIN NOTED NONE PRSNT: CPT | Mod: CPTII,S$GLB,, | Performed by: STUDENT IN AN ORGANIZED HEALTH CARE EDUCATION/TRAINING PROGRAM

## 2022-09-16 PROCEDURE — 3288F FALL RISK ASSESSMENT DOCD: CPT | Mod: CPTII,S$GLB,, | Performed by: STUDENT IN AN ORGANIZED HEALTH CARE EDUCATION/TRAINING PROGRAM

## 2022-09-16 PROCEDURE — 3080F DIAST BP >= 90 MM HG: CPT | Mod: CPTII,S$GLB,, | Performed by: STUDENT IN AN ORGANIZED HEALTH CARE EDUCATION/TRAINING PROGRAM

## 2022-09-16 PROCEDURE — 3288F PR FALLS RISK ASSESSMENT DOCUMENTED: ICD-10-PCS | Mod: CPTII,S$GLB,, | Performed by: STUDENT IN AN ORGANIZED HEALTH CARE EDUCATION/TRAINING PROGRAM

## 2022-09-16 PROCEDURE — 1101F PT FALLS ASSESS-DOCD LE1/YR: CPT | Mod: CPTII,S$GLB,, | Performed by: STUDENT IN AN ORGANIZED HEALTH CARE EDUCATION/TRAINING PROGRAM

## 2022-09-16 PROCEDURE — 1126F PR PAIN SEVERITY QUANTIFIED, NO PAIN PRESENT: ICD-10-PCS | Mod: CPTII,S$GLB,, | Performed by: STUDENT IN AN ORGANIZED HEALTH CARE EDUCATION/TRAINING PROGRAM

## 2022-09-16 PROCEDURE — 1159F MED LIST DOCD IN RCRD: CPT | Mod: CPTII,S$GLB,, | Performed by: STUDENT IN AN ORGANIZED HEALTH CARE EDUCATION/TRAINING PROGRAM

## 2022-09-16 PROCEDURE — 99999 PR PBB SHADOW E&M-EST. PATIENT-LVL III: ICD-10-PCS | Mod: PBBFAC,,, | Performed by: STUDENT IN AN ORGANIZED HEALTH CARE EDUCATION/TRAINING PROGRAM

## 2022-09-16 PROCEDURE — 3077F SYST BP >= 140 MM HG: CPT | Mod: CPTII,S$GLB,, | Performed by: STUDENT IN AN ORGANIZED HEALTH CARE EDUCATION/TRAINING PROGRAM

## 2022-09-16 PROCEDURE — 99214 OFFICE O/P EST MOD 30 MIN: CPT | Mod: S$GLB,,, | Performed by: STUDENT IN AN ORGANIZED HEALTH CARE EDUCATION/TRAINING PROGRAM

## 2022-09-16 PROCEDURE — 99214 PR OFFICE/OUTPT VISIT, EST, LEVL IV, 30-39 MIN: ICD-10-PCS | Mod: S$GLB,,, | Performed by: STUDENT IN AN ORGANIZED HEALTH CARE EDUCATION/TRAINING PROGRAM

## 2022-09-19 ENCOUNTER — OFFICE VISIT (OUTPATIENT)
Dept: PSYCHIATRY | Facility: CLINIC | Age: 77
End: 2022-09-19
Payer: MEDICARE

## 2022-09-19 DIAGNOSIS — F41.1 GAD (GENERALIZED ANXIETY DISORDER): Primary | ICD-10-CM

## 2022-09-19 PROCEDURE — 90837 PSYTX W PT 60 MINUTES: CPT | Mod: 95,,, | Performed by: SOCIAL WORKER

## 2022-09-19 PROCEDURE — 90837 PR PSYCHOTHERAPY W/PATIENT, 60 MIN: ICD-10-PCS | Mod: 95,,, | Performed by: SOCIAL WORKER

## 2022-09-19 NOTE — PROGRESS NOTES
Individual Psychotherapy (PhD/LCSW)    9/19/2022    Site:  Telemed         Therapeutic Intervention: Met with patient and spouse.  Outpatient - Insight oriented psychotherapy 60 min - CPT code 63992    Chief complaint/reason for encounter: depression and anxiety     Interval history and content of current session:      Site: [facility]             Length of service: 60        Therapeutic intervention:     Persons present: spouse and pt  Trinidad (needed to help with interpretation of intervention due to pt cognitive decline due to dementia).    Interval history:      The patient location is: home [place].   The chief complaint leading to consultation is: anxiety/depression  Visit type: audio  Total time spent with patient: 60 min  Each patient to whom he or she provides medical services by telemedicine is:  (1) informed of the relationship between the physician and patient and the respective role of any other health care provider with respect to management of the patient; and (2) notified that he or she may decline to receive medical services by telemedicine and may withdraw from such care at any time.       Notes:   Trinidad went on cruise.     Pt complains of much more frequent visual hallucinations.  Mostly of people in her house.   They do not talk.   He will make effort while Trinidad is present to ask then a question to see if they respond or not.  If they do not respond then he can use this strategy to determine if his hallucination is real or not.     He is also in the process of getting a urinary vibration devise to aid in urinary symptoms like voiding.    Supportive therapy.                Target symptoms: depression, anxiety        Progress toward goals: progressing well    Diagnosis: generalized anxiety disorder       Treatment plan:  Target symptoms: anxiety   Why chosen therapy is appropriate versus another modality: relevant to diagnosis, evidence based practice  Outcome monitoring methods:  self-report, observation, feedback from family  Therapeutic intervention type: behavior modifying psychotherapy    Risk parameters:  Patient reports no suicidal ideation  Patient reports no homicidal ideation  Patient reports no self-injurious behavior  Patient reports no violent behavior        Verbal deficits: None           Patient's response to intervention:   The patient's response to intervention is accepting, motivated.              Progress toward goals and other mental status changes:  The patient's progress toward goals is fair .    Diagnosis:     ICD-10-CM ICD-9-CM   1. Generalized anxiety disorder F41.1 300.02       Plan:  individual psychotherapy and family psychotherapy    Return to clinic: as scheduled    Length of Service (minutes): 60

## 2022-10-06 ENCOUNTER — TELEPHONE (OUTPATIENT)
Dept: NEUROLOGY | Facility: CLINIC | Age: 77
End: 2022-10-06
Payer: MEDICARE

## 2022-10-06 NOTE — TELEPHONE ENCOUNTER
"Ok. Sorry. I do see that message now.    Continue Sinemet TID.    However, she and I have discussed prior that other meds he is on  including can actually cause or worsen his hallucinations     Per my note, "Stopping Proscar and Atarax has not improved hallucinations in the past ."    If it has not been done before, I think its time to try to stop Tamulsolin noted on his med card to see if this improves the hallucinations as it can be big culprit. Monitor for worsening urinary symptoms      "

## 2022-10-06 NOTE — TELEPHONE ENCOUNTER
"Per my last note:    "Can try to take this (sinemet) QID now: Take at 8am, 11am, 2pm, and 5pm unless worse hallucinations and no benefit (reduce to TID if so)"    So reduce sinemet to TID. Please make sure he is on the list to see Dr Mcmahon this month   "

## 2022-10-06 NOTE — TELEPHONE ENCOUNTER
----- Message from Sabrina Chow sent at 10/6/2022 10:09 AM CDT -----  Contact: WIFE - ALISE Marti  MRN: [id number]  : 1945  PCP: Andrez Jackson  Home Phone      [phone]  Work Phone      Not on file.  Mobile          [phone]      MESSAGE: Wife states that she has been trying to hold on to making an appointment with Dr. Mag Mcmahon but states that the hallucinations are getting worse.  She would like to know if there is something that Dr. Hidalgo can recommend.          Phone: [phone]

## 2022-10-06 NOTE — TELEPHONE ENCOUNTER
Pts wife states his hallucinations are getting worse at night, I offered her an appt with JOAQUIN Carter for 10/24/2022, she states she would rather wait and see a Doctor. Pts wife would like to know if something can be prescribed for hallucinations. Advised pts wife to take him to the ER if his symptoms get worse. Please advise.

## 2022-10-11 ENCOUNTER — OUTPATIENT CASE MANAGEMENT (OUTPATIENT)
Dept: NEUROLOGY | Facility: CLINIC | Age: 77
End: 2022-10-11
Payer: MEDICARE

## 2022-10-11 ENCOUNTER — OFFICE VISIT (OUTPATIENT)
Dept: PSYCHIATRY | Facility: CLINIC | Age: 77
End: 2022-10-11
Payer: MEDICARE

## 2022-10-11 DIAGNOSIS — F32.A DEPRESSION, UNSPECIFIED DEPRESSION TYPE: ICD-10-CM

## 2022-10-11 DIAGNOSIS — F41.1 GAD (GENERALIZED ANXIETY DISORDER): Primary | ICD-10-CM

## 2022-10-11 PROCEDURE — 90834 PR PSYCHOTHERAPY W/PATIENT, 45 MIN: ICD-10-PCS | Mod: 95,S$GLB,, | Performed by: SOCIAL WORKER

## 2022-10-11 PROCEDURE — 90834 PSYTX W PT 45 MINUTES: CPT | Mod: 95,S$GLB,, | Performed by: SOCIAL WORKER

## 2022-10-11 NOTE — PROGRESS NOTES
Individual Psychotherapy (PhD/LCSW)    10/11/2022    Site:  Telemed         Therapeutic Intervention: Met with patient and spouse.  Outpatient - Insight oriented psychotherapy 45 min - CPT code 61351    Chief complaint/reason for encounter: depression and anxiety     Interval history and content of current session:      Site: [facility]             Length of service: 45         Therapeutic intervention:     Persons present: spouse and pt  [place] (needed to help with interpretation of intervention due to pt cognitive decline due to dementia).    Interval history:      The patient location is: home [place].   The chief complaint leading to consultation is: anxiety/depression  Visit type: audio  Total time spent with patient: 45 min  Each patient to whom he or she provides medical services by telemedicine is:  (1) informed of the relationship between the physician and patient and the respective role of any other health care provider with respect to management of the patient; and (2) notified that he or she may decline to receive medical services by telemedicine and may withdraw from such care at any time.       Notes:   Trinidad awoken at night by Haja seeking comfort especially when he has visual hallucinations.  They are frustrated with the advancing health problems.  They went to a Parkinson's walk event at [facility].                Target symptoms: depression, anxiety        Progress toward goals: progressing well    Diagnosis: generalized anxiety disorder       Treatment plan:  Target symptoms: anxiety   Why chosen therapy is appropriate versus another modality: relevant to diagnosis, evidence based practice  Outcome monitoring methods: self-report, observation, feedback from family  Therapeutic intervention type: behavior modifying psychotherapy    Risk parameters:  Patient reports no suicidal ideation  Patient reports no homicidal ideation  Patient reports no self-injurious behavior  Patient reports  no violent behavior        Verbal deficits: None           Patient's response to intervention:   The patient's response to intervention is accepting, motivated.              Progress toward goals and other mental status changes:  The patient's progress toward goals is fair .    Diagnosis:     ICD-10-CM ICD-9-CM   1. Generalized anxiety disorder F41.1 300.02       Plan:  individual psychotherapy and family psychotherapy    Return to clinic: as scheduled    Length of Service (minutes): 45

## 2022-10-11 NOTE — PROGRESS NOTES
Social Work - Dementia Care Management:    Phoned caregiver, wife Whitley; consultation scheduled via phone for 10/14/22, 10:15 a.m.. Wife consented to observation by trainees.

## 2022-10-14 ENCOUNTER — OUTPATIENT CASE MANAGEMENT (OUTPATIENT)
Dept: NEUROLOGY | Facility: CLINIC | Age: 77
End: 2022-10-14
Payer: MEDICARE

## 2022-10-14 NOTE — PROGRESS NOTES
"CARE SOCIAL WORK PSYCHOSOCIAL ASSESSMENT - DEMENTIA CARE MANAGEMENT    REASON FOR VISIT; INFORMANT; RELATIONSHIP   Patient referred from [facility] waiting list to assess care management needs. Spoke on phone with caregiver, wife Whitley.    PSYCHOSOCIAL ASSESSMENT      Location (own home, family, facility)    Own home   Resides with (name, relationship) Wife Whitley   Primary Caregiver (if different from "resides with") name, relationship Wife Whitley   Is a caregiver present during day? Overnight? Yes  Yes   Marital/relationship status    Financial Status Adequate    Status No   ACP Documents Do not have PoA documents. Cousin is  so wife can check with him.    Additional information He keeps her awake most of night.  Driving conversation with Dr. Jenkins went well, pt handed over keys, truck has been sold, pt rarely brings it up.    Caregiver's Concerns Gets up at night, roams, at night is totally confused, doesn't know where/how, gets her up to go to bathroom, sometimes just wants her to be with him, they don't sleep in same room, he was acting out dreams in sleep. Snores, noise, talks. Can't keep him awake during day.  Hurtful when he's not responsive to her. Has lost all that. Feels like she lives alone.       SUPPORTS:    Additional Caregivers/relationship Wife cares for pt alone. Two sons in town, both work, they'll come occasionally on weekend. Dtr lives next door, grandkids ages 3,5,8,13. Son is physician.    Patient engagement (activities, hobbies) No activities.     Used to athletic. Did the Parkinson's boxing activity. Had stim put in for bladder control, just got cleared to go back to the boxing.  Watches reruns, sometimes aware and sometimes not. Likes football games, baseball, nature shows.   Friends bring him to lunch about monthly. Most friends know the dx. Hard time with conversation. He's open about dx, prob doesn't understand. Perks up and remember the things in the " past.    Caregiver's natural supports/self-care Likes to stay active, sew, maching embroidery, artsy things. Likes to have a project. Not much time for herself. Pt calls for a lot during day. Read a lot.   No ones comes for visit; two friends  in past year, another 's is sick. Does talk on phone.    Resources currently utilized Neighbor does grass.  Otherwise no other resources. Children encouraging to look into. Doesn't know what to expect as far as where they are.      MOOD/BEHAVIOR  Fluctuates ADL.   Usually walks on own, at times he can't, freezes, little steps, wife has to get walker and coax.  Able to go to bathroom during the day, had procedure to help urinate less, use little urinal with help.   Wash off self but has trouble, eats by self, she does all food prep, hands him meds, set alarm but has to check on him. Have to help him dress.   Pt doesn't answer wife much.  Follows wife around, hard for her to get time alone.  Up a lot at night to urinate  Cant' get self into bed all the way. Hard for wife to assist, she's much smaller.         PROBLEM AREAS IDENTIFIED     PLAN  Children encouraging wife to bring in a sitter, maybe someone overnight so wife could get a full night sleep. Discuss/education on options.   Need for activities/engagement; and need for respite for wife. Provide resource education on Adult Day Program; wife will consider.   Emotional support for wife - losses, changes Provide resources on support and education groups                                                                                                                        RESOURCES/REFERRALS PROVIDED TODAY:  Provided psycho-education, support, resource info, strategies, brainstorming. Discussed [facility] Adult Day Program.    MANAGEMENT FOLLOW-UP PLAN:   Email resources.  Follow up in 1-2 months.

## 2022-10-25 NOTE — PROGRESS NOTES
Name: Haja Marti  MRN: [id number]   CSN: [id number]      Date: 10/26/2022    Referring physician:  Reji Hidalgo MD  [address] [address]    Chief Complaint / Interval History: LBD      History of Present Illness (HPI):    Mr. Marti is a 76 year old man who is new to me however is known to both [facility] and Dr. Hidalgo. 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 His symptom onset was approximately 7 years ago when he began having some walking difficulties and was found to not swing his arms bilaterally. He saw a general neurologist at that time who suspected parkinsonism but had concerns for LBD given some cognitive difficulties he was experiencing at that time. Initially it was his movement that was more of an issue than his cognition. He would shuffle his steps, freeze and displayed a stoop posture. The freezing continues to lead to rather frequent falls as he only has a lightweight rolling walker at this time for ambulation. He used to be a very active man and enjoyed exercise and participating in boxing however now given his worsening mobility he does not feel he is able to do those activities out of fear of falling. He denies any tremor. As for his cognition, morning and night times are the worst. He has regular hallucinations however they are not overly bothersome to him as they are not frightening. His mentation seems to fluctuate throughout the day and he does have periods of lucidity. He does take both Hydroxyzine and Topamax which he was told to hold out of concern of it affecting his cognition. With holding these medications he did not feel there was an improvement so he continues to use them. He only uses hydroxyzine once a day when he wakes up as he describes a sensation of anxiety. He is especially confused in the middle of the night upon waking and sometimes cannot locate the restroom within his home. Today his biggest  complaint is his inability to walk more so than his cognition.     Current Mvmt Medications:  Sinemet 25/100 - 1 tab TID (9am/2pm/7pm)  - Doesn't feel like it helps much with his movements    Prior Mvmt Medication Trials:  Donepezil - did not tolerate per LUZ Shrestha    Nonmotor/Premotor ROS:  Smell/Taste: no issues  Voice/Swallowing:  no issues   Stridor/SOB: none  Exercise: not exercising regularly   Dizziness: none  Hydration: doing ok   Urinary Issues: none  Constipation: takes OTC medications that control this   Sleep/RBD: sleeping ok but likely still having RBD behavior, wife no longer in the same room  - has clonazepam but has not been using it  Hallucinations/Peripheral Illusions: yes, regularly however they are not bothersome to him   Visual Change: none  Memory/Cognition/Language: some issues with language comprehension at times, frequently disoriented   Mood: stable    Past Medical History: The patient  has a past medical history of Anxiety, Arthritis, BPH (benign prostatic hypertrophy), Cataract, Colon polyp (2002), Depression, Epiretinal membrane, both eyes, psychiatric care, Hyperlipidemia, Hypertension, Kidney stones, Kidney stones, Parkinsons, Posterior vitreous detachment of left eye, Psychiatric problem, Retinal detachment (2004), Retinoschisis, left eye, Sleep apnea, and Vitreous hemorrhage of left eye.    Social History: The patient  reports that he has never smoked. He has never used smokeless tobacco. He reports that he does not drink alcohol and does not use drugs.    Family History: Their family history includes Anxiety disorder in his daughter and son; Cancer in his father and mother; Cataracts in his father; Depression in his son; Diabetes in his father.    Allergies: Patient has no known allergies.     Meds:   Current Outpatient Medications on File Prior to Visit   Medication Sig Dispense Refill    atorvastatin (LIPITOR) 40 MG tablet TAKE 1 TABLET EVERY DAY 90 tablet 3     carbidopa-levodopa  mg (SINEMET)  mg per tablet TAKE 1 QID (Patient taking differently: Take 1 tablet by mouth 3 (three) times daily.) 360 tablet 3    celecoxib (CELEBREX) 100 MG capsule Take 100 mg by mouth 2 (two) times daily.      clonazePAM (KLONOPIN) 0.5 MG tablet TAKE 1/2 TO 1 TABLET BY MOUTH UP TO 3 TIMES DAILY AS NEEDED FOR ANXIETY (Patient taking differently: Take 0.5 mg by mouth 3 (three) times daily as needed.) 90 tablet 5    ergocalciferol (ERGOCALCIFEROL) 50,000 unit Cap Take 1 capsule (50,000 Units total) by mouth every 7 days. 12 capsule 1    finasteride (PROSCAR) 5 mg tablet TAKE 1 TABLET EVERY DAY 90 tablet 3    folic acid (FOLVITE) 1 MG tablet TAKE 1 TABLET EVERY DAY 90 tablet 0    hydrOXYzine HCL (ATARAX) 10 MG Tab TAKE 1 TABLET BY MOUTH 3 TIMES A DAY AS NEEDED ANXIETY 270 tablet 1    losartan (COZAAR) 25 MG tablet Take 1 tablet (25 mg total) by mouth once daily. (Patient taking differently: Take 50 mg by mouth once daily.) 90 tablet 1    magnesium oxide (MAG-OX) 400 mg tablet Take 400 mg by mouth once daily.      potassium citrate (UROCIT-K) 10 mEq (1,080 mg) TbSR TAKE 1 TABLET THREE TIMES DAILY WITH MEALS (Patient taking differently: Take 10 mEq by mouth once daily.) 270 tablet 3    pyridoxine, vitamin B6, (B-6) 50 MG Tab Take 50 mg by mouth once daily.      sertraline (ZOLOFT) 100 MG tablet Take 2 tablets (200 mg total) by mouth once daily. 180 tablet 3    sodium bicarbonate 650 MG tablet Take 1 tablet (650 mg total) by mouth once daily. 90 tablet 3    tamsulosin (FLOMAX) 0.4 mg Cap TAKE 1 CAPSULE EVERY DAY 90 capsule 0    meloxicam (MOBIC) 7.5 MG tablet TAKE 1 TABLET EVERY DAY 90 tablet 0     Current Facility-Administered Medications on File Prior to Visit   Medication Dose Route Frequency Provider Last Rate Last Admin    0.9%  NaCl infusion   Intravenous Continuous Lizz Lowry NP 70 mL/hr at 11/12/21 1227 New Bag at 11/12/21 1227    mupirocin 2 % ointment   Nasal On Call  "Procedure Lizz Lowry NP   Given at 11/12/21 1227    tamsulosin 24 hr capsule 0.4 mg  0.4 mg Oral Daily Mike Mtz MD           Exam:  /86 (BP Location: Right arm, Patient Position: Sitting, BP Method: Medium (Manual))   Pulse 64   Resp 16   Ht 5' 10" (1.778 m)   Wt 88.1 kg (194 lb 3.6 oz)   BMI 27.87 kg/m²     Constitutional  Well-developed, well-nourished, appears stated age   Cardiovascular  No LE edema bilaterally   Neurological    * Mental status  MOCA = not done during today's visit     - Orientation  Oriented to conversation     - Memory   Intact recent and remote     - Attention/concentration  Attentive, vigilant during exam     - Language  Intact to conversation.     - Fund of knowledge  Aware of current events     - Executive  Well-organized thoughts     - Other     * Cranial nerves       - CN II  Pupils equal, visual fields full to confrontation     - CN III, IV, VI  Extraocular movements full, normal pursuits and saccades     - CN V  Sensation V1 - V3 intact     - CN VII  Face strong and symmetric bilaterally     - CN VIII  Hearing intact bilaterally     - CN IX, X  Palate raises midline and symmetric     - CN XI  SCM and trapezius 5/5 bilaterally     - CN XII  Tongue midline   * Motor  Muscle bulk normal, strength 5/5 throughout   * Sensory   Intact to temperature and vibration throughout   * Coordination  No dysmetria with finger-to-nose or heel-to-shin   * Gait  See below.   * Deep tendon reflexes  2+ and symmetric throughout   Babinski downgoing bilaterally   * Specialized movement exam  Moderately hypophonic speech.    Facial masking with reduced blink.   Increased tone L>R in the upper and lower extremities.     Global bradykinesia.   No tremor with rest, posture, kinesis, or intention.    No other dystonia, chorea, athetosis, myoclonus, or tics.   No motor impersistence.   Walking is slow with freezing and festination. Posture is stooped. There is reduced arm swing " "bilaterally (L>R).          Medical Record Review:  Labs, imaging and prior notes reviewed independently.     MRI Brain 2017:  MRI is performed with routine sequences.  No mass, hemorrhage, infarction, subdural collection, hydrocephalus or other acute finding is seen on this cranial MR examination.  There is volume loss consistent with the patient's age.  No diffusion abnormality is seen.      Diagnoses:          1. Lewy body Parkinson's disease        2. Parkinson's disease  WALKER FOR HOME USE    Ambulatory referral/consult to Physical/Occupational Therapy    Ambulatory referral/consult to Speech Therapy      3. Abnormality of gait due to impairment of balance  WALKER FOR HOME USE    Ambulatory referral/consult to Physical/Occupational Therapy      4. Hypophonia            Assessment:  Mr. Marti is a 76 year old RH man who carries a diagnosis of LBD by neuropsychological testing and displays a significant amount of parkinsonism primarily affecting his gait. Today on examination he is barely able to walk without freezing. He would like to focus on his mobility rather than cognition today although he does continue to have daily hallucinations and fluctuations in his lucidity. His hallucinations are currently not bothersome as they are not frightening. We discussed, and he understands, that increasing medications for his parkinsonism will likely drive hallucinations but is willing to try this. We have agreed to the following plan today:    Plan:  - Currently on Sinemet 25/100 1 tab TID - instructed to add 1/2 tablet to the AM dose for 1-2 weeks and if tolerating (no worsening hallucinations) then to add another 1/2 tablet to his afternoon or evening dose. Ask that he pay close attention following the increased dosages if his walking improves at all.   - I asked that instead of taking Hydroxyzine first thing in the morning to instead take his first dose of Sinemet as some patients report anxiety as an "off" symptom. " If this does not work, limit Hydroxyzine use to only daily.   - If his hallucinations worsen with this plan we will likely back down to Sinemet 1 tab TID and try donepezil (reportedly did not help in the past), exelon, seroquel or nuplazid if needed to get them better controlled.   - I have placed an order for big and loud therapy - this can be done locally to him in [place].   - I have placed an order to obtain a USTEP walker as this patient has a significant amount of freezing of gait which poses a fall risk in the setting of his parkinsonism.     RTC in 3 months to see me.     Total time: 87 minutes spent on the encounter, which includes face to face time and non-face to face time preparing to see the patient (eg, review of tests), Obtaining and/or reviewing separately obtained history, Documenting clinical information in the electronic or other health record, Independently interpreting results (not separately reported) and communicating results to the patient/family/caregiver, or Care coordination (not separately reported).     Mag Mcmahon MD  Division of Movement and Memory Disorders  [facility]  [phone]

## 2022-10-26 ENCOUNTER — PATIENT MESSAGE (OUTPATIENT)
Dept: NEUROLOGY | Facility: CLINIC | Age: 77
End: 2022-10-26

## 2022-10-26 ENCOUNTER — OFFICE VISIT (OUTPATIENT)
Dept: NEUROLOGY | Facility: CLINIC | Age: 77
End: 2022-10-26
Payer: MEDICARE

## 2022-10-26 VITALS
BODY MASS INDEX: 27.81 KG/M2 | SYSTOLIC BLOOD PRESSURE: 134 MMHG | DIASTOLIC BLOOD PRESSURE: 86 MMHG | HEIGHT: 70 IN | WEIGHT: 194.25 LBS | HEART RATE: 64 BPM | RESPIRATION RATE: 16 BRPM

## 2022-10-26 DIAGNOSIS — R26.89 ABNORMALITY OF GAIT DUE TO IMPAIRMENT OF BALANCE: ICD-10-CM

## 2022-10-26 DIAGNOSIS — F02.80 LEWY BODY PARKINSON'S DISEASE: Primary | ICD-10-CM

## 2022-10-26 DIAGNOSIS — G31.83 LEWY BODY PARKINSON'S DISEASE: Primary | ICD-10-CM

## 2022-10-26 DIAGNOSIS — G20.A1 PARKINSON'S DISEASE: ICD-10-CM

## 2022-10-26 DIAGNOSIS — R49.8 HYPOPHONIA: ICD-10-CM

## 2022-10-26 PROCEDURE — 1160F PR REVIEW ALL MEDS BY PRESCRIBER/CLIN PHARMACIST DOCUMENTED: ICD-10-PCS | Mod: CPTII,S$GLB,, | Performed by: STUDENT IN AN ORGANIZED HEALTH CARE EDUCATION/TRAINING PROGRAM

## 2022-10-26 PROCEDURE — 99999 PR PBB SHADOW E&M-EST. PATIENT-LVL V: ICD-10-PCS | Mod: PBBFAC,,, | Performed by: STUDENT IN AN ORGANIZED HEALTH CARE EDUCATION/TRAINING PROGRAM

## 2022-10-26 PROCEDURE — 1159F PR MEDICATION LIST DOCUMENTED IN MEDICAL RECORD: ICD-10-PCS | Mod: CPTII,S$GLB,, | Performed by: STUDENT IN AN ORGANIZED HEALTH CARE EDUCATION/TRAINING PROGRAM

## 2022-10-26 PROCEDURE — 1159F MED LIST DOCD IN RCRD: CPT | Mod: CPTII,S$GLB,, | Performed by: STUDENT IN AN ORGANIZED HEALTH CARE EDUCATION/TRAINING PROGRAM

## 2022-10-26 PROCEDURE — 99999 PR PBB SHADOW E&M-EST. PATIENT-LVL V: CPT | Mod: PBBFAC,,, | Performed by: STUDENT IN AN ORGANIZED HEALTH CARE EDUCATION/TRAINING PROGRAM

## 2022-10-26 PROCEDURE — 3079F DIAST BP 80-89 MM HG: CPT | Mod: CPTII,S$GLB,, | Performed by: STUDENT IN AN ORGANIZED HEALTH CARE EDUCATION/TRAINING PROGRAM

## 2022-10-26 PROCEDURE — 1125F PR PAIN SEVERITY QUANTIFIED, PAIN PRESENT: ICD-10-PCS | Mod: CPTII,S$GLB,, | Performed by: STUDENT IN AN ORGANIZED HEALTH CARE EDUCATION/TRAINING PROGRAM

## 2022-10-26 PROCEDURE — 99417 PROLNG OP E/M EACH 15 MIN: CPT | Mod: S$GLB,,, | Performed by: STUDENT IN AN ORGANIZED HEALTH CARE EDUCATION/TRAINING PROGRAM

## 2022-10-26 PROCEDURE — 99215 OFFICE O/P EST HI 40 MIN: CPT | Mod: S$GLB,,, | Performed by: STUDENT IN AN ORGANIZED HEALTH CARE EDUCATION/TRAINING PROGRAM

## 2022-10-26 PROCEDURE — 3075F PR MOST RECENT SYSTOLIC BLOOD PRESS GE 130-139MM HG: ICD-10-PCS | Mod: CPTII,S$GLB,, | Performed by: STUDENT IN AN ORGANIZED HEALTH CARE EDUCATION/TRAINING PROGRAM

## 2022-10-26 PROCEDURE — 99417 PR PROLONGED SVC, OUTPT, W/WO DIRECT PT CONTACT,  EA ADDTL 15 MIN: ICD-10-PCS | Mod: S$GLB,,, | Performed by: STUDENT IN AN ORGANIZED HEALTH CARE EDUCATION/TRAINING PROGRAM

## 2022-10-26 PROCEDURE — 1160F RVW MEDS BY RX/DR IN RCRD: CPT | Mod: CPTII,S$GLB,, | Performed by: STUDENT IN AN ORGANIZED HEALTH CARE EDUCATION/TRAINING PROGRAM

## 2022-10-26 PROCEDURE — 99215 PR OFFICE/OUTPT VISIT, EST, LEVL V, 40-54 MIN: ICD-10-PCS | Mod: S$GLB,,, | Performed by: STUDENT IN AN ORGANIZED HEALTH CARE EDUCATION/TRAINING PROGRAM

## 2022-10-26 PROCEDURE — 3075F SYST BP GE 130 - 139MM HG: CPT | Mod: CPTII,S$GLB,, | Performed by: STUDENT IN AN ORGANIZED HEALTH CARE EDUCATION/TRAINING PROGRAM

## 2022-10-26 PROCEDURE — 1125F AMNT PAIN NOTED PAIN PRSNT: CPT | Mod: CPTII,S$GLB,, | Performed by: STUDENT IN AN ORGANIZED HEALTH CARE EDUCATION/TRAINING PROGRAM

## 2022-10-26 PROCEDURE — 3079F PR MOST RECENT DIASTOLIC BLOOD PRESSURE 80-89 MM HG: ICD-10-PCS | Mod: CPTII,S$GLB,, | Performed by: STUDENT IN AN ORGANIZED HEALTH CARE EDUCATION/TRAINING PROGRAM

## 2022-10-26 RX ORDER — CELECOXIB 100 MG/1
100 CAPSULE ORAL 2 TIMES DAILY
COMMUNITY
Start: 2022-10-17

## 2022-10-26 NOTE — TELEPHONE ENCOUNTER
Hi  And Mrs. Marti,     Just wanted to follow-up with our visit earlier today. I have been able to place the order for both Big and Loud therapy as well as the USTEP walker. Therapy should be contacting you regarding this and I will keep you posted if I hear of any hold ups on the walker.     It was a pleasure meeting you both,   Dr. Mcmahon

## 2022-10-26 NOTE — PATIENT INSTRUCTIONS
"PLAN:    Add 1/2 tablet more of Sinemet to the morning dose only for 1-2 weeks. If tolerating (no worsening of hallucinations) add another 1/2 tablet to afternoon or evening dose.   - Notice on dosages where he is taking 1.5 tablets if his walking improves at all. Also pay attention to hallucinations and if they become bothersome.     Try giving AM dose of Sinemet rather than Hydroxyzine for "anxious feeling". OK to give hydroxyzine    Hold off on beginning clonazepam at night for now. May do this at a later time.    I will work on big and loud therapy and USTEP walker and be in touch.               "

## 2022-10-28 ENCOUNTER — CLINICAL SUPPORT (OUTPATIENT)
Dept: REHABILITATION | Facility: HOSPITAL | Age: 77
End: 2022-10-28
Attending: STUDENT IN AN ORGANIZED HEALTH CARE EDUCATION/TRAINING PROGRAM
Payer: MEDICARE

## 2022-10-28 DIAGNOSIS — R26.89 ABNORMALITY OF GAIT DUE TO IMPAIRMENT OF BALANCE: ICD-10-CM

## 2022-10-28 DIAGNOSIS — G20.A1 PARKINSON'S DISEASE: ICD-10-CM

## 2022-10-28 PROCEDURE — 97110 THERAPEUTIC EXERCISES: CPT | Mod: PO

## 2022-10-28 PROCEDURE — 97162 PT EVAL MOD COMPLEX 30 MIN: CPT | Mod: PO

## 2022-10-28 NOTE — PLAN OF CARE
[facility]  Physical Therapy Neurological Rehabilitation Initial Evaluation    Name: Haja Marti  Clinic Number: [id number]    Therapy Diagnosis:   Encounter Diagnoses   Name Primary?    Parkinson's disease     Abnormality of gait due to impairment of balance      Physician: Mag Mcmahon MD    Physician Orders: PT Eval and Treat BIG therapy  Medical Diagnosis from Referral:   G20 (ICD-10-CM) - Parkinson's disease   R26.89 (ICD-10-CM) - Abnormality of gait due to impairment of balance     Evaluation Date: 10/28/2022  Authorization Period Expiration: 12/28/22  Plan of Care Expiration: 10/26/23  Visit # / Visits authorized: 1/ 1    Time In: 0915  Time Out: 10:05  Total Billable Time: 50 minutes    Precautions: Standard and Fall    Subjective        Medical History:   Past Medical History:   Diagnosis Date    Anxiety     Arthritis     BPH (benign prostatic hypertrophy)     Cataract     OS    Colon polyp 2002    Depression     Epiretinal membrane, both eyes     Hx of psychiatric care     Hyperlipidemia     Hypertension     Kidney stones     Kidney stones     Parkinsons     Posterior vitreous detachment of left eye     Psychiatric problem     Retinal detachment 2004    OD    Retinoschisis, left eye     Sleep apnea     Vitreous hemorrhage of left eye        Surgical History:   Haja Marti  has a past surgical history that includes Tonsillectomy; Eye surgery; Prostate surgery; laser indirect (4/8/10); Retinal detachment surgery (2004); Laser Indirect Retinopexy (4/8/10); Cataract extraction; Rotator cuff repair (11/13/2013); Injection of anesthetic agent around nerve (Bilateral, 7/3/2018); Transforaminal epidural injection of steroid (Bilateral, 2/19/2019); Epidural steroid injection (N/A, 3/12/2019); Colonoscopy (N/A, 12/3/2019); Biopsy of adenoids; Incision of perirectal abscess (N/A, 2/12/2021); Insertion of seton stitch (N/A, 2/12/2021); Examination under anesthesia (N/A,  2/18/2021); Insertion of seton stitch (Left, 2/18/2021); Incision of perirectal abscess (Left, 2/18/2021); Insertion of seton stitch (N/A, 9/24/2021); Rectal fistulotomy (N/A, 11/12/2021); and Cystoscopy with urodynamic testing (N/A, 1/31/2022).    Medications:   Haja has a current medication list which includes the following prescription(s): atorvastatin, carbidopa-levodopa  mg, celecoxib, clonazepam, ergocalciferol, finasteride, folic acid, hydroxyzine hcl, losartan, magnesium oxide, meloxicam, potassium citrate, pyridoxine (vitamin b6), sertraline, sodium bicarbonate, and tamsulosin, and the following Facility-Administered Medications: sodium chloride 0.9%, mupirocin, and tamsulosin.    Allergies:   Review of patient's allergies indicates:  No Known Allergies       Date of onset: PD diagnosed about 6-7 year ago   History of current condition - Haja reports:Wife helps to provide some history.  for the past 6 months he gradually decreased.  He has stopped walking and doing the boxing class after he has urinary stimulator placed.  He recently changed his medication. He has had lumbar surgery in the past     Prior Therapy: as done Physical Therapy for PD   Social History:  lives with their spouse  Falls: last fall was a couple days ago, has a bruise on his right arm.  Slipped down 2 concrete steps.  Has about 1 fall per month  DME: rolling walker, seat in shower and grab bars  Home Environment: 1 steps  Exercise Routine / History: no longer working out but was boxing  Family Present at time of Eval: Wife  Occupation: Retired sales   Prior Level of Function: Was walking 1 mile in neighborhood  Current Level of Function: Having more falls, unable to walk community distance    Pain:  Denies pain    Patient's goals: I'd like to continue boxing class and improve walking    Objective       Objective   - Follows commands: 100% of time   - Speech: hypophonic    Mental status: alert, oriented to person, place, and  time, normal mood, behavior, speech, dress, motor activity, and thought processes  Appearance: Casually dressed  Behavior:  cooperative  Attention Span and Concentration:  Easily distracted    Has some memory impairments    Dominant hand:  right     Posture Alignment in standing:   Head: forward head   Trunk: slouched posture     Sensation: Light Touch: Intact    Coordination:   - fine motor: intact  - UE coordination: slowed left upper extremity   - LE coordination:  slowed B lower extremity     ROM:  RANGE OF MOTION--LOWER EXTREMITIES  (R) LE Hip: normal   Knee: normal   Ankle: normal    (L) LE: Hip: normal   Knee: normal   Ankle: normal    Strength: manual muscle test grades below       Lower Extremity Strength  Right LE  Left LE    Hip Flexion: 5/5 Hip Flexion: 5/5   Hip Extension:  3+/5 Hip Extension: 3+/5   Hip Abduction: 3+/5 Hip Abduction: 3+/5   Hip Adduction: 4/5 Hip Adduction 4/5   Knee Extension: 5/5 Knee Extension: 5/5   Knee Flexion: 4/5 Knee Flexion: 4/5   Ankle Dorsiflexion: 5/5 Ankle Dorsiflexion: 5/5   Ankle Plantarflexion: NT Ankle Plantarflexion: NT       Functional Mobility (Bed mobility, transfers)  Bed mobility: Mod Independent with increased time  Supine to sit: Mod Independent with increased time  Sit to supine: Mod Independent with increased time  Sit to stand:  Mod Independent  with upper extremity assist      BALANCE:   Evaluation   Single Limb Stance R LE 4  (<10 sec = HIGH FALL RISK)   Single Limb Stance L LE 6  (<10 sec = HIGH FALL RISK)   30 second Chair Rise NT   5 times sit-stand 23 seconds, lower extremity pushing on back of chair     Romber sec  Romberg Eyes Closed: 20 sec  Sharpened Romberg: unable     Gait Assessment:   - AD used: RW  - Assistance: CGA  - Distance: 120ft during eval    GAIT DEVIATIONS:  Haja displays the following deviations with ambulation: freezing and festination of gait, difficulty with turns, limited bilateral foot clearance, stooped posture, cues  "for proper rolling walker placement    Impairments contributing to deviations: impaired motor control    Endurance Deficit: Fair     Evaluation   Timed Up and Go 1'15"    Self Selected Walking Speed 0.55 m/sec (6m/11s)   Fast Walking Speed 0.86 m/sec (6m/7s)         CMS Impairment/Limitation/Restriction for FOTO Neuro Survey    Therapist reviewed FOTO scores for Haja Marti on 10/28/2022.   FOTO documents entered into [employer] - see Media section.    Limitation Score: 45%         TREATMENT   Treatment Time In: 09:55  Treatment Time Out: 10:05  Total Treatment time separate from Evaluation: 10 minutes    Haja received therapeutic exercises to develop strength, ROM, flexibility, and posture for 10 minutes including:  Supine snow angels x 10  Bridge x 10  Sit to stand x 5, increased time  Educated on HEP      Home Exercises and Patient Education Provided    Education provided:   - Physical Therapy Plan of Care, role of Physical Therapy, BIG program, HEP, PD med timing    Written Home Exercises Provided: yes.  Exercises were reviewed and Haja was able to demonstrate them prior to the end of the session.  Haja demonstrated fair  understanding of the education provided.     See EMR under Patient Instructions for exercises provided 10/28/2022.    Assessment   Haja is a 76 y.o. male referred to outpatient Physical Therapy with a medical diagnosis of Parkinson's. Patient presents with impaired memory and requires assistance from his wife for subjective history. He demo's weakness in bilateral hip extension.  He requires increased time for bed mobility and cues for reaching upper extremity.  He scored 23 seconds on the 5 times sit to stand indicating decreased functional strength. He scored 1'15" on the TUG indicating impaired gait and increased fall risk.  His gait is characterized by increased festination, freezing and decreased bilateral foot clearance.  He will benefit from skilled Physical Therapy to " address impairments and maximize functional mobility.     Patient prognosis is Good.   Patient will benefit from skilled outpatient Physical Therapy to address the deficits stated above and in the chart below, provide patient/family education, and to maximize patient's level of independence.     Plan of care discussed with patient: Yes  Patient's spiritual, cultural and educational needs considered and patient is agreeable to the plan of care and goals as stated below:     Anticipated Barriers for therapy: cognition    Medical Necessity is demonstrated by the following  History  Co-morbidities and personal factors that may impact the plan of care Co-morbidities:   anxiety, depression, prior lumbar surgery, and urinary surgery, prostate surgery, rotator cuff repair    Personal Factors:   age  attitudes     moderate   Examination  Body Structures and Functions, activity limitations and participation restrictions that may impact the plan of care Body Regions:   back  lower extremities  trunk    Body Systems:    gross symmetry  ROM  strength  gross coordinated movement  balance  gait  motor learning    Participation Restrictions:   memory    Activity limitations:   Learning and applying knowledge  solving problems    General Tasks and Commands  no deficits    Communication  no deficits    Mobility  lifting and carrying objects  walking  moving around using equipment (WC)  driving (bike, car, motorcycle)    Self care  looking after one's health    Domestic Life  doing house work (cleaning house, washing dishes, laundry)  assisting others    Interactions/Relationships  complex interpersonal interactions    Life Areas  basic economic transactions    Community and Social Life  community life  recreation and leisure         moderate   Clinical Presentation evolving clinical presentation with changing clinical characteristics moderate   Decision Making/ Complexity Score: moderate     Goals:  Short Term Goals: 4 weeks     Patient to perform HEP with min A  Patient able to score less than or equal to 18 sec on the 5 times sit to stand for improved functional strength  Patient to score less than or equal to 60 sec on the TUG for decreased fall risk  Patient able to ambulate greater than 250ft mod I with rolling walker, with 50% increased bilateral foot clearance and less than 3 episodes of freezing    Long Term Goals: 8 weeks   Patient to perform HEP Independent  Patient able to score less than or equal to 45 sec on the TUG for decreased fall risk.  Patient to score improve FOTO score by 5% for improved functional mobility.   Patient able to ambulate greater than 500ft mod Independent with rolling walker with 50% improved bilateral foot clearance, less than 2 episodes of freezing, no loss of balance.   Patient to improved bilateral hip strength to 4-/5 for improved upright posture.     Plan   Plan of care Certification: 10/28/2022 to 12/28/22.    Outpatient Physical Therapy 1 times per week for 4 weeks to include the following interventions: Gait Training, Manual Therapy, Patient Education, Therapeutic Activities, and Therapeutic Exercise. A  Then 4 times weekly for 4 weeks to perform LSVT BIG Program    Shana Frost, PT

## 2022-10-31 ENCOUNTER — OUTPATIENT CASE MANAGEMENT (OUTPATIENT)
Dept: NEUROLOGY | Facility: CLINIC | Age: 77
End: 2022-10-31
Payer: MEDICARE

## 2022-10-31 NOTE — PROGRESS NOTES
Social Work - Dementia Care Management:    Emailed the following to caregiver, wife Whitley:  [place] for Dementia Caregiver Support Group  [employer] for Dementia Education Series  [place] to in-person Dementia Caregiver Support Group  Link to search for Alzheimer's Association virtual groups  [place] for Finding Meaning and Hope video discussion series  Two general Tip Sheets  Link to [facility] Adult [facility] Program  [place] for Music Therapy  [place] for Connected Home program  Home Safety Checklist  Technology for Caregivers Guide  [employer] website  FCA website  AFTD website  Teepa Snow video info  Teepa Snow podcast site  Book recommendations and offers of complimentary copies

## 2022-11-02 ENCOUNTER — OFFICE VISIT (OUTPATIENT)
Dept: PSYCHIATRY | Facility: CLINIC | Age: 77
End: 2022-11-02
Payer: MEDICARE

## 2022-11-02 ENCOUNTER — CLINICAL SUPPORT (OUTPATIENT)
Dept: REHABILITATION | Facility: HOSPITAL | Age: 77
End: 2022-11-02
Attending: INTERNAL MEDICINE
Payer: MEDICARE

## 2022-11-02 DIAGNOSIS — G20.A1 PARKINSON'S DISEASE: Primary | ICD-10-CM

## 2022-11-02 DIAGNOSIS — F41.1 GAD (GENERALIZED ANXIETY DISORDER): Primary | ICD-10-CM

## 2022-11-02 DIAGNOSIS — F32.A DEPRESSION, UNSPECIFIED DEPRESSION TYPE: ICD-10-CM

## 2022-11-02 PROCEDURE — 90837 PR PSYCHOTHERAPY W/PATIENT, 60 MIN: ICD-10-PCS | Mod: 95,S$GLB,, | Performed by: SOCIAL WORKER

## 2022-11-02 PROCEDURE — 97110 THERAPEUTIC EXERCISES: CPT | Mod: PO,CQ

## 2022-11-02 PROCEDURE — 90837 PSYTX W PT 60 MINUTES: CPT | Mod: 95,S$GLB,, | Performed by: SOCIAL WORKER

## 2022-11-02 NOTE — PATIENT INSTRUCTIONS
Hip Abduction: Standing Side Leg Lift (Eccentric)        Lift leg out to side quickly. Slowly lower for 3-5 seconds. Perform reps per set, _2x10__ sets per day       https://ecce.[employer].us/69     Copyright © I. All rights reserved.   Heel Raises        Stand with support. Tighten pelvic floor and hold. With knees straight, raise heels off ground. Hold ___ seconds. Relax for ___ seconds.  Repeat ___ times. Do ___ times a day.    Copyright © I. All rights reserved.   FUNCTIONAL MOBILITY: Marching - Standing        March in place by lifting left leg up, then right. Alternate.  __10_ reps per set, _2__ sets per day, ___ days per week Hold onto a support.    Copyright © I. All rights reserved.   Leg Flexion        Inhale. While exhaling, lift one ankle toward buttocks, keeping knees together. Slowly return to starting position.  Repeat __10__ times each leg. Do _2___ sets per session. Do ____ sessions per day.      Copyright © [employer]. All rights reserved.   Hip Extension (Standing)        Stand with support. Squeeze pelvic floor and hold. Move right leg backward with straight knee. Hold for _2__ seconds. Relax for __2_ seconds.  Repeat _2x10__ times. Do ___ times a day.  Repeat with other leg.      Copyright © I. All rights reserved.   Mini-Squats (Standing)        Stand with support. Bend knees slightly. Tighten pelvic floor. Hold for 5___ seconds. Return to straight standing.   Repeat _2x10__ times.     Copyright © [employer]. All rights reserved.

## 2022-11-02 NOTE — PROGRESS NOTES
"[facility]   Physical Therapy Treatment Note     Name: Haja Marti  Clinic Number: [id number]    Therapy Diagnosis:   Encounter Diagnosis   Name Primary?    Parkinson's disease Yes     Physician: No ref. provider found    Visit Date: 11/2/2022    Physician: Mag Mcmahon MD     Physician Orders: PT Eval and Treat BIG therapy  Medical Diagnosis from Referral:   G20 (ICD-10-CM) - Parkinson's disease   R26.89 (ICD-10-CM) - Abnormality of gait due to impairment of balance      Evaluation Date: 10/28/2022  Authorization Period Expiration: 12/28/22  Plan of Care Expiration: 10/26/23  Visit # / Visits authorized: 1/ 1      PTA Visit #: 1/5     Time In: 14:00  Time Out: 14:45  Total Billable Time: 45 minutes    Precautions: Standard and Fall    SUBJECTIVE     Pt reports: " I'm doing ok, I just got my U-Step.  Wife reported: " WE got this and it didn't come with the laser component that we wanted.  Do you know who to call for that?"   He was compliant with home exercise program.  Response to previous treatment: no problems  Functional change: ongoing    Pain: 0/10  Location: N/A      OBJECTIVE   Pt ambulated into the clinic with his new U-Step, wife remained in lobby    Treatment     Haja received the treatments listed below:      therapeutic exercises to develop strength, endurance, ROM, flexibility, posture, and core stabilization for 45   minutes including:  X 8 min on [employer] Fit recumbent stepper.  B UE/B LE on level 1.o    Educated pt on HEP including:   2 x 10 reps of B LE hip abd/add  2 x 10 reps of B LE hip flexion  2 x 10 reps of B LE hip extension  2 x 10 reps of B LE mini squats  2 x 10 reps of B LE heel/toe raises    Pt had to use the restroom and ambulated to/from the bathroom with U-Step and SBA.  VCs for increase step length      neuromuscular re-education activities to improve: Balance, Coordination, Kinesthetic, Sense, Proprioception, and Posture for 0 minutes. The " following activities were included:      therapeutic activities to improve functional performance for 0  minutes, including:      gait training to improve functional mobility and safety for 0  minutes, including:      Patient Education and Home Exercises     Home Exercises Provided and Patient Education Provided     Education provided:   - HEP, 4S's for when festinating gait occurs    Written Home Exercises Provided: Patient instructed to cont prior HEP. Exercises were reviewed and Haja was able to demonstrate them prior to the end of the session.  Haja demonstrated good  understanding of the education provided. See EMR under Patient Instructions for exercises provided during therapy sessions    ASSESSMENT     Haja tolerated his first tx session following initial evaluation well and did not have any problems.  Haja began cardiovascular endurance on the stepper and was educated on his first installment of HEP.  Haja was able to demonstrate understanding of all exercises.      Haja Is progressing well towards his goals.   Pt prognosis is Good.     Pt will continue to benefit from skilled outpatient physical therapy to address the deficits listed in the problem list box on initial evaluation, provide pt/family education and to maximize pt's level of independence in the home and community environment.     Pt's spiritual, cultural and educational needs considered and pt agreeable to plan of care and goals.     Anticipated barriers to physical therapy: cognition    Goals:  Short Term Goals: 4 weeks    Patient to perform HEP with min A  Patient able to score less than or equal to 18 sec on the 5 times sit to stand for improved functional strength  Patient to score less than or equal to 60 sec on the TUG for decreased fall risk  Patient able to ambulate greater than 250ft mod I with rolling walker, with 50% increased bilateral foot clearance and less than 3 episodes of freezing     Long Term Goals: 8 weeks    Patient to perform HEP Independent  Patient able to score less than or equal to 45 sec on the TUG for decreased fall risk.  Patient to score improve FOTO score by 5% for improved functional mobility.   Patient able to ambulate greater than 500ft mod Independent with rolling walker with 50% improved bilateral foot clearance, less than 2 episodes of freezing, no loss of balance.   Patient to improved bilateral hip strength to 4-/5 for improved upright posture.     PLAN     Cont with LSVT protocol      Fadia Cardona, PTA

## 2022-11-02 NOTE — PROGRESS NOTES
"Individual Psychotherapy (PhD/LCSW)    11/2/2022    Site:  Telemed         Therapeutic Intervention: Met with patient and spouse.  Outpatient - Insight oriented psychotherapy 60 min - CPT code 79523    Chief complaint/reason for encounter: depression and anxiety     Interval history and content of current session:      Site: [facility]             Length of service: 60         Therapeutic intervention:     Persons present: spouse and pt  [place] (needed to help with interpretation of intervention due to pt cognitive decline due to dementia).    Interval history:      The patient location is: home [place].   The chief complaint leading to consultation is: anxiety/depression  Visit type: audio  Total time spent with patient: 45 min  Each patient to whom he or she provides medical services by telemedicine is:  (1) informed of the relationship between the physician and patient and the respective role of any other health care provider with respect to management of the patient; and (2) notified that he or she may decline to receive medical services by telemedicine and may withdraw from such care at any time.       Notes:   He has been bothered by the visual hallucinations.  He will try to stop the "tug of war" with them.  Instead of fighting them he will let them be.  Wife on board.  Some discussion over the acceptance of his advancing LBD.                 Target symptoms: depression, anxiety        Progress toward goals: progressing well    Diagnosis: generalized anxiety disorder       Treatment plan:  Target symptoms: anxiety   Why chosen therapy is appropriate versus another modality: relevant to diagnosis, evidence based practice  Outcome monitoring methods: self-report, observation, feedback from family  Therapeutic intervention type: behavior modifying psychotherapy    Risk parameters:  Patient reports no suicidal ideation  Patient reports no homicidal ideation  Patient reports no self-injurious behavior  Patient " reports no violent behavior        Verbal deficits: None           Patient's response to intervention:   The patient's response to intervention is accepting, motivated.              Progress toward goals and other mental status changes:  The patient's progress toward goals is fair .    Diagnosis:        ICD-10-CM ICD-9-CM   1. Generalized anxiety disorder F41.1 300.02       Plan:  individual psychotherapy and family psychotherapy    Return to clinic: as scheduled    Length of Service (minutes): 60

## 2022-11-08 ENCOUNTER — CLINICAL SUPPORT (OUTPATIENT)
Dept: REHABILITATION | Facility: HOSPITAL | Age: 77
End: 2022-11-08
Attending: STUDENT IN AN ORGANIZED HEALTH CARE EDUCATION/TRAINING PROGRAM
Payer: MEDICARE

## 2022-11-08 DIAGNOSIS — G20.A1 PARKINSON'S DISEASE: Primary | ICD-10-CM

## 2022-11-08 PROCEDURE — 97110 THERAPEUTIC EXERCISES: CPT | Mod: PO,CQ

## 2022-11-08 PROCEDURE — 97112 NEUROMUSCULAR REEDUCATION: CPT | Mod: PO,CQ

## 2022-11-08 NOTE — PROGRESS NOTES
"[facility]   Physical Therapy Treatment Note     Name: Haja Marti  Clinic Number: [id number]    Therapy Diagnosis:   No diagnosis found.    Physician: Mag Mcmahon MD    Visit Date: 11/8/2022    Physician: Mag Mcmahon MD     Physician Orders: PT Eval and Treat BIG therapy  Medical Diagnosis from Referral:   G20 (ICD-10-CM) - Parkinson's disease   R26.89 (ICD-10-CM) - Abnormality of gait due to impairment of balance      Evaluation Date: 10/28/2022  Authorization Period Expiration: 12/28/22  Plan of Care Expiration: 10/26/23  Visit # / Visits authorized: 2/ 1      PTA Visit #: 2/5     Time In: 14:00  Time Out: 14:45  Total Billable Time: 45 minutes    Precautions: Standard and Fall    SUBJECTIVE     Pt reports: " I need my wife to come bring me my medicine."   He was semi compliant with home exercise program.  Response to previous treatment: a little fatigued  Functional change: ongoing    Pain: 0/10  Location: N/A      OBJECTIVE   Pt ambulated into the clinic with his new U-Step, wife remained in lobby    Treatment     Haja received the treatments listed below:      therapeutic exercises to develop strength, endurance, ROM, flexibility, posture, and core stabilization for 30   minutes including:  X 8 min on Sci Fit recumbent stepper.  B UE/B LE on level 1.o    Sitting in Brown bench:     1 x 5 reps of floor to ceiling stretches with 10 sec hold  1 x 10 reps of sit to stand with big amplitude  1 x 10 reps of car legs stepping over yoga block      neuromuscular re-education activities to improve: Balance, Coordination, Kinesthetic, Sense, Proprioception, and Posture for 10 minutes. The following activities were included:  // bars:   1 x 10 reps of B LE Forward step over and back an orange manuel with 2 UE support.  X 6 half laps of side stepping along the bars with 2 UE support    therapeutic activities to improve functional performance for 0  minutes, " including:      gait training to improve functional mobility and safety for 5  minutes, including:  Pt ambulated in/out of the clinic from the [facility] with U-Step and SBA.  VC's for increased step length and pt given cues to step to each square while walking.      Patient Education and Home Exercises     Home Exercises Provided and Patient Education Provided     Education provided:   - HEP, 4S's for when festinating gait occurs    Written Home Exercises Provided: Patient instructed to cont prior HEP. Exercises were reviewed and Haja was able to demonstrate them prior to the end of the session.  Haja demonstrated good  understanding of the education provided. See EMR under Patient Instructions for exercises provided during therapy sessions    ASSESSMENT     Haja tolerated his tx session well and cont with endurance, strengthening, amplitude and balance activities.  Pt requires verbal cues for the 4 S's when he starts to shuffle and is able to improve his gait and posture with the verbal cues.  Haja showed improved gait and step length, with cues for stepping on the square and using visual targets.      Haja Is progressing well towards his goals.   Pt prognosis is Good.     Pt will continue to benefit from skilled outpatient physical therapy to address the deficits listed in the problem list box on initial evaluation, provide pt/family education and to maximize pt's level of independence in the home and community environment.     Pt's spiritual, cultural and educational needs considered and pt agreeable to plan of care and goals.     Anticipated barriers to physical therapy: cognition    Goals:  Short Term Goals: 4 weeks    Patient to perform HEP with min A  Patient able to score less than or equal to 18 sec on the 5 times sit to stand for improved functional strength  Patient to score less than or equal to 60 sec on the TUG for decreased fall risk  Patient able to ambulate greater than 250ft mod I with  rolling walker, with 50% increased bilateral foot clearance and less than 3 episodes of freezing     Long Term Goals: 8 weeks   Patient to perform HEP Independent  Patient able to score less than or equal to 45 sec on the TUG for decreased fall risk.  Patient to score improve FOTO score by 5% for improved functional mobility.   Patient able to ambulate greater than 500ft mod Independent with rolling walker with 50% improved bilateral foot clearance, less than 2 episodes of freezing, no loss of balance.   Patient to improved bilateral hip strength to 4-/5 for improved upright posture.     PLAN     Cont with LSVT protocol      Fadia Cardona, PTA

## 2022-11-10 ENCOUNTER — CLINICAL SUPPORT (OUTPATIENT)
Dept: REHABILITATION | Facility: HOSPITAL | Age: 77
End: 2022-11-10
Attending: STUDENT IN AN ORGANIZED HEALTH CARE EDUCATION/TRAINING PROGRAM
Payer: MEDICARE

## 2022-11-10 DIAGNOSIS — R47.1 DYSARTHRIA: ICD-10-CM

## 2022-11-10 DIAGNOSIS — F02.80 LEWY BODY PARKINSON'S DISEASE: ICD-10-CM

## 2022-11-10 DIAGNOSIS — R49.8 HYPOPHONIA: Primary | ICD-10-CM

## 2022-11-10 DIAGNOSIS — G31.83 LEWY BODY PARKINSON'S DISEASE: ICD-10-CM

## 2022-11-10 DIAGNOSIS — G20.A1 PARKINSON'S DISEASE: ICD-10-CM

## 2022-11-10 PROCEDURE — 92522 EVALUATE SPEECH PRODUCTION: CPT | Mod: PO

## 2022-11-10 NOTE — PLAN OF CARE
"Outpatient Neurological Rehabilitation  Speech and Language Therapy Evaluation    Date: 11/10/2022     Name: Haja Marti   MRN: [id number]    Therapy Diagnosis:   Encounter Diagnosis   Name Primary?    Parkinson's disease     Physician: Mag Mcmahon MD  Physician Orders: [id number] - AMB REFERRAL/CONSULT TO SPEECH THERAPY    Medical Diagnosis from Referral: Parkinson's disease [G20]    Visit #/Visits authorized: 1/ 1  Date of Evaluation:  11/10/2022   Insurance Authorization Period: 10/26/22-10/26/23   Plan of Care Expiration: 11/10/2022 to 12/22/22     Time In: 1:00pm  Time Out: 1:45pm  Test interpretation time:  Procedure Min.   Evaluation of Speech Sound Production  45          Precautions:Standard and Fall  Subjective   Date of Onset: diagnosed 7 years ago   History of Current Condition:   Haja Marti is a 77 y.o. male male who presents to [facility] for evaluation secondary to Parkinson's disease. Per chart review, patient additionally was diagnosed with Lewy Body Parkinsons. Patient was referred to therapy by Mag Mcmahon MD , which is the patient's neurologist. Patient reports he completed LSVT LOUD 4 years ago and have not completed any exercises since. He reports he does not want to complete the program or a differing program, however his wife made him come today. Patient arrived alone to session today, however called wife to discuss next steps. Wife reports they will discuss at home however to proceed evaluation. Patient reports he feels his voice is "fine" with various inconsistent whispering from day-to-day.    Past Medical History: Haja Marti  has a past medical history of Anxiety, Arthritis, BPH (benign prostatic hypertrophy), Cataract, Colon polyp (2002), Depression, Epiretinal membrane, both eyes, psychiatric care, Hyperlipidemia, Hypertension, Kidney stones, Kidney stones, Parkinsons, Posterior vitreous detachment of left eye, " "Psychiatric problem, Retinal detachment (2004), Retinoschisis, left eye, Sleep apnea, and Vitreous hemorrhage of left eye.  Haja Marti  has a past surgical history that includes Tonsillectomy; Eye surgery; Prostate surgery; laser indirect (4/8/10); Retinal detachment surgery (2004); Laser Indirect Retinopexy (4/8/10); Cataract extraction; Rotator cuff repair (11/13/2013); Injection of anesthetic agent around nerve (Bilateral, 7/3/2018); Transforaminal epidural injection of steroid (Bilateral, 2/19/2019); Epidural steroid injection (N/A, 3/12/2019); Colonoscopy (N/A, 12/3/2019); Biopsy of adenoids; Incision of perirectal abscess (N/A, 2/12/2021); Insertion of seton stitch (N/A, 2/12/2021); Examination under anesthesia (N/A, 2/18/2021); Insertion of seton stitch (Left, 2/18/2021); Incision of perirectal abscess (Left, 2/18/2021); Insertion of seton stitch (N/A, 9/24/2021); Rectal fistulotomy (N/A, 11/12/2021); and Cystoscopy with urodynamic testing (N/A, 1/31/2022).  Medical Hx and Allergies:  Haja has a current medication list which includes the following prescription(s): atorvastatin, carbidopa-levodopa  mg, celecoxib, clonazepam, ergocalciferol, finasteride, folic acid, hydroxyzine hcl, losartan, magnesium oxide, meloxicam, potassium citrate, pyridoxine (vitamin b6), sertraline, sodium bicarbonate, and tamsulosin, and the following Facility-Administered Medications: sodium chloride 0.9%, mupirocin, and tamsulosin. Review of patient's allergies indicates:  No Known Allergies  Imaging: no recent relevant images   Prior Therapy:  LSVT LOUD program 4 1/2 years ago  Social History:  retired; lives with their spouse  Prior Level of Function: difficulty with voice projection   Current Level of Function: continued difficulties.   Pain:   0/10  Pain Location / Description: n/a  Nutrition:  IDDSI 0 (Thin) and IDDSI 7 (Regular)  Patient's Therapy Goals:  "I don't want to do this"  Objective   Formal " Assessment:     Auditory Comprehension: No concerns reported or observed; WFL for the purpose of assessment and conversation.  Verbal Expression: No concerns reported or observed; WFL for the purpose of assessment and conversation.   Reading Comprehension:  Did not assess. No concerns reported or observed.    Written Expression:  Did not assess. No concerns reported or observed.     Cognition: Cognitive impairment noted throughout evaluation, however functional for evaluation of voice only.     Motor Speech/Fluency/Voice:    Pt's motor speech, fluency, and voice were informally assessed. OME performed within Cranial Nerve Assessment detailed below. Motor speech skills were assessed and were not functional for daily communication with a variety of communication partners (i.e. Family, friends, medical personnel).   Respiration / Phonation:   # of reps/ 5s Norms/ # reps per second Maximum Phon. Time (ah) Words Per Minute:   P^ 8  5.0-7.1 1.6  Trial 1: 4 seconds     103 words per minute   T^ 13  4.8-7.1 2.6  Trial 2: 4 seconds >125 = WFL    K^ 9 4.4-7.4 1.8  <125 = refer for AAC eval   P^T^K^ 6  3.6-7.5 1.2  Av seconds       Norm: 160-170  (paragraph reading)       Norm (F 10-26, M 13-34.6) Norm: 150 to 250 (norm conversation)        Phonation Oral Reading Conversation   Stimulus Sustained ah:    Singing up scale:    Counting 1 to highest # on one breath: [employer] [place] Passage History and Conversation   Quality breathy, weak breathy, weak breathy, weak   Duration  4 seconds  - minute  N/a   Loudness  monoloudness monoloudness monoloudness   Steadiness low pitch low pitch low pitch     Diadochokinetic (DDK) rates for rapid repetition of 1 - 3 syllable utterances were not WNL.     .Communicative Effectiveness Survey: is a questionnaire given to the patient to  the effectiveness of his communicative function.         Communication Situation  Rating on a scale of 1 - 4  1= not at all effective   4= very effective    Having a conversation with a family member or friends at home  3     Participating in conversation with strangers in a quiet place 2     Conversing with a familiar person over the telephone  2    Conversing with a stranger over the telephone  1    Being part of a conversation in a noisy environment (social gathering) 2    Speaking to a friend when you are emotionally upset of you are angry 3    Having a conversation while traveling in a car 4     Having a conversation with someone at a distance (across a room) 2         Overall Speech Intelligibility: good to fair; speech was intelligible for both familiar and unfamiliar listeners however their vocal intensity and vocal endurance was reduced. Rapid, rushed speech and invariable rate of speech noted during the DDK/MMR's.    Cranial Nerve Examination  Cranial Nerve 5: Trigeminal Nerve  Motor Jaw Posture at rest: Open  Mandible Elevation/Depression: WFL  Mandible lateralization: WFL  Abnormal movement: absent Interpretation: WNL   Sensory Forehead: WFL  Cheek: WFL  Jaw: WFL  Facial Pain: None noted Interpretation: WNL     Cranial Nerve 7: Facial Nerve  Motor Facial Symmetry: drooling, asymmetry, and mask-like expression; left side reduced movemfent   Wrinkle Forehead: WFL  Close eyes tightly: WFL  Labial Protrusion: WFL  Labial Retraction: WFL  Buccal Strength with Labial Seal: WFL  Abnormal movement: present Interpretation: various asymmetry    Sensory Formal testing not completed. Reports he lost taste 50 years ago       Cranial Nerves IX and X: Glossopharyngeal and Vagus Nerves  Motor Palatal Symmetry (Rest): WNL  Palatal Symmetry (Movement): WNL  Cough: Perceptually weak  Voice Prior to PO intake: breathy  Resonance: Normal  Abnormal movement: absent Interpretation: weak voice      Cranial Nerve XII: Hypoglossal Nerve  Motor Tongue at rest: fasciculations  Lingual Protrusion: WNL  Lingual Protrusion against Resistance: WNL  Lingual Lateralization: WNL  Abnormal  movement: present Interpretation: WNL     Other information:  Volitional Swallow: n/a  Mucosal Quality: No abnormal findings  Secretion Management: drolling, reports due to medication for back   Dentition: Good condition for speech and mastication    Swallowing: No swallowing difficulties reported at this time.     Hearing / Vision: No difficulties reported, functional for purpose of evaluation.        Treatment   Treatment Time In: n/a  Treatment Time Out: n/a  Total Treatment Time: n/a  no treatment performed 2/2 time to complete evaluation.    Education: Plan of Care and role of SLP in care was discussed with pt. Patient and/or family members expressed understanding. Discussed LSVT LOUD and PHOrte program relating to voice endurance and projection.     Home Program: not yet established   Assessment     Haja presents to [facility] s/p medical diagnosis of  Parkinson's disease.  Demonstrates impairments including limitations as described in the problem list. He presents with mild hypokinetic dysarthria c/b reduced vocal intensity and increased rate of speech.  Positive prognostic factors include family support. Negative prognostic factors include motivation. Patient will benefit from skilled, outpatient neurological rehabilitation speech therapy.    Rehab Potential: fair  Pt's spiritual, cultural and educational needs considered and pt agreeable to plan of care and goals.    Short Term Goals (4 weeks):   1. Mr. Marti will be seen by ENT to assess vocal cord function.  2. Mr. Marti will participate in further assessment of his/her vocal intensity to determine his sound pressure level (SPL, acoustic correlate of vocal loudness) measured with a sound level meter and more specific goals will be determined.     Long Term Goals (8 weeks):   1. Mr. Marti will develop functional and intelligible speech and utilize compensatory strategies through the use of adequate labial and lingual  function, increased articulatory precision, loud voice, and speech prosody.     2. Mr. Marti will develop functional motor programming, articulatory proficiency, vocal intensity, and utilize compensatory strategies to express wants and needs for intelligible speech and functional prosody in the functional living environment.    Plan     Plan of Care Certification Period: 11/10/2022  to 1/5/22    Recommended Treatment Plan:  Patient will participate in the [facility] program for speech therapy 4 times per week to address his  voice deficits, to educate patient and their family, and to participate in a home exercise program.    Other Recommendations: Referral to Otolaryngology (ENT)  For voice clearance prior to LSVT LOUD    Therapist's Name:   JAYMIE Damico (Ali), CCC-SLP  Speech Language Pathologist   11/10/2022

## 2022-11-10 NOTE — PROGRESS NOTES
Please see initial plan of care for evaluation details.     JAYMIE Damico, CCC-SLP  Speech Language Pathologist   11/10/2022

## 2022-11-11 ENCOUNTER — DOCUMENTATION ONLY (OUTPATIENT)
Dept: REHABILITATION | Facility: HOSPITAL | Age: 77
End: 2022-11-11
Payer: MEDICARE

## 2022-11-11 ENCOUNTER — OUTPATIENT CASE MANAGEMENT (OUTPATIENT)
Dept: NEUROLOGY | Facility: CLINIC | Age: 77
End: 2022-11-11
Payer: MEDICARE

## 2022-11-11 NOTE — PROGRESS NOTES
PT/PTA met face to face to discuss pt's treatment plan and progress towards established goals.  Continue with current PT POC with focus on amplitude and balance.  Needs LSVT scheduled.  Patient will be seen by physical therapist at least every sixth treatment or 30 days, whichever occurs first.    Fadia Cardona, PTA  11/11/2022

## 2022-11-11 NOTE — PROGRESS NOTES
Social Work - Dementia Care Management:    Phoned caregiver, wife Whitley, for follow up and confirm receipt of books sent via [employer]. Left VM.

## 2022-11-14 ENCOUNTER — CLINICAL SUPPORT (OUTPATIENT)
Dept: REHABILITATION | Facility: HOSPITAL | Age: 77
End: 2022-11-14
Attending: STUDENT IN AN ORGANIZED HEALTH CARE EDUCATION/TRAINING PROGRAM
Payer: MEDICARE

## 2022-11-14 DIAGNOSIS — G20.A1 PARKINSON'S DISEASE: Primary | ICD-10-CM

## 2022-11-14 PROCEDURE — 97112 NEUROMUSCULAR REEDUCATION: CPT | Mod: PO

## 2022-11-14 PROCEDURE — 97116 GAIT TRAINING THERAPY: CPT | Mod: PO

## 2022-11-14 NOTE — PROGRESS NOTES
[facility]   Physical Therapy Treatment Note     Name: Haja Marti  Clinic Number: [id number]    Therapy Diagnosis:   Encounter Diagnosis   Name Primary?    Parkinson's disease Yes       Physician: Mag Mcmahon MD    Visit Date: 11/14/2022    Physician: Mag Mcmahon MD     Physician Orders: PT Eval and Treat BIG therapy  Medical Diagnosis from Referral:   G20 (ICD-10-CM) - Parkinson's disease   R26.89 (ICD-10-CM) - Abnormality of gait due to impairment of balance      Evaluation Date: 10/28/2022  Authorization Period Expiration: 12/28/22  Plan of Care Expiration: 10/26/23  Visit # / Visits authorized: 3/ 12      PTA Visit #: 2/5     Time In: 12:30  Time Out:13:15  Total Billable Time: 45 minutes    Precautions: Standard and Fall    SUBJECTIVE     Pt reports: Patient brings in Ustep with new red laser.  Why do my feet start shaking when I walk?  He was semi compliant with home exercise program.  Response to previous treatment: a little fatigued  Functional change: ongoing    Pain: 0/10  Location: N/A      OBJECTIVE       Treatment     Haja received the treatments listed below:      therapeutic exercises to develop strength, endurance, ROM, flexibility, posture, and core stabilization for 0 min      neuromuscular re-education activities to improve: Balance, Coordination, Kinesthetic, Sense, Proprioception, and Posture for 30 minutes. The following activities were included:  // bars:   X 6 lengths forward stepping over orange hurdles  X 4 lengths side stepping over orange hurdles  X 12 forward step with anterior weight shift over half foam roll, B lower extremity   X 12 lateral step over half foam roll with trunk rotation to same side, B lower extremity each    Sit to/from stand x 4 trials, cues for backing up all the way to the chair and reaching back with upper extremity     therapeutic activities to improve functional performance for 0  minutes, including:      gait  "training to improve functional mobility and safety for 15  minutes, including:  Pt ambulated around 2 large mats to work on right turns.  Given cues to keep stepping over red line of Ustep.   Patient ambulated 120 feet x 2 SBA with Ustep, cues for long step over red line, cues for upright posture, heel at initial contact.    Patient Education and Home Exercises     Home Exercises Provided and Patient Education Provided     Education provided:   - HEP, 4S's for when festinating gait occurs, BIG scheduling  Educated on purpose of red laser on Ustep and how to "step to" laser       Written Home Exercises Provided: Patient instructed to cont prior HEP. Exercises were reviewed and Haja was able to demonstrate them prior to the end of the session.  Haja demonstrated good  understanding of the education provided. See EMR under Patient Instructions for exercises provided during therapy sessions    ASSESSMENT     Haja tolerated session well.  He has increased difficulty with multistep tasks, such as lateral step with trunk rotation. He demo's slowed movement when stepping over hurdles this session and cues to get closer to manuel prior to step.  Patient educated on purpose of red laser for Ustep for improved step length and decreased festination. Also educated on safety when sitting down into chair, patient tries to sit in chair from 3/4 turn away from chair.  Discussed with his wife scheduling for 4 weeks of BIG therapy.      Haja Is progressing well towards his goals.   Pt prognosis is Good.     Pt will continue to benefit from skilled outpatient physical therapy to address the deficits listed in the problem list box on initial evaluation, provide pt/family education and to maximize pt's level of independence in the home and community environment.     Pt's spiritual, cultural and educational needs considered and pt agreeable to plan of care and goals.     Anticipated barriers to physical therapy: " cognition    Goals:  Short Term Goals: 4 weeks    Patient to perform HEP with min A on going   Patient able to score less than or equal to 18 sec on the 5 times sit to stand for improved functional strength on going   Patient to score less than or equal to 60 sec on the TUG for decreased fall risk on going   Patient able to ambulate greater than 250ft mod I with rolling walker, with 50% increased bilateral foot clearance and less than 3 episodes of freezing on going      Long Term Goals: 8 weeks   Patient to perform HEP Independent  Patient able to score less than or equal to 45 sec on the TUG for decreased fall risk. On going   Patient to score improve FOTO score by 5% for improved functional mobility.  On going   Patient able to ambulate greater than 500ft mod Independent with rolling walker with 50% improved bilateral foot clearance, less than 2 episodes of freezing, no loss of balance. On going   Patient to improved bilateral hip strength to 4-/5 for improved upright posture. On going     PLAN     Cont with LSVT protocol      Shana Frost, PT

## 2022-11-15 ENCOUNTER — OFFICE VISIT (OUTPATIENT)
Dept: PSYCHIATRY | Facility: CLINIC | Age: 77
End: 2022-11-15
Payer: MEDICARE

## 2022-11-15 VITALS
HEART RATE: 75 BPM | SYSTOLIC BLOOD PRESSURE: 172 MMHG | DIASTOLIC BLOOD PRESSURE: 102 MMHG | WEIGHT: 197.44 LBS | BODY MASS INDEX: 28.33 KG/M2

## 2022-11-15 DIAGNOSIS — R44.1 VISUAL HALLUCINATIONS: ICD-10-CM

## 2022-11-15 DIAGNOSIS — F51.04 CHRONIC INSOMNIA: ICD-10-CM

## 2022-11-15 DIAGNOSIS — F32.A DEPRESSION, UNSPECIFIED DEPRESSION TYPE: ICD-10-CM

## 2022-11-15 DIAGNOSIS — F41.1 GAD (GENERALIZED ANXIETY DISORDER): Primary | ICD-10-CM

## 2022-11-15 DIAGNOSIS — G20.A1 PARKINSON'S DISEASE: ICD-10-CM

## 2022-11-15 PROCEDURE — 1160F PR REVIEW ALL MEDS BY PRESCRIBER/CLIN PHARMACIST DOCUMENTED: ICD-10-PCS | Mod: CPTII,S$GLB,, | Performed by: PSYCHIATRY & NEUROLOGY

## 2022-11-15 PROCEDURE — 3080F DIAST BP >= 90 MM HG: CPT | Mod: CPTII,S$GLB,, | Performed by: PSYCHIATRY & NEUROLOGY

## 2022-11-15 PROCEDURE — 99214 OFFICE O/P EST MOD 30 MIN: CPT | Mod: S$GLB,,, | Performed by: PSYCHIATRY & NEUROLOGY

## 2022-11-15 PROCEDURE — 99999 PR PBB SHADOW E&M-EST. PATIENT-LVL II: ICD-10-PCS | Mod: PBBFAC,,, | Performed by: PSYCHIATRY & NEUROLOGY

## 2022-11-15 PROCEDURE — 99999 PR PBB SHADOW E&M-EST. PATIENT-LVL II: CPT | Mod: PBBFAC,,, | Performed by: PSYCHIATRY & NEUROLOGY

## 2022-11-15 PROCEDURE — 99214 PR OFFICE/OUTPT VISIT, EST, LEVL IV, 30-39 MIN: ICD-10-PCS | Mod: S$GLB,,, | Performed by: PSYCHIATRY & NEUROLOGY

## 2022-11-15 PROCEDURE — 1159F MED LIST DOCD IN RCRD: CPT | Mod: CPTII,S$GLB,, | Performed by: PSYCHIATRY & NEUROLOGY

## 2022-11-15 PROCEDURE — 1159F PR MEDICATION LIST DOCUMENTED IN MEDICAL RECORD: ICD-10-PCS | Mod: CPTII,S$GLB,, | Performed by: PSYCHIATRY & NEUROLOGY

## 2022-11-15 PROCEDURE — 1160F RVW MEDS BY RX/DR IN RCRD: CPT | Mod: CPTII,S$GLB,, | Performed by: PSYCHIATRY & NEUROLOGY

## 2022-11-15 PROCEDURE — 3077F SYST BP >= 140 MM HG: CPT | Mod: CPTII,S$GLB,, | Performed by: PSYCHIATRY & NEUROLOGY

## 2022-11-15 PROCEDURE — 3077F PR MOST RECENT SYSTOLIC BLOOD PRESSURE >= 140 MM HG: ICD-10-PCS | Mod: CPTII,S$GLB,, | Performed by: PSYCHIATRY & NEUROLOGY

## 2022-11-15 PROCEDURE — 3080F PR MOST RECENT DIASTOLIC BLOOD PRESSURE >= 90 MM HG: ICD-10-PCS | Mod: CPTII,S$GLB,, | Performed by: PSYCHIATRY & NEUROLOGY

## 2022-11-15 RX ORDER — CLONAZEPAM 0.5 MG/1
TABLET ORAL
Qty: 90 TABLET | Refills: 5 | Status: SHIPPED | OUTPATIENT
Start: 2022-11-15

## 2022-11-15 NOTE — PROGRESS NOTES
"Outpatient Psychiatry Follow-Up Visit (MD/NP)  11/15/2022    Session Length: 40 minutes (E&M Level 4)    Clinical Status of Patient:  Outpatient (Ambulatory)    Chief Complaint:  Haja Marti is a 77 y.o. male who presents today for follow-up of depression and anxiety.  Met with patient and spouse.      Interval History and Content of Current Session:  Interim Events/Subjective Report/Content of Current Session:  First appt with me since 8/9/2022.    He has had several appts with Mr. Galvan for psychotherapy in the interim.      He has a new "high tech" walker that has assisted him with walking.    It has a mechanism that will release the brake from the wheels when he wants to walk.    It also has a laser that shows him where to place his feet.    Despite this, he still has had about 4 recent falls; these all occurred when he was not using the walker.  Most recently he fell on a step (they have steps and brick pillars).    He also cut his hand somehow when he was trying to get up from a [place] [employer] [place].  It's bandaged in session today.    "His skin is so thin".    No recent head injury.   He still has a lot of rigidity, mostly at night, but sometimes during the day.    Often hard to get up and get going.       He has problems at night with both sleep and with need to go to the bathroom to urinate, usually 2 - 3 trips at night.      He still has well-formed hallucinations lately.    He lately sees people, mostly during the day, sometimes at night upon awakening.    He generally cannot recognize people's faces and if they try to speak, it's only mumbling.    He realizes they are not real, so he is not afraid.    He has experienced hypnopompic hallucinations (e.g., "fish in the toilet") for some time now.   No recent delusions were noted by pt or his wife.    He is more confused in the evening hours or at night, much less during the day.    He realizes he becomes confused and has memory lapses.  " "    He requires glasses for reading.    He tries to see the eye doctor at least once a year.       He still has good and bad days in regards to his mood.    His wife thinks his anxiety is worse than the depression.    He worries a lot about things in general.    He likes to watch TV, or reads some.     He has NOT been attending boxing classes lately.    However, he is engaged now in PT sessions 4 days a week.    They started 1 day a week, but this will increase to 4 days/week in Dec.      Today is a good day.  Most days are not this good.    He has a fair amount of anxiety -- generally takes hydroxyzine 10 mg 1 in am and 1 in pm.    He has not been taking Klonopin very often.    He did take it prior to a back procedure -- had injections for chronic pain.    It helped with his BP, which spikes when he is very anxious.      He uses ibuprofen PRN for back pain.      He still has urinary incontinence.    He sees a urologist at [facility].    He had a Urostem device placed.  Wife thinks it has helped him with the incontinence -- he had "no control" before (wife had to clean up urine daily, especially at night).    He has a urinal as well.    They have an appt with the urologist in the next couple of months.    His wife plans to contact that office about the continued urinary incontinence and if an adjustment is needed.      He stopped driving at the recommendation of Dr. Jenkins after recent neuropsychological testing.      Near end of the session we discussed trial of Belsomra for chronic insomnia.  I mentioned this medication may help with sleep and have less potential for excessive sedation, dizziness and falls compared to LOCO-ergic drugs.   Both expressed interest in pt trying this med.       Psychotherapy:  Target symptoms: depression, adjustment  Why chosen therapy is appropriate versus another modality: relevant to diagnosis, patient responds to this modality  Outcome monitoring methods: self-report, lab data, " observation, feedback from family  Therapeutic intervention type: supportive psychotherapy  Topics discussed/themes: stress related to medical comorbidities, life stage transitional issues  The patient's response to the intervention is accepting.   The patient's progress toward treatment goals is fair.   Duration of intervention: 0 minutes.    Review of Systems   PSYCHIATRIC: Pertinant items are noted in the narrative.  CONSTITUTIONAL:  No recent changes in wt.    MUSCULOSKELETAL: No pain or stiffness of the joints.  NEUROLOGIC: + tremors and shuffling gait; No weakness, sensory changes, seizures, memory loss, confusion, or other abnormal movements.  Memory Loss: no  ENDOCRINE: No polydipsia or polyuria.  INTEGUMENTARY: No rashes or lacerations.  EYES: No exophthalmos, jaundice or blindness.  ENT: No dizziness, tinnitus or hearing loss.  RESPIRATORY: No shortness of breath.  CARDIOVASCULAR: No tachycardia or chest pain.  GASTROINTESTINAL: No nausea, vomiting, pain, constipation or diarrhea.  GENITOURINARY: No frequency, dysuria.    The following portions of the patient's history were reviewed and updated as appropriate: allergies, current medications, past family history, past medical history, past social history, past surgical history and problem list.      Current Outpatient Medications:     atorvastatin (LIPITOR) 40 MG tablet, TAKE 1 TABLET EVERY DAY, Disp: 90 tablet, Rfl: 3    carbidopa-levodopa  mg (SINEMET)  mg per tablet, TAKE 1 QID (Patient taking differently: Take 1 tablet by mouth 3 (three) times daily.), Disp: 360 tablet, Rfl: 3    celecoxib (CELEBREX) 100 MG capsule, Take 100 mg by mouth 2 (two) times daily., Disp: , Rfl:     clonazePAM (KLONOPIN) 0.5 MG tablet, TAKE 1/2 TO 1 TABLET BY MOUTH UP TO 3 TIMES DAILY AS NEEDED FOR ANXIETY (Patient taking differently: Take 0.5 mg by mouth 3 (three) times daily as needed.), Disp: 90 tablet, Rfl: 5    ergocalciferol (ERGOCALCIFEROL) 50,000 unit Cap,  Take 1 capsule (50,000 Units total) by mouth every 7 days., Disp: 12 capsule, Rfl: 1    finasteride (PROSCAR) 5 mg tablet, TAKE 1 TABLET EVERY DAY, Disp: 90 tablet, Rfl: 3    folic acid (FOLVITE) 1 MG tablet, TAKE 1 TABLET EVERY DAY, Disp: 90 tablet, Rfl: 0    hydrOXYzine HCL (ATARAX) 10 MG Tab, TAKE 1 TABLET BY MOUTH 3 TIMES A DAY AS NEEDED ANXIETY, Disp: 270 tablet, Rfl: 1    losartan (COZAAR) 25 MG tablet, Take 1 tablet (25 mg total) by mouth once daily. (Patient taking differently: Take 50 mg by mouth once daily.), Disp: 90 tablet, Rfl: 1    magnesium oxide (MAG-OX) 400 mg tablet, Take 400 mg by mouth once daily., Disp: , Rfl:     meloxicam (MOBIC) 7.5 MG tablet, TAKE 1 TABLET EVERY DAY, Disp: 90 tablet, Rfl: 0    potassium citrate (UROCIT-K) 10 mEq (1,080 mg) TbSR, TAKE 1 TABLET THREE TIMES DAILY WITH MEALS (Patient taking differently: Take 10 mEq by mouth once daily.), Disp: 270 tablet, Rfl: 3    pyridoxine, vitamin B6, (B-6) 50 MG Tab, Take 50 mg by mouth once daily., Disp: , Rfl:     sertraline (ZOLOFT) 100 MG tablet, Take 2 tablets (200 mg total) by mouth once daily., Disp: 180 tablet, Rfl: 3    sodium bicarbonate 650 MG tablet, Take 1 tablet (650 mg total) by mouth once daily., Disp: 90 tablet, Rfl: 3    tamsulosin (FLOMAX) 0.4 mg Cap, TAKE 1 CAPSULE EVERY DAY, Disp: 90 capsule, Rfl: 0    Current Facility-Administered Medications:     tamsulosin 24 hr capsule 0.4 mg, 0.4 mg, Oral, Daily, Miek Mtz MD    Facility-Administered Medications Ordered in Other Visits:     0.9%  NaCl infusion, , Intravenous, Continuous, Lizz Lowry NP, Last Rate: 70 mL/hr at 11/12/21 1227, New Bag at 11/12/21 1227    mupirocin 2 % ointment, , Nasal, On Call Procedure, Lizz Lowry NP, Given at 11/12/21 1227   He has not been taking clonazepam or the hydroxyzine daily, only occasionally.    Pt has not been taking the Seroquel at night -- he states he has not needed this medication.    Compliance: yes    Side  "effects: None    Risk Parameters:  Patient reports no suicidal ideation  Patient reports no homicidal ideation  Patient reports no self-injurious behavior  Patient reports no violent behavior    Exam (detailed: at least 9 elements; comprehensive: all 15 elements)   Constitutional  Vitals - 1 value per visit 8/4/2022 8/9/2022 9/16/2022 9/16/2022 10/26/2022 10/26/2022 11/15/2022   SYSTOLIC 128 160 - 170 - 134 172   DIASTOLIC 80 84 - 92 - 86 102   Pulse 68 72 - 65 - 64 75   Temp - - - - - - -   Resp 14 - - - - 16 -   SPO2 - - - - - - -   Weight (lb) 194.01 194.01 - 191 - 194.23 197.42   Weight (kg) 88 88 - 86.637 - 88.1 89.55   Height 70 70 - 70 - 70 -   BMI (Calculated) 27.8 27.8 - 27.4 - 27.9 -   VISIT REPORT - - - - - - -   Pain Score  - - 0 - 4 - -   Some recent data might be hidden       General:  unremarkable, age appropriate, casually dressed, neatly groomed, overweight     Musculoskeletal  Muscle Strength/Tone:  not assessed today; cogwheeling had been noted in BUE's in past   Gait & Station:  parkinsonian, slow, steady     Psychiatric  Speech:  slowed, non-spontaneous, normal volume   Behavior: friendly and cooperative, eye contact normal   Mood & Affect:  "OK"  euthymic, constricted range, conguent, appropriate   Thought Process:  goal-directed, logical   Associations:  intact   Thought Content:  normal, no suicidality, no homicidality, delusions, or paranoia   Insight:  intact, has awareness of illness   Judgement: behavior is adequate to circumstances   Orientation:  grossly intact   Memory: intact for content of interview   Language: grossly intact   Attention Span & Concentration:  able to focus   Fund of Knowledge:  intact and appropriate to age and level of education     Lab Results   Component Value Date    WBC 6.21 03/31/2022    HGB 16.3 03/31/2022    HCT 47.7 03/31/2022    MCV 94 03/31/2022     03/31/2022     06/14/2022    K 4.6 06/14/2022     06/14/2022    CO2 24 06/14/2022    GLU " 92 06/14/2022    BUN 23 06/14/2022    CREATININE 1.1 06/14/2022    CALCIUM 10.3 06/14/2022    PROT 7.3 03/31/2022    ALBUMIN 4.1 03/31/2022    BILITOT 0.9 03/31/2022    ALKPHOS 73 03/31/2022    AST 28 03/31/2022    ALT 34 03/31/2022    ANIONGAP 12 06/14/2022    ESTGFRAFRICA >60.0 06/14/2022    EGFRNONAA >60.0 06/14/2022    CHOL 137 03/31/2022    HDL 43 03/31/2022    LDLCALC 69.0 03/31/2022    TRIG 125 03/31/2022    CHOLHDL 31.4 03/31/2022    TSH 1.947 04/04/2022       Imaging:    Exam: MRI brain without contrast    History: Dizziness, extrapyramidal movement disorder    Findings: MRI is performed with routine sequences.  No mass, hemorrhage, infarction, subdural collection, hydrocephalus or other acute finding is seen on this cranial MR examination.  There is volume loss consistent with the patient's age.  No diffusion abnormality is seen.      Impression     Normal noncontrast MRI of the brain     Electronically signed by: FLORI NEWMAN MD  Date: 07/15/17  Time: 15:51        Assessment and Diagnosis   Status/Progress: Based on the examination today, the patient's problem(s) is/are adequately but not ideally controlled.  New problems have been presented today.   Co-morbidities (chronic pain) are complicating management of the primary condition.    There are not active rule-out diagnoses for this patient at this time.      General Impression:  POLINA (generalized anxiety disorder)  Chronic insomnia  Depression, unspecified depression type  Parkinson's disease    Visual hallucinations  Chronic pain (NOT CODED)    Intervention/Counseling/Treatment Plan   Medication Management:  Try Belsomra 5 mg one tab qhs for chronic insomnia.  Risks/benefits noted to pt and wife, including potential for excessive sedation, dizziness and falls.   Continue hydroxyzine 10 mg TID prn anxiety.  Risks/benefits noted, including potential for sedation, dizziness and risk for falls.    If hydroxyzine is not effective, pt can take 1/2 to 1 tab  of 0.5 mg Klonopin -- can use prior to medical procedures.    Continue other current medications (sertraline, PRN clonazepam).        Follow-up plan for depression was discussed with patient and spouse.      Return to Clinic: Follow up in 4 months (on 3/15/2023).    Sudhir Elliott MD

## 2022-11-21 ENCOUNTER — CLINICAL SUPPORT (OUTPATIENT)
Dept: REHABILITATION | Facility: HOSPITAL | Age: 77
End: 2022-11-21
Attending: STUDENT IN AN ORGANIZED HEALTH CARE EDUCATION/TRAINING PROGRAM
Payer: MEDICARE

## 2022-11-21 DIAGNOSIS — G20.A1 PARKINSON'S DISEASE: Primary | ICD-10-CM

## 2022-11-21 PROCEDURE — 97112 NEUROMUSCULAR REEDUCATION: CPT | Mod: PO

## 2022-11-21 PROCEDURE — 97116 GAIT TRAINING THERAPY: CPT | Mod: PO

## 2022-11-21 NOTE — PROGRESS NOTES
[facility]   Physical Therapy Treatment Note     Name: Haja Marti  Clinic Number: [id number]    Therapy Diagnosis:   Encounter Diagnosis   Name Primary?    Parkinson's disease Yes       Physician: Mag Mcmahon MD    Visit Date: 11/21/2022    Physician: Mag Mcmahon MD     Physician Orders: PT Eval and Treat BIG therapy  Medical Diagnosis from Referral:   G20 (ICD-10-CM) - Parkinson's disease   R26.89 (ICD-10-CM) - Abnormality of gait due to impairment of balance      Evaluation Date: 10/28/2022  Authorization Period Expiration: 12/28/22  Plan of Care Expiration: 10/26/23  Visit # / Visits authorized: 4/ 12      PTA Visit #: 0/5     Time In: 12:30  Time Out:13:15  Total Billable Time: 45 minutes    Precautions: Standard and Fall    SUBJECTIVE     Pt reports: Patient states he has been using the laser on his Ustep more often.   He was semi compliant with home exercise program.  Response to previous treatment: a little fatigued  Functional change: ongoing    Pain: 0/10  Location: N/A      OBJECTIVE       Treatment     Haja received the treatments listed below:      therapeutic exercises to develop strength, endurance, ROM, flexibility, posture, and core stabilization for 0 min      neuromuscular re-education activities to improve: Balance, Coordination, Kinesthetic, Sense, Proprioception, and Posture for 25 minutes. The following activities were included:  // bars:   X 12 forward step with anterior weight shift over half foam roll, B lower extremity   X 12 lateral step over half foam roll with trunk rotation to same side, B lower extremity each  X 6 lengths high knee stepping  X 4 lengths side stepping    Sit to/from stand x 2 trials, cues for backing up all the way to the chair and reaching back with upper extremity     therapeutic activities to improve functional performance for 0  minutes, including:      gait training to improve functional mobility and safety for 20   "minutes, including:  GaitBetter Round 1:  Speed: 0.9mph  Time: 3 min and 2 minutes with a standing rest break  Overall Score: 116  Distance: 127 yds  Distractors: none  Obstacles: 0%    With seated rest break in between     GaitBetter Round 2:   Speed: 0.9mph  Time: 3.5 min  Overall Score: 82  Distance: 90 yds  Distractors: none  Obstacles: 0%     Patient Education and Home Exercises     Home Exercises Provided and Patient Education Provided     Education provided:   - HEP, 4S's for when festinating gait occurs, BIG scheduling  Educated on purpose of red laser on Ustep and how to "step to" laser       Written Home Exercises Provided: Patient instructed to cont prior HEP. Exercises were reviewed and Haja was able to demonstrate them prior to the end of the session.  Haja demonstrated good  understanding of the education provided. See EMR under Patient Instructions for exercises provided during therapy sessions    ASSESSMENT     Haja tolerated session fairly well.  Trialed Gaitbetter system on treadmill, patient able to ambulate at 0.9 mph for 9 minutes total with seated rest break.  He requires mod to max cues for increased foot clearance, increase step length and heel at initial contact.  He reports increased lower extremity fatigue after ambulation.  Discussed with patient and wife having daily walking routine for increased mobility.     Haja Is progressing well towards his goals.   Pt prognosis is Good.     Pt will continue to benefit from skilled outpatient physical therapy to address the deficits listed in the problem list box on initial evaluation, provide pt/family education and to maximize pt's level of independence in the home and community environment.     Pt's spiritual, cultural and educational needs considered and pt agreeable to plan of care and goals.     Anticipated barriers to physical therapy: cognition    Goals:  Short Term Goals: 4 weeks    Patient to perform HEP with min A on going "   Patient able to score less than or equal to 18 sec on the 5 times sit to stand for improved functional strength on going   Patient to score less than or equal to 60 sec on the TUG for decreased fall risk on going   Patient able to ambulate greater than 250ft mod I with rolling walker, with 50% increased bilateral foot clearance and less than 3 episodes of freezing on going      Long Term Goals: 8 weeks   Patient to perform HEP Independent  Patient able to score less than or equal to 45 sec on the TUG for decreased fall risk. On going   Patient to score improve FOTO score by 5% for improved functional mobility.  On going   Patient able to ambulate greater than 500ft mod Independent with rolling walker with 50% improved bilateral foot clearance, less than 2 episodes of freezing, no loss of balance. On going   Patient to improved bilateral hip strength to 4-/5 for improved upright posture. On going     PLAN     Cont with LSVT protocol      Shana Frost, PT

## 2022-11-22 ENCOUNTER — OFFICE VISIT (OUTPATIENT)
Dept: PSYCHIATRY | Facility: CLINIC | Age: 77
End: 2022-11-22
Payer: MEDICARE

## 2022-11-22 ENCOUNTER — PATIENT MESSAGE (OUTPATIENT)
Dept: PSYCHIATRY | Facility: CLINIC | Age: 77
End: 2022-11-22
Payer: MEDICARE

## 2022-11-22 DIAGNOSIS — F32.A DEPRESSION, UNSPECIFIED DEPRESSION TYPE: ICD-10-CM

## 2022-11-22 DIAGNOSIS — F41.1 GAD (GENERALIZED ANXIETY DISORDER): Primary | ICD-10-CM

## 2022-11-22 PROCEDURE — 90785 PSYTX COMPLEX INTERACTIVE: CPT | Mod: 95,,, | Performed by: SOCIAL WORKER

## 2022-11-22 PROCEDURE — 90834 PR PSYCHOTHERAPY W/PATIENT, 45 MIN: ICD-10-PCS | Mod: 95,,, | Performed by: SOCIAL WORKER

## 2022-11-22 PROCEDURE — 90834 PSYTX W PT 45 MINUTES: CPT | Mod: 95,,, | Performed by: SOCIAL WORKER

## 2022-11-22 PROCEDURE — 90785 PR INTERACTIVE COMPLEXITY: ICD-10-PCS | Mod: 95,,, | Performed by: SOCIAL WORKER

## 2022-11-22 NOTE — PROGRESS NOTES
Individual Psychotherapy (PhD/LCSW)    11/22/2022    Site:  Telemed         Therapeutic Intervention: Met with patient and spouse.  Outpatient - Insight oriented psychotherapy 45 min - CPT code 50457    Chief complaint/reason for encounter: depression and anxiety     Interval history and content of current session:      Site: [facility]             Length of service: 45         Therapeutic intervention:     Persons present: spouse and pt  [place] (needed to help with interpretation of intervention due to pt cognitive decline due to dementia).    Interval history:      The patient location is: home [place].   The chief complaint leading to consultation is: anxiety/depression  Visit type: audio  Total time spent with patient: 45 min  Each patient to whom he or she provides medical services by telemedicine is:  (1) informed of the relationship between the physician and patient and the respective role of any other health care provider with respect to management of the patient; and (2) notified that he or she may decline to receive medical services by telemedicine and may withdraw from such care at any time.       Notes:   His hallucinations are improved.  We discussed a trial of him going to the bathroom by himself tonight.  In addition [place] is going to bring her pillow and placed it in his room tonight.  He likely had some separation anxiety as a child.    He has been down although today he is in a better mood.  He has been thinking about the progressive nature of his illness.                   Target symptoms: depression, anxiety        Progress toward goals: progressing well    Diagnosis: generalized anxiety disorder       Treatment plan:  Target symptoms: anxiety   Why chosen therapy is appropriate versus another modality: relevant to diagnosis, evidence based practice  Outcome monitoring methods: self-report, observation, feedback from family  Therapeutic intervention type: behavior modifying  psychotherapy    Risk parameters:  Patient reports no suicidal ideation  Patient reports no homicidal ideation  Patient reports no self-injurious behavior  Patient reports no violent behavior        Verbal deficits: None           Patient's response to intervention:   The patient's response to intervention is accepting, motivated.              Progress toward goals and other mental status changes:  The patient's progress toward goals is fair .    Diagnosis:        ICD-10-CM ICD-9-CM   1. Generalized anxiety disorder F41.1 300.02       Plan:  individual psychotherapy and family psychotherapy    Return to clinic: as scheduled    Length of Service (minutes): 45

## 2022-12-06 ENCOUNTER — CLINICAL SUPPORT (OUTPATIENT)
Dept: REHABILITATION | Facility: HOSPITAL | Age: 77
End: 2022-12-06
Attending: STUDENT IN AN ORGANIZED HEALTH CARE EDUCATION/TRAINING PROGRAM
Payer: MEDICARE

## 2022-12-06 DIAGNOSIS — G20.A1 PARKINSON'S DISEASE: Primary | ICD-10-CM

## 2022-12-06 PROCEDURE — 97112 NEUROMUSCULAR REEDUCATION: CPT | Mod: PO

## 2022-12-06 NOTE — PATIENT INSTRUCTIONS
COMPLETE 10 REPS OF EACH EXERCISE.   PERFORM EACH EXERCISE Twice Daily, 7 DAYS A WEEK.

## 2022-12-06 NOTE — PROGRESS NOTES
[facility]   Physical Therapy Treatment Note     Name: Haja Marti  Clinic Number: [id number]    Therapy Diagnosis:   Encounter Diagnosis   Name Primary?    Parkinson's disease Yes       Physician: Mag Mcmahon MD    Visit Date: 12/6/2022    Physician: Mag Mcmahon MD     Physician Orders: PT Eval and Treat BIG therapy  Medical Diagnosis from Referral:   G20 (ICD-10-CM) - Parkinson's disease   R26.89 (ICD-10-CM) - Abnormality of gait due to impairment of balance      Evaluation Date: 10/28/2022  Authorization Period Expiration: 12/28/22  Plan of Care Expiration: 10/26/23  Visit # / Visits authorized: 5 / 12    PTA Visit #: 0/5     Time In: 1230  Time Out: 1315  Total Billable Time: 45 minutes    Precautions: Standard and Fall    SUBJECTIVE     Pt reports: Has not been given max daily exercises yet.   He was semi compliant with home exercise program.  Response to previous treatment: a little fatigued  Functional change: ongoing    Pain: 0/10  Location: N/A      OBJECTIVE   See below.     Treatment     Haja received the treatments listed below:        neuromuscular re-education activities to improve: Balance, Coordination, Kinesthetic, Sense, Proprioception, and Posture for 25 minutes. The following activities were included:      LSVT Maximal Daily Exercises to promote amplitude: (10 repetitions each)   Daily Exercises   Performance  Comments/Modifications   1  Floor<>ceiling  good    2  Side<>side  good    3  Step & reach  (forward)   fair with upper extremity support     4  Step & reach (sideways)   fair with upper extremity support     5  Step & reach  (backwards)   Fair/poor with upper extremity support     6  Rock & reach  (foward)   Fair/poor with upper extremity support     7  Rock & reach   (sideways twist)   fair with no upper extremity support        Sit to/from stand x 5 trials, cues for posture       Patient Education and Home Exercises     Home Exercises  "Provided and Patient Education Provided     Education provided:   - HEP, 4S's for when festinating gait occurs, BIG scheduling  Educated on purpose of red laser on Ustep and how to "step to" laser       Written Home Exercises Provided: YES. Max daily provided 12/6/22.   Exercises were reviewed and Haja was able to demonstrate them prior to the end of the session.  Haja demonstrated good  understanding of the education provided. See EMR under Patient Instructions for exercises provided during therapy sessions    ASSESSMENT     Haja was taught max daily exercises today. All standing moves completed with hand hold on back of chair and SBA/supervision. He will wilder further reinforcement for understanding. Handout showed to wife at end of session and recommended she learn them next session.     Haja Is progressing well towards his goals.   Pt prognosis is Good.     Pt will continue to benefit from skilled outpatient physical therapy to address the deficits listed in the problem list box on initial evaluation, provide pt/family education and to maximize pt's level of independence in the home and community environment.     Pt's spiritual, cultural and educational needs considered and pt agreeable to plan of care and goals.     Anticipated barriers to physical therapy: cognition    Goals:  Short Term Goals: 4 weeks    Patient to perform HEP with min A on going   Patient able to score less than or equal to 18 sec on the 5 times sit to stand for improved functional strength on going   Patient to score less than or equal to 60 sec on the TUG for decreased fall risk on going   Patient able to ambulate greater than 250ft mod I with rolling walker, with 50% increased bilateral foot clearance and less than 3 episodes of freezing on going      Long Term Goals: 8 weeks   Patient to perform HEP Independent  Patient able to score less than or equal to 45 sec on the TUG for decreased fall risk. On going   Patient to score " improve FOTO score by 5% for improved functional mobility.  On going   Patient able to ambulate greater than 500ft mod Independent with rolling walker with 50% improved bilateral foot clearance, less than 2 episodes of freezing, no loss of balance. On going   Patient to improved bilateral hip strength to 4-/5 for improved upright posture. On going     PLAN     Cont with LSVT protocol      Mag Delgado, PT

## 2022-12-07 ENCOUNTER — CLINICAL SUPPORT (OUTPATIENT)
Dept: REHABILITATION | Facility: HOSPITAL | Age: 77
End: 2022-12-07
Attending: INTERNAL MEDICINE
Payer: MEDICARE

## 2022-12-07 DIAGNOSIS — G20.A1 PARKINSON'S DISEASE: Primary | ICD-10-CM

## 2022-12-07 PROCEDURE — 97112 NEUROMUSCULAR REEDUCATION: CPT | Mod: PO,CQ

## 2022-12-07 NOTE — PROGRESS NOTES
"  [facility]   Physical Therapy Treatment Note     Name: Haja Marti  Clinic Number: [id number]    Therapy Diagnosis:   Encounter Diagnosis   Name Primary?    Parkinson's disease Yes       Physician: Mag Mcmahon MD    Visit Date: 12/7/2022    Physician: Mag Mcmahon MD     Physician Orders: PT Eval and Treat BIG therapy  Medical Diagnosis from Referral:   G20 (ICD-10-CM) - Parkinson's disease   R26.89 (ICD-10-CM) - Abnormality of gait due to impairment of balance      Evaluation Date: 10/28/2022  Authorization Period Expiration: 12/28/22  Plan of Care Expiration: 10/26/23  Visit # / Visits authorized: 6 / 12    PTA Visit #: 1/5     Time In: 1230  Time Out: 1315  Total Billable Time: 45 minutes    Precautions: Standard and Fall    SUBJECTIVE     Pt reports: " I'm having trouble doing them at home because I don't have any space to do them."   He was semi compliant with home exercise program.  Response to previous treatment: a little fatigued  Functional change: ongoing    Pain: 0/10  Location: N/A      OBJECTIVE   See below.     Treatment     Haja received the treatments listed below:        neuromuscular re-education activities to improve: Balance, Coordination, Kinesthetic, Sense, Proprioception, and Posture for 45 minutes. The following activities were included:      LSVT Maximal Daily Exercises to promote amplitude: (8 repetitions each)   Daily Exercises   Performance  Comments/Modifications   1  Floor<>ceiling  good    2  Side<>side  good    3  Step & reach  (forward)   fair with upper extremity support - VC's to increase upright posture   4  Step & reach (sideways)   fair with upper extremity support  - VC's to increase upright posture   5  Step & reach  (backwards)   Fair/poor with upper extremity support  - VC's to increase upright posture   6  Rock & reach  (foward)   Fair/poor with upper extremity support  - VC's to increase upright posture   7  Rock & reach " "  (sideways twist)   fair with no upper extremity support  - VC's to increase upright posture      Sit to/from stand x 5 trials, cues for posture     Time spent filing out the Foto    Patient Education and Home Exercises     Home Exercises Provided and Patient Education Provided     Education provided:   - HEP, 4S's for when festinating gait occurs, BIG scheduling  Educated on purpose of red laser on Ustep and how to "step to" laser       Written Home Exercises Provided: YES. Max daily provided 12/6/22.   Exercises were reviewed and Haja was able to demonstrate them prior to the end of the session.  Haja demonstrated good  understanding of the education provided. See EMR under Patient Instructions for exercises provided during therapy sessions    ASSESSMENT     Haja tolerated his tx session fairly well and did not have any problems noted.  Haja required moderate verbal cues for upright posture throughout the tx session and cues to increase his amplitude.  Haja did not have any loss of balance noted today.  Therapist adjusted pts walker to attempt to assist with upright posture.    Haja Is progressing well towards his goals.   Pt prognosis is Good.     Pt will continue to benefit from skilled outpatient physical therapy to address the deficits listed in the problem list box on initial evaluation, provide pt/family education and to maximize pt's level of independence in the home and community environment.     Pt's spiritual, cultural and educational needs considered and pt agreeable to plan of care and goals.     Anticipated barriers to physical therapy: cognition    Goals:  Short Term Goals: 4 weeks    Patient to perform HEP with min A on going   Patient able to score less than or equal to 18 sec on the 5 times sit to stand for improved functional strength on going   Patient to score less than or equal to 60 sec on the TUG for decreased fall risk on going   Patient able to ambulate greater than " 250ft mod I with rolling walker, with 50% increased bilateral foot clearance and less than 3 episodes of freezing on going      Long Term Goals: 8 weeks   Patient to perform HEP Independent  Patient able to score less than or equal to 45 sec on the TUG for decreased fall risk. On going   Patient to score improve FOTO score by 5% for improved functional mobility.  On going   Patient able to ambulate greater than 500ft mod Independent with rolling walker with 50% improved bilateral foot clearance, less than 2 episodes of freezing, no loss of balance. On going   Patient to improved bilateral hip strength to 4-/5 for improved upright posture. On going     PLAN     Cont with LSVT protocol      Fadia Cardona, PTA

## 2022-12-08 ENCOUNTER — CLINICAL SUPPORT (OUTPATIENT)
Dept: REHABILITATION | Facility: HOSPITAL | Age: 77
End: 2022-12-08
Attending: INTERNAL MEDICINE
Payer: MEDICARE

## 2022-12-08 DIAGNOSIS — G20.A1 PARKINSON'S DISEASE: Primary | ICD-10-CM

## 2022-12-08 PROCEDURE — 97112 NEUROMUSCULAR REEDUCATION: CPT | Mod: PO

## 2022-12-08 NOTE — PROGRESS NOTES
[facility]   Physical Therapy Treatment Note     Name: Haja Marti  Clinic Number: [id number]    Therapy Diagnosis:   Encounter Diagnosis   Name Primary?    Parkinson's disease Yes       Physician: Mag Mcmahon MD    Visit Date: 12/8/2022    Physician: Mag Mcmahon MD     Physician Orders: PT Eval and Treat BIG therapy  Medical Diagnosis from Referral:   G20 (ICD-10-CM) - Parkinson's disease   R26.89 (ICD-10-CM) - Abnormality of gait due to impairment of balance      Evaluation Date: 10/28/2022  Authorization Period Expiration: 12/28/22  Plan of Care Expiration: 10/26/23  Visit # / Visits authorized: 7 / 12    PTA Visit #: 0/5     Time In: 1305  Time Out: 1345  Total Billable Time: 40 minutes    Precautions: Standard and Fall    SUBJECTIVE     Pt reports: Reports the standing max daily are too hard at home and he did the supine bridging etc from prior home exercise program instead.    He was semi compliant with home exercise program.  Response to previous treatment: a little fatigued  Functional change: ongoing    Pain: 0/10  Location: N/A      OBJECTIVE   See below.     Treatment     Haja received the treatments listed below:      neuromuscular re-education activities to improve: Balance, Coordination, Kinesthetic, Sense, Proprioception, and Posture for 40 minutes. The following activities were included:    SEATED VERSION FOR ALL - VC's to increase upright posture    LSVT Maximal Daily Exercises to promote amplitude: (8 repetitions each)   Daily Exercises   Performance  Comments/Modifications   1  Floor<>ceiling  good    2  Side<>side  good    3  Step & reach  (forward)   fair Seated    4  Step & reach (sideways)   fair Seated    5  Step & reach  (backwards)   Fair/poor Seated    6  Rock & reach  (foward)   Fair/poor Seated    7  Rock & reach   (sideways twist)   fair Seated       Sit to/from stand x 5 trials, cues for posture - CGA needed to prevent posterior lean back  "    Seated pelvic tilts on low mat - manual cues to assist  At USTEP: 90 deg turns    Discussed freezing 4Ss.       Patient Education and Home Exercises     Home Exercises Provided and Patient Education Provided     Education provided:   - HEP, 4S's for when festinating gait occurs, BIG scheduling  Educated on purpose of red laser on Ustep and how to "step to" laser       Written Home Exercises Provided: YES. Max daily provided 12/6/22.   Exercises were reviewed and Haja was able to demonstrate them prior to the end of the session.  Haja demonstrated good  understanding of the education provided. See EMR under Patient Instructions for exercises provided during therapy sessions    ASSESSMENT     Haja did better with the seated version of LSVT max daily exercises today. New packet provided. Added pelvic tilts for posture improvement and pt reported better comfort in sitting afterwards. Continue treatment. Check on home exercise program compliance in sitting.     Haja Is progressing well towards his goals.   Pt prognosis is Good.     Pt will continue to benefit from skilled outpatient physical therapy to address the deficits listed in the problem list box on initial evaluation, provide pt/family education and to maximize pt's level of independence in the home and community environment.     Pt's spiritual, cultural and educational needs considered and pt agreeable to plan of care and goals.     Anticipated barriers to physical therapy: cognition    Goals:  Short Term Goals: 4 weeks    Patient to perform HEP with min A on going   Patient able to score less than or equal to 18 sec on the 5 times sit to stand for improved functional strength on going   Patient to score less than or equal to 60 sec on the TUG for decreased fall risk on going   Patient able to ambulate greater than 250ft mod I with rolling walker, with 50% increased bilateral foot clearance and less than 3 episodes of freezing on going      Long " Term Goals: 8 weeks   Patient to perform HEP Independent  Patient able to score less than or equal to 45 sec on the TUG for decreased fall risk. On going   Patient to score improve FOTO score by 5% for improved functional mobility.  On going   Patient able to ambulate greater than 500ft mod Independent with rolling walker with 50% improved bilateral foot clearance, less than 2 episodes of freezing, no loss of balance. On going   Patient to improved bilateral hip strength to 4-/5 for improved upright posture. On going     PLAN     Cont with LSVT protocol      Mag Delgado, PT

## 2022-12-08 NOTE — PATIENT INSTRUCTIONS
COMPLETE 10 REPS OF EACH EXERCISE.   PERFORM EACH EXERCISE Twice Daily, 7 DAYS A WEEK.

## 2022-12-09 ENCOUNTER — CLINICAL SUPPORT (OUTPATIENT)
Dept: REHABILITATION | Facility: HOSPITAL | Age: 77
End: 2022-12-09
Attending: STUDENT IN AN ORGANIZED HEALTH CARE EDUCATION/TRAINING PROGRAM
Payer: MEDICARE

## 2022-12-09 DIAGNOSIS — G20.A1 PARKINSON'S DISEASE: Primary | ICD-10-CM

## 2022-12-09 PROCEDURE — 97116 GAIT TRAINING THERAPY: CPT | Mod: PO,CQ

## 2022-12-09 PROCEDURE — 97112 NEUROMUSCULAR REEDUCATION: CPT | Mod: PO,CQ

## 2022-12-09 NOTE — PROGRESS NOTES
"[facility]   Physical Therapy Treatment Note     Name: Haja Marti  Clinic Number: [id number]    Therapy Diagnosis:   Encounter Diagnosis   Name Primary?    Parkinson's disease Yes       Physician: Mag Mcmahon MD    Visit Date: 12/9/2022    Physician: Mag Mcmahon MD     Physician Orders: PT Eval and Treat BIG therapy  Medical Diagnosis from Referral:   G20 (ICD-10-CM) - Parkinson's disease   R26.89 (ICD-10-CM) - Abnormality of gait due to impairment of balance      Evaluation Date: 10/28/2022  Authorization Period Expiration: 12/28/22  Plan of Care Expiration: 10/26/23  Visit # / Visits authorized: 8 / 12    PTA Visit #: 1/5     Time In: 1000  Time Out: 1045  Total Billable Time: 45 minutes    Precautions: Standard and Fall    SUBJECTIVE     Pt reports: " I'm doing ok."   He was semi compliant with home exercise program.  Response to previous treatment: a little fatigued  Functional change: ongoing    Pain: 0/10  Location: N/A      OBJECTIVE   See below.     Treatment     Haja received the treatments listed below:      neuromuscular re-education activities to improve: Balance, Coordination, Kinesthetic, Sense, Proprioception, and Posture for 40 minutes. The following activities were included:    SEATED VERSION FOR ALL - VC's to increase upright posture    LSVT Maximal Daily Exercises to promote amplitude: (8 repetitions each)   Daily Exercises   Performance  Comments/Modifications   1  Floor<>ceiling  good    2  Side<>side  good    3  Step & reach  (forward)   fair Seated    4  Step & reach (sideways)   fair Seated    5  Step & reach  (backwards)   Fair/poor Seated    6  Rock & reach  (foward)   Fair/poor Seated    7  Rock & reach   (sideways twist)   fair Seated       Sit to/from stand x 5 trials, cues for posture - CGA needed to prevent posterior lean back     Seated pelvic tilts on low mat -x 10 reps  At USTEP: 90 deg turns  Ambulated ~ 3 laps in the hallway with " "U-step and SBA.  Pt given a metronome for step speed set at 67, but not much of a change of it.      Discussed freezing 4Ss.       Patient Education and Home Exercises     Home Exercises Provided and Patient Education Provided     Education provided:   - HEP, 4S's for when festinating gait occurs, BIG scheduling  Educated on purpose of red laser on Ustep and how to "step to" laser       Written Home Exercises Provided: YES. Max daily provided 12/6/22.   Exercises were reviewed and Haja was able to demonstrate them prior to the end of the session.  Haja demonstrated good  understanding of the education provided. See EMR under Patient Instructions for exercises provided during therapy sessions    ASSESSMENT     Haja tolerated his tx session well and did not have any problems.  Haja focused on sitting amplitude exercises again today and cont to require minimal cues for amplitude.  Pt did not have much difference using the metronome,vs not using the metronome for step length.  Cont with plan of care.     Haja Is progressing well towards his goals.   Pt prognosis is Good.     Pt will continue to benefit from skilled outpatient physical therapy to address the deficits listed in the problem list box on initial evaluation, provide pt/family education and to maximize pt's level of independence in the home and community environment.     Pt's spiritual, cultural and educational needs considered and pt agreeable to plan of care and goals.     Anticipated barriers to physical therapy: cognition    Goals:  Short Term Goals: 4 weeks    Patient to perform HEP with min A on going   Patient able to score less than or equal to 18 sec on the 5 times sit to stand for improved functional strength on going   Patient to score less than or equal to 60 sec on the TUG for decreased fall risk on going   Patient able to ambulate greater than 250ft mod I with rolling walker, with 50% increased bilateral foot clearance and less " than 3 episodes of freezing on going      Long Term Goals: 8 weeks   Patient to perform HEP Independent  Patient able to score less than or equal to 45 sec on the TUG for decreased fall risk. On going   Patient to score improve FOTO score by 5% for improved functional mobility.  On going   Patient able to ambulate greater than 500ft mod Independent with rolling walker with 50% improved bilateral foot clearance, less than 2 episodes of freezing, no loss of balance. On going   Patient to improved bilateral hip strength to 4-/5 for improved upright posture. On going     PLAN     Cont with LSVT protocol      Fadia Cardona, PTA

## 2022-12-12 ENCOUNTER — CLINICAL SUPPORT (OUTPATIENT)
Dept: REHABILITATION | Facility: HOSPITAL | Age: 77
End: 2022-12-12
Attending: STUDENT IN AN ORGANIZED HEALTH CARE EDUCATION/TRAINING PROGRAM
Payer: MEDICARE

## 2022-12-12 DIAGNOSIS — G20.A1 PARKINSON'S DISEASE: Primary | ICD-10-CM

## 2022-12-12 PROCEDURE — 97112 NEUROMUSCULAR REEDUCATION: CPT | Mod: PO

## 2022-12-12 NOTE — PROGRESS NOTES
[facility]   Physical Therapy Treatment Note     Name: Haja Marti  Clinic Number: [id number]    Therapy Diagnosis:   Encounter Diagnosis   Name Primary?    Parkinson's disease Yes         Physician: Mag Mcmahon MD    Visit Date: 12/12/2022    Physician: Mag Mcmahon MD     Physician Orders: PT Eval and Treat BIG therapy  Medical Diagnosis from Referral:   G20 (ICD-10-CM) - Parkinson's disease   R26.89 (ICD-10-CM) - Abnormality of gait due to impairment of balance      Evaluation Date: 10/28/2022  Authorization Period Expiration: 12/28/22  Plan of Care Expiration: 10/26/23  Visit # / Visits authorized: 8 / 12    PTA Visit #: 0/5     Time In: 15:30  Time Out: 16:15  Total Billable Time: 45 minutes    Precautions: Standard and Fall    SUBJECTIVE     Pt reports: I have been practicing at home.   He was semi compliant with home exercise program.  Response to previous treatment: a little fatigued  Functional change: ongoing    Pain: 0/10  Location: N/A      OBJECTIVE   See below.     Treatment     Haja received the treatments listed below:      neuromuscular re-education activities to improve: Balance, Coordination, Kinesthetic, Sense, Proprioception, and Posture for 40 minutes. The following activities were included:      LSVT Maximal Daily Exercises to promote amplitude: (10 repetitions each)   Daily Exercises   Performance  Comments/Modifications   1  Floor<>ceiling  good Added upper extremity bicep curl    2  Side<>side  good Cues for open hand   3  Step & reach  (forward)   fair 1 Upper extremity assist, cues for upper extremity reach, stepping over half foam roll   4  Step & reach (sideways)   fair 1 upper extremity assist, cues for upper extremity reach and BIG step, stepping over half foam roll   5  Step & reach  (backwards)   Fair/poor Standing 1 upper extremity assist, stepping over half foam roll   6  Rock & reach  (foward)   Fair/poor 1 upper extremity assist on  "ballet bar, cues for posture and coordination   7  Rock & reach   (sideways twist)   fair       Sit to/from stand x 5 trials, cues for posture - CGA needed to prevent posterior lean back     Upper extremity clasped behind back for chest stretch  Lower extremity tap onto colored pads, lateral, forward and backward step x 10 each lower extremity   Lower extremity tap onto orange cones x 10 B lower extremity   Staggered stance stabilization      Patient Education and Home Exercises     Home Exercises Provided and Patient Education Provided     Education provided:   - HEP, 4S's for when festinating gait occurs, BIG scheduling  Educated on purpose of red laser on Ustep and how to "step to" laser       Written Home Exercises Provided: YES. Max daily provided 12/6/22.   Exercises were reviewed and Haja was able to demonstrate them prior to the end of the session.  Haja demonstrated good  understanding of the education provided. See EMR under Patient Instructions for exercises provided during therapy sessions    ASSESSMENT     Haja tolerated session well, he was able to perform standing exercises at ballet bar.  He requires upper extremity assist for standing exercises due to instability.  Cues for coordination as well as upright posture for most exercises.  He is making good progress with daily exercises.     Haja Is progressing well towards his goals.   Pt prognosis is Good.     Pt will continue to benefit from skilled outpatient physical therapy to address the deficits listed in the problem list box on initial evaluation, provide pt/family education and to maximize pt's level of independence in the home and community environment.     Pt's spiritual, cultural and educational needs considered and pt agreeable to plan of care and goals.     Anticipated barriers to physical therapy: cognition    Goals:  Short Term Goals: 4 weeks    Patient to perform HEP with min A on going   Patient able to score less than or " equal to 18 sec on the 5 times sit to stand for improved functional strength on going   Patient to score less than or equal to 60 sec on the TUG for decreased fall risk on going   Patient able to ambulate greater than 250ft mod I with rolling walker, with 50% increased bilateral foot clearance and less than 3 episodes of freezing on going      Long Term Goals: 8 weeks   Patient to perform HEP Independent  Patient able to score less than or equal to 45 sec on the TUG for decreased fall risk. On going   Patient to score improve FOTO score by 5% for improved functional mobility.  On going   Patient able to ambulate greater than 500ft mod Independent with rolling walker with 50% improved bilateral foot clearance, less than 2 episodes of freezing, no loss of balance. On going   Patient to improved bilateral hip strength to 4-/5 for improved upright posture. On going     PLAN     Cont with LSVT protocol      Shana Frost, PT

## 2022-12-13 ENCOUNTER — CLINICAL SUPPORT (OUTPATIENT)
Dept: REHABILITATION | Facility: HOSPITAL | Age: 77
End: 2022-12-13
Attending: STUDENT IN AN ORGANIZED HEALTH CARE EDUCATION/TRAINING PROGRAM
Payer: MEDICARE

## 2022-12-13 DIAGNOSIS — G20.A1 PARKINSON'S DISEASE: Primary | ICD-10-CM

## 2022-12-13 PROCEDURE — 97112 NEUROMUSCULAR REEDUCATION: CPT | Mod: PO,CQ

## 2022-12-13 NOTE — PROGRESS NOTES
"[facility]   Physical Therapy Treatment Note     Name: Haja Marti  Clinic Number: [id number]    Therapy Diagnosis:   Encounter Diagnosis   Name Primary?    Parkinson's disease Yes         Physician: Mag Mcmahon MD    Visit Date: 12/13/2022    Physician: Mag Mcmahon MD     Physician Orders: PT Eval and Treat BIG therapy  Medical Diagnosis from Referral:   G20 (ICD-10-CM) - Parkinson's disease   R26.89 (ICD-10-CM) - Abnormality of gait due to impairment of balance      Evaluation Date: 10/28/2022  Authorization Period Expiration: 12/28/22  Plan of Care Expiration: 10/26/23  Visit # / Visits authorized: 9 / 12    PTA Visit #: 1/5     Time In: 15:30  Time Out: 16:15  Total Billable Time: 45 minutes    Precautions: Standard and Fall    SUBJECTIVE     Pt reports: " I was able to do a little more."   He was semi compliant with home exercise program.  Response to previous treatment: a little fatigued  Functional change: ongoing    Pain: 0/10  Location: N/A      OBJECTIVE   See below.     Treatment     Haja received the treatments listed below:      neuromuscular re-education activities to improve: Balance, Coordination, Kinesthetic, Sense, Proprioception, and Posture for 40 minutes. The following activities were included:      LSVT Maximal Daily Exercises to promote amplitude: (10 repetitions each)   Daily Exercises   Performance  Comments/Modifications   1  Floor<>ceiling  good Added upper extremity bicep curl    2  Side<>side  good Cues for open hand   3  Step & reach  (forward)   fair Sitting   4  Step & reach (sideways)   fair Sitting   5  Step & reach  (backwards)   Fair/poor Sitting   6  Rock & reach  (foward)   Fair/poor Standing with 1 UE support on the stool.  Difficulty with coordination    7  Rock & reach   (sideways twist)   fair Standing with 1 UE support on the stool      Sit to/from stand x 5 trials, cues for posture - CGA needed to prevent posterior lean back " "    Upper extremity clasped behind back for chest stretch- 2 x 30 sec each         Patient Education and Home Exercises     Home Exercises Provided and Patient Education Provided     Education provided:   - HEP, 4S's for when festinating gait occurs, BIG scheduling  Educated on purpose of red laser on Ustep and how to "step to" laser       Written Home Exercises Provided: YES. Max daily provided 12/6/22.   Exercises were reviewed and Haja was able to demonstrate them prior to the end of the session.  Haja demonstrated good  understanding of the education provided. See EMR under Patient Instructions for exercises provided during therapy sessions    ASSESSMENT     Haja tolerated session well and did not have any problems.  Haja cont to report semi-compliance with HEP and had difficulty with coordination for the forward rock and reach.  Most exercises were performed in sitting to go over them for HEP once again.      Haja Is progressing well towards his goals.   Pt prognosis is Good.     Pt will continue to benefit from skilled outpatient physical therapy to address the deficits listed in the problem list box on initial evaluation, provide pt/family education and to maximize pt's level of independence in the home and community environment.     Pt's spiritual, cultural and educational needs considered and pt agreeable to plan of care and goals.     Anticipated barriers to physical therapy: cognition    Goals:  Short Term Goals: 4 weeks    Patient to perform HEP with min A on going   Patient able to score less than or equal to 18 sec on the 5 times sit to stand for improved functional strength on going   Patient to score less than or equal to 60 sec on the TUG for decreased fall risk on going   Patient able to ambulate greater than 250ft mod I with rolling walker, with 50% increased bilateral foot clearance and less than 3 episodes of freezing on going      Long Term Goals: 8 weeks   Patient to perform HEP " Independent  Patient able to score less than or equal to 45 sec on the TUG for decreased fall risk. On going   Patient to score improve FOTO score by 5% for improved functional mobility.  On going   Patient able to ambulate greater than 500ft mod Independent with rolling walker with 50% improved bilateral foot clearance, less than 2 episodes of freezing, no loss of balance. On going   Patient to improved bilateral hip strength to 4-/5 for improved upright posture. On going     PLAN     Cont with LSVT protocol      Fadia Cardona, PTA

## 2022-12-14 ENCOUNTER — DOCUMENTATION ONLY (OUTPATIENT)
Dept: REHABILITATION | Facility: HOSPITAL | Age: 77
End: 2022-12-14

## 2022-12-14 ENCOUNTER — CLINICAL SUPPORT (OUTPATIENT)
Dept: REHABILITATION | Facility: HOSPITAL | Age: 77
End: 2022-12-14
Attending: INTERNAL MEDICINE
Payer: MEDICARE

## 2022-12-14 DIAGNOSIS — G20.A1 PARKINSON'S DISEASE: Primary | ICD-10-CM

## 2022-12-14 PROCEDURE — 97112 NEUROMUSCULAR REEDUCATION: CPT | Mod: PO,CQ

## 2022-12-14 NOTE — PROGRESS NOTES
"[facility]   Physical Therapy Treatment Note     Name: Haja Marti  Clinic Number: [id number]    Therapy Diagnosis:   Encounter Diagnosis   Name Primary?    Parkinson's disease Yes         Physician: Mag Mcmahon MD    Visit Date: 12/14/2022    Physician: Mag Mcmahon MD     Physician Orders: PT Eval and Treat BIG therapy  Medical Diagnosis from Referral:   G20 (ICD-10-CM) - Parkinson's disease   R26.89 (ICD-10-CM) - Abnormality of gait due to impairment of balance      Evaluation Date: 10/28/2022  Authorization Period Expiration: 12/28/22  Plan of Care Expiration: 10/26/23  Visit # / Visits authorized: 10 / 12    PTA Visit #: 2/5     Time In: 13:15  Time Out: 14:00  Total Billable Time: 45 minutes    Precautions: Standard and Fall    SUBJECTIVE     Pt reports: " I'm tired."   He was semi compliant with home exercise program.  Response to previous treatment: a little fatigued  Functional change: ongoing    Pain: 0/10  Location: N/A      OBJECTIVE   See below.     Treatment     Haja received the treatments listed below:      neuromuscular re-education activities to improve: Balance, Coordination, Kinesthetic, Sense, Proprioception, and Posture for 40 minutes. The following activities were included:      LSVT Maximal Daily Exercises to promote amplitude: (10 repetitions each)   Daily Exercises   Performance  Comments/Modifications   1  Floor<>ceiling  good Added upper extremity bicep curl    2  Side<>side  good Cues for open hand   3  Step & reach  (forward)   fair Standing with 1 UE support   4  Step & reach (sideways)   fair Standing with 1 UE support   5  Step & reach  (backwards)   Fair/poor Standing with 1 UE support   6  Rock & reach  (foward)   Fair/poor Standing with 1 UE support on the stool.  Difficulty with coordination    7  Rock & reach   (sideways twist)   fair Standing with 1 UE support on the stool      Sit to/from stand x 10 trials, cues for posture - CGA " "needed to prevent posterior lean back     Upper extremity clasped behind back for chest stretch- 2 x 30 sec each     // bars:   X 4 laps of side stepping with 1 UE support.  Max VCs for upright posture  X 4 laps of forward tandem stance with 1 UE support.  Max VC's for upright posture.  X 1 lap of backward ambulation with 1 UE support      Patient Education and Home Exercises     Home Exercises Provided and Patient Education Provided     Education provided:   - HEP, 4S's for when festinating gait occurs, BIG scheduling  Educated on purpose of red laser on Ustep and how to "step to" laser       Written Home Exercises Provided: YES. Max daily provided 12/6/22.   Exercises were reviewed and Haja was able to demonstrate them prior to the end of the session.  Haja demonstrated good  understanding of the education provided. See EMR under Patient Instructions for exercises provided during therapy sessions    ASSESSMENT     Haja tolerated his tx session well and cont to focus on amplitude and balance.  Haja was able to perform all his multi-directional exercises in standing with 1 UE support and was mostly challenged by backward ambulation in the // bars.  Haja cont to require VCs and TCs for upright posturing.  Cont with plan of care.      Hjaa Is progressing well towards his goals.   Pt prognosis is Good.     Pt will continue to benefit from skilled outpatient physical therapy to address the deficits listed in the problem list box on initial evaluation, provide pt/family education and to maximize pt's level of independence in the home and community environment.     Pt's spiritual, cultural and educational needs considered and pt agreeable to plan of care and goals.     Anticipated barriers to physical therapy: cognition    Goals:  Short Term Goals: 4 weeks    Patient to perform HEP with min A on going   Patient able to score less than or equal to 18 sec on the 5 times sit to stand for improved functional " strength on going   Patient to score less than or equal to 60 sec on the TUG for decreased fall risk on going   Patient able to ambulate greater than 250ft mod I with rolling walker, with 50% increased bilateral foot clearance and less than 3 episodes of freezing on going      Long Term Goals: 8 weeks   Patient to perform HEP Independent  Patient able to score less than or equal to 45 sec on the TUG for decreased fall risk. On going   Patient to score improve FOTO score by 5% for improved functional mobility.  On going   Patient able to ambulate greater than 500ft mod Independent with rolling walker with 50% improved bilateral foot clearance, less than 2 episodes of freezing, no loss of balance. On going   Patient to improved bilateral hip strength to 4-/5 for improved upright posture. On going     PLAN     Cont with LSVT protocol      Fadia Cardona, PTA

## 2022-12-14 NOTE — PROGRESS NOTES
PT/PTA met face to face to discuss pt's treatment plan and progress towards established goals.  Continue with current PT POC with focus on amplitude, endurance and balance.  Patient will be seen by physical therapist at least every sixth treatment or 30 days, whichever occurs first.    Fadia Cardona, PTA  12/14/2022

## 2022-12-15 ENCOUNTER — DOCUMENTATION ONLY (OUTPATIENT)
Dept: REHABILITATION | Facility: HOSPITAL | Age: 77
End: 2022-12-15

## 2022-12-19 ENCOUNTER — CLINICAL SUPPORT (OUTPATIENT)
Dept: REHABILITATION | Facility: HOSPITAL | Age: 77
End: 2022-12-19
Attending: STUDENT IN AN ORGANIZED HEALTH CARE EDUCATION/TRAINING PROGRAM
Payer: MEDICARE

## 2022-12-19 DIAGNOSIS — G20.A1 PARKINSON'S DISEASE: Primary | ICD-10-CM

## 2022-12-19 PROCEDURE — 97112 NEUROMUSCULAR REEDUCATION: CPT | Mod: PO

## 2022-12-19 NOTE — PROGRESS NOTES
[facility]   Physical Therapy Treatment Note     Name: Haja Marti  Clinic Number: [id number]    Therapy Diagnosis:   Encounter Diagnosis   Name Primary?    Parkinson's disease Yes       Physician: Mag Mcmahon MD    Visit Date: 12/19/2022    Physician: Mag Mcmahon MD     Physician Orders: PT Eval and Treat BIG therapy  Medical Diagnosis from Referral:   G20 (ICD-10-CM) - Parkinson's disease   R26.89 (ICD-10-CM) - Abnormality of gait due to impairment of balance      Evaluation Date: 10/28/2022  Authorization Period Expiration: 12/28/22  Plan of Care Expiration: 10/26/23  Visit # / Visits authorized: 12 / 12    PTA Visit #: 0/5     Time In: 12:30  Time Out: 13:15  Total Billable Time: 45 minutes    Precautions: Standard and Fall    SUBJECTIVE     Pt reports: I'm using this walker more often.  He was semi compliant with home exercise program.  Response to previous treatment: a little fatigued  Functional change: ongoing    Pain: 0/10  Location: N/A      OBJECTIVE       Treatment     Haja received the treatments listed below:      neuromuscular re-education activities to improve: Balance, Coordination, Kinesthetic, Sense, Proprioception, and Posture for 40 minutes. The following activities were included:      LSVT Maximal Daily Exercises to promote amplitude: (10 repetitions each)   Daily Exercises   Performance  Comments/Modifications   1  Floor<>ceiling  good Added upper extremity bicep curl, 1/4# wrist weight   2  Side<>side  good Cues for open, hand1/4# wrist weight   3  Step & reach  (forward)   fair Standing with 1 UE support, stepping over half foam roll, 1/4# wrist weight   4  Step & reach (sideways)   fair Standing with 1 UE support, stepping over half foam roll, 1/4# wrist weight   5  Step & reach  (backwards)   Fair Standing with 1 UE support, stepping over half foam roll, cues for coordination, 1/4# wrist weight   6  Rock & reach  (foward)   Fair Standing with  "1 UE support on the stool.  Difficulty with coordination    7  Rock & reach   (sideways twist)   fair Standing with 1 UE support on the stool      Sit to/from stand x 10 trials, cues for posture - CGA needed to prevent posterior lean back     Ambulated 25 feet x 2 with horizontal head turns, using Ustep walker  Ambulated 25 feet x 2 with vertical head turns, using Ustep walker    // bars:   X 6 laps stepping over orange hurdles, reciprocal pattern, B upper extremity assist  X 4 lengths lateral stepping over hurdles, B upper extremity assist       Patient Education and Home Exercises     Home Exercises Provided and Patient Education Provided     Education provided:   - HEP, 4S's for when festinating gait occurs, BIG scheduling  Educated on purpose of red laser on Ustep and how to "step to" laser       Written Home Exercises Provided: YES. Max daily provided 12/6/22.   Exercises were reviewed and Haja was able to demonstrate them prior to the end of the session.  Haja demonstrated good  understanding of the education provided. See EMR under Patient Instructions for exercises provided during therapy sessions    ASSESSMENT     Haja tolerated his session well.  He was able to perform exercises in standing with only 1 upper extremity assist.  He has difficulty with coordinating of rock and reach exercises, requires max verbal cues.  He demo's fair to good foot clearance when ambulating over hurdles, however with increased fatigue after multiple trials leading to slowed movement.  He continues to make good progress with LSVT program.     Haja Is progressing well towards his goals.   Pt prognosis is Good.     Pt will continue to benefit from skilled outpatient physical therapy to address the deficits listed in the problem list box on initial evaluation, provide pt/family education and to maximize pt's level of independence in the home and community environment.     Pt's spiritual, cultural and educational needs " considered and pt agreeable to plan of care and goals.     Anticipated barriers to physical therapy: cognition    Goals:  Short Term Goals: 4 weeks    Patient to perform HEP with min A on going   Patient able to score less than or equal to 18 sec on the 5 times sit to stand for improved functional strength on going   Patient to score less than or equal to 60 sec on the TUG for decreased fall risk on going   Patient able to ambulate greater than 250ft mod I with rolling walker, with 50% increased bilateral foot clearance and less than 3 episodes of freezing on going      Long Term Goals: 8 weeks   Patient to perform HEP Independent  Patient able to score less than or equal to 45 sec on the TUG for decreased fall risk. On going   Patient to score improve FOTO score by 5% for improved functional mobility.  On going   Patient able to ambulate greater than 500ft mod Independent with rolling walker with 50% improved bilateral foot clearance, less than 2 episodes of freezing, no loss of balance. On going   Patient to improved bilateral hip strength to 4-/5 for improved upright posture. On going     PLAN     Cont with LSVT protocol      Shana Frost, PT

## 2022-12-20 ENCOUNTER — CLINICAL SUPPORT (OUTPATIENT)
Dept: REHABILITATION | Facility: HOSPITAL | Age: 77
End: 2022-12-20
Attending: STUDENT IN AN ORGANIZED HEALTH CARE EDUCATION/TRAINING PROGRAM
Payer: MEDICARE

## 2022-12-20 DIAGNOSIS — N40.0 ENLARGED PROSTATE: ICD-10-CM

## 2022-12-20 DIAGNOSIS — R39.15 URINARY URGENCY: ICD-10-CM

## 2022-12-20 DIAGNOSIS — G20.A1 PARKINSON'S DISEASE: Primary | ICD-10-CM

## 2022-12-20 PROCEDURE — 97112 NEUROMUSCULAR REEDUCATION: CPT | Mod: PO,CQ

## 2022-12-20 RX ORDER — FINASTERIDE 5 MG/1
5 TABLET, FILM COATED ORAL DAILY
Qty: 90 TABLET | Refills: 3 | Status: SHIPPED | OUTPATIENT
Start: 2022-12-20

## 2022-12-20 NOTE — TELEPHONE ENCOUNTER
No new care gaps identified.  [facility] Embedded Care Gaps. Reference number: [id number]. 12/20/2022   12:12:03 PM CST

## 2022-12-20 NOTE — TELEPHONE ENCOUNTER
----- Message from Melita Ewing sent at 12/20/2022 11:58 AM CST -----  Contact: Self/  Requesting an RX refill or new RX.  Is this a refill or new RX: New  RX name and strength :  finasteride (PROSCAR) 5 mg tablet  Is this a 30 day or 90 day RX: 90  [facility] - [facility] [address]  [address] [facility] [address] [facility] [address]  Phone: [phone] Fax: [phone]        The doctors have asked that we provide their patients with the following 2 reminders -- prescription refills can take up to 72 hours, and a friendly reminder that in the future you can use your [facility] account to request refills: pt stated that he is out of  the medication

## 2022-12-20 NOTE — PROGRESS NOTES
"[facility]   Physical Therapy Treatment Note     Name: Haja Marti  Clinic Number: [id number]    Therapy Diagnosis:   Encounter Diagnosis   Name Primary?    Parkinson's disease Yes       Physician: Mag Mcmahon MD    Visit Date: 12/20/2022    Physician: Mag Mcmahon MD     Physician Orders: PT Eval and Treat BIG therapy  Medical Diagnosis from Referral:   G20 (ICD-10-CM) - Parkinson's disease   R26.89 (ICD-10-CM) - Abnormality of gait due to impairment of balance      Evaluation Date: 10/28/2022  Authorization Period Expiration: 12/28/22  Plan of Care Expiration: 10/26/23  Visit # / Visits authorized: 13 / 12    PTA Visit #: 1/5     Time In: 10:15  Time Out: 11:00  Total Billable Time: 45 minutes    Precautions: Standard and Fall    SUBJECTIVE     Pt reports: " I think I need to make up a session I missed last week."   He was semi compliant with home exercise program.  Response to previous treatment: a little fatigued  Functional change: ongoing    Pain: 0/10  Location: N/A      OBJECTIVE       Treatment     Haja received the treatments listed below:      neuromuscular re-education activities to improve: Balance, Coordination, Kinesthetic, Sense, Proprioception, and Posture for 45 minutes. The following activities were included:      LSVT Maximal Daily Exercises to promote amplitude: (10 repetitions each)   Daily Exercises   Performance  Comments/Modifications   1  Floor<>ceiling  good Added upper extremity bicep curl, 1/4# wrist weight   2  Side<>side  good Cues for open, hand1/4# wrist weight   3  Step & reach  (forward)   fair Standing with 1 UE support, stepping over half foam roll, 1/4# wrist weight   4  Step & reach (sideways)   fair Standing with 1 UE support, stepping over half foam roll, 1/4# wrist weight   5  Step & reach  (backwards)   Fair Standing with 1 UE support, stepping over half foam roll, cues for coordination, 1/4# wrist weight   6  Rock & " "reach  (foward)   Fair Standing with 1 UE support on the stool.  Difficulty with coordination    7  Rock & reach   (sideways twist)   fair Standing with 1 UE support on the stool      Sit to/from stand x 10 trials, cues for posture - CGA needed to prevent posterior lean back     Ambulated 25 feet x 2 with horizontal head turns, using Ustep walker -NP today 2* time  Ambulated 25 feet x 2 with vertical head turns, using Ustep walker -NP today 2* time    Pt had to use the rest room during the tx session. X 5 mins    Patient Education and Home Exercises     Home Exercises Provided and Patient Education Provided     Education provided:   - HEP, 4S's for when festinating gait occurs, BIG scheduling  Educated on purpose of red laser on Ustep and how to "step to" laser       Written Home Exercises Provided: YES. Max daily provided 12/6/22.   Exercises were reviewed and Haja was able to demonstrate them prior to the end of the session.  Haja demonstrated good  understanding of the education provided. See EMR under Patient Instructions for exercises provided during therapy sessions    ASSESSMENT     Haja tolerated his tx session well, but slightly limited due to having to use the rest room during his tx session.  Haja had a difficult time coordinating his backward step and reach and required increased verbal and tactile cues for safety.   He continues to make good progress with LSVT program.     Haja Is progressing well towards his goals.   Pt prognosis is Good.     Pt will continue to benefit from skilled outpatient physical therapy to address the deficits listed in the problem list box on initial evaluation, provide pt/family education and to maximize pt's level of independence in the home and community environment.     Pt's spiritual, cultural and educational needs considered and pt agreeable to plan of care and goals.     Anticipated barriers to physical therapy: cognition    Goals:  Short Term Goals: 4 " weeks    Patient to perform HEP with min A on going   Patient able to score less than or equal to 18 sec on the 5 times sit to stand for improved functional strength on going   Patient to score less than or equal to 60 sec on the TUG for decreased fall risk on going   Patient able to ambulate greater than 250ft mod I with rolling walker, with 50% increased bilateral foot clearance and less than 3 episodes of freezing on going      Long Term Goals: 8 weeks   Patient to perform HEP Independent  Patient able to score less than or equal to 45 sec on the TUG for decreased fall risk. On going   Patient to score improve FOTO score by 5% for improved functional mobility.  On going   Patient able to ambulate greater than 500ft mod Independent with rolling walker with 50% improved bilateral foot clearance, less than 2 episodes of freezing, no loss of balance. On going   Patient to improved bilateral hip strength to 4-/5 for improved upright posture. On going     PLAN     Cont with LSVT protocol      Fadia Cardona, PTA

## 2022-12-21 ENCOUNTER — TELEPHONE (OUTPATIENT)
Dept: NEUROLOGY | Facility: CLINIC | Age: 77
End: 2022-12-21
Payer: MEDICARE

## 2022-12-21 ENCOUNTER — CLINICAL SUPPORT (OUTPATIENT)
Dept: REHABILITATION | Facility: HOSPITAL | Age: 77
End: 2022-12-21
Attending: INTERNAL MEDICINE
Payer: MEDICARE

## 2022-12-21 DIAGNOSIS — G20.A1 PARKINSON'S DISEASE: Primary | ICD-10-CM

## 2022-12-21 PROCEDURE — 97112 NEUROMUSCULAR REEDUCATION: CPT | Mod: PO

## 2022-12-21 NOTE — TELEPHONE ENCOUNTER
----- Message from Sabrina Chow sent at 2022 10:20 AM CST -----  Contact: WIFE - ALISE Marti  MRN: [id number]  : 1945  PCP: Andrez Jackson  Home Phone      [phone]  Work Phone      Not on file.  Mobile          [phone]      MESSAGE: Wife feels that the Carbidopa-Levodopa is working and would like to know if there is something else that can be prescribed for him.  She feels that his symptoms is getting worse.        Phone: [phone] or [phone]

## 2022-12-21 NOTE — PROGRESS NOTES
"[facility]   Physical Therapy Treatment Note     Name: Haja Marti  Clinic Number: [id number]    Therapy Diagnosis:   Encounter Diagnosis   Name Primary?    Parkinson's disease Yes         Physician: Mag Mcmahon MD    Visit Date: 12/21/2022    Physician: Mag Mcmahon MD     Physician Orders: PT Eval and Treat BIG therapy  Medical Diagnosis from Referral:   G20 (ICD-10-CM) - Parkinson's disease   R26.89 (ICD-10-CM) - Abnormality of gait due to impairment of balance      Evaluation Date: 10/28/2022  Authorization Period Expiration: 12/28/22  Plan of Care Expiration: 10/26/23  Visit # / Visits authorized: 14 / 12    PTA Visit #: 1/5     Time In: 10:15  Time Out: 11:00  Total Billable Time: 45 minutes    Precautions: Standard and Fall    SUBJECTIVE     Pt reports: "I've been having trouble at [employer] lately, and it make me not want to go".   He was semi compliant with home exercise program.  Response to previous treatment: a little fatigued  Functional change: ongoing    Pain: 0/10  Location: N/A      OBJECTIVE       Treatment     Haja received the treatments listed below:      neuromuscular re-education activities to improve: Balance, Coordination, Kinesthetic, Sense, Proprioception, and Posture for 45 minutes. The following activities were included:      LSVT Maximal Daily Exercises to promote amplitude: (10 repetitions each)   Daily Exercises   Performance  Comments/Modifications   1  Floor<>ceiling  good Added upper extremity bicep curl, 1# wrist weight   2  Side<>side  good Cues for open, hand 1# wrist weight   3  Step & reach  (forward)   fair Standing with 1 UE support, stepping over half foam roll, 1# wrist weight   4  Step & reach (sideways)   fair Standing with 1 UE support, stepping over half foam roll, 1# wrist weight   5  Step & reach  (backwards)   Fair Standing with 1 UE support, stepping over half foam roll, cues for coordination, 1# wrist weight "   6  Rock & reach  (foward)   Fair Standing with 1 UE support on the stool.  Difficulty with coordination    7  Rock & reach   (sideways twist)   fair Standing with 1 UE support on the stool      Sit to/from stand x 10 trials, cues for posture - CGA needed to prevent posterior lean back     Lower extremity  tap onto orange cones x 10, 1 upper extremity assist      Patient Education and Home Exercises     Home Exercises Provided and Patient Education Provided     Education provided:   - HEP, 4S's for when festinating gait occurs, BIG scheduling  Educated on importance of maintaining social appointments to get out of the house, increased cognitive task  Discussed community programs for PD       Written Home Exercises Provided: YES. Max daily provided 12/6/22.   Exercises were reviewed and Haja was able to demonstrate them prior to the end of the session.  Haja demonstrated good  understanding of the education provided. See EMR under Patient Instructions for exercises provided during therapy sessions    ASSESSMENT     Haja tolerated session fairly well.  He had some loss of balance with staggered stance rocking today, requires min assist to correct.  Spent time on education of single task focus with festinating gait, stopping and then taking a big step.  He demo's improved carry over of exercises, requires less cues for steps of performance.  He will continue to benefit from skilled Physical Therapy to address impairments.      Haja Is progressing well towards his goals.   Pt prognosis is Good.     Pt will continue to benefit from skilled outpatient physical therapy to address the deficits listed in the problem list box on initial evaluation, provide pt/family education and to maximize pt's level of independence in the home and community environment.     Pt's spiritual, cultural and educational needs considered and pt agreeable to plan of care and goals.     Anticipated barriers to physical therapy:  cognition    Goals:  Short Term Goals: 4 weeks    Patient to perform HEP with min A on going   Patient able to score less than or equal to 18 sec on the 5 times sit to stand for improved functional strength on going   Patient to score less than or equal to 60 sec on the TUG for decreased fall risk on going   Patient able to ambulate greater than 250ft mod I with rolling walker, with 50% increased bilateral foot clearance and less than 3 episodes of freezing on going      Long Term Goals: 8 weeks   Patient to perform HEP Independent  Patient able to score less than or equal to 45 sec on the TUG for decreased fall risk. On going   Patient to score improve FOTO score by 5% for improved functional mobility.  On going   Patient able to ambulate greater than 500ft mod Independent with rolling walker with 50% improved bilateral foot clearance, less than 2 episodes of freezing, no loss of balance. On going   Patient to improved bilateral hip strength to 4-/5 for improved upright posture. On going     PLAN     Cont with LSVT protocol      Shana Frost, PT

## 2022-12-22 NOTE — TELEPHONE ENCOUNTER
Notify: It can take up to 3 months for the best effects of this medication. Please give the medication another 4 weeks / follow up with Dr Mcmahon will be at around that time for that reason

## 2022-12-22 NOTE — TELEPHONE ENCOUNTER
Attempted to contact both phone numbers left with message, left message on both voice mails to return call.

## 2022-12-22 NOTE — TELEPHONE ENCOUNTER
LVM for pt letting them know that Dr. Mcmahon is out on maternity leave currently and will be returning in February. Also informed that I have reached out to Dr. Hidalgo's staff to see if they would be able to make a suggestion in the meantime. Encouraged pt to call back

## 2022-12-22 NOTE — TELEPHONE ENCOUNTER
"So I think someone is confusting something here.    Per Dr Mcmahon's note:  "Currently on Sinemet 25/100 1 tab TID - instructed to add 1/2 tablet to the AM dose for 1-2 weeks and if tolerating (no worsening hallucinations) then to add another 1/2 tablet to his afternoon or evening dose."      So its not a decrease to go from 1 TID to 1.5 BID??? What are they doing currently?    So if he is currently taking 1.5 BID he should do the following:    Add 1 tablet at noon in addition to the 1.5 in the am morning and night.      "

## 2022-12-22 NOTE — TELEPHONE ENCOUNTER
Whitley (spouse) notified, voiced understanding. Whitley states that medication is not new, has been taking for years,  decreased dose from 2 BID to 1.5 BID and Haja appears more confused and sleeping less.

## 2022-12-23 ENCOUNTER — CLINICAL SUPPORT (OUTPATIENT)
Dept: REHABILITATION | Facility: HOSPITAL | Age: 77
End: 2022-12-23
Attending: STUDENT IN AN ORGANIZED HEALTH CARE EDUCATION/TRAINING PROGRAM
Payer: MEDICARE

## 2022-12-23 DIAGNOSIS — G20.A1 PARKINSON'S DISEASE: Primary | ICD-10-CM

## 2022-12-23 PROCEDURE — 97112 NEUROMUSCULAR REEDUCATION: CPT | Mod: PO,CQ

## 2022-12-23 NOTE — PROGRESS NOTES
"[facility]   Physical Therapy Treatment Note     Name: Haja Marti  Clinic Number: [id number]    Therapy Diagnosis:   Encounter Diagnosis   Name Primary?    Parkinson's disease Yes         Physician: Mag Mcmahon MD    Visit Date: 12/23/2022    Physician: Mag Mcmahon MD     Physician Orders: PT Eval and Treat BIG therapy  Medical Diagnosis from Referral:   G20 (ICD-10-CM) - Parkinson's disease   R26.89 (ICD-10-CM) - Abnormality of gait due to impairment of balance      Evaluation Date: 10/28/2022  Authorization Period Expiration: 12/28/22  Plan of Care Expiration: 10/26/23  Visit # / Visits authorized: 15 / 12    PTA Visit #: 2/5     Time In: 10:00  Time Out: 10:45  Total Billable Time: 45 minutes    Precautions: Standard and Fall    SUBJECTIVE     Pt reports: "It's cold out there."   He was semi compliant with home exercise program.  Response to previous treatment: no problems  Functional change: ongoing    Pain: 0/10  Location: N/A      OBJECTIVE       Treatment     Haja received the treatments listed below:      neuromuscular re-education activities to improve: Balance, Coordination, Kinesthetic, Sense, Proprioception, and Posture for 45 minutes. The following activities were included:      LSVT Maximal Daily Exercises to promote amplitude: (10 repetitions each)   Daily Exercises   Performance  Comments/Modifications   1  Floor<>ceiling  good Added upper extremity bicep curl, 1# wrist weight   2  Side<>side  good Cues for open, hand 1# wrist weight   3  Step & reach  (forward)   fair Standing with 1 UE support, stepping over half foam roll, 1# wrist weight   4  Step & reach (sideways)   fair Standing with 1 UE support, stepping over half foam roll, 1# wrist weight   5  Step & reach  (backwards)   Fair Standing with 1 UE support, stepping over half foam roll, cues for coordination, 1# wrist weight   6  Rock & reach  (foward)   Fair Standing with 1 UE support on the " stool.  Difficulty with coordination    7  Rock & reach   (sideways twist)   fair Standing with 1 UE support, difficulty with coordinating arms      Sit to/from stand x 10 trials, cues for posture - CGA needed to prevent posterior lean back           Patient Education and Home Exercises     Home Exercises Provided and Patient Education Provided     Education provided:   - HEP, 4S's for when festinating gait occurs, BIG scheduling  Educated on importance of maintaining social appointments to get out of the house, increased cognitive task  Discussed community programs for PD       Written Home Exercises Provided: YES. Max daily provided 12/6/22.   Exercises were reviewed and Haja was able to demonstrate them prior to the end of the session.  Haja demonstrated good  understanding of the education provided. See EMR under Patient Instructions for exercises provided during therapy sessions    ASSESSMENT     Haja tolerated his tx session well and did not have any complaints.  Haja was able to complete all of his exercises in standing, and cont to require VC's to increase his amplitude and upright posture.  Haja was mostly challenged by the coordination with standing forward rock and reach and side ways rock and reach.  He will continue to benefit from skilled Physical Therapy to address impairments.      Haja Is progressing well towards his goals.   Pt prognosis is Good.     Pt will continue to benefit from skilled outpatient physical therapy to address the deficits listed in the problem list box on initial evaluation, provide pt/family education and to maximize pt's level of independence in the home and community environment.     Pt's spiritual, cultural and educational needs considered and pt agreeable to plan of care and goals.     Anticipated barriers to physical therapy: cognition    Goals:  Short Term Goals: 4 weeks    Patient to perform HEP with min A on going   Patient able to score less than or  equal to 18 sec on the 5 times sit to stand for improved functional strength on going   Patient to score less than or equal to 60 sec on the TUG for decreased fall risk on going   Patient able to ambulate greater than 250ft mod I with rolling walker, with 50% increased bilateral foot clearance and less than 3 episodes of freezing on going      Long Term Goals: 8 weeks   Patient to perform HEP Independent  Patient able to score less than or equal to 45 sec on the TUG for decreased fall risk. On going   Patient to score improve FOTO score by 5% for improved functional mobility.  On going   Patient able to ambulate greater than 500ft mod Independent with rolling walker with 50% improved bilateral foot clearance, less than 2 episodes of freezing, no loss of balance. On going   Patient to improved bilateral hip strength to 4-/5 for improved upright posture. On going     PLAN     Cont with LSVT protocol      Fadia Cardona, PTA

## 2022-12-23 NOTE — TELEPHONE ENCOUNTER
Spoke with patients wife Whitley. Currently, patient is taking Sinemet as follows:    1.5 tablets in the morning  1 tablet at 2:00 pm  1 tablet at 7:00 pm    Wife will increase the 2:00 pm dose to 1.5 tablets as advised. New dosing schedule will be:    1.5 tablets in the morning  1.5 tablets at 2:00 pm  1 tablet at 7:00 pm    Wife notified that Dr Leonard will be out of the office until 1/3/2023 but encouraged to contact the office with any concerns and request advice from Movement Disorders specialist on service.       Note to clinical staff: Ceci Cantu MA is contact for Dr Mcmahon and partners. Can reach out to her for further recommendations and how to reach staff providers if needed.

## 2022-12-27 ENCOUNTER — CLINICAL SUPPORT (OUTPATIENT)
Dept: REHABILITATION | Facility: HOSPITAL | Age: 77
End: 2022-12-27
Attending: STUDENT IN AN ORGANIZED HEALTH CARE EDUCATION/TRAINING PROGRAM
Payer: MEDICARE

## 2022-12-27 DIAGNOSIS — G20.A1 PARKINSON'S DISEASE: Primary | ICD-10-CM

## 2022-12-27 PROCEDURE — 97112 NEUROMUSCULAR REEDUCATION: CPT | Mod: PO,CQ

## 2022-12-27 NOTE — PROGRESS NOTES
"[facility]   Physical Therapy Treatment Note     Name: Haja Marti  Clinic Number: [id number]    Therapy Diagnosis:   Encounter Diagnosis   Name Primary?    Parkinson's disease Yes         Physician: Mag Mcmahon MD    Visit Date: 12/27/2022    Physician: Mag Mcmahon MD     Physician Orders: PT Eval and Treat BIG therapy  Medical Diagnosis from Referral:   G20 (ICD-10-CM) - Parkinson's disease   R26.89 (ICD-10-CM) - Abnormality of gait due to impairment of balance      Evaluation Date: 10/28/2022  Authorization Period Expiration: 12/28/22  Plan of Care Expiration: 10/26/23  Visit # / Visits authorized: 16 / 12    PTA Visit #: 2/5     Time In: 10:15  Time Out: 11:00  Total Billable Time: 45 minutes    Precautions: Standard and Fall    SUBJECTIVE     Pt reports: "I woke up with a stiff back last night and I didn't sleep much."   He was semi compliant with home exercise program.  Response to previous treatment: no problems  Functional change: ongoing    Pain: 0/10  Location: N/A      OBJECTIVE       Treatment     Haja received the treatments listed below:      neuromuscular re-education activities to improve: Balance, Coordination, Kinesthetic, Sense, Proprioception, and Posture for 45 minutes. The following activities were included:      LSVT Maximal Daily Exercises to promote amplitude: (10 repetitions each)   Daily Exercises   Performance  Comments/Modifications   1  Floor<>ceiling  good Added upper extremity bicep curl, 1# wrist weight   2  Side<>side  good Cues for open, hand 1# wrist weight, Alternating sides   3  Step & reach  (forward)   fair Standing with 1 UE support, stepping over half foam roll, 1# wrist weight   4  Step & reach (sideways)   fair Standing with 1 UE support, stepping over half foam roll, 1# wrist weight   5  Step & reach  (backwards)   Fair Standing with 1 UE support, stepping over half foam roll, cues for coordination, 1# wrist weight   6  Rock " & reach  (foward)   Fair Standing with 1 UE support on the stool.  Difficulty with coordination , #1lb cuff weight   7  Rock & reach   (sideways twist)   fair Standing with 1 UE support, difficulty with coordinating arms, #1lb cuff weights      Sit to/from stand x 10 trials, cues for posture - CGA needed to prevent posterior lean back     X 5 min on [employer] recumbent stepper.  B UE/B LE on level 1.o      Patient Education and Home Exercises     Home Exercises Provided and Patient Education Provided     Education provided:   - HEP, 4S's for when festinating gait occurs, BIG scheduling  Educated on importance of maintaining social appointments to get out of the house, increased cognitive task  Discussed community programs for PD       Written Home Exercises Provided: YES. Max daily provided 12/6/22.   Exercises were reviewed and Haja was able to demonstrate them prior to the end of the session.  Haja demonstrated good  understanding of the education provided. See EMR under Patient Instructions for exercises provided during therapy sessions    ASSESSMENT     Haja tolerated his tx session well and did not have any complaints. Haja was educated on the importance of HEP and was re-educated on the 4's for when he starts to freeze or shuffle.  Haja's wife was educated on the 4s in the lobby and he cont to have difficulty with coordinating both forward and sideways rock and reach exercises.  He will continue to benefit from skilled Physical Therapy to address impairments.      Haja Is progressing well towards his goals.   Pt prognosis is Good.     Pt will continue to benefit from skilled outpatient physical therapy to address the deficits listed in the problem list box on initial evaluation, provide pt/family education and to maximize pt's level of independence in the home and community environment.     Pt's spiritual, cultural and educational needs considered and pt agreeable to plan of care and goals.      Anticipated barriers to physical therapy: cognition    Goals:  Short Term Goals: 4 weeks    Patient to perform HEP with min A on going   Patient able to score less than or equal to 18 sec on the 5 times sit to stand for improved functional strength on going   Patient to score less than or equal to 60 sec on the TUG for decreased fall risk on going   Patient able to ambulate greater than 250ft mod I with rolling walker, with 50% increased bilateral foot clearance and less than 3 episodes of freezing on going      Long Term Goals: 8 weeks   Patient to perform HEP Independent  Patient able to score less than or equal to 45 sec on the TUG for decreased fall risk. On going   Patient to score improve FOTO score by 5% for improved functional mobility.  On going   Patient able to ambulate greater than 500ft mod Independent with rolling walker with 50% improved bilateral foot clearance, less than 2 episodes of freezing, no loss of balance. On going   Patient to improved bilateral hip strength to 4-/5 for improved upright posture. On going     PLAN     Cont with LSVT protocol      Fadia Cardona, PTA

## 2022-12-28 ENCOUNTER — CLINICAL SUPPORT (OUTPATIENT)
Dept: REHABILITATION | Facility: HOSPITAL | Age: 77
End: 2022-12-28
Attending: INTERNAL MEDICINE
Payer: MEDICARE

## 2022-12-28 DIAGNOSIS — G20.A1 PARKINSON'S DISEASE: Primary | ICD-10-CM

## 2022-12-28 PROCEDURE — 97112 NEUROMUSCULAR REEDUCATION: CPT | Mod: PO,CQ

## 2022-12-28 NOTE — PROGRESS NOTES
"[facility]   Physical Therapy Treatment Note     Name: Haja Marti  Clinic Number: [id number]    Therapy Diagnosis:   Encounter Diagnosis   Name Primary?    Parkinson's disease Yes         Physician: Mag Mcmahon MD    Visit Date: 12/28/2022    Physician: Mag Mcmahon MD     Physician Orders: PT Eval and Treat BIG therapy  Medical Diagnosis from Referral:   G20 (ICD-10-CM) - Parkinson's disease   R26.89 (ICD-10-CM) - Abnormality of gait due to impairment of balance      Evaluation Date: 10/28/2022  Authorization Period Expiration: 12/28/22  Plan of Care Expiration: 10/26/23  Visit # / Visits authorized: 17 / 12    PTA Visit #: 3/5     Time In: 10:15  Time Out: 11:00  Total Billable Time: 45 minutes    Precautions: Standard and Fall    SUBJECTIVE     Pt reports: " I feel better today."  He was semi compliant with home exercise program.  Response to previous treatment: no problems  Functional change: ongoing    Pain: 0/10  Location: N/A      OBJECTIVE       Treatment     Haja received the treatments listed below:      neuromuscular re-education activities to improve: Balance, Coordination, Kinesthetic, Sense, Proprioception, and Posture for 45 minutes. The following activities were included:      LSVT Maximal Daily Exercises to promote amplitude: (10 repetitions each)   Daily Exercises   Performance  Comments/Modifications   1  Floor<>ceiling  good Added upper extremity bicep curl, 1# wrist weight   2  Side<>side  good Cues for open, hand 1# wrist weight, Alternating sides   3  Step & reach  (forward)   fair Standing with 1 UE support, stepping over half foam roll, 1# wrist weight   4  Step & reach (sideways)   fair Standing with 1 UE support, stepping over half foam roll, 1# wrist weight   5  Step & reach  (backwards)   Fair Standing with 1 UE support, stepping over half foam roll, cues for coordination, 1# wrist weight   6  Rock & reach  (foward)   Fair Standing with 1 UE " support on the stool.  Difficulty with coordination , #1lb cuff weight   7  Rock & reach   (sideways twist)   fair Standing with 1 UE support, difficulty with coordinating arms, #1lb cuff weights      Sit to/from stand x 10 trials, cues for posture - CGA needed to prevent posterior lean back     X 5 min working on quarter turns.  3 in each direction.  Pt requires VC's for increased hip flexion and turns    X 2 laps of weaving around 5 cones with U-Step and CGA      Patient Education and Home Exercises     Home Exercises Provided and Patient Education Provided     Education provided:   - HEP, 4S's for when festinating gait occurs, BIG scheduling  Educated on importance of maintaining social appointments to get out of the house, increased cognitive task  Discussed community programs for PD       Written Home Exercises Provided: YES. Max daily provided 12/6/22.   Exercises were reviewed and Haja was able to demonstrate them prior to the end of the session.  Haja demonstrated good  understanding of the education provided. See EMR under Patient Instructions for exercises provided during therapy sessions    ASSESSMENT     Haja tolerated his tx session well today and did not have any complaints.  Haja cont to have difficulty with coordinating his arms during the standing exercises, especially with backward step and reach and forward rock and reach.   He will continue to benefit from skilled Physical Therapy to address impairments.      Haja Is progressing well towards his goals.   Pt prognosis is Good.     Pt will continue to benefit from skilled outpatient physical therapy to address the deficits listed in the problem list box on initial evaluation, provide pt/family education and to maximize pt's level of independence in the home and community environment.     Pt's spiritual, cultural and educational needs considered and pt agreeable to plan of care and goals.     Anticipated barriers to physical therapy:  cognition    Goals:  Short Term Goals: 4 weeks    Patient to perform HEP with min A on going   Patient able to score less than or equal to 18 sec on the 5 times sit to stand for improved functional strength on going   Patient to score less than or equal to 60 sec on the TUG for decreased fall risk on going   Patient able to ambulate greater than 250ft mod I with rolling walker, with 50% increased bilateral foot clearance and less than 3 episodes of freezing on going      Long Term Goals: 8 weeks   Patient to perform HEP Independent  Patient able to score less than or equal to 45 sec on the TUG for decreased fall risk. On going   Patient to score improve FOTO score by 5% for improved functional mobility.  On going   Patient able to ambulate greater than 500ft mod Independent with rolling walker with 50% improved bilateral foot clearance, less than 2 episodes of freezing, no loss of balance. On going   Patient to improved bilateral hip strength to 4-/5 for improved upright posture. On going     PLAN     Cont with LSVT protocol      Fadia Cardona, PTA

## 2022-12-30 ENCOUNTER — CLINICAL SUPPORT (OUTPATIENT)
Dept: REHABILITATION | Facility: HOSPITAL | Age: 77
End: 2022-12-30
Attending: STUDENT IN AN ORGANIZED HEALTH CARE EDUCATION/TRAINING PROGRAM
Payer: MEDICARE

## 2022-12-30 DIAGNOSIS — G20.A1 PARKINSON'S DISEASE: Primary | ICD-10-CM

## 2022-12-30 PROCEDURE — 97112 NEUROMUSCULAR REEDUCATION: CPT | Mod: PO,CQ

## 2022-12-30 NOTE — PROGRESS NOTES
"[facility]   Physical Therapy Treatment Note     Name: Haja Marti  Clinic Number: [id number]    Therapy Diagnosis:   Encounter Diagnosis   Name Primary?    Parkinson's disease Yes         Physician: Mag Mcmahon MD    Visit Date: 12/30/2022    Physician: Mag Mcmahon MD     Physician Orders: PT Eval and Treat BIG therapy  Medical Diagnosis from Referral:   G20 (ICD-10-CM) - Parkinson's disease   R26.89 (ICD-10-CM) - Abnormality of gait due to impairment of balance      Evaluation Date: 10/28/2022  Authorization Period Expiration: 12/28/22  Plan of Care Expiration: 10/26/23  Visit # / Visits authorized: 18 / 12    PTA Visit #: 4/5     Time In: 0945  Time Out: 10: 30   Total Billable Time: 45 minutes    Precautions: Standard and Fall    SUBJECTIVE     Pt reports: " Better, I felt the best I have in a long time yesterday."   He was semi compliant with home exercise program.  Response to previous treatment: no problems  Functional change: ongoing    Pain: 0/10  Location: N/A      OBJECTIVE       Treatment     Haja received the treatments listed below:      neuromuscular re-education activities to improve: Balance, Coordination, Kinesthetic, Sense, Proprioception, and Posture for 45 minutes. The following activities were included:      LSVT Maximal Daily Exercises to promote amplitude: (10 repetitions each)   Daily Exercises   Performance  Comments/Modifications   1  Floor<>ceiling  good Added upper extremity bicep curl, 1.5# wrist weight   2  Side<>side  good Cues for open, hand 1.5# wrist weight, Alternating sides   3  Step & reach  (forward)   Fair-good Standing with 1 UE support, stepping over [place] 1.5# wrist weight   4  Step & reach (sideways)   Fair-good Standing with 1 UE support, stepping over  orange [place]1.5# wrist weight   5  Step & reach  (backwards)   Fair Standing with 1 UE support, orange [place], cues for coordination, 1.5 # wrist weight   6  Rock & " reach  (foward)   Fair Standing with 1 UE support on the stool.  Difficulty with coordination , #1.5 lb cuff weight   7  Rock & reach   (sideways twist)   fair Standing with 1 UE support, difficulty with coordinating arms, #1.5 lb cuff weights      Sit to/from stand x 10 trials with #1.5 lb cuff weights on B arms.  cues for posture - CGA needed to prevent posterior lean back     X 4 laps of forward ambulation stepping over 5 hurdles with 2 UE support and CGA.  Pt requires constant verbal and tactile cues for upright posture.        Patient Education and Home Exercises     Home Exercises Provided and Patient Education Provided     Education provided:   - HEP, 4S's for when festinating gait occurs, BIG scheduling  Educated on importance of maintaining social appointments to get out of the house, increased cognitive task  Discussed community programs for PD       Written Home Exercises Provided: YES. Max daily provided 12/6/22.   Exercises were reviewed and Haja was able to demonstrate them prior to the end of the session.  Haja demonstrated good  understanding of the education provided. See EMR under Patient Instructions for exercises provided during therapy sessions    ASSESSMENT     Haja tolerated his tx session well and cont to slowly progress towards his goals.  Haja was able to progress to 1.5lb cuff weights on his arms for the exercises and progressed to stepping over the hurdles for safety.  Haja cont to have difficulty with coordination for the backward step and reach and at times forward rock and reach.    He will continue to benefit from skilled Physical Therapy to address impairments.      Haja Is progressing well towards his goals.   Pt prognosis is Good.     Pt will continue to benefit from skilled outpatient physical therapy to address the deficits listed in the problem list box on initial evaluation, provide pt/family education and to maximize pt's level of independence in the home and  community environment.     Pt's spiritual, cultural and educational needs considered and pt agreeable to plan of care and goals.     Anticipated barriers to physical therapy: cognition    Goals:  Short Term Goals: 4 weeks    Patient to perform HEP with min A on going   Patient able to score less than or equal to 18 sec on the 5 times sit to stand for improved functional strength on going   Patient to score less than or equal to 60 sec on the TUG for decreased fall risk on going   Patient able to ambulate greater than 250ft mod I with rolling walker, with 50% increased bilateral foot clearance and less than 3 episodes of freezing on going      Long Term Goals: 8 weeks   Patient to perform HEP Independent  Patient able to score less than or equal to 45 sec on the TUG for decreased fall risk. On going   Patient to score improve FOTO score by 5% for improved functional mobility.  On going   Patient able to ambulate greater than 500ft mod Independent with rolling walker with 50% improved bilateral foot clearance, less than 2 episodes of freezing, no loss of balance. On going   Patient to improved bilateral hip strength to 4-/5 for improved upright posture. On going     PLAN     Cont with LSVT protocol      Fadia Cardona, PTA

## 2023-01-04 ENCOUNTER — CLINICAL SUPPORT (OUTPATIENT)
Dept: REHABILITATION | Facility: HOSPITAL | Age: 78
End: 2023-01-04
Attending: STUDENT IN AN ORGANIZED HEALTH CARE EDUCATION/TRAINING PROGRAM
Payer: MEDICARE

## 2023-01-04 DIAGNOSIS — G20.A1 PARKINSON'S DISEASE: Primary | ICD-10-CM

## 2023-01-04 PROCEDURE — 97112 NEUROMUSCULAR REEDUCATION: CPT | Mod: PO

## 2023-01-04 NOTE — PROGRESS NOTES
[facility]   Physical Therapy Treatment and Discharge Note     Name: Haja Marti  Clinic Number: [id number]    Therapy Diagnosis:   Encounter Diagnosis   Name Primary?    Parkinson's disease Yes       Physician: Mag Mcmahon MD    Visit Date: 2023    Physician: Mag Mcmahon MD     Physician Orders: PT Eval and Treat BIG therapy  Medical Diagnosis from Referral:   G20 (ICD-10-CM) - Parkinson's disease   R26.89 (ICD-10-CM) - Abnormality of gait due to impairment of balance      Evaluation Date: 10/28/2022  Authorization Period Expiration: 22  Plan of Care Expiration: 10/26/23  Visit # / Visits authorized:     PTA Visit #: 0/5     Time In: 11:00  Time Out: 11:45   Total Billable Time: 45 minutes    Precautions: Standard and Fall    SUBJECTIVE     Pt reports: Patient had a fall getting out of his car in the clinic parking lot.  This therapist did not witness fall, but was notified by therapist and tech who helped patient up from floor. Patient reports he feels ok, he has minor scraps on his knee that are bandaged.   He was semi compliant with home exercise program.  Response to previous treatment: no problems  Functional change: ongoing    Pain: 0/10  Location: N/A      OBJECTIVE   5 times sit to stand: 16 sec with upper extremity assist  TU sec with Ustep; 2nd trial 25 sec with Ustep  MMT: right hip flexion 4+/5 ; left hip flexion 4+/5    Hip ext: grossly 4/5 bilateral      Treatment     Haja received the treatments listed below:      neuromuscular re-education activities to improve: Balance, Coordination, Kinesthetic, Sense, Proprioception, and Posture for 45 minutes. The following activities were included:      LSVT Maximal Daily Exercises to promote amplitude: (10 repetitions each)   Daily Exercises   Performance  Comments/Modifications   1  Floor<>ceiling  good Added upper extremity bicep curl, 1.5# wrist weight   2  Side<>side  good Cues for open,  hand 1.5# wrist weight, Alternating sides   3  Step & reach  (forward)   Fair-good Standing with 1 UE support, stepping over orange hurlde    4  Step & reach (sideways)   Fair-good Standing with 1 UE support, stepping over  orange hurlde   5  Step & reach  (backwards)   Good Standing with 1 UE support, orange hurlde, cues for coordination,    6  Rock & reach  (foward)   Fair Standing with 1 UE support on the stool.  Difficulty with coordination    7  Rock & reach   (sideways twist)   fair Standing with 1 UE support, difficulty with coordinating arms,       Sit to/from stand x 10 trials cues for posture - CGA needed to prevent posterior lean back   Bridge x 10     Time above includes objective measures      Patient Education and Home Exercises     Home Exercises Provided and Patient Education Provided     Education provided:   - HEP, 4S's for when festinating gait occurs, BIG scheduling  Educated on importance of maintaining social appointments to get out of the house, increased cognitive task  Discussed and given handout for community programs for PD also educated his wife.        Written Home Exercises Provided: YES. Max daily provided 12/6/22.   Exercises were reviewed and Haja was able to demonstrate them prior to the end of the session.  Haja demonstrated good  understanding of the education provided. See EMR under Patient Instructions for exercises provided during therapy sessions    ASSESSMENT     Haja tolerated his session well. His HEP is limited mostly by impaired cognition, requires cues for progression of exercises.  He and his wife were educated on community exercise programs to continue with BIG exercises outside of clinic.  He has made improvements on his 5 times sit to stand indicating improved functional strength and scored 25 sec on the TUG indicating improved gait speed and balance.  He continues to demonstrate shuffling during ambulation with turns and cognitive dual task.  He has  complete the LSVT BIG program and will continue with community program to maintain mobility. Will discharge Physical Therapy at this time.       Pt's spiritual, cultural and educational needs considered and pt agreeable to plan of care and goals.     Anticipated barriers to physical therapy: cognition    Goals:  Short Term Goals: 4 weeks    Patient to perform HEP with min A MET 1/4/22 (requires assist)  Patient able to score less than or equal to 18 sec on the 5 times sit to stand for improved functional strength MET 1/4/23  Patient to score less than or equal to 60 sec on the TUG for decreased fall risk MET 1/4/23  Patient able to ambulate greater than 250ft mod I with rolling walker, with 50% increased bilateral foot clearance and less than 3 episodes of freezing Met 1/4/23     Long Term Goals: 8 weeks   Patient to perform HEP Independent Progressing 1/4/23 (able to perform seated exercises)  Patient able to score less than or equal to 45 sec on the TUG for decreased fall risk. MET 1/4/23  Patient to score improve FOTO score by 5% for improved functional mobility.  MET 1/4/23  Patient able to ambulate greater than 500ft mod Independent with rolling walker with 50% improved bilateral foot clearance, less than 2 episodes of freezing, no loss of balance. Progressing 1/4/23  Patient to improved bilateral hip strength to 4-/5 for improved upright posture. MET 1/4/23    PLAN     Discharge Physical Therapy       Shana Frost, PT

## 2023-01-09 ENCOUNTER — OFFICE VISIT (OUTPATIENT)
Dept: PSYCHIATRY | Facility: CLINIC | Age: 78
End: 2023-01-09
Payer: MEDICARE

## 2023-01-09 DIAGNOSIS — F32.A DEPRESSION, UNSPECIFIED DEPRESSION TYPE: ICD-10-CM

## 2023-01-09 DIAGNOSIS — F41.1 GAD (GENERALIZED ANXIETY DISORDER): Primary | ICD-10-CM

## 2023-01-09 PROCEDURE — 90834 PR PSYCHOTHERAPY W/PATIENT, 45 MIN: ICD-10-PCS | Mod: 95,,, | Performed by: SOCIAL WORKER

## 2023-01-09 PROCEDURE — 90834 PSYTX W PT 45 MINUTES: CPT | Mod: 95,,, | Performed by: SOCIAL WORKER

## 2023-01-09 NOTE — PROGRESS NOTES
Individual Psychotherapy (PhD/LCSW)    1/9/2023    Site:  Telemed         Therapeutic Intervention: Met with patient and spouse.  Outpatient - Insight oriented psychotherapy 45 min - CPT code 63011    Chief complaint/reason for encounter: depression and anxiety     Interval history and content of current session:      Site:    Telehealth          Length of service: 45         Therapeutic intervention:     Persons present: spouse and pt  [place] (needed to help with interpretation of intervention due to pt cognitive decline due to dementia).    Interval history:      The patient location is: home [place].   The chief complaint leading to consultation is: anxiety/depression  Visit type: audio  Total time spent with patient: 45 min  Each patient to whom he or she provides medical services by telemedicine is:  (1) informed of the relationship between the physician and patient and the respective role of any other health care provider with respect to management of the patient; and (2) notified that he or she may decline to receive medical services by telemedicine and may withdraw from such care at any time.       Notes:   We examined his beliefs about worries and how to make a decision as to when to let go of worries.   Wife was upset because he refused to go to her to the [facility] celebration.    This was processed in the session.                 Target symptoms: depression, anxiety        Progress toward goals: progressing well    Diagnosis: generalized anxiety disorder       Treatment plan:  Target symptoms: anxiety   Why chosen therapy is appropriate versus another modality: relevant to diagnosis, evidence based practice  Outcome monitoring methods: self-report, observation, feedback from family  Therapeutic intervention type: behavior modifying psychotherapy    Risk parameters:  Patient reports no suicidal ideation  Patient reports no homicidal ideation  Patient reports no self-injurious behavior  Patient  reports no violent behavior        Verbal deficits: None           Patient's response to intervention:   The patient's response to intervention is accepting, motivated.              Progress toward goals and other mental status changes:  The patient's progress toward goals is fair .    Diagnosis:        ICD-10-CM ICD-9-CM   1. Generalized anxiety disorder F41.1 300.02       Plan:  individual psychotherapy and family psychotherapy    Return to clinic: as scheduled    Length of Service (minutes): 45

## 2023-01-13 ENCOUNTER — DOCUMENTATION ONLY (OUTPATIENT)
Dept: REHABILITATION | Facility: HOSPITAL | Age: 78
End: 2023-01-13
Payer: MEDICARE

## 2023-01-13 NOTE — PROGRESS NOTES
Date: 1/13/2023    Name: Haja Marti  MRN: [id number]     Patient screened and deemed appropriate for wellness program at this time. See screening form in media section. See waiver also in media section.     Haja attended [employer] wellness class today. This is the 1st class that the pt has attended.      The class participated in the following:  Maximal Daily Exercises  Functional tasks x 3 (Sit to Stands, Car Legs - seated, Forward/Back Step)  BIG walking (Relay Race - walking ~40 ft with pivot in between)   Hierarchy Tasks - Shuffleboard and Bowling x 10 minutes     Therapist's Name:  Mindy Mcgarry, PT, DPT  1/13/2023

## 2023-01-20 ENCOUNTER — DOCUMENTATION ONLY (OUTPATIENT)
Dept: REHABILITATION | Facility: HOSPITAL | Age: 78
End: 2023-01-20
Payer: MEDICARE

## 2023-01-20 NOTE — PROGRESS NOTES
Date: 1/13/23    Name: Haja Marti   MRN: [id number]      Haja Marti attended [facility] [employer] wellness class today. This is the 1st class that the pt has attended.      The class participated in the following:   sustained phonation with a model x 10   ascending pitch glides with a model x 10   descending pitch glides with a model x 10   10 functional phrases with a model x 2  5 loud catch phrases with a model x 2  conversation activity:   Abram Roper Questions  Abram Roper Conversations    Therapist's Name:   Jessica Carreno CCC-SLP         
External genitalia is normal.

## 2023-01-20 NOTE — PROGRESS NOTES
Date: 1/13/23    Name: Haja Marti   MRN: [id number]      Haja Marti attended [facility] wellness class today. This is the 2nd class that the pt has attended.      The class participated in the following:   sustained phonation with a model x 10   ascending pitch glides with a model x 10   descending pitch glides with a model x 10   10 functional phrases with a model x 2  5 loud catch phrases with a model x 2  conversation activity:   Interview questions in break out groups with music in background  Reading loudly about word finding strategies    Therapist's Name:   Jessica Carreno, MIKE-SLP

## 2023-01-23 ENCOUNTER — DOCUMENTATION ONLY (OUTPATIENT)
Dept: REHABILITATION | Facility: HOSPITAL | Age: 78
End: 2023-01-23
Payer: MEDICARE

## 2023-01-23 NOTE — PROGRESS NOTES
Date: 1/20/2023    Name: Haja Marti  MRN: [id number]     Patient screened and deemed appropriate for wellness program at this time. See screening form in media section.     Haja attended [employer] wellness class today. This is the 2nd class that the pt has attended.      The class participated in the following:  Maximal Daily Exercises  Functional tasks x 3 (sit to stand, car legs, pushing open door)  BIG walking (Walking in [place], switching directions by call out from therapist)   Hierarchy Tasks (Dance Cardio x 3 minutes, [place] overhead in seated/standing)  Cool Down - Stretching (hamstring stretch B seated, gastroc stretch standing, posterior shoulder stretch B)    Therapist's Name:  Mindy Mcgarry, PT, DPT

## 2023-01-27 ENCOUNTER — DOCUMENTATION ONLY (OUTPATIENT)
Dept: REHABILITATION | Facility: HOSPITAL | Age: 78
End: 2023-01-27
Payer: MEDICARE

## 2023-01-27 NOTE — PROGRESS NOTES
Date: 1/27/2023    Name: Haja Marti  MRN: [id number]     Patient screened and deemed appropriate for wellness program at this time. See screening form in media section.     Haja attended [employer] wellness class today. This is the 3rd class that the pt has attended.      The class participated in the following:  Maximal Daily Exercises  Functional Benchmark test (30 second sit to stand: 8 seconds)  Circuit:  Med Ball roll 5# 2x 10  Side stepping x 3 laps  Rope Pull x 3  4. Cool Down - Stretching   A. Hamstring stretch B  B. Thoracic windmill x 5 ea side  C. Posterior capsule stretch and shoulder circles     Therapist's Name:  Mindy Mcgarry, PT, DPT

## 2023-01-27 NOTE — PROGRESS NOTES
Date: 1/27/2023     Name: Haja Marti   MRN: [id number]      Haja Marti attended [facility] wellness class today. This is the 2nd class that the pt has attended.      The class participated in the following:   sustained phonation with a model x 10   ascending pitch glides with a model x 10   descending pitch glides with a model x 10   10 functional phrases with a model x 2  5 loud catch phrases with a model x 2  conversation activity:   LOUD telephone with loud background noise  Guess that celebrity    Therapist's Name:   Jessica Carreno CCC-SLP

## 2023-01-30 NOTE — PROGRESS NOTES
Name: Haja Marti  MRN: [id number]   [facility]: [id number]      Date: 02/02/2023    Chief Complaint: LBD follow-up     History of Present Illness (HPI):    Mr. Marti is a 76 year old man who is new to me however is known to both [facility] and Dr. Hidalgo. 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 His symptom onset was approximately 7 years ago when he began having some walking difficulties and was found to not swing his arms bilaterally. He saw a general neurologist at that time who suspected parkinsonism but had concerns for LBD given some cognitive difficulties he was experiencing at that time. Initially it was his movement that was more of an issue than his cognition. He would shuffle his steps, freeze and displayed a stoop posture. The freezing continues to lead to rather frequent falls as he only has a lightweight rolling walker at this time for ambulation. He used to be a very active man and enjoyed exercise and participating in boxing however now given his worsening mobility he does not feel he is able to do those activities out of fear of falling. He denies any tremor. As for his cognition, morning and night times are the worst. He has regular hallucinations however they are not overly bothersome to him as they are not frightening. His mentation seems to fluctuate throughout the day and he does have periods of lucidity. He does take both Hydroxyzine and Topamax which he was told to hold out of concern of it affecting his cognition. With holding these medications he did not feel there was an improvement so he continues to use them. He only uses hydroxyzine once a day when he wakes up as he describes a sensation of anxiety. He is especially confused in the middle of the night upon waking and sometimes cannot locate the restroom within his home. Today his biggest complaint is his inability to walk more so than his cognition.     Interval History:  At the  "last visit Mr. Marti wanted to focus on his movements rather than cognition.  We discussed that increasing his Sinemet dosing could potentially worsen his hallucinations but he was willing to take this risk.  We increased his Sinemet gradually adding 1/2 tablet to the morning dose and then either the evening or afternoon dose if tolerated.  I also asked him to avoid taking hydroxyzine for anxiety and instead take his 1st dosage of Sinemet. Since making these changes his wife reports some mild improvement in his mobility but he continues to have good and bad days. He has also obtained a USTEP walker with a laser light which has been very helpful. He doesn't use the walker as consistently as his wife and I would like as he doesn't like to use it in public. He finished BIG therapy and is scheduled to begin a 1x/week BIG/LOUD combo program in [place]. He is having some difficulties with staying asleep even with the use of 10mg melatonin. He and his wife do not share a bed although his wife can hear him talking in his sleep frequently. He is also having some confusion in the late evening.     Current Mvmt Medications:  Sinemet 25/100 - 1.5 tab at 9am and 7pm and 1 tab at 2pm -- states he can tell a difference in his mobility with 1.5 vs 1 tablet     Prior Mvmt Medication Trials:  Donepezil - did not tolerate per LUZ Shrestha  Hydroxzine    Nonmotor/Premotor ROS:  Smell/Taste: no issues  Voice/Swallowing:  no issues   Stridor/SOB: none  Exercise: not exercising regularly   Dizziness: none  Hydration: doing ok   Urinary Issues: none  Constipation: takes OTC medications that control this   Sleep/RBD: sleeping ok but likely still having RBD behavior, wife not in same room - unclear how much he is awakened by this. He has clonazepam but has not been using it.  Hallucinations/Peripheral Illusions: yes, regularly however they are not bothersome to him -- "his friends"  Visual Change: none  Memory/Cognition/Language: " some issues with language comprehension at times, frequently disoriented in the evening hours  Mood: stable    Past Medical History: The patient  has a past medical history of Anxiety, Arthritis, BPH (benign prostatic hypertrophy), Cataract, Colon polyp (2002), Depression, Epiretinal membrane, both eyes, psychiatric care, Hyperlipidemia, Hypertension, Kidney stones, Kidney stones, Parkinsons, Posterior vitreous detachment of left eye, Psychiatric problem, Retinal detachment (2004), Retinoschisis, left eye, Sleep apnea, and Vitreous hemorrhage of left eye.    Social History: The patient  reports that he has never smoked. He has never used smokeless tobacco. He reports that he does not drink alcohol and does not use drugs.    Family History: Their family history includes Anxiety disorder in his daughter and son; Cancer in his father and mother; Cataracts in his father; Depression in his son; Diabetes in his father.    Allergies: Patient has no known allergies.     Meds:   Current Outpatient Medications on File Prior to Visit   Medication Sig Dispense Refill    atorvastatin (LIPITOR) 40 MG tablet TAKE 1 TABLET EVERY DAY 90 tablet 0    carbidopa-levodopa  mg (SINEMET)  mg per tablet TAKE 1 QID (Patient taking differently: Take 1 tablet by mouth 3 (three) times daily.) 360 tablet 3    celecoxib (CELEBREX) 100 MG capsule Take 100 mg by mouth 2 (two) times daily.      clonazePAM (KLONOPIN) 0.5 MG tablet TAKE 1/2 TO 1 TABLET BY MOUTH UP TO 3 TIMES DAILY AS NEEDED FOR ANXIETY 90 tablet 5    ergocalciferol (ERGOCALCIFEROL) 50,000 unit Cap TAKE 1 CAPSULE (50,000 UNITS TOTAL) BY MOUTH EVERY 7 DAYS. 12 capsule 1    finasteride (PROSCAR) 5 mg tablet Take 1 tablet (5 mg total) by mouth once daily. 90 tablet 3    folic acid (FOLVITE) 1 MG tablet TAKE 1 TABLET EVERY DAY 90 tablet 0    hydrOXYzine HCL (ATARAX) 10 MG Tab TAKE 1 TABLET BY MOUTH 3 TIMES A DAY AS NEEDED ANXIETY 270 tablet 1    ibuprofen (ADVIL,MOTRIN) 200 MG  "tablet Take 200 mg by mouth every 6 (six) hours as needed.      ketoconazole (NIZORAL) 2 % cream Apply topically 2 (two) times daily as needed.      losartan (COZAAR) 25 MG tablet Take 1 tablet (25 mg total) by mouth once daily. 90 tablet 1    magnesium oxide (MAG-OX) 400 mg tablet Take 400 mg by mouth once daily.      meloxicam (MOBIC) 7.5 MG tablet TAKE 1 TABLET EVERY DAY 90 tablet 0    potassium citrate (UROCIT-K) 10 mEq (1,080 mg) TbSR TAKE 1 TABLET THREE TIMES DAILY WITH MEALS (Patient taking differently: Take 10 mEq by mouth once daily.) 270 tablet 3    pyridoxine, vitamin B6, (B-6) 50 MG Tab Take 50 mg by mouth once daily.      sertraline (ZOLOFT) 100 MG tablet Take 2 tablets (200 mg total) by mouth once daily. 180 tablet 3    sodium bicarbonate 650 MG tablet Take 1 tablet (650 mg total) by mouth once daily. 90 tablet 3    tamsulosin (FLOMAX) 0.4 mg Cap TAKE 1 CAPSULE EVERY DAY 90 capsule 0    suvorexant (BELSOMRA) 5 mg Tab Take 1 tablet by mouth every evening. (Patient not taking: Reported on 2/2/2023) 30 tablet 3     Current Facility-Administered Medications on File Prior to Visit   Medication Dose Route Frequency Provider Last Rate Last Admin    0.9%  NaCl infusion   Intravenous Continuous Lizz Lowry NP 70 mL/hr at 11/12/21 1227 New Bag at 11/12/21 1227    mupirocin 2 % ointment   Nasal On Call Procedure Lizz Lowry NP   Given at 11/12/21 1227    tamsulosin 24 hr capsule 0.4 mg  0.4 mg Oral Daily Mike Mtz MD           Exam:  /62 (BP Location: Right arm, Patient Position: Sitting, BP Method: Medium (Manual))   Pulse 60   Resp 12   Ht 5' 10" (1.778 m)   Wt 90.5 kg (199 lb 6.5 oz)   BMI 28.61 kg/m²     Constitutional  Well-developed, well-nourished, appears stated age   Cardiovascular  No LE edema bilaterally   Neurological    * Mental status  MOCA = not done during today's visit     - Orientation  Oriented to conversation     - Memory   Intact recent and remote     - " Attention/concentration  Attentive, vigilant during exam     - Language  Intact to conversation.     - Fund of knowledge  Aware of current events     - Executive  Well-organized thoughts     - Other     * Cranial nerves       - CN II  Pupils equal, visual fields full to confrontation     - CN III, IV, VI  Extraocular movements full, normal pursuits and saccades     - CN V  Sensation V1 - V3 intact     - CN VII  Face strong and symmetric bilaterally     - CN VIII  Hearing intact bilaterally     - CN IX, X  Palate raises midline and symmetric     - CN XI  SCM and trapezius 5/5 bilaterally     - CN XII  Tongue midline   * Motor  Muscle bulk normal, strength 5/5 throughout   * Sensory   Intact to temperature and vibration throughout   * Coordination  No dysmetria with finger-to-nose or heel-to-shin   * Gait  See below.   * Deep tendon reflexes  2+ and symmetric throughout   Babinski downgoing bilaterally   * Specialized movement exam  Moderately hypophonic speech.    Facial masking with reduced blink.   Increased tone L>R in the upper and lower extremities however better than prior.    Some mild global bradykinesia.    No tremor with rest, posture, kinesis, or intention.    No other dystonia, chorea, athetosis, myoclonus, or tics.   No motor impersistence.   Walking is slow but without freezing or festination today. Posture is stooped. There is reduced arm swing bilaterally (L>R).          Medical Record Review:  Labs, imaging and prior notes reviewed independently.     MRI Brain 2017:  MRI is performed with routine sequences.  No mass, hemorrhage, infarction, subdural collection, hydrocephalus or other acute finding is seen on this cranial MR examination.  There is volume loss consistent with the patient's age.  No diffusion abnormality is seen.    Diagnoses:          1. Lewy body Parkinson's disease        2. REM sleep behavior disorder        3. Abnormal gait              Assessment:  Mr. Marti is a 76 year old RH  man who carries a diagnosis of LBD by neuropsychological testing and displays a significant amount of parkinsonism primarily affecting his gait. At his last visit we focused on improving his mobility by gradually increasing his levodopa. This, along with BIG therapy and USTEP, has helped his mobility some without significantly worsening his hallucinations. He continues to have confusion primarily in the evening hours and he is not sleeping well due to inability to stay asleep. It is possible that his active dreams are causing him to wake. We have agreed to the following plan today:     Plan:  - First I suggested he try 1/2 of his Clonazepam tablet at bedtime in hopes of reducing his RBD symptoms and allowing for more consolidated sleep. Better sleep could in turn help his cognition and possibly his mobility.   - After 1-2 weeks of the above change I suggested he increase Sinemet 25/100 to 1.5 tabs TID (increase mid-day dose from 1 to 1.5 tabs). Should this worsen his hallucinations I recommended returning to prior dosing as stated in the HPI. If hallucinations worsen we can consider trying donepezil again vs exelon, seroquel or nuplazid.   - Continue therapy with plans to do BIG/LOUD combo class.   - Encouraged regular use of walker to avoid falls.     RTC in 2 months to see me.     Total time: 41 minutes spent on the encounter, which includes face to face time and non-face to face time preparing to see the patient (eg, review of tests), Obtaining and/or reviewing separately obtained history, Documenting clinical information in the electronic or other health record, Independently interpreting results (not separately reported) and communicating results to the patient/family/caregiver, or Care coordination (not separately reported).     Mag Mcmahon MD  Division of Movement and Memory Disorders  [facility]  [phone]

## 2023-02-02 ENCOUNTER — OFFICE VISIT (OUTPATIENT)
Dept: NEUROLOGY | Facility: CLINIC | Age: 78
End: 2023-02-02
Payer: MEDICARE

## 2023-02-02 VITALS
HEIGHT: 70 IN | DIASTOLIC BLOOD PRESSURE: 62 MMHG | BODY MASS INDEX: 28.55 KG/M2 | RESPIRATION RATE: 12 BRPM | SYSTOLIC BLOOD PRESSURE: 134 MMHG | WEIGHT: 199.44 LBS | HEART RATE: 60 BPM

## 2023-02-02 DIAGNOSIS — G20.A1 PARKINSON'S DISEASE: ICD-10-CM

## 2023-02-02 DIAGNOSIS — F02.80 LEWY BODY PARKINSON'S DISEASE: Primary | ICD-10-CM

## 2023-02-02 DIAGNOSIS — G31.83 LEWY BODY PARKINSON'S DISEASE: Primary | ICD-10-CM

## 2023-02-02 DIAGNOSIS — I70.0 AORTIC ATHEROSCLEROSIS: ICD-10-CM

## 2023-02-02 DIAGNOSIS — R26.9 ABNORMAL GAIT: ICD-10-CM

## 2023-02-02 DIAGNOSIS — G31.83 LEWY BODY DEMENTIA WITH OTHER BEHAVIORAL DISTURBANCE, UNSPECIFIED DEMENTIA SEVERITY: ICD-10-CM

## 2023-02-02 DIAGNOSIS — F02.818 LEWY BODY DEMENTIA WITH OTHER BEHAVIORAL DISTURBANCE, UNSPECIFIED DEMENTIA SEVERITY: ICD-10-CM

## 2023-02-02 DIAGNOSIS — G47.52 REM SLEEP BEHAVIOR DISORDER: ICD-10-CM

## 2023-02-02 PROCEDURE — 1126F AMNT PAIN NOTED NONE PRSNT: CPT | Mod: HCNC,CPTII,S$GLB, | Performed by: STUDENT IN AN ORGANIZED HEALTH CARE EDUCATION/TRAINING PROGRAM

## 2023-02-02 PROCEDURE — 99999 PR PBB SHADOW E&M-EST. PATIENT-LVL IV: ICD-10-PCS | Mod: PBBFAC,HCNC,, | Performed by: STUDENT IN AN ORGANIZED HEALTH CARE EDUCATION/TRAINING PROGRAM

## 2023-02-02 PROCEDURE — 1100F PR PT FALLS ASSESS DOC 2+ FALLS/FALL W/INJURY/YR: ICD-10-PCS | Mod: HCNC,CPTII,S$GLB, | Performed by: STUDENT IN AN ORGANIZED HEALTH CARE EDUCATION/TRAINING PROGRAM

## 2023-02-02 PROCEDURE — 99215 OFFICE O/P EST HI 40 MIN: CPT | Mod: HCNC,S$GLB,, | Performed by: STUDENT IN AN ORGANIZED HEALTH CARE EDUCATION/TRAINING PROGRAM

## 2023-02-02 PROCEDURE — 99999 PR PBB SHADOW E&M-EST. PATIENT-LVL IV: CPT | Mod: PBBFAC,HCNC,, | Performed by: STUDENT IN AN ORGANIZED HEALTH CARE EDUCATION/TRAINING PROGRAM

## 2023-02-02 PROCEDURE — 1159F PR MEDICATION LIST DOCUMENTED IN MEDICAL RECORD: ICD-10-PCS | Mod: HCNC,CPTII,S$GLB, | Performed by: STUDENT IN AN ORGANIZED HEALTH CARE EDUCATION/TRAINING PROGRAM

## 2023-02-02 PROCEDURE — 1100F PTFALLS ASSESS-DOCD GE2>/YR: CPT | Mod: HCNC,CPTII,S$GLB, | Performed by: STUDENT IN AN ORGANIZED HEALTH CARE EDUCATION/TRAINING PROGRAM

## 2023-02-02 PROCEDURE — 3288F PR FALLS RISK ASSESSMENT DOCUMENTED: ICD-10-PCS | Mod: HCNC,CPTII,S$GLB, | Performed by: STUDENT IN AN ORGANIZED HEALTH CARE EDUCATION/TRAINING PROGRAM

## 2023-02-02 PROCEDURE — 3075F PR MOST RECENT SYSTOLIC BLOOD PRESS GE 130-139MM HG: ICD-10-PCS | Mod: HCNC,CPTII,S$GLB, | Performed by: STUDENT IN AN ORGANIZED HEALTH CARE EDUCATION/TRAINING PROGRAM

## 2023-02-02 PROCEDURE — 3288F FALL RISK ASSESSMENT DOCD: CPT | Mod: HCNC,CPTII,S$GLB, | Performed by: STUDENT IN AN ORGANIZED HEALTH CARE EDUCATION/TRAINING PROGRAM

## 2023-02-02 PROCEDURE — 1159F MED LIST DOCD IN RCRD: CPT | Mod: HCNC,CPTII,S$GLB, | Performed by: STUDENT IN AN ORGANIZED HEALTH CARE EDUCATION/TRAINING PROGRAM

## 2023-02-02 PROCEDURE — 99215 PR OFFICE/OUTPT VISIT, EST, LEVL V, 40-54 MIN: ICD-10-PCS | Mod: HCNC,S$GLB,, | Performed by: STUDENT IN AN ORGANIZED HEALTH CARE EDUCATION/TRAINING PROGRAM

## 2023-02-02 PROCEDURE — 3078F PR MOST RECENT DIASTOLIC BLOOD PRESSURE < 80 MM HG: ICD-10-PCS | Mod: HCNC,CPTII,S$GLB, | Performed by: STUDENT IN AN ORGANIZED HEALTH CARE EDUCATION/TRAINING PROGRAM

## 2023-02-02 PROCEDURE — 3075F SYST BP GE 130 - 139MM HG: CPT | Mod: HCNC,CPTII,S$GLB, | Performed by: STUDENT IN AN ORGANIZED HEALTH CARE EDUCATION/TRAINING PROGRAM

## 2023-02-02 PROCEDURE — 3078F DIAST BP <80 MM HG: CPT | Mod: HCNC,CPTII,S$GLB, | Performed by: STUDENT IN AN ORGANIZED HEALTH CARE EDUCATION/TRAINING PROGRAM

## 2023-02-02 PROCEDURE — 1126F PR PAIN SEVERITY QUANTIFIED, NO PAIN PRESENT: ICD-10-PCS | Mod: HCNC,CPTII,S$GLB, | Performed by: STUDENT IN AN ORGANIZED HEALTH CARE EDUCATION/TRAINING PROGRAM

## 2023-02-02 RX ORDER — IBUPROFEN 200 MG
200 TABLET ORAL EVERY 6 HOURS PRN
COMMUNITY
End: 2023-04-27

## 2023-02-02 RX ORDER — KETOCONAZOLE 20 MG/G
CREAM TOPICAL 2 TIMES DAILY PRN
COMMUNITY
Start: 2023-01-11 | End: 2023-04-27

## 2023-02-02 NOTE — PATIENT INSTRUCTIONS
Plan:    - Use walker more consistently.   - 1st try taking 1/2 tablet of clonazepam at bedtime for a few days to see if his sleeping is improved and if his walking is better.   - Then can try 1.5 tablets of Sinemet three times per day for better movement

## 2023-02-03 ENCOUNTER — DOCUMENTATION ONLY (OUTPATIENT)
Dept: REHABILITATION | Facility: HOSPITAL | Age: 78
End: 2023-02-03
Payer: MEDICARE

## 2023-02-03 NOTE — PROGRESS NOTES
Date: 2/3/2023    Name: Haja Marti  MRN: [id number]     Patient screened and deemed appropriate for wellness program at this time. See screening form in media section.     Haja attended [employer] wellness class today.     The class participated in the following:    Maximal Daily Exercises  Functional Tasks  Seated - Arms/Core - Marching in seated between ea activity  Arm Circles - 1 minute forward/backward  Big Wave - 30 seconds ea arm  Seated Twists - 1 minute   Sit to Stands x 30 seconds - 2x  Reach for ceiling and floor - 30 seconds  Hierarchy Tasks  Bead Catching - one participant throwing beads and another participant catching with box    Therapist's Name:  Mindy Mcgarry, PT, DPT

## 2023-02-03 NOTE — PROGRESS NOTES
Date: 2/3/2023     Name: Haja Marti   MRN: [id number]      Haja Marti attended [employer] [facility] [employer] wellness class today. This is the 3rd class that the pt has attended.      The class participated in the following:   sustained phonation with a model x 10   ascending pitch glides with a model x 10   descending pitch glides with a model x 10   10 functional phrases with a model x 2  5 loud catch phrases with a model x 2  conversation activity:   Party planning loudly (menu and decorations)    Therapist's Name:   Jessica Carreno CCC-SLP

## 2023-02-09 DIAGNOSIS — Z00.00 ENCOUNTER FOR MEDICARE ANNUAL WELLNESS EXAM: ICD-10-CM

## 2023-02-10 ENCOUNTER — DOCUMENTATION ONLY (OUTPATIENT)
Dept: REHABILITATION | Facility: HOSPITAL | Age: 78
End: 2023-02-10
Payer: MEDICARE

## 2023-02-10 NOTE — PROGRESS NOTES
Date: 2/10/2023    Name: Haja Marti   MRN: [id number]     Patient screened and deemed appropriate for wellness program at this time. See screening form in media section.     Haja attended [employer] wellness class today.     The class participated in the following:    Maximal Daily Exercises  Functional Tasks - group  Sit to Stands x 5   Ankle Taps x 5 each - Ankle Knee Taps x 5 each (seated or standing)  Break Out  Functional Tasks  Cup Stacking on chair in front - pyramid  Cup pass with small ball on top   Ball pass- big overhead  Gait/Cognitive  Stepping lateral/anterior/posterior with color call out from therapist  BIG walking  Laps of 10 feet x 4  Seated exercise while waiting (GABRIELLA newell)    Therapist's Name:  Mindy Mcgarry, PT, DPT

## 2023-02-10 NOTE — PROGRESS NOTES
Date: 2/10/2023     Name: Haja Marti   MRN: [id number]      Haja Marti attended [employer] wellness class today. This is the 4th class that the pt has attended.      The class participated in the following:   sustained phonation with a model x 10   ascending pitch glides with a model x 10   descending pitch glides with a model x 10   10 functional phrases with a model x 2  5 loud catch phrases with a model x 2  conversation activity:   Amaris Reyes Speeches    Therapist's Name:   Jessica Carreno CCC-SLP

## 2023-02-13 ENCOUNTER — TELEPHONE (OUTPATIENT)
Dept: NEUROLOGY | Facility: CLINIC | Age: 78
End: 2023-02-13
Payer: MEDICARE

## 2023-02-13 NOTE — TELEPHONE ENCOUNTER
----- Message from Beatriz Flores sent at 2023  1:09 PM CST -----  Contact: mimi/spouse  Haja Marti  MRN: [id number]  : 1945  PCP: Andrez Jackson  Home Phone      [phone]  Work Phone      Not on file.  [employer]          [phone]      MESSAGE: asking for a nurse to return her call have questions.          Phone [phone]

## 2023-02-15 ENCOUNTER — TELEPHONE (OUTPATIENT)
Dept: PHYSICAL MEDICINE AND REHAB | Facility: CLINIC | Age: 78
End: 2023-02-15
Payer: MEDICARE

## 2023-02-15 ENCOUNTER — TELEPHONE (OUTPATIENT)
Dept: NEUROLOGY | Facility: CLINIC | Age: 78
End: 2023-02-15
Payer: MEDICARE

## 2023-02-15 RX ORDER — QUETIAPINE FUMARATE 25 MG/1
25 TABLET, FILM COATED ORAL NIGHTLY
Qty: 30 TABLET | Refills: 0 | Status: SHIPPED | OUTPATIENT
Start: 2023-02-15 | End: 2023-02-16

## 2023-02-15 NOTE — TELEPHONE ENCOUNTER
I spoke with mr. August wife via phone. He is increasingly confused and having hallucinations. I asked that they stop the clonazepam 0.25mg which was just started and instead begin low dose Seroquel 12.5mg at night with the option to increase to 25mg if tolerating. I discussed that it is possible this may worsen his mobility. She states there is no evidence of infection to explain his worsening.

## 2023-02-15 NOTE — TELEPHONE ENCOUNTER
----- Message from Syeda Curry sent at 2/15/2023  4:05 PM CST -----  Regarding: call back  Contact: [phone]  Caller, Pt wife returning a missed call. Please call to discuss further.    
bilateral silicone breast implants

## 2023-02-15 NOTE — TELEPHONE ENCOUNTER
----- Message from Beatriz Flores sent at 2/15/2023 12:23 PM CST -----  Contact: julio/spouse  Haja Marti  MRN: [id number]  : 1945  PCP: Andrez Jackson  Home Phone      [phone]  Work Phone      Not on file.  [employer]          [phone]      MESSAGE: Have questions about his medication and his health is declining states she have left several messages and no one returned her called.    Phone  [phone]

## 2023-02-24 ENCOUNTER — DOCUMENTATION ONLY (OUTPATIENT)
Dept: REHABILITATION | Facility: HOSPITAL | Age: 78
End: 2023-02-24
Payer: MEDICARE

## 2023-02-24 NOTE — PROGRESS NOTES
Date: 2/24/2023     Name: Haja Marti   MRN: [id number]      Haja Marti attended [facility] [employer] wellness class today. This is the 5th class that the pt has attended.      The class participated in the following:   sustained phonation with a model x 10  ascending pitch glides with a model x 10  descending pitch glides with a model x 10  10 functional phrases with a model x 3  5 loud catch phrases with a model x 3 - DID NOT DO  conversation activity: identify problems and solutions to hypothetical situations, listed 3 items in categories, and named a fun fact about self.     Therapist's Name:   JAYMIE Herzog, CCC-SLP , CBIS

## 2023-03-03 ENCOUNTER — DOCUMENTATION ONLY (OUTPATIENT)
Dept: REHABILITATION | Facility: HOSPITAL | Age: 78
End: 2023-03-03
Payer: MEDICARE

## 2023-03-03 NOTE — PROGRESS NOTES
"Date: 3/3/2023    Name: Haja Marti  MRN: [id number]     Patient screened and deemed appropriate for wellness program at this time. See screening form in media section.     Haja attended [employer] wellness class today. This is the 6th class that the pt has attended.      The class participated in the following:    Maximal Daily Exercises    2.   Interval Training (2 minute rest between each activity)  A. Punches 45 on/30 off (jabs, cross body, upper cuts) - 3 times each  B. Sit to stands (verbal "lofton" with stand) 30    on/30 off - 2 times each    BIG walking (4 laps, 20 ft with walker and vc for big steps)     Therapist's Name:  Mindy Mcgarry, PT, DPT       "

## 2023-03-08 ENCOUNTER — DOCUMENTATION ONLY (OUTPATIENT)
Dept: REHABILITATION | Facility: HOSPITAL | Age: 78
End: 2023-03-08
Payer: MEDICARE

## 2023-03-08 NOTE — PROGRESS NOTES
Date: 3/3/23    Name: Haja Marti   MRN: [id number]      Haja Marti attended [facility] [employer] wellness class today. This is the 6th class that the pt has attended.      The class participated in the following:   sustained phonation with a model x 10  ascending pitch glides with a model x 10  descending pitch glides with a model x 10  10 functional phrases with a model x 3  5 loud catch phrases with a model x 3   conversation activity:  LOUD improv fast food order  LOUD improv with a partner set up doctor's appointment    Therapist's Name:   Jessica Carreno, CCC-SLP , CBIS

## 2023-03-09 ENCOUNTER — OFFICE VISIT (OUTPATIENT)
Dept: PSYCHIATRY | Facility: CLINIC | Age: 78
End: 2023-03-09
Payer: MEDICARE

## 2023-03-09 DIAGNOSIS — F32.A DEPRESSION, UNSPECIFIED DEPRESSION TYPE: ICD-10-CM

## 2023-03-09 DIAGNOSIS — F41.1 GAD (GENERALIZED ANXIETY DISORDER): Primary | ICD-10-CM

## 2023-03-09 PROCEDURE — 90834 PSYTX W PT 45 MINUTES: CPT | Mod: HCNC,95,, | Performed by: SOCIAL WORKER

## 2023-03-09 PROCEDURE — 90834 PR PSYCHOTHERAPY W/PATIENT, 45 MIN: ICD-10-PCS | Mod: HCNC,95,, | Performed by: SOCIAL WORKER

## 2023-03-09 NOTE — PROGRESS NOTES
"     Individual Psychotherapy (PhD/LCSW)    3/9/2023    Site:  Telemed         Therapeutic Intervention: Met with patient and spouse.  Outpatient - Insight oriented psychotherapy 45 min - CPT code 58636    Chief complaint/reason for encounter: depression and anxiety     Interval history and content of current session:      Site:    Telehealth          Length of service: 45         Therapeutic intervention:     Persons present: spouse and pt  [place] (needed to help with interpretation of intervention due to pt cognitive decline due to dementia).    Interval history:      The patient location is: home [place].   The chief complaint leading to consultation is: anxiety/depression  Visit type: audio  Total time spent with patient: 45 min  Each patient to whom he or she provides medical services by telemedicine is:  (1) informed of the relationship between the physician and patient and the respective role of any other health care provider with respect to management of the patient; and (2) notified that he or she may decline to receive medical services by telemedicine and may withdraw from such care at any time.       Notes:   Encouraged them to try again the movement disorder support group; they are open to this.  They tried a few years back but they were at a different stage in relation to his illness.   He relates an "embarrassing" experience at a restaurant as he was unable to get out of the chair.  People in restaurant were extremely helpful.    He was encouraged to use the walker more consistently.     He complained less about his mood and anxiety.                 Target symptoms: depression, anxiety        Progress toward goals: progressing well    Diagnosis: generalized anxiety disorder       Treatment plan:  Target symptoms: anxiety   Why chosen therapy is appropriate versus another modality: relevant to diagnosis, evidence based practice  Outcome monitoring methods: self-report, observation, feedback from " family  Therapeutic intervention type: behavior modifying psychotherapy    Risk parameters:  Patient reports no suicidal ideation  Patient reports no homicidal ideation  Patient reports no self-injurious behavior  Patient reports no violent behavior        Verbal deficits: None           Patient's response to intervention:   The patient's response to intervention is accepting, motivated.              Progress toward goals and other mental status changes:  The patient's progress toward goals is fair .    Diagnosis:        ICD-10-CM ICD-9-CM   1. Generalized anxiety disorder F41.1 300.02       Plan:  individual psychotherapy and family psychotherapy    Return to clinic: as scheduled    Length of Service (minutes): 45

## 2023-03-15 ENCOUNTER — OFFICE VISIT (OUTPATIENT)
Dept: PSYCHIATRY | Facility: CLINIC | Age: 78
End: 2023-03-15
Payer: MEDICARE

## 2023-03-15 VITALS
SYSTOLIC BLOOD PRESSURE: 154 MMHG | WEIGHT: 199.75 LBS | DIASTOLIC BLOOD PRESSURE: 101 MMHG | BODY MASS INDEX: 28.66 KG/M2 | HEART RATE: 68 BPM

## 2023-03-15 DIAGNOSIS — F32.A DEPRESSION, UNSPECIFIED DEPRESSION TYPE: ICD-10-CM

## 2023-03-15 DIAGNOSIS — F41.1 GAD (GENERALIZED ANXIETY DISORDER): Primary | ICD-10-CM

## 2023-03-15 DIAGNOSIS — F51.04 CHRONIC INSOMNIA: ICD-10-CM

## 2023-03-15 DIAGNOSIS — F41.1 GENERALIZED ANXIETY DISORDER: ICD-10-CM

## 2023-03-15 DIAGNOSIS — G20.C PRIMARY PARKINSONISM: ICD-10-CM

## 2023-03-15 DIAGNOSIS — F29 PSYCHOSIS, UNSPECIFIED PSYCHOSIS TYPE: ICD-10-CM

## 2023-03-15 PROCEDURE — 3077F PR MOST RECENT SYSTOLIC BLOOD PRESSURE >= 140 MM HG: ICD-10-PCS | Mod: HCNC,CPTII,S$GLB, | Performed by: PSYCHIATRY & NEUROLOGY

## 2023-03-15 PROCEDURE — 99999 PR PBB SHADOW E&M-EST. PATIENT-LVL I: CPT | Mod: PBBFAC,HCNC,, | Performed by: PSYCHIATRY & NEUROLOGY

## 2023-03-15 PROCEDURE — 3077F SYST BP >= 140 MM HG: CPT | Mod: HCNC,CPTII,S$GLB, | Performed by: PSYCHIATRY & NEUROLOGY

## 2023-03-15 PROCEDURE — 99999 PR PBB SHADOW E&M-EST. PATIENT-LVL I: ICD-10-PCS | Mod: PBBFAC,HCNC,, | Performed by: PSYCHIATRY & NEUROLOGY

## 2023-03-15 PROCEDURE — 99213 OFFICE O/P EST LOW 20 MIN: CPT | Mod: HCNC,S$GLB,, | Performed by: PSYCHIATRY & NEUROLOGY

## 2023-03-15 PROCEDURE — 3080F PR MOST RECENT DIASTOLIC BLOOD PRESSURE >= 90 MM HG: ICD-10-PCS | Mod: HCNC,CPTII,S$GLB, | Performed by: PSYCHIATRY & NEUROLOGY

## 2023-03-15 PROCEDURE — 99213 PR OFFICE/OUTPT VISIT, EST, LEVL III, 20-29 MIN: ICD-10-PCS | Mod: HCNC,S$GLB,, | Performed by: PSYCHIATRY & NEUROLOGY

## 2023-03-15 PROCEDURE — 3080F DIAST BP >= 90 MM HG: CPT | Mod: HCNC,CPTII,S$GLB, | Performed by: PSYCHIATRY & NEUROLOGY

## 2023-03-15 RX ORDER — SERTRALINE HYDROCHLORIDE 100 MG/1
200 TABLET, FILM COATED ORAL DAILY
Qty: 60 TABLET | Refills: 1 | Status: SHIPPED | OUTPATIENT
Start: 2023-03-15 | End: 2023-04-27 | Stop reason: SDUPTHER

## 2023-03-15 RX ORDER — QUETIAPINE FUMARATE 25 MG/1
TABLET, FILM COATED ORAL
Qty: 90 TABLET | Refills: 1 | Status: SHIPPED | OUTPATIENT
Start: 2023-03-15 | End: 2023-06-07

## 2023-03-15 RX ORDER — ZALEPLON 5 MG/1
5 CAPSULE ORAL NIGHTLY PRN
Qty: 30 CAPSULE | Refills: 3 | Status: SHIPPED | OUTPATIENT
Start: 2023-03-15

## 2023-03-15 RX ORDER — SERTRALINE HYDROCHLORIDE 100 MG/1
200 TABLET, FILM COATED ORAL DAILY
Qty: 180 TABLET | Refills: 3 | Status: SHIPPED | OUTPATIENT
Start: 2023-03-15

## 2023-03-15 NOTE — PROGRESS NOTES
"Outpatient Psychiatry Follow-Up Visit (MD/NP)  3/15/2023    Session Length: 30 minutes (E&M Level 3)    Clinical Status of Patient:  Outpatient (Ambulatory)    Chief Complaint:  Haja Marti is a 77 y.o. male who presents today for follow-up of depression and anxiety.  Met with patient and spouse.      Interval History and Content of Current Session:  Interim Events/Subjective Report/Content of Current Session:  First appt with me since 11/15/2022.    He has had several appts with Mr. Galvan for psychotherapy in the interim.      Wife states things have been "up and down".      He has been taking Seroquel 25 mg 1/2 tablet qhs to help with sleep.    Dr. Mcmahon had Rx this med to him recently.  She has offices at [facility] and in [place].     This med is only partially effective.    He has been sleeping during the day because he is not sleeping at night.    Wife states this has been a problem for a while now.      Wife said the pharmacy NEVER received the Rx for the Belsomra that I had called in soon after his November appt.    She also called to my office and mentioned this; however, I don't recall ever hearing about this problem.    I have a message from his wife and had mentioned this to her in a reply message.  However, the pharmacy told her they never received the message.      Dr. Mcmahon had increased pt's C/L from 1 tab TID to 1.5 tabs TID.  This has helped with his walking, but he has been hallucinating more.      He must use the walker to ambulate safely.  His wife must remind him to always use the walker.    This is a new "high tech" walker that has assisted him with walking.    It has a mechanism that will easily release the brake from the wheels when he wants to walk.    It also has a laser that shows him where to place his feet.    He is still having occasional falls.  Fortunately, no serious injuries.      He has had new onset swelling in his feet.      He still has a lot of rigidity, mostly at night, " "but sometimes during the day.    Often hard to get up and get going.       He has problems at night with both sleep and with need to go to the bathroom to urinate, usually 2 - 3 trips at night.      He still has well-formed hallucinations lately.    He lately sees people, mostly during the day, sometimes at night upon awakening.    He generally cannot recognize people's faces and if they try to speak, it's only mumbling.    He realizes they are not real, so he is not afraid.    He has experienced hypnopompic hallucinations (e.g., "fish in the toilet") for some time now.   No recent delusions were noted by pt or his wife.    He is more confused in the evening hours or at night, much less during the day.    He realizes he becomes confused and has memory lapses.      He requires glasses for reading.    He tries to see the eye doctor at least once a year.       He still has good and bad days in regards to his mood.    His wife thinks his anxiety is worse than the depression.    He worries a lot about things in general.    He likes to watch TV, or reads some.     He has NOT been attending boxing classes lately.    However, he is engaged now in PT sessions 4 days a week.    They started 1 day a week, but this will increase to 4 days/week in Dec.      Today is a good day.  Most days are not this good.    He has a fair amount of anxiety -- generally takes hydroxyzine 10 mg 1 in am and 1 in pm.    He has not been taking Klonopin very often.    He did take it prior to a back procedure -- had injections for chronic pain.    It helped with his BP, which spikes when he is very anxious.      He uses ibuprofen PRN for back pain.      He still has urinary incontinence.    He sees a urologist at [facility].    He had a Urostem device placed.  Wife thinks it has helped him with the incontinence -- he had "no control" before (wife had to clean up urine daily, especially at night).    He has a urinal as well.    They have an appt with the " urologist in the next couple of months.    His wife plans to contact that office about the continued urinary incontinence and if an adjustment is needed.      He stopped driving at the recommendation of Dr. Jenkins after recent neuropsychological testing.      Near end of the session we discussed trial of Belsomra for chronic insomnia.  I mentioned this medication may help with sleep and have less potential for excessive sedation, dizziness and falls compared to LOCO-ergic drugs.   Both expressed interest in pt trying this med.       Psychotherapy:  Target symptoms: depression, adjustment  Why chosen therapy is appropriate versus another modality: relevant to diagnosis, patient responds to this modality  Outcome monitoring methods: self-report, lab data, observation, feedback from family  Therapeutic intervention type: supportive psychotherapy  Topics discussed/themes: stress related to medical comorbidities, life stage transitional issues  The patient's response to the intervention is accepting.   The patient's progress toward treatment goals is fair.   Duration of intervention: 0 minutes.    Review of Systems   PSYCHIATRIC: Pertinant items are noted in the narrative.  CONSTITUTIONAL:  No recent changes in wt.    MUSCULOSKELETAL: No pain or stiffness of the joints.  NEUROLOGIC: + tremors and shuffling gait; No weakness, sensory changes, seizures, memory loss, confusion, or other abnormal movements.  Memory Loss: no  ENDOCRINE: No polydipsia or polyuria.  INTEGUMENTARY: No rashes or lacerations.  EYES: No exophthalmos, jaundice or blindness.  ENT: No dizziness, tinnitus or hearing loss.  RESPIRATORY: No shortness of breath.  CARDIOVASCULAR: No tachycardia or chest pain.  GASTROINTESTINAL: No nausea, vomiting, pain, constipation or diarrhea.  GENITOURINARY: No frequency, dysuria.    The following portions of the patient's history were reviewed and updated as appropriate: allergies, current medications, past family  history, past medical history, past social history, past surgical history and problem list.      Current Outpatient Medications:     atorvastatin (LIPITOR) 40 MG tablet, TAKE 1 TABLET EVERY DAY, Disp: 90 tablet, Rfl: 0    carbidopa-levodopa  mg (SINEMET)  mg per tablet, TAKE 1 QID (Patient taking differently: Take 1 tablet by mouth 3 (three) times daily.), Disp: 360 tablet, Rfl: 3    celecoxib (CELEBREX) 100 MG capsule, Take 100 mg by mouth 2 (two) times daily., Disp: , Rfl:     clonazePAM (KLONOPIN) 0.5 MG tablet, TAKE 1/2 TO 1 TABLET BY MOUTH UP TO 3 TIMES DAILY AS NEEDED FOR ANXIETY, Disp: 90 tablet, Rfl: 5    ergocalciferol (ERGOCALCIFEROL) 50,000 unit Cap, TAKE 1 CAPSULE (50,000 UNITS TOTAL) BY MOUTH EVERY 7 DAYS., Disp: 12 capsule, Rfl: 1    finasteride (PROSCAR) 5 mg tablet, Take 1 tablet (5 mg total) by mouth once daily., Disp: 90 tablet, Rfl: 3    folic acid (FOLVITE) 1 MG tablet, TAKE 1 TABLET EVERY DAY, Disp: 90 tablet, Rfl: 0    hydrOXYzine HCL (ATARAX) 10 MG Tab, TAKE 1 TABLET BY MOUTH 3 TIMES A DAY AS NEEDED ANXIETY, Disp: 270 tablet, Rfl: 1    ibuprofen (ADVIL,MOTRIN) 200 MG tablet, Take 200 mg by mouth every 6 (six) hours as needed., Disp: , Rfl:     ketoconazole (NIZORAL) 2 % cream, Apply topically 2 (two) times daily as needed., Disp: , Rfl:     losartan (COZAAR) 25 MG tablet, Take 1 tablet (25 mg total) by mouth once daily., Disp: 90 tablet, Rfl: 1    magnesium oxide (MAG-OX) 400 mg tablet, Take 400 mg by mouth once daily., Disp: , Rfl:     meloxicam (MOBIC) 7.5 MG tablet, TAKE 1 TABLET EVERY DAY, Disp: 90 tablet, Rfl: 0    potassium citrate (UROCIT-K) 10 mEq (1,080 mg) TbSR, TAKE 1 TABLET THREE TIMES DAILY WITH MEALS (Patient taking differently: Take 10 mEq by mouth once daily.), Disp: 270 tablet, Rfl: 3    pyridoxine, vitamin B6, (B-6) 50 MG Tab, Take 50 mg by mouth once daily., Disp: , Rfl:     QUEtiapine (SEROQUEL) 25 MG Tab, TAKE 1 TABLET BY MOUTH. TRY 1/2 TAB AT NIGHT, IF  TOLERATE INC TO 1 TAB. TAKE IN PLACE OF CLONAZEPAM, Disp: 30 tablet, Rfl: 0    sertraline (ZOLOFT) 100 MG tablet, Take 2 tablets (200 mg total) by mouth once daily., Disp: 180 tablet, Rfl: 3    sodium bicarbonate 650 MG tablet, Take 1 tablet (650 mg total) by mouth once daily., Disp: 90 tablet, Rfl: 3    suvorexant (BELSOMRA) 5 mg Tab, Take 1 tablet by mouth every evening. (Patient not taking: Reported on 2/2/2023), Disp: 30 tablet, Rfl: 3    tamsulosin (FLOMAX) 0.4 mg Cap, TAKE 1 CAPSULE EVERY DAY, Disp: 90 capsule, Rfl: 0    Current Facility-Administered Medications:     tamsulosin 24 hr capsule 0.4 mg, 0.4 mg, Oral, Daily, Mike Mtz MD    Facility-Administered Medications Ordered in Other Visits:     0.9%  NaCl infusion, , Intravenous, Continuous, Lizz Lowry NP, Last Rate: 70 mL/hr at 11/12/21 1227, New Bag at 11/12/21 1227    mupirocin 2 % ointment, , Nasal, On Call Procedure, Lizz Lowry NP, Given at 11/12/21 1227   He has not been taking clonazepam or the hydroxyzine daily, only occasionally.    Pt has been taking the Seroquel 25 mg 1/2 tab qhs nightly.    Pt never had the Belsomra filled.    Compliance: yes    Side effects: None    Risk Parameters:  Patient reports no suicidal ideation  Patient reports no homicidal ideation  Patient reports no self-injurious behavior  Patient reports no violent behavior    Exam (detailed: at least 9 elements; comprehensive: all 15 elements)   Constitutional  Vitals - 1 value per visit 9/16/2022 9/16/2022 10/26/2022 10/26/2022 11/15/2022 2/2/2023 2/2/2023   SYSTOLIC - 170 - 134 172 - 134   DIASTOLIC - 92 - 86 102 - 62   Pulse - 65 - 64 75 - 60   Temp - - - - - - -   Resp - - - 16 - - 12   SPO2 - - - - - - -   Weight (lb) - 191 - 194.23 197.42 - 199.41   Weight (kg) - 86.637 - 88.1 89.55 - 90.45   Height - 70 - 70 - - 70   BMI (Calculated) - 27.4 - 27.9 - - 28.6   VISIT REPORT - - - - - - -   Pain Score  0 - 4 - - 0 -   Some recent data might be hidden  "      General:  unremarkable, age appropriate, casually dressed, neatly groomed, overweight     Musculoskeletal  Muscle Strength/Tone:  not assessed today; cogwheeling had been noted in BUE's in past   Gait & Station:  parkinsonian, slow, steady , uses walker for assistance     Psychiatric  Speech:  slowed, non-spontaneous, normal volume   Behavior: friendly and cooperative, eye contact normal   Mood & Affect:  "OK"  euthymic, constricted range, conguent, appropriate   Thought Process:  goal-directed, logical   Associations:  intact   Thought Content:  normal, no suicidality, no homicidality, delusions, or paranoia   Insight:  intact, has awareness of illness   Judgement: behavior is adequate to circumstances   Orientation:  grossly intact   Memory: intact for content of interview   Language: grossly intact   Attention Span & Concentration:  able to focus   Fund of Knowledge:  intact and appropriate to age and level of education     Lab Results   Component Value Date    WBC 6.21 03/31/2022    HGB 16.3 03/31/2022    HCT 47.7 03/31/2022    MCV 94 03/31/2022     03/31/2022     06/14/2022    K 4.6 06/14/2022     06/14/2022    CO2 24 06/14/2022    GLU 92 06/14/2022    BUN 23 06/14/2022    CREATININE 1.1 06/14/2022    CALCIUM 10.3 06/14/2022    PROT 7.3 03/31/2022    ALBUMIN 4.1 03/31/2022    BILITOT 0.9 03/31/2022    ALKPHOS 73 03/31/2022    AST 28 03/31/2022    ALT 34 03/31/2022    ANIONGAP 12 06/14/2022    ESTGFRAFRICA >60.0 06/14/2022    EGFRNONAA >60.0 06/14/2022    CHOL 137 03/31/2022    HDL 43 03/31/2022    LDLCALC 69.0 03/31/2022    TRIG 125 03/31/2022    CHOLHDL 31.4 03/31/2022    TSH 1.947 04/04/2022       Imaging:    Exam: MRI brain without contrast    History: Dizziness, extrapyramidal movement disorder    Findings: MRI is performed with routine sequences.  No mass, hemorrhage, infarction, subdural collection, hydrocephalus or other acute finding is seen on this cranial MR examination.  " There is volume loss consistent with the patient's age.  No diffusion abnormality is seen.      Impression     Normal noncontrast MRI of the brain     Electronically signed by: FLORI NEWMAN MD  Date: 07/15/17  Time: 15:51        Assessment and Diagnosis   Status/Progress: Based on the examination today, the patient's problem(s) is/are adequately but not ideally controlled.  New problems have been presented today.   Co-morbidities (chronic pain) are complicating management of the primary condition.    There are not active rule-out diagnoses for this patient at this time.      General Impression:  POLINA (generalized anxiety disorder)  Chronic insomnia  Depression, unspecified depression type  Parkinson's disease    Visual hallucinations (NOT CODED)  Chronic pain (NOT CODED)    Intervention/Counseling/Treatment Plan   Medication Management:  Try Sonata 5 mg one tab at night PRN for insomnia in middle of night (trying to fall back asleep).  Risks/benefits noted to pt and wife, including potential for excessive sedation, dizziness and falls.   Continue other current medications (sertraline, PRN clonazepam for anxiety).        Follow-up plan for depression was discussed with patient and spouse.      Return to Clinic: Follow up in 14 weeks (on 6/21/2023).    Sudhir Elliott MD

## 2023-03-17 ENCOUNTER — DOCUMENTATION ONLY (OUTPATIENT)
Dept: REHABILITATION | Facility: HOSPITAL | Age: 78
End: 2023-03-17
Payer: MEDICARE

## 2023-03-17 NOTE — PROGRESS NOTES
Date: 3/17/2023    Name: Haja Marti  MRN: [id number]     Patient screened and deemed appropriate for wellness program at this time. See screening form in media section.     Haja attended [employer] wellness class today. This is the 7th class that the pt has attended.      The class participated in the following:  Maximal Daily Exercises  Group Functional Tasks - Partners x 2 rounds   Reaching to 4 corners x 10  Sit to Stands x 5-10  Big Pull x 5 ea side  Big Push x 10   Cognition/Agility   Reaching/stepping multidirectional with callouts from therapist  Hierarchy Task  Paper Airplane construction  BIG WALK to launch area  Paper Airplane contest    Therapist's Name:  Mindy Mcgarry, PT, DPT      breast pain

## 2023-03-20 ENCOUNTER — DOCUMENTATION ONLY (OUTPATIENT)
Dept: REHABILITATION | Facility: HOSPITAL | Age: 78
End: 2023-03-20
Payer: MEDICARE

## 2023-03-20 NOTE — PROGRESS NOTES
Date: 3/17/23    Name: Haja Marti   MRN: [id number]      Haja Marti attended [employer] wellness class today. This is the 7th class that the pt has attended.      The class participated in the following:   sustained phonation with a model x 10  ascending pitch glides with a model x 10  descending pitch glides with a model x 10  10 functional phrases with a model x 3  5 loud catch phrases with a model x 3   conversation activity:  Category naming in ABC order LOUDLY  Conversation in a LOUD environment    Therapist's Name:   Jessica Carreno CCC-SLP , CBIS

## 2023-03-30 ENCOUNTER — OFFICE VISIT (OUTPATIENT)
Dept: INTERNAL MEDICINE | Facility: CLINIC | Age: 78
End: 2023-03-30
Payer: MEDICARE

## 2023-03-30 ENCOUNTER — LAB VISIT (OUTPATIENT)
Dept: LAB | Facility: HOSPITAL | Age: 78
End: 2023-03-30
Attending: INTERNAL MEDICINE
Payer: MEDICARE

## 2023-03-30 VITALS
BODY MASS INDEX: 28.37 KG/M2 | HEIGHT: 70 IN | WEIGHT: 198.19 LBS | OXYGEN SATURATION: 96 % | SYSTOLIC BLOOD PRESSURE: 116 MMHG | HEART RATE: 54 BPM | DIASTOLIC BLOOD PRESSURE: 60 MMHG

## 2023-03-30 DIAGNOSIS — G31.83 LEWY BODY PARKINSON'S DISEASE: Primary | ICD-10-CM

## 2023-03-30 DIAGNOSIS — R00.1 BRADYCARDIA: ICD-10-CM

## 2023-03-30 DIAGNOSIS — I10 ESSENTIAL HYPERTENSION: ICD-10-CM

## 2023-03-30 DIAGNOSIS — E78.5 HYPERLIPIDEMIA, UNSPECIFIED HYPERLIPIDEMIA TYPE: ICD-10-CM

## 2023-03-30 DIAGNOSIS — N40.1 BENIGN PROSTATIC HYPERPLASIA WITH NOCTURIA: ICD-10-CM

## 2023-03-30 DIAGNOSIS — M47.816 LUMBAR SPONDYLOSIS: ICD-10-CM

## 2023-03-30 DIAGNOSIS — G20.C PARKINSONISM, UNSPECIFIED PARKINSONISM TYPE: ICD-10-CM

## 2023-03-30 DIAGNOSIS — F02.80 LEWY BODY PARKINSON'S DISEASE: Primary | ICD-10-CM

## 2023-03-30 DIAGNOSIS — R60.0 BILATERAL LEG EDEMA: ICD-10-CM

## 2023-03-30 DIAGNOSIS — R35.1 BENIGN PROSTATIC HYPERPLASIA WITH NOCTURIA: ICD-10-CM

## 2023-03-30 DIAGNOSIS — F02.80 LEWY BODY PARKINSON'S DISEASE: ICD-10-CM

## 2023-03-30 DIAGNOSIS — G31.83 LEWY BODY PARKINSON'S DISEASE: ICD-10-CM

## 2023-03-30 PROBLEM — R35.0 BENIGN PROSTATIC HYPERPLASIA WITH URINARY FREQUENCY: Status: ACTIVE | Noted: 2023-03-30

## 2023-03-30 LAB
ALBUMIN SERPL BCP-MCNC: 4 G/DL (ref 3.5–5.2)
ALP SERPL-CCNC: 79 U/L (ref 55–135)
ALT SERPL W/O P-5'-P-CCNC: 16 U/L (ref 10–44)
ANION GAP SERPL CALC-SCNC: 6 MMOL/L (ref 8–16)
AST SERPL-CCNC: 28 U/L (ref 10–40)
BASOPHILS # BLD AUTO: 0.03 K/UL (ref 0–0.2)
BASOPHILS NFR BLD: 0.5 % (ref 0–1.9)
BILIRUB SERPL-MCNC: 0.7 MG/DL (ref 0.1–1)
BNP SERPL-MCNC: 312 PG/ML (ref 0–99)
BUN SERPL-MCNC: 29 MG/DL (ref 8–23)
CALCIUM SERPL-MCNC: 10 MG/DL (ref 8.7–10.5)
CHLORIDE SERPL-SCNC: 106 MMOL/L (ref 95–110)
CHOLEST SERPL-MCNC: 110 MG/DL (ref 120–199)
CHOLEST/HDLC SERPL: 2.6 {RATIO} (ref 2–5)
CO2 SERPL-SCNC: 28 MMOL/L (ref 23–29)
CREAT SERPL-MCNC: 1.1 MG/DL (ref 0.5–1.4)
DIFFERENTIAL METHOD: ABNORMAL
EOSINOPHIL # BLD AUTO: 0.1 K/UL (ref 0–0.5)
EOSINOPHIL NFR BLD: 1.5 % (ref 0–8)
ERYTHROCYTE [DISTWIDTH] IN BLOOD BY AUTOMATED COUNT: 12.9 % (ref 11.5–14.5)
EST. GFR  (NO RACE VARIABLE): >60 ML/MIN/1.73 M^2
GLUCOSE SERPL-MCNC: 74 MG/DL (ref 70–110)
HCT VFR BLD AUTO: 44 % (ref 40–54)
HDLC SERPL-MCNC: 43 MG/DL (ref 40–75)
HDLC SERPL: 39.1 % (ref 20–50)
HGB BLD-MCNC: 14.4 G/DL (ref 14–18)
IMM GRANULOCYTES # BLD AUTO: 0.02 K/UL (ref 0–0.04)
IMM GRANULOCYTES NFR BLD AUTO: 0.3 % (ref 0–0.5)
LDLC SERPL CALC-MCNC: 54 MG/DL (ref 63–159)
LYMPHOCYTES # BLD AUTO: 0.9 K/UL (ref 1–4.8)
LYMPHOCYTES NFR BLD: 14.3 % (ref 18–48)
MAGNESIUM SERPL-MCNC: 2.2 MG/DL (ref 1.6–2.6)
MCH RBC QN AUTO: 30.9 PG (ref 27–31)
MCHC RBC AUTO-ENTMCNC: 32.7 G/DL (ref 32–36)
MCV RBC AUTO: 94 FL (ref 82–98)
MONOCYTES # BLD AUTO: 0.6 K/UL (ref 0.3–1)
MONOCYTES NFR BLD: 9.3 % (ref 4–15)
NEUTROPHILS # BLD AUTO: 4.5 K/UL (ref 1.8–7.7)
NEUTROPHILS NFR BLD: 74.1 % (ref 38–73)
NONHDLC SERPL-MCNC: 67 MG/DL
NRBC BLD-RTO: 0 /100 WBC
PLATELET # BLD AUTO: 200 K/UL (ref 150–450)
PMV BLD AUTO: 10.3 FL (ref 9.2–12.9)
POTASSIUM SERPL-SCNC: 4.8 MMOL/L (ref 3.5–5.1)
PROT SERPL-MCNC: 6.8 G/DL (ref 6–8.4)
RBC # BLD AUTO: 4.66 M/UL (ref 4.6–6.2)
SODIUM SERPL-SCNC: 140 MMOL/L (ref 136–145)
TRIGL SERPL-MCNC: 65 MG/DL (ref 30–150)
TSH SERPL DL<=0.005 MIU/L-ACNC: 1.77 UIU/ML (ref 0.4–4)
WBC # BLD AUTO: 6.1 K/UL (ref 3.9–12.7)

## 2023-03-30 PROCEDURE — 3288F FALL RISK ASSESSMENT DOCD: CPT | Mod: HCNC,CPTII,S$GLB, | Performed by: INTERNAL MEDICINE

## 2023-03-30 PROCEDURE — 36415 COLL VENOUS BLD VENIPUNCTURE: CPT | Mod: HCNC | Performed by: INTERNAL MEDICINE

## 2023-03-30 PROCEDURE — 99215 OFFICE O/P EST HI 40 MIN: CPT | Mod: HCNC,S$GLB,, | Performed by: INTERNAL MEDICINE

## 2023-03-30 PROCEDURE — 99999 PR PBB SHADOW E&M-EST. PATIENT-LVL IV: CPT | Mod: PBBFAC,HCNC,, | Performed by: INTERNAL MEDICINE

## 2023-03-30 PROCEDURE — 99999 PR PBB SHADOW E&M-EST. PATIENT-LVL IV: ICD-10-PCS | Mod: PBBFAC,HCNC,, | Performed by: INTERNAL MEDICINE

## 2023-03-30 PROCEDURE — 83880 ASSAY OF NATRIURETIC PEPTIDE: CPT | Mod: HCNC | Performed by: INTERNAL MEDICINE

## 2023-03-30 PROCEDURE — 3288F PR FALLS RISK ASSESSMENT DOCUMENTED: ICD-10-PCS | Mod: HCNC,CPTII,S$GLB, | Performed by: INTERNAL MEDICINE

## 2023-03-30 PROCEDURE — 1101F PR PT FALLS ASSESS DOC 0-1 FALLS W/OUT INJ PAST YR: ICD-10-PCS | Mod: HCNC,CPTII,S$GLB, | Performed by: INTERNAL MEDICINE

## 2023-03-30 PROCEDURE — 80053 COMPREHEN METABOLIC PANEL: CPT | Mod: HCNC | Performed by: INTERNAL MEDICINE

## 2023-03-30 PROCEDURE — 1160F RVW MEDS BY RX/DR IN RCRD: CPT | Mod: HCNC,CPTII,S$GLB, | Performed by: INTERNAL MEDICINE

## 2023-03-30 PROCEDURE — 3078F PR MOST RECENT DIASTOLIC BLOOD PRESSURE < 80 MM HG: ICD-10-PCS | Mod: HCNC,CPTII,S$GLB, | Performed by: INTERNAL MEDICINE

## 2023-03-30 PROCEDURE — 1126F AMNT PAIN NOTED NONE PRSNT: CPT | Mod: HCNC,CPTII,S$GLB, | Performed by: INTERNAL MEDICINE

## 2023-03-30 PROCEDURE — 3074F SYST BP LT 130 MM HG: CPT | Mod: HCNC,CPTII,S$GLB, | Performed by: INTERNAL MEDICINE

## 2023-03-30 PROCEDURE — 85025 COMPLETE CBC W/AUTO DIFF WBC: CPT | Mod: HCNC | Performed by: INTERNAL MEDICINE

## 2023-03-30 PROCEDURE — 1101F PT FALLS ASSESS-DOCD LE1/YR: CPT | Mod: HCNC,CPTII,S$GLB, | Performed by: INTERNAL MEDICINE

## 2023-03-30 PROCEDURE — 99215 PR OFFICE/OUTPT VISIT, EST, LEVL V, 40-54 MIN: ICD-10-PCS | Mod: HCNC,S$GLB,, | Performed by: INTERNAL MEDICINE

## 2023-03-30 PROCEDURE — 1159F PR MEDICATION LIST DOCUMENTED IN MEDICAL RECORD: ICD-10-PCS | Mod: HCNC,CPTII,S$GLB, | Performed by: INTERNAL MEDICINE

## 2023-03-30 PROCEDURE — 3074F PR MOST RECENT SYSTOLIC BLOOD PRESSURE < 130 MM HG: ICD-10-PCS | Mod: HCNC,CPTII,S$GLB, | Performed by: INTERNAL MEDICINE

## 2023-03-30 PROCEDURE — 3078F DIAST BP <80 MM HG: CPT | Mod: HCNC,CPTII,S$GLB, | Performed by: INTERNAL MEDICINE

## 2023-03-30 PROCEDURE — 84443 ASSAY THYROID STIM HORMONE: CPT | Mod: HCNC | Performed by: INTERNAL MEDICINE

## 2023-03-30 PROCEDURE — 1159F MED LIST DOCD IN RCRD: CPT | Mod: HCNC,CPTII,S$GLB, | Performed by: INTERNAL MEDICINE

## 2023-03-30 PROCEDURE — 80061 LIPID PANEL: CPT | Mod: HCNC | Performed by: INTERNAL MEDICINE

## 2023-03-30 PROCEDURE — 1160F PR REVIEW ALL MEDS BY PRESCRIBER/CLIN PHARMACIST DOCUMENTED: ICD-10-PCS | Mod: HCNC,CPTII,S$GLB, | Performed by: INTERNAL MEDICINE

## 2023-03-30 PROCEDURE — 1126F PR PAIN SEVERITY QUANTIFIED, NO PAIN PRESENT: ICD-10-PCS | Mod: HCNC,CPTII,S$GLB, | Performed by: INTERNAL MEDICINE

## 2023-03-30 PROCEDURE — 83735 ASSAY OF MAGNESIUM: CPT | Mod: HCNC | Performed by: INTERNAL MEDICINE

## 2023-03-30 NOTE — PROGRESS NOTES
MEDICAL HISTORY:  Hypertension.  Hyperlipidemia.  Parkinsonism with cognitive deficit  Depression, anxiety.  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  SHU  Bilateral retinal detachment.  Adenomatous colon polyp in 2002.  Tonsillectomy.  Hemorrhoidectomy.  Appendectomy.  Left inguinal hernia repair.  Left knee surgery.  Lumbar degenerative disc disease, status post bilateral L4 transforaminal epidural steroid injection   /   lumbar laminectomy and facetectomy L2-L5  BPH        SOCIAL HISTORY:  Tobacco and alcohol use - none.    .      MEDICINES:  Atorvastatin 40 mg daily.  Carbidopa levodopa  mg 1.5  tablet t.i.d.   Vitamin D 50,000 unit once a month.  Finasteride 5 mg a day..  Losartan 25 mg  Magnesium oxide 400 mg daily.  Vitamin B6 50 mg daily.  Sertraline 100 mg two a day.  Tamsulosin 0.4 mg  Urocit-K 10 mEq  Celebrex 100 mg daily   Seroquel 25 mg half a tablet q.h.s.  Folic acid  Hydroxyzine 10 mg t.i.d. as needed, mainly if night if needed.  Ray a Sodium bicarbonate 650 mg daily    Clonazepam has been prescribed but does not take it because a confusion      77-year-old male, he is here with his wife who does provide a lot of the information    Presents as a follow-up but is been noted that for the past month he has been having edema involving his lower extremities.  Worse as the day goes on.  There has been no associated shortness of breath chest pain weight gain.    He feels that is parkinsonism symptoms are getting worse in that his mobility is steadily declining.  It is hard for him to initiated movement such as getting up from a chair.  He is walking with a walker but at times he can stop in May need his wife to help initiate more movement.  Carbidopa has been increased to half a pill 3 times a day.  Seroquel 25 mg half a tablet at night is been given the help with hallucinations.  Clonazepam and has been initiated help with sleep in REM behavior sleep but wife finds it makes him more  confused of the medication has been stopped and the use of hydralazine is very rare.    Sleep is poor.  Maybe no difficulty going to sleep but has trouble staying asleep throughout the night.  He tosses and turns throughout the night such as white does not sleep with him.  Although she can he EM any peers to be up most of the night.  Lot times he will she will need to system and because the need to urinate frequently throughout the night.  He has been given a diagnosis of sleep apnea based on testing years ago but has refused the use of CPAP it appears that he does breathe with his mouth open snores    He has chronic low back pain for lumbar degenerative disc disease.  The past received epidural steroid injections.  Wife inquired about the use of amitriptyline/Elavil.  But is taking Celebrex once a day    Exam   Weight 198 lb   Pulse rate by me 44   Blood pressure by me 95-98 over 50   Chest clear breath sounds   Heart regular rate rhythm soft systolic murmur at the apex  Abdominal exam nontender soft no masses  2+ carotid pulses no bruits  2+ pedal pulses   Extremities 1+ pedal pretibial edema    Also last year a Holter monitor was performed to evaluate bradycardia.  There was frequent PVCs.  Average heart rate was 59 beats.  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  Monitor blood pressure at home  Review of the chart in regard to previous sleep apnea evaluation.  Discussed to re-explore the issue of sleep apnea and uses CPAP

## 2023-03-31 ENCOUNTER — DOCUMENTATION ONLY (OUTPATIENT)
Dept: REHABILITATION | Facility: HOSPITAL | Age: 78
End: 2023-03-31
Payer: MEDICARE

## 2023-03-31 NOTE — PROGRESS NOTES
Date: 3/17/23    Name: Haja Marti   MRN: [id number]      Haja Marti attended [facility] wellness class today. This is the 8th class that the pt has attended.      The class participated in the following:   sustained phonation with a model x 10  ascending pitch glides with a model x 10  descending pitch glides with a model x 10  10 functional phrases with a model x 2  5 loud catch phrases with a model x 2 - DID NOT DO  conversation activity:  Name favorite restaurant and vacation LOUDLY  Conversation in a LOUD environment    Therapist's Name:   JAYMIE Herzog, CCC-SLP , CBIS

## 2023-04-12 NOTE — PROGRESS NOTES
Name: Haja Marti  MRN: [id number]   CSN: [id number]      Date: 04/13/2023    Chief Complaint: LBD follow-up     History of Present Illness (HPI):    Mr. Marti is a 76 year old man who is new to me however is known to both [facility] and Dr. Hidalgo. 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 His symptom onset was approximately 7 years ago when he began having some walking difficulties and was found to not swing his arms bilaterally. He saw a general neurologist at that time who suspected parkinsonism but had concerns for LBD given some cognitive difficulties he was experiencing at that time. Initially it was his movement that was more of an issue than his cognition. He would shuffle his steps, freeze and displayed a stoop posture. The freezing continues to lead to rather frequent falls as he only has a lightweight rolling walker at this time for ambulation. He used to be a very active man and enjoyed exercise and participating in boxing however now given his worsening mobility he does not feel he is able to do those activities out of fear of falling. He denies any tremor. As for his cognition, morning and night times are the worst. He has regular hallucinations however they are not overly bothersome to him as they are not frightening. His mentation seems to fluctuate throughout the day and he does have periods of lucidity. He does take both Hydroxyzine and Topamax which he was told to hold out of concern of it affecting his cognition. With holding these medications he did not feel there was an improvement so he continues to use them. He only uses hydroxyzine once a day when he wakes up as he describes a sensation of anxiety. He is especially confused in the middle of the night upon waking and sometimes cannot locate the restroom within his home. Today his biggest complaint is his inability to walk more so than his cognition.     Interval History:    "Figueroa and his wife present for follow-up. He has been going through a rough time. He is having severe fluctuations in his motor symptoms. He continues to have days where he feels great but then will have other days where he will become frozen in his tracks. He does better with socialization. He is having difficulties when getting up to go to the restroom at night. His wife tried changing dosing of Seroquel due to worsening confusion at night however did not notice much benefit. Could not correlate worsening movements to dosage of Seroquel. His off times seem to be unpredictable. Both he and his wife are becoming frustrated with his symptoms. Adding the mid day dosage of Sinemet did seem to help some but he is having periods of fatigue and did have to be taken off of his blood pressure medications recently. He remains active in PD specific therapy in [place]. He has had 2 falls within a week. He uses his USTEP walker often but not consistently..       Current Mvmt Medications:  Sinemet 25/100 - 1.5 tab at 9am/7pm/2pm     Prior Mvmt Medication Trials:  Donepezil - did not tolerate per LUZ Shrestha - patient unsure of this   Hydroxzine  Clonazepam - worsening confusion, could re-try    Nonmotor/Premotor ROS:  Smell/Taste: no issues  Voice/Swallowing:  no issues   Stridor/SOB: none  Exercise: not exercising regularly   Dizziness: none  -no longer on blood pressure medications due to hypotension   Hydration: doing ok   Urinary Issues: none  Constipation: takes OTC medications that control this   Sleep/RBD: sleeping ok but likely still having RBD behavior, wife not in same room - unclear how much he is awakened by this. He has clonazepam but has not been using it.  Hallucinations/Peripheral Illusions: yes, regularly however they are not bothersome to him -- "his friends"  - they worsened for a while but seems to be better   Visual Change: none  Memory/Cognition/Language: some issues with language comprehension at " times, frequently disoriented in the evening hours  Mood: stable    Past Medical History: The patient  has a past medical history of Anxiety, Arthritis, BPH (benign prostatic hypertrophy), Cataract, Colon polyp (2002), Depression, Epiretinal membrane, both eyes, psychiatric care, Hyperlipidemia, Hypertension, Kidney stones, Kidney stones, Parkinsons, Posterior vitreous detachment of left eye, Psychiatric problem, Retinal detachment (2004), Retinoschisis, left eye, Sleep apnea, and Vitreous hemorrhage of left eye.    Social History: The patient  reports that he has never smoked. He has never used smokeless tobacco. He reports that he does not drink alcohol and does not use drugs.    Family History: Their family history includes Anxiety disorder in his daughter and son; Cancer in his father and mother; Cataracts in his father; Depression in his son; Diabetes in his father.    Allergies: Patient has no known allergies.     Meds:   Current Outpatient Medications on File Prior to Visit   Medication Sig Dispense Refill    acetaminophen (TYLENOL) 500 MG tablet Take 1,000 mg by mouth every 8 (eight) hours as needed for Pain.      atorvastatin (LIPITOR) 40 MG tablet TAKE 1 TABLET EVERY DAY 90 tablet 0    carbidopa-levodopa  mg (SINEMET)  mg per tablet TAKE 1 QID (Patient taking differently: Take 1.5 tablets by mouth 3 (three) times daily.) 360 tablet 3    celecoxib (CELEBREX) 100 MG capsule Take 100 mg by mouth 2 (two) times daily.      clonazePAM (KLONOPIN) 0.5 MG tablet TAKE 1/2 TO 1 TABLET BY MOUTH UP TO 3 TIMES DAILY AS NEEDED FOR ANXIETY 90 tablet 5    ergocalciferol (ERGOCALCIFEROL) 50,000 unit Cap TAKE 1 CAPSULE (50,000 UNITS TOTAL) BY MOUTH EVERY 7 DAYS. 12 capsule 1    finasteride (PROSCAR) 5 mg tablet Take 1 tablet (5 mg total) by mouth once daily. 90 tablet 3    folic acid (FOLVITE) 1 MG tablet TAKE 1 TABLET EVERY DAY 90 tablet 0    ibuprofen (ADVIL,MOTRIN) 200 MG tablet Take 200 mg by mouth every 6  "(six) hours as needed.      magnesium oxide (MAG-OX) 400 mg tablet Take 400 mg by mouth once daily.      potassium citrate (UROCIT-K) 10 mEq (1,080 mg) TbSR TAKE 1 TABLET THREE TIMES DAILY WITH MEALS (Patient taking differently: Take 10 mEq by mouth once daily.) 270 tablet 3    pyridoxine, vitamin B6, (B-6) 50 MG Tab Take 50 mg by mouth once daily.      QUEtiapine (SEROQUEL) 25 MG Tab TAKE ORAL 1/2 TO 1 TABLET QHS.  TAKE IN PLACE OF CLONAZEPAM (Patient taking differently: Take 25 mg by mouth every evening.) 90 tablet 1    sertraline (ZOLOFT) 100 MG tablet Take 2 tablets (200 mg total) by mouth once daily. 60 tablet 1    sodium bicarbonate 650 MG tablet Take 1 tablet (650 mg total) by mouth once daily. 90 tablet 3    tamsulosin (FLOMAX) 0.4 mg Cap TAKE 1 CAPSULE EVERY DAY 90 capsule 0    zaleplon (SONATA) 5 MG Cap Take 1 capsule (5 mg total) by mouth nightly as needed (insomnia). 30 capsule 3    hydrOXYzine HCL (ATARAX) 10 MG Tab TAKE 1 TABLET BY MOUTH 3 TIMES A DAY AS NEEDED ANXIETY (Patient not taking: Reported on 4/13/2023) 270 tablet 1    ketoconazole (NIZORAL) 2 % cream Apply topically 2 (two) times daily as needed.      losartan (COZAAR) 25 MG tablet Take 1 tablet (25 mg total) by mouth once daily. 90 tablet 1    sertraline (ZOLOFT) 100 MG tablet Take 2 tablets (200 mg total) by mouth once daily. 180 tablet 3     Current Facility-Administered Medications on File Prior to Visit   Medication Dose Route Frequency Provider Last Rate Last Admin    0.9%  NaCl infusion   Intravenous Continuous Lizz Lowry NP 70 mL/hr at 11/12/21 1227 New Bag at 11/12/21 1227    mupirocin 2 % ointment   Nasal On Call Procedure Lizz Lowry NP   Given at 11/12/21 1227    tamsulosin 24 hr capsule 0.4 mg  0.4 mg Oral Daily Mike Mtz MD           Exam:  /84 (BP Location: Right arm, Patient Position: Sitting, BP Method: Medium (Manual))   Pulse 64   Resp 16   Ht 5' 10" (1.778 m)   Wt 87.2 kg (192 lb 3.9 oz)   " BMI 27.58 kg/m²     Constitutional  Well-developed, well-nourished, appears stated age   Cardiovascular  No LE edema bilaterally   Neurological    * Mental status  MOCA = not done during today's visit     - Orientation  Oriented to conversation     - Memory   Intact recent and remote     - Attention/concentration  Attentive, vigilant during exam     - Language  Intact to conversation.     - Fund of knowledge  Aware of current events     - Executive  Well-organized thoughts     - Other     * Cranial nerves       - CN II  Pupils equal, visual fields full to confrontation     - CN III, IV, VI  Extraocular movements full, normal pursuits and saccades         - CN VII  Face strong and symmetric bilaterally     - CN VIII  Hearing intact bilaterally         - CN XI  SCM and trapezius 5/5 bilaterally       * Motor  Muscle bulk normal, strength 5/5 throughout       * Coordination  No dysmetria with finger-to-nose   * Gait  See below.       * Specialized movement exam  Moderately hypophonic speech.    Facial masking with reduced blink.   Increased tone L>R in the upper and lower extremities however better than prior.    Some mild global bradykinesia.    No tremor with rest, posture, kinesis, or intention.    No other dystonia, chorea, athetosis, myoclonus, or tics.   No motor impersistence.   Walking is slow with freezing and very very narrow gait. He shuffles to the side with turning.  Posture is stooped. There is reduced arm swing bilaterally (L>R).        Medical Record Review:  Labs, imaging and prior notes reviewed independently.     MRI Brain 2017:  MRI is performed with routine sequences.  No mass, hemorrhage, infarction, subdural collection, hydrocephalus or other acute finding is seen on this cranial MR examination.  There is volume loss consistent with the patient's age.  No diffusion abnormality is seen.    Diagnoses:          1. Lewy body Parkinson's disease  Ambulatory referral/consult to Home Health      2.  Abnormal gait        3. Frequent falls        4. Cognitive change                Assessment:  Mr. Marti is a 76 year old RH man who carries a diagnosis of LBD by neuropsychological testing and displays a significant amount of parkinsonism primarily affecting his gait. We have gradually increased his Levodopa which he was overall tolerated relatively well. He has had to come off of his blood pressure medications due to hypotension. He continues to hallucinate but is not overly bothered by his hallucinations. His most significant problem now is that of unpredictable off times and freezing of gait leading to falls. He has a USTEP walker which helps and he is participating in BIG and LOUD therapies regularly. We agreed to the following plan today:    Plan:  - Continue current dosage of Sinemet 1.5 tabs TID. I asked that his wife monitor his blood pressure for a few days especially when he is feeling fatigued to see if hypotension is occurring in the setting of his levodopa. If this is not a concern we may increase his levodopa dose further.   - I will work on Imbrija as a possible treatment option for his abrupt off periods. I discussed that this type of therapy may cause blood pressure fluctuations and worsening of his hallucinations but given the abruptness of his off periods, this may be the best option to keep him mobile.   - If none of the above options are available to us, we can consider donepezil again. Not sure why it was discontinued. He is scheduled to have a holter monitor so I will review this as well to ensure no bradycardia.   - Continue regular exercise.   - I have placed a referral for home health to help his wife ensure a safe environment and to provide any support she may need.     RTC in 3 months to see me.     Total time: 53 minutes spent on the encounter, which includes face to face time and non-face to face time preparing to see the patient (eg, review of tests), Obtaining and/or reviewing  separately obtained history, Documenting clinical information in the electronic or other health record, Independently interpreting results (not separately reported) and communicating results to the patient/family/caregiver, or Care coordination (not separately reported).     Mag Mcmahon MD  Division of Movement and Memory Disorders  [facility]  [phone]

## 2023-04-13 ENCOUNTER — PATIENT MESSAGE (OUTPATIENT)
Dept: NEUROLOGY | Facility: CLINIC | Age: 78
End: 2023-04-13

## 2023-04-13 ENCOUNTER — OFFICE VISIT (OUTPATIENT)
Dept: NEUROLOGY | Facility: CLINIC | Age: 78
End: 2023-04-13
Payer: MEDICARE

## 2023-04-13 VITALS
BODY MASS INDEX: 27.52 KG/M2 | HEART RATE: 64 BPM | DIASTOLIC BLOOD PRESSURE: 84 MMHG | SYSTOLIC BLOOD PRESSURE: 124 MMHG | HEIGHT: 70 IN | WEIGHT: 192.25 LBS | RESPIRATION RATE: 16 BRPM

## 2023-04-13 DIAGNOSIS — R26.9 ABNORMAL GAIT: ICD-10-CM

## 2023-04-13 DIAGNOSIS — R29.6 FREQUENT FALLS: ICD-10-CM

## 2023-04-13 DIAGNOSIS — G31.83 LEWY BODY PARKINSON'S DISEASE: Primary | ICD-10-CM

## 2023-04-13 DIAGNOSIS — F02.80 LEWY BODY PARKINSON'S DISEASE: Primary | ICD-10-CM

## 2023-04-13 DIAGNOSIS — R41.89 COGNITIVE CHANGE: ICD-10-CM

## 2023-04-13 PROCEDURE — 3074F SYST BP LT 130 MM HG: CPT | Mod: HCNC,CPTII,S$GLB, | Performed by: STUDENT IN AN ORGANIZED HEALTH CARE EDUCATION/TRAINING PROGRAM

## 2023-04-13 PROCEDURE — 1126F AMNT PAIN NOTED NONE PRSNT: CPT | Mod: HCNC,CPTII,S$GLB, | Performed by: STUDENT IN AN ORGANIZED HEALTH CARE EDUCATION/TRAINING PROGRAM

## 2023-04-13 PROCEDURE — 99999 PR PBB SHADOW E&M-EST. PATIENT-LVL V: CPT | Mod: PBBFAC,HCNC,, | Performed by: STUDENT IN AN ORGANIZED HEALTH CARE EDUCATION/TRAINING PROGRAM

## 2023-04-13 PROCEDURE — 99215 PR OFFICE/OUTPT VISIT, EST, LEVL V, 40-54 MIN: ICD-10-PCS | Mod: HCNC,S$GLB,, | Performed by: STUDENT IN AN ORGANIZED HEALTH CARE EDUCATION/TRAINING PROGRAM

## 2023-04-13 PROCEDURE — 3079F PR MOST RECENT DIASTOLIC BLOOD PRESSURE 80-89 MM HG: ICD-10-PCS | Mod: HCNC,CPTII,S$GLB, | Performed by: STUDENT IN AN ORGANIZED HEALTH CARE EDUCATION/TRAINING PROGRAM

## 2023-04-13 PROCEDURE — 1159F MED LIST DOCD IN RCRD: CPT | Mod: HCNC,CPTII,S$GLB, | Performed by: STUDENT IN AN ORGANIZED HEALTH CARE EDUCATION/TRAINING PROGRAM

## 2023-04-13 PROCEDURE — 3074F PR MOST RECENT SYSTOLIC BLOOD PRESSURE < 130 MM HG: ICD-10-PCS | Mod: HCNC,CPTII,S$GLB, | Performed by: STUDENT IN AN ORGANIZED HEALTH CARE EDUCATION/TRAINING PROGRAM

## 2023-04-13 PROCEDURE — 99999 PR PBB SHADOW E&M-EST. PATIENT-LVL V: ICD-10-PCS | Mod: PBBFAC,HCNC,, | Performed by: STUDENT IN AN ORGANIZED HEALTH CARE EDUCATION/TRAINING PROGRAM

## 2023-04-13 PROCEDURE — 99215 OFFICE O/P EST HI 40 MIN: CPT | Mod: HCNC,S$GLB,, | Performed by: STUDENT IN AN ORGANIZED HEALTH CARE EDUCATION/TRAINING PROGRAM

## 2023-04-13 PROCEDURE — 1126F PR PAIN SEVERITY QUANTIFIED, NO PAIN PRESENT: ICD-10-PCS | Mod: HCNC,CPTII,S$GLB, | Performed by: STUDENT IN AN ORGANIZED HEALTH CARE EDUCATION/TRAINING PROGRAM

## 2023-04-13 PROCEDURE — 3079F DIAST BP 80-89 MM HG: CPT | Mod: HCNC,CPTII,S$GLB, | Performed by: STUDENT IN AN ORGANIZED HEALTH CARE EDUCATION/TRAINING PROGRAM

## 2023-04-13 PROCEDURE — 1159F PR MEDICATION LIST DOCUMENTED IN MEDICAL RECORD: ICD-10-PCS | Mod: HCNC,CPTII,S$GLB, | Performed by: STUDENT IN AN ORGANIZED HEALTH CARE EDUCATION/TRAINING PROGRAM

## 2023-04-13 RX ORDER — LEVODOPA 42 MG/1
84 CAPSULE RESPIRATORY (INHALATION) 4 TIMES DAILY PRN
Qty: 120 CAPSULE | Refills: 5 | Status: SHIPPED | OUTPATIENT
Start: 2023-04-13 | End: 2023-05-13

## 2023-04-13 RX ORDER — ACETAMINOPHEN 500 MG
1000 TABLET ORAL EVERY 8 HOURS PRN
COMMUNITY

## 2023-04-13 NOTE — PATIENT INSTRUCTIONS
1) take BP when feeling extra tired or sluggish for 2-3 days.  2) I will work on off-medication options

## 2023-04-14 ENCOUNTER — DOCUMENTATION ONLY (OUTPATIENT)
Dept: REHABILITATION | Facility: HOSPITAL | Age: 78
End: 2023-04-14
Payer: MEDICARE

## 2023-04-14 NOTE — PROGRESS NOTES
Date: 4/14/2023     Name: Haja Marti   MRN: [id number]      Haja Marti attended [facility] [employer] wellness class today. This is the 8th class that the pt has attended.      The class participated in the following:   sustained phonation with a model x 10  ascending pitch glides with a model x 10  descending pitch glides with a model x 10  10 functional phrases with a model x 3  5 loud catch phrases with a model x 3   conversation activity:  Ad joel: calling to lower a Almendarez bill LOUDLY  Rapid fire questions LOUDLY    Therapist's Name:   Jessica Carreno, CCC-SLP , CBIS

## 2023-04-14 NOTE — PROGRESS NOTES
Date: 4/14/2023    Name: Haja Marti  MRN: [id number]     Patient screened and deemed appropriate for wellness program at this time. See screening form in media section.     Haja attended [facility] wellness class today. This is the 8th class that the pt has attended.      The class participated in the following:    Warm up - reaching up, thoracic twists (seated)    Maximal Daily Exercises   Seated forward reach  Seated lateral reach  Forward step and reach  Lateral step and reach  Backwards step and reach  Forward rock and reach  Side rock and reach    Functional tasks  Marching 4x10  Reaching overhead and lateral x 6  Sit to Stands x 10  Reach and place (5# weight)     Hierarchy Task  Cornhole  Darts (magnetic)    Therapist's Name:  Mindy Mcgarry, PT, DPT

## 2023-04-18 ENCOUNTER — HOSPITAL ENCOUNTER (OUTPATIENT)
Dept: CARDIOLOGY | Facility: HOSPITAL | Age: 78
Discharge: HOME | End: 2023-04-18
Attending: INTERNAL MEDICINE
Payer: MEDICARE

## 2023-04-18 DIAGNOSIS — R00.1 BRADYCARDIA: ICD-10-CM

## 2023-04-18 DIAGNOSIS — G20.C PARKINSONISM, UNSPECIFIED PARKINSONISM TYPE: ICD-10-CM

## 2023-04-18 DIAGNOSIS — E78.5 HYPERLIPIDEMIA, UNSPECIFIED HYPERLIPIDEMIA TYPE: ICD-10-CM

## 2023-04-18 DIAGNOSIS — F02.80 LEWY BODY PARKINSON'S DISEASE: ICD-10-CM

## 2023-04-18 DIAGNOSIS — I10 ESSENTIAL HYPERTENSION: ICD-10-CM

## 2023-04-18 DIAGNOSIS — G31.83 LEWY BODY PARKINSON'S DISEASE: ICD-10-CM

## 2023-04-18 DIAGNOSIS — N40.1 BENIGN PROSTATIC HYPERPLASIA WITH NOCTURIA: ICD-10-CM

## 2023-04-18 DIAGNOSIS — R60.0 BILATERAL LEG EDEMA: ICD-10-CM

## 2023-04-18 DIAGNOSIS — M47.816 LUMBAR SPONDYLOSIS: ICD-10-CM

## 2023-04-18 DIAGNOSIS — R35.1 BENIGN PROSTATIC HYPERPLASIA WITH NOCTURIA: ICD-10-CM

## 2023-04-18 PROCEDURE — G0180 PR HOME HEALTH MD CERTIFICATION: ICD-10-PCS | Mod: ,,, | Performed by: STUDENT IN AN ORGANIZED HEALTH CARE EDUCATION/TRAINING PROGRAM

## 2023-04-18 PROCEDURE — 93227 XTRNL ECG REC<48 HR R&I: CPT | Mod: HCNC,,, | Performed by: INTERNAL MEDICINE

## 2023-04-18 PROCEDURE — G0180 MD CERTIFICATION HHA PATIENT: HCPCS | Mod: ,,, | Performed by: STUDENT IN AN ORGANIZED HEALTH CARE EDUCATION/TRAINING PROGRAM

## 2023-04-18 PROCEDURE — 93225 XTRNL ECG REC<48 HRS REC: CPT | Mod: HCNC

## 2023-04-18 PROCEDURE — 93227 HOLTER MONITOR - 24 HOUR (CUPID ONLY): ICD-10-PCS | Mod: HCNC,,, | Performed by: INTERNAL MEDICINE

## 2023-04-19 ENCOUNTER — DOCUMENTATION ONLY (OUTPATIENT)
Dept: REHABILITATION | Facility: HOSPITAL | Age: 78
End: 2023-04-19
Payer: MEDICARE

## 2023-04-19 ENCOUNTER — SPECIALTY PHARMACY (OUTPATIENT)
Dept: PHARMACY | Facility: CLINIC | Age: 78
End: 2023-04-19
Payer: MEDICARE

## 2023-04-19 DIAGNOSIS — F02.80 LEWY BODY PARKINSON'S DISEASE: Primary | ICD-10-CM

## 2023-04-19 DIAGNOSIS — G31.83 LEWY BODY PARKINSON'S DISEASE: Primary | ICD-10-CM

## 2023-04-19 NOTE — TELEPHONE ENCOUNTER
Sindy echavarria. [facility] Key: [id number]    Shell, this is Sugar Li with [facility].  We are working on your prescription that your doctor has sent us. We will be working with your insurance to get this approved for you. We will be calling you along the way with updates on your medication.  If you have any questions, you can reach us at [phone].    Welcome call outcome: Patient/caregiver reached

## 2023-04-19 NOTE — PROGRESS NOTES
Date: 4/19/2023    Name: Haja Marti  MRN: [id number]    Patient screened and deemed appropriate for wellness program at this time. See screening form and participation waiver in media section.     Haja Marti attended [facility] wellness class today.    The class participated in the following:  Warm Up:   Posterior capsule stretch  Cervical side bend   Hamstring/calf stretches   LSVT BIG Maximal Daily Exercises: Adapted   Exercise 1: Floor to Ceiling   Exercise 2: Side to Side  Exercise 3: Forward Step and Reach   Exercise 4: Sideways Step and Reach   Exercise 5: Backward Step and Reach   Exercise 6: Forward Rock and Reach   Exercise 7: Sideways Rock and Reach   Functional component tasks x 5 reps each:   Sit<>stands  Complex Functional Activity:   Chair aerobics - pt performed one exercise in stance with CGA  Chair yoga  BIG Walking:   Forwards walking    *Supervision/assistance provided as needed       Therapist's Name:  JESUS Mars LOTR

## 2023-04-20 NOTE — TELEPHONE ENCOUNTER
Incoming call from pts wife returning call to [facility]. Informed her that Inbrija was a LDD drug and [facility] had access, so rx would be sent there to fill. Pt voiced understanding. Routing to assigned rph to [facility].

## 2023-04-20 NOTE — TELEPHONE ENCOUNTER
Outgoing call to patient to notify of PA approval and inform him that drug is LDD and Rx will have to be forwarded to another pharmacy. Can be filled at [facility] if patient ok with filling there. LVM

## 2023-04-21 LAB
OHS CV EVENT MONITOR DAY: 0
OHS CV HOLTER LENGTH DECIMAL HOURS: 24
OHS CV HOLTER LENGTH HOURS: 24
OHS CV HOLTER LENGTH MINUTES: 0
OHS CV HOLTER SINUS AVERAGE HR: 47
OHS CV HOLTER SINUS MAX HR: 63
OHS CV HOLTER SINUS MIN HR: 33

## 2023-04-24 ENCOUNTER — DOCUMENTATION ONLY (OUTPATIENT)
Dept: REHABILITATION | Facility: HOSPITAL | Age: 78
End: 2023-04-24
Payer: MEDICARE

## 2023-04-24 NOTE — PROGRESS NOTES
Date: 4/19/23    Name: Haja Marti   MRN: [id number]      Haja Marti attended [facility] [employer] wellness class today. This is the 9th class that the pt has attended.      The class participated in the following:   sustained phonation with a model x 10  ascending pitch glides with a model x 10  descending pitch glides with a model x 10  10 functional phrases with a model x 3  5 loud catch phrases with a model x 3   conversation activity:  Conversation across a room  Conversation with background noise    Therapist's Name:   Jessica Carreno, CCC-SLP , CBIS

## 2023-04-24 NOTE — PROGRESS NOTES
Date: 4/21/23    Name: Haja Marti   MRN: [id number]      Haja Marti attended [facility] [employer] wellness class today. This is the 9th class that the pt has attended.      The class participated in the following:   sustained phonation with a model x 10  ascending pitch glides with a model x 10  descending pitch glides with a model x 10  10 functional phrases with a model x 3  5 loud catch phrases with a model x 3   conversation activity:  Word finding -category naming - LOUDLY  Memory recall - when are our classes - LOUDLY    Therapist's Name:   Jessica Carreno CCC-SLP

## 2023-04-26 RX ORDER — FOLIC ACID 1 MG/1
TABLET ORAL
Qty: 90 TABLET | Refills: 0 | Status: SHIPPED | OUTPATIENT
Start: 2023-04-26

## 2023-04-26 RX ORDER — SODIUM BICARBONATE 650 MG/1
TABLET ORAL
Qty: 90 TABLET | Refills: 3 | Status: SHIPPED | OUTPATIENT
Start: 2023-04-26

## 2023-04-26 NOTE — PROGRESS NOTES
Date: 4/24/2023    Name: Haja Marti  MRN: [id number]     Patient screened and deemed appropriate for wellness program at this time. See screening form in media section.     Haja attended [facility] wellness class today. This is the 9th class that the pt has attended.      The class participated in the following:    Maximal Daily Exercises    Functional Task - Circuit    Cardio Circuit  Jabs x 30 seconds  Upper Cuts x 30 seconds  [place] x 30 seconds  1 minute Rest   REPEAT  Ball Punches x 30 seconds  Trunk seated balance - perturbations from PT x 30 seconds     Strength Circuit (2 rounds)  Bicep Curls x 8-10   Upright Rows x 8-10  Seated/Standing hip abduction x 8-10  Sit to Stands x 10    BIG walking (Obstacle course)       Therapist's Name:  Mindy Mcgarry, PT, DPT      (1) Oriented to own ability

## 2023-04-27 ENCOUNTER — NURSE TRIAGE (OUTPATIENT)
Dept: ADMINISTRATIVE | Facility: CLINIC | Age: 78
End: 2023-04-27
Payer: MEDICARE

## 2023-04-27 ENCOUNTER — HOSPITAL ENCOUNTER (OUTPATIENT)
Facility: HOSPITAL | Age: 78
Discharge: HOME | End: 2023-04-28
Attending: EMERGENCY MEDICINE | Admitting: HOSPITALIST
Payer: MEDICARE

## 2023-04-27 ENCOUNTER — PATIENT MESSAGE (OUTPATIENT)
Dept: NEUROLOGY | Facility: CLINIC | Age: 78
End: 2023-04-27
Payer: MEDICARE

## 2023-04-27 DIAGNOSIS — R00.1 SYMPTOMATIC BRADYCARDIA: Primary | ICD-10-CM

## 2023-04-27 DIAGNOSIS — R07.9 CHEST PAIN: ICD-10-CM

## 2023-04-27 DIAGNOSIS — R00.1 BRADYCARDIA: ICD-10-CM

## 2023-04-27 PROBLEM — R79.89 ELEVATED TROPONIN: Status: ACTIVE | Noted: 2023-04-27

## 2023-04-27 LAB
ALBUMIN SERPL BCP-MCNC: 4.1 G/DL (ref 3.5–5.2)
ALP SERPL-CCNC: 72 U/L (ref 55–135)
ALT SERPL W/O P-5'-P-CCNC: 9 U/L (ref 10–44)
ANION GAP SERPL CALC-SCNC: 8 MMOL/L (ref 8–16)
AST SERPL-CCNC: 39 U/L (ref 10–40)
BASOPHILS # BLD AUTO: 0.03 K/UL (ref 0–0.2)
BASOPHILS NFR BLD: 0.5 % (ref 0–1.9)
BILIRUB SERPL-MCNC: 0.9 MG/DL (ref 0.1–1)
BILIRUB UR QL STRIP: NEGATIVE
BNP SERPL-MCNC: 324 PG/ML (ref 0–99)
BUN SERPL-MCNC: 22 MG/DL (ref 8–23)
BUN SERPL-MCNC: 23 MG/DL (ref 6–30)
CALCIUM SERPL-MCNC: 9.7 MG/DL (ref 8.7–10.5)
CHLORIDE SERPL-SCNC: 102 MMOL/L (ref 95–110)
CHLORIDE SERPL-SCNC: 105 MMOL/L (ref 95–110)
CLARITY UR REFRACT.AUTO: CLEAR
CO2 SERPL-SCNC: 25 MMOL/L (ref 23–29)
COLOR UR AUTO: YELLOW
CREAT SERPL-MCNC: 0.9 MG/DL (ref 0.5–1.4)
CREAT SERPL-MCNC: 1 MG/DL (ref 0.5–1.4)
DIFFERENTIAL METHOD: ABNORMAL
EOSINOPHIL # BLD AUTO: 0.1 K/UL (ref 0–0.5)
EOSINOPHIL NFR BLD: 1.8 % (ref 0–8)
ERYTHROCYTE [DISTWIDTH] IN BLOOD BY AUTOMATED COUNT: 12.6 % (ref 11.5–14.5)
EST. GFR  (NO RACE VARIABLE): >60 ML/MIN/1.73 M^2
GLUCOSE SERPL-MCNC: 86 MG/DL (ref 70–110)
GLUCOSE SERPL-MCNC: 87 MG/DL (ref 70–110)
GLUCOSE UR QL STRIP: NEGATIVE
HCT VFR BLD AUTO: 44.2 % (ref 40–54)
HCT VFR BLD CALC: 45 %PCV (ref 36–54)
HGB BLD-MCNC: 15 G/DL (ref 14–18)
HGB UR QL STRIP: NEGATIVE
IMM GRANULOCYTES # BLD AUTO: 0.04 K/UL (ref 0–0.04)
IMM GRANULOCYTES NFR BLD AUTO: 0.6 % (ref 0–0.5)
KETONES UR QL STRIP: NEGATIVE
LEUKOCYTE ESTERASE UR QL STRIP: NEGATIVE
LYMPHOCYTES # BLD AUTO: 1 K/UL (ref 1–4.8)
LYMPHOCYTES NFR BLD: 15.9 % (ref 18–48)
MAGNESIUM SERPL-MCNC: 2.2 MG/DL (ref 1.6–2.6)
MCH RBC QN AUTO: 31.1 PG (ref 27–31)
MCHC RBC AUTO-ENTMCNC: 33.9 G/DL (ref 32–36)
MCV RBC AUTO: 92 FL (ref 82–98)
MONOCYTES # BLD AUTO: 0.5 K/UL (ref 0.3–1)
MONOCYTES NFR BLD: 7.4 % (ref 4–15)
NEUTROPHILS # BLD AUTO: 4.6 K/UL (ref 1.8–7.7)
NEUTROPHILS NFR BLD: 73.8 % (ref 38–73)
NITRITE UR QL STRIP: NEGATIVE
NRBC BLD-RTO: 0 /100 WBC
PH UR STRIP: 7 [PH] (ref 5–8)
PLATELET # BLD AUTO: 194 K/UL (ref 150–450)
PMV BLD AUTO: 9.8 FL (ref 9.2–12.9)
POC CARDIAC TROPONIN I: 0.03 NG/ML (ref 0–0.08)
POC IONIZED CALCIUM: 1.16 MMOL/L (ref 1.06–1.42)
POC TCO2 (MEASURED): 25 MMOL/L (ref 23–29)
POTASSIUM BLD-SCNC: 4 MMOL/L (ref 3.5–5.1)
POTASSIUM SERPL-SCNC: 4.4 MMOL/L (ref 3.5–5.1)
PROT SERPL-MCNC: 7.2 G/DL (ref 6–8.4)
PROT UR QL STRIP: NEGATIVE
RBC # BLD AUTO: 4.82 M/UL (ref 4.6–6.2)
SAMPLE: NORMAL
SAMPLE: NORMAL
SODIUM BLD-SCNC: 140 MMOL/L (ref 136–145)
SODIUM SERPL-SCNC: 138 MMOL/L (ref 136–145)
SP GR UR STRIP: 1.01 (ref 1–1.03)
TROPONIN I SERPL DL<=0.01 NG/ML-MCNC: 0.03 NG/ML (ref 0–0.03)
TROPONIN I SERPL DL<=0.01 NG/ML-MCNC: 0.03 NG/ML (ref 0–0.03)
TROPONIN I SERPL DL<=0.01 NG/ML-MCNC: 0.04 NG/ML (ref 0–0.03)
TSH SERPL DL<=0.005 MIU/L-ACNC: 1.66 UIU/ML (ref 0.4–4)
URN SPEC COLLECT METH UR: NORMAL
WBC # BLD AUTO: 6.18 K/UL (ref 3.9–12.7)

## 2023-04-27 PROCEDURE — 81003 URINALYSIS AUTO W/O SCOPE: CPT | Mod: HCNC

## 2023-04-27 PROCEDURE — 85025 COMPLETE CBC W/AUTO DIFF WBC: CPT | Mod: HCNC | Performed by: STUDENT IN AN ORGANIZED HEALTH CARE EDUCATION/TRAINING PROGRAM

## 2023-04-27 PROCEDURE — 96372 THER/PROPH/DIAG INJ SC/IM: CPT | Mod: 59

## 2023-04-27 PROCEDURE — 94761 N-INVAS EAR/PLS OXIMETRY MLT: CPT | Mod: HCNC

## 2023-04-27 PROCEDURE — 99497 PR ADVNCD CARE PLAN 30 MIN: ICD-10-PCS | Mod: HCNC,25,, | Performed by: NURSE PRACTITIONER

## 2023-04-27 PROCEDURE — 96360 HYDRATION IV INFUSION INIT: CPT | Mod: 59,HCNC

## 2023-04-27 PROCEDURE — 93010 EKG 12-LEAD: ICD-10-PCS | Mod: HCNC,,, | Performed by: INTERNAL MEDICINE

## 2023-04-27 PROCEDURE — 25000003 PHARM REV CODE 250: Mod: HCNC

## 2023-04-27 PROCEDURE — 93005 ELECTROCARDIOGRAM TRACING: CPT | Mod: HCNC

## 2023-04-27 PROCEDURE — 25000003 PHARM REV CODE 250: Mod: HCNC | Performed by: STUDENT IN AN ORGANIZED HEALTH CARE EDUCATION/TRAINING PROGRAM

## 2023-04-27 PROCEDURE — 84484 ASSAY OF TROPONIN QUANT: CPT | Mod: 91,HCNC | Performed by: NURSE PRACTITIONER

## 2023-04-27 PROCEDURE — 99223 PR INITIAL HOSPITAL CARE,LEVL III: ICD-10-PCS | Mod: HCNC,AI,,

## 2023-04-27 PROCEDURE — 96361 HYDRATE IV INFUSION ADD-ON: CPT

## 2023-04-27 PROCEDURE — 96374 THER/PROPH/DIAG INJ IV PUSH: CPT | Mod: HCNC

## 2023-04-27 PROCEDURE — 99285 EMERGENCY DEPT VISIT HI MDM: CPT | Mod: 25,HCNC

## 2023-04-27 PROCEDURE — 84484 ASSAY OF TROPONIN QUANT: CPT | Mod: 91,HCNC | Performed by: STUDENT IN AN ORGANIZED HEALTH CARE EDUCATION/TRAINING PROGRAM

## 2023-04-27 PROCEDURE — 63600175 PHARM REV CODE 636 W HCPCS: Mod: HCNC

## 2023-04-27 PROCEDURE — G0378 HOSPITAL OBSERVATION PER HR: HCPCS | Mod: HCNC

## 2023-04-27 PROCEDURE — 93010 ELECTROCARDIOGRAM REPORT: CPT | Mod: HCNC,,, | Performed by: INTERNAL MEDICINE

## 2023-04-27 PROCEDURE — 25000003 PHARM REV CODE 250: Mod: HCNC | Performed by: NURSE PRACTITIONER

## 2023-04-27 PROCEDURE — 99223 1ST HOSP IP/OBS HIGH 75: CPT | Mod: HCNC,AI,,

## 2023-04-27 PROCEDURE — 83735 ASSAY OF MAGNESIUM: CPT | Mod: HCNC | Performed by: STUDENT IN AN ORGANIZED HEALTH CARE EDUCATION/TRAINING PROGRAM

## 2023-04-27 PROCEDURE — 99285 EMERGENCY DEPT VISIT HI MDM: CPT | Mod: HCNC,,, | Performed by: EMERGENCY MEDICINE

## 2023-04-27 PROCEDURE — 80053 COMPREHEN METABOLIC PANEL: CPT | Mod: HCNC | Performed by: STUDENT IN AN ORGANIZED HEALTH CARE EDUCATION/TRAINING PROGRAM

## 2023-04-27 PROCEDURE — 99285 PR EMERGENCY DEPT VISIT,LEVEL V: ICD-10-PCS | Mod: HCNC,,, | Performed by: EMERGENCY MEDICINE

## 2023-04-27 PROCEDURE — 84484 ASSAY OF TROPONIN QUANT: CPT | Mod: 91,HCNC

## 2023-04-27 PROCEDURE — 96361 HYDRATE IV INFUSION ADD-ON: CPT | Mod: HCNC

## 2023-04-27 PROCEDURE — 36415 COLL VENOUS BLD VENIPUNCTURE: CPT | Mod: HCNC | Performed by: NURSE PRACTITIONER

## 2023-04-27 PROCEDURE — 83880 ASSAY OF NATRIURETIC PEPTIDE: CPT | Mod: HCNC | Performed by: STUDENT IN AN ORGANIZED HEALTH CARE EDUCATION/TRAINING PROGRAM

## 2023-04-27 PROCEDURE — 99497 ADVNCD CARE PLAN 30 MIN: CPT | Mod: HCNC,25,, | Performed by: NURSE PRACTITIONER

## 2023-04-27 PROCEDURE — 84443 ASSAY THYROID STIM HORMONE: CPT | Mod: HCNC | Performed by: STUDENT IN AN ORGANIZED HEALTH CARE EDUCATION/TRAINING PROGRAM

## 2023-04-27 RX ORDER — QUETIAPINE FUMARATE 25 MG/1
25 TABLET, FILM COATED ORAL NIGHTLY PRN
Status: DISCONTINUED | OUTPATIENT
Start: 2023-04-27 | End: 2023-04-28 | Stop reason: HOSPADM

## 2023-04-27 RX ORDER — LANOLIN ALCOHOL/MO/W.PET/CERES
50 CREAM (GRAM) TOPICAL DAILY
Status: DISCONTINUED | OUTPATIENT
Start: 2023-04-28 | End: 2023-04-28 | Stop reason: HOSPADM

## 2023-04-27 RX ORDER — ERGOCALCIFEROL 1.25 MG/1
50000 CAPSULE ORAL
Status: DISCONTINUED | OUTPATIENT
Start: 2023-04-28 | End: 2023-04-28 | Stop reason: HOSPADM

## 2023-04-27 RX ORDER — HYDRALAZINE HYDROCHLORIDE 20 MG/ML
10 INJECTION INTRAMUSCULAR; INTRAVENOUS EVERY 6 HOURS PRN
Status: DISCONTINUED | OUTPATIENT
Start: 2023-04-27 | End: 2023-04-27

## 2023-04-27 RX ORDER — PROMETHAZINE HYDROCHLORIDE 25 MG/1
25 TABLET ORAL EVERY 6 HOURS PRN
Status: DISCONTINUED | OUTPATIENT
Start: 2023-04-27 | End: 2023-04-28 | Stop reason: HOSPADM

## 2023-04-27 RX ORDER — HYDRALAZINE HYDROCHLORIDE 20 MG/ML
5 INJECTION INTRAMUSCULAR; INTRAVENOUS EVERY 6 HOURS PRN
Status: DISCONTINUED | OUTPATIENT
Start: 2023-04-27 | End: 2023-04-28 | Stop reason: HOSPADM

## 2023-04-27 RX ORDER — TALC
6 POWDER (GRAM) TOPICAL NIGHTLY PRN
Status: DISCONTINUED | OUTPATIENT
Start: 2023-04-27 | End: 2023-04-28 | Stop reason: HOSPADM

## 2023-04-27 RX ORDER — CARBIDOPA AND LEVODOPA 25; 100 MG/1; MG/1
2 TABLET, EXTENDED RELEASE ORAL 3 TIMES DAILY
Status: DISCONTINUED | OUTPATIENT
Start: 2023-04-27 | End: 2023-04-28 | Stop reason: HOSPADM

## 2023-04-27 RX ORDER — SODIUM CHLORIDE 0.9 % (FLUSH) 0.9 %
10 SYRINGE (ML) INJECTION EVERY 12 HOURS PRN
Status: DISCONTINUED | OUTPATIENT
Start: 2023-04-27 | End: 2023-04-28 | Stop reason: HOSPADM

## 2023-04-27 RX ORDER — FOLIC ACID 1 MG/1
1000 TABLET ORAL DAILY
Status: DISCONTINUED | OUTPATIENT
Start: 2023-04-28 | End: 2023-04-28 | Stop reason: HOSPADM

## 2023-04-27 RX ORDER — FINASTERIDE 5 MG/1
5 TABLET, FILM COATED ORAL DAILY
Status: DISCONTINUED | OUTPATIENT
Start: 2023-04-28 | End: 2023-04-28 | Stop reason: HOSPADM

## 2023-04-27 RX ORDER — ACETAMINOPHEN 325 MG/1
650 TABLET ORAL EVERY 4 HOURS PRN
Status: DISCONTINUED | OUTPATIENT
Start: 2023-04-27 | End: 2023-04-28 | Stop reason: HOSPADM

## 2023-04-27 RX ORDER — SERTRALINE HYDROCHLORIDE 100 MG/1
200 TABLET, FILM COATED ORAL DAILY
Status: DISCONTINUED | OUTPATIENT
Start: 2023-04-28 | End: 2023-04-28 | Stop reason: HOSPADM

## 2023-04-27 RX ORDER — CELECOXIB 100 MG/1
100 CAPSULE ORAL 2 TIMES DAILY
Status: DISCONTINUED | OUTPATIENT
Start: 2023-04-27 | End: 2023-04-28 | Stop reason: HOSPADM

## 2023-04-27 RX ORDER — SODIUM BICARBONATE 650 MG/1
650 TABLET ORAL DAILY
Status: DISCONTINUED | OUTPATIENT
Start: 2023-04-28 | End: 2023-04-28 | Stop reason: HOSPADM

## 2023-04-27 RX ORDER — ENOXAPARIN SODIUM 100 MG/ML
40 INJECTION SUBCUTANEOUS EVERY 24 HOURS
Status: DISCONTINUED | OUTPATIENT
Start: 2023-04-27 | End: 2023-04-28

## 2023-04-27 RX ORDER — NALOXONE HCL 0.4 MG/ML
0.02 VIAL (ML) INJECTION
Status: DISCONTINUED | OUTPATIENT
Start: 2023-04-27 | End: 2023-04-28 | Stop reason: HOSPADM

## 2023-04-27 RX ORDER — ATORVASTATIN CALCIUM 40 MG/1
40 TABLET, FILM COATED ORAL DAILY
Status: DISCONTINUED | OUTPATIENT
Start: 2023-04-28 | End: 2023-04-28 | Stop reason: HOSPADM

## 2023-04-27 RX ORDER — ONDANSETRON 8 MG/1
8 TABLET, ORALLY DISINTEGRATING ORAL EVERY 8 HOURS PRN
Status: DISCONTINUED | OUTPATIENT
Start: 2023-04-27 | End: 2023-04-28 | Stop reason: HOSPADM

## 2023-04-27 RX ORDER — DEXTROSE 40 %
30 GEL (GRAM) ORAL
Status: DISCONTINUED | OUTPATIENT
Start: 2023-04-27 | End: 2023-04-28 | Stop reason: HOSPADM

## 2023-04-27 RX ORDER — CLONAZEPAM 0.5 MG/1
0.5 TABLET ORAL 3 TIMES DAILY PRN
Status: DISCONTINUED | OUTPATIENT
Start: 2023-04-27 | End: 2023-04-28 | Stop reason: HOSPADM

## 2023-04-27 RX ORDER — CARBIDOPA AND LEVODOPA 25; 100 MG/1; MG/1
2 TABLET ORAL ONCE
Status: COMPLETED | OUTPATIENT
Start: 2023-04-27 | End: 2023-04-27

## 2023-04-27 RX ORDER — GLUCAGON 1 MG
1 KIT INJECTION
Status: DISCONTINUED | OUTPATIENT
Start: 2023-04-27 | End: 2023-04-28 | Stop reason: HOSPADM

## 2023-04-27 RX ORDER — POLYETHYLENE GLYCOL 3350 17 G/17G
17 POWDER, FOR SOLUTION ORAL DAILY PRN
Status: DISCONTINUED | OUTPATIENT
Start: 2023-04-27 | End: 2023-04-28 | Stop reason: HOSPADM

## 2023-04-27 RX ORDER — POTASSIUM CITRATE 10 MEQ/1
10 TABLET, EXTENDED RELEASE ORAL DAILY
Status: DISCONTINUED | OUTPATIENT
Start: 2023-04-28 | End: 2023-04-28 | Stop reason: HOSPADM

## 2023-04-27 RX ORDER — LOSARTAN POTASSIUM 25 MG/1
25 TABLET ORAL DAILY
Status: DISCONTINUED | OUTPATIENT
Start: 2023-04-28 | End: 2023-04-27

## 2023-04-27 RX ORDER — HYDROXYZINE HYDROCHLORIDE 10 MG/1
10 TABLET, FILM COATED ORAL 3 TIMES DAILY PRN
Status: DISCONTINUED | OUTPATIENT
Start: 2023-04-27 | End: 2023-04-28 | Stop reason: HOSPADM

## 2023-04-27 RX ORDER — DEXTROSE 40 %
15 GEL (GRAM) ORAL
Status: DISCONTINUED | OUTPATIENT
Start: 2023-04-27 | End: 2023-04-28 | Stop reason: HOSPADM

## 2023-04-27 RX ORDER — BISACODYL 10 MG
10 SUPPOSITORY, RECTAL RECTAL DAILY PRN
Status: DISCONTINUED | OUTPATIENT
Start: 2023-04-27 | End: 2023-04-28 | Stop reason: HOSPADM

## 2023-04-27 RX ORDER — HYDRALAZINE HYDROCHLORIDE 20 MG/ML
10 INJECTION INTRAMUSCULAR; INTRAVENOUS ONCE
Status: COMPLETED | OUTPATIENT
Start: 2023-04-27 | End: 2023-04-27

## 2023-04-27 RX ORDER — TAMSULOSIN HYDROCHLORIDE 0.4 MG/1
1 CAPSULE ORAL DAILY
Status: DISCONTINUED | OUTPATIENT
Start: 2023-04-28 | End: 2023-04-28 | Stop reason: HOSPADM

## 2023-04-27 RX ORDER — LANOLIN ALCOHOL/MO/W.PET/CERES
400 CREAM (GRAM) TOPICAL DAILY
Status: DISCONTINUED | OUTPATIENT
Start: 2023-04-28 | End: 2023-04-28 | Stop reason: HOSPADM

## 2023-04-27 RX ADMIN — CELECOXIB 100 MG: 100 CAPSULE ORAL at 08:04

## 2023-04-27 RX ADMIN — CARBIDOPA AND LEVODOPA 2 TABLET: 25; 100 TABLET ORAL at 02:04

## 2023-04-27 RX ADMIN — QUETIAPINE FUMARATE 25 MG: 25 TABLET ORAL at 10:04

## 2023-04-27 RX ADMIN — HYDRALAZINE HYDROCHLORIDE 10 MG: 20 INJECTION, SOLUTION INTRAMUSCULAR; INTRAVENOUS at 03:04

## 2023-04-27 RX ADMIN — ENOXAPARIN SODIUM 40 MG: 100 INJECTION SUBCUTANEOUS at 05:04

## 2023-04-27 RX ADMIN — CARBIDOPA AND LEVODOPA 2 TABLET: 25; 100 TABLET, EXTENDED RELEASE ORAL at 08:04

## 2023-04-27 RX ADMIN — SODIUM CHLORIDE 1000 ML: 0.9 INJECTION, SOLUTION INTRAVENOUS at 07:04

## 2023-04-27 NOTE — ASSESSMENT & PLAN NOTE
- chronic, diet controlled  - previously on losartan, had been stopped prior  - restarting during admission given hypertensive BP

## 2023-04-27 NOTE — SUBJECTIVE & OBJECTIVE
Past Medical History:   Diagnosis Date    Anxiety     Arthritis     BPH (benign prostatic hypertrophy)     Cataract     OS    Colon polyp 2002    Depression     Epiretinal membrane, both eyes     Hx of psychiatric care     Hyperlipidemia     Hypertension     Kidney stones     Kidney stones     Parkinsons     Posterior vitreous detachment of left eye     Psychiatric problem     Retinal detachment 2004    OD    Retinoschisis, left eye     Sleep apnea     Vitreous hemorrhage of left eye        Past Surgical History:   Procedure Laterality Date    BIOPSY OF ADENOIDS      CATARACT EXTRACTION      OD    COLONOSCOPY N/A 12/3/2019    Procedure: COLONOSCOPY;  Surgeon: Ceferino Oliveira MD;  Location: [facility] ENDO (4TH FLR);  Service: Endoscopy;  Laterality: N/A;  5/2019 laminectomy with post op confusion tb    CYSTOSCOPY WITH URODYNAMIC TESTING N/A 1/31/2022    Procedure: CYSTOSCOPY, WITH URODYNAMIC TESTING FLOUROSCOPIC;  Surgeon: Devang Butler MD;  Location: [facility] OR 1ST FLR;  Service: Urology;  Laterality: N/A;  1hr    EPIDURAL STEROID INJECTION N/A 3/12/2019    Procedure: INJECTION, STEROID, EPIDURAL, L5-S1;  Surgeon: Laina Crockett MD;  Location: [facility] PAIN MGT;  Service: Pain Management;  Laterality: N/A;    EXAMINATION UNDER ANESTHESIA N/A 2/18/2021    Procedure: Exam under anesthesia;  Surgeon: West Clifton MD;  Location: [facility] OR 2ND FLR;  Service: Colon and Rectal;  Laterality: N/A;    EYE SURGERY      INCISION OF PERIRECTAL ABSCESS N/A 2/12/2021    Procedure: INCISION AND DRAINAGE  ABSCESS, PERIRECTAL;  Surgeon: West Clifton MD;  Location: [facility] OR 2ND FLR;  Service: Colon and Rectal;  Laterality: N/A;    INCISION OF PERIRECTAL ABSCESS Left 2/18/2021    Procedure: INCISION, ABSCESS, PERIRECTAL;  Surgeon: West Clifton MD;  Location: [facility] OR 2ND FLR;  Service: Colon and Rectal;  Laterality: Left;    INJECTION OF ANESTHETIC AGENT AROUND NERVE Bilateral 7/3/2018    Procedure: BLOCK, NERVE;  Surgeon:  Laina Crockett MD;  Location: [facility] PAIN MGT;  Service: Pain Management;  Laterality: Bilateral;  Bilateral Lumbar L2,L3,L4,L5 MBB      NEEDS CONSENT    INSERTION OF SETON STITCH N/A 2/12/2021    Procedure: PLACEMENT, SETON STITCH;  Surgeon: West Clifton MD;  Location: [facility] OR 2ND FLR;  Service: Colon and Rectal;  Laterality: N/A;    INSERTION OF SETON STITCH Left 2/18/2021    Procedure: PLACEMENT, SETON;  Surgeon: West Clifton MD;  Location: [facility] OR 2ND FLR;  Service: Colon and Rectal;  Laterality: Left;    INSERTION OF SETON STITCH N/A 9/24/2021    Procedure: PLACEMENT, SETON STITCH;  Surgeon: West Clifton MD;  Location: [facility] OR 2ND FLR;  Service: Colon and Rectal;  Laterality: N/A;  seton exchange    laser indirect  4/8/10    left eye    Laser Indirect Retinopexy  4/8/10    OS    PROSTATE SURGERY      RECTAL FISTULOTOMY N/A 11/12/2021    Procedure: FISTULOTOMY, RECTUM;  Surgeon: West Clifton MD;  Location: [facility] OR [facility] FLR;  Service: Colon and Rectal;  Laterality: N/A;    RETINAL DETACHMENT SURGERY  2004    OD    ROTATOR CUFF REPAIR  11/13/2013    TONSILLECTOMY      TRANSFORAMINAL EPIDURAL INJECTION OF STEROID Bilateral 2/19/2019    Procedure: INJECTION, STEROID, EPIDURAL, TRANSFORAMINAL APPROACH, L5;  Surgeon: Laina Crockett MD;  Location: [facility] PAIN MGT;  Service: Pain Management;  Laterality: Bilateral;       Review of patient's allergies indicates:  No Known Allergies    Current Facility-Administered Medications on File Prior to Encounter   Medication    0.9%  NaCl infusion    mupirocin 2 % ointment    tamsulosin 24 hr capsule 0.4 mg     Current Outpatient Medications on File Prior to Encounter   Medication Sig    acetaminophen (TYLENOL) 500 MG tablet Take 1,000 mg by mouth every 8 (eight) hours as needed for Pain.    atorvastatin (LIPITOR) 40 MG tablet TAKE 1 TABLET EVERY DAY    carbidopa-levodopa  mg (SINEMET)  mg per tablet TAKE 1 QID (Patient taking  differently: Take 1.5 tablets by mouth 3 (three) times daily.)    celecoxib (CELEBREX) 100 MG capsule Take 100 mg by mouth 2 (two) times daily.    clonazePAM (KLONOPIN) 0.5 MG tablet TAKE 1/2 TO 1 TABLET BY MOUTH UP TO 3 TIMES DAILY AS NEEDED FOR ANXIETY    ergocalciferol (ERGOCALCIFEROL) 50,000 unit Cap TAKE 1 CAPSULE (50,000 UNITS TOTAL) BY MOUTH EVERY 7 DAYS.    finasteride (PROSCAR) 5 mg tablet Take 1 tablet (5 mg total) by mouth once daily.    folic acid (FOLVITE) 1 MG tablet TAKE 1 TABLET EVERY DAY    hydrOXYzine HCL (ATARAX) 10 MG Tab TAKE 1 TABLET BY MOUTH 3 TIMES A DAY AS NEEDED ANXIETY (Patient not taking: Reported on 4/13/2023)    ibuprofen (ADVIL,MOTRIN) 200 MG tablet Take 200 mg by mouth every 6 (six) hours as needed.    levodopa (INBRIJA) 42 mg Cap Inhale 84 mg into the lungs 4 (four) times daily as needed (As needed for freezing/OFF episodes.).    losartan (COZAAR) 25 MG tablet Take 1 tablet (25 mg total) by mouth once daily.    magnesium oxide (MAG-OX) 400 mg tablet Take 400 mg by mouth once daily.    potassium citrate (UROCIT-K) 10 mEq (1,080 mg) TbSR TAKE 1 TABLET THREE TIMES DAILY WITH MEALS (Patient taking differently: Take 10 mEq by mouth once daily.)    pyridoxine, vitamin B6, (B-6) 50 MG Tab Take 50 mg by mouth once daily.    QUEtiapine (SEROQUEL) 25 MG Tab TAKE ORAL 1/2 TO 1 TABLET QHS.  TAKE IN PLACE OF CLONAZEPAM (Patient taking differently: Take 25 mg by mouth every evening.)    sertraline (ZOLOFT) 100 MG tablet Take 2 tablets (200 mg total) by mouth once daily.    sertraline (ZOLOFT) 100 MG tablet Take 2 tablets (200 mg total) by mouth once daily.    sodium bicarbonate 650 MG tablet TAKE 1 TABLET ONE TIME DAILY    tamsulosin (FLOMAX) 0.4 mg Cap TAKE 1 CAPSULE EVERY DAY    zaleplon (SONATA) 5 MG Cap Take 1 capsule (5 mg total) by mouth nightly as needed (insomnia).    [DISCONTINUED] ketoconazole (NIZORAL) 2 % cream Apply topically 2 (two) times daily as needed.     Family History        Problem Relation (Age of Onset)    Anxiety disorder Son, Daughter    Cancer Father, Mother    Cataracts Father    Depression Son    Diabetes Father          Tobacco Use    Smoking status: Never    Smokeless tobacco: Never   Substance and Sexual Activity    Alcohol use: No    Drug use: No    Sexual activity: Not on file     Review of Systems   Constitutional:  Negative for activity change, chills and fever.   HENT:  Negative for trouble swallowing.    Eyes:  Negative for photophobia and visual disturbance.   Respiratory:  Negative for chest tightness, shortness of breath and wheezing.    Cardiovascular:  Negative for chest pain, palpitations and leg swelling.   Gastrointestinal:  Negative for abdominal pain, constipation, diarrhea, nausea and vomiting.   Genitourinary:  Negative for dysuria, frequency, hematuria and urgency.   Musculoskeletal:  Negative for arthralgias, back pain and gait problem.   Skin:  Negative for color change and rash.   Neurological:  Negative for dizziness, syncope, weakness, light-headedness, numbness and headaches.   Psychiatric/Behavioral:  Negative for agitation and confusion. The patient is not nervous/anxious.    Objective:     Vital Signs (Most Recent):  Temp: 98.7 °F (37.1 °C) (04/27/23 1530)  Pulse: 67 (04/27/23 1530)  Resp: 18 (04/27/23 1530)  BP: (!) 223/109 (04/27/23 1530)  SpO2: 98 % (04/27/23 1530)   Vital Signs (24h Range):  Temp:  [98.1 °F (36.7 °C)-98.7 °F (37.1 °C)] 98.7 °F (37.1 °C)  Pulse:  [40-73] 67  Resp:  [16-21] 18  SpO2:  [97 %-100 %] 98 %  BP: (172-223)/() 223/109     Weight: 86.2 kg (190 lb)  Body mass index is 27.26 kg/m².    Physical Exam  Vitals and nursing note reviewed.   Constitutional:       General: He is not in acute distress.     Appearance: He is well-developed.   HENT:      Head: Normocephalic and atraumatic.      Mouth/Throat:      Pharynx: No oropharyngeal exudate.   Eyes:      Conjunctiva/sclera: Conjunctivae normal.      Pupils: Pupils are  equal, round, and reactive to light.   Cardiovascular:      Rate and Rhythm: Normal rate and regular rhythm.      Heart sounds: Normal heart sounds.   Pulmonary:      Effort: Pulmonary effort is normal. No respiratory distress.      Breath sounds: Normal breath sounds. No wheezing.   Abdominal:      General: Bowel sounds are normal. There is no distension.      Palpations: Abdomen is soft.      Tenderness: There is no abdominal tenderness.   Musculoskeletal:         General: No tenderness. Normal range of motion.      Cervical back: Normal range of motion and neck supple.      Right lower leg: No edema.      Left lower leg: No edema.   Lymphadenopathy:      Cervical: No cervical adenopathy.   Skin:     General: Skin is warm and dry.      Capillary Refill: Capillary refill takes less than 2 seconds.      Findings: No rash.   Neurological:      Mental Status: He is alert and oriented to person, place, and time.      Cranial Nerves: No cranial nerve deficit.      Sensory: No sensory deficit.      Coordination: Coordination normal.   Psychiatric:         Behavior: Behavior normal.         Thought Content: Thought content normal.         Judgment: Judgment normal.         CRANIAL NERVES     CN III, IV, VI   Pupils are equal, round, and reactive to light.     Significant Labs: All pertinent labs within the past 24 hours have been reviewed.  CBC:   Recent Labs   Lab 04/27/23  1218 04/27/23  1224   WBC 6.18  --    HGB 15.0  --    HCT 44.2 45     --      CMP:   Recent Labs   Lab 04/27/23  1218      K 4.4      CO2 25   GLU 86   BUN 22   CREATININE 0.9   CALCIUM 9.7   PROT 7.2   ALBUMIN 4.1   BILITOT 0.9   ALKPHOS 72   AST 39   ALT 9*   ANIONGAP 8     Troponin:   Recent Labs   Lab 04/27/23  1218   TROPONINI 0.032*  0.034*       Significant Imaging: I have reviewed all pertinent imaging results/findings within the past 24 hours.  Imaging Results    None

## 2023-04-27 NOTE — ED TRIAGE NOTES
Pt arriving to ED via EMS Coming from home after home health care nurse noted a low heart rate. Pt denies any SOB, no chest pain.

## 2023-04-27 NOTE — Clinical Note
Diagnosis: Bradycardia [[id number]]   Future Attending Provider: ALEJANDRO BARFIELD [[id number]]   Admitting Provider:: ALEJANDRO BARFIELD [[id number]]

## 2023-04-27 NOTE — TELEPHONE ENCOUNTER
Reason for Disposition   Dizziness, lightheadedness, or weakness and heart beating very slowly (e.g., < 50 / minute)    Additional Information   Negative: Passed out (i.e., lost consciousness, collapsed and was not responding)   Negative: Shock suspected (e.g., cold/pale/clammy skin, too weak to stand, low BP, rapid pulse)   Negative: Difficult to awaken or acting confused (e.g., disoriented, slurred speech)   Negative: Visible sweat on face or sweat dripping down face   Negative: Unable to walk, or can only walk with assistance (e.g., requires support)   Negative: Received SHOCK from implantable cardiac defibrillator and has persisting symptoms (i.e., palpitations, lightheadedness)   Negative: Dizziness, lightheadedness, or weakness and heart beating very rapidly (e.g., > 140 / minute)    Protocols used: Heart Rate and Heartbeat [id number]  Received call from [facility]s [facility] nurse Sally stating his heart rate is low. HR is 40 and he complains of weakness. Advised per Triage protocol to call 911 for EMS. She verbalized understanding.

## 2023-04-27 NOTE — ED NOTES
Patient identifiers verified and correct for Haja Culotta  LOC: The patient is awake, alert and aware of environment with an appropriate affect, the patient is oriented x 3 and speaking appropriately.   APPEARANCE: Patient appears comfortable and in no acute distress, patient is clean and well groomed.  SKIN: The skin is warm and dry, color consistent with ethnicity, patient has normal skin turgor and moist mucus membranes, skin intact  MUSCULOSKELETAL: Patient moving all extremities spontaneously, no swelling noted.  RESPIRATORY: Airway is open and patent, respirations are spontaneous, patient has a normal effort and rate, no accessory muscle use noted, pt placed on continuous pulse ox with O2 sats noted at 97% on room air.  CARDIAC: Pt placed on cardiac monitor. Patient has a clarisse rate, no edema noted, capillary refill < 3 seconds.   GASTRO: Soft and non tender to palpation, no distention noted, normoactive bowel sounds present in all four quadrants. Pt states bowel movements have been regular.  : Pt denies any pain or frequency with urination.  NEURO: Pt opens eyes spontaneously, behavior appropriate to situation, follows commands, facial expression symmetrical, bilateral hand grasp equal and even, purposeful motor response noted, normal sensation in all extremities when touched with a finger.

## 2023-04-27 NOTE — H&P
[facility] Emergency Dept  Hospital Medicine  History & Physical    Patient Name: Haja Marti  MRN: [id number]  Patient Class: OP- Observation  Admission Date: 4/27/2023  Attending Physician: Nelly Moy MD   Primary Care Provider: Andrez Jackson MD         Patient information was obtained from patient, spouse/SO, relative(s) and ER records.     Subjective:     Principal Problem:Bradycardia    Chief Complaint:   Chief Complaint   Patient presents with    Bradycardia     Coming from home after home health care nurse noted a low heart rate. Pt denies any SOB, no chest pain.         HPI: Haja Marti is a 77 y.o. male with PMHx of HTN, HLD, Parkinson's disease who presents to [facility] ED with bradycardia. His Home Health nurse noted that his HR was in the 30s, contacted PCP who recommended ED visit for evaluation. Patient denies associated symptoms including HA, chest pain, shortness of breath, N/V/D, urinary symptoms, numbness or tingling. Patient reports he previously had a Holter Monitor earlier this month. Patient reports that his HR has never been as low as reported and normally BP systolic 120-130.    In the ED: Hypertensive to 221/115, bradycardic to 40, other VSSAF. CBC without leukocytosis. CMP without emergent abnormalities. . Troponin 0.034. EKG NSR with 2nd degree A-V block (Mobitz I), LAD, RBBB, abnormal compared to prior. Received carbidopa-levodopa. Patient admitted to Hospital Medicine for further management.       Past Medical History:   Diagnosis Date    Anxiety     Arthritis     BPH (benign prostatic hypertrophy)     Cataract     OS    Colon polyp 2002    Depression     Epiretinal membrane, both eyes     Hx of psychiatric care     Hyperlipidemia     Hypertension     Kidney stones     Kidney stones     Parkinsons     Posterior vitreous detachment of left eye     Psychiatric problem     Retinal detachment 2004    OD    Retinoschisis, left eye     Sleep apnea      Vitreous hemorrhage of left eye        Past Surgical History:   Procedure Laterality Date    BIOPSY OF ADENOIDS      CATARACT EXTRACTION      OD    COLONOSCOPY N/A 12/3/2019    Procedure: COLONOSCOPY;  Surgeon: Ceferino Oliveira MD;  Location: [facility] ENDO (4TH FLR);  Service: Endoscopy;  Laterality: N/A;  5/2019 laminectomy with post op confusion tb    CYSTOSCOPY WITH URODYNAMIC TESTING N/A 1/31/2022    Procedure: CYSTOSCOPY, WITH URODYNAMIC TESTING FLOUROSCOPIC;  Surgeon: Devang Butler MD;  Location: [facility] OR 1ST FLR;  Service: Urology;  Laterality: N/A;  1hr    EPIDURAL STEROID INJECTION N/A 3/12/2019    Procedure: INJECTION, STEROID, EPIDURAL, L5-S1;  Surgeon: Laina Crockett MD;  Location: [facility] PAIN MGT;  Service: Pain Management;  Laterality: N/A;    EXAMINATION UNDER ANESTHESIA N/A 2/18/2021    Procedure: Exam under anesthesia;  Surgeon: West Clifton MD;  Location: [facility] OR 2ND FLR;  Service: Colon and Rectal;  Laterality: N/A;    EYE SURGERY      INCISION OF PERIRECTAL ABSCESS N/A 2/12/2021    Procedure: INCISION AND DRAINAGE  ABSCESS, PERIRECTAL;  Surgeon: West Clifton MD;  Location: [facility] OR 2ND FLR;  Service: Colon and Rectal;  Laterality: N/A;    INCISION OF PERIRECTAL ABSCESS Left 2/18/2021    Procedure: INCISION, ABSCESS, PERIRECTAL;  Surgeon: West Clifton MD;  [facility];  Service: Colon and Rectal;  Laterality: Left;    INJECTION OF ANESTHETIC AGENT AROUND NERVE Bilateral 7/3/2018    Procedure: BLOCK, NERVE;  Surgeon: Laina Crockett MD;  Location: [facility] PAIN MGT;  Service: Pain Management;  Laterality: Bilateral;  Bilateral Lumbar L2,L3,L4,L5 MBB      NEEDS CONSENT    INSERTION OF SETON STITCH N/A 2/12/2021    Procedure: PLACEMENT, SETON STITCH;  Surgeon: West Clifton MD;  Location: [facility] OR 2ND FLR;  Service: Colon and Rectal;  Laterality: N/A;    INSERTION OF SETON STITCH Left 2/18/2021    Procedure: PLACEMENT, SETON;  Surgeon: West CIFUENTES  MD Elodia;  Location: [facility] OR 2ND FLR;  Service: Colon and Rectal;  Laterality: Left;    INSERTION OF SETON STITCH N/A 9/24/2021    Procedure: PLACEMENT, SETON STITCH;  Surgeon: West Clifton MD;  Location: [facility] OR 2ND FLR;  Service: Colon and Rectal;  Laterality: N/A;  seton exchange    laser indirect  4/8/10    left eye    Laser Indirect Retinopexy  4/8/10    OS    PROSTATE SURGERY      RECTAL FISTULOTOMY N/A 11/12/2021    Procedure: FISTULOTOMY, RECTUM;  Surgeon: West Clifton MD;  Location: [facility] OR 2ND FLR;  Service: Colon and Rectal;  Laterality: N/A;    RETINAL DETACHMENT SURGERY  2004    OD    ROTATOR CUFF REPAIR  11/13/2013    TONSILLECTOMY      TRANSFORAMINAL EPIDURAL INJECTION OF STEROID Bilateral 2/19/2019    Procedure: INJECTION, STEROID, EPIDURAL, TRANSFORAMINAL APPROACH, L5;  Surgeon: Laina Crockett MD;  Location: [facility];  Service: Pain Management;  Laterality: Bilateral;       Review of patient's allergies indicates:  No Known Allergies    Current Facility-Administered Medications on File Prior to Encounter   Medication    0.9%  NaCl infusion    mupirocin 2 % ointment    tamsulosin 24 hr capsule 0.4 mg     Current Outpatient Medications on File Prior to Encounter   Medication Sig    acetaminophen (TYLENOL) 500 MG tablet Take 1,000 mg by mouth every 8 (eight) hours as needed for Pain.    atorvastatin (LIPITOR) 40 MG tablet TAKE 1 TABLET EVERY DAY    carbidopa-levodopa  mg (SINEMET)  mg per tablet TAKE 1 QID (Patient taking differently: Take 1.5 tablets by mouth 3 (three) times daily.)    celecoxib (CELEBREX) 100 MG capsule Take 100 mg by mouth 2 (two) times daily.    clonazePAM (KLONOPIN) 0.5 MG tablet TAKE 1/2 TO 1 TABLET BY MOUTH UP TO 3 TIMES DAILY AS NEEDED FOR ANXIETY    ergocalciferol (ERGOCALCIFEROL) 50,000 unit Cap TAKE 1 CAPSULE (50,000 UNITS TOTAL) BY MOUTH EVERY 7 DAYS.    finasteride (PROSCAR) 5 mg tablet Take 1 tablet (5 mg total) by  mouth once daily.    folic acid (FOLVITE) 1 MG tablet TAKE 1 TABLET EVERY DAY    hydrOXYzine HCL (ATARAX) 10 MG Tab TAKE 1 TABLET BY MOUTH 3 TIMES A DAY AS NEEDED ANXIETY (Patient not taking: Reported on 4/13/2023)    ibuprofen (ADVIL,MOTRIN) 200 MG tablet Take 200 mg by mouth every 6 (six) hours as needed.    levodopa (INBRIJA) 42 mg Cap Inhale 84 mg into the lungs 4 (four) times daily as needed (As needed for freezing/OFF episodes.).    losartan (COZAAR) 25 MG tablet Take 1 tablet (25 mg total) by mouth once daily.    magnesium oxide (MAG-OX) 400 mg tablet Take 400 mg by mouth once daily.    potassium citrate (UROCIT-K) 10 mEq (1,080 mg) TbSR TAKE 1 TABLET THREE TIMES DAILY WITH MEALS (Patient taking differently: Take 10 mEq by mouth once daily.)    pyridoxine, vitamin B6, (B-6) 50 MG Tab Take 50 mg by mouth once daily.    QUEtiapine (SEROQUEL) 25 MG Tab TAKE ORAL 1/2 TO 1 TABLET QHS.  TAKE IN PLACE OF CLONAZEPAM (Patient taking differently: Take 25 mg by mouth every evening.)    sertraline (ZOLOFT) 100 MG tablet Take 2 tablets (200 mg total) by mouth once daily.    sertraline (ZOLOFT) 100 MG tablet Take 2 tablets (200 mg total) by mouth once daily.    sodium bicarbonate 650 MG tablet TAKE 1 TABLET ONE TIME DAILY    tamsulosin (FLOMAX) 0.4 mg Cap TAKE 1 CAPSULE EVERY DAY    zaleplon (SONATA) 5 MG Cap Take 1 capsule (5 mg total) by mouth nightly as needed (insomnia).    [DISCONTINUED] ketoconazole (NIZORAL) 2 % cream Apply topically 2 (two) times daily as needed.     Family History       Problem Relation (Age of Onset)    Anxiety disorder Son, Daughter    Cancer Father, Mother    Cataracts Father    Depression Son    Diabetes Father          Tobacco Use    Smoking status: Never    Smokeless tobacco: Never   Substance and Sexual Activity    Alcohol use: No    Drug use: No    Sexual activity: Not on file     Review of Systems   Constitutional:  Negative for activity change, chills and fever.    HENT:  Negative for trouble swallowing.    Eyes:  Negative for photophobia and visual disturbance.   Respiratory:  Negative for chest tightness, shortness of breath and wheezing.    Cardiovascular:  Negative for chest pain, palpitations and leg swelling.   Gastrointestinal:  Negative for abdominal pain, constipation, diarrhea, nausea and vomiting.   Genitourinary:  Negative for dysuria, frequency, hematuria and urgency.   Musculoskeletal:  Negative for arthralgias, back pain and gait problem.   Skin:  Negative for color change and rash.   Neurological:  Negative for dizziness, syncope, light-headedness, numbness and headaches. Positive for weakness.   Psychiatric/Behavioral:  Negative for agitation and confusion. The patient is not nervous/anxious.    Objective:     Vital Signs (Most Recent):  Temp: 98.7 °F (37.1 °C) (04/27/23 1530)  Pulse: 67 (04/27/23 1530)  Resp: 18 (04/27/23 1530)  BP: (!) 223/109 (04/27/23 1530)  SpO2: 98 % (04/27/23 1530)   Vital Signs (24h Range):  Temp:  [98.1 °F (36.7 °C)-98.7 °F (37.1 °C)] 98.7 °F (37.1 °C)  Pulse:  [40-73] 67  Resp:  [16-21] 18  SpO2:  [97 %-100 %] 98 %  BP: (172-223)/() 223/109     Weight: 86.2 kg (190 lb)  Body mass index is 27.26 kg/m².    Physical Exam  Vitals and nursing note reviewed.   Constitutional:       General: He is not in acute distress.     Appearance: He is well-developed.   HENT:      Head: Normocephalic and atraumatic.      Mouth/Throat:      Pharynx: No oropharyngeal exudate.   Eyes:      Conjunctiva/sclera: Conjunctivae normal.      Pupils: Pupils are equal, round, and reactive to light.   Cardiovascular:      Rate and Rhythm: Normal rate and regular rhythm.      Heart sounds: Normal heart sounds.   Pulmonary:      Effort: Pulmonary effort is normal. No respiratory distress.      Breath sounds: Normal breath sounds. No wheezing.   Abdominal:      General: Bowel sounds are normal. There is no distension.      Palpations: Abdomen is soft.       Tenderness: There is no abdominal tenderness.   Musculoskeletal:         General: No tenderness. Normal range of motion.      Cervical back: Normal range of motion and neck supple.      Right lower leg: No edema.      Left lower leg: No edema.   Lymphadenopathy:      Cervical: No cervical adenopathy.   Skin:     General: Skin is warm and dry.      Capillary Refill: Capillary refill takes less than 2 seconds.      Findings: No rash.   Neurological:      Mental Status: He is alert and oriented to person, place, and time.      Cranial Nerves: No cranial nerve deficit.      Sensory: No sensory deficit.      Coordination: Coordination normal.   Psychiatric:         Behavior: Behavior normal.         Thought Content: Thought content normal.         Judgment: Judgment normal.         CRANIAL NERVES     CN III, IV, VI   Pupils are equal, round, and reactive to light.     Significant Labs: All pertinent labs within the past 24 hours have been reviewed.  CBC:   Recent Labs   Lab 04/27/23  1218 04/27/23  1224   WBC 6.18  --    HGB 15.0  --    HCT 44.2 45     --      CMP:   Recent Labs   Lab 04/27/23  1218      K 4.4      CO2 25   GLU 86   BUN 22   CREATININE 0.9   CALCIUM 9.7   PROT 7.2   ALBUMIN 4.1   BILITOT 0.9   ALKPHOS 72   AST 39   ALT 9*   ANIONGAP 8     Troponin:   Recent Labs   Lab 04/27/23  1218   TROPONINI 0.032*  0.034*       Significant Imaging: I have reviewed all pertinent imaging results/findings within the past 24 hours.  Imaging Results    None           Assessment/Plan:     * Bradycardia  - Home Health reporting bradycardic to 30s - patient denies symptoms of lightheadedness, dizziness. Admits associated weakness.  - HR 40 while in ED, improving to mid-60s on admission  - EKG NSR with 2nd degree A-V block (Mobitz I), LAD, RBBB, abnormal compared to prior.  -   - CXR pending  - Echo pending  - EP consulted, appreciate assistance    Elevated troponin  - troponin 0.034 on admission  -  patient without chest pain  - cont to trend    Parkinsonism  - chronic, cont carbidopa-levodopa    Generalized anxiety disorder  - chronic, cont klonopin prn    BPH (benign prostatic hypertrophy)  - chronic, cont home finasteride and flomax    Pure hypercholesterolemia  - chronic, cont home statin    Essential hypertension  - chronic, diet controlled  - previously on losartan, had been stopped prior  - restarting during admission given hypertensive BP    VTE Risk Mitigation (From admission, onward)         Ordered     enoxaparin injection 40 mg  Daily         04/27/23 1531     IP VTE HIGH RISK PATIENT  Once         04/27/23 1531     Place sequential compression device  Until discontinued         04/27/23 1531                     On 04/27/2023, patient should be placed in hospital observation services under my care in collaboration with Nelly Moy MD.      Martin Cee PA-C  Department of Hospital Medicine  [facility] - Emergency Dept

## 2023-04-27 NOTE — HPI
Haja Marti is a 77 y.o. male with PMHx of HTN, HLD, Parkinson's disease who [place] [facility] [place]. His Home Health nurse noted that his HR was in the 30s, contacted PCP who recommended ED visit for evaluation. Patient denies associated symptoms including HA, chest pain, shortness of breath, N/V/D, urinary symptoms, numbness or tingling. Patient reports he previously had a Holter Monitor earlier this month. Patient reports that his HR has never been as low as reported and normally BP systolic 120-130.    In the ED: Hypertensive to 221/115, bradycardic to 40, other VSSAF. CBC without leukocytosis. CMP without emergent abnormalities. . Troponin 0.034. EKG NSR with 2nd degree A-V block (Mobitz I), LAD, RBBB, abnormal compared to prior. Received carbidopa-levodopa. Patient admitted to Hospital Medicine for further management.

## 2023-04-27 NOTE — ED PROVIDER NOTES
Encounter Date: 4/27/2023       History     Chief Complaint   Patient presents with    Bradycardia     Coming from home after home health care nurse noted a low heart rate. Pt denies any SOB, no chest pain.      77-year-old male with history of hypertension, hyperlipidemia, Parkinson's disease presenting to the ED with bradycardia.  He is asymptomatic.  Home health nurse came and saw that his heart rate was in the 30s.  They called PCP who recommended patient go to the emergency department for evaluation.  Patient denies any lightheadedness, dizziness, chest pain or shortness of breath, abdominal pain, nausea, vomiting, fevers or chills.      Review of patient's allergies indicates:  No Known Allergies  Past Medical History:   Diagnosis Date    Anxiety     Arthritis     BPH (benign prostatic hypertrophy)     Cataract     OS    Colon polyp 2002    Depression     Epiretinal membrane, both eyes     Hx of psychiatric care     Hyperlipidemia     Hypertension     Kidney stones     Kidney stones     Parkinsons     Posterior vitreous detachment of left eye     Psychiatric problem     Retinal detachment 2004    OD    Retinoschisis, left eye     Sleep apnea     Vitreous hemorrhage of left eye      Past Surgical History:   Procedure Laterality Date    BIOPSY OF ADENOIDS      CATARACT EXTRACTION      OD    COLONOSCOPY N/A 12/3/2019    Procedure: COLONOSCOPY;  Surgeon: Ceferino Oliveira MD;  Location: [facility] (4TH FLR);  Service: Endoscopy;  Laterality: N/A;  5/2019 laminectomy with post op confusion tb    CYSTOSCOPY WITH URODYNAMIC TESTING N/A 1/31/2022    Procedure: CYSTOSCOPY, WITH URODYNAMIC TESTING FLOUROSCOPIC;  Surgeon: Devang Butler MD;  [facility];  Service: Urology;  Laterality: N/A;  1hr    EPIDURAL STEROID INJECTION N/A 3/12/2019    Procedure: INJECTION, STEROID, EPIDURAL, L5-S1;  Surgeon: Laina Crockett MD;  Location: [facility] PAIN MGT;  Service: Pain Management;  Laterality: N/A;    EXAMINATION  UNDER ANESTHESIA N/A 2/18/2021    Procedure: Exam under anesthesia;  Surgeon: West Clifton MD;  Location: [facility] OR [facility] FLR;  Service: Colon and Rectal;  Laterality: N/A;    EYE SURGERY      INCISION OF PERIRECTAL ABSCESS N/A 2/12/2021    Procedure: INCISION AND DRAINAGE  ABSCESS, PERIRECTAL;  Surgeon: West Clifton MD;  Location: [facility] OR 2ND FLR;  Service: Colon and Rectal;  Laterality: N/A;    INCISION OF PERIRECTAL ABSCESS Left 2/18/2021    Procedure: INCISION, ABSCESS, PERIRECTAL;  Surgeon: West Clifton MD;  Location: [facility] OR [facility] FLR;  Service: Colon and Rectal;  Laterality: Left;    INJECTION OF ANESTHETIC AGENT AROUND NERVE Bilateral 7/3/2018    Procedure: BLOCK, NERVE;  Surgeon: Laina Crockett MD;  Location: [facility] PAIN MGT;  Service: Pain Management;  Laterality: Bilateral;  Bilateral Lumbar L2,L3,L4,L5 MBB      NEEDS CONSENT    INSERTION OF SETON STITCH N/A 2/12/2021    Procedure: PLACEMENT, SETON STITCH;  Surgeon: West Clifton MD;  [facility];  Service: Colon and Rectal;  Laterality: N/A;    INSERTION OF SETON STITCH Left 2/18/2021    Procedure: PLACEMENT, SETON;  Surgeon: West Clifton MD;  Location: [facility] OR 2ND FLR;  Service: Colon and Rectal;  Laterality: Left;    INSERTION OF SETON STITCH N/A 9/24/2021    Procedure: PLACEMENT, SETON STITCH;  Surgeon: West Clifton MD;  [facility];  Service: Colon and Rectal;  Laterality: N/A;  seton exchange    laser indirect  4/8/10    left eye    Laser Indirect Retinopexy  4/8/10    OS    PROSTATE SURGERY      RECTAL FISTULOTOMY N/A 11/12/2021    Procedure: FISTULOTOMY, RECTUM;  Surgeon: West Clifton MD;  Location: [facility] OR 2ND FLR;  Service: Colon and Rectal;  Laterality: N/A;    RETINAL DETACHMENT SURGERY  2004    OD    ROTATOR CUFF REPAIR  11/13/2013    TONSILLECTOMY      TRANSFORAMINAL EPIDURAL INJECTION OF STEROID Bilateral 2/19/2019    Procedure: INJECTION, STEROID, EPIDURAL,  TRANSFORAMINAL APPROACH, L5;  Surgeon: Laina Crockett MD;  Location: [facility];  Service: Pain Management;  Laterality: Bilateral;     Family History   Problem Relation Age of Onset    Cataracts Father     Cancer Father         lung    Diabetes Father     Cancer Mother         lung    Anxiety disorder Son     Depression Son     Anxiety disorder Daughter      Social History     Tobacco Use    Smoking status: Never    Smokeless tobacco: Never   Substance Use Topics    Alcohol use: No    Drug use: No     Review of Systems    Physical Exam     Initial Vitals [04/27/23 1158]   BP Pulse Resp Temp SpO2   (!) 182/104 (!) 58 16 98.1 °F (36.7 °C) 97 %      MAP       --         Physical Exam    Nursing note and vitals reviewed.  Constitutional: Vital signs are normal. He appears well-developed and well-nourished. He is not diaphoretic. He does not appear ill. No distress.   HENT:   Head: Normocephalic and atraumatic.   Eyes: Conjunctivae and EOM are normal. Right conjunctiva is not injected. Left conjunctiva is not injected.   Neck:   Normal range of motion.  Cardiovascular:  Regular rhythm, S1 normal and S2 normal.     Exam reveals no gallop and no friction rub.       No murmur heard.  Bradycardic   Pulmonary/Chest: No respiratory distress. He has no wheezes. He has no rhonchi. He has no rales. He exhibits no tenderness.   Abdominal: Abdomen is soft. He exhibits no distension and no mass. There is no abdominal tenderness. There is no rebound and no guarding.   Musculoskeletal:         General: No edema.      Cervical back: Normal range of motion.     Neurological: He is alert.   Skin: Skin is warm and dry. No rash noted. No erythema. No pallor.       ED Course   Procedures  Labs Reviewed   CBC W/ AUTO DIFFERENTIAL - Abnormal; Notable for the following components:       Result Value    MCH 31.1 (*)     Immature Granulocytes 0.6 (*)     Gran % 73.8 (*)     Lymph % 15.9 (*)     All other components within normal limits    COMPREHENSIVE METABOLIC PANEL - Abnormal; Notable for the following components:    ALT 9 (*)     All other components within normal limits   TROPONIN I - Abnormal; Notable for the following components:    Troponin I 0.034 (*)     All other components within normal limits   TROPONIN I - Abnormal; Notable for the following components:    Troponin I 0.032 (*)     All other components within normal limits   B-TYPE NATRIURETIC PEPTIDE - Abnormal; Notable for the following components:     (*)     All other components within normal limits   TROPONIN I - Abnormal; Notable for the following components:    Troponin I 0.039 (*)     All other components within normal limits   MAGNESIUM   TSH   TROPONIN ISTAT   URINALYSIS, REFLEX TO URINE CULTURE    Narrative:     Specimen Source->Urine   ISTAT PROCEDURE   POCT TROPONIN   POCT TROPONIN   ISTAT CHEM8     EKG Readings: (Independently Interpreted)   Second-degree AV block, type 1 rate of 43.  No ST elevations or depressions concerning for ischemia.  No STEMI per my independent interpretation.     ECG Results              EKG 12-lead (Final result)  Result time 04/27/23 14:20:24      Final result by Interface, Lab In [facility] (04/27/23 14:20:24)                   Narrative:    Test Reason : R07.9,    Vent. Rate : 043 BPM     Atrial Rate : 075 BPM     P-R Int : 456 ms          QRS Dur : 128 ms      QT Int : 492 ms       P-R-T Axes : 079 -41 015 degrees     QTc Int : 415 ms    Normal sinus rhythm with 2nd degree A-V block (Mobitz I)  Left axis deviation  Right bundle branch block  Abnormal ECG  When compared with ECG of 25-APR-2019 09:48,  2nd degree AV block, Mobitz 1 now present  Confirmed by Misti PEREYRA, Leti ([id number]) on 4/27/2023 2:20:10 PM    Referred By: [email or web address]   SELF           Confirmed By:Leti Chappell MD                                  Imaging Results              X-Ray Chest AP Portable (Final result)  Result time 04/27/23 18:40:25      Final result by Dwayne MILLER  MD Jessica (04/27/23 18:40:25)                   Impression:      1. Coarse interstitial attenuation, accentuated by habitus.  No large focal consolidation.      Electronically signed by: Dwayne Lopez MD  Date:    04/27/2023  Time:    18:40               Narrative:    EXAMINATION:  XR CHEST AP PORTABLE    CLINICAL HISTORY:  bradycardia;    TECHNIQUE:  Single frontal view of the chest was performed.    COMPARISON:  04/25/2019    FINDINGS:  The cardiomediastinal silhouette is prominent, magnified by technique noting calcification of the aorta..  There is no pleural effusion.  The trachea is midline.  The lungs are symmetrically expanded bilaterally with mildly coarse interstitial attenuation accentuated by habitus..  No large focal consolidation seen.  There is no pneumothorax.  The osseous structures are remarkable for degenerative change..                                    X-Rays:   Independently Interpreted Readings:   Other Readings:  No pneumonia, pneumothorax, or pleural effusion noted on CXR      Medications   sodium chloride 0.9% flush 10 mL (has no administration in time range)   enoxaparin injection 40 mg (40 mg Subcutaneous Given 4/27/23 1700)   melatonin tablet 6 mg (has no administration in time range)   ondansetron disintegrating tablet 8 mg (has no administration in time range)   promethazine tablet 25 mg (has no administration in time range)   polyethylene glycol packet 17 g (has no administration in time range)   bisacodyL suppository 10 mg (has no administration in time range)   acetaminophen tablet 650 mg (has no administration in time range)   naloxone 0.4 mg/mL injection 0.02 mg (has no administration in time range)   glucagon (human recombinant) injection 1 mg (has no administration in time range)   dextrose 10% bolus 125 mL 125 mL (has no administration in time range)   dextrose 10% bolus 250 mL 250 mL (has no administration in time range)   dextrose 40 % gel 15,000 mg (has no  administration in time range)   dextrose 40 % gel 30,000 mg (has no administration in time range)   atorvastatin tablet 40 mg (has no administration in time range)   celecoxib capsule 100 mg (100 mg Oral Given 4/27/23 2010)   clonazePAM tablet 0.5 mg (has no administration in time range)   ergocalciferol capsule 50,000 Units (has no administration in time range)   finasteride tablet 5 mg (has no administration in time range)   folic acid tablet 1,000 mcg (has no administration in time range)   hydrOXYzine HCL tablet 10 mg (has no administration in time range)   magnesium oxide tablet 400 mg (has no administration in time range)   potassium citrate SR tablet 10 mEq (has no administration in time range)   pyridoxine (vitamin B6) tablet 50 mg (has no administration in time range)   QUEtiapine tablet 25 mg (25 mg Oral Given 4/27/23 2234)   sertraline tablet 200 mg (has no administration in time range)   sodium bicarbonate tablet 650 mg (has no administration in time range)   tamsulosin 24 hr capsule 0.4 mg (has no administration in time range)   sodium chloride 0.9% bolus 1,000 mL 1,000 mL (1,000 mLs Intravenous New Bag 4/27/23 1915)   hydrALAZINE injection 5 mg (has no administration in time range)   carbidopa-levodopa  mg TBSR 2 tablet (2 tablets Oral Given 4/27/23 2010)   carbidopa-levodopa  mg per tablet 2 tablet (2 tablets Oral Given 4/27/23 1423)   hydrALAZINE injection 10 mg (10 mg Intravenous Given 4/27/23 1537)     Medical Decision Making:   History:   Old Medical Records: I decided to obtain old medical records.  Initial Assessment:   Emergent evaluation of male presenting the ED with bradycardia.  He is asymptomatic. BP wnl    Differential includes but is not limited to electrolyte abnormalities, ACS, infectious source including UTI/pneumonia, thyroid abnormalities.    EKG showed concern for Wenckebach block with rates in the upper 30s to 40s. Patient asymtompatic while in the ER but had symptoms  of lightheadedness/dizziness at home this week. Unsure if this is related to bradycardia. CBC showed no leukocytosis or anemia. CMP showed no electrolyte abnormalities concerning for hospitalization. troponin mildly no previous troponin to compare to.  BNP also elevated, patient has no history of CHF, previous echo showed no concern for systolic or diastolic heart failure. While in the ER, patient spontaneously converted to NSR.  For further workup, patient admitted to Hospital Medicine for further management.  Independently Interpreted Test(s):   I have ordered and independently interpreted X-rays - see prior notes.  I have ordered and independently interpreted EKG Reading(s) - see prior notes  Clinical Tests:   Lab Tests: Ordered and Reviewed  Radiological Study: Ordered and Reviewed  Medical Tests: Ordered and Reviewed  Other:   I have discussed this case with another health care provider.       <> Summary of the Discussion:                         Clinical Impression:   Final diagnoses:  [R07.9] Chest pain  [R00.1] Bradycardia        ED Disposition Condition    Observation                 Romario Elizondo MD  Resident  04/27/23 1025

## 2023-04-27 NOTE — ASSESSMENT & PLAN NOTE
- chronic, diet controlled  - previously on losartan, had been stopped prior  - restarting during admission given hypertensive BP  - 223/109 while in ED  - ordered IV hydralazine 10mg x 1  - will order prn hydralazine for SBP > 180

## 2023-04-27 NOTE — ASSESSMENT & PLAN NOTE
- Home Health reporting bradycardic to 30s - patient denies symptoms of lightheadedness, dizziness. Admits associated weakness.  - HR 40 while in ED, improving to mid-60s on admission  - EKG NSR with 2nd degree A-V block (Mobitz I), LAD, RBBB, abnormal compared to prior.  -   - CXR pending  - Echo pending  - EP consulted, appreciate assistance

## 2023-04-28 ENCOUNTER — CLINICAL SUPPORT (OUTPATIENT)
Dept: CARDIOLOGY | Facility: HOSPITAL | Age: 78
End: 2023-04-28
Attending: INTERNAL MEDICINE
Payer: MEDICARE

## 2023-04-28 ENCOUNTER — TELEPHONE (OUTPATIENT)
Dept: INTERNAL MEDICINE | Facility: CLINIC | Age: 78
End: 2023-04-28
Payer: MEDICARE

## 2023-04-28 VITALS
BODY MASS INDEX: 28.2 KG/M2 | RESPIRATION RATE: 16 BRPM | HEIGHT: 70 IN | OXYGEN SATURATION: 97 % | HEART RATE: 54 BPM | TEMPERATURE: 98 F | WEIGHT: 197 LBS | DIASTOLIC BLOOD PRESSURE: 86 MMHG | SYSTOLIC BLOOD PRESSURE: 160 MMHG

## 2023-04-28 PROBLEM — Z51.5 ENCOUNTER FOR PALLIATIVE CARE: Status: ACTIVE | Noted: 2023-04-28

## 2023-04-28 LAB
ALBUMIN SERPL BCP-MCNC: 3.7 G/DL (ref 3.5–5.2)
ALP SERPL-CCNC: 75 U/L (ref 55–135)
ALT SERPL W/O P-5'-P-CCNC: 13 U/L (ref 10–44)
ANION GAP SERPL CALC-SCNC: 8 MMOL/L (ref 8–16)
ASCENDING AORTA: 4.2 CM
AST SERPL-CCNC: 26 U/L (ref 10–40)
AV INDEX (PROSTH): 0.53
AV MEAN GRADIENT: 4 MMHG
AV PEAK GRADIENT: 9 MMHG
AV VALVE AREA: 2.91 CM2
AV VELOCITY RATIO: 0.55
BASOPHILS # BLD AUTO: 0.03 K/UL (ref 0–0.2)
BASOPHILS NFR BLD: 0.4 % (ref 0–1.9)
BILIRUB SERPL-MCNC: 0.6 MG/DL (ref 0.1–1)
BSA FOR ECHO PROCEDURE: 2.1 M2
BUN SERPL-MCNC: 23 MG/DL (ref 8–23)
CALCIUM SERPL-MCNC: 9.5 MG/DL (ref 8.7–10.5)
CHLORIDE SERPL-SCNC: 108 MMOL/L (ref 95–110)
CO2 SERPL-SCNC: 23 MMOL/L (ref 23–29)
CREAT SERPL-MCNC: 0.9 MG/DL (ref 0.5–1.4)
CV ECHO LV RWT: 0.39 CM
DIFFERENTIAL METHOD: ABNORMAL
DOP CALC AO PEAK VEL: 1.46 M/S
DOP CALC AO VTI: 33.36 CM
DOP CALC LVOT AREA: 5.5 CM2
DOP CALC LVOT DIAMETER: 2.64 CM
DOP CALC LVOT PEAK VEL: 0.81 M/S
DOP CALC LVOT STROKE VOLUME: 97.11 CM3
DOP CALCLVOT PEAK VEL VTI: 17.75 CM
E WAVE DECELERATION TIME: 147.5 MSEC
E/A RATIO: 2.79
E/E' RATIO: 8.67 M/S
ECHO LV POSTERIOR WALL: 0.96 CM (ref 0.6–1.1)
EJECTION FRACTION: 55 %
EOSINOPHIL # BLD AUTO: 0.1 K/UL (ref 0–0.5)
EOSINOPHIL NFR BLD: 2 % (ref 0–8)
ERYTHROCYTE [DISTWIDTH] IN BLOOD BY AUTOMATED COUNT: 12.8 % (ref 11.5–14.5)
EST. GFR  (NO RACE VARIABLE): >60 ML/MIN/1.73 M^2
FRACTIONAL SHORTENING: 25 % (ref 28–44)
GLUCOSE SERPL-MCNC: 87 MG/DL (ref 70–110)
HCT VFR BLD AUTO: 45.6 % (ref 40–54)
HGB BLD-MCNC: 15 G/DL (ref 14–18)
IMM GRANULOCYTES # BLD AUTO: 0.03 K/UL (ref 0–0.04)
IMM GRANULOCYTES NFR BLD AUTO: 0.4 % (ref 0–0.5)
INTERVENTRICULAR SEPTUM: 1.11 CM (ref 0.6–1.1)
LA MAJOR: 6.41 CM
LA MINOR: 6.79 CM
LA WIDTH: 4.17 CM
LEFT ATRIUM SIZE: 4.9 CM
LEFT ATRIUM VOLUME INDEX MOD: 52.1 ML/M2
LEFT ATRIUM VOLUME INDEX: 55.3 ML/M2
LEFT ATRIUM VOLUME MOD: 107.78 CM3
LEFT ATRIUM VOLUME: 114.53 CM3
LEFT INTERNAL DIMENSION IN SYSTOLE: 3.65 CM (ref 2.1–4)
LEFT VENTRICLE DIASTOLIC VOLUME INDEX: 54.03 ML/M2
LEFT VENTRICLE DIASTOLIC VOLUME: 111.85 ML
LEFT VENTRICLE MASS INDEX: 89 G/M2
LEFT VENTRICLE SYSTOLIC VOLUME INDEX: 27.3 ML/M2
LEFT VENTRICLE SYSTOLIC VOLUME: 56.41 ML
LEFT VENTRICULAR INTERNAL DIMENSION IN DIASTOLE: 4.88 CM (ref 3.5–6)
LEFT VENTRICULAR MASS: 183.21 G
LV LATERAL E/E' RATIO: 9 M/S
LV SEPTAL E/E' RATIO: 8.36 M/S
LYMPHOCYTES # BLD AUTO: 0.9 K/UL (ref 1–4.8)
LYMPHOCYTES NFR BLD: 13.2 % (ref 18–48)
MAGNESIUM SERPL-MCNC: 2.1 MG/DL (ref 1.6–2.6)
MCH RBC QN AUTO: 30.8 PG (ref 27–31)
MCHC RBC AUTO-ENTMCNC: 32.9 G/DL (ref 32–36)
MCV RBC AUTO: 94 FL (ref 82–98)
MONOCYTES # BLD AUTO: 0.6 K/UL (ref 0.3–1)
MONOCYTES NFR BLD: 8.6 % (ref 4–15)
MV PEAK A VEL: 0.42 M/S
MV PEAK E VEL: 1.17 M/S
MV STENOSIS PRESSURE HALF TIME: 42.78 MS
MV VALVE AREA P 1/2 METHOD: 5.14 CM2
NEUTROPHILS # BLD AUTO: 5.2 K/UL (ref 1.8–7.7)
NEUTROPHILS NFR BLD: 75.4 % (ref 38–73)
NRBC BLD-RTO: 0 /100 WBC
PHOSPHATE SERPL-MCNC: 3.3 MG/DL (ref 2.7–4.5)
PISA TR MAX VEL: 2.49 M/S
PLATELET # BLD AUTO: 197 K/UL (ref 150–450)
PMV BLD AUTO: 9.6 FL (ref 9.2–12.9)
POTASSIUM SERPL-SCNC: 3.8 MMOL/L (ref 3.5–5.1)
PROT SERPL-MCNC: 6.4 G/DL (ref 6–8.4)
RA MAJOR: 5.76 CM
RA PRESSURE: 3 MMHG
RA WIDTH: 3.1 CM
RBC # BLD AUTO: 4.87 M/UL (ref 4.6–6.2)
RIGHT VENTRICULAR END-DIASTOLIC DIMENSION: 3.43 CM
SINUS: 4.28 CM
SODIUM SERPL-SCNC: 139 MMOL/L (ref 136–145)
STJ: 3.15 CM
TDI LATERAL: 0.13 M/S
TDI SEPTAL: 0.14 M/S
TDI: 0.14 M/S
TR MAX PG: 25 MMHG
TRICUSPID ANNULAR PLANE SYSTOLIC EXCURSION: 1.45 CM
TROPONIN I SERPL DL<=0.01 NG/ML-MCNC: 0.04 NG/ML (ref 0–0.03)
TROPONIN I SERPL DL<=0.01 NG/ML-MCNC: 0.04 NG/ML (ref 0–0.03)
TV REST PULMONARY ARTERY PRESSURE: 28 MMHG
WBC # BLD AUTO: 6.89 K/UL (ref 3.9–12.7)

## 2023-04-28 PROCEDURE — 99239 PR HOSPITAL DISCHARGE DAY,>30 MIN: ICD-10-PCS | Mod: HCNC,,,

## 2023-04-28 PROCEDURE — 83735 ASSAY OF MAGNESIUM: CPT | Mod: HCNC

## 2023-04-28 PROCEDURE — 99239 HOSP IP/OBS DSCHRG MGMT >30: CPT | Mod: HCNC,,,

## 2023-04-28 PROCEDURE — 36415 COLL VENOUS BLD VENIPUNCTURE: CPT | Mod: HCNC

## 2023-04-28 PROCEDURE — 99233 PR SUBSEQUENT HOSPITAL CARE,LEVL III: ICD-10-PCS | Mod: HCNC,,, | Performed by: INTERNAL MEDICINE

## 2023-04-28 PROCEDURE — 80053 COMPREHEN METABOLIC PANEL: CPT | Mod: HCNC

## 2023-04-28 PROCEDURE — 99205 PR OFFICE/OUTPT VISIT, NEW, LEVL V, 60-74 MIN: ICD-10-PCS | Mod: HCNC,,, | Performed by: STUDENT IN AN ORGANIZED HEALTH CARE EDUCATION/TRAINING PROGRAM

## 2023-04-28 PROCEDURE — 85025 COMPLETE CBC W/AUTO DIFF WBC: CPT | Mod: HCNC

## 2023-04-28 PROCEDURE — 84484 ASSAY OF TROPONIN QUANT: CPT | Mod: HCNC | Performed by: NURSE PRACTITIONER

## 2023-04-28 PROCEDURE — 84100 ASSAY OF PHOSPHORUS: CPT | Mod: HCNC

## 2023-04-28 PROCEDURE — 25000003 PHARM REV CODE 250: Mod: HCNC | Performed by: NURSE PRACTITIONER

## 2023-04-28 PROCEDURE — 99233 SBSQ HOSP IP/OBS HIGH 50: CPT | Mod: HCNC,,, | Performed by: INTERNAL MEDICINE

## 2023-04-28 PROCEDURE — 99205 OFFICE O/P NEW HI 60 MIN: CPT | Mod: HCNC,,, | Performed by: STUDENT IN AN ORGANIZED HEALTH CARE EDUCATION/TRAINING PROGRAM

## 2023-04-28 PROCEDURE — G0378 HOSPITAL OBSERVATION PER HR: HCPCS | Mod: HCNC

## 2023-04-28 PROCEDURE — 25000003 PHARM REV CODE 250: Mod: HCNC

## 2023-04-28 RX ADMIN — CARBIDOPA AND LEVODOPA 2 TABLET: 25; 100 TABLET, EXTENDED RELEASE ORAL at 03:04

## 2023-04-28 RX ADMIN — Medication 400 MG: at 09:04

## 2023-04-28 RX ADMIN — ERGOCALCIFEROL 50000 UNITS: 1.25 CAPSULE ORAL at 09:04

## 2023-04-28 RX ADMIN — FINASTERIDE 5 MG: 5 TABLET, FILM COATED ORAL at 09:04

## 2023-04-28 RX ADMIN — FOLIC ACID 1000 MCG: 1 TABLET ORAL at 09:04

## 2023-04-28 RX ADMIN — POTASSIUM CITRATE 10 MEQ: 10 TABLET, EXTENDED RELEASE ORAL at 09:04

## 2023-04-28 RX ADMIN — SERTRALINE 200 MG: 100 TABLET, FILM COATED ORAL at 09:04

## 2023-04-28 RX ADMIN — ATORVASTATIN CALCIUM 40 MG: 40 TABLET, FILM COATED ORAL at 09:04

## 2023-04-28 RX ADMIN — TAMSULOSIN HYDROCHLORIDE 0.4 MG: 0.4 CAPSULE ORAL at 09:04

## 2023-04-28 RX ADMIN — PYRIDOXINE HCL TAB 50 MG 50 MG: 50 TAB at 09:04

## 2023-04-28 RX ADMIN — CARBIDOPA AND LEVODOPA 2 TABLET: 25; 100 TABLET, EXTENDED RELEASE ORAL at 09:04

## 2023-04-28 RX ADMIN — SODIUM BICARBONATE 650 MG: 650 TABLET ORAL at 09:04

## 2023-04-28 RX ADMIN — CELECOXIB 100 MG: 100 CAPSULE ORAL at 09:04

## 2023-04-28 NOTE — DISCHARGE SUMMARY
[facility] - Observation [address] [place] [address] Medicine  Discharge Summary      Patient Name: Haja Marti  MRN: [id number]  SHANELL: [id number]  Patient Class: OP- Observation  Admission Date: 4/27/2023  Hospital Length of Stay: 0 days  Discharge Date and Time:  04/28/2023 4:27 PM  Attending Physician: Nelly Moy MD   Discharging Provider: Martin Cee PA-C  Primary Care Provider: Andrez Jackson MD  Hospital Medicine Team: [facility]OS[facility] MED  Martin Cee PA-C  Primary Care Team: [facility] MED     HPI:   Haja Marti is a 77 y.o. male with PMHx of HTN, HLD, Parkinson's disease who presents to [facility] ED with bradycardia. His Home Health nurse noted that his HR was in the 30s, contacted PCP who recommended ED visit for evaluation. Patient denies associated symptoms including HA, chest pain, shortness of breath, N/V/D, urinary symptoms, numbness or tingling. Patient reports he previously had a Holter Monitor earlier this month. Patient reports that his HR has never been as low as reported and normally BP systolic 120-130.    In the ED: Hypertensive to 221/115, bradycardic to 40, other VSSAF. CBC without leukocytosis. CMP without emergent abnormalities. . Troponin 0.034. EKG NSR with 2nd degree A-V block (Mobitz I), LAD, RBBB, abnormal compared to prior. Received carbidopa-levodopa. Patient admitted to Hospital Medicine for further management.       * No surgery found *      Hospital Course:   Haja Marti is placed in  Observation for management of symptomatic bradycardia. Hypertensive on admission but improving status post BP controlling agents. Electrophysiology consulted with recommendations for no pacemaker at this point, will proceed with 14-day event monitor and follow up in clinic. Additionally patient made DNR during hospital course and family requesting to speak with Palliative. Palliative Care consulted, appreciate assistance. At discharge patient will follow up with PCP, EP, and Palliative  Care. Family requesting Urology follow up, ordered. Family requesting information on sitter services and information and pamphlet provided to patient and family. Patient medically ready for discharge. Plan of care discussed with patient, patient agreeable to plan, and all questions answered.         Goals of Care Treatment Preferences:  Code Status: DNR          What is most important right now is to focus on quality of life, even if it means sacrificing a little time.  Accordingly, we have decided that the best plan to meet the patient's goals includes continuing with treatment.      Consults:   Consults (From admission, onward)          Status Ordering Provider     Inpatient consult to Palliative Care  Once        Provider:  (Not yet assigned)    Completed DEN WORLEY     Inpatient consult to Electrophysiology  Once        Provider:  (Not yet assigned)    Completed DEN WORLEY            Final Active Diagnoses:    Diagnosis Date Noted POA    PRINCIPAL PROBLEM:  Symptomatic bradycardia [R00.1] 04/27/2023 Yes    Encounter for palliative care [Z51.5] 04/28/2023 Not Applicable    Elevated troponin [R77.8] 04/27/2023 Yes    Parkinsonism [G20] 12/28/2018 Yes    Generalized anxiety disorder [F41.1] 11/14/2017 Yes    Essential hypertension [I10]  Yes    Pure hypercholesterolemia [E78.00]  Yes    Depression [F32.A]  Yes    BPH (benign prostatic hypertrophy) [N40.0]  Yes      Problems Resolved During this Admission:       Discharged Condition: stable    Disposition: Home or Self Care    Follow Up:   Follow-up Information       Andrez Jackson MD Follow up in 1 week(s).    Specialty: Internal Medicine  Why: A message has been sent to your PCP and the nurse will contact you to schedule this hospital follow up visit. However, if you do not hear from them within 24 to 48 hours of discharge please call to schedule the appointment.  Contact information:  [address]  [facility] [id number] [phone]                            Patient Instructions:      Ambulatory referral/consult to CLINIC Palliative Care   Standing Status: Future   Referral Priority: Routine Referral Type: Consultation   Requested Specialty: Palliative Medicine   Number of Visits Requested: 1     Ambulatory referral/consult to Cardiac Electrophysiology   Standing Status: Future   Referral Priority: Urgent Referral Type: Consultation   Referral Reason: Specialty Services Required   Requested Specialty: Cardiology   Number of Visits Requested: 1     Diet Cardiac     Notify your health care provider if you experience any of the following:  difficulty breathing or increased cough     Notify your health care provider if you experience any of the following:  increased confusion or weakness     Notify your health care provider if you experience any of the following:  persistent dizziness, light-headedness, or visual disturbances     Activity as tolerated       Significant Diagnostic Studies: N/A    Pending Diagnostic Studies:       None           Medications:  Reconciled Home Medications:      Medication List        CHANGE how you take these medications      carbidopa-levodopa  mg  mg per tablet  Commonly known as: SINEMET  TAKE 1 QID  What changed:   how much to take  how to take this  when to take this  additional instructions     clonazePAM 0.5 MG tablet  Commonly known as: KlonoPIN  TAKE 1/2 TO 1 TABLET BY MOUTH UP TO 3 TIMES DAILY AS NEEDED FOR ANXIETY  What changed: additional instructions     potassium citrate 10 mEq (1,080 mg) Tbsr  Commonly known as: UROCIT-K  TAKE 1 TABLET THREE TIMES DAILY WITH MEALS  What changed: when to take this     QUEtiapine 25 MG Tab  Commonly known as: SEROQUEL  TAKE ORAL 1/2 TO 1 TABLET QHS.  TAKE IN PLACE OF CLONAZEPAM  What changed:   how much to take  how to take this  when to take this  additional instructions            CONTINUE taking these medications      acetaminophen 500 MG tablet  Commonly known as:  TYLENOL  Take 1,000 mg by mouth every 8 (eight) hours as needed for Pain.     atorvastatin 40 MG tablet  Commonly known as: LIPITOR  TAKE 1 TABLET EVERY DAY     celecoxib 100 MG capsule  Commonly known as: CeleBREX  Take 100 mg by mouth 2 (two) times daily.     ergocalciferol 50,000 unit Cap  Commonly known as: ERGOCALCIFEROL  TAKE 1 CAPSULE (50,000 UNITS TOTAL) BY MOUTH EVERY 7 DAYS.     finasteride 5 mg tablet  Commonly known as: PROSCAR  Take 1 tablet (5 mg total) by mouth once daily.     folic acid 1 MG tablet  Commonly known as: FOLVITE  TAKE 1 TABLET EVERY DAY     magnesium oxide 400 mg (241.3 mg magnesium) tablet  Commonly known as: MAG-OX  Take 400 mg by mouth once daily.     pyridoxine (vitamin B6) 50 MG Tab  Commonly known as: B-6  Take 50 mg by mouth once daily.     sertraline 100 MG tablet  Commonly known as: ZOLOFT  Take 2 tablets (200 mg total) by mouth once daily.     sodium bicarbonate 650 MG tablet  TAKE 1 TABLET ONE TIME DAILY     tamsulosin 0.4 mg Cap  Commonly known as: FLOMAX  TAKE 1 CAPSULE EVERY DAY     zaleplon 5 MG Cap  Commonly known as: SONATA  Take 1 capsule (5 mg total) by mouth nightly as needed (insomnia).            ASK your doctor about these medications      hydrOXYzine HCL 10 MG Tab  Commonly known as: ATARAX  TAKE 1 TABLET BY MOUTH 3 TIMES A DAY AS NEEDED ANXIETY     INBRIJA 42 mg Cap  Generic drug: levodopa  Inhale 84 mg into the lungs 4 (four) times daily as needed (As needed for freezing/OFF episodes.).     losartan 25 MG tablet  Commonly known as: COZAAR  Take 1 tablet (25 mg total) by mouth once daily.              Indwelling Lines/Drains at time of discharge:   Lines/Drains/Airways       Drain  Duration                  Open Drain 02/18/21 1838 Left Buttock Penrose 1 inch 798 days         Open Drain 02/18/21 1840 Left Buttock Other (see comments) 798 days         Open Drain 09/24/21 1512 Other (Comment) Other (see comments) Other (see comments) 581 days                     Time spent on the discharge of patient: 33 minutes         Martin Cee PA-C  Department of Hospital Medicine  [facility] - Observation 11H

## 2023-04-28 NOTE — HPI
"Haja Marti is a 77 y.o. male with PMHx of HTN, HLD, Parkinson's disease with Lewy body dementia who had HR 33 at home while working with home PT. He also has been getting lightheaded and dizzy for the past several weeks along with headaches and nausea. He has dementia so history is given by son at bedside and wife who talked to me on the phone. Per wife, HR was never checked at home before until yesterday when it was 33. He has no prior cardiac history. He also didn't have any chest pain, dyspnea or syncope. He has had several falls in the past few months but they are related to physical debility per wie and he uses a walker at home. In the ER, HR was recorded at 43 which later came up to 60s on its own. His BP was at 223/109 in the ER. A Holter monitor was done on 4/18/23 which showed "Sinus bradycardia with Mobiz I second degree AV block, 2:1 AV block and RBBB".   "

## 2023-04-28 NOTE — PLAN OF CARE
MIGUELW unable to schedule the PCP hospital follow up as there were no available appointments within 7 days. A message was sent to the provider requesting an appointment on the patients behalf. I added this information to the AVS as a reminder for the patient.    MIGUELW scheduled the Cardiology hospital follow up for May 2 @ 9:45am

## 2023-04-28 NOTE — CONSULTS
"[facility]  Cardiac Electrophysiology  Consult Note    Admission Date: 4/27/2023  Code Status: DNR   Attending Provider: Nelly Moy MD  Consulting Provider: Benji Hammer MD  Principal Problem:Symptomatic bradycardia    Inpatient consult to Electrophysiology  Consult performed by: Benji Hammer MD  Consult ordered by: Martin Cee PA-C        Subjective:     Chief Complaint:  Symptomatic bradycardia  HPI:   Haja Marti is a 77 y.o. male with PMHx of HTN, HLD, Parkinson's disease with Lewy body dementia who had HR 33 at home while working with home PT. He also has been getting lightheaded and dizzy for the past several weeks along with headaches and nausea. He has dementia so history is given by son at bedside and wife who talked to me on the phone. Per wife, HR was never checked at home before until yesterday when it was 33. He has no prior cardiac history. He also didn't have any chest pain, dyspnea or syncope. He has had several falls in the past few months but they are related to physical debility per wie and he uses a walker at home. In the ER, HR was recorded at 43 which later came up to 60s on its own. His BP was at 223/109 in the ER. A Holter monitor was done on 4/18/23 which showed "Sinus bradycardia with Mobiz I second degree AV block, 2:1 AV block and RBBB".      Past Medical History:   Diagnosis Date    Anxiety     Arthritis     BPH (benign prostatic hypertrophy)     Cataract     OS    Colon polyp 2002    Depression     Epiretinal membrane, both eyes     Hx of psychiatric care     Hyperlipidemia     Hypertension     Kidney stones     Kidney stones     Parkinsons     Posterior vitreous detachment of left eye     Psychiatric problem     Retinal detachment 2004    OD    Retinoschisis, left eye     Sleep apnea     Vitreous hemorrhage of left eye        Past Surgical History:   Procedure Laterality Date    BIOPSY OF ADENOIDS      CATARACT EXTRACTION      OD "    COLONOSCOPY N/A 12/3/2019    Procedure: COLONOSCOPY;  Surgeon: Ceferino Oliveira MD;  Location: [facility] ENDO (4TH FLR);  Service: Endoscopy;  Laterality: N/A;  5/2019 laminectomy with post op confusion tb    CYSTOSCOPY WITH URODYNAMIC TESTING N/A 1/31/2022    Procedure: CYSTOSCOPY, WITH URODYNAMIC TESTING FLOUROSCOPIC;  Surgeon: Devang Butler MD;  Location: [facility] OR 1ST FLR;  Service: Urology;  Laterality: N/A;  1hr    EPIDURAL STEROID INJECTION N/A 3/12/2019    Procedure: INJECTION, STEROID, EPIDURAL, L5-S1;  Surgeon: Laina Crockett MD;  Location: [facility] PAIN MGT;  Service: Pain Management;  Laterality: N/A;    EXAMINATION UNDER ANESTHESIA N/A 2/18/2021    Procedure: Exam under anesthesia;  Surgeon: West Clifton MD;  Location: [facility] OR 2ND FLR;  Service: Colon and Rectal;  Laterality: N/A;    EYE SURGERY      INCISION OF PERIRECTAL ABSCESS N/A 2/12/2021    Procedure: INCISION AND DRAINAGE  ABSCESS, PERIRECTAL;  Surgeon: West Clifton MD;  Location: [facility] OR 2ND FLR;  Service: Colon and Rectal;  Laterality: N/A;    INCISION OF PERIRECTAL ABSCESS Left 2/18/2021    Procedure: INCISION, ABSCESS, PERIRECTAL;  Surgeon: West Clifton MD;  Location: [facility] OR 2ND FLR;  Service: Colon and Rectal;  Laterality: Left;    INJECTION OF ANESTHETIC AGENT AROUND NERVE Bilateral 7/3/2018    Procedure: BLOCK, NERVE;  Surgeon: Laina Crockett MD;  Location: [facility] PAIN MGT;  Service: Pain Management;  Laterality: Bilateral;  Bilateral Lumbar L2,L3,L4,L5 MBB      NEEDS CONSENT    INSERTION OF SETON STITCH N/A 2/12/2021    Procedure: PLACEMENT, SETON STITCH;  Surgeon: West Clifton MD;  Location: [facility] OR 2ND FLR;  Service: Colon and Rectal;  Laterality: N/A;    INSERTION OF SETON STITCH Left 2/18/2021    Procedure: PLACEMENT, SETON;  Surgeon: West Clifton MD;  Location: [facility] OR 2ND FLR;  Service: Colon and Rectal;  Laterality: Left;    INSERTION OF SETON STITCH N/A 9/24/2021    Procedure: PLACEMENT, SETON  STITCH;  Surgeon: West Clifton MD;  Location: [facility] OR 2ND FLR;  Service: Colon and Rectal;  Laterality: N/A;  seton exchange    laser indirect  4/8/10    left eye    Laser Indirect Retinopexy  4/8/10    OS    PROSTATE SURGERY      RECTAL FISTULOTOMY N/A 11/12/2021    Procedure: FISTULOTOMY, RECTUM;  Surgeon: West Clifton MD;  Location: [facility] OR 2ND FLR;  Service: Colon and Rectal;  Laterality: N/A;    RETINAL DETACHMENT SURGERY  2004    OD    ROTATOR CUFF REPAIR  11/13/2013    TONSILLECTOMY      TRANSFORAMINAL EPIDURAL INJECTION OF STEROID Bilateral 2/19/2019    Procedure: INJECTION, STEROID, EPIDURAL, TRANSFORAMINAL APPROACH, L5;  Surgeon: Laina Crockett MD;  Location: [facility] PAIN T;  Service: Pain Management;  Laterality: Bilateral;       Review of patient's allergies indicates:  No Known Allergies    Current Facility-Administered Medications on File Prior to Encounter   Medication    0.9%  NaCl infusion    mupirocin 2 % ointment     Current Outpatient Medications on File Prior to Encounter   Medication Sig    acetaminophen (TYLENOL) 500 MG tablet Take 1,000 mg by mouth every 8 (eight) hours as needed for Pain.    atorvastatin (LIPITOR) 40 MG tablet TAKE 1 TABLET EVERY DAY    carbidopa-levodopa  mg (SINEMET)  mg per tablet TAKE 1 QID (Patient taking differently: Take 1 tablet by mouth 3 (three) times daily.)    celecoxib (CELEBREX) 100 MG capsule Take 100 mg by mouth 2 (two) times daily.    clonazePAM (KLONOPIN) 0.5 MG tablet TAKE 1/2 TO 1 TABLET BY MOUTH UP TO 3 TIMES DAILY AS NEEDED FOR ANXIETY (Patient taking differently: TAKE 1/2 TO 1 TABLET BY MOUTH ONCE DAILY AS NEEDED FOR ANXIETY)    ergocalciferol (ERGOCALCIFEROL) 50,000 unit Cap TAKE 1 CAPSULE (50,000 UNITS TOTAL) BY MOUTH EVERY 7 DAYS. (Patient taking differently: Take 50,000 Units by mouth every 7 days. ON WEDNESDAYS)    finasteride (PROSCAR) 5 mg tablet Take 1 tablet (5 mg total) by mouth once daily.    folic acid (FOLVITE)  1 MG tablet TAKE 1 TABLET EVERY DAY    magnesium oxide (MAG-OX) 400 mg tablet Take 400 mg by mouth once daily.    potassium citrate (UROCIT-K) 10 mEq (1,080 mg) TbSR TAKE 1 TABLET THREE TIMES DAILY WITH MEALS (Patient taking differently: Take 10 mEq by mouth once daily.)    pyridoxine, vitamin B6, (B-6) 50 MG Tab Take 50 mg by mouth once daily.    QUEtiapine (SEROQUEL) 25 MG Tab TAKE ORAL 1/2 TO 1 TABLET QHS.  TAKE IN PLACE OF CLONAZEPAM (Patient taking differently: Take 25 mg by mouth every evening.)    sertraline (ZOLOFT) 100 MG tablet Take 2 tablets (200 mg total) by mouth once daily.    sodium bicarbonate 650 MG tablet TAKE 1 TABLET ONE TIME DAILY    tamsulosin (FLOMAX) 0.4 mg Cap TAKE 1 CAPSULE EVERY DAY    zaleplon (SONATA) 5 MG Cap Take 1 capsule (5 mg total) by mouth nightly as needed (insomnia).    hydrOXYzine HCL (ATARAX) 10 MG Tab TAKE 1 TABLET BY MOUTH 3 TIMES A DAY AS NEEDED ANXIETY (Patient not taking: Reported on 4/13/2023)    levodopa (INBRIJA) 42 mg Cap Inhale 84 mg into the lungs 4 (four) times daily as needed (As needed for freezing/OFF episodes.). (Patient not taking: Reported on 4/27/2023)    losartan (COZAAR) 25 MG tablet Take 1 tablet (25 mg total) by mouth once daily. (Patient not taking: Reported on 4/27/2023)     Family History       Problem Relation (Age of Onset)    Anxiety disorder Son, Daughter    Cancer Father, Mother    Cataracts Father    Depression Son    Diabetes Father          Tobacco Use    Smoking status: Never    Smokeless tobacco: Never   Substance and Sexual Activity    Alcohol use: No    Drug use: No    Sexual activity: Not on file       Objective:     Vital Signs (Most Recent):  Temp: 97.8 °F (36.6 °C) (04/27/23 2345)  Pulse: 63 (04/27/23 2345)  Resp: 17 (04/27/23 2345)  BP: (!) 146/77 (04/27/23 2345)  SpO2: 97 % (04/27/23 2345)   Vital Signs (24h Range):  Temp:  [97.3 °F (36.3 °C)-98.7 °F (37.1 °C)] 97.8 °F (36.6 °C)  Pulse:  [40-73] 63  Resp:  [16-21] 17  SpO2:  [95  %-100 %] 97 %  BP: ()/() 146/77     Weight: 89.5 kg (197 lb 5 oz)  Body mass index is 28.31 kg/m².    CRANIAL NERVES     CN III, IV, VI   Pupils are equal, round, and reactive to light.     Significant Labs: All pertinent labs within the past 24 hours have been reviewed.  CBC:   Recent Labs   Lab 04/27/23  1218 04/27/23  1224   WBC 6.18  --    HGB 15.0  --    HCT 44.2 45     --        CMP:   Recent Labs   Lab 04/27/23  1218      K 4.4      CO2 25   GLU 86   BUN 22   CREATININE 0.9   CALCIUM 9.7   PROT 7.2   ALBUMIN 4.1   BILITOT 0.9   ALKPHOS 72   AST 39   ALT 9*   ANIONGAP 8       Troponin:   Recent Labs   Lab 04/27/23  1218 04/27/23  1851 04/27/23  2320   TROPONINI 0.032*  0.034* 0.039* 0.045*         Significant Imaging: I have reviewed all pertinent imaging results/findings within the past 24 hours.  Imaging Results              X-Ray Chest AP Portable (Final result)  Result time 04/27/23 18:40:25      Final result by Dwayne Lopez MD (04/27/23 18:40:25)                   Impression:      1. Coarse interstitial attenuation, accentuated by habitus.  No large focal consolidation.      Electronically signed by: Dwayne Lopez MD  Date:    04/27/2023  Time:    18:40               Narrative:    EXAMINATION:  XR CHEST AP PORTABLE    CLINICAL HISTORY:  bradycardia;    TECHNIQUE:  Single frontal view of the chest was performed.    COMPARISON:  04/25/2019    FINDINGS:  The cardiomediastinal silhouette is prominent, magnified by technique noting calcification of the aorta..  There is no pleural effusion.  The trachea is midline.  The lungs are symmetrically expanded bilaterally with mildly coarse interstitial attenuation accentuated by habitus..  No large focal consolidation seen.  There is no pneumothorax.  The osseous structures are remarkable for degenerative change..                                      Review of Systems   Constitutional: Positive for activity change (Freq falls).  Negative for chills and fever.   HENT:  Negative for trouble swallowing.    Eyes:  Negative for photophobia and visual disturbance.   Cardiovascular:  Negative for chest pain, leg swelling and palpitations.   Respiratory:  Negative for chest tightness, shortness of breath and wheezing.    Skin:  Negative for color change and rash.   Musculoskeletal:  Negative for arthralgias, back pain and gait problem.   Gastrointestinal:  Positive for nausea. Negative for abdominal pain, constipation, diarrhea and vomiting.   Genitourinary:  Negative for dysuria, frequency, hematuria and urgency.   Neurological:  Positive for dizziness, headaches and light-headedness. Negative for numbness, syncope and weakness.   Psychiatric/Behavioral:  Negative for agitation and confusion. The patient is not nervous/anxious.    Physical Exam  Vitals and nursing note reviewed.   Constitutional:       General: He is not in acute distress.     Appearance: He is well-developed.   HENT:      Head: Normocephalic and atraumatic.      Mouth/Throat:      Pharynx: No oropharyngeal exudate.   Eyes:      Conjunctiva/sclera: Conjunctivae normal.      Pupils: Pupils are equal, round, and reactive to light.   Cardiovascular:      Rate and Rhythm: Normal rate and regular rhythm.      Heart sounds: Normal heart sounds.   Pulmonary:      Effort: Pulmonary effort is normal. No respiratory distress.      Breath sounds: Normal breath sounds. No wheezing.   Abdominal:      General: Bowel sounds are normal. There is no distension.      Palpations: Abdomen is soft.      Tenderness: There is no abdominal tenderness.   Musculoskeletal:         General: No tenderness. Normal range of motion.      Cervical back: Normal range of motion and neck supple.      Right lower leg: No edema.      Left lower leg: No edema.   Lymphadenopathy:      Cervical: No cervical adenopathy.   Skin:     General: Skin is warm and dry.      Capillary Refill: Capillary refill takes less than 2  "seconds.      Findings: No rash.   Neurological:      Mental Status: He is alert and oriented to person, place, and time.      Cranial Nerves: No cranial nerve deficit.      Sensory: No sensory deficit.      Coordination: Coordination normal.   Psychiatric:         Behavior: Behavior normal.         Thought Content: Thought content normal.         Judgment: Judgment normal.                 Assessment and Plan:     * Symptomatic bradycardia  Symptoms: dizziness, lightheadedness, headache and nausea x few weeks per wife (patient has dementia)  HR 33 per home PT yesterday, hemodynamically stable  No prior cardiac history, not on any rate controlling meds at home   Holter monitor on 4/18/23 (for 20 min of effective study only) showed "Sinus bradycardia with Mobiz I second degree AV block, 2:1 AV block and RBBB"  DNR/DNI but ok for pacemaker and ok for intubation if needed for the day of surgery/procedure  Will make NPO in case needs a procedure tomorrow (last lovenox for DVT Ppx 5 pm on 4/27)  Final plan to come in the morning           Thank you for your consult. I will follow-up with patient. Please contact us if you have any additional questions.    Benji Hammer MD  Cardiac Electrophysiology  [facility] - Observation 11H    "

## 2023-04-28 NOTE — SUBJECTIVE & OBJECTIVE
Past Medical History:   Diagnosis Date    Anxiety     Arthritis     BPH (benign prostatic hypertrophy)     Cataract     OS    Colon polyp 2002    Depression     Epiretinal membrane, both eyes     Hx of psychiatric care     Hyperlipidemia     Hypertension     Kidney stones     Kidney stones     Parkinsons     Posterior vitreous detachment of left eye     Psychiatric problem     Retinal detachment 2004    OD    Retinoschisis, left eye     Sleep apnea     Vitreous hemorrhage of left eye        Past Surgical History:   Procedure Laterality Date    BIOPSY OF ADENOIDS      CATARACT EXTRACTION      OD    COLONOSCOPY N/A 12/3/2019    Procedure: COLONOSCOPY;  Surgeon: Ceferino Oliveira MD;  Location: [facility] ENDO (4TH FLR);  Service: Endoscopy;  Laterality: N/A;  5/2019 laminectomy with post op confusion tb    CYSTOSCOPY WITH URODYNAMIC TESTING N/A 1/31/2022    Procedure: CYSTOSCOPY, WITH URODYNAMIC TESTING FLOUROSCOPIC;  Surgeon: Devang Butler MD;  Location: [facility] OR 1ST FLR;  Service: Urology;  Laterality: N/A;  1hr    EPIDURAL STEROID INJECTION N/A 3/12/2019    Procedure: INJECTION, STEROID, EPIDURAL, L5-S1;  Surgeon: Laina Crockett MD;  Location: [facility] PAIN MGT;  Service: Pain Management;  Laterality: N/A;    EXAMINATION UNDER ANESTHESIA N/A 2/18/2021    Procedure: Exam under anesthesia;  Surgeon: West Clifton MD;  Location: [facility] OR 2ND FLR;  Service: Colon and Rectal;  Laterality: N/A;    EYE SURGERY      INCISION OF PERIRECTAL ABSCESS N/A 2/12/2021    Procedure: INCISION AND DRAINAGE  ABSCESS, PERIRECTAL;  Surgeon: West Clifton MD;  Location: [facility] OR 2ND FLR;  Service: Colon and Rectal;  Laterality: N/A;    INCISION OF PERIRECTAL ABSCESS Left 2/18/2021    Procedure: INCISION, ABSCESS, PERIRECTAL;  Surgeon: West Clifton MD;  Location: [facility] OR 2ND FLR;  Service: Colon and Rectal;  Laterality: Left;    INJECTION OF ANESTHETIC AGENT AROUND NERVE Bilateral 7/3/2018    Procedure: BLOCK, NERVE;  Surgeon:  Laina Crockett MD;  Location: [facility] PAIN MGT;  Service: Pain Management;  Laterality: Bilateral;  Bilateral Lumbar L2,L3,L4,L5 MBB      NEEDS CONSENT    INSERTION OF SETON STITCH N/A 2/12/2021    Procedure: PLACEMENT, SETON STITCH;  Surgeon: West Clifton MD;  Location: [facility] OR 2ND FLR;  Service: Colon and Rectal;  Laterality: N/A;    INSERTION OF SETON STITCH Left 2/18/2021    Procedure: PLACEMENT, SETON;  Surgeon: West Clifton MD;  Location: [facility] OR 2ND FLR;  Service: Colon and Rectal;  Laterality: Left;    INSERTION OF SETON STITCH N/A 9/24/2021    Procedure: PLACEMENT, SETON STITCH;  Surgeon: West Clifton MD;  Location: [facility] OR 2ND FLR;  Service: Colon and Rectal;  Laterality: N/A;  seton exchange    laser indirect  4/8/10    left eye    Laser Indirect Retinopexy  4/8/10    OS    PROSTATE SURGERY      RECTAL FISTULOTOMY N/A 11/12/2021    Procedure: FISTULOTOMY, RECTUM;  Surgeon: West Clifton MD;  Location: [facility] OR 2ND FLR;  Service: Colon and Rectal;  Laterality: N/A;    RETINAL DETACHMENT SURGERY  2004    OD    ROTATOR CUFF REPAIR  11/13/2013    TONSILLECTOMY      TRANSFORAMINAL EPIDURAL INJECTION OF STEROID Bilateral 2/19/2019    Procedure: INJECTION, STEROID, EPIDURAL, TRANSFORAMINAL APPROACH, L5;  Surgeon: Laina Crockett MD;  Location: [facility] PAIN MGT;  Service: Pain Management;  Laterality: Bilateral;       Review of patient's allergies indicates:  No Known Allergies    Current Facility-Administered Medications on File Prior to Encounter   Medication    0.9%  NaCl infusion    mupirocin 2 % ointment     Current Outpatient Medications on File Prior to Encounter   Medication Sig    acetaminophen (TYLENOL) 500 MG tablet Take 1,000 mg by mouth every 8 (eight) hours as needed for Pain.    atorvastatin (LIPITOR) 40 MG tablet TAKE 1 TABLET EVERY DAY    carbidopa-levodopa  mg (SINEMET)  mg per tablet TAKE 1 QID (Patient taking differently: Take 1 tablet by mouth 3 (three)  times daily.)    celecoxib (CELEBREX) 100 MG capsule Take 100 mg by mouth 2 (two) times daily.    clonazePAM (KLONOPIN) 0.5 MG tablet TAKE 1/2 TO 1 TABLET BY MOUTH UP TO 3 TIMES DAILY AS NEEDED FOR ANXIETY (Patient taking differently: TAKE 1/2 TO 1 TABLET BY MOUTH ONCE DAILY AS NEEDED FOR ANXIETY)    ergocalciferol (ERGOCALCIFEROL) 50,000 unit Cap TAKE 1 CAPSULE (50,000 UNITS TOTAL) BY MOUTH EVERY 7 DAYS. (Patient taking differently: Take 50,000 Units by mouth every 7 days. ON WEDNESDAYS)    finasteride (PROSCAR) 5 mg tablet Take 1 tablet (5 mg total) by mouth once daily.    folic acid (FOLVITE) 1 MG tablet TAKE 1 TABLET EVERY DAY    magnesium oxide (MAG-OX) 400 mg tablet Take 400 mg by mouth once daily.    potassium citrate (UROCIT-K) 10 mEq (1,080 mg) TbSR TAKE 1 TABLET THREE TIMES DAILY WITH MEALS (Patient taking differently: Take 10 mEq by mouth once daily.)    pyridoxine, vitamin B6, (B-6) 50 MG Tab Take 50 mg by mouth once daily.    QUEtiapine (SEROQUEL) 25 MG Tab TAKE ORAL 1/2 TO 1 TABLET QHS.  TAKE IN PLACE OF CLONAZEPAM (Patient taking differently: Take 25 mg by mouth every evening.)    sertraline (ZOLOFT) 100 MG tablet Take 2 tablets (200 mg total) by mouth once daily.    sodium bicarbonate 650 MG tablet TAKE 1 TABLET ONE TIME DAILY    tamsulosin (FLOMAX) 0.4 mg Cap TAKE 1 CAPSULE EVERY DAY    zaleplon (SONATA) 5 MG Cap Take 1 capsule (5 mg total) by mouth nightly as needed (insomnia).    hydrOXYzine HCL (ATARAX) 10 MG Tab TAKE 1 TABLET BY MOUTH 3 TIMES A DAY AS NEEDED ANXIETY (Patient not taking: Reported on 4/13/2023)    levodopa (INBRIJA) 42 mg Cap Inhale 84 mg into the lungs 4 (four) times daily as needed (As needed for freezing/OFF episodes.). (Patient not taking: Reported on 4/27/2023)    losartan (COZAAR) 25 MG tablet Take 1 tablet (25 mg total) by mouth once daily. (Patient not taking: Reported on 4/27/2023)     Family History       Problem Relation (Age of Onset)    Anxiety disorder Son, Daughter     Cancer Father, Mother    Cataracts Father    Depression Son    Diabetes Father          Tobacco Use    Smoking status: Never    Smokeless tobacco: Never   Substance and Sexual Activity    Alcohol use: No    Drug use: No    Sexual activity: Not on file     Review of Systems   Constitutional:  Positive for activity change (Freq falls). Negative for chills and fever.   HENT:  Negative for trouble swallowing.    Eyes:  Negative for photophobia and visual disturbance.   Respiratory:  Negative for chest tightness, shortness of breath and wheezing.    Cardiovascular:  Negative for chest pain, palpitations and leg swelling.   Gastrointestinal:  Positive for nausea. Negative for abdominal pain, constipation, diarrhea and vomiting.   Genitourinary:  Negative for dysuria, frequency, hematuria and urgency.   Musculoskeletal:  Negative for arthralgias, back pain and gait problem.   Skin:  Negative for color change and rash.   Neurological:  Positive for dizziness, light-headedness and headaches. Negative for syncope, weakness and numbness.   Psychiatric/Behavioral:  Negative for agitation and confusion. The patient is not nervous/anxious.    Objective:     Vital Signs (Most Recent):  Temp: 97.8 °F (36.6 °C) (04/27/23 2345)  Pulse: 63 (04/27/23 2345)  Resp: 17 (04/27/23 2345)  BP: (!) 146/77 (04/27/23 2345)  SpO2: 97 % (04/27/23 2345)   Vital Signs (24h Range):  Temp:  [97.3 °F (36.3 °C)-98.7 °F (37.1 °C)] 97.8 °F (36.6 °C)  Pulse:  [40-73] 63  Resp:  [16-21] 17  SpO2:  [95 %-100 %] 97 %  BP: ()/() 146/77     Weight: 89.5 kg (197 lb 5 oz)  Body mass index is 28.31 kg/m².    Physical Exam  Vitals and nursing note reviewed.   Constitutional:       General: He is not in acute distress.     Appearance: He is well-developed.   HENT:      Head: Normocephalic and atraumatic.      Mouth/Throat:      Pharynx: No oropharyngeal exudate.   Eyes:      Conjunctiva/sclera: Conjunctivae normal.      Pupils: Pupils are equal,  round, and reactive to light.   Cardiovascular:      Rate and Rhythm: Normal rate and regular rhythm.      Heart sounds: Normal heart sounds.   Pulmonary:      Effort: Pulmonary effort is normal. No respiratory distress.      Breath sounds: Normal breath sounds. No wheezing.   Abdominal:      General: Bowel sounds are normal. There is no distension.      Palpations: Abdomen is soft.      Tenderness: There is no abdominal tenderness.   Musculoskeletal:         General: No tenderness. Normal range of motion.      Cervical back: Normal range of motion and neck supple.      Right lower leg: No edema.      Left lower leg: No edema.   Lymphadenopathy:      Cervical: No cervical adenopathy.   Skin:     General: Skin is warm and dry.      Capillary Refill: Capillary refill takes less than 2 seconds.      Findings: No rash.   Neurological:      Mental Status: He is alert and oriented to person, place, and time.      Cranial Nerves: No cranial nerve deficit.      Sensory: No sensory deficit.      Coordination: Coordination normal.   Psychiatric:         Behavior: Behavior normal.         Thought Content: Thought content normal.         Judgment: Judgment normal.         CRANIAL NERVES     CN III, IV, VI   Pupils are equal, round, and reactive to light.     Significant Labs: All pertinent labs within the past 24 hours have been reviewed.  CBC:   Recent Labs   Lab 04/27/23  1218 04/27/23  1224   WBC 6.18  --    HGB 15.0  --    HCT 44.2 45     --        CMP:   Recent Labs   Lab 04/27/23  1218      K 4.4      CO2 25   GLU 86   BUN 22   CREATININE 0.9   CALCIUM 9.7   PROT 7.2   ALBUMIN 4.1   BILITOT 0.9   ALKPHOS 72   AST 39   ALT 9*   ANIONGAP 8       Troponin:   Recent Labs   Lab 04/27/23  1218 04/27/23  1851 04/27/23  2320   TROPONINI 0.032*  0.034* 0.039* 0.045*         Significant Imaging: I have reviewed all pertinent imaging results/findings within the past 24 hours.  Imaging Results              X-Ray  Chest AP Portable (Final result)  Result time 04/27/23 18:40:25      Final result by Dwayne Lopez MD (04/27/23 18:40:25)                   Impression:      1. Coarse interstitial attenuation, accentuated by habitus.  No large focal consolidation.      Electronically signed by: Dwayne Lopez MD  Date:    04/27/2023  Time:    18:40               Narrative:    EXAMINATION:  XR CHEST AP PORTABLE    CLINICAL HISTORY:  bradycardia;    TECHNIQUE:  Single frontal view of the chest was performed.    COMPARISON:  04/25/2019    FINDINGS:  The cardiomediastinal silhouette is prominent, magnified by technique noting calcification of the aorta..  There is no pleural effusion.  The trachea is midline.  The lungs are symmetrically expanded bilaterally with mildly coarse interstitial attenuation accentuated by habitus..  No large focal consolidation seen.  There is no pneumothorax.  The osseous structures are remarkable for degenerative change..                                      Review of Systems   Constitutional: Positive for activity change (Freq falls). Negative for chills and fever.   HENT:  Negative for trouble swallowing.    Eyes:  Negative for photophobia and visual disturbance.   Cardiovascular:  Negative for chest pain, leg swelling and palpitations.   Respiratory:  Negative for chest tightness, shortness of breath and wheezing.    Skin:  Negative for color change and rash.   Musculoskeletal:  Negative for arthralgias, back pain and gait problem.   Gastrointestinal:  Positive for nausea. Negative for abdominal pain, constipation, diarrhea and vomiting.   Genitourinary:  Negative for dysuria, frequency, hematuria and urgency.   Neurological:  Positive for dizziness, headaches and light-headedness. Negative for numbness, syncope and weakness.   Psychiatric/Behavioral:  Negative for agitation and confusion. The patient is not nervous/anxious.    Physical Exam  Vitals and nursing note reviewed.   Constitutional:        General: He is not in acute distress.     Appearance: He is well-developed.   HENT:      Head: Normocephalic and atraumatic.      Mouth/Throat:      Pharynx: No oropharyngeal exudate.   Eyes:      Conjunctiva/sclera: Conjunctivae normal.      Pupils: Pupils are equal, round, and reactive to light.   Cardiovascular:      Rate and Rhythm: Normal rate and regular rhythm.      Heart sounds: Normal heart sounds.   Pulmonary:      Effort: Pulmonary effort is normal. No respiratory distress.      Breath sounds: Normal breath sounds. No wheezing.   Abdominal:      General: Bowel sounds are normal. There is no distension.      Palpations: Abdomen is soft.      Tenderness: There is no abdominal tenderness.   Musculoskeletal:         General: No tenderness. Normal range of motion.      Cervical back: Normal range of motion and neck supple.      Right lower leg: No edema.      Left lower leg: No edema.   Lymphadenopathy:      Cervical: No cervical adenopathy.   Skin:     General: Skin is warm and dry.      Capillary Refill: Capillary refill takes less than 2 seconds.      Findings: No rash.   Neurological:      Mental Status: He is alert and oriented to person, place, and time.      Cranial Nerves: No cranial nerve deficit.      Sensory: No sensory deficit.      Coordination: Coordination normal.   Psychiatric:         Behavior: Behavior normal.         Thought Content: Thought content normal.         Judgment: Judgment normal.

## 2023-04-28 NOTE — TELEPHONE ENCOUNTER
----- Message from Parisa Bonner sent at 4/28/2023  3:18 PM CDT -----  Contact: Self [phone]  Would like to receive medical advice.    Would they like a call back or a response via [facility]:  call back    Additional information:  Calling to request a sooner one week hosp follow up. Pt is discharging from [facility] on 4/28/2023.

## 2023-04-28 NOTE — SUBJECTIVE & OBJECTIVE
Past Medical History:   Diagnosis Date    Anxiety     Arthritis     BPH (benign prostatic hypertrophy)     Cataract     OS    Colon polyp 2002    Depression     Epiretinal membrane, both eyes     Hx of psychiatric care     Hyperlipidemia     Hypertension     Kidney stones     Kidney stones     Parkinsons     Posterior vitreous detachment of left eye     Psychiatric problem     Retinal detachment 2004    OD    Retinoschisis, left eye     Sleep apnea     Vitreous hemorrhage of left eye        Past Surgical History:   Procedure Laterality Date    BIOPSY OF ADENOIDS      CATARACT EXTRACTION      OD    COLONOSCOPY N/A 12/3/2019    Procedure: COLONOSCOPY;  Surgeon: Ceferino Oliveira MD;  Location: [facility] ENDO (4TH FLR);  Service: Endoscopy;  Laterality: N/A;  5/2019 laminectomy with post op confusion tb    CYSTOSCOPY WITH URODYNAMIC TESTING N/A 1/31/2022    Procedure: CYSTOSCOPY, WITH URODYNAMIC TESTING FLOUROSCOPIC;  Surgeon: Devang Butler MD;  Location: [facility] OR 1ST FLR;  Service: Urology;  Laterality: N/A;  1hr    EPIDURAL STEROID INJECTION N/A 3/12/2019    Procedure: INJECTION, STEROID, EPIDURAL, L5-S1;  Surgeon: Laina Crockett MD;  Location: [facility] PAIN MGT;  Service: Pain Management;  Laterality: N/A;    EXAMINATION UNDER ANESTHESIA N/A 2/18/2021    Procedure: Exam under anesthesia;  Surgeon: West Clifton MD;  Location: [facility] OR 2ND FLR;  Service: Colon and Rectal;  Laterality: N/A;    EYE SURGERY      INCISION OF PERIRECTAL ABSCESS N/A 2/12/2021    Procedure: INCISION AND DRAINAGE  ABSCESS, PERIRECTAL;  Surgeon: West Clifton MD;  Location: [facility] OR 2ND FLR;  Service: Colon and Rectal;  Laterality: N/A;    INCISION OF PERIRECTAL ABSCESS Left 2/18/2021    Procedure: INCISION, ABSCESS, PERIRECTAL;  Surgeon: West Clifton MD;  Location: [facility] OR 2ND FLR;  Service: Colon and Rectal;  Laterality: Left;    INJECTION OF ANESTHETIC AGENT AROUND NERVE Bilateral 7/3/2018    Procedure: BLOCK, NERVE;  Surgeon:  Laina Crockett MD;  Location: [facility] [place] PAIN MGT;  Service: Pain Management;  Laterality: Bilateral;  Bilateral Lumbar L2,L3,L4,L5 MBB      NEEDS CONSENT    INSERTION OF SETON STITCH N/A 2/12/2021    Procedure: PLACEMENT, SETON STITCH;  Surgeon: West Clifton MD;  Location: [facility] OR 2ND FLR;  Service: Colon and Rectal;  Laterality: N/A;    INSERTION OF SETON STITCH Left 2/18/2021    Procedure: PLACEMENT, SETON;  Surgeon: West Clifton MD;  Location: [facility] OR 2ND FLR;  Service: Colon and Rectal;  Laterality: Left;    INSERTION OF SETON STITCH N/A 9/24/2021    Procedure: PLACEMENT, SETON STITCH;  Surgeon: West Clifton MD;  Location: [facility] OR 2ND FLR;  Service: Colon and Rectal;  Laterality: N/A;  seton exchange    laser indirect  4/8/10    left eye    Laser Indirect Retinopexy  4/8/10    OS    PROSTATE SURGERY      RECTAL FISTULOTOMY N/A 11/12/2021    Procedure: FISTULOTOMY, RECTUM;  Surgeon: West Clifton MD;  Location: [facility] OR 2ND FLR;  Service: Colon and Rectal;  Laterality: N/A;    RETINAL DETACHMENT SURGERY  2004    OD    ROTATOR CUFF REPAIR  11/13/2013    TONSILLECTOMY      TRANSFORAMINAL EPIDURAL INJECTION OF STEROID Bilateral 2/19/2019    Procedure: INJECTION, STEROID, EPIDURAL, TRANSFORAMINAL APPROACH, L5;  Surgeon: Laina Crockett MD;  Location: [facility] [place] PAIN MGT;  Service: Pain Management;  Laterality: Bilateral;       Review of patient's allergies indicates:  No Known Allergies    Current Facility-Administered Medications on File Prior to Encounter   Medication    0.9%  NaCl infusion    mupirocin 2 % ointment     Current Outpatient Medications on File Prior to Encounter   Medication Sig    acetaminophen (TYLENOL) 500 MG tablet Take 1,000 mg by mouth every 8 (eight) hours as needed for Pain.    atorvastatin (LIPITOR) 40 MG tablet TAKE 1 TABLET EVERY DAY    carbidopa-levodopa  mg (SINEMET)  mg per tablet TAKE 1 QID (Patient taking differently: Take 1 tablet by mouth 3 (three)  times daily.)    celecoxib (CELEBREX) 100 MG capsule Take 100 mg by mouth 2 (two) times daily.    clonazePAM (KLONOPIN) 0.5 MG tablet TAKE 1/2 TO 1 TABLET BY MOUTH UP TO 3 TIMES DAILY AS NEEDED FOR ANXIETY (Patient taking differently: TAKE 1/2 TO 1 TABLET BY MOUTH ONCE DAILY AS NEEDED FOR ANXIETY)    ergocalciferol (ERGOCALCIFEROL) 50,000 unit Cap TAKE 1 CAPSULE (50,000 UNITS TOTAL) BY MOUTH EVERY 7 DAYS. (Patient taking differently: Take 50,000 Units by mouth every 7 days. ON WEDNESDAYS)    finasteride (PROSCAR) 5 mg tablet Take 1 tablet (5 mg total) by mouth once daily.    folic acid (FOLVITE) 1 MG tablet TAKE 1 TABLET EVERY DAY    magnesium oxide (MAG-OX) 400 mg tablet Take 400 mg by mouth once daily.    potassium citrate (UROCIT-K) 10 mEq (1,080 mg) TbSR TAKE 1 TABLET THREE TIMES DAILY WITH MEALS (Patient taking differently: Take 10 mEq by mouth once daily.)    pyridoxine, vitamin B6, (B-6) 50 MG Tab Take 50 mg by mouth once daily.    QUEtiapine (SEROQUEL) 25 MG Tab TAKE ORAL 1/2 TO 1 TABLET QHS.  TAKE IN PLACE OF CLONAZEPAM (Patient taking differently: Take 25 mg by mouth every evening.)    sertraline (ZOLOFT) 100 MG tablet Take 2 tablets (200 mg total) by mouth once daily.    sodium bicarbonate 650 MG tablet TAKE 1 TABLET ONE TIME DAILY    tamsulosin (FLOMAX) 0.4 mg Cap TAKE 1 CAPSULE EVERY DAY    zaleplon (SONATA) 5 MG Cap Take 1 capsule (5 mg total) by mouth nightly as needed (insomnia).    hydrOXYzine HCL (ATARAX) 10 MG Tab TAKE 1 TABLET BY MOUTH 3 TIMES A DAY AS NEEDED ANXIETY (Patient not taking: Reported on 4/13/2023)    levodopa (INBRIJA) 42 mg Cap Inhale 84 mg into the lungs 4 (four) times daily as needed (As needed for freezing/OFF episodes.). (Patient not taking: Reported on 4/27/2023)    losartan (COZAAR) 25 MG tablet Take 1 tablet (25 mg total) by mouth once daily. (Patient not taking: Reported on 4/27/2023)     Family History       Problem Relation (Age of Onset)    Anxiety disorder Son, Daughter     Cancer Father, Mother    Cataracts Father    Depression Son    Diabetes Father          Tobacco Use    Smoking status: Never    Smokeless tobacco: Never   Substance and Sexual Activity    Alcohol use: No    Drug use: No    Sexual activity: Not on file     Review of Systems   Constitutional:  Positive for activity change (Freq falls). Negative for chills and fever.   HENT:  Negative for trouble swallowing.    Eyes:  Negative for photophobia and visual disturbance.   Respiratory:  Negative for chest tightness, shortness of breath and wheezing.    Cardiovascular:  Negative for chest pain, palpitations and leg swelling.   Gastrointestinal:  Positive for nausea. Negative for abdominal pain, constipation, diarrhea and vomiting.   Genitourinary:  Negative for dysuria, frequency, hematuria and urgency.   Musculoskeletal:  Negative for arthralgias, back pain and gait problem.   Skin:  Negative for color change and rash.   Neurological:  Positive for dizziness, light-headedness and headaches. Negative for syncope, weakness and numbness.   Psychiatric/Behavioral:  Negative for agitation and confusion. The patient is not nervous/anxious.    Objective:     Vital Signs (Most Recent):  Temp: 97.8 °F (36.6 °C) (04/27/23 2345)  Pulse: 63 (04/27/23 2345)  Resp: 17 (04/27/23 2345)  BP: (!) 146/77 (04/27/23 2345)  SpO2: 97 % (04/27/23 2345)   Vital Signs (24h Range):  Temp:  [97.3 °F (36.3 °C)-98.7 °F (37.1 °C)] 97.8 °F (36.6 °C)  Pulse:  [40-73] 63  Resp:  [16-21] 17  SpO2:  [95 %-100 %] 97 %  BP: ()/() 146/77     Weight: 89.5 kg (197 lb 5 oz)  Body mass index is 28.31 kg/m².    Physical Exam  Vitals and nursing note reviewed.   Constitutional:       General: He is not in acute distress.     Appearance: He is well-developed.   HENT:      Head: Normocephalic and atraumatic.      Mouth/Throat:      Pharynx: No oropharyngeal exudate.   Eyes:      Conjunctiva/sclera: Conjunctivae normal.      Pupils: Pupils are equal,  round, and reactive to light.   Cardiovascular:      Rate and Rhythm: Normal rate and regular rhythm.      Heart sounds: Normal heart sounds.   Pulmonary:      Effort: Pulmonary effort is normal. No respiratory distress.      Breath sounds: Normal breath sounds. No wheezing.   Abdominal:      General: Bowel sounds are normal. There is no distension.      Palpations: Abdomen is soft.      Tenderness: There is no abdominal tenderness.   Musculoskeletal:         General: No tenderness. Normal range of motion.      Cervical back: Normal range of motion and neck supple.      Right lower leg: No edema.      Left lower leg: No edema.   Lymphadenopathy:      Cervical: No cervical adenopathy.   Skin:     General: Skin is warm and dry.      Capillary Refill: Capillary refill takes less than 2 seconds.      Findings: No rash.   Neurological:      Mental Status: He is alert and oriented to person, place, and time.      Cranial Nerves: No cranial nerve deficit.      Sensory: No sensory deficit.      Coordination: Coordination normal.   Psychiatric:         Behavior: Behavior normal.         Thought Content: Thought content normal.         Judgment: Judgment normal.         CRANIAL NERVES     CN III, IV, VI   Pupils are equal, round, and reactive to light.     Significant Labs: All pertinent labs within the past 24 hours have been reviewed.  CBC:   Recent Labs   Lab 04/27/23  1218 04/27/23  1224   WBC 6.18  --    HGB 15.0  --    HCT 44.2 45     --        CMP:   Recent Labs   Lab 04/27/23  1218      K 4.4      CO2 25   GLU 86   BUN 22   CREATININE 0.9   CALCIUM 9.7   PROT 7.2   ALBUMIN 4.1   BILITOT 0.9   ALKPHOS 72   AST 39   ALT 9*   ANIONGAP 8       Troponin:   Recent Labs   Lab 04/27/23  1218 04/27/23  1851 04/27/23  2320   TROPONINI 0.032*  0.034* 0.039* 0.045*         Significant Imaging: I have reviewed all pertinent imaging results/findings within the past 24 hours.  Imaging Results              X-Ray  Chest AP Portable (Final result)  Result time 04/27/23 18:40:25      Final result by Dwayne Lopez MD (04/27/23 18:40:25)                   Impression:      1. Coarse interstitial attenuation, accentuated by habitus.  No large focal consolidation.      Electronically signed by: Dwayne Lopez MD  Date:    04/27/2023  Time:    18:40               Narrative:    EXAMINATION:  XR CHEST AP PORTABLE    CLINICAL HISTORY:  bradycardia;    TECHNIQUE:  Single frontal view of the chest was performed.    COMPARISON:  04/25/2019    FINDINGS:  The cardiomediastinal silhouette is prominent, magnified by technique noting calcification of the aorta..  There is no pleural effusion.  The trachea is midline.  The lungs are symmetrically expanded bilaterally with mildly coarse interstitial attenuation accentuated by habitus..  No large focal consolidation seen.  There is no pneumothorax.  The osseous structures are remarkable for degenerative change..                                      Review of Systems   Constitutional: Positive for activity change (Freq falls). Negative for chills and fever.   HENT:  Negative for trouble swallowing.    Eyes:  Negative for photophobia and visual disturbance.   Cardiovascular:  Negative for chest pain, leg swelling and palpitations.   Respiratory:  Negative for chest tightness, shortness of breath and wheezing.    Skin:  Negative for color change and rash.   Musculoskeletal:  Negative for arthralgias, back pain and gait problem.   Gastrointestinal:  Positive for nausea. Negative for abdominal pain, constipation, diarrhea and vomiting.   Genitourinary:  Negative for dysuria, frequency, hematuria and urgency.   Neurological:  Positive for dizziness, headaches and light-headedness. Negative for numbness, syncope and weakness.   Psychiatric/Behavioral:  Negative for agitation and confusion. The patient is not nervous/anxious.    Physical Exam  Vitals and nursing note reviewed.   Constitutional:        General: He is not in acute distress.     Appearance: He is well-developed.   HENT:      Head: Normocephalic and atraumatic.      Mouth/Throat:      Pharynx: No oropharyngeal exudate.   Eyes:      Conjunctiva/sclera: Conjunctivae normal.      Pupils: Pupils are equal, round, and reactive to light.   Cardiovascular:      Rate and Rhythm: Normal rate and regular rhythm.      Heart sounds: Normal heart sounds.   Pulmonary:      Effort: Pulmonary effort is normal. No respiratory distress.      Breath sounds: Normal breath sounds. No wheezing.   Abdominal:      General: Bowel sounds are normal. There is no distension.      Palpations: Abdomen is soft.      Tenderness: There is no abdominal tenderness.   Musculoskeletal:         General: No tenderness. Normal range of motion.      Cervical back: Normal range of motion and neck supple.      Right lower leg: No edema.      Left lower leg: No edema.   Lymphadenopathy:      Cervical: No cervical adenopathy.   Skin:     General: Skin is warm and dry.      Capillary Refill: Capillary refill takes less than 2 seconds.      Findings: No rash.   Neurological:      Mental Status: He is alert and oriented to person, place, and time.      Cranial Nerves: No cranial nerve deficit.      Sensory: No sensory deficit.      Coordination: Coordination normal.   Psychiatric:         Behavior: Behavior normal.         Thought Content: Thought content normal.         Judgment: Judgment normal.

## 2023-04-28 NOTE — PLAN OF CARE
[facility] - Observation 11H  Initial Discharge Assessment       Primary Care Provider: Andrez Jackson MD    Admission Diagnosis: Bradycardia [R00.1]  Chest pain [R07.9]    Admission Date: 4/27/2023  Expected Discharge Date: 4/28/2023         Payor: [employer] MEDICARE / Plan: [employer] MEDICARE HMO / Product Type: Capitation /     Extended Emergency Contact Information  Primary Emergency Contact: Whitley Marti  Address: [address]           [place] [address] [facility]  Home Phone: [phone]  Mobile Phone: [phone]  Relation: Spouse    Discharge Plan A: (P) Home  Discharge Plan B: (P) Home with [facility] - [place], [place] - [address]  [address] [address]  Phone: [phone] Fax: [phone]    [id number] - [place] - [address]  [address]  [place] [address]  Phone: [phone] Fax: [phone]        Assessment completed.  Pt lives with his wife, does not saw approximately 1.5 weeks ago,.  [facility] preferred [facility].  He is not on coumadin or warfarin. His wife,Whitley, is his ride home and his .SHARDA Reilly is Home.

## 2023-04-28 NOTE — NURSING
Patient transported to unit via stretcher with son at bedside. VSS.  Patient oriented to room. Safety and fall precautions initiated.  Respirations even and unlabored.   Will continue to monitor.

## 2023-04-28 NOTE — NURSING
Discharge instructions reviewed with patient and his wife. They both verbalized understanding. Resp even and unlabored. No acute resp distress noted. No questions/concerns voiced at this time. Patient assisted with dressing. Patient awaiting wheelchair transport.

## 2023-04-28 NOTE — ASSESSMENT & PLAN NOTE
"Symptoms: dizziness, lightheadedness, headache and nausea x few weeks per wife (patient has dementia)  HR 33 per home PT yesterday, hemodynamically stable  No prior cardiac history, not on any rate controlling meds at home   Holter monitor on 4/18/23 (for 20 min of effective study only) showed "Sinus bradycardia with Mobiz I second degree AV block, 2:1 AV block and RBBB"  DNR/DNI but ok for pacemaker and ok for intubation if needed for the day of surgery/procedure  Will make NPO in case needs a procedure tomorrow   Final plan to come in the morning       "

## 2023-04-28 NOTE — ACP (ADVANCE CARE PLANNING)
Advance Care Planning     Date: 04/27/2023    Code Status  I engaged the the patient and family in a conversation about the patient's preferences for care if his heart were to stop or if he were to stop breathing. The patient wishes to have a natural, peaceful death.  Along those lines, the patient does not wish to have CPR or other invasive treatments performed when his heart and/or breathing stops. I communicated to the patient and family that a DNR order would be placed in his medical record to reflect this preference.  I spent a total of 16 minutes engaging the patient in this advance care planning discussion.

## 2023-04-28 NOTE — HOSPITAL COURSE
Haja Marti is placed in  Observation for management of symptomatic bradycardia. Hypertensive on admission but improving status post BP controlling agents. Electrophysiology consulted with recommendations for no pacemaker at this point, will proceed with 14-day event monitor and follow up in clinic. Additionally patient made DNR during hospital course and family requesting to speak with Palliative. Palliative Care consulted, appreciate assistance. At discharge patient will follow up with PCP, EP, and Palliative Care. Family requesting Urology follow up, ordered. Family requesting information on sitter services and information and pamphlet provided to patient and family. Patient medically ready for discharge. Plan of care discussed with patient, patient agreeable to plan, and all questions answered.

## 2023-04-28 NOTE — PLAN OF CARE
"  Clinical Cardiac Electrophysiology Plan of Care     Date:  4/28/2023    HPI:   Haja Marti is a 77 y.o. male with PMHx of HTN, HLD, Parkinson's disease with Lewy body dementia who had HR 33 at home while working with home PT. He also has been getting lightheaded and dizzy for the past several weeks along with headaches and nausea. He has dementia so history is given by son at bedside and wife who talked to me on the phone. Per wife, HR was never checked at home before until yesterday when it was 33. He has no prior cardiac history. He also didn't have any chest pain, dyspnea or syncope. He has had several falls in the past few months but they are related to physical debility per wie and he uses a walker at home. In the ER, HR was recorded at 43 which later came up to 60s on its own. His BP was at 223/109 in the ER. A Holter monitor was done on 4/18/23 which showed "Sinus bradycardia with Mobiz I second degree AV block, 2:1 AV block and RBBB".    For complete note, please refer to Note on 4/28/23 by Dr Benji Garcia     Laboratory Data:      BUN/Cr/glu/ALT/AST/amyl/lip:  23/0.9/--/13/26/--/-- (04/28 0604)  WBC/Hgb/Hct/Plts:  6.89/15.0/45.6/197 (04/28 0604)       Plan:   14 day cardiac monitor ([employer]) Called device clinic and order placed        We appreciate the opportunity to assist in the care of Mr Haja Marti. We are signing off. Should you have any further questions, please do not hesitate to call.      Pt  discussed with the attending of the arrhythmia consult service.     Sal Fischer   [facility]  PGY4 Cardiology Fellow      "

## 2023-04-28 NOTE — ASSESSMENT & PLAN NOTE
Haja Marti is a 77-year-old man with a history of Parkinson's and Lewy Body Dementia who was admitted for bradycardia, thankfully asymptomatic. Cardiology was consulted and recommended Holter monitoring in the outpatient setting. Palliative and Supportive Care was consulted for introduction of services per family's request.    Advance Care Planning   Goals of Care:  - Code status: DNAR/DNI  - Next of kin: wife Whitley Marti [phone]  - ACP documents: none  - Support: excellent, lives with wife Whitley. Family close by. One of his sons is a pulmonary/critical care physician in [place] (Dr. Tod Marti)  - Prognosis: guarded  - Goals: optimize quality of life  - Recommendations: continue current level of care. Mr. Marti has already decided that he will decline Cardiology's offer for pacemaker placement. Will establish care with our palliative care clinic    Goals of Care Conversation, 4/28/23:  - I met Mr. Marti and his wife, son and daughter-in-law at his bedside. Mr. Marti is alert, engaged and participates in the discussion, but the majority of conversation was held with Mrs. Marti and their son. Their other son  Figueroa (pulmonary/critical care) joins via speaker phone in the middle of our conversation. Family shares that Dr. Tod Marti (son) had recommended that they speak with the palliative care services as Mr. Marti before they were discharged home. They have spoken with the Cardiology team who recommended a Holter monitor to monitor his heart rate/rhythm and initiated discussions about pacemaker placement. Family supports Mr. Marti's decision to decline the pacemaker placement. Priority is to optimize Mr. Marti's quality of life. Mrs. Marti shares that at home, he mostly watches TV and utilizes a walker when he is able to walk. Unfortunately he does freeze due to the movement disorder, and he becomes very fatigued at nighttime. This is especially concerning because due to  the fatigue, he is unable to transfer from the wheelchair to the bed and she is not strong enough to lift/transfer him. They live alone together and while one of their sons live nearby, they want to prepare and learn what they can do to help him transfer. They have home health PT/OT, but during one of their sessions, they saw that he was very bradycardic and sent them to the hospital (for this admission). I shared that ideally home health PT/OT should be able to make recommendations for durable medical equipment to help with these situations and encouraged them to ask them for guidance. We also talked about sitter services and will get case management to provide a list of sitter services, as at nighttime he tries to get out of bed due to nocturia and sometimes responds to his visual hallucinations. They don't need sitter services now, but anticipate that they may need this soon. They are very grateful for Dr. Feliciano, their neurologist, who has been very thoughtful, caring and attentive to their needs. She even visited them during this hospitalization. He enjoys going to the [employer] classes on [employer], and feel that he enjoys the company of others in similar situations. Family expresses desire for more guidance about the future, and ask me what to expect in the future. I asked to clarify if they mean what to expect with the natural progression of his Parkinson's disease and prognosis. Mrs. Marti shakes her head no, and would prefer to not talk about that right now. I recommended that in the interim, we should continue to focus on optimization of his movement disorder in efforts to optimize his quality of life. I offered to refer Mr. Marti to our palliative care clinic as things progress and more questions/concern come as everyone would benefit from a holistic approach to his care. They were agreeable with this. I asked about ACP documents and Mrs. Marti confirms that this has not been done (living will,  [employer]). I gave them a HCPOA form and informed them that when they are ready to complete it, the HCPOA form is free, and just needs two non-family witnesses. Mr. Marti's sons assure that they will discuss ACP documents together in the near future.

## 2023-04-28 NOTE — PHARMACY MED REC
"Admission Medication History     The home medication history was taken by Cara Bonner.    You may go to "Admission" then "Reconcile Home Medications" tabs to review and/or act upon these items.     The home medication list has been updated by the Pharmacy department.   Please read ALL comments highlighted in yellow.   Please address this information as you see fit.    Feel free to contact us if you have any questions or require assistance.      The medications listed below were removed from the home medication list. Please reorder if appropriate:  Patient reports no longer taking the following medication(s):  IBUPROFEN 200 MG TABLET  KETOCONAZOLE 2 % CREAM        Current Outpatient Medications on File Prior to Encounter   Medication Sig    acetaminophen (TYLENOL) 500 MG tablet   Take 1,000 mg by mouth every 8 (eight) hours as needed for Pain.    atorvastatin (LIPITOR) 40 MG tablet   TAKE 1 TABLET EVERY DAY    carbidopa-levodopa  mg (SINEMET)  mg per tablet   TAKE 1 QID (Patient taking differently: Take 1 tablet by mouth 3 (three) times daily.)    celecoxib (CELEBREX) 100 MG capsule   Take 100 mg by mouth 2 (two) times daily.    clonazePAM (KLONOPIN) 0.5 MG tablet   TAKE 1/2 TO 1 TABLET BY MOUTH ONCE DAILY AS NEEDED FOR ANXIETY    ergocalciferol (ERGOCALCIFEROL) 50,000 unit Cap   Take 50,000 Units by mouth every 7 days. ON WEDNESDAYS    finasteride (PROSCAR) 5 mg tablet   Take 1 tablet (5 mg total) by mouth once daily.    folic acid (FOLVITE) 1 MG tablet   TAKE 1 TABLET EVERY DAY    magnesium oxide (MAG-OX) 400 mg tablet   Take 400 mg by mouth once daily.    potassium citrate (UROCIT-K) 10 mEq (1,080 mg) TbSR   Take 10 mEq by mouth once daily.    pyridoxine, vitamin B6, (B-6) 50 MG Tab   Take 50 mg by mouth once daily.    QUEtiapine (SEROQUEL) 25 MG Tab   Take 25 mg by mouth every evening.    sertraline (ZOLOFT) 100 MG tablet   Take 2 tablets (200 mg total) by mouth once daily.    sodium bicarbonate " 650 MG tablet   TAKE 1 TABLET ONE TIME DAILY    tamsulosin (FLOMAX) 0.4 mg Cap   TAKE 1 CAPSULE EVERY DAY    zaleplon (SONATA) 5 MG Cap     Take 1 capsule (5 mg total) by mouth nightly as needed (insomnia).    hydrOXYzine HCL (ATARAX) 10 MG Tab     TAKE 1 TABLET BY MOUTH 3 TIMES A DAY AS NEEDED ANXIETY (Patient not taking: Reported on 4/13/2023)    levodopa (INBRIJA) 42 mg Cap       Inhale 84 mg into the lungs 4 (four) times daily as needed (As needed for freezing/OFF episodes.). (Patient not taking: Reported on 4/27/2023)    losartan (COZAAR) 25 MG tablet   Take 1 tablet (25 mg total) by mouth once daily. (Patient not taking: Reported on 4/27/2023)       Cara Bonner  EXT [id number]                  .

## 2023-04-28 NOTE — CONSULTS
[facility]  Palliative Medicine  Consult Note    Patient Name: Haja Marti  MRN: [id number]  Admission Date: 4/27/2023  Hospital Length of Stay: 0 days  Code Status: DNR   Attending Provider: Nelly Moy MD  Consulting Provider: Mag Gutierrez MD  Primary Care Physician: Andrez Jackson MD  Principal Problem:Symptomatic bradycardia    Patient information was obtained from patient, spouse/SO, relative(s) and primary team.      Inpatient consult to Palliative Care  Consult performed by: Mag Gutierrez MD  Consult ordered by: Martin Cee PA-C  Reason for consult: introduction of services        Assessment/Plan:     Palliative Care  Encounter for palliative care  Haja Marti is a 77-year-old man with a history of Parkinson's and Lewy Body Dementia who was admitted for bradycardia, thankfully asymptomatic. Cardiology was consulted and recommended Holter monitoring in the outpatient setting. Palliative and Supportive Care was consulted for introduction of services per family's request.    Advance Care Planning   Goals of Care:  - Code status: DNAR/DNI  - Next of kin: wife Whitley Marti [phone]  - ACP documents: none  - Support: excellent, lives with wife Whitley. Family close by. One of his sons is a pulmonary/critical care physician in [place] (Dr. Tod Marti)  - Prognosis: guarded  - Goals: optimize quality of life  - Recommendations: continue current level of care. Mr. Marti has already decided that he will decline Cardiology's offer for pacemaker placement. Will establish care with our palliative care clinic    Goals of Care Conversation, 4/28/23:  - I met Mr. Marti and his wife, son and daughter-in-law at his bedside. Mr. Marti is alert, engaged and participates in the discussion, but the majority of conversation was held with Mrs. Marti and their son. Their other son Dr. Marti (pulmonary/critical care) joins via speaker phone in the middle of our conversation.  Family shares that Dr. Tod Marti (son) had recommended that they speak with the palliative care services as Mr. Marti before they were discharged home. They have spoken with the Cardiology team who recommended a Holter monitor to monitor his heart rate/rhythm and initiated discussions about pacemaker placement. Family supports Mr. Marti's decision to decline the pacemaker placement. Priority is to optimize Mr. Marti's quality of life. Mrs. Marti shares that at home, he mostly watches TV and utilizes a walker when he is able to walk. Unfortunately he does freeze due to the movement disorder, and he becomes very fatigued at nighttime. This is especially concerning because due to the fatigue, he is unable to transfer from the wheelchair to the bed and she is not strong enough to lift/transfer him. They live alone together and while one of their sons live nearby, they want to prepare and learn what they can do to help him transfer. They have home health PT/OT, but during one of their sessions, they saw that he was very bradycardic and sent them to the hospital (for this admission). I shared that ideally home health PT/OT should be able to make recommendations for durable medical equipment to help with these situations and encouraged them to ask them for guidance. We also talked about sitter services and will get case management to provide a list of sitter services, as at nighttime he tries to get out of bed due to nocturia and sometimes responds to his visual hallucinations. They don't need sitter services now, but anticipate that they may need this soon. They are very grateful for Dr. Feliciano, their neurologist, who has been very thoughtful, caring and attentive to their needs. She even visited them during this hospitalization. He enjoys going to the [employer] classes on 's, and feel that he enjoys the company of others in similar situations. Family expresses desire for more guidance about the future,  and ask me what to expect in the future. I asked to clarify if they mean what to expect with the natural progression of his Parkinson's disease and prognosis. Mrs. Marti shakes her head no, and would prefer to not talk about that right now. I recommended that in the interim, we should continue to focus on optimization of his movement disorder in efforts to optimize his quality of life. I offered to refer Mr. Marti to our palliative care clinic as things progress and more questions/concern come as everyone would benefit from a holistic approach to his care. They were agreeable with this. I asked about ACP documents and Mrs. Marti confirms that this has not been done (living will, MPOA). I gave them a HCPOA form and informed them that when they are ready to complete it, the HCPOA form is free, and just needs two non-family witnesses. Mr. Marti's sons assure that they will discuss ACP documents together in the near future.          Thank you for your consult. I will sign off. Please contact us if you have any additional questions.    Subjective:     HPI:   Haja Marti is a 77-year-old man with a history of Parkinson's and Lewy Body Dementia who was admitted for bradycardia, thankfully asymptomatic. Cardiology was consulted and recommended Holter monitoring in the outpatient setting. Palliative and Supportive Care was consulted for introduction of services per family's request.      Past Medical History:   Diagnosis Date    Anxiety     Arthritis     BPH (benign prostatic hypertrophy)     Cataract     OS    Colon polyp 2002    Depression     Epiretinal membrane, both eyes     Hx of psychiatric care     Hyperlipidemia     Hypertension     Kidney stones     Kidney stones     Parkinsons     Posterior vitreous detachment of left eye     Psychiatric problem     Retinal detachment 2004    OD    Retinoschisis, left eye     Sleep apnea     Vitreous hemorrhage of left eye      Past Surgical History:    Procedure Laterality Date    BIOPSY OF ADENOIDS      CATARACT EXTRACTION      OD    COLONOSCOPY N/A 12/3/2019    Procedure: COLONOSCOPY;  Surgeon: Ceferino Oliveira MD;  Location: [facility] ENDO (4TH FLR);  Service: Endoscopy;  Laterality: N/A;  5/2019 laminectomy with post op confusion tb    CYSTOSCOPY WITH URODYNAMIC TESTING N/A 1/31/2022    Procedure: CYSTOSCOPY, WITH URODYNAMIC TESTING FLOUROSCOPIC;  Surgeon: Devang Butler MD;  Location: [facility] OR 1ST FLR;  Service: Urology;  Laterality: N/A;  1hr    EPIDURAL STEROID INJECTION N/A 3/12/2019    Procedure: INJECTION, STEROID, EPIDURAL, L5-S1;  Surgeon: Laina Crockett MD;  Location: [facility] PAIN MGT;  Service: Pain Management;  Laterality: N/A;    EXAMINATION UNDER ANESTHESIA N/A 2/18/2021    Procedure: Exam under anesthesia;  Surgeon: West Clifton MD;  Location: [facility] OR 2ND FLR;  Service: Colon and Rectal;  Laterality: N/A;    EYE SURGERY      INCISION OF PERIRECTAL ABSCESS N/A 2/12/2021    Procedure: INCISION AND DRAINAGE  ABSCESS, PERIRECTAL;  Surgeon: West Clifton MD;  Location: [facility] OR 2ND FLR;  Service: Colon and Rectal;  Laterality: N/A;    INCISION OF PERIRECTAL ABSCESS Left 2/18/2021    Procedure: INCISION, ABSCESS, PERIRECTAL;  Surgeon: West Clifton MD;  Location: [facility] OR 2ND FLR;  Service: Colon and Rectal;  Laterality: Left;    INJECTION OF ANESTHETIC AGENT AROUND NERVE Bilateral 7/3/2018    Procedure: BLOCK, NERVE;  Surgeon: Laina Crockett MD;  Location: [facility] PAIN MGT;  Service: Pain Management;  Laterality: Bilateral;  Bilateral Lumbar L2,L3,L4,L5 MBB      NEEDS CONSENT    INSERTION OF SETON STITCH N/A 2/12/2021    Procedure: PLACEMENT, SETON STITCH;  Surgeon: West Clifton MD;  Location: [facility] OR 2ND FLR;  Service: Colon and Rectal;  Laterality: N/A;    INSERTION OF SETON STITCH Left 2/18/2021    Procedure: PLACEMENT, SETON;  Surgeon: West Clifton MD;  Location: [facility] OR 2ND FLR;  Service: Colon and Rectal;   Laterality: Left;    INSERTION OF SETON STITCH N/A 9/24/2021    Procedure: PLACEMENT, SETON STITCH;  Surgeon: West Clifton MD;  Location: [facility] OR 2ND FLR;  Service: Colon and Rectal;  Laterality: N/A;  seton exchange    laser indirect  4/8/10    left eye    Laser Indirect Retinopexy  4/8/10    OS    PROSTATE SURGERY      RECTAL FISTULOTOMY N/A 11/12/2021    Procedure: FISTULOTOMY, RECTUM;  Surgeon: West Clifton MD;  Location: [facility] OR 2ND FLR;  Service: Colon and Rectal;  Laterality: N/A;    RETINAL DETACHMENT SURGERY  2004    OD    ROTATOR CUFF REPAIR  11/13/2013    TONSILLECTOMY      TRANSFORAMINAL EPIDURAL INJECTION OF STEROID Bilateral 2/19/2019    Procedure: INJECTION, STEROID, EPIDURAL, TRANSFORAMINAL APPROACH, L5;  Surgeon: Laina Crockett MD;  Location: [facility] PAIN MGT;  Service: Pain Management;  Laterality: Bilateral;     Family History   Problem Relation Age of Onset    Cataracts Father     Cancer Father         lung    Diabetes Father     Cancer Mother         lung    Anxiety disorder Son     Depression Son     Anxiety disorder Daughter      Social History     Tobacco Use    Smoking status: Never    Smokeless tobacco: Never   Substance Use Topics    Alcohol use: No    Drug use: No     Review of patient's allergies indicates:  No Known Allergies    Medications:  Continuous Infusions:   Scheduled:    atorvastatin  40 mg Oral Daily    carbidopa-levodopa  mg  2 tablet Oral TID    celecoxib  100 mg Oral BID    ergocalciferol  50,000 Units Oral Q7 Days    finasteride  5 mg Oral Daily    folic acid  1,000 mcg Oral Daily    magnesium oxide  400 mg Oral Daily    potassium citrate  10 mEq Oral Daily    pyridoxine (vitamin B6)  50 mg Oral Daily    sertraline  200 mg Oral Daily    sodium bicarbonate  650 mg Oral Daily    tamsulosin  1 capsule Oral Daily     PRN Meds: acetaminophen, bisacodyL, clonazePAM, dextrose 10%, dextrose 10%, dextrose, dextrose, glucagon  (human recombinant), hydrALAZINE, hydrOXYzine HCL, melatonin, naloxone, ondansetron, polyethylene glycol, promethazine, QUEtiapine, sodium chloride 0.9%    OBJECTIVE:   ROS:  Review of Systems   Unable to perform ROS: Dementia       Review of Symptoms    Symptom Assessment (ESAS 0-10 Scale)  Unable to complete assessment due to Dementia         Pain Assessment in Advanced Demential Scale (PAINAD)   Breathing - Independent of vocalization:  0  Negative vocalization:  0  Facial expression:  0  Body language:  0  Consolability:  0  Total:  0    Living Arrangements:  Lives with spouse    Psychosocial/Cultural:   See Palliative Psychosocial Note: No  Lives with spouse. Has son who lives nearby and another son who works as a pulmonary/critical care physician in [place]  **Primary  to Follow**  Palliative Care  Consult: No      Advance Care Planning   Advance Directives:   Living Will: No    LaPOST: No    Do Not Resuscitate Status: Yes    Medical Power of : No      Decision Making:  Family answered questions  Goals of Care: The patient and family endorses that what is most important right now is to focus on quality of life, even if it means sacrificing a little time    Accordingly, we have decided that the best plan to meet the patient's goals includes continuing with treatment and establishing relationship with outpatient palliative care team          Physical Exam:  Vitals: Temp: 97.9 °F (36.6 °C) (04/28/23 1152)  Pulse: (!) 54 (04/28/23 1442)  Resp: 16 (04/28/23 1152)  BP: (!) 160/86 (04/28/23 1213)  SpO2: 97 % (04/28/23 1152)  Physical Exam  Constitutional:       General: He is not in acute distress.  HENT:      Head: Normocephalic and atraumatic.      Right Ear: External ear normal.      Left Ear: External ear normal.      Nose: Nose normal.      Mouth/Throat:      Mouth: Mucous membranes are moist.   Eyes:      Extraocular Movements: Extraocular movements intact.       Conjunctiva/sclera: Conjunctivae normal.   Cardiovascular:      Rate and Rhythm: Bradycardia present.   Pulmonary:      Effort: Pulmonary effort is normal.      Breath sounds: Normal breath sounds.   Abdominal:      General: Bowel sounds are normal.      Palpations: Abdomen is soft.   Musculoskeletal:         General: No swelling.   Lymphadenopathy:      Cervical: No cervical adenopathy.   Skin:     General: Skin is warm and dry.   Neurological:      General: No focal deficit present.      Mental Status: He is alert and oriented to person, place, and time.   Psychiatric:         Mood and Affect: Mood normal.         Behavior: Behavior normal.      Comments: Intermittently seeing hallucination         Labs:  CBC:   WBC   Date Value Ref Range Status   04/28/2023 6.89 3.90 - 12.70 K/uL Final     Hemoglobin   Date Value Ref Range Status   04/28/2023 15.0 14.0 - 18.0 g/dL Final     POC Hematocrit   Date Value Ref Range Status   04/27/2023 45 36 - 54 %PCV Final     Hematocrit   Date Value Ref Range Status   04/28/2023 45.6 40.0 - 54.0 % Final     MCV   Date Value Ref Range Status   04/28/2023 94 82 - 98 fL Final     Platelets   Date Value Ref Range Status   04/28/2023 197 150 - 450 K/uL Final       LFT:   Lab Results   Component Value Date    AST 26 04/28/2023    ALKPHOS 75 04/28/2023    BILITOT 0.6 04/28/2023       Albumin:   Albumin   Date Value Ref Range Status   04/28/2023 3.7 3.5 - 5.2 g/dL Final   08/02/2018 4.5 3.6 - 5.1 g/dL Final     Comment:     @ Test Performed By:  [facility][1]  Jonnathan Schneider M.D., Ph.D.,   [address] [place] [id number]  [facility]  [id number]       Protein:   Total Protein   Date Value Ref Range Status   04/28/2023 6.4 6.0 - 8.4 g/dL Final       Radiology:I have reviewed all pertinent imaging results/findings within the past 24 hours.    I spent a total of 75 minutes on the day of the visit. This includes face to face time in discussion of  goals of care, symptom assessment, coordination of care and emotional support. This also includes non-face to face time preparing to see the patient (eg, review of tests/imaging), obtaining and/or reviewing separately obtained history, documenting clinical information in the electronic or other health record, independently interpreting results and communicating results to the patient/family/caregiver, or care coordinator.    Mag Gutierrez MD  Palliative Medicine  [facility]

## 2023-04-28 NOTE — HPI
Haja Marti is a 77-year-old man with a history of Parkinson's and Lewy Body Dementia who was admitted for bradycardia, thankfully asymptomatic. Cardiology was consulted and recommended Holter monitoring in the outpatient setting. Palliative and Supportive Care was consulted for introduction of services per family's request.

## 2023-05-01 ENCOUNTER — TELEPHONE (OUTPATIENT)
Dept: PALLIATIVE MEDICINE | Facility: CLINIC | Age: 78
End: 2023-05-01
Payer: MEDICARE

## 2023-05-01 ENCOUNTER — TELEPHONE (OUTPATIENT)
Dept: ELECTROPHYSIOLOGY | Facility: CLINIC | Age: 78
End: 2023-05-01
Payer: MEDICARE

## 2023-05-01 DIAGNOSIS — R00.1 SYMPTOMATIC BRADYCARDIA: Primary | ICD-10-CM

## 2023-05-01 NOTE — TELEPHONE ENCOUNTER
Returned call to patients wife regarding symptoms of feeling fatigued and sleepy. I spoke with Dr. Li. Patients wife given instructions to push the button on the monitor for symptoms. Patients wife also instructed that if they have pushed the button for symptoms that they can return the monitor this week on Thursday. Patients wife states they have pushed the button twice. She will return the monitor on Thursday of this week. Dr. Li will get the result and call the patient with next steps. Wife verbalized understanding.

## 2023-05-05 ENCOUNTER — DOCUMENTATION ONLY (OUTPATIENT)
Dept: REHABILITATION | Facility: HOSPITAL | Age: 78
End: 2023-05-05
Payer: MEDICARE

## 2023-05-05 NOTE — PROGRESS NOTES
Date: 5/5/2023     Name: Haja Marti   MRN: [id number]      Haja Marti attended [employer] wellness class today. This is the 10th class that the pt has attended.      The class participated in the following:   sustained phonation with a model x 10  ascending pitch glides with a model x 10  descending pitch glides with a model x 10  10 functional phrases with a model x 3  5 loud catch phrases with a model x 3   conversation activity:  Charades - guessing LOUDLY  Rapid fire - LOUDLY name countries    Therapist's Name:   Jessica Carreno, MIKE-SLP

## 2023-05-08 ENCOUNTER — TELEPHONE (OUTPATIENT)
Dept: ELECTROPHYSIOLOGY | Facility: CLINIC | Age: 78
End: 2023-05-08
Payer: MEDICARE

## 2023-05-08 NOTE — TELEPHONE ENCOUNTER
Spoke with patient's wife and their son, Dr. Tod Marti, per there request. Discussed holter monitor. Discussed possibility that some of his symptoms are due to AV Wenckebach episodes. Discussed that a PPM may improve some of his symptoms. However discussed that being significantly physically dependent on others for assistance would pose a problem for a traditional transvenous system. I think a leadless PPM would be the best course of action should they elect to proceed. Dr. Marti indicated that his quality of life is quite poor and no treatment for his dementia is helping and they are unsure of doing any procedures that would extend his life. They will discuss and let me know if they desire to proceed.

## 2023-05-09 ENCOUNTER — OFFICE VISIT (OUTPATIENT)
Dept: PSYCHIATRY | Facility: CLINIC | Age: 78
End: 2023-05-09
Payer: MEDICARE

## 2023-05-09 ENCOUNTER — TELEPHONE (OUTPATIENT)
Dept: NEUROLOGY | Facility: CLINIC | Age: 78
End: 2023-05-09
Payer: MEDICARE

## 2023-05-09 DIAGNOSIS — F32.A DEPRESSION, UNSPECIFIED DEPRESSION TYPE: ICD-10-CM

## 2023-05-09 DIAGNOSIS — G20.A1 PARKINSON'S DISEASE: ICD-10-CM

## 2023-05-09 DIAGNOSIS — F41.1 GAD (GENERALIZED ANXIETY DISORDER): Primary | ICD-10-CM

## 2023-05-09 PROCEDURE — 90785 PR INTERACTIVE COMPLEXITY: ICD-10-PCS | Mod: HCNC,95,, | Performed by: SOCIAL WORKER

## 2023-05-09 PROCEDURE — 90834 PSYTX W PT 45 MINUTES: CPT | Mod: HCNC,95,, | Performed by: SOCIAL WORKER

## 2023-05-09 PROCEDURE — 90785 PSYTX COMPLEX INTERACTIVE: CPT | Mod: HCNC,95,, | Performed by: SOCIAL WORKER

## 2023-05-09 PROCEDURE — 90834 PR PSYCHOTHERAPY W/PATIENT, 45 MIN: ICD-10-PCS | Mod: HCNC,95,, | Performed by: SOCIAL WORKER

## 2023-05-09 NOTE — TELEPHONE ENCOUNTER
----- Message from Sabrina Chow sent at 2023  1:39 PM CDT -----  Contact: WIFE - ALISE Marti  MRN: [id number]  : 1945  PCP: Andrez Jackson  Home Phone      [phone]  Work Phone      Not on file.  Mobile          [phone]      MESSAGE: Patient states that the patient was seen by a cardiologist and she would like to discuss with Dr. Mcmahon what they are wanting to do.        Phone: [phone] or [phone]

## 2023-05-09 NOTE — PROGRESS NOTES
Individual Psychotherapy (PhD/LCSW)    5/9/2023    Site:  Telemed         Therapeutic Intervention: Met with patient and spouse.  Outpatient - Insight oriented psychotherapy 45 min - CPT code 46020    Chief complaint/reason for encounter: depression and anxiety     Interval history and content of current session:      Site:    Telehealth          Length of service: 45         Therapeutic intervention:     Persons present: spouse and pt  [place] (needed to help with interpretation of intervention due to pt cognitive decline due to dementia).    Interval history:      The patient location is: home [place].   The chief complaint leading to consultation is: anxiety/depression  Visit type: audio  Total time spent with patient: 45 min  Each patient to whom he or she provides medical services by telemedicine is:  (1) informed of the relationship between the physician and patient and the respective role of any other health care provider with respect to management of the patient; and (2) notified that he or she may decline to receive medical services by telemedicine and may withdraw from such care at any time.       Notes:   He is mostly concern about event this Sat.  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      He was hospitalized for two days per slow heart rate.   Pace maker was discussed while in hospital but opted not to.    Anxiety remains high.  He also lost Mr. Wellington who was like a father to him.  He was 89 years old.                  Target symptoms: depression, anxiety        Progress toward goals: progressing well    Diagnosis: generalized anxiety disorder       Treatment plan:  Target symptoms: anxiety   Why chosen therapy is appropriate versus another modality: relevant to diagnosis, evidence based practice  Outcome monitoring methods: self-report, observation, feedback from family  Therapeutic intervention type: behavior modifying psychotherapy    Risk parameters:  Patient reports no suicidal  ideation  Patient reports no homicidal ideation  Patient reports no self-injurious behavior  Patient reports no violent behavior        Verbal deficits: None           Patient's response to intervention:   The patient's response to intervention is accepting, motivated.              Progress toward goals and other mental status changes:  The patient's progress toward goals is fair .    Diagnosis:        ICD-10-CM ICD-9-CM   1. Generalized anxiety disorder F41.1 300.02       Plan:  individual psychotherapy and family psychotherapy    Return to clinic: as scheduled    Length of Service (minutes): 45

## 2023-05-09 NOTE — TELEPHONE ENCOUNTER
Patients wife Whitley wanted to let you know that Mr Smyth saw cardiology and it has been recommended that he get a pacemaker. Per wife, patient is miserable with his life right now and she does not think that he will go through with it. She is asking for your insight. They are aware that this will not fix his current health issues besides possibly help with fatigue so wife is needing some insight to the situation. She also asked that I let you know how much they appreciated you seeing Mr Smyth in the hospital last week. It really means a lot to them to have such a caring provider in their corner. Please advise.

## 2023-05-10 ENCOUNTER — DOCUMENTATION ONLY (OUTPATIENT)
Dept: REHABILITATION | Facility: HOSPITAL | Age: 78
End: 2023-05-10
Payer: MEDICARE

## 2023-05-10 NOTE — PROGRESS NOTES
Date: 5/10/2023    Name: Haja Marti  MRN: [id number]    Patient screened and deemed appropriate for wellness program at this time. See screening form and participation waiver in media section.     Haja Marti attended [employer] wellness class today.    The class participated in the following:  Warm Up:   Posterior capsule stretch   Cervical side bend R/L  Hamstring/calf stretch   LSVT BIG Maximal Daily Exercises: Adapted   Exercise 1: Floor to Ceiling   Exercise 2: Side to Side  Exercise 3: Forward Step and Reach   Exercise 4: Sideways Step and Reach   Exercise 5: Backward Step and Reach   Exercise 6: Forward Rock and Reach   Exercise 7: Sideways Rock and Reach   Functional component tasks x 5 reps each:   Sit<>stands  [place] step overs   Simulated turning on faucet knobs  Simulated flipping steak on BBQ pit   Simulated pouring   Complex Functional Activity:   Ball toss with cognitive component  Charades - seated and standing   BIG Walking:   Forwards direction  Performed LE marches, jumping jack arms, arm circles, and boxing in seated while awaiting turn   Cool Down:   Pursed lip breathing    *Supervision/assistance provided as needed       Therapist's Name:  JESUS Mars LOTR

## 2023-05-10 NOTE — PROGRESS NOTES
Date: 5/5/23    Name: Haja Marti  MRN: [id number]     Patient screened and deemed appropriate for wellness program at this time. See screening form in media section.     Haja attended [employer] wellness class today. This is the 10th class that the pt has attended.      The class participated in the following:    Maximal Daily Exercises  A.  Floor to Ceiling   B.  Side to Side  C.  Forward Step and Reach   D.  Sideways Step and Reach   E.  Backward Step and Reach   F.  Forward Rock and Reach   G. Sideways Rock and Reach     2.    Functional Tasks  -Sit to Stand  - waterfall (one after another) x 6 each    3.    Complex Functional Task - Circuit Seated  30 seconds on - 1 minute rest at end of 5 tasks   Repeat x 3   -Heel tap and alternate UE reach with small ball  -Lateral heel tap and opposite UE abduction with small ball  -throw and catch small ball  -Marching with alt tapping using small ball  -Heel Tap with alt UE lasso     4. Big Walking  -forward walking with high five in between laps x 2   -CONGA LINE! - walking in [place] to beat of [place]    Therapist's Name:  Mindy Mcgarry, PT, DPT

## 2023-05-22 ENCOUNTER — DOCUMENTATION ONLY (OUTPATIENT)
Dept: REHABILITATION | Facility: HOSPITAL | Age: 78
End: 2023-05-22
Payer: MEDICARE

## 2023-05-22 NOTE — PROGRESS NOTES
Date: 5/19/23    Name: Haja Marti   MRN: [id number]      Haja Marti attended [employer] wellness class today. This is the 12th class that the pt has attended.      The class participated in the following:   sustained phonation with a model x 10  ascending pitch glides with a model x 10  descending pitch glides with a model x 10  10 functional phrases with a model x 3  5 loud catch phrases with a model x 3   conversation activity:  Plan a graduation party in 5 minutes - group activity LOUDLY  Category naming: graduation LOUDLY    Therapist's Name:   Jessica Carreno CCC-SLP

## 2023-05-22 NOTE — PROGRESS NOTES
Date: 5/10/23    Name: Haja Marti   MRN: [id number]      Haja Marti attended [employer] wellness class today. This is the 11th class that the pt has attended.      The class participated in the following:   sustained phonation with a model x 10  ascending pitch glides with a model x 10  descending pitch glides with a model x 10  10 functional phrases with a model x 3  5 loud catch phrases with a model x 3   conversation activity:  Taboo - explain holidays and others guess LOUDLY  Rapid fire - name holidays LOUDLY    Therapist's Name:   Jessica Carreno, MIKE-SLP

## 2023-05-23 ENCOUNTER — TELEPHONE (OUTPATIENT)
Dept: NEUROLOGY | Facility: CLINIC | Age: 78
End: 2023-05-23
Payer: MEDICARE

## 2023-05-23 NOTE — TELEPHONE ENCOUNTER
----- Message from Amy Kaylene Bryant sent at 5/23/2023  9:30 AM CDT -----  Contact: Gamaliel @ [phone].  Gamaliel with [facility] called to speak with someone in the office in regards to pt physical therapy. Gamaliel states that he need a verbal approval to continue the pt physical therapy with [facility]. Please contact Gamaliel @ [phone]. Thanks.

## 2023-05-23 NOTE — TELEPHONE ENCOUNTER
Returned phone call to Gamaliel with [facility]. Requesting continuation of physical therapy order.     Verbal order given to continue physical therapy plan as evaluated by PT.

## 2023-05-24 ENCOUNTER — DOCUMENTATION ONLY (OUTPATIENT)
Dept: REHABILITATION | Facility: HOSPITAL | Age: 78
End: 2023-05-24
Payer: MEDICARE

## 2023-05-24 NOTE — PROGRESS NOTES
Date: 5/12/2023    Name: Haja Marti  MRN: [id number]     Patient screened and deemed appropriate for wellness program at this time. See screening form in media section.     Haja attended [employer] wellness class today. This is the 11th class that the pt has attended.      The class participated in the following:    Maximal Daily Exercises  A.  Floor to Ceiling   B.  Side to Side  C.  Forward Step and Reach   D.  Sideways Step and Reach   E.  Backward Step and Reach   F.  Forward Rock and Reach   G. Sideways Rock and Reach     Functional tasks   Sit to Stands x 10  Floor transfers - significant education/safety cueing  Each demo one other functional task (stepping in shower, buttons, handwriting, floor transfer, picking up object)    Complex Task  Seated beach volleyball    Therapist's Name:  Mindy Mcgarry, PT, DPT

## 2023-05-24 NOTE — PROGRESS NOTES
Date: 5/24/2023    Name: Haja Marti  MRN: [id number]    Patient screened and deemed appropriate for wellness program at this time. See screening form and participation waiver in media section.     Haja Marti attended [employer] wellness class today.    The class participated in the following:  Warm Up:   Posterior capsule stretch  Tricep stretch   Arm circles   Neck rolls   Cervical side bed R>L  Cervical flexion/extension  LSVT BIG Maximal Daily Exercises: Adapted   Exercise 1: Floor to Ceiling   Exercise 2: Side to Side  Exercise 3: Forward Step and Reach   Exercise 4: Sideways Step and Reach   Exercise 5: Backward Step and Reach   Exercise 6: Forward Rock and Reach   Exercise 7: Sideways Rock and Reach   Functional component tasks x 5 reps each:   Sit<>stands  Complex Functional Activity:   Boxing: jabs, hooks, & uppercuts   BIG movements with sequencing and memory component  BIG Walking:   Forwards direction using AD    *Supervision/assistance provided as needed       Therapist's Name:  JESUS Mars LOTR

## 2023-05-24 NOTE — PROGRESS NOTES
Date: 5/19/23    Name: Haja Marti  MRN: [id number]     Patient screened and deemed appropriate for wellness program at this time. See screening form in media section.     Haja attended [employer] wellness class today. This is the 12th class that the pt has attended.       The class participated in the following:  Maximal Daily Exercises   A.  Floor to Ceiling   B.  Side to Side  C.  Forward Step and Reach   D.  Sideways Step and Reach   E.  Backward Step and Reach   F.  Forward Rock and Reach   G. Sideways Rock and Reach      Functional Tasks   Sit to Stands x 10     Complex Functional Tasks  Seated Cardio/LE (marching, LAQ, SLR cross over, Heel Taps, Ankle Pumps)  returning demo from instructor  Seated UE/Cardio (shoulder press, alternating shoulder press, Lateral Raise, Bicep curls)  returning demo from instructor     4.    Big Walking - 10 ft x 4 with cues from other      participants    Therapist's Name:  Mindy Mcgarry, PT, DPT

## 2023-05-25 ENCOUNTER — DOCUMENTATION ONLY (OUTPATIENT)
Dept: REHABILITATION | Facility: HOSPITAL | Age: 78
End: 2023-05-25
Payer: MEDICARE

## 2023-05-25 ENCOUNTER — EXTERNAL HOME HEALTH (OUTPATIENT)
Dept: HOME HEALTH SERVICES | Facility: HOSPITAL | Age: 78
End: 2023-05-25
Payer: MEDICARE

## 2023-05-25 RX ORDER — ATORVASTATIN CALCIUM 40 MG/1
40 TABLET, FILM COATED ORAL DAILY
Qty: 90 TABLET | Refills: 1 | Status: SHIPPED | OUTPATIENT
Start: 2023-05-25

## 2023-05-25 NOTE — TELEPHONE ENCOUNTER
No care due was identified.  [facility] Embedded Care Due Messages. Reference number: [id number].   5/25/2023 2:49:37 AM CDT

## 2023-05-25 NOTE — PROGRESS NOTES
Date: 5/24/23    Name: Haja Marti   MRN: [id number]      Haja Marti attended [employer] wellness class today. This is the 13 th class that the pt has attended.      The class participated in the following:   The class participated in the following:   sustained phonation with a model x 10  ascending pitch glides with a model x 10  descending pitch glides with a model x 10  10 functional phrases with a model x 2  5 loud catch phrases with a model x 2  Cognitive conversation activity:   Named cities with the ending letter of the previous word - group activity LOUDLY  Identified items described given 2 to 3 clues LOUDLY  Added to and completed stories following previous person LOUDLY    Therapist's Name:   JAYMIE Herzog, CCC-SLP, CBIS

## 2023-05-27 ENCOUNTER — DOCUMENTATION ONLY (OUTPATIENT)
Dept: REHABILITATION | Facility: HOSPITAL | Age: 78
End: 2023-05-27
Payer: MEDICARE

## 2023-05-27 NOTE — PROGRESS NOTES
Date: 5/26/23    Name: Haja Marti   MRN: [id number]      Haja Marti attended [employer] wellness class today. This is the 14 th class that the pt has attended.      The class participated in the following:   sustained phonation with a model x 10  ascending pitch glides with a model x 10  descending pitch glides with a model x 10  10 functional phrases with a model x 5  5 loud catch phrases with a model x 5  Cognitive conversation activity:   Answer general temporal questions - group activity LOUDLY  Engaged in conversation about Memorial Day weekend plans and other topics LOUDLY    Therapist's Name:   JAYMIE Herzog, CCC-SLP, CBIS

## 2023-05-31 ENCOUNTER — DOCUMENTATION ONLY (OUTPATIENT)
Dept: REHABILITATION | Facility: HOSPITAL | Age: 78
End: 2023-05-31
Payer: MEDICARE

## 2023-05-31 NOTE — PROGRESS NOTES
"Date: 5/31/2023    Name: Haja Marti  MRN: [id number]    Patient screened and deemed appropriate for wellness program at this time. See screening form and participation waiver in media section.     Haja Marti attended [employer] wellness class today.    The class participated in the following:  Warm Up:   Posterior capsule stretch  Seated kicks   Neck circles   LSVT BIG Maximal Daily Exercises: Adapted   Exercise 1: Floor to Ceiling   Exercise 2: Side to Side  Exercise 3: Forward Step and Reach   Exercise 4: Sideways Step and Reach   Exercise 5: Backward Step and Reach   Exercise 6: Forward Rock and Reach   Exercise 7: Sideways Rock and Reach   Functional component tasks x 5 reps each:   Sit<>stands  Complex Functional Activity:   Hot potato; seated exercise performed once "out"  Basketball toss through hoop  BIG Walking:   Forwards walking with AD    *Supervision/assistance provided as needed       Therapist's Name:  JESUS Mars LOTR        "

## 2023-06-01 ENCOUNTER — DOCUMENTATION ONLY (OUTPATIENT)
Dept: REHABILITATION | Facility: HOSPITAL | Age: 78
End: 2023-06-01
Payer: MEDICARE

## 2023-06-01 NOTE — PROGRESS NOTES
Date: 5/31/23    Name: Haja Marti   MRN: [id number]      Haja Marti attended [employer] wellness class today. This is the 15 th class that the pt has attended.      The class participated in the following:   sustained phonation with a model x 10  ascending pitch glides with a model x 10  descending pitch glides with a model x 10  10 functional phrases with a model x 5  5 loud catch phrases with a model x 5  Cognitive conversation activity:   Broke into groups: Role play Memorial day  LOUDLY   Memorial day activities LOUDLY    Therapist's Name:   Jessica Carreno CCC-SLP, CBIS

## 2023-06-07 ENCOUNTER — OFFICE VISIT (OUTPATIENT)
Dept: PALLIATIVE MEDICINE | Facility: CLINIC | Age: 78
End: 2023-06-07
Payer: MEDICARE

## 2023-06-07 ENCOUNTER — DOCUMENTATION ONLY (OUTPATIENT)
Dept: REHABILITATION | Facility: HOSPITAL | Age: 78
End: 2023-06-07
Payer: MEDICARE

## 2023-06-07 VITALS
HEART RATE: 65 BPM | WEIGHT: 190.69 LBS | SYSTOLIC BLOOD PRESSURE: 132 MMHG | DIASTOLIC BLOOD PRESSURE: 80 MMHG | BODY MASS INDEX: 27.3 KG/M2 | OXYGEN SATURATION: 97 % | HEIGHT: 70 IN

## 2023-06-07 DIAGNOSIS — Z51.5 PALLIATIVE CARE ENCOUNTER: ICD-10-CM

## 2023-06-07 DIAGNOSIS — F41.1 GENERALIZED ANXIETY DISORDER: ICD-10-CM

## 2023-06-07 DIAGNOSIS — R26.9 ABNORMAL GAIT: ICD-10-CM

## 2023-06-07 DIAGNOSIS — F02.80 LEWY BODY PARKINSON'S DISEASE: Primary | ICD-10-CM

## 2023-06-07 DIAGNOSIS — K59.09 CONSTIPATION, CHRONIC: ICD-10-CM

## 2023-06-07 DIAGNOSIS — F29 PSYCHOSIS, UNSPECIFIED PSYCHOSIS TYPE: ICD-10-CM

## 2023-06-07 DIAGNOSIS — G31.83 LEWY BODY PARKINSON'S DISEASE: Primary | ICD-10-CM

## 2023-06-07 PROCEDURE — 1123F ACP DISCUSS/DSCN MKR DOCD: CPT | Mod: CPTII,S$GLB,, | Performed by: STUDENT IN AN ORGANIZED HEALTH CARE EDUCATION/TRAINING PROGRAM

## 2023-06-07 PROCEDURE — 99999 PR PBB SHADOW E&M-EST. PATIENT-LVL III: CPT | Mod: PBBFAC,,, | Performed by: STUDENT IN AN ORGANIZED HEALTH CARE EDUCATION/TRAINING PROGRAM

## 2023-06-07 PROCEDURE — 3079F PR MOST RECENT DIASTOLIC BLOOD PRESSURE 80-89 MM HG: ICD-10-PCS | Mod: CPTII,S$GLB,, | Performed by: STUDENT IN AN ORGANIZED HEALTH CARE EDUCATION/TRAINING PROGRAM

## 2023-06-07 PROCEDURE — 1126F PR PAIN SEVERITY QUANTIFIED, NO PAIN PRESENT: ICD-10-PCS | Mod: CPTII,S$GLB,, | Performed by: STUDENT IN AN ORGANIZED HEALTH CARE EDUCATION/TRAINING PROGRAM

## 2023-06-07 PROCEDURE — 1123F PR ADV CARE PLAN DISCUSSED, PLAN OR SURROGATE DOCUMENTED: ICD-10-PCS | Mod: CPTII,S$GLB,, | Performed by: STUDENT IN AN ORGANIZED HEALTH CARE EDUCATION/TRAINING PROGRAM

## 2023-06-07 PROCEDURE — 99999 PR PBB SHADOW E&M-EST. PATIENT-LVL III: ICD-10-PCS | Mod: PBBFAC,,, | Performed by: STUDENT IN AN ORGANIZED HEALTH CARE EDUCATION/TRAINING PROGRAM

## 2023-06-07 PROCEDURE — 3075F PR MOST RECENT SYSTOLIC BLOOD PRESS GE 130-139MM HG: ICD-10-PCS | Mod: CPTII,S$GLB,, | Performed by: STUDENT IN AN ORGANIZED HEALTH CARE EDUCATION/TRAINING PROGRAM

## 2023-06-07 PROCEDURE — 1159F MED LIST DOCD IN RCRD: CPT | Mod: CPTII,S$GLB,, | Performed by: STUDENT IN AN ORGANIZED HEALTH CARE EDUCATION/TRAINING PROGRAM

## 2023-06-07 PROCEDURE — 3079F DIAST BP 80-89 MM HG: CPT | Mod: CPTII,S$GLB,, | Performed by: STUDENT IN AN ORGANIZED HEALTH CARE EDUCATION/TRAINING PROGRAM

## 2023-06-07 PROCEDURE — 3075F SYST BP GE 130 - 139MM HG: CPT | Mod: CPTII,S$GLB,, | Performed by: STUDENT IN AN ORGANIZED HEALTH CARE EDUCATION/TRAINING PROGRAM

## 2023-06-07 PROCEDURE — 99215 PR OFFICE/OUTPT VISIT, EST, LEVL V, 40-54 MIN: ICD-10-PCS | Mod: S$GLB,,, | Performed by: STUDENT IN AN ORGANIZED HEALTH CARE EDUCATION/TRAINING PROGRAM

## 2023-06-07 PROCEDURE — 99215 OFFICE O/P EST HI 40 MIN: CPT | Mod: S$GLB,,, | Performed by: STUDENT IN AN ORGANIZED HEALTH CARE EDUCATION/TRAINING PROGRAM

## 2023-06-07 PROCEDURE — 1159F PR MEDICATION LIST DOCUMENTED IN MEDICAL RECORD: ICD-10-PCS | Mod: CPTII,S$GLB,, | Performed by: STUDENT IN AN ORGANIZED HEALTH CARE EDUCATION/TRAINING PROGRAM

## 2023-06-07 PROCEDURE — 1126F AMNT PAIN NOTED NONE PRSNT: CPT | Mod: CPTII,S$GLB,, | Performed by: STUDENT IN AN ORGANIZED HEALTH CARE EDUCATION/TRAINING PROGRAM

## 2023-06-07 RX ORDER — QUETIAPINE FUMARATE 50 MG/1
50 TABLET, FILM COATED ORAL NIGHTLY
Qty: 30 TABLET | Refills: 1 | Status: SHIPPED | OUTPATIENT
Start: 2023-06-07 | End: 2023-07-20

## 2023-06-07 NOTE — PROGRESS NOTES
Date: 6/2/2023    Name: Haja Marti  MRN: [id number]     Patient screened and deemed appropriate for wellness program at this time. See screening form in media section.     Haja attended [employer] wellness class today.     The class participated in the following:    Warm Up (Seated): Thoracic twists, Hamstring Sweeps, Arm Circles    -Maximal Daily Exercises - LSVT   Floor to Ceiling   Side to Side  Forward Step and Reach   Sideways Step and Reach   Backward Step and Reach   Forward Rock and Reach   Sideways Rock and Reach      -Functional Tasks - interval training - 30 seconds on, 30 seconds off:  3 rounds  Up and Overs x 30 seconds (rainbows)  Opposite foot tap with ball  Russian Twists  Around the worlds x 30 seconds (passing around trunk- standing)     -Benchmark Testing   -30 second sit to stand: 2 reps (previous: 8)    -Gait   -Walking with manual task ant/retro  -Walking anterior/retro    Therapist's Name:  Mindy Mcgarry, PT, DPT

## 2023-06-07 NOTE — PROGRESS NOTES
Palliative and Geriatric Medicine Evaluation    Patient Name: Haja Marti     Date: 06/07/2023      Consult Requested By:  Dr. Mag Gutierrez    Primary Care Physician:  Andrez Jackson MD    Reason for Consult: Advance care planning and symptom management in the setting of Lewy Body Parkinson's Dementia       Assessment/Plan     Plan/Recommendations:  Diagnoses and all orders for this visit:        Mentation: Cognition and Mood   Lewy body Parkinson's disease  Psychosis, unspecified psychosis type  Generalized anxiety disorde  Haja Marti and  family presented today for evaluation and are interested in receiving educational information about dementia.  -Diagnosed with Lewy Body Parkinson's Dementia   -Currently FAST 6c  -his wife is his primary caregiver  -discussed the need for additional help, like a sitter that can supervise him to prevent falls and provide him with some independence  -he is currently having hallucinations he describes them as people they are not distressing  -he is anxious and depressed per wife - placed a palliative Medicine behavioral health referral  -he is not sleeping well, however his sleep has improved on Seroquel  -taking PRN anxiety sleep as prescribed by PCP, discussed the risks of benzodiazepines  -They are wondering regarding adding medications- will reach out to Dr Mcmahon        Mobility  Abnormal gait  Haja Marti is  not independent with ADL's and is not independent with IADL's  -walks with a walker  -has frequent falls  -he is mostly sitting in the cough, napping often   -discussed the importance of walking and keeping mobility.  He is getting physical therapy and is [facility].       Medications  -Medication reconciliation performed   -High risk medications identified clonazepam- advised to use only in severe cases   -Recommend to avoid benzo's or antihistamines (such as Benadryl or atarax).      Nutritional Evaluation  Weight loss: yes -  lost 10lbs in 4m  Nutrition/Food intake:  He eats 3 times per day however sometimes eating minimal amounts  Encouraged them to liberalize diet and increase high calorie foods.  Discussed drinking Ensure or boost as supplements  BMI: Body mass index is 27.36 kg/m².      Multi-Complexity:  -Frailty based on Clinical Frailty scale yes Mild Frailty   Constipation, chronic  -discussed the importance of a good bowel regimen to prevent obstruction  -currently taking MiraLax daily      Palliative Care Encounter / Advance Care Planning     Medicolegal: Has decision making capacity.  Wife  is surrogate decision maker.  NO HCPOA yet provided them with the form and they will discuss this as a family     Psychosocial:  support system consists of lives with wife Whitley. Family close by. One of his sons is a pulmonary/critical care physician in [place] (Dr. Tod Marti)      Understanding of disease and Illness Trajectory: Patient and Significant Other  have  good understanding of his illness, they can benefit from continued education on what to expect in the future.          Advance Care Planning     Advance Directives:   Living Will: No    LaPOST: No    Do Not Resuscitate Status: Yes    Medical Power of : No      Decision Making:  Patient answered questions and Family answered questions  Goals of Care: What is most important right now is to focus on quality of life, even if it means sacrificing a little time. Accordingly, we have decided that the best plan to meet the patient's goals includes continuing with treatment.        Date: 06/08/2023    During this visit, I engaged the patient and wife   in the advance care planning process.  We reviewed the role for advance directives and their purpose in directing future healthcare if the patient's unable to speak for him/herself.      We discussed the healthcare power of  form.  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  His wike (NOK) Whitley Marti [phone] shared that so far she has always been his decision maker so they will discuss more as a family.  Provided them with the healthcare power of  form.     We discussed different extreme health states that he could experience, and reviewed what kind of medical care he would want in those situations.  The patient communicated that if he were comatose and had little chance of a meaningful recovery, he would not want machines/life-sustaining treatments used. The patient wishes to have a natural, peaceful death.  Along those lines, the patient does not wish to have CPR or other invasive treatments performed when his heart and/or breathing stops.     We explored the patient's values and preferences for future care.  The patient endorses that what is most important right now is to focus on remaining as independent as possible and quality of life, even if it means sacrificing a little time.  He shared that he would like to go out more and enjoying time with friends and family.             18 min time was spent on advance care planning, goals of care discussion, emotional support, formulating and communicating prognosis and goals of care, exploring burden/benefit of various approaches of treatment.         Follow up: 2m       Plan discussed with: PCP, Dr Mcmahon        Subjective:     Informant: patient and wife     Chief Complaint: No chief complaint on file.      History of Present Illness:  Haja Marti is a 77 y.o. male presenting with Parkinson's and Lewy Body Dementia.  Referred to Geriatric and Palliative Care for evaluation and management of physical symptoms, advance care planning,, and additional support..     Patient states that he has been declining some.  Wife is concerned about his constant anxiety and apathy as he does not like to leave the house as much.  He shared that it is because of the burden and the equipment that he needs to bring every  time he needs to leave house.    He has had significant weight loss he has lost 10 lb in 4 months    He is unable to sleep night he is waking up 3-4 times per night whenever this has improved some with Seroquel.    He likes to watch TV  [facility] no needing help with transferring and that becomes difficult at night          Palliative Exam     Review of Symptoms      Symptom Assessment (ESAS 0-10 Scale)  Pain:  0  Dyspnea:  0  Anxiety:  6  Nausea:  0  Depression:  5  Anorexia:  6  Fatigue:  5  Insomnia:  7  Restlessness:  0  Agitation:  0     CAM / Delirium:  Negative  Constipation:  Negative  Diarrhea:  Negative      Bowel Management Plan (BMP):  Yes      Comments:  Miralax Daily    Performance Status:  50    Functional Assessment Scale (FAST):  6c    Psychosocial/Cultural:   See Palliative Psychosocial Note: Yes  Lives with spouse. Has son who lives nearby and another son who works as a pulmonary/critical care physician in [place]  **Primary  to Follow**  Palliative Care  Consult: Yes          Geriatric Evaluation     4Ms for Medical Decision-Making in Older Adults  Last Completed EAWV: None      MENTATION:   Depression Patient Health Questionnaire:  Depression Patient Health Questionnaire 5/12/2022   Over the last two weeks how often have you been bothered by little interest or pleasure in doing things Several days   Over the last two weeks how often have you been bothered by feeling down, depressed or hopeless More than half the days   PHQ-2 Total Score 3   PHQ-9 Total Score 14   PHQ-9 Interpretation Moderate     Has Dementia Dx: Yes    Cognitive Function Screening:  No flowsheet data found.      MEDICATIONS:  High Risk Medications:  Total Active Medications: 4  clonazePAM - 0.5 MG  QUEtiapine - 50 MG  sertraline - 100 MG  zaleplon Cap - 5 MG    MOBILITY:    Falls:   Fear of falling, balance/trouble walking? yes    Previous falls? yes    Mobility Device:  walker and wheelchair    Vision:   Recent vision change? no   Baseline corrective lenses? no    Hearing:   Hearing loss? no   Hearing aids? no      Medical Decision-making Capacity:  Is patient able to make and communicate a choice? yes  Does patient understand risks, alternatives, and consequences of decisions? no  Is patient able to reason and provide logical explanations for decisions? no  Is the surrogate or health care proxy always available? yes    Caregiver:  Patient has someone to help him/her-paid/unpaid?yes  Primary Caregiver: wife Whitley    Paid caregivers (if applicable):  none  Help is adequate for needs? Yes - For now   Safety concerns? Falls        Past Medical History:   Diagnosis Date    Anxiety     Arthritis     BPH (benign prostatic hypertrophy)     Cataract     OS    Colon polyp 2002    Depression     Epiretinal membrane, both eyes     Hx of psychiatric care     Hyperlipidemia     Hypertension     Kidney stones     Kidney stones     Parkinsons     Posterior vitreous detachment of left eye     Psychiatric problem     Retinal detachment 2004    OD    Retinoschisis, left eye     Sleep apnea     Vitreous hemorrhage of left eye      Past Surgical History:   Procedure Laterality Date    BIOPSY OF ADENOIDS      CATARACT EXTRACTION      OD    COLONOSCOPY N/A 12/3/2019    Procedure: COLONOSCOPY;  Surgeon: Ceferino Oliveira MD;  Location: [facility] (4TH FLR);  Service: Endoscopy;  Laterality: N/A;  5/2019 laminectomy with post op confusion tb    CYSTOSCOPY WITH URODYNAMIC TESTING N/A 1/31/2022    Procedure: CYSTOSCOPY, WITH URODYNAMIC TESTING FLOUROSCOPIC;  Surgeon: Devang Butler MD;  Location: [facility] OR [address] [place] [address];  Service: Urology;  Laterality: N/A;  1hr    EPIDURAL STEROID INJECTION N/A 3/12/2019    Procedure: INJECTION, STEROID, EPIDURAL, L5-S1;  Surgeon: Laina Crockett MD;  Location: [facility] PAIN MGT;  Service: Pain Management;  Laterality: N/A;    EXAMINATION UNDER ANESTHESIA N/A 2/18/2021     Procedure: Exam under anesthesia;  Surgeon: West Clifton MD;  [facility];  Service: Colon and Rectal;  Laterality: N/A;    EYE SURGERY      INCISION OF PERIRECTAL ABSCESS N/A 2/12/2021    Procedure: INCISION AND DRAINAGE  ABSCESS, PERIRECTAL;  Surgeon: West Clifton MD;  [facility];  Service: Colon and Rectal;  Laterality: N/A;    INCISION OF PERIRECTAL ABSCESS Left 2/18/2021    Procedure: INCISION, ABSCESS, PERIRECTAL;  Surgeon: West Clifton MD;  [facility];  Service: Colon and Rectal;  Laterality: Left;    INJECTION OF ANESTHETIC AGENT AROUND NERVE Bilateral 7/3/2018    Procedure: BLOCK, NERVE;  Surgeon: Laina Crockett MD;  Location: [facility] PAIN MGT;  Service: Pain Management;  Laterality: Bilateral;  Bilateral Lumbar L2,L3,L4,L5 MBB      NEEDS CONSENT    INSERTION OF SETON STITCH N/A 2/12/2021    Procedure: PLACEMENT, SETON STITCH;  Surgeon: West Clifton MD;  [facility];  Service: Colon and Rectal;  Laterality: N/A;    INSERTION OF SETON STITCH Left 2/18/2021    Procedure: PLACEMENT, SETON;  Surgeon: West Clifton MD;  [facility];  Service: Colon and Rectal;  Laterality: Left;    INSERTION OF SETON STITCH N/A 9/24/2021    Procedure: PLACEMENT, SETON STITCH;  Surgeon: West Clifton MD;  [facility];  Service: Colon and Rectal;  Laterality: N/A;  seton exchange    laser indirect  4/8/10    left eye    Laser Indirect Retinopexy  4/8/10    OS    PROSTATE SURGERY      RECTAL FISTULOTOMY N/A 11/12/2021    Procedure: FISTULOTOMY, RECTUM;  Surgeon: West Clifton MD;  [facility];  Service: Colon and Rectal;  Laterality: N/A;    RETINAL DETACHMENT SURGERY  2004    OD    ROTATOR CUFF REPAIR  11/13/2013    TONSILLECTOMY      TRANSFORAMINAL EPIDURAL INJECTION OF STEROID Bilateral 2/19/2019    Procedure: INJECTION, STEROID, EPIDURAL, TRANSFORAMINAL APPROACH, L5;  Surgeon: Laina  MORENITA Crockett MD;  Location: [facility] PAIN MGT;  Service: Pain Management;  Laterality: Bilateral;     Family History   Problem Relation Age of Onset    Cataracts Father     Cancer Father         lung    Diabetes Father     Cancer Mother         lung    Anxiety disorder Son     Depression Son     Anxiety disorder Daughter      Review of patient's allergies indicates:  No Known Allergies      Medications:    Current Outpatient Medications:     acetaminophen (TYLENOL) 500 MG tablet, Take 1,000 mg by mouth every 8 (eight) hours as needed for Pain., Disp: , Rfl:     atorvastatin (LIPITOR) 40 MG tablet, Take 1 tablet (40 mg total) by mouth once daily., Disp: 90 tablet, Rfl: 1    carbidopa-levodopa  mg (SINEMET)  mg per tablet, TAKE 1 QID (Patient taking differently: Take 1 tablet by mouth 3 (three) times daily.), Disp: 360 tablet, Rfl: 3    celecoxib (CELEBREX) 100 MG capsule, Take 100 mg by mouth 2 (two) times daily., Disp: , Rfl:     clonazePAM (KLONOPIN) 0.5 MG tablet, TAKE 1/2 TO 1 TABLET BY MOUTH UP TO 3 TIMES DAILY AS NEEDED FOR ANXIETY (Patient taking differently: TAKE 1/2 TO 1 TABLET BY MOUTH ONCE DAILY AS NEEDED FOR ANXIETY), Disp: 90 tablet, Rfl: 5    ergocalciferol (ERGOCALCIFEROL) 50,000 unit Cap, TAKE 1 CAPSULE (50,000 UNITS TOTAL) BY MOUTH EVERY 7 DAYS. (Patient taking differently: Take 50,000 Units by mouth every 7 days. ON WEDNESDAYS), Disp: 12 capsule, Rfl: 1    finasteride (PROSCAR) 5 mg tablet, Take 1 tablet (5 mg total) by mouth once daily., Disp: 90 tablet, Rfl: 3    folic acid (FOLVITE) 1 MG tablet, TAKE 1 TABLET EVERY DAY, Disp: 90 tablet, Rfl: 0    magnesium oxide (MAG-OX) 400 mg tablet, Take 400 mg by mouth once daily., Disp: , Rfl:     potassium citrate (UROCIT-K) 10 mEq (1,080 mg) TbSR, TAKE 1 TABLET THREE TIMES DAILY WITH MEALS (Patient taking differently: Take 10 mEq by mouth once daily.), Disp: 270 tablet, Rfl: 3    pyridoxine, vitamin B6, (B-6) 50 MG Tab, Take 50 mg by mouth once  daily., Disp: , Rfl:     sertraline (ZOLOFT) 100 MG tablet, Take 2 tablets (200 mg total) by mouth once daily., Disp: 180 tablet, Rfl: 3    sodium bicarbonate 650 MG tablet, TAKE 1 TABLET ONE TIME DAILY, Disp: 90 tablet, Rfl: 3    tamsulosin (FLOMAX) 0.4 mg Cap, TAKE 1 CAPSULE EVERY DAY, Disp: 90 capsule, Rfl: 0    zaleplon (SONATA) 5 MG Cap, Take 1 capsule (5 mg total) by mouth nightly as needed (insomnia)., Disp: 30 capsule, Rfl: 3    losartan (COZAAR) 25 MG tablet, Take 1 tablet (25 mg total) by mouth once daily. (Patient not taking: Reported on 4/27/2023), Disp: 90 tablet, Rfl: 1    QUEtiapine (SEROQUEL) 50 MG tablet, Take 1 tablet (50 mg total) by mouth every evening. TAKE ORAL 1/2 TO 1 TABLET QHS.  TAKE IN PLACE OF CLONAZEPAM, Disp: 30 tablet, Rfl: 1    Current Facility-Administered Medications:     tamsulosin 24 hr capsule 0.4 mg, 0.4 mg, Oral, Daily, Mike Mtz MD    Facility-Administered Medications Ordered in Other Visits:     0.9%  NaCl infusion, , Intravenous, Continuous, Lizz Lowry NP, Last Rate: 70 mL/hr at 11/12/21 1227, New Bag at 11/12/21 1227    mupirocin 2 % ointment, , Nasal, On Call Procedure, Lizz Lowry NP, Given at 11/12/21 1227     database queried on 06/07/2023  by Zena Quinteros . The results reviewed and considered with the clinical data in the decision whether or not to prescribe a controlled substance.     03/16/2023  03/15/2023   1  Zaleplon 5 Mg Capsule 30.00  30  [facility]  [id number]   Alicia ([id number])  0  0.25 LME  [place] Pay  [place]    11/21/2022  11/15/2022   2  Clonazepam 0.5 Mg Tablet 90.00  30  [facility]  [phone]   Joe ([id number])  0  3.00 LME  [place]  [place]    09/22/2022 09/22/2022   1  Tramadol Hcl 50 Mg Tablet 10.00  2  [employer]  [id number]                 Objective:   Physical Exam:  Vitals: Pulse: 65 (06/07/23 1013)  BP: 132/80 (06/07/23 1013)  SpO2: 97 % (06/07/23 1013)  Physical Exam  Constitutional:       General: He is not in acute distress.  HENT:      Head:  Normocephalic and atraumatic.   Eyes:      General: No scleral icterus.  Pulmonary:      Effort: Pulmonary effort is normal. No respiratory distress.   Abdominal:      General: There is no distension.   Musculoskeletal:      Cervical back: Neck supple.      Comments: Walking with a walker    Skin:     Findings: Bruising (minimal, from falls) present. No rash.   Neurological:      Mental Status: He is alert. Mental status is at baseline.   Psychiatric:         Mood and Affect: Affect normal. Mood is depressed.         Labs  CBC:   WBC   Date Value Ref Range Status   04/28/2023 6.89 3.90 - 12.70 K/uL Final       Hemoglobin   Date Value Ref Range Status   04/28/2023 15.0 14.0 - 18.0 g/dL Final       POC Hematocrit   Date Value Ref Range Status   04/27/2023 45 36 - 54 %PCV Final     Hematocrit   Date Value Ref Range Status   04/28/2023 45.6 40.0 - 54.0 % Final       MCV   Date Value Ref Range Status   04/28/2023 94 82 - 98 fL Final       Platelets   Date Value Ref Range Status   04/28/2023 197 150 - 450 K/uL Final           LFT:   Lab Results   Component Value Date    AST 26 04/28/2023    ALKPHOS 75 04/28/2023    BILITOT 0.6 04/28/2023       Albumin:   Albumin   Date Value Ref Range Status   04/28/2023 3.7 3.5 - 5.2 g/dL Final   08/02/2018 4.5 3.6 - 5.1 g/dL Final     Comment:     @ Test Performed By:  [employer] [facility]  Jonnathan Schneider M.D., Ph.D.,   [address] [place] [phone]  [facility]  [id number]       Protein:   Total Protein   Date Value Ref Range Status   04/28/2023 6.4 6.0 - 8.4 g/dL Final         Radiology: I have reviewed all pertinent imaging results/findings within the past 24 hours.        50 minutes of total time spent on the encounter, which includes face to face time and non-face to face time preparing to see the patient (eg, review of tests), Obtaining and/or reviewing separately obtained history, Documenting clinical information in the electronic or  other health record, Independently interpreting results (not separately reported) and communicating results to the patient/family/caregiver, or Care coordination (not separately reported).    18 minutes spent in discussing ACP    This note was partially created using [employer] Voice Recognition software. Typographical and content errors may occur with this process. While efforts are made to detect and correct such errors, in some cases errors will persist. For this reason, wording in this document should be considered in the proper context and not strictly verbatim.    Encounter occurred during period of COVID-19 emergency. Encounter performed under the concurrent guidelines, limitations, and protocols.      Signature: Zena Quinteros MD

## 2023-06-07 NOTE — PROGRESS NOTES
Advance Care Planning   [facility]  Palliative Care   Psychosocial Assessment    Patient Name: Haja Marti  MRN: [id number]    Primary Care Physician: Andrez Jackson MD      Reason for Referral: assistance with clarification of goals of care    Present during Interview: patient and spouse/SO.      Primary Language:English   Needed: no      Past Medical Situation:   PMH:   Past Medical History:   Diagnosis Date    Anxiety     Arthritis     BPH (benign prostatic hypertrophy)     Cataract     OS    Colon polyp 2002    Depression     Epiretinal membrane, both eyes     Hx of psychiatric care     Hyperlipidemia     Hypertension     Kidney stones     Kidney stones     Parkinsons     Posterior vitreous detachment of left eye     Psychiatric problem     Retinal detachment 2004    OD    Retinoschisis, left eye     Sleep apnea     Vitreous hemorrhage of left eye      Mental Health/Substance Use History: anxiety, depression, sees psychiatry   Risk of Abuse, neglect or exploitation: none identified   Current or Previous Trauma and/or evidence of PTSD: none identified   Non-traditional Health practices: none identified     Understanding of diagnosis and prognosis: patient and Whitley have good understanding of diagnosis and that disease is progressive although does not discuss this at length   Experience/Comfort level with health care system: fair     Patients Mental Status: patient is alert and oriented to person and place    Socio-Economic Factors/Resources:  Address: [address] [place] [address]  Phone Number: [phone] (home)     Marital Status:   Household composition: patient lives with spouse, daughter and four grandchildren live next door  Children: four children, twelve grandchildren     Patient/Family perceptions about Caregiving Needs; availability and capacity:patient and Trinidad acknowledge patient's needs are increasing, reluctant to seek additional  help     Family Structure, Dynamics/Relationships: family presents as supportive     Patient/Family Strengths/Resilience: patient and spouse are honest and make careful decisions  Patient/Family Coping Style: Whitley keeps herself busy working around the house and sewing, patient watches television     Activities of Daily Living:   Support Systems-Family & Community (Home Health, HME etc):     Transportation:  yes    Work/Education History: retired salesman   Self-Care Activities/Hobbies: watching television, patient stated he likes to go to the [facility] and do physical therapy the this has not happened in a while      History: no    Financial Resources:Medicare      Advanced Care Planning & Legal Concerns:   Advanced Directives/Living Will: no  [place]/POLST: no   Planning:  no    Emergency Contacts:    Spirituality, Culture & Coping Mechanisms:  F- Sachi and Belief: Roman Catholic     I - Importance: yes    C - Community/Culture Values: yes     A - Address in Care: yes      Goals/Hopes/Expectations: patient's goal is to maintain as much independence as possible   Fears/Anxiety/Concerns:patient stated he cannot resolve the fact that his disease is progressive and he will not get better         Preferences about EOL Environment: (own bed, family nearby, pets, music, etc) tbd      Complicated Bereavement Risk Assessment Tool (CBRAT)  Reference:  [facility] Clinical Practice Group (May 2016). Bereavement Risk Screening and Management Guidelines.  Retrieved from: http://www.grpcc.com.au/wp-content/uploads//JNGQY-Txfikgqqyjb-Wcaopiajt-and-Management-Guideline-2016.pdf      Bereaved Client Characteristics   Under 18      no  Was a Twin   no  Young Spouse   no  Elderly Spouse    no  Isolated    no  Lacks Meaningful Social Support   no  Dissatisfied with help available during illness   no  New to Financial [place] no  New to Decision-Making   no     Illness  Inherited Disorder   no  Stigmatized Disease in the family/community   no  Lengthy/Burdensome   yes     Bereaved Client's History of Loss   Cumulative Multiple Losses   no  Previous Mental Health Illnesses   no  Current Mental Health Illness   no  Other Significant Health Issues   no   Migrant/Refugee   no Will the Death be:  Sudden or Unexpected   no  Traumatic Circumstances Associated with Death   no  Significant Cultural/Social Burdens as a result of Death   no   Relationship with   Profound Lifelong Partner   yes  Highly Dependent    no  Antagonistic   no  Ambivalent   no  Deeply Connected   yes  Culturally Defined   yes   Risk Factors Scores  0-2  Low  3-5  Moderate  5+  High  All persons scoring moderate to high presume to be at risk**    (** It is acknowledged that protective factors and resilience may outweigh apparent risk factors.      Total Risk Factors Score:   low to moderate    Plan of Care:   Palliative APRN and this  met with patient and spouse Whitley in clinic exam room. Patient able to participate in conversation but remains mostly quiet and lets Whitley speak for him. Whitley described patient's reluctance to go out of the house. Patient get anxious about freezing and/or falling. Whitley states patient would like her to spend more time sitting with him but she prefers to stay busy. They have four children and twelve grandchildren whom are supportive and visit. Whitley encourages patient to do as much as possible but also respects his wishes to stay home.  will follow for support.     Edward Ledesma, [facility]  Palliative Medicine

## 2023-06-07 NOTE — PROGRESS NOTES
Date: 6/7/2023    Name: Haja Marti  MRN: [id number]    Patient screened and deemed appropriate for wellness program at this time. See screening form and participation waiver in media section.     Haja Marti attended [employer] wellness class today.    The class participated in the following:  Warm Up:   Posterior capsule stretch   Hip flexion/high knees   LSVT BIG Maximal Daily Exercises: Adapted   Exercise 1: Floor to Ceiling   Exercise 2: Side to Side  Exercise 3: Forward Step and Reach   Exercise 4: Sideways Step and Reach   Exercise 5: Backward Step and Reach   Exercise 6: Forward Rock and Reach   Exercise 7: Sideways Rock and Reach   Functional component tasks x 5 reps each:   Sit<>stands  Complex Functional Activity:   Stretches: lateral bending, arm circles CW/CCW, hamstring/calf stretch, trunk rotation, forward reach, long arc quad  Reciprocal ball bounce with theraball combined with cognitive component of naming holidays   UE strengthening with 10# weight: chest press, bicep curls, shoulder press, tricep extension (unweighted)  BIG Walking:   Forwards direction with AD  Cool Down:   Pursed lip breathing    *Supervision/assistance provided as needed       Therapist's Name:  JESUS Mars LOTR

## 2023-06-07 NOTE — Clinical Note
Good afternoon, I saw him yesterday and wife was wondering if is going to start a new medication?  It is unclear for her what the decision was regarding the new medication that he was going to start.  She is also wondering about if there is something else that will be done to help improve his mobility?   His sleep improved with Seroquel 25 however wife would like to optimize that a little bit more so I increased his Seroquel to 50 and we discussed the possible side effects.  He was complaining about drooling, I shared that some of the Parkinson's patients receive Botox injections.  I just wanted to make you aware of this.  Thank you,  Zena

## 2023-06-08 ENCOUNTER — DOCUMENTATION ONLY (OUTPATIENT)
Dept: REHABILITATION | Facility: HOSPITAL | Age: 78
End: 2023-06-08
Payer: MEDICARE

## 2023-06-08 NOTE — PROGRESS NOTES
Date: 6/2/23    Name: Haja Marti   MRN: [id number]      Haja Marti attended [facility] [employer] wellness class today. This is the 16th class that the pt has attended.      The class participated in the following:   sustained phonation with a model x 10  ascending pitch glides with a model x 10  descending pitch glides with a model x 10  10 functional phrases with a model x 5  5 loud catch phrases with a model x 5  Cognitive conversation activity:   LOUDLY plan summer camp for grandchildren  LOUDLY describe favorite summer vacation    Therapist's Name:   Jessica Carreno CCC-SLP

## 2023-06-08 NOTE — PROGRESS NOTES
Date: 6/7/23    Name: Haja Marti   MRN: [id number]      Haja Marti attended [employer] wellness class today. This is the 16th class that the pt has attended.      The class participated in the following:   sustained phonation with a model x 10  ascending pitch glides with a model x 10  descending pitch glides with a model x 10  10 functional phrases with a model x 5  5 loud catch phrases with a model x 5  Cognitive conversation activity:   LOUDLY guess Charades  LOUDLY describe favorite movie  LOUDLY have a conversation with your neighbor while music plays in the background    Therapist's Name:   Jessica Carreno, MIKE-SLP

## 2023-06-09 ENCOUNTER — TELEPHONE (OUTPATIENT)
Dept: PALLIATIVE MEDICINE | Facility: CLINIC | Age: 78
End: 2023-06-09
Payer: MEDICARE

## 2023-06-09 ENCOUNTER — DOCUMENTATION ONLY (OUTPATIENT)
Dept: REHABILITATION | Facility: HOSPITAL | Age: 78
End: 2023-06-09
Payer: MEDICARE

## 2023-06-09 NOTE — PROGRESS NOTES
Date: 6/9/23    Name: Haja Marti   MRN: [id number]      Haja Marti attended [facility] [employer] wellness class today. This is the 17th class that the pt has attended.      The class participated in the following:   sustained phonation with a model x 15  ascending pitch glides with a model x 15  descending pitch glides with a model x 15  10 functional phrases with a model x 5  5 loud catch phrases with a model x 5  conversation activity:   LOUDLY complete if/then sentences   LOUDLY describe items without using certain words  LOUDLY identify items described     Therapist's Name:   JAYMIE Herzog, CCC-SLP , CBIS

## 2023-06-12 ENCOUNTER — OFFICE VISIT (OUTPATIENT)
Dept: PALLIATIVE MEDICINE | Facility: CLINIC | Age: 78
End: 2023-06-12
Payer: MEDICARE

## 2023-06-12 ENCOUNTER — PATIENT MESSAGE (OUTPATIENT)
Dept: PALLIATIVE MEDICINE | Facility: CLINIC | Age: 78
End: 2023-06-12

## 2023-06-12 DIAGNOSIS — G31.83 LEWY BODY PARKINSON'S DISEASE: Primary | ICD-10-CM

## 2023-06-12 DIAGNOSIS — F02.80 LEWY BODY PARKINSON'S DISEASE: Primary | ICD-10-CM

## 2023-06-12 DIAGNOSIS — F41.1 GENERALIZED ANXIETY DISORDER: ICD-10-CM

## 2023-06-12 PROCEDURE — 90791 PR PSYCHIATRIC DIAGNOSTIC EVALUATION: ICD-10-PCS | Mod: S$GLB,,, | Performed by: SOCIAL WORKER

## 2023-06-12 PROCEDURE — 90791 PSYCH DIAGNOSTIC EVALUATION: CPT | Mod: S$GLB,,, | Performed by: SOCIAL WORKER

## 2023-06-12 NOTE — PROGRESS NOTES
Psychiatry Initial Visit (PhD/LCSW)  Diagnostic Interview - CPT 26345    Date: 6/12/2023    Site: [facility]    Referral source: Dr. Delvalle, Palliative Medicine     Clinical status of patient: Outpatient    Haja Marti, a 77 y.o. male, for initial evaluation visit.  Met with patient and spouse.    Chief complaint/reason for encounter: depression, anxiety, and sleep    History of present illness: LCSW and patient completed initial psychotherapy session this date. He is accompanied by his wife, Whitley. He has a dx of Lewy Body Parkinson's Dementia and has shown progression. He presents alert, oriented to person and place, with a pleasant affect. He is able to answer questions, however, he refers to his wife to provide answers/information. He did discuss how he is frustrated with his dx and how he see's a decline in his functional mobility. Currently, he is under psychiatric care. He see's Dr. Elliott for medication management and Sherif Galvan LCSW for psychotherapy. Will discuss with Sherif regarding care for Haja. Due to cognitive decline, psychotherapy might not be as effective at this time. However, palliative medicine behavioral health LCSW can provide services to his wife, Jai and address anxiety/depression from a palliative perspective. Patient is having behavioral changes due to disease progression and this is making it difficult for his wife to provide total care. Endorses feelings of anxiety -  restlessness, fatigued,  muscle tightness, feeling constantly on edge, concentration difficulties, and a general state of irritability that all significantly impacts pt's daily life and functioning. Goals discussed and reviewed.  Interventions include participating in individual therapy and ongoing medication management thorough MD. Psychotherapy interventions include supportive, Interactive, self-oriented, and behavior modification therapies.  Follow up visit as needed. Will schedule  appointment to start psychotherapy services with his wife to provide additional support and assistance with current stressors.     Pain: 2    Symptoms:   Mood: diminished interest, fatigue, worthlessness/guilt, and poor concentration  Anxiety: decreased memory, excessive anxiety/worry, restlessness/keyed up, and irritability  Substance abuse: denied  Cognitive functioning: denied  Health behaviors: noncontributory    Psychiatric history: none    Medical history:   Past Medical History:   Diagnosis Date    Anxiety     Arthritis     BPH (benign prostatic hypertrophy)     Cataract     OS    Colon polyp 2002    Depression     Epiretinal membrane, both eyes     Hx of psychiatric care     Hyperlipidemia     Hypertension     Kidney stones     Kidney stones     Parkinsons     Posterior vitreous detachment of left eye     Psychiatric problem     Retinal detachment 2004    OD    Retinoschisis, left eye     Sleep apnea     Vitreous hemorrhage of left eye         Family history of psychiatric illness: none    Social history (marriage, employment, etc.):   Lives in [place] with his wife. Has 3 sons and 1 daughter. His daughter lives next door. He has 12 grandchildren. 2 sons live locally and 1 son lives in [place]. She has twin sisters and one brother. He reflected on his love for sports. 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 This has made the loss of physical status more difficult for him. He was participating in a boxing gym for individuals with Parkinsons but due to progression, is unable to do thing anymore. He enjoys being able to spend time with his children and grandchildren. Has increased anxiety when leaving the home due to fear of freezing or falling. He prefers for his wife to be by his side and for them to stay home. This is causing difficulty for his wife.     Substance use:   Alcohol: none   Drugs: none   Tobacco: none   Caffeine: none    Current medications and drug reactions (include OTC,  herbal): see medication list     Strengths and liabilities: Strength: Patient accepts guidance/feedback, Strength: Patient is intelligent., Liability: Patient has poor health., Liability: Patient lacks coping skills.    Current Evaluation:     Mental Status Exam:  General Appearance:  unremarkable, age appropriate   Speech: normal volume, slowed, articulation error      Level of Cooperation: cooperative      Thought Processes: loose associations   Mood: anxious, sad      Thought Content: normal, no suicidality, no homicidality, delusions, or paranoia   Affect: congruent and appropriate   Orientation: Oriented to person, Oriented to place   Memory: recent >  intact   Attention Span & Concentration: intact   Fund of General Knowledge: intact and appropriate to age and level of education   Abstract Reasoning: interpretation of similarities was concrete   Judgment & Insight: good     Language  intact     Diagnostic Impression - Plan:       ICD-10-CM ICD-9-CM   1. Lewy body Parkinson's disease  G31.83 332.0    F02.80 331.82   2. Generalized anxiety disorder  F41.1 300.02       Plan:individual psychotherapy    Return to Clinic: as needed    Length of Service (minutes): 60

## 2023-06-14 ENCOUNTER — DOCUMENTATION ONLY (OUTPATIENT)
Dept: REHABILITATION | Facility: HOSPITAL | Age: 78
End: 2023-06-14
Payer: MEDICARE

## 2023-06-14 NOTE — PROGRESS NOTES
Date: 6/14/2023    Name: Haja Marti  MRN: [id number]    Patient screened and deemed appropriate for wellness program at this time. See screening form and participation waiver in media section.     Haja Marti attended [employer] wellness class today.    The class participated in the following:  Warm Up:   Shoulder rolls forward/backward   Arm circles   High knees   LSVT BIG Maximal Daily Exercises: Adapted   Exercise 1: Floor to Ceiling   Exercise 2: Side to Side  Exercise 3: Forward Step and Reach   Exercise 4: Sideways Step and Reach   Exercise 5: Backward Step and Reach   Exercise 6: Forward Rock and Reach   Exercise 7: Sideways Rock and Reach   Functional component tasks x 5 reps each:   Sit<>stands  Complex Functional Activity:   Boxing: jabs, uppercuts, and hooks   Relay race including: walking around cones, jumping jack arms, and ball toss   BIG Walking:   Forward direction using AD   Seated stretches completed when awaiting turn  Cool Down:   Pursed lip breathing   Education on diaphragmatic breathing     *Supervision/assistance provided as needed       Therapist's Name:  JESUS Mars LOTR

## 2023-06-14 NOTE — PROGRESS NOTES
Date: 6/14/2023     Name: Haja Marti   MRN: [id number]      Haja Marti attended [employer] wellness class today. This is the 18th class that the pt has attended.      The class participated in the following:   sustained phonation with a model x 15  ascending pitch glides with a model x 15  descending pitch glides with a model x 15  10 functional phrases with a model x 2 sets  5 loud catch phrases with a model x 2 sets  conversation activity:   LOUDLY discuss favorite songs from the 60s, 70s, 80s.  LOUDLY sing songs or say the lyrics of songs  LOUDLY name songs and artists  LOUDLY answer song trivia questions    Therapist's Name:   Jessica Carreno, CCC-SLP , CBIS

## 2023-06-15 ENCOUNTER — OFFICE VISIT (OUTPATIENT)
Dept: PSYCHIATRY | Facility: CLINIC | Age: 78
End: 2023-06-15
Payer: MEDICARE

## 2023-06-15 ENCOUNTER — DOCUMENT SCAN (OUTPATIENT)
Dept: HOME HEALTH SERVICES | Facility: HOSPITAL | Age: 78
End: 2023-06-15
Payer: MEDICARE

## 2023-06-15 DIAGNOSIS — F41.1 GAD (GENERALIZED ANXIETY DISORDER): Primary | ICD-10-CM

## 2023-06-15 PROCEDURE — 90834 PR PSYCHOTHERAPY W/PATIENT, 45 MIN: ICD-10-PCS | Mod: 95,,, | Performed by: SOCIAL WORKER

## 2023-06-15 PROCEDURE — 90834 PSYTX W PT 45 MINUTES: CPT | Mod: 95,,, | Performed by: SOCIAL WORKER

## 2023-06-15 NOTE — PROGRESS NOTES
Individual Psychotherapy (PhD/LCSW)    6/15/2023    Site:  Telemed         Therapeutic Intervention: Met with patient and spouse.  Outpatient - Insight oriented psychotherapy 45 min - CPT code 76861    Chief complaint/reason for encounter: depression and anxiety     Persons present: spouse and pt  Trinidad (needed to help with interpretation of intervention due to pt cognitive decline due to dementia).    The patient location is: home [place].   The chief complaint leading to consultation is: anxiety/depression  Visit type: audio  Total time spent with patient: 45 min  Each patient to whom he or she provides medical services by telemedicine is:  (1) informed of the relationship between the physician and patient and the respective role of any other health care provider with respect to management of the patient; and (2) notified that he or she may decline to receive medical services by telemedicine and may withdraw from such care at any time.         Interval history and content of current session:    Discussion over allowing Trinidad to make the decisions including when and how to go about obtaining extra help for him.   He was in agreement.    Trinidad fell and is anxious about her dentist appointment today.  She may need a root canal in a frontal teeth because of the fall.    Haja reports we are in Aug and could not recall year.   She did label the correct president.  Pt was mostly listless although smiled a few times.   They are now obtaining services from palliative care and my sessions with them may end soon.   This was communicated to them.   They feel comfortable with new services and understand the logic behind the transition.                 Target symptoms: depression, anxiety        Progress toward goals: progressing well    Diagnosis: generalized anxiety disorder       Treatment plan:  Target symptoms: anxiety   Why chosen therapy is appropriate versus another modality: relevant to diagnosis, evidence  based practice  Outcome monitoring methods: self-report, observation, feedback from family  Therapeutic intervention type: behavior modifying psychotherapy    Risk parameters:  Patient reports no suicidal ideation  Patient reports no homicidal ideation  Patient reports no self-injurious behavior  Patient reports no violent behavior        Verbal deficits: None           Patient's response to intervention:   The patient's response to intervention is accepting, motivated.              Progress toward goals and other mental status changes:  The patient's progress toward goals is fair .    Diagnosis:        ICD-10-CM ICD-9-CM   1. Generalized anxiety disorder F41.1 300.02       Plan:  individual psychotherapy and family psychotherapy    Return to clinic: 2 months    Length of Service (minutes): 45

## 2023-06-21 ENCOUNTER — DOCUMENTATION ONLY (OUTPATIENT)
Dept: REHABILITATION | Facility: HOSPITAL | Age: 78
End: 2023-06-21
Payer: MEDICARE

## 2023-06-21 ENCOUNTER — OFFICE VISIT (OUTPATIENT)
Dept: PSYCHIATRY | Facility: CLINIC | Age: 78
End: 2023-06-21
Payer: MEDICARE

## 2023-06-21 DIAGNOSIS — F41.1 GENERALIZED ANXIETY DISORDER: Primary | ICD-10-CM

## 2023-06-21 DIAGNOSIS — F51.04 CHRONIC INSOMNIA: ICD-10-CM

## 2023-06-21 DIAGNOSIS — F32.A DEPRESSION, UNSPECIFIED DEPRESSION TYPE: ICD-10-CM

## 2023-06-21 DIAGNOSIS — G20.C PRIMARY PARKINSONISM: ICD-10-CM

## 2023-06-21 PROCEDURE — 99213 OFFICE O/P EST LOW 20 MIN: CPT | Mod: HCNC,S$GLB,, | Performed by: PSYCHIATRY & NEUROLOGY

## 2023-06-21 PROCEDURE — 99213 PR OFFICE/OUTPT VISIT, EST, LEVL III, 20-29 MIN: ICD-10-PCS | Mod: HCNC,S$GLB,, | Performed by: PSYCHIATRY & NEUROLOGY

## 2023-06-21 RX ORDER — MIRABEGRON 50 MG/1
1 TABLET, FILM COATED, EXTENDED RELEASE ORAL NIGHTLY
COMMUNITY
Start: 2023-06-20

## 2023-06-21 NOTE — PROGRESS NOTES
"Outpatient Psychiatry Follow-Up Visit (MD/NP)  6/21/2023    Session Length: 30 minutes (E&M Level 3)    Clinical Status of Patient:  Outpatient (Ambulatory)    Chief Complaint:  Haja Marti is a 77 y.o. male who presents today for follow-up of depression and anxiety.  Met with patient and spouse.      Interval History and Content of Current Session:  Interim Events/Subjective Report/Content of Current Session:  First appt with me since 3/15/2023.    He has had several appts with Mr. Galvan for psychotherapy in the interim.      Med plan in interim:  "Try Sonata 5 mg one tab at night PRN for insomnia in middle of night (trying to fall back asleep).  Risks/benefits noted to pt and wife, including potential for excessive sedation, dizziness and falls.   Continue other current medications (sertraline, PRN clonazepam for anxiety)."    Pt states he is "OK" I guess.      Wife notes they had some problems last week.  Another provider had made adjustments in the Seroquel, raising it from 25 mg to 50 mg to try to improve his sleep.    However, this increase soon caused MORE problems with walking -- more stiffness/rigidity.    His evenings are always hardest with his walking -- "he's tired in the evenings".    "Sometimes he cannot get his feet to move."    The Seroquel was thus tapered back to 25 mg a day (back on this dose just 2 nights ago).  Walking has improved, but not back to baseline as prior to the increase.       They will start using the  parking because his mobility has worsened.  She plans to take him in a wheelchair to his appts here with me & other specialists.       Per wife: "I feel he has a little more anxiety than usual".    There have been a couple of deaths (good friend's wife) lately.    He has had to take more Klonopin than usual lately.      He has his days and nights mixed up.  He has been sleeping during the day because he is not sleeping at night.    Wife states this has been a problem " "for a while now.       He is seeing people in the house basically any time of day.    He also has urinary incontinence for overactive bladder; they use adult diapers and pads.    He also drools a lot; this happens anytime of day, but is worse when he sleeps.    Zaleplon may be somewhat helpful for sleep.      They met with Palliative Medicine for first time on 6/12/23.    They met with both pt and his wife.    They gave information/resources they can use as his condition continues to decline.      Discussed the objective of balancing dopamine levels between different areas of the brain to improve Parkinson's symptoms without causing too many psychotic Sx.      Dr. Mcmahon had increased pt's C/L from 1 tab TID to 1.5 tabs TID.  This has helped with his walking, but he has been hallucinating more.      He must use the walker to ambulate safely.  His wife must remind him to always use the walker.    This is a new "high tech" walker that has assisted him with walking.    It has a mechanism that will easily release the brake from the wheels when he wants to walk.    It also has a laser that shows him where to place his feet.    He is still having occasional falls.  Fortunately, no serious injuries.    He still has a lot of rigidity, mostly at night, but sometimes during the day.    Often hard to get up and get going.       He has problems at night with both sleep and with need to go to the bathroom to urinate, usually 2 - 3 trips at night.      He still has well-formed hallucinations lately.    He lately sees people, mostly during the day, sometimes at night upon awakening.    He generally cannot recognize people's faces and if they try to speak, it's only mumbling.    He realizes they are not real, so he is not afraid.    He has experienced hypnopompic hallucinations (e.g., "fish in the toilet") for some time now.   No recent delusions were noted by pt or his wife.    He is more confused in the evening hours or at night, " "much less during the day.    He realizes he becomes confused and has memory lapses.      He requires glasses for reading.    He tries to see the eye doctor at least once a year.       He still has good and bad days in regards to his mood.    His wife thinks his anxiety is worse than the depression.    He worries a lot about things in general.    He likes to watch TV, or reads some.     He uses ibuprofen PRN for back pain.      He still has urinary incontinence.    He sees a urologist at [facility].    He had a Urostem device placed.  Wife thinks it has helped him with the incontinence -- he had "no control" before (wife had to clean up urine daily, especially at night).    He has a urinal as well.    They have an appt with the urologist in the next couple of months.    His wife plans to contact that office about the continued urinary incontinence and if an adjustment is needed.      He stopped driving at the recommendation of Dr. Jenkins after recent neuropsychological testing.        Psychotherapy:  Target symptoms: depression, adjustment  Why chosen therapy is appropriate versus another modality: relevant to diagnosis, patient responds to this modality  Outcome monitoring methods: self-report, lab data, observation, feedback from family  Therapeutic intervention type: supportive psychotherapy  Topics discussed/themes: stress related to medical comorbidities, life stage transitional issues  The patient's response to the intervention is accepting.   The patient's progress toward treatment goals is fair.   Duration of intervention: 0 minutes.    Review of Systems   PSYCHIATRIC: Pertinant items are noted in the narrative.  CONSTITUTIONAL:  Some recent fluctuations in wt.    MUSCULOSKELETAL: No pain or stiffness of the joints.  NEUROLOGIC: + tremors and shuffling gait; No weakness, sensory changes, seizures, memory loss, confusion, or other abnormal movements.  Memory Loss: no  ENDOCRINE: No polydipsia or " polyuria.  INTEGUMENTARY: No rashes or lacerations.  EYES: No exophthalmos, jaundice or blindness.  ENT: No dizziness, tinnitus or hearing loss.  RESPIRATORY: No shortness of breath.  CARDIOVASCULAR: No tachycardia or chest pain.  GASTROINTESTINAL: No nausea, vomiting, pain, constipation or diarrhea.  GENITOURINARY: No frequency, dysuria.    The following portions of the patient's history were reviewed and updated as appropriate: allergies, current medications, past family history, past medical history, past social history, past surgical history and problem list.      Current Outpatient Medications:     acetaminophen (TYLENOL) 500 MG tablet, Take 1,000 mg by mouth every 8 (eight) hours as needed for Pain., Disp: , Rfl:     atorvastatin (LIPITOR) 40 MG tablet, Take 1 tablet (40 mg total) by mouth once daily., Disp: 90 tablet, Rfl: 1    carbidopa-levodopa  mg (SINEMET)  mg per tablet, TAKE 1 QID (Patient taking differently: Take 1 tablet by mouth 3 (three) times daily.), Disp: 360 tablet, Rfl: 3    celecoxib (CELEBREX) 100 MG capsule, Take 100 mg by mouth 2 (two) times daily., Disp: , Rfl:     clonazePAM (KLONOPIN) 0.5 MG tablet, TAKE 1/2 TO 1 TABLET BY MOUTH UP TO 3 TIMES DAILY AS NEEDED FOR ANXIETY (Patient taking differently: TAKE 1/2 TO 1 TABLET BY MOUTH ONCE DAILY AS NEEDED FOR ANXIETY), Disp: 90 tablet, Rfl: 5    ergocalciferol (ERGOCALCIFEROL) 50,000 unit Cap, TAKE 1 CAPSULE (50,000 UNITS TOTAL) BY MOUTH EVERY 7 DAYS. (Patient taking differently: Take 50,000 Units by mouth every 7 days. ON WEDNESDAYS), Disp: 12 capsule, Rfl: 1    finasteride (PROSCAR) 5 mg tablet, Take 1 tablet (5 mg total) by mouth once daily., Disp: 90 tablet, Rfl: 3    folic acid (FOLVITE) 1 MG tablet, TAKE 1 TABLET EVERY DAY, Disp: 90 tablet, Rfl: 0    losartan (COZAAR) 25 MG tablet, Take 1 tablet (25 mg total) by mouth once daily. (Patient not taking: Reported on 4/27/2023), Disp: 90 tablet, Rfl: 1    magnesium oxide (MAG-OX)  400 mg tablet, Take 400 mg by mouth once daily., Disp: , Rfl:     potassium citrate (UROCIT-K) 10 mEq (1,080 mg) TbSR, TAKE 1 TABLET THREE TIMES DAILY WITH MEALS (Patient taking differently: Take 10 mEq by mouth once daily.), Disp: 270 tablet, Rfl: 3    pyridoxine, vitamin B6, (B-6) 50 MG Tab, Take 50 mg by mouth once daily., Disp: , Rfl:     QUEtiapine (SEROQUEL) 50 MG tablet, Take 1 tablet (50 mg total) by mouth every evening. TAKE ORAL 1/2 TO 1 TABLET QHS.  TAKE IN PLACE OF CLONAZEPAM, Disp: 30 tablet, Rfl: 1    sertraline (ZOLOFT) 100 MG tablet, Take 2 tablets (200 mg total) by mouth once daily., Disp: 180 tablet, Rfl: 3    sodium bicarbonate 650 MG tablet, TAKE 1 TABLET ONE TIME DAILY, Disp: 90 tablet, Rfl: 3    tamsulosin (FLOMAX) 0.4 mg Cap, TAKE 1 CAPSULE EVERY DAY, Disp: 90 capsule, Rfl: 0    zaleplon (SONATA) 5 MG Cap, Take 1 capsule (5 mg total) by mouth nightly as needed (insomnia)., Disp: 30 capsule, Rfl: 3    Current Facility-Administered Medications:     tamsulosin 24 hr capsule 0.4 mg, 0.4 mg, Oral, Daily, Mike Mtz MD    Facility-Administered Medications Ordered in Other Visits:     0.9%  NaCl infusion, , Intravenous, Continuous, Lizz Lowry NP, Last Rate: 70 mL/hr at 11/12/21 1227, New Bag at 11/12/21 1227    mupirocin 2 % ointment, , Nasal, On Call Procedure, Lizz Lowry NP, Given at 11/12/21 1227   He has not been taking clonazepam or the hydroxyzine daily, only occasionally.    Pt has been taking the Seroquel 25 mg 1/2 tab qhs nightly.    Pt never had the Belsomra filled.    Compliance: yes    Side effects: None    Risk Parameters:  Patient reports no suicidal ideation  Patient reports no homicidal ideation  Patient reports no self-injurious behavior  Patient reports no violent behavior    Exam (detailed: at least 9 elements; comprehensive: all 15 elements)   Constitutional  Vitals - 1 value per visit 4/28/2023 4/28/2023 4/28/2023 4/28/2023 4/28/2023 6/7/2023 6/7/2023  "  SYSTOLIC 158 - - 160 - - 132   DIASTOLIC 70 - - 86 - - 80   Pulse 44 37 44 61 54 - 65   Temp 97.9 - 97.9 - - - -   Resp 18 - 16 - - - -   SPO2 97 - 97 - - - 97   Weight (lb) - - - - - - 190.7   Weight (kg) - - - - - - 86.5   Height - - - - - - 70   BMI (Calculated) - - - - - - 27.4   VISIT REPORT - - - - - - -   Pain Score  - - - - - 0 -   Some recent data might be hidden       General:  unremarkable, age appropriate, casually dressed, neatly groomed, overweight     Musculoskeletal  Muscle Strength/Tone:  not assessed today; cogwheeling had been noted in BUE's in past   Gait & Station:  parkinsonian, slow, steady , uses walker for assistance     Psychiatric  Speech:  slowed, non-spontaneous, normal volume   Behavior: friendly and cooperative, eye contact normal   Mood & Affect:  "OK"  euthymic, constricted range, conguent, appropriate   Thought Process:  goal-directed, logical   Associations:  intact   Thought Content:  normal, no suicidality, no homicidality, delusions, or paranoia   Insight:  intact, has awareness of illness   Judgement: behavior is adequate to circumstances   Orientation:  grossly intact   Memory: intact for content of interview   Language: grossly intact   Attention Span & Concentration:  able to focus   Fund of Knowledge:  intact and appropriate to age and level of education     Lab Results   Component Value Date    WBC 6.89 04/28/2023    HGB 15.0 04/28/2023    HCT 45.6 04/28/2023    MCV 94 04/28/2023     04/28/2023     04/28/2023    K 3.8 04/28/2023     04/28/2023    CO2 23 04/28/2023    GLU 87 04/28/2023    BUN 23 04/28/2023    CREATININE 0.9 04/28/2023    CALCIUM 9.5 04/28/2023    PROT 6.4 04/28/2023    ALBUMIN 3.7 04/28/2023    BILITOT 0.6 04/28/2023    ALKPHOS 75 04/28/2023    AST 26 04/28/2023    ALT 13 04/28/2023    ANIONGAP 8 04/28/2023    ESTGFRAFRICA >60.0 06/14/2022    EGFRNONAA >60.0 06/14/2022    CHOL 110 (L) 03/30/2023    HDL 43 03/30/2023    LDLCALC 54.0 (L) " 03/30/2023    TRIG 65 03/30/2023    CHOLHDL 39.1 03/30/2023    TSH 1.655 04/27/2023       Imaging:    Exam: MRI brain without contrast    History: Dizziness, extrapyramidal movement disorder    Findings: MRI is performed with routine sequences.  No mass, hemorrhage, infarction, subdural collection, hydrocephalus or other acute finding is seen on this cranial MR examination.  There is volume loss consistent with the patient's age.  No diffusion abnormality is seen.      Impression     Normal noncontrast MRI of the brain     Electronically signed by: FLORI NEWMAN MD  Date: 07/15/17  Time: 15:51        Assessment and Diagnosis   Status/Progress: Based on the examination today, the patient's problem(s) is/are adequately but not ideally controlled.  New problems have been presented today.   Co-morbidities (chronic pain) are complicating management of the primary condition.    There are not active rule-out diagnoses for this patient at this time.      General Impression:  POLINA (generalized anxiety disorder)  Chronic insomnia  Depression, unspecified depression type  Parkinson's disease    Visual hallucinations  Chronic pain (NOT CODED)    Intervention/Counseling/Treatment Plan   Medication Management:  Continue all current medications (sertraline, PRN clonazepam for anxiety).        Follow-up plan for depression was discussed with patient and spouse.      Return to Clinic: Follow up in about 3 months (around 9/21/2023).    Sudhir Elliott MD

## 2023-06-21 NOTE — PROGRESS NOTES
Date: 6/21/2023    Name: Haja Marti  MRN: [id number]    Patient screened and deemed appropriate for wellness program at this time. See screening form and participation waiver in media section.     Haja Marti attended [employer] wellness class today.    The class participated in the following:  Warm Up:   Arm circles  Posterior capsule stretch  Hip flexor stretch  LSVT BIG Maximal Daily Exercises:   Exercise 1: Floor to Ceiling   Exercise 2: Side to Side  Exercise 3: Forward Step and Reach   Exercise 4: Sideways Step and Reach   Exercise 5: Backward Step and Reach   Exercise 6: Forward Rock and Reach   Exercise 7: Sideways Rock and Reach   Functional component tasks x 5 reps each:   Sit<>stands  Complex Functional Activity:   The following stations were complicated in seated/standing:   Bean bag toss   Balloon volleyball   Cup stacking from design copy   Basketball dribbles/passes   Seated stretches  BIG Walking:   Forwards only with AD  Cool Down:   Pursed lip breathing     *Supervision/assistance provided as needed        Therapist's Name:  JESUS Mars LOTR

## 2023-06-23 ENCOUNTER — DOCUMENTATION ONLY (OUTPATIENT)
Dept: REHABILITATION | Facility: HOSPITAL | Age: 78
End: 2023-06-23
Payer: MEDICARE

## 2023-06-23 NOTE — PROGRESS NOTES
Patient screened and deemed appropriate for wellness program at this time. See screening form and participation waiver in media section.     Haja Marti attended [employer] wellness class today.    The class participated in the following:  Warm Up: cervical ROM in each direction  Shoulder rolls in each direction  Shoulder retration    LSVT BIG Maximal Daily Exercises:  Exercise 1: Floor to Ceiling   Exercise 2: Side to Side  Exercise 3: Forward Step and Reach   Exercise 4: Sideways Step and Reach   Exercise 5: Backward Step and Reach   Exercise 6: Forward Rock and Reach   Exercise 7: Sideways Rock and Reach     Functional component tasks  Sit<>stands x 10 reps each   Quarter turns x 5 reps each  Marching in place x 20 reps each   Car legs x 10 reps each leg    Complex Functional Activity: Circuit including  Stepping over the ladder  Forward step and back over foam roller  Ball toss  Single leg hip flexion tapping the top of a step  Bicep curls with weights  Rope swings  BIG Walking: x 5 mins walking around the chairs in the gym    Cool Down:   Hamstring stretches  Shoulder stretches      *Supervision/assistance provided as needed           Therapist's Name:  Fadia Cardona, PTA  06/23/2023

## 2023-06-28 ENCOUNTER — DOCUMENTATION ONLY (OUTPATIENT)
Dept: REHABILITATION | Facility: HOSPITAL | Age: 78
End: 2023-06-28
Payer: MEDICARE

## 2023-06-28 NOTE — PROGRESS NOTES
Date: 6/28/2023    Name: Haja Marti  MRN: [id number]    Patient screened and deemed appropriate for wellness program at this time. See screening form and participation waiver in media section.     Haja Marti attended [employer] wellness class today.    The class participated in the following:  Warm Up:   High Knees   Cross Body arm stretch   Arm circles, forwards/backwards   LSVT BIG Maximal Daily Exercises:  Exercise 1: Floor to Ceiling   Exercise 2: Side to Side  Exercise 3: Forward Step and Reach   Exercise 4: Sideways Step and Reach   Exercise 5: Backward Step and Reach   Exercise 6: Forward Rock and Reach   Exercise 7: Sideways Rock and Reach   Functional component tasks x 5 reps each:   Sit<>stands  Complex Functional Activity:   Bowling   Velcro - Ball & Catch   BIG Walking:   Forwards walking   Cool Down:   Pursed Lip Breathing     *Supervision/assistance provided as needed     ANS Rivera     Therapist's Name:  JESUS Mars LOTR

## 2023-06-28 NOTE — PROGRESS NOTES
Date: 6/28/2023     Name: Haja Marti   MRN: [id number]      Haja Marti attended [employer] wellness class today. This is the 19th class that the pt has attended.      The class participated in the following:   sustained phonation with a model x 10  ascending pitch glides with a model x 10  descending pitch glides with a model x 10  10 functional phrases with a model x 3 sets  5 loud catch phrases with a model x 2 sets - did not complete today  conversation activity:   LOUDLY discuss Parkinson diagnosis and symptoms.  LOUDLY introduced self  to the new member of the group and discussed his Parkinson's disease journey  LOUDLY discussed Parkinson's medication and treatments    Therapist's Name:   JAYMIE Herzog, CCC-SLP , CBIS

## 2023-06-30 ENCOUNTER — DOCUMENTATION ONLY (OUTPATIENT)
Dept: REHABILITATION | Facility: HOSPITAL | Age: 78
End: 2023-06-30
Payer: MEDICARE

## 2023-06-30 NOTE — PROGRESS NOTES
Patient screened and deemed appropriate for wellness program at this time. See screening form and participation waiver in media section.      Haja Marti attended [employer] wellness class today.     The class participated in the following:  Warm Up: cervical ROM  Forward/ backward shoulder rolls x 10 each way     LSVT BIG Maximal Daily Exercises:  Exercise 1: Floor to Ceiling   Exercise 2: Side to Side  Exercise 3: Forward Step and Reach   Exercise 4: Sideways Step and Reach   Exercise 5: Backward Step and Reach   Exercise 6: Forward Rock and Reach   Exercise 7: Sideways Rock and Reach   Functional component tasks  Sit<>stands x 10 reps     BIG Walking: x 5 laps around the gym     Cool Down:   Shoulder stretches in all directions   Triceps stretches  Hamstring stretches  5 slow relaxing breaths     *Supervision/assistance provided as needed            Therapist's Name:  Fadia Cardona, PTA

## 2023-07-01 NOTE — PROGRESS NOTES
Date: 6/30/2023     Name: Haja Marti   MRN: [id number]      Haja Marti attended [employer] wellness class today. This is the 20th class that the pt has attended.      The class participated in the following:   sustained phonation with a model x 10  ascending pitch glides with a model x 10  descending pitch glides with a model x 10  10 functional phrases with a model x 5  5 loud catch phrases with a model x 5  conversation activity:   LOUDLY discussed 4th of July plans (past and present)  LOUDLY named Cities [place] started with specific alphabets  LOUDLY engaged in conversation    Therapist's Name:   JAYMIE Herzog, CCC-SLP , CBIS

## 2023-07-05 ENCOUNTER — DOCUMENTATION ONLY (OUTPATIENT)
Dept: REHABILITATION | Facility: HOSPITAL | Age: 78
End: 2023-07-05
Payer: MEDICARE

## 2023-07-05 RX ORDER — TAMSULOSIN HYDROCHLORIDE 0.4 MG/1
CAPSULE ORAL
Qty: 90 CAPSULE | Refills: 0 | Status: SHIPPED | OUTPATIENT
Start: 2023-07-05

## 2023-07-05 NOTE — PROGRESS NOTES
Date: 7/5/2023    Name: Haja Marti  MRN: [id number]    Patient screened and deemed appropriate for wellness program at this time. See screening form and participation waiver in media section.     Haja Marti attended [employer] wellness class today.  The class participated in the following:  Warm Up:   Cross body arm stretch   Arm circles forwards/backwards   High knees   LSVT BIG Maximal Daily Exercises:  Exercise 1: Floor to Ceiling   Exercise 2: Side to Side  Exercise 3: Forward Step and Reach   Exercise 4: Sideways Step and Reach   Exercise 5: Backward Step and Reach   Exercise 6: Forward Rock and Reach   Exercise 7: Sideways Rock and Reach   Functional component tasks x 5 reps each:   Sit<>stands  Complex Functional Activity:   Activity BINGO with focus on BIG movements  BIG Walking:   Forward walking   Cool Down:   Pursed Lip Breathing     *Supervision/assistance provided as needed     LA Rivera     Therapist's Name:  JESUS Mars LOTR

## 2023-07-09 RX ORDER — ERGOCALCIFEROL 1.25 MG/1
CAPSULE ORAL
Qty: 12 CAPSULE | Refills: 1 | Status: SHIPPED | OUTPATIENT
Start: 2023-07-09

## 2023-07-18 NOTE — PROGRESS NOTES
Name: Haja Marti  MRN: [id number]   CSN: [id number]      Date: 07/21/2023    Chief Complaint: LBD follow-up     History of Present Illness (HPI):    Mr. Marti is a 77 year old man who is new to me however is known to both [facility] and Dr. Hidalgo. 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 His symptom onset was approximately 7 years ago when he began having some walking difficulties and was found to not swing his arms bilaterally. He saw a general neurologist at that time who suspected parkinsonism but had concerns for LBD given some cognitive difficulties he was experiencing at that time. Initially it was his movement that was more of an issue than his cognition. He would shuffle his steps, freeze and displayed a stoop posture. The freezing continues to lead to rather frequent falls as he only has a lightweight rolling walker at this time for ambulation. He used to be a very active man and enjoyed exercise and participating in boxing however now given his worsening mobility he does not feel he is able to do those activities out of fear of falling. He denies any tremor. As for his cognition, morning and night times are the worst. He has regular hallucinations however they are not overly bothersome to him as they are not frightening. His mentation seems to fluctuate throughout the day and he does have periods of lucidity. He does take both Hydroxyzine and Topamax which he was told to hold out of concern of it affecting his cognition. With holding these medications he did not feel there was an improvement so he continues to use them. He only uses hydroxyzine once a day when he wakes up as he describes a sensation of anxiety. He is especially confused in the middle of the night upon waking and sometimes cannot locate the restroom within his home. Today his biggest complaint is his inability to walk more so than his cognition.     Interval History:    Figueroa presents for follow-up. He has good and bad days. He is not falling regularly but occasionally. Typically falls due to freezing. His hallucinations are terrible and more bothersome. Seeing his wife in bed with another man. Seroquel was increased from 25 to 50mg to help with sleep but this worsened his parkinsonism. He is now back at the 25mg dosage. He has a hard time saying if his Sinemet is effective. Wife states cognition is certainly declining. He is becoming disoriented in his own home, wandering at night, has a hard time with grooming and toileting.     Current Mvmt Medications:  Sinemet 25/100 - 1.5 tab at 9am/7pm/2pm   Clonazepam 0.25mg PRN  Seroquel 25mg QHS    Prior Mvmt Medication Trials:  Donepezil - did not tolerate per LUZ Shrestha - patient unsure of this   Hydroxzine  Clonazepam - worsening confusion, could re-try    Nonmotor/Premotor ROS:  Smell/Taste: no issues  Voice/Swallowing:  no issues   Stridor/SOB: none  Exercise: not exercising regularly but continues to do BIG and LOUD  Dizziness: none  -no longer on blood pressure medications due to hypotension   Hydration: doing ok   Urinary Issues: none  Constipation: takes OTC medications that control this   Sleep/RBD: sleeping ok but likely still having RBD behavior, wife not in same room - unclear how much he is awakened by this. He has clonazepam but has not been using it.  Hallucinations/Peripheral Illusions: yes, regularly and they are becoming very bothersome.  Visual Change: none  Memory/Cognition/Language: some issues with language comprehension at times, frequently disoriented in the evening hours  Mood: stable    Past Medical History: The patient  has a past medical history of Anxiety, Arthritis, BPH (benign prostatic hypertrophy), Cataract, Colon polyp (2002), Depression, Epiretinal membrane, both eyes, psychiatric care, Hyperlipidemia, Hypertension, Kidney stones, Kidney stones, Parkinsons, Posterior vitreous detachment of  left eye, Psychiatric problem, Psychosis (7/21/2023), Retinal detachment (2004), Retinoschisis, left eye, Sleep apnea, and Vitreous hemorrhage of left eye.    Social History: The patient  reports that he has never smoked. He has never used smokeless tobacco. He reports that he does not drink alcohol and does not use drugs.    Family History: Their family history includes Anxiety disorder in his daughter and son; Cancer in his father and mother; Cataracts in his father; Depression in his son; Diabetes in his father.    Allergies: Patient has no known allergies.     Meds:   Current Outpatient Medications on File Prior to Visit   Medication Sig Dispense Refill    acetaminophen (TYLENOL) 500 MG tablet Take 1,000 mg by mouth every 8 (eight) hours as needed for Pain.      atorvastatin (LIPITOR) 40 MG tablet Take 1 tablet (40 mg total) by mouth once daily. 90 tablet 1    carbidopa-levodopa  mg (SINEMET)  mg per tablet TAKE 1 QID (Patient taking differently: Take 1 tablet by mouth 3 (three) times daily.) 360 tablet 3    celecoxib (CELEBREX) 100 MG capsule Take 100 mg by mouth 2 (two) times daily.      clonazePAM (KLONOPIN) 0.5 MG tablet TAKE 1/2 TO 1 TABLET BY MOUTH UP TO 3 TIMES DAILY AS NEEDED FOR ANXIETY (Patient taking differently: TAKE 1/2 TO 1 TABLET BY MOUTH ONCE DAILY AS NEEDED FOR ANXIETY) 90 tablet 5    finasteride (PROSCAR) 5 mg tablet Take 1 tablet (5 mg total) by mouth once daily. 90 tablet 3    folic acid (FOLVITE) 1 MG tablet TAKE 1 TABLET EVERY DAY 90 tablet 0    magnesium oxide (MAG-OX) 400 mg tablet Take 400 mg by mouth once daily.      MYRBETRIQ 50 mg Tb24 Take 1 tablet by mouth every evening.      potassium citrate (UROCIT-K) 10 mEq (1,080 mg) TbSR TAKE 1 TABLET THREE TIMES DAILY WITH MEALS (Patient taking differently: Take 10 mEq by mouth once daily.) 270 tablet 3    pyridoxine, vitamin B6, (B-6) 50 MG Tab Take 50 mg by mouth once daily.      sertraline (ZOLOFT) 100 MG tablet Take 2 tablets  "(200 mg total) by mouth once daily. 180 tablet 3    sodium bicarbonate 650 MG tablet TAKE 1 TABLET ONE TIME DAILY 90 tablet 3    zaleplon (SONATA) 5 MG Cap Take 1 capsule (5 mg total) by mouth nightly as needed (insomnia). 30 capsule 3    ergocalciferol (ERGOCALCIFEROL) 50,000 unit Cap TAKE 1 CAPSULE EVERY 7 DAYS (Patient not taking: Reported on 7/20/2023) 12 capsule 1    losartan (COZAAR) 25 MG tablet Take 1 tablet (25 mg total) by mouth once daily. (Patient not taking: Reported on 7/20/2023) 90 tablet 1    tamsulosin (FLOMAX) 0.4 mg Cap TAKE 1 CAPSULE EVERY DAY (Patient not taking: Reported on 7/20/2023) 90 capsule 0     Current Facility-Administered Medications on File Prior to Visit   Medication Dose Route Frequency Provider Last Rate Last Admin    0.9%  NaCl infusion   Intravenous Continuous Lizz Lowry NP 70 mL/hr at 11/12/21 1227 New Bag at 11/12/21 1227    mupirocin 2 % ointment   Nasal On Call Procedure Lizz Lowry NP   Given at 11/12/21 1227    tamsulosin 24 hr capsule 0.4 mg  0.4 mg Oral Daily Mike Mtz MD           Exam:  /64 (BP Location: Left arm, Patient Position: Sitting, BP Method: Medium (Manual))   Pulse (!) 53   Ht 5' 10" (1.778 m)   Wt 89.4 kg (197 lb 1.5 oz)   SpO2 (!) 94%   BMI 28.28 kg/m²     Constitutional  Well-developed, well-nourished, appears stated age   Cardiovascular  No LE edema bilaterally   Neurological    * Mental status  MOCA = not done during today's visit     - Orientation  Oriented to conversation     - Memory   Intact recent and remote     - Attention/concentration  Attentive, vigilant during exam     - Language  Intact to conversation.     - Fund of knowledge  Aware of current events     - Executive  Well-organized thoughts     - Other     * Cranial nerves       - CN II  Pupils equal, visual fields full to confrontation     - CN III, IV, VI  Extraocular movements full, normal pursuits and saccades         - CN VII  Face strong and symmetric " bilaterally     - CN VIII  Hearing intact bilaterally         - CN XI  SCM and trapezius 5/5 bilaterally       * Motor  Muscle bulk normal, strength 5/5 throughout       * Coordination  No dysmetria with finger-to-nose   * Gait  See below.       * Specialized movement exam  Moderately hypophonic speech.    Facial masking with reduced blink.   Increased tone L>R in the upper and lower extremities however better than prior.    Some mild global bradykinesia.    No tremor with rest, posture, kinesis, or intention.    No other dystonia, chorea, athetosis, myoclonus, or tics.   No motor impersistence.   Walking is slow with freezing and very very narrow gait. He shuffles to the side with turning.  Posture is stooped. There is reduced arm swing bilaterally (L>R).      Medical Record Review:  Labs, imaging and prior notes reviewed independently.     MRI Brain 2017:  MRI is performed with routine sequences.  No mass, hemorrhage, infarction, subdural collection, hydrocephalus or other acute finding is seen on this cranial MR examination.  There is volume loss consistent with the patient's age.  No diffusion abnormality is seen.    Diagnoses:          1. Lewy body Parkinson's disease  Ambulatory referral/consult to Home Health      2. Cardiac arrhythmia, unspecified cardiac arrhythmia type  EKG 12-lead    CANCELED: IN OFFICE EKG 12-LEAD (to Muse)      3. Psychosis, unspecified psychosis type  QUEtiapine (SEROQUEL) 25 MG Tab      4. Frequent falls        5. REM behavioral disorder        6. Anxiety          Assessment:  Mr. Marti is a 77 year old RH man who carries a diagnosis of LBD by neuropsychological testing and displays a significant amount of parkinsonism primarily affecting his gait. We have gradually increased his Levodopa which he was overall tolerated relatively well however currently his hallucinations have become more bothersome.. He has had to come off of his blood pressure medications due to hypotension.   He  has a USTEP walker which helps with his freezing and he is participating in BIG and LOUD therapies regularly. We agreed to the following plan today:    Plan:  - For worsening hallucinations we can try reducing Sinemet to 1 tablet per dose to see if this helps. We discussed that this may worsen his mobility. We also discussed the possibility of splitting the Seroquel to 1/2 in the morning and 1/2 at night. I will get an EKG today to see if his QTc interval is less than 440 and we can attempt to get Nuplazid. I would love to use aricept however with his history of severe bradycardia I am hesitant. Should QTC be prolonged we could consider low dose Namenda.   - For his anxiety, he can use 1/2 of the clonazepam twice daily as needed but I would be cautious with it's use at night being that he is getting up and walking around unsupervised out of concern for fall.   - I will send his wife some resources for LBD caregivers.   - Already established with palliative. I will also get HH to evaluate home safety and order any equipment necessary.     RTC in 3 months to see me.     Total time: 57 minutes spent on the encounter, which includes face to face time and non-face to face time preparing to see the patient (eg, review of tests), Obtaining and/or reviewing separately obtained history, Documenting clinical information in the electronic or other health record, Independently interpreting results (not separately reported) and communicating results to the patient/family/caregiver, or Care coordination (not separately reported).     Mag Mcmahon MD  Division of Movement and Memory Disorders  [facility]  [phone]

## 2023-07-20 ENCOUNTER — OFFICE VISIT (OUTPATIENT)
Dept: NEUROLOGY | Facility: CLINIC | Age: 78
End: 2023-07-20
Payer: MEDICARE

## 2023-07-20 ENCOUNTER — HOSPITAL ENCOUNTER (OUTPATIENT)
Dept: PULMONOLOGY | Facility: HOSPITAL | Age: 78
Discharge: HOME | End: 2023-07-20
Attending: STUDENT IN AN ORGANIZED HEALTH CARE EDUCATION/TRAINING PROGRAM
Payer: MEDICARE

## 2023-07-20 VITALS
HEIGHT: 70 IN | WEIGHT: 197.06 LBS | SYSTOLIC BLOOD PRESSURE: 128 MMHG | OXYGEN SATURATION: 94 % | BODY MASS INDEX: 28.21 KG/M2 | HEART RATE: 53 BPM | DIASTOLIC BLOOD PRESSURE: 64 MMHG

## 2023-07-20 DIAGNOSIS — G47.52 REM BEHAVIORAL DISORDER: ICD-10-CM

## 2023-07-20 DIAGNOSIS — F29 PSYCHOSIS, UNSPECIFIED PSYCHOSIS TYPE: ICD-10-CM

## 2023-07-20 DIAGNOSIS — F41.9 ANXIETY: ICD-10-CM

## 2023-07-20 DIAGNOSIS — G31.83 LEWY BODY PARKINSON'S DISEASE: Primary | ICD-10-CM

## 2023-07-20 DIAGNOSIS — I49.9 CARDIAC ARRHYTHMIA, UNSPECIFIED CARDIAC ARRHYTHMIA TYPE: ICD-10-CM

## 2023-07-20 DIAGNOSIS — R29.6 FREQUENT FALLS: ICD-10-CM

## 2023-07-20 DIAGNOSIS — F02.80 LEWY BODY PARKINSON'S DISEASE: Primary | ICD-10-CM

## 2023-07-20 PROCEDURE — 1101F PT FALLS ASSESS-DOCD LE1/YR: CPT | Mod: HCNC,CPTII,S$GLB, | Performed by: STUDENT IN AN ORGANIZED HEALTH CARE EDUCATION/TRAINING PROGRAM

## 2023-07-20 PROCEDURE — 93005 ELECTROCARDIOGRAM TRACING: CPT | Mod: HCNC

## 2023-07-20 PROCEDURE — 1159F MED LIST DOCD IN RCRD: CPT | Mod: HCNC,CPTII,S$GLB, | Performed by: STUDENT IN AN ORGANIZED HEALTH CARE EDUCATION/TRAINING PROGRAM

## 2023-07-20 PROCEDURE — 3074F SYST BP LT 130 MM HG: CPT | Mod: HCNC,CPTII,S$GLB, | Performed by: STUDENT IN AN ORGANIZED HEALTH CARE EDUCATION/TRAINING PROGRAM

## 2023-07-20 PROCEDURE — 3078F DIAST BP <80 MM HG: CPT | Mod: HCNC,CPTII,S$GLB, | Performed by: STUDENT IN AN ORGANIZED HEALTH CARE EDUCATION/TRAINING PROGRAM

## 2023-07-20 PROCEDURE — 99215 PR OFFICE/OUTPT VISIT, EST, LEVL V, 40-54 MIN: ICD-10-PCS | Mod: HCNC,S$GLB,, | Performed by: STUDENT IN AN ORGANIZED HEALTH CARE EDUCATION/TRAINING PROGRAM

## 2023-07-20 PROCEDURE — 3078F PR MOST RECENT DIASTOLIC BLOOD PRESSURE < 80 MM HG: ICD-10-PCS | Mod: HCNC,CPTII,S$GLB, | Performed by: STUDENT IN AN ORGANIZED HEALTH CARE EDUCATION/TRAINING PROGRAM

## 2023-07-20 PROCEDURE — 1126F AMNT PAIN NOTED NONE PRSNT: CPT | Mod: HCNC,CPTII,S$GLB, | Performed by: STUDENT IN AN ORGANIZED HEALTH CARE EDUCATION/TRAINING PROGRAM

## 2023-07-20 PROCEDURE — 99999 PR PBB SHADOW E&M-EST. PATIENT-LVL IV: ICD-10-PCS | Mod: PBBFAC,HCNC,, | Performed by: STUDENT IN AN ORGANIZED HEALTH CARE EDUCATION/TRAINING PROGRAM

## 2023-07-20 PROCEDURE — 99215 OFFICE O/P EST HI 40 MIN: CPT | Mod: HCNC,S$GLB,, | Performed by: STUDENT IN AN ORGANIZED HEALTH CARE EDUCATION/TRAINING PROGRAM

## 2023-07-20 PROCEDURE — 99999 PR PBB SHADOW E&M-EST. PATIENT-LVL IV: CPT | Mod: PBBFAC,HCNC,, | Performed by: STUDENT IN AN ORGANIZED HEALTH CARE EDUCATION/TRAINING PROGRAM

## 2023-07-20 PROCEDURE — 1126F PR PAIN SEVERITY QUANTIFIED, NO PAIN PRESENT: ICD-10-PCS | Mod: HCNC,CPTII,S$GLB, | Performed by: STUDENT IN AN ORGANIZED HEALTH CARE EDUCATION/TRAINING PROGRAM

## 2023-07-20 PROCEDURE — 1159F PR MEDICATION LIST DOCUMENTED IN MEDICAL RECORD: ICD-10-PCS | Mod: HCNC,CPTII,S$GLB, | Performed by: STUDENT IN AN ORGANIZED HEALTH CARE EDUCATION/TRAINING PROGRAM

## 2023-07-20 PROCEDURE — 3288F PR FALLS RISK ASSESSMENT DOCUMENTED: ICD-10-PCS | Mod: HCNC,CPTII,S$GLB, | Performed by: STUDENT IN AN ORGANIZED HEALTH CARE EDUCATION/TRAINING PROGRAM

## 2023-07-20 PROCEDURE — 3288F FALL RISK ASSESSMENT DOCD: CPT | Mod: HCNC,CPTII,S$GLB, | Performed by: STUDENT IN AN ORGANIZED HEALTH CARE EDUCATION/TRAINING PROGRAM

## 2023-07-20 PROCEDURE — 1101F PR PT FALLS ASSESS DOC 0-1 FALLS W/OUT INJ PAST YR: ICD-10-PCS | Mod: HCNC,CPTII,S$GLB, | Performed by: STUDENT IN AN ORGANIZED HEALTH CARE EDUCATION/TRAINING PROGRAM

## 2023-07-20 PROCEDURE — 3074F PR MOST RECENT SYSTOLIC BLOOD PRESSURE < 130 MM HG: ICD-10-PCS | Mod: HCNC,CPTII,S$GLB, | Performed by: STUDENT IN AN ORGANIZED HEALTH CARE EDUCATION/TRAINING PROGRAM

## 2023-07-20 PROCEDURE — 93010 ELECTROCARDIOGRAM REPORT: CPT | Mod: HCNC,,, | Performed by: INTERNAL MEDICINE

## 2023-07-20 PROCEDURE — 93010 EKG 12-LEAD: ICD-10-PCS | Mod: HCNC,,, | Performed by: INTERNAL MEDICINE

## 2023-07-20 RX ORDER — QUETIAPINE FUMARATE 25 MG/1
25 TABLET, FILM COATED ORAL NIGHTLY
Qty: 90 TABLET | Refills: 3 | Status: SHIPPED | OUTPATIENT
Start: 2023-07-20 | End: 2024-07-19

## 2023-07-20 NOTE — PATIENT INSTRUCTIONS
1) For anxiety - can try 1/2 clonazepam as needed twice daily but would be cautious with use at night being that he gets up unassisted.  2) For hallucinations - can try reducing Sinemet doses to 1 tablet per dose. Watch for worsening mobility. Another option is to take 1/2 seroquel in the morning and again at bedtime. Watch for worsening sleep.      I'm going to order EKG today and if QTc interval is < 440 we can try to get Nuplazid.

## 2023-07-21 ENCOUNTER — PATIENT MESSAGE (OUTPATIENT)
Dept: NEUROLOGY | Facility: CLINIC | Age: 78
End: 2023-07-21
Payer: MEDICARE

## 2023-07-21 PROBLEM — F41.9 ANXIETY: Status: ACTIVE | Noted: 2023-07-21

## 2023-07-21 PROBLEM — F29 PSYCHOSIS: Status: ACTIVE | Noted: 2023-07-21

## 2023-07-21 PROBLEM — I49.9 CARDIAC ARRHYTHMIA: Status: ACTIVE | Noted: 2023-07-21

## 2023-07-24 ENCOUNTER — TELEPHONE (OUTPATIENT)
Dept: NEUROLOGY | Facility: CLINIC | Age: 78
End: 2023-07-24
Payer: MEDICARE

## 2023-07-24 NOTE — TELEPHONE ENCOUNTER
----- Message from Mag Mcmahon MD sent at 7/24/2023  8:05 AM CDT -----  Hi Mr. Marti - Unfortunately your QTc interval is too long for us to safely consider Nuplazid. We have a few options:    1) We could reduce your dose of Sinemet so that your hallucinations are less however this may slightly worsen your mobility.     2) If we'd like to focus more on cognition, we can try a low dose Namenda which is a medication typically used in dementia. It is not one to cause hallucinations but it may not help his current hallucinations.     Let me know how you'd prefer to proceed. I understand neither option is perfect. I think I would suggest reducing Sinemet before adding something else to the mix but am open to either.     Dr. Mcmahon

## 2023-07-24 NOTE — PROGRESS NOTES
Hi Mr. Marti - Unfortunately your QTc interval is too long for us to safely consider Nuplazid. We have a few options:    1) We could reduce your dose of Sinemet so that your hallucinations are less however this may slightly worsen your mobility.     2) If we'd like to focus more on cognition, we can try a low dose Namenda which is a medication typically used in dementia. It is not one to cause hallucinations but it may not help his current hallucinations.     Let me know how you'd prefer to proceed. I understand neither option is perfect. I think I would suggest reducing Sinemet before adding something else to the mix but am open to either.     Dr. Mcmahon

## 2023-07-27 ENCOUNTER — TELEPHONE (OUTPATIENT)
Dept: INTERNAL MEDICINE | Facility: CLINIC | Age: 78
End: 2023-07-27
Payer: MEDICARE

## 2023-07-27 NOTE — TELEPHONE ENCOUNTER
----- Message from Melita Ewing sent at 7/27/2023  3:05 PM CDT -----  Contact: [facility]/[phone]  Type: Home Health (orders, updates, clarifications, etc.)    Home Health Agency/Nurse:[facility]    Phone number:[phone]    Reason for call: calling to let the doctor know that pt's heart rate is 40     Comments:  Any stated that she knows pt has a history of low heart rate but she just needs to notify the doctor

## 2023-07-27 NOTE — TELEPHONE ENCOUNTER
Contact a me, just let me know if the heart rate is consistently in low 40s or low.  Yes he has a history of such.  He is met with the arrhythmia Service before, there was discussion about pacemaker but because of his advanced Parkinson's it was elected to watch and observe but still can be a consideration if it persists

## 2023-07-31 ENCOUNTER — TELEPHONE (OUTPATIENT)
Dept: INTERNAL MEDICINE | Facility: CLINIC | Age: 78
End: 2023-07-31
Payer: MEDICARE

## 2023-07-31 DIAGNOSIS — F02.80 LEWY BODY PARKINSON'S DISEASE: Primary | ICD-10-CM

## 2023-07-31 DIAGNOSIS — G31.83 LEWY BODY PARKINSON'S DISEASE: Primary | ICD-10-CM

## 2023-07-31 NOTE — TELEPHONE ENCOUNTER
----- Message from Norah Brennan sent at 7/31/2023  8:16 AM CDT -----  Contact: [phone] [facility] is calling for a bedside commode for the pt please advise     [facility] fax to

## 2023-07-31 NOTE — TELEPHONE ENCOUNTER
----- Message from Norah Brennan sent at 7/31/2023  8:18 AM CDT -----  Contact: [phone]  Previous message she states to please have it state the pt is confined to his room

## 2023-08-07 ENCOUNTER — HOSPITAL ENCOUNTER (INPATIENT)
Facility: HOSPITAL | Age: 78
LOS: 3 days | Discharge: HOSPICE | DRG: 309 | End: 2023-08-10
Attending: EMERGENCY MEDICINE | Admitting: STUDENT IN AN ORGANIZED HEALTH CARE EDUCATION/TRAINING PROGRAM
Payer: MEDICARE

## 2023-08-07 ENCOUNTER — NURSE TRIAGE (OUTPATIENT)
Dept: ADMINISTRATIVE | Facility: CLINIC | Age: 78
End: 2023-08-07
Payer: MEDICARE

## 2023-08-07 DIAGNOSIS — I49.9 CARDIAC ARRHYTHMIA, UNSPECIFIED CARDIAC ARRHYTHMIA TYPE: ICD-10-CM

## 2023-08-07 DIAGNOSIS — I48.92 ATRIAL FLUTTER: ICD-10-CM

## 2023-08-07 DIAGNOSIS — R07.9 CHEST PAIN: ICD-10-CM

## 2023-08-07 DIAGNOSIS — I48.4 ATYPICAL ATRIAL FLUTTER: ICD-10-CM

## 2023-08-07 DIAGNOSIS — I50.9 DECOMPENSATED HEART FAILURE: ICD-10-CM

## 2023-08-07 DIAGNOSIS — I48.0 PAROXYSMAL ATRIAL FIBRILLATION: ICD-10-CM

## 2023-08-07 DIAGNOSIS — I48.92 NEW ONSET ATRIAL FLUTTER: ICD-10-CM

## 2023-08-07 DIAGNOSIS — R06.2 WHEEZING: Primary | ICD-10-CM

## 2023-08-07 DIAGNOSIS — R00.1 BRADYCARDIA: ICD-10-CM

## 2023-08-07 LAB
ALBUMIN SERPL BCP-MCNC: 3.8 G/DL (ref 3.5–5.2)
ALP SERPL-CCNC: 83 U/L (ref 55–135)
ALT SERPL W/O P-5'-P-CCNC: 45 U/L (ref 10–44)
ANION GAP SERPL CALC-SCNC: 8 MMOL/L (ref 8–16)
AST SERPL-CCNC: 38 U/L (ref 10–40)
BASOPHILS # BLD AUTO: 0.02 K/UL (ref 0–0.2)
BASOPHILS NFR BLD: 0.3 % (ref 0–1.9)
BILIRUB SERPL-MCNC: 0.9 MG/DL (ref 0.1–1)
BILIRUB UR QL STRIP: NEGATIVE
BNP SERPL-MCNC: 331 PG/ML (ref 0–99)
BUN SERPL-MCNC: 21 MG/DL (ref 8–23)
CALCIUM SERPL-MCNC: 9.4 MG/DL (ref 8.7–10.5)
CHLORIDE SERPL-SCNC: 107 MMOL/L (ref 95–110)
CLARITY UR REFRACT.AUTO: CLEAR
CO2 SERPL-SCNC: 22 MMOL/L (ref 23–29)
COLOR UR AUTO: YELLOW
CREAT SERPL-MCNC: 0.9 MG/DL (ref 0.5–1.4)
DIFFERENTIAL METHOD: ABNORMAL
EOSINOPHIL # BLD AUTO: 0.1 K/UL (ref 0–0.5)
EOSINOPHIL NFR BLD: 1.3 % (ref 0–8)
ERYTHROCYTE [DISTWIDTH] IN BLOOD BY AUTOMATED COUNT: 13.5 % (ref 11.5–14.5)
EST. GFR  (NO RACE VARIABLE): >60 ML/MIN/1.73 M^2
GLUCOSE SERPL-MCNC: 87 MG/DL (ref 70–110)
GLUCOSE UR QL STRIP: NEGATIVE
HCT VFR BLD AUTO: 41.2 % (ref 40–54)
HGB BLD-MCNC: 14 G/DL (ref 14–18)
HGB UR QL STRIP: NEGATIVE
IMM GRANULOCYTES # BLD AUTO: 0.02 K/UL (ref 0–0.04)
IMM GRANULOCYTES NFR BLD AUTO: 0.3 % (ref 0–0.5)
KETONES UR QL STRIP: NEGATIVE
LEUKOCYTE ESTERASE UR QL STRIP: NEGATIVE
LYMPHOCYTES # BLD AUTO: 0.6 K/UL (ref 1–4.8)
LYMPHOCYTES NFR BLD: 10.3 % (ref 18–48)
MAGNESIUM SERPL-MCNC: 2.1 MG/DL (ref 1.6–2.6)
MCH RBC QN AUTO: 31.7 PG (ref 27–31)
MCHC RBC AUTO-ENTMCNC: 34 G/DL (ref 32–36)
MCV RBC AUTO: 93 FL (ref 82–98)
MONOCYTES # BLD AUTO: 0.5 K/UL (ref 0.3–1)
MONOCYTES NFR BLD: 8.1 % (ref 4–15)
NEUTROPHILS # BLD AUTO: 4.9 K/UL (ref 1.8–7.7)
NEUTROPHILS NFR BLD: 79.7 % (ref 38–73)
NITRITE UR QL STRIP: NEGATIVE
NRBC BLD-RTO: 0 /100 WBC
PH UR STRIP: 6 [PH] (ref 5–8)
PLATELET # BLD AUTO: 184 K/UL (ref 150–450)
PMV BLD AUTO: 9.8 FL (ref 9.2–12.9)
POTASSIUM SERPL-SCNC: 4.4 MMOL/L (ref 3.5–5.1)
PROT SERPL-MCNC: 6.7 G/DL (ref 6–8.4)
PROT UR QL STRIP: ABNORMAL
RBC # BLD AUTO: 4.42 M/UL (ref 4.6–6.2)
SODIUM SERPL-SCNC: 137 MMOL/L (ref 136–145)
SP GR UR STRIP: 1.03 (ref 1–1.03)
TROPONIN I SERPL DL<=0.01 NG/ML-MCNC: 0.04 NG/ML (ref 0–0.03)
TROPONIN I SERPL DL<=0.01 NG/ML-MCNC: 0.04 NG/ML (ref 0–0.03)
TSH SERPL DL<=0.005 MIU/L-ACNC: 0.73 UIU/ML (ref 0.4–4)
URN SPEC COLLECT METH UR: ABNORMAL
WBC # BLD AUTO: 6.19 K/UL (ref 3.9–12.7)

## 2023-08-07 PROCEDURE — 93005 ELECTROCARDIOGRAM TRACING: CPT | Mod: HCNC

## 2023-08-07 PROCEDURE — 99223 1ST HOSP IP/OBS HIGH 75: CPT | Mod: AI,HCNC,GC, | Performed by: STUDENT IN AN ORGANIZED HEALTH CARE EDUCATION/TRAINING PROGRAM

## 2023-08-07 PROCEDURE — 81003 URINALYSIS AUTO W/O SCOPE: CPT | Mod: HCNC

## 2023-08-07 PROCEDURE — 99223 PR INITIAL HOSPITAL CARE,LEVL III: ICD-10-PCS | Mod: AI,HCNC,GC, | Performed by: STUDENT IN AN ORGANIZED HEALTH CARE EDUCATION/TRAINING PROGRAM

## 2023-08-07 PROCEDURE — 84443 ASSAY THYROID STIM HORMONE: CPT | Mod: HCNC

## 2023-08-07 PROCEDURE — 85025 COMPLETE CBC W/AUTO DIFF WBC: CPT | Mod: HCNC

## 2023-08-07 PROCEDURE — 93010 ELECTROCARDIOGRAM REPORT: CPT | Mod: HCNC,,, | Performed by: INTERNAL MEDICINE

## 2023-08-07 PROCEDURE — 36415 COLL VENOUS BLD VENIPUNCTURE: CPT | Mod: HCNC

## 2023-08-07 PROCEDURE — 84484 ASSAY OF TROPONIN QUANT: CPT | Mod: 91,HCNC

## 2023-08-07 PROCEDURE — 25000003 PHARM REV CODE 250: Mod: HCNC

## 2023-08-07 PROCEDURE — 93010 EKG 12-LEAD: ICD-10-PCS | Mod: HCNC,,, | Performed by: INTERNAL MEDICINE

## 2023-08-07 PROCEDURE — 63600175 PHARM REV CODE 636 W HCPCS: Mod: HCNC

## 2023-08-07 PROCEDURE — 51798 US URINE CAPACITY MEASURE: CPT | Mod: HCNC

## 2023-08-07 PROCEDURE — 84484 ASSAY OF TROPONIN QUANT: CPT | Mod: HCNC

## 2023-08-07 PROCEDURE — 83735 ASSAY OF MAGNESIUM: CPT | Mod: HCNC

## 2023-08-07 PROCEDURE — 99285 EMERGENCY DEPT VISIT HI MDM: CPT | Mod: 25,HCNC

## 2023-08-07 PROCEDURE — 96372 THER/PROPH/DIAG INJ SC/IM: CPT

## 2023-08-07 PROCEDURE — 80053 COMPREHEN METABOLIC PANEL: CPT | Mod: HCNC

## 2023-08-07 PROCEDURE — 20600001 HC STEP DOWN PRIVATE ROOM: Mod: HCNC

## 2023-08-07 PROCEDURE — 83880 ASSAY OF NATRIURETIC PEPTIDE: CPT | Mod: HCNC

## 2023-08-07 RX ORDER — ENOXAPARIN SODIUM 100 MG/ML
40 INJECTION SUBCUTANEOUS EVERY 24 HOURS
Status: DISCONTINUED | OUTPATIENT
Start: 2023-08-07 | End: 2023-08-08

## 2023-08-07 RX ORDER — ATORVASTATIN CALCIUM 40 MG/1
40 TABLET, FILM COATED ORAL DAILY
Status: DISCONTINUED | OUTPATIENT
Start: 2023-08-08 | End: 2023-08-10 | Stop reason: HOSPADM

## 2023-08-07 RX ORDER — IBUPROFEN 200 MG
24 TABLET ORAL
Status: DISCONTINUED | OUTPATIENT
Start: 2023-08-07 | End: 2023-08-10 | Stop reason: HOSPADM

## 2023-08-07 RX ORDER — CARBIDOPA AND LEVODOPA 25; 100 MG/1; MG/1
1 TABLET ORAL 3 TIMES DAILY
Status: DISCONTINUED | OUTPATIENT
Start: 2023-08-07 | End: 2023-08-10 | Stop reason: HOSPADM

## 2023-08-07 RX ORDER — IBUPROFEN 200 MG
16 TABLET ORAL
Status: DISCONTINUED | OUTPATIENT
Start: 2023-08-07 | End: 2023-08-10 | Stop reason: HOSPADM

## 2023-08-07 RX ORDER — ZOLPIDEM TARTRATE 5 MG/1
5 TABLET ORAL NIGHTLY
Status: DISCONTINUED | OUTPATIENT
Start: 2023-08-07 | End: 2023-08-07

## 2023-08-07 RX ORDER — POTASSIUM CITRATE 10 MEQ/1
10 TABLET, EXTENDED RELEASE ORAL DAILY
Status: DISCONTINUED | OUTPATIENT
Start: 2023-08-08 | End: 2023-08-10 | Stop reason: HOSPADM

## 2023-08-07 RX ORDER — SERTRALINE HYDROCHLORIDE 100 MG/1
200 TABLET, FILM COATED ORAL DAILY
Status: DISCONTINUED | OUTPATIENT
Start: 2023-08-08 | End: 2023-08-10 | Stop reason: HOSPADM

## 2023-08-07 RX ORDER — CELECOXIB 100 MG/1
100 CAPSULE ORAL 2 TIMES DAILY PRN
Status: DISCONTINUED | OUTPATIENT
Start: 2023-08-07 | End: 2023-08-10 | Stop reason: HOSPADM

## 2023-08-07 RX ORDER — TALC
6 POWDER (GRAM) TOPICAL NIGHTLY PRN
Status: DISCONTINUED | OUTPATIENT
Start: 2023-08-07 | End: 2023-08-10 | Stop reason: HOSPADM

## 2023-08-07 RX ORDER — CELECOXIB 100 MG/1
100 CAPSULE ORAL 2 TIMES DAILY
Status: DISCONTINUED | OUTPATIENT
Start: 2023-08-07 | End: 2023-08-07

## 2023-08-07 RX ORDER — QUETIAPINE FUMARATE 25 MG/1
25 TABLET, FILM COATED ORAL NIGHTLY
Status: DISCONTINUED | OUTPATIENT
Start: 2023-08-07 | End: 2023-08-10 | Stop reason: HOSPADM

## 2023-08-07 RX ORDER — NALOXONE HCL 0.4 MG/ML
0.02 VIAL (ML) INJECTION
Status: DISCONTINUED | OUTPATIENT
Start: 2023-08-07 | End: 2023-08-10 | Stop reason: HOSPADM

## 2023-08-07 RX ORDER — CLONAZEPAM 0.5 MG/1
0.5 TABLET ORAL 2 TIMES DAILY PRN
Status: DISCONTINUED | OUTPATIENT
Start: 2023-08-07 | End: 2023-08-08

## 2023-08-07 RX ORDER — LANOLIN ALCOHOL/MO/W.PET/CERES
50 CREAM (GRAM) TOPICAL DAILY
Status: DISCONTINUED | OUTPATIENT
Start: 2023-08-08 | End: 2023-08-10 | Stop reason: HOSPADM

## 2023-08-07 RX ORDER — GLUCAGON 1 MG
1 KIT INJECTION
Status: DISCONTINUED | OUTPATIENT
Start: 2023-08-07 | End: 2023-08-10 | Stop reason: HOSPADM

## 2023-08-07 RX ORDER — FINASTERIDE 5 MG/1
5 TABLET, FILM COATED ORAL DAILY
Status: DISCONTINUED | OUTPATIENT
Start: 2023-08-08 | End: 2023-08-10 | Stop reason: HOSPADM

## 2023-08-07 RX ORDER — ACETAMINOPHEN 325 MG/1
650 TABLET ORAL EVERY 8 HOURS PRN
Status: DISCONTINUED | OUTPATIENT
Start: 2023-08-07 | End: 2023-08-10 | Stop reason: HOSPADM

## 2023-08-07 RX ORDER — SODIUM CHLORIDE 0.9 % (FLUSH) 0.9 %
10 SYRINGE (ML) INJECTION EVERY 12 HOURS PRN
Status: DISCONTINUED | OUTPATIENT
Start: 2023-08-07 | End: 2023-08-10 | Stop reason: HOSPADM

## 2023-08-07 RX ORDER — LANOLIN ALCOHOL/MO/W.PET/CERES
400 CREAM (GRAM) TOPICAL DAILY
Status: DISCONTINUED | OUTPATIENT
Start: 2023-08-08 | End: 2023-08-10 | Stop reason: HOSPADM

## 2023-08-07 RX ORDER — POLYETHYLENE GLYCOL 3350 17 G/17G
17 POWDER, FOR SOLUTION ORAL DAILY
Status: DISCONTINUED | OUTPATIENT
Start: 2023-08-08 | End: 2023-08-10 | Stop reason: HOSPADM

## 2023-08-07 RX ORDER — ACETAMINOPHEN 325 MG/1
650 TABLET ORAL EVERY 4 HOURS PRN
Status: DISCONTINUED | OUTPATIENT
Start: 2023-08-07 | End: 2023-08-10 | Stop reason: HOSPADM

## 2023-08-07 RX ORDER — HYDRALAZINE HYDROCHLORIDE 20 MG/ML
2 INJECTION INTRAMUSCULAR; INTRAVENOUS ONCE
Status: COMPLETED | OUTPATIENT
Start: 2023-08-07 | End: 2023-08-07

## 2023-08-07 RX ORDER — FUROSEMIDE 40 MG/1
40 TABLET ORAL DAILY
Status: DISCONTINUED | OUTPATIENT
Start: 2023-08-07 | End: 2023-08-07

## 2023-08-07 RX ADMIN — CLONAZEPAM 0.5 MG: 0.5 TABLET ORAL at 10:08

## 2023-08-07 RX ADMIN — HYDRALAZINE HYDROCHLORIDE 2 MG: 20 INJECTION, SOLUTION INTRAMUSCULAR; INTRAVENOUS at 10:08

## 2023-08-07 RX ADMIN — ENOXAPARIN SODIUM 40 MG: 40 INJECTION SUBCUTANEOUS at 06:08

## 2023-08-07 RX ADMIN — QUETIAPINE FUMARATE 25 MG: 25 TABLET ORAL at 10:08

## 2023-08-07 RX ADMIN — CARBIDOPA AND LEVODOPA 1 TABLET: 25; 100 TABLET ORAL at 10:08

## 2023-08-07 RX ADMIN — FUROSEMIDE 40 MG: 40 TABLET ORAL at 07:08

## 2023-08-07 NOTE — HPI
77 year old male with pmh of Lewy body Parkinson's disease, HTN, HLD, SHU, dep/anx, BPH, lumbar radiculopathy, urge incontinence, AV block, and RBB who presented for episodes of dyspnea and decreased urinary production. Yesterday he had an isolated 45 minute episode of dyspnea. This has been occurring off and on again today and at some points was associated with accessory respiratory muscle use. He wears diapers and in the morning they are usually filled with urine, but according to his wife this am there was barely any. He oral fluid intake has not changed. His also acutely lost his voice. He denies any chest pain or palpitations these last two days. He usually has some leg swelling but today they are more swollen than usual. His wife believes his breathing has been worse when he has been laying down. He has not had any fever or chills and denies any other upper respiratory symptoms.    The pt took an at home covid test and it was negative.    In the ED he was bradycardic (pulse 44) and his EKG showed new onset atrial flutter. His troponin was mildly elevated at 0.042. His CXR showed pulmonary edema and his SpO2 was 98.

## 2023-08-07 NOTE — TELEPHONE ENCOUNTER
"OOC NT incoming call PES escalation -  Pt wife, Whitley, reports pt having some difficulty breathing "wheeze" noted with increased confusion from normal. Also concerned about decreased urination and slight increase swelling to BLE.  Difficulty breathing protocol followed and pt advised  to go to ED now.  Wife in agrees and will bring pt to local ED. Encounter routed to PCP     Reason for Disposition   Patient sounds very sick or weak to the triager    Additional Information   Negative: SEVERE difficulty breathing (e.g., struggling for each breath, speaks in single words, pulse > 120)   Negative: Breathing stopped and hasn't returned   Negative: Choking on something   Negative: Bluish (or gray) lips or face   Negative: Difficult to awaken or acting confused (e.g., disoriented, slurred speech)   Negative: Passed out (i.e., fainted, collapsed and was not responding)   Negative: Wheezing started suddenly after medicine, an allergic food, or bee sting   Negative: Stridor   Negative: Slow, shallow and weak breathing   Negative: Sounds like a life-threatening emergency to the triager   Negative: MODERATE difficulty breathing (e.g., speaks in phrases, SOB even at rest, pulse 100-120) of new-onset or worse than normal   Negative: Oxygen level (e.g., pulse oximetry) 90 percent or lower   Negative: Wheezing can be heard across the room   Negative: Drooling or spitting out saliva (because can't swallow)   Negative: Any history of prior "blood clot" in leg or lungs   Negative: Illness requiring prolonged bedrest in past month (e.g., immobilization, long hospital stay)   Negative: Hip or leg fracture (broken bone) in past month (or had cast on leg or ankle in past month)   Negative: Major surgery in the past month   Negative: Long-distance travel in past month (e.g., car, bus, train, plane; with trip lasting 6 or more hours)   Negative: Cancer treatment in past six months (or has cancer now)   Negative: Extra heart beats OR " "irregular heart beating (i.e., "palpitations")     Wife reports HR=48 this morning   Negative: Fever > 100.0 F (37.8 C) and bedridden (e.g., nursing home patient, stroke, chronic illness, recovering from surgery)   Negative: Fever > 100.0 F (37.8 C) and diabetes mellitus or weak immune system (e.g., HIV positive, cancer chemo, splenectomy, organ transplant, chronic steroids)   Negative: Fever > 101 F (38.3 C) and over 60 years of age   Negative: Fever > 103 F (39.4 C)   Negative: Periods where breathing stops and then resumes normally and bedridden (e.g., nursing home patient, CVA)   Negative: Pregnant or postpartum (from 0 to 6 weeks after delivery)    Protocols used: Breathing Difficulty-A-[place]    "

## 2023-08-07 NOTE — SUBJECTIVE & OBJECTIVE
Past Medical History:   Diagnosis Date    Anxiety     Arthritis     BPH (benign prostatic hypertrophy)     Cataract     OS    Colon polyp 2002    Depression     Epiretinal membrane, both eyes     Hx of psychiatric care     Hyperlipidemia     Hypertension     Kidney stones     Kidney stones     Parkinsons     Posterior vitreous detachment of left eye     Psychiatric problem     Psychosis 7/21/2023    Retinal detachment 2004    OD    Retinoschisis, left eye     Sleep apnea     Vitreous hemorrhage of left eye        Past Surgical History:   Procedure Laterality Date    BIOPSY OF ADENOIDS      CATARACT EXTRACTION      OD    COLONOSCOPY N/A 12/3/2019    Procedure: COLONOSCOPY;  Surgeon: Ceferino Oliveira MD;  Location: [facility] (4TH FLR);  Service: Endoscopy;  Laterality: N/A;  5/2019 laminectomy with post op confusion tb    CYSTOSCOPY WITH URODYNAMIC TESTING N/A 1/31/2022    Procedure: CYSTOSCOPY, WITH URODYNAMIC TESTING FLOUROSCOPIC;  Surgeon: Devang Butler MD;  Location: [facility] OR 1ST FLR;  Service: Urology;  Laterality: N/A;  1hr    EPIDURAL STEROID INJECTION N/A 3/12/2019    Procedure: INJECTION, STEROID, EPIDURAL, L5-S1;  Surgeon: Laina Crockett MD;  Location: [facility] PAIN MGT;  Service: Pain Management;  Laterality: N/A;    EXAMINATION UNDER ANESTHESIA N/A 2/18/2021    Procedure: Exam under anesthesia;  Surgeon: West Clifton MD;  Location: [facility] OR 2ND FLR;  Service: Colon and Rectal;  Laterality: N/A;    EYE SURGERY      INCISION OF PERIRECTAL ABSCESS N/A 2/12/2021    Procedure: INCISION AND DRAINAGE  ABSCESS, PERIRECTAL;  Surgeon: West Clifton MD;  [facility];  Service: Colon and Rectal;  Laterality: N/A;    INCISION OF PERIRECTAL ABSCESS Left 2/18/2021    Procedure: INCISION, ABSCESS, PERIRECTAL;  Surgeon: West Clifton MD;  [facility];  Service: Colon and Rectal;  Laterality: Left;    INJECTION OF ANESTHETIC AGENT AROUND NERVE Bilateral 7/3/2018    Procedure:  BLOCK, NERVE;  Surgeon: Laina Crockett MD;  Location: [facility] PAIN MGT;  Service: Pain Management;  Laterality: Bilateral;  Bilateral Lumbar L2,L3,L4,L5 MBB      NEEDS CONSENT    INSERTION OF SETON STITCH N/A 2/12/2021    Procedure: PLACEMENT, SETON STITCH;  Surgeon: West Clifton MD;  Location: [facility] OR 2ND FLR;  Service: Colon and Rectal;  Laterality: N/A;    INSERTION OF SETON STITCH Left 2/18/2021    Procedure: PLACEMENT, SETON;  Surgeon: West Clifton MD;  Location: [facility] OR 2ND FLR;  Service: Colon and Rectal;  Laterality: Left;    INSERTION OF SETON STITCH N/A 9/24/2021    Procedure: PLACEMENT, SETON STITCH;  Surgeon: West Clifton MD;  Location: [facility] OR 2ND FLR;  Service: Colon and Rectal;  Laterality: N/A;  seton exchange    laser indirect  4/8/10    left eye    Laser Indirect Retinopexy  4/8/10    OS    PROSTATE SURGERY      RECTAL FISTULOTOMY N/A 11/12/2021    Procedure: FISTULOTOMY, RECTUM;  Surgeon: West Clifton MD;  Location: [facility] OR 2ND FLR;  Service: Colon and Rectal;  Laterality: N/A;    RETINAL DETACHMENT SURGERY  2004    OD    ROTATOR CUFF REPAIR  11/13/2013    TONSILLECTOMY      TRANSFORAMINAL EPIDURAL INJECTION OF STEROID Bilateral 2/19/2019    Procedure: INJECTION, STEROID, EPIDURAL, TRANSFORAMINAL APPROACH, L5;  Surgeon: Laina Crockett MD;  Location: [facility] PAIN MGT;  Service: Pain Management;  Laterality: Bilateral;       Review of patient's allergies indicates:  No Known Allergies    Current Facility-Administered Medications on File Prior to Encounter   Medication    0.9%  NaCl infusion    mupirocin 2 % ointment    tamsulosin 24 hr capsule 0.4 mg     Current Outpatient Medications on File Prior to Encounter   Medication Sig    acetaminophen (TYLENOL) 500 MG tablet Take 1,000 mg by mouth every 8 (eight) hours as needed for Pain.    atorvastatin (LIPITOR) 40 MG tablet Take 1 tablet (40 mg total) by mouth once daily.    carbidopa-levodopa  mg (SINEMET)  mg  per tablet TAKE 1 QID (Patient taking differently: Take 1 tablet by mouth 3 (three) times daily.)    celecoxib (CELEBREX) 100 MG capsule Take 100 mg by mouth 2 (two) times daily.    clonazePAM (KLONOPIN) 0.5 MG tablet TAKE 1/2 TO 1 TABLET BY MOUTH UP TO 3 TIMES DAILY AS NEEDED FOR ANXIETY (Patient taking differently: TAKE 1/2 TO 1 TABLET BY MOUTH ONCE DAILY AS NEEDED FOR ANXIETY)    ergocalciferol (ERGOCALCIFEROL) 50,000 unit Cap TAKE 1 CAPSULE EVERY 7 DAYS (Patient not taking: Reported on 7/20/2023)    finasteride (PROSCAR) 5 mg tablet Take 1 tablet (5 mg total) by mouth once daily.    folic acid (FOLVITE) 1 MG tablet TAKE 1 TABLET EVERY DAY    losartan (COZAAR) 25 MG tablet Take 1 tablet (25 mg total) by mouth once daily. (Patient not taking: Reported on 7/20/2023)    magnesium oxide (MAG-OX) 400 mg tablet Take 400 mg by mouth once daily.    MYRBETRIQ 50 mg Tb24 Take 1 tablet by mouth every evening.    potassium citrate (UROCIT-K) 10 mEq (1,080 mg) TbSR TAKE 1 TABLET THREE TIMES DAILY WITH MEALS (Patient taking differently: Take 10 mEq by mouth once daily.)    pyridoxine, vitamin B6, (B-6) 50 MG Tab Take 50 mg by mouth once daily.    QUEtiapine (SEROQUEL) 25 MG Tab Take 1 tablet (25 mg total) by mouth every evening. TAKE ORAL 1 TABLET QHS.    sertraline (ZOLOFT) 100 MG tablet Take 2 tablets (200 mg total) by mouth once daily.    sodium bicarbonate 650 MG tablet TAKE 1 TABLET ONE TIME DAILY    tamsulosin (FLOMAX) 0.4 mg Cap TAKE 1 CAPSULE EVERY DAY (Patient not taking: Reported on 7/20/2023)    zaleplon (SONATA) 5 MG Cap Take 1 capsule (5 mg total) by mouth nightly as needed (insomnia).     Family History       Problem Relation (Age of Onset)    Anxiety disorder Son, Daughter    Cancer Father, Mother    Cataracts Father    Depression Son    Diabetes Father          Tobacco Use    Smoking status: Never    Smokeless tobacco: Never   Substance and Sexual Activity    Alcohol use: No    Drug use: No    Sexual activity:  Not on file     Review of Systems   Constitutional:  Negative for chills and fever.   HENT:  Positive for congestion (chronic) and voice change. Negative for sore throat.    Respiratory:  Positive for shortness of breath. Negative for cough, choking, chest tightness and wheezing.    Cardiovascular:  Positive for leg swelling. Negative for chest pain and palpitations.   Gastrointestinal:  Positive for constipation (chronic). Negative for abdominal pain, diarrhea, nausea and vomiting.   Genitourinary:  Negative for difficulty urinating, dysuria, frequency and urgency.   Musculoskeletal:  Positive for gait problem.   Neurological:  Negative for dizziness.     Objective:     Vital Signs (Most Recent):  Temp: 99 °F (37.2 °C) (08/07/23 1724)  Pulse: (!) 45 (08/07/23 1724)  Resp: 15 (08/07/23 1724)  BP: (!) 179/85 (08/07/23 1724)  SpO2: 96 % (08/07/23 1724) Vital Signs (24h Range):  Temp:  [99 °F (37.2 °C)-99.3 °F (37.4 °C)] 99 °F (37.2 °C)  Pulse:  [44-45] 45  Resp:  [15-16] 15  SpO2:  [96 %-98 %] 96 %  BP: (179-184)/(85-88) 179/85     Weight: 92 kg (202 lb 13.2 oz)  Body mass index is 29.1 kg/m².     Physical Exam  Constitutional:       General: He is not in acute distress.     Appearance: Normal appearance. He is not diaphoretic.   HENT:      Head: Normocephalic and atraumatic.   Cardiovascular:      Rate and Rhythm: Regular rhythm. Bradycardia present.      Pulses: Normal pulses.      Heart sounds: Normal heart sounds. No murmur heard.     No friction rub. No gallop.   Pulmonary:      Effort: Pulmonary effort is normal. No respiratory distress.      Breath sounds: Examination of the right-lower field reveals decreased breath sounds. Examination of the left-lower field reveals decreased breath sounds. Decreased breath sounds present. No wheezing, rhonchi or rales.   Abdominal:      General: Abdomen is flat. There is no distension.      Palpations: Abdomen is soft.      Tenderness: There is no abdominal tenderness. There  is no guarding or rebound.   Musculoskeletal:      Right lower leg: Edema present.      Left lower leg: Edema present.   Skin:     General: Skin is warm and dry.      Capillary Refill: Capillary refill takes 2 to 3 seconds.   Neurological:      General: No focal deficit present.      Mental Status: He is alert and oriented to person, place, and time.   Psychiatric:         Mood and Affect: Mood normal.                Significant Labs: All pertinent labs within the past 24 hours have been reviewed.  CBC:   Recent Labs   Lab 08/07/23  1445   WBC 6.19   HGB 14.0   HCT 41.2        CMP:   Recent Labs   Lab 08/07/23  1445      K 4.4      CO2 22*   GLU 87   BUN 21   CREATININE 0.9   CALCIUM 9.4   PROT 6.7   ALBUMIN 3.8   BILITOT 0.9   ALKPHOS 83   AST 38   ALT 45*   ANIONGAP 8     Troponin:   Recent Labs   Lab 08/07/23  1445   TROPONINI 0.042*       Significant Imaging: I have reviewed all pertinent imaging results/findings within the past 24 hours.

## 2023-08-07 NOTE — Clinical Note
Diagnosis: New onset atrial flutter [244335]   Future Attending Provider: RAMIN COLLADO [[id number]]   Is the patient being sent to ED Observation?: No   Admitting Provider:: RAMIN COLLADO [[id number]]   Special Needs:: Fall Risk [15]

## 2023-08-07 NOTE — ED TRIAGE NOTES
Patient to ED with complaints of shortness of breath and decreased urine output x this morning. PMH HTN, parkinsons, BPH, HLD, kidney stones. Patient oriented x4, respirations even and unlabored. Skin warm and dry. Denies chest pain, abd pain. Personal wheelchair at bedside. Wife at bedside. Wife at bedside. Plan of care discussed with patient - verbalized understanding. Pending ED workup at this time.

## 2023-08-08 ENCOUNTER — ANESTHESIA EVENT (OUTPATIENT)
Dept: MEDSURG UNIT | Facility: HOSPITAL | Age: 78
DRG: 309 | End: 2023-08-08
Payer: MEDICARE

## 2023-08-08 PROBLEM — I48.92 ATRIAL FLUTTER: Status: ACTIVE | Noted: 2023-08-08

## 2023-08-08 LAB
ALBUMIN SERPL BCP-MCNC: 3.5 G/DL (ref 3.5–5.2)
ALP SERPL-CCNC: 76 U/L (ref 55–135)
ALT SERPL W/O P-5'-P-CCNC: 9 U/L (ref 10–44)
ANION GAP SERPL CALC-SCNC: 10 MMOL/L (ref 8–16)
ASCENDING AORTA: 3.75 CM
AST SERPL-CCNC: 28 U/L (ref 10–40)
AV INDEX (PROSTH): 0.64
AV MEAN GRADIENT: 4 MMHG
AV PEAK GRADIENT: 7 MMHG
AV VALVE AREA BY VELOCITY RATIO: 3.19 CM²
AV VALVE AREA: 2.93 CM²
AV VELOCITY RATIO: 0.7
BASOPHILS # BLD AUTO: 0.02 K/UL (ref 0–0.2)
BASOPHILS NFR BLD: 0.3 % (ref 0–1.9)
BILIRUB SERPL-MCNC: 0.7 MG/DL (ref 0.1–1)
BSA FOR ECHO PROCEDURE: 2.12 M2
BUN SERPL-MCNC: 22 MG/DL (ref 8–23)
CALCIUM SERPL-MCNC: 8.8 MG/DL (ref 8.7–10.5)
CHLORIDE SERPL-SCNC: 107 MMOL/L (ref 95–110)
CO2 SERPL-SCNC: 22 MMOL/L (ref 23–29)
CREAT SERPL-MCNC: 0.9 MG/DL (ref 0.5–1.4)
CV ECHO LV RWT: 0.4 CM
DIFFERENTIAL METHOD: ABNORMAL
DOP CALC AO PEAK VEL: 1.33 M/S
DOP CALC AO VTI: 34.35 CM
DOP CALC LVOT AREA: 4.6 CM2
DOP CALC LVOT DIAMETER: 2.41 CM
DOP CALC LVOT PEAK VEL: 0.93 M/S
DOP CALC LVOT STROKE VOLUME: 100.81 CM3
DOP CALCLVOT PEAK VEL VTI: 22.11 CM
E WAVE DECELERATION TIME: 180.56 MSEC
E/A RATIO: 3.14
ECHO LV POSTERIOR WALL: 1.06 CM (ref 0.6–1.1)
EJECTION FRACTION: 60 %
EOSINOPHIL # BLD AUTO: 0.1 K/UL (ref 0–0.5)
EOSINOPHIL NFR BLD: 1.3 % (ref 0–8)
ERYTHROCYTE [DISTWIDTH] IN BLOOD BY AUTOMATED COUNT: 13.3 % (ref 11.5–14.5)
EST. GFR  (NO RACE VARIABLE): >60 ML/MIN/1.73 M^2
FRACTIONAL SHORTENING: 29 % (ref 28–44)
GLUCOSE SERPL-MCNC: 84 MG/DL (ref 70–110)
HCT VFR BLD AUTO: 38.7 % (ref 40–54)
HGB BLD-MCNC: 13.3 G/DL (ref 14–18)
IMM GRANULOCYTES # BLD AUTO: 0.03 K/UL (ref 0–0.04)
IMM GRANULOCYTES NFR BLD AUTO: 0.4 % (ref 0–0.5)
INTERVENTRICULAR SEPTUM: 1.03 CM (ref 0.6–1.1)
LA MAJOR: 6.64 CM
LA MINOR: 6.49 CM
LA WIDTH: 5.6 CM
LEFT ATRIUM SIZE: 5.35 CM
LEFT ATRIUM VOLUME INDEX MOD: 72.2 ML/M2
LEFT ATRIUM VOLUME INDEX: 80 ML/M2
LEFT ATRIUM VOLUME MOD: 150.87 CM3
LEFT ATRIUM VOLUME: 167.16 CM3
LEFT INTERNAL DIMENSION IN SYSTOLE: 3.76 CM (ref 2.1–4)
LEFT VENTRICLE DIASTOLIC VOLUME INDEX: 64.4 ML/M2
LEFT VENTRICLE DIASTOLIC VOLUME: 134.59 ML
LEFT VENTRICLE MASS INDEX: 101 G/M2
LEFT VENTRICLE SYSTOLIC VOLUME INDEX: 28.8 ML/M2
LEFT VENTRICLE SYSTOLIC VOLUME: 60.25 ML
LEFT VENTRICULAR INTERNAL DIMENSION IN DIASTOLE: 5.29 CM (ref 3.5–6)
LEFT VENTRICULAR MASS: 211.85 G
LV LATERAL E/E' RATIO: 16.14 M/S
LYMPHOCYTES # BLD AUTO: 0.9 K/UL (ref 1–4.8)
LYMPHOCYTES NFR BLD: 12.9 % (ref 18–48)
MAGNESIUM SERPL-MCNC: 2 MG/DL (ref 1.6–2.6)
MCH RBC QN AUTO: 31.4 PG (ref 27–31)
MCHC RBC AUTO-ENTMCNC: 34.4 G/DL (ref 32–36)
MCV RBC AUTO: 92 FL (ref 82–98)
MONOCYTES # BLD AUTO: 0.6 K/UL (ref 0.3–1)
MONOCYTES NFR BLD: 8.8 % (ref 4–15)
MV PEAK A VEL: 0.36 M/S
MV PEAK E VEL: 1.13 M/S
MV STENOSIS PRESSURE HALF TIME: 52.36 MS
MV VALVE AREA P 1/2 METHOD: 4.2 CM2
NEUTROPHILS # BLD AUTO: 5.4 K/UL (ref 1.8–7.7)
NEUTROPHILS NFR BLD: 76.3 % (ref 38–73)
NRBC BLD-RTO: 0 /100 WBC
PHOSPHATE SERPL-MCNC: 3 MG/DL (ref 2.7–4.5)
PISA TR MAX VEL: 2.85 M/S
PLATELET # BLD AUTO: 177 K/UL (ref 150–450)
PMV BLD AUTO: 10.2 FL (ref 9.2–12.9)
POTASSIUM SERPL-SCNC: 3.7 MMOL/L (ref 3.5–5.1)
PROT SERPL-MCNC: 6.2 G/DL (ref 6–8.4)
RA MAJOR: 6.24 CM
RA PRESSURE ESTIMATED: 15 MMHG
RA WIDTH: 3.92 CM
RBC # BLD AUTO: 4.23 M/UL (ref 4.6–6.2)
RIGHT VENTRICULAR END-DIASTOLIC DIMENSION: 4.21 CM
RV TB RVSP: 18 MMHG
SINUS: 3.99 CM
SODIUM SERPL-SCNC: 139 MMOL/L (ref 136–145)
STJ: 3.3 CM
TDI LATERAL: 0.07 M/S
TR MAX PG: 32 MMHG
TRICUSPID ANNULAR PLANE SYSTOLIC EXCURSION: 1.25 CM
TROPONIN I SERPL DL<=0.01 NG/ML-MCNC: 0.05 NG/ML (ref 0–0.03)
TV REST PULMONARY ARTERY PRESSURE: 47 MMHG
WBC # BLD AUTO: 7.12 K/UL (ref 3.9–12.7)
Z-SCORE OF LEFT VENTRICULAR DIMENSION IN END DIASTOLE: -1.98
Z-SCORE OF LEFT VENTRICULAR DIMENSION IN END SYSTOLE: -0.37

## 2023-08-08 PROCEDURE — 20600001 HC STEP DOWN PRIVATE ROOM: Mod: HCNC

## 2023-08-08 PROCEDURE — 85025 COMPLETE CBC W/AUTO DIFF WBC: CPT | Mod: HCNC

## 2023-08-08 PROCEDURE — 83735 ASSAY OF MAGNESIUM: CPT | Mod: HCNC

## 2023-08-08 PROCEDURE — 84100 ASSAY OF PHOSPHORUS: CPT | Mod: HCNC

## 2023-08-08 PROCEDURE — 63600175 PHARM REV CODE 636 W HCPCS: Mod: HCNC

## 2023-08-08 PROCEDURE — 36415 COLL VENOUS BLD VENIPUNCTURE: CPT | Mod: HCNC

## 2023-08-08 PROCEDURE — 25000003 PHARM REV CODE 250: Mod: HCNC

## 2023-08-08 PROCEDURE — 80053 COMPREHEN METABOLIC PANEL: CPT | Mod: HCNC

## 2023-08-08 RX ORDER — CLONAZEPAM 0.5 MG/1
0.5 TABLET ORAL 3 TIMES DAILY PRN
Status: DISCONTINUED | OUTPATIENT
Start: 2023-08-08 | End: 2023-08-10 | Stop reason: HOSPADM

## 2023-08-08 RX ORDER — LOSARTAN POTASSIUM 25 MG/1
25 TABLET ORAL DAILY
Status: CANCELLED | OUTPATIENT
Start: 2023-08-08

## 2023-08-08 RX ORDER — HYDRALAZINE HYDROCHLORIDE 20 MG/ML
5 INJECTION INTRAMUSCULAR; INTRAVENOUS ONCE
Status: COMPLETED | OUTPATIENT
Start: 2023-08-08 | End: 2023-08-08

## 2023-08-08 RX ADMIN — LOSARTAN POTASSIUM 12.5 MG: 25 TABLET, FILM COATED ORAL at 08:08

## 2023-08-08 RX ADMIN — Medication 400 MG: at 08:08

## 2023-08-08 RX ADMIN — ATORVASTATIN CALCIUM 40 MG: 40 TABLET, FILM COATED ORAL at 08:08

## 2023-08-08 RX ADMIN — Medication 6 MG: at 09:08

## 2023-08-08 RX ADMIN — CARBIDOPA AND LEVODOPA 1 TABLET: 25; 100 TABLET ORAL at 09:08

## 2023-08-08 RX ADMIN — SERTRALINE 200 MG: 100 TABLET, FILM COATED ORAL at 08:08

## 2023-08-08 RX ADMIN — QUETIAPINE FUMARATE 25 MG: 25 TABLET ORAL at 09:08

## 2023-08-08 RX ADMIN — CARBIDOPA AND LEVODOPA 1 TABLET: 25; 100 TABLET ORAL at 03:08

## 2023-08-08 RX ADMIN — POLYETHYLENE GLYCOL 3350 17 G: 17 POWDER, FOR SOLUTION ORAL at 08:08

## 2023-08-08 RX ADMIN — POTASSIUM CITRATE 10 MEQ: 10 TABLET, EXTENDED RELEASE ORAL at 08:08

## 2023-08-08 RX ADMIN — FINASTERIDE 5 MG: 5 TABLET, FILM COATED ORAL at 08:08

## 2023-08-08 RX ADMIN — PYRIDOXINE HCL TAB 50 MG 50 MG: 50 TAB at 08:08

## 2023-08-08 RX ADMIN — CLONAZEPAM 0.5 MG: 0.5 TABLET ORAL at 04:08

## 2023-08-08 RX ADMIN — APIXABAN 5 MG: 5 TABLET, FILM COATED ORAL at 09:08

## 2023-08-08 RX ADMIN — HYDRALAZINE HYDROCHLORIDE 5 MG: 20 INJECTION INTRAMUSCULAR; INTRAVENOUS at 01:08

## 2023-08-08 NOTE — ASSESSMENT & PLAN NOTE
Pt heart rate has been in the 40s. He has not been having any chest pain but has been having episodic dyspnea. Has new atrial flutter and hx of RBB and 2nd degree AV block.CXR showed pulmonary edema and pt has b/l pitting edema. Last echo showed EF of 55% and severe LAE.    -Placing pt in cardiac stepdown unit with transcutaneous pacing pads  -Cardiology is following; NPO at midnight for cardioversion  -Telemetry  -Repeat echo  -BNP pending  -Lasix 40 x1  -Avoid BB and CCB

## 2023-08-08 NOTE — CONSULTS
[facility] - Cardiology Stepdown  Cardiac Electrophysiology  Consult Note    Admission Date: 8/7/2023  Code Status: DNR   Attending Provider: Esteban Otoole MD  Consulting Provider: Humera Orta MD  Principal Problem:Symptomatic bradycardia    Inpatient consult to Electrophysiology  Consult performed by: Humera Orta MD  Consult ordered by: Humera Orta MD        Subjective:     Chief Complaint:  New onset atrial flutter    HPI:   77 year old male with Lewy body dementia, Parkinson's disease, HTN, HLD, SHU, BPH, lumbar radiculopathy, urge incontinence who presented for episodes of dyspnea and decreased urinary production. In the ED he was found to have atrial flutter with ventricular rate 40 and qrs morphology similar to his sinus qrs morphology.    At baseline patient has a RBBB. Cardiac event monitor 5/5: Sinus rhythm with intermittent AVWB and pauses of up to 4.3 seconds from nonconducted nonsustained AT. Symptomatic activations predominantly correspond to sinus rhythm with AV Wenckebach and 1 episode of complete AV block.  He was offered a leadless pacemaker in the past but after discussions with his son who is a pulmonologist it was declined at the time due to low quality of life/dementia and assumed no benefit.  He is a DNR.      Past Medical History:   Diagnosis Date    Anxiety     Arthritis     BPH (benign prostatic hypertrophy)     Cataract     OS    Colon polyp 2002    Depression     Epiretinal membrane, both eyes     Hx of psychiatric care     Hyperlipidemia     Hypertension     Kidney stones     Kidney stones     Parkinsons     Posterior vitreous detachment of left eye     Psychiatric problem     Psychosis 7/21/2023    Retinal detachment 2004    OD    Retinoschisis, left eye     Sleep apnea     Vitreous hemorrhage of left eye        Past Surgical History:   Procedure Laterality Date    BIOPSY OF ADENOIDS      CATARACT EXTRACTION      OD    COLONOSCOPY N/A 12/3/2019     Procedure: COLONOSCOPY;  Surgeon: Ceferino Oliveira MD;  Location: [facility] ENDO (4TH FLR);  Service: Endoscopy;  Laterality: N/A;  5/2019 laminectomy with post op confusion tb    CYSTOSCOPY WITH URODYNAMIC TESTING N/A 1/31/2022    Procedure: CYSTOSCOPY, WITH URODYNAMIC TESTING FLOUROSCOPIC;  Surgeon: Devang Butler MD;  Location: [facility] OR 1ST FLR;  Service: Urology;  Laterality: N/A;  1hr    EPIDURAL STEROID INJECTION N/A 3/12/2019    Procedure: INJECTION, STEROID, EPIDURAL, L5-S1;  Surgeon: Laina Crockett MD;  Location: [facility] PAIN MGT;  Service: Pain Management;  Laterality: N/A;    EXAMINATION UNDER ANESTHESIA N/A 2/18/2021    Procedure: Exam under anesthesia;  Surgeon: West Clifton MD;  Location: [facility] OR 2ND FLR;  Service: Colon and Rectal;  Laterality: N/A;    EYE SURGERY      INCISION OF PERIRECTAL ABSCESS N/A 2/12/2021    Procedure: INCISION AND DRAINAGE  ABSCESS, PERIRECTAL;  Surgeon: West Clifton MD;  Location: [facility] OR 2ND FLR;  Service: Colon and Rectal;  Laterality: N/A;    INCISION OF PERIRECTAL ABSCESS Left 2/18/2021    Procedure: INCISION, ABSCESS, PERIRECTAL;  Surgeon: West Clifton MD;  Location: [facility] OR 2ND FLR;  Service: Colon and Rectal;  Laterality: Left;    INJECTION OF ANESTHETIC AGENT AROUND NERVE Bilateral 7/3/2018    Procedure: BLOCK, NERVE;  Surgeon: Laina Crockett MD;  Location: [facility] PAIN MGT;  Service: Pain Management;  Laterality: Bilateral;  Bilateral Lumbar L2,L3,L4,L5 MBB      NEEDS CONSENT    INSERTION OF SETON STITCH N/A 2/12/2021    Procedure: PLACEMENT, SETON STITCH;  Surgeon: West Clifton MD;  Location: [facility] OR 2ND FLR;  Service: Colon and Rectal;  Laterality: N/A;    INSERTION OF SETON STITCH Left 2/18/2021    Procedure: PLACEMENT, SETON;  Surgeon: West Clifton MD;  Location: [facility] OR 2ND FLR;  Service: Colon and Rectal;  Laterality: Left;    INSERTION OF SETON STITCH N/A 9/24/2021    Procedure: PLACEMENT, SETON STITCH;  Surgeon:  West Clifton MD;  Location: [facility] OR 2ND FLR;  Service: Colon and Rectal;  Laterality: N/A;  seton exchange    laser indirect  4/8/10    left eye    Laser Indirect Retinopexy  4/8/10    OS    PROSTATE SURGERY      RECTAL FISTULOTOMY N/A 11/12/2021    Procedure: FISTULOTOMY, RECTUM;  Surgeon: West Clifton MD;  Location: [facility] OR 2ND FLR;  Service: Colon and Rectal;  Laterality: N/A;    RETINAL DETACHMENT SURGERY  2004    OD    ROTATOR CUFF REPAIR  11/13/2013    TONSILLECTOMY      TRANSFORAMINAL EPIDURAL INJECTION OF STEROID Bilateral 2/19/2019    Procedure: INJECTION, STEROID, EPIDURAL, TRANSFORAMINAL APPROACH, L5;  Surgeon: Laina Crockett MD;  Location: [facility];  Service: Pain Management;  Laterality: Bilateral;       Review of patient's allergies indicates:  No Known Allergies    Current Facility-Administered Medications on File Prior to Encounter   Medication    0.9%  NaCl infusion    mupirocin 2 % ointment     Current Outpatient Medications on File Prior to Encounter   Medication Sig    acetaminophen (TYLENOL) 500 MG tablet Take 1,000 mg by mouth every 8 (eight) hours as needed for Pain.    atorvastatin (LIPITOR) 40 MG tablet Take 1 tablet (40 mg total) by mouth once daily.    carbidopa-levodopa  mg (SINEMET)  mg per tablet TAKE 1 QID (Patient taking differently: Take 1 tablet by mouth 3 (three) times daily.)    celecoxib (CELEBREX) 100 MG capsule Take 100 mg by mouth 2 (two) times daily.    clonazePAM (KLONOPIN) 0.5 MG tablet TAKE 1/2 TO 1 TABLET BY MOUTH UP TO 3 TIMES DAILY AS NEEDED FOR ANXIETY (Patient taking differently: TAKE 1/2 TO 1 TABLET BY MOUTH ONCE DAILY AS NEEDED FOR ANXIETY)    ergocalciferol (ERGOCALCIFEROL) 50,000 unit Cap TAKE 1 CAPSULE EVERY 7 DAYS (Patient not taking: Reported on 7/20/2023)    finasteride (PROSCAR) 5 mg tablet Take 1 tablet (5 mg total) by mouth once daily.    folic acid (FOLVITE) 1 MG tablet TAKE 1 TABLET EVERY DAY     losartan (COZAAR) 25 MG tablet Take 1 tablet (25 mg total) by mouth once daily. (Patient not taking: Reported on 7/20/2023)    magnesium oxide (MAG-OX) 400 mg tablet Take 400 mg by mouth once daily.    MYRBETRIQ 50 mg Tb24 Take 1 tablet by mouth every evening.    potassium citrate (UROCIT-K) 10 mEq (1,080 mg) TbSR TAKE 1 TABLET THREE TIMES DAILY WITH MEALS (Patient taking differently: Take 10 mEq by mouth once daily.)    pyridoxine, vitamin B6, (B-6) 50 MG Tab Take 50 mg by mouth once daily.    QUEtiapine (SEROQUEL) 25 MG Tab Take 1 tablet (25 mg total) by mouth every evening. TAKE ORAL 1 TABLET QHS.    sertraline (ZOLOFT) 100 MG tablet Take 2 tablets (200 mg total) by mouth once daily.    sodium bicarbonate 650 MG tablet TAKE 1 TABLET ONE TIME DAILY    tamsulosin (FLOMAX) 0.4 mg Cap TAKE 1 CAPSULE EVERY DAY (Patient not taking: Reported on 7/20/2023)    zaleplon (SONATA) 5 MG Cap Take 1 capsule (5 mg total) by mouth nightly as needed (insomnia).     Family History       Problem Relation (Age of Onset)    Anxiety disorder Son, Daughter    Cancer Father, Mother    Cataracts Father    Depression Son    Diabetes Father          Tobacco Use    Smoking status: Never    Smokeless tobacco: Never   Substance and Sexual Activity    Alcohol use: No    Drug use: No    Sexual activity: Not on file     Review of Systems   Constitutional: Negative for chills, fever and night sweats.   HENT:  Negative for congestion and hearing loss.    Eyes:  Negative for blurred vision, discharge, pain and visual disturbance.   Cardiovascular:  Positive for dyspnea on exertion. Negative for chest pain, leg swelling, near-syncope and palpitations.   Respiratory:  Negative for cough, hemoptysis, shortness of breath and wheezing.    Endocrine: Negative for cold intolerance and heat intolerance.   Skin:  Negative for flushing.   Musculoskeletal:  Negative for muscle weakness, myalgias, neck pain and stiffness.   Gastrointestinal:   Negative for abdominal pain, constipation, diarrhea, nausea and vomiting.   Genitourinary:  Negative for dysuria and hematuria.   Neurological:  Negative for focal weakness, headaches, numbness and paresthesias.   Psychiatric/Behavioral:  Negative for altered mental status and memory loss. The patient is not nervous/anxious.      Objective:     Vital Signs (Most Recent):  Temp: 97.9 °F (36.6 °C) (08/07/23 2212)  Pulse: (!) 43 (08/07/23 2257)  Resp: (!) 24 (08/07/23 2212)  BP: (!) 175/78 (08/07/23 2324)  SpO2: 95 % (08/07/23 2257) Vital Signs (24h Range):  Temp:  [97.9 °F (36.6 °C)-99.3 °F (37.4 °C)] 97.9 °F (36.6 °C)  Pulse:  [40-45] 43  Resp:  [15-24] 24  SpO2:  [95 %-98 %] 95 %  BP: (154-226)/(73-94) 175/78       Weight: 92 kg (202 lb 13.2 oz)  Body mass index is 29.1 kg/m².    SpO2: 95 %        Physical Exam  Vitals reviewed.   Constitutional:       Appearance: He is well-developed.   HENT:      Head: Normocephalic and atraumatic.   Eyes:      Conjunctiva/sclera: Conjunctivae normal.      Pupils: Pupils are equal, round, and reactive to light.   Neck:      Vascular: Normal carotid pulses. No carotid bruit or JVD.   Cardiovascular:      Rate and Rhythm: Regular rhythm. Bradycardia present.      Heart sounds: No murmur heard.     No friction rub. No gallop.   Pulmonary:      Effort: Pulmonary effort is normal. No respiratory distress.   Abdominal:      General: There is no distension.      Palpations: Abdomen is soft.      Tenderness: There is no abdominal tenderness.   Musculoskeletal:      Cervical back: Normal range of motion and neck supple.   Skin:     General: Skin is warm and dry.      Nails: There is no clubbing.   Neurological:      Mental Status: He is alert and oriented to person, place, and time.   Psychiatric:         Behavior: Behavior normal.            Significant Labs: All pertinent lab results from the last 24 hours have been reviewed.                  Assessment and Plan:     Atrial flutter  New  onset typical atrial flutter  Atrial rate approx 250  Ventricular rate 42  4:1 conduction vs CHB with junctional escape  Patient is a DNR  Possible BRAD/DCCV, patient refused pacemaker in the past  NPO past midnight        Thank you for your consult. I will follow-up with patient. Please contact us if you have any additional questions.    Humera Orta MD  Cardiac Electrophysiology  [facility] - [facility]

## 2023-08-08 NOTE — SUBJECTIVE & OBJECTIVE
Past Medical History:   Diagnosis Date    Anxiety     Arthritis     BPH (benign prostatic hypertrophy)     Cataract     OS    Colon polyp 2002    Depression     Epiretinal membrane, both eyes     Hx of psychiatric care     Hyperlipidemia     Hypertension     Kidney stones     Kidney stones     Parkinsons     Posterior vitreous detachment of left eye     Psychiatric problem     Psychosis 7/21/2023    Retinal detachment 2004    OD    Retinoschisis, left eye     Sleep apnea     Vitreous hemorrhage of left eye        Past Surgical History:   Procedure Laterality Date    BIOPSY OF ADENOIDS      CATARACT EXTRACTION      OD    COLONOSCOPY N/A 12/3/2019    Procedure: COLONOSCOPY;  Surgeon: Ceferino Oliveira MD;  Location: [facility] (4TH FLR);  Service: Endoscopy;  Laterality: N/A;  5/2019 laminectomy with post op confusion tb    CYSTOSCOPY WITH URODYNAMIC TESTING N/A 1/31/2022    Procedure: CYSTOSCOPY, WITH URODYNAMIC TESTING FLOUROSCOPIC;  Surgeon: Devang Butler MD;  Location: [facility] OR 1ST FLR;  Service: Urology;  Laterality: N/A;  1hr    EPIDURAL STEROID INJECTION N/A 3/12/2019    Procedure: INJECTION, STEROID, EPIDURAL, L5-S1;  Surgeon: Laina Crockett MD;  Location: [facility] PAIN MGT;  Service: Pain Management;  Laterality: N/A;    EXAMINATION UNDER ANESTHESIA N/A 2/18/2021    Procedure: Exam under anesthesia;  Surgeon: West Clifton MD;  Location: [facility] OR 2ND FLR;  Service: Colon and Rectal;  Laterality: N/A;    EYE SURGERY      INCISION OF PERIRECTAL ABSCESS N/A 2/12/2021    Procedure: INCISION AND DRAINAGE  ABSCESS, PERIRECTAL;  Surgeon: West Clifton MD;  [facility];  Service: Colon and Rectal;  Laterality: N/A;    INCISION OF PERIRECTAL ABSCESS Left 2/18/2021    Procedure: INCISION, ABSCESS, PERIRECTAL;  Surgeon: West Clifton MD;  [facility];  Service: Colon and Rectal;  Laterality: Left;    INJECTION OF ANESTHETIC AGENT AROUND NERVE Bilateral 7/3/2018    Procedure:  BLOCK, NERVE;  Surgeon: Laina Crockett MD;  Location: [facility] PAIN MGT;  Service: Pain Management;  Laterality: Bilateral;  Bilateral Lumbar L2,L3,L4,L5 MBB      NEEDS CONSENT    INSERTION OF SETON STITCH N/A 2/12/2021    Procedure: PLACEMENT, SETON STITCH;  Surgeon: West Clifton MD;  Location: [facility] OR 2ND FLR;  Service: Colon and Rectal;  Laterality: N/A;    INSERTION OF SETON STITCH Left 2/18/2021    Procedure: PLACEMENT, SETON;  Surgeon: West Clifton MD;  Location: [facility] OR 2ND FLR;  Service: Colon and Rectal;  Laterality: Left;    INSERTION OF SETON STITCH N/A 9/24/2021    Procedure: PLACEMENT, SETON STITCH;  Surgeon: West Clifton MD;  Location: [facility] OR 2ND FLR;  Service: Colon and Rectal;  Laterality: N/A;  seton exchange    laser indirect  4/8/10    left eye    Laser Indirect Retinopexy  4/8/10    OS    PROSTATE SURGERY      RECTAL FISTULOTOMY N/A 11/12/2021    Procedure: FISTULOTOMY, RECTUM;  Surgeon: West Clifton MD;  Location: [facility] OR 2ND FLR;  Service: Colon and Rectal;  Laterality: N/A;    RETINAL DETACHMENT SURGERY  2004    OD    ROTATOR CUFF REPAIR  11/13/2013    TONSILLECTOMY      TRANSFORAMINAL EPIDURAL INJECTION OF STEROID Bilateral 2/19/2019    Procedure: INJECTION, STEROID, EPIDURAL, TRANSFORAMINAL APPROACH, L5;  Surgeon: Laina Crockett MD;  Location: [facility] PAIN MGT;  Service: Pain Management;  Laterality: Bilateral;       Review of patient's allergies indicates:  No Known Allergies    Current Facility-Administered Medications   Medication    acetaminophen tablet 650 mg    acetaminophen tablet 650 mg    [START ON 8/8/2023] atorvastatin tablet 40 mg    carbidopa-levodopa  mg per tablet 1 tablet    celecoxib capsule 100 mg    clonazePAM tablet 0.5 mg    enoxaparin injection 40 mg    [START ON 8/8/2023] finasteride tablet 5 mg    glucagon (human recombinant) injection 1 mg    glucose chewable tablet 16 g    glucose chewable tablet 24 g    [START ON 8/8/2023]  magnesium oxide tablet 400 mg    melatonin tablet 6 mg    naloxone 0.4 mg/mL injection 0.02 mg    [START ON 8/8/2023] polyethylene glycol packet 17 g    [START ON 8/8/2023] potassium citrate SR tablet 10 mEq    [START ON 8/8/2023] pyridoxine (vitamin B6) tablet 50 mg    QUEtiapine tablet 25 mg    [START ON 8/8/2023] sertraline tablet 200 mg    sodium chloride 0.9% flush 10 mL    tamsulosin 24 hr capsule 0.4 mg     Current Outpatient Medications   Medication Sig    acetaminophen (TYLENOL) 500 MG tablet Take 1,000 mg by mouth every 8 (eight) hours as needed for Pain.    atorvastatin (LIPITOR) 40 MG tablet Take 1 tablet (40 mg total) by mouth once daily.    carbidopa-levodopa  mg (SINEMET)  mg per tablet TAKE 1 QID (Patient taking differently: Take 1 tablet by mouth 3 (three) times daily.)    celecoxib (CELEBREX) 100 MG capsule Take 100 mg by mouth 2 (two) times daily.    clonazePAM (KLONOPIN) 0.5 MG tablet TAKE 1/2 TO 1 TABLET BY MOUTH UP TO 3 TIMES DAILY AS NEEDED FOR ANXIETY (Patient taking differently: TAKE 1/2 TO 1 TABLET BY MOUTH ONCE DAILY AS NEEDED FOR ANXIETY)    ergocalciferol (ERGOCALCIFEROL) 50,000 unit Cap TAKE 1 CAPSULE EVERY 7 DAYS (Patient not taking: Reported on 7/20/2023)    finasteride (PROSCAR) 5 mg tablet Take 1 tablet (5 mg total) by mouth once daily.    folic acid (FOLVITE) 1 MG tablet TAKE 1 TABLET EVERY DAY    losartan (COZAAR) 25 MG tablet Take 1 tablet (25 mg total) by mouth once daily. (Patient not taking: Reported on 7/20/2023)    magnesium oxide (MAG-OX) 400 mg tablet Take 400 mg by mouth once daily.    MYRBETRIQ 50 mg Tb24 Take 1 tablet by mouth every evening.    potassium citrate (UROCIT-K) 10 mEq (1,080 mg) TbSR TAKE 1 TABLET THREE TIMES DAILY WITH MEALS (Patient taking differently: Take 10 mEq by mouth once daily.)    pyridoxine, vitamin B6, (B-6) 50 MG Tab Take 50 mg by mouth once daily.    QUEtiapine (SEROQUEL) 25 MG Tab Take 1 tablet (25 mg total) by mouth every evening.  TAKE ORAL 1 TABLET QHS.    sertraline (ZOLOFT) 100 MG tablet Take 2 tablets (200 mg total) by mouth once daily.    sodium bicarbonate 650 MG tablet TAKE 1 TABLET ONE TIME DAILY    tamsulosin (FLOMAX) 0.4 mg Cap TAKE 1 CAPSULE EVERY DAY (Patient not taking: Reported on 7/20/2023)    zaleplon (SONATA) 5 MG Cap Take 1 capsule (5 mg total) by mouth nightly as needed (insomnia).     Facility-Administered Medications Ordered in Other Encounters   Medication    0.9%  NaCl infusion    mupirocin 2 % ointment     Family History       Problem Relation (Age of Onset)    Anxiety disorder Son, Daughter    Cancer Father, Mother    Cataracts Father    Depression Son    Diabetes Father          Tobacco Use    Smoking status: Never    Smokeless tobacco: Never   Substance and Sexual Activity    Alcohol use: No    Drug use: No    Sexual activity: Not on file     Review of Systems  Objective:     Vital Signs (Most Recent):  Temp: 98.6 °F (37 °C) (08/07/23 2008)  Pulse: (!) 40 (08/07/23 2008)  Resp: 20 (08/07/23 2008)  BP: (!) 154/73 (08/07/23 2008)  SpO2: 95 % (08/07/23 2008) Vital Signs (24h Range):  Temp:  [98.6 °F (37 °C)-99.3 °F (37.4 °C)] 98.6 °F (37 °C)  Pulse:  [40-45] 40  Resp:  [15-20] 20  SpO2:  [95 %-98 %] 95 %  BP: (154-184)/(73-88) 154/73     Patient Vitals for the past 72 hrs (Last 3 readings):   Weight   08/07/23 1822 92 kg (202 lb 13.2 oz)     Body mass index is 29.1 kg/m².      Intake/Output Summary (Last 24 hours) at 8/7/2023 2117  Last data filed at 8/7/2023 1831  Gross per 24 hour   Intake --   Output 250 ml   Net -250 ml          Physical Exam       Significant Labs:  CBC:  Recent Labs   Lab 08/07/23  1445   WBC 6.19   RBC 4.42*   HGB 14.0   HCT 41.2      MCV 93   MCH 31.7*   MCHC 34.0     BNP:  Recent Labs   Lab 08/07/23  1841   *     CMP:  Recent Labs   Lab 08/07/23  1445   GLU 87   CALCIUM 9.4   ALBUMIN 3.8   PROT 6.7      K 4.4   CO2 22*      BUN 21   CREATININE 0.9   ALKPHOS 83   ALT  "45*   AST 38   BILITOT 0.9      Coagulation:   No results for input(s): "PT", "INR", "APTT" in the last 168 hours.  LDH:  No results for input(s): "LDH" in the last 72 hours.  Microbiology:  Microbiology Results (last 7 days)       ** No results found for the last 168 hours. **            {Select Labs:55942}    Diagnostic Results:  {Imaging Review:[id number]}    "

## 2023-08-08 NOTE — CARE UPDATE
Discussed benefits and risk of BRAD/DCCV with patient, wife and son. They would like to proceed with procedure  on 8/9/2023. Patient currently has DNR order, but patient and family have agreed on revoking DNR for procedure so he will be transitioned to FULL Code in there morning and after procedure will change to DNR.   -BRAD/DCCV tomorrow  -Agree with anticoagulation     Full consult note performed by Dr. Orta.    Vaishnavi HARRIS MD  Cardiology Fellow  Pager: [phone]  [facility]

## 2023-08-08 NOTE — HPI
77 year old male with Lewy body dementia, Parkinson's disease, HTN, HLD, SHU, BPH, lumbar radiculopathy, urge incontinence who presented for episodes of dyspnea and decreased urinary production. In the ED he was found to have atrial flutter with ventricular rate 40 and qrs morphology similar to his sinus qrs morphology.    At baseline patient has a RBBB. Cardiac event monitor 5/5: Sinus rhythm with intermittent AVWB and pauses of up to 4.3 seconds from nonconducted nonsustained AT. Symptomatic activations predominantly correspond to sinus rhythm with AV Wenckebach and 1 episode of complete AV block.  He was offered a leadless pacemaker in the past but after discussions with his son who is a pulmonologist it was declined at the time due to low quality of life/dementia and assumed no benefit.  He is a DNR.

## 2023-08-08 NOTE — SUBJECTIVE & OBJECTIVE
Past Medical History:   Diagnosis Date    Anxiety     Arthritis     BPH (benign prostatic hypertrophy)     Cataract     OS    Colon polyp 2002    Depression     Epiretinal membrane, both eyes     Hx of psychiatric care     Hyperlipidemia     Hypertension     Kidney stones     Kidney stones     Parkinsons     Posterior vitreous detachment of left eye     Psychiatric problem     Psychosis 7/21/2023    Retinal detachment 2004    OD    Retinoschisis, left eye     Sleep apnea     Vitreous hemorrhage of left eye        Past Surgical History:   Procedure Laterality Date    BIOPSY OF ADENOIDS      CATARACT EXTRACTION      OD    COLONOSCOPY N/A 12/3/2019    Procedure: COLONOSCOPY;  Surgeon: Ceferino Oliveira MD;  Location: [facility] (4TH FLR);  Service: Endoscopy;  Laterality: N/A;  5/2019 laminectomy with post op confusion tb    CYSTOSCOPY WITH URODYNAMIC TESTING N/A 1/31/2022    Procedure: CYSTOSCOPY, WITH URODYNAMIC TESTING FLOUROSCOPIC;  Surgeon: Devang Butler MD;  Location: [facility] OR 1ST FLR;  Service: Urology;  Laterality: N/A;  1hr    EPIDURAL STEROID INJECTION N/A 3/12/2019    Procedure: INJECTION, STEROID, EPIDURAL, L5-S1;  Surgeon: Laina Crockett MD;  Location: [facility] PAIN MGT;  Service: Pain Management;  Laterality: N/A;    EXAMINATION UNDER ANESTHESIA N/A 2/18/2021    Procedure: Exam under anesthesia;  Surgeon: West Clifton MD;  Location: [facility] OR 2ND FLR;  Service: Colon and Rectal;  Laterality: N/A;    EYE SURGERY      INCISION OF PERIRECTAL ABSCESS N/A 2/12/2021    Procedure: INCISION AND DRAINAGE  ABSCESS, PERIRECTAL;  Surgeon: West Clifotn MD;  [facility];  Service: Colon and Rectal;  Laterality: N/A;    INCISION OF PERIRECTAL ABSCESS Left 2/18/2021    Procedure: INCISION, ABSCESS, PERIRECTAL;  Surgeon: West Clifton MD;  [facility];  Service: Colon and Rectal;  Laterality: Left;    INJECTION OF ANESTHETIC AGENT AROUND NERVE Bilateral 7/3/2018    Procedure:  BLOCK, NERVE;  Surgeon: Laina Crockett MD;  Location: [facility] PAIN MGT;  Service: Pain Management;  Laterality: Bilateral;  Bilateral Lumbar L2,L3,L4,L5 MBB      NEEDS CONSENT    INSERTION OF SETON STITCH N/A 2/12/2021    Procedure: PLACEMENT, SETON STITCH;  Surgeon: West Clifton MD;  Location: [facility] OR 2ND FLR;  Service: Colon and Rectal;  Laterality: N/A;    INSERTION OF SETON STITCH Left 2/18/2021    Procedure: PLACEMENT, SETON;  Surgeon: West Clifton MD;  Location: [facility] OR 2ND FLR;  Service: Colon and Rectal;  Laterality: Left;    INSERTION OF SETON STITCH N/A 9/24/2021    Procedure: PLACEMENT, SETON STITCH;  Surgeon: West Clifton MD;  Location: [facility] OR 2ND FLR;  Service: Colon and Rectal;  Laterality: N/A;  seton exchange    laser indirect  4/8/10    left eye    Laser Indirect Retinopexy  4/8/10    OS    PROSTATE SURGERY      RECTAL FISTULOTOMY N/A 11/12/2021    Procedure: FISTULOTOMY, RECTUM;  Surgeon: West Clifton MD;  Location: [facility] OR 2ND FLR;  Service: Colon and Rectal;  Laterality: N/A;    RETINAL DETACHMENT SURGERY  2004    OD    ROTATOR CUFF REPAIR  11/13/2013    TONSILLECTOMY      TRANSFORAMINAL EPIDURAL INJECTION OF STEROID Bilateral 2/19/2019    Procedure: INJECTION, STEROID, EPIDURAL, TRANSFORAMINAL APPROACH, L5;  Surgeon: Laina Crockett MD;  Location: [facility] PAIN MGT;  Service: Pain Management;  Laterality: Bilateral;       Review of patient's allergies indicates:  No Known Allergies    Current Facility-Administered Medications on File Prior to Encounter   Medication    0.9%  NaCl infusion    mupirocin 2 % ointment     Current Outpatient Medications on File Prior to Encounter   Medication Sig    acetaminophen (TYLENOL) 500 MG tablet Take 1,000 mg by mouth every 8 (eight) hours as needed for Pain.    atorvastatin (LIPITOR) 40 MG tablet Take 1 tablet (40 mg total) by mouth once daily.    carbidopa-levodopa  mg (SINEMET)  mg per tablet TAKE 1 QID (Patient  taking differently: Take 1 tablet by mouth 3 (three) times daily.)    celecoxib (CELEBREX) 100 MG capsule Take 100 mg by mouth 2 (two) times daily.    clonazePAM (KLONOPIN) 0.5 MG tablet TAKE 1/2 TO 1 TABLET BY MOUTH UP TO 3 TIMES DAILY AS NEEDED FOR ANXIETY (Patient taking differently: TAKE 1/2 TO 1 TABLET BY MOUTH ONCE DAILY AS NEEDED FOR ANXIETY)    ergocalciferol (ERGOCALCIFEROL) 50,000 unit Cap TAKE 1 CAPSULE EVERY 7 DAYS (Patient not taking: Reported on 7/20/2023)    finasteride (PROSCAR) 5 mg tablet Take 1 tablet (5 mg total) by mouth once daily.    folic acid (FOLVITE) 1 MG tablet TAKE 1 TABLET EVERY DAY    losartan (COZAAR) 25 MG tablet Take 1 tablet (25 mg total) by mouth once daily. (Patient not taking: Reported on 7/20/2023)    magnesium oxide (MAG-OX) 400 mg tablet Take 400 mg by mouth once daily.    MYRBETRIQ 50 mg Tb24 Take 1 tablet by mouth every evening.    potassium citrate (UROCIT-K) 10 mEq (1,080 mg) TbSR TAKE 1 TABLET THREE TIMES DAILY WITH MEALS (Patient taking differently: Take 10 mEq by mouth once daily.)    pyridoxine, vitamin B6, (B-6) 50 MG Tab Take 50 mg by mouth once daily.    QUEtiapine (SEROQUEL) 25 MG Tab Take 1 tablet (25 mg total) by mouth every evening. TAKE ORAL 1 TABLET QHS.    sertraline (ZOLOFT) 100 MG tablet Take 2 tablets (200 mg total) by mouth once daily.    sodium bicarbonate 650 MG tablet TAKE 1 TABLET ONE TIME DAILY    tamsulosin (FLOMAX) 0.4 mg Cap TAKE 1 CAPSULE EVERY DAY (Patient not taking: Reported on 7/20/2023)    zaleplon (SONATA) 5 MG Cap Take 1 capsule (5 mg total) by mouth nightly as needed (insomnia).     Family History       Problem Relation (Age of Onset)    Anxiety disorder Son, Daughter    Cancer Father, Mother    Cataracts Father    Depression Son    Diabetes Father          Tobacco Use    Smoking status: Never    Smokeless tobacco: Never   Substance and Sexual Activity    Alcohol use: No    Drug use: No    Sexual activity: Not on file     Review of  Systems   Constitutional: Negative for chills, fever and night sweats.   HENT:  Negative for congestion and hearing loss.    Eyes:  Negative for blurred vision, discharge, pain and visual disturbance.   Cardiovascular:  Positive for dyspnea on exertion. Negative for chest pain, leg swelling, near-syncope and palpitations.   Respiratory:  Negative for cough, hemoptysis, shortness of breath and wheezing.    Endocrine: Negative for cold intolerance and heat intolerance.   Skin:  Negative for flushing.   Musculoskeletal:  Negative for muscle weakness, myalgias, neck pain and stiffness.   Gastrointestinal:  Negative for abdominal pain, constipation, diarrhea, nausea and vomiting.   Genitourinary:  Negative for dysuria and hematuria.   Neurological:  Negative for focal weakness, headaches, numbness and paresthesias.   Psychiatric/Behavioral:  Negative for altered mental status and memory loss. The patient is not nervous/anxious.      Objective:     Vital Signs (Most Recent):  Temp: 97.9 °F (36.6 °C) (08/07/23 2212)  Pulse: (!) 43 (08/07/23 2257)  Resp: (!) 24 (08/07/23 2212)  BP: (!) 175/78 (08/07/23 2324)  SpO2: 95 % (08/07/23 2257) Vital Signs (24h Range):  Temp:  [97.9 °F (36.6 °C)-99.3 °F (37.4 °C)] 97.9 °F (36.6 °C)  Pulse:  [40-45] 43  Resp:  [15-24] 24  SpO2:  [95 %-98 %] 95 %  BP: (154-226)/(73-94) 175/78       Weight: 92 kg (202 lb 13.2 oz)  Body mass index is 29.1 kg/m².    SpO2: 95 %        Physical Exam  Vitals reviewed.   Constitutional:       Appearance: He is well-developed.   HENT:      Head: Normocephalic and atraumatic.   Eyes:      Conjunctiva/sclera: Conjunctivae normal.      Pupils: Pupils are equal, round, and reactive to light.   Neck:      Vascular: Normal carotid pulses. No carotid bruit or JVD.   Cardiovascular:      Rate and Rhythm: Regular rhythm. Bradycardia present.      Heart sounds: No murmur heard.     No friction rub. No gallop.   Pulmonary:      Effort: Pulmonary effort is normal. No  respiratory distress.   Abdominal:      General: There is no distension.      Palpations: Abdomen is soft.      Tenderness: There is no abdominal tenderness.   Musculoskeletal:      Cervical back: Normal range of motion and neck supple.   Skin:     General: Skin is warm and dry.      Nails: There is no clubbing.   Neurological:      Mental Status: He is alert and oriented to person, place, and time.   Psychiatric:         Behavior: Behavior normal.            Significant Labs: All pertinent lab results from the last 24 hours have been reviewed.

## 2023-08-08 NOTE — ASSESSMENT & PLAN NOTE
Initial Tropnin was mildly elevated at 0.042. 0.042->0.039->0.04.    -Will monitor for signs and symptoms of ACS

## 2023-08-08 NOTE — ASSESSMENT & PLAN NOTE
New onset typical atrial flutter  Atrial rate approx 250  Ventricular rate 42  4:1 conduction vs CHB with junctional escape  Patient is a DNR  Possible BRAD/DCCV, patient refused pacemaker in the past  NPO past midnight

## 2023-08-08 NOTE — ASSESSMENT & PLAN NOTE
Pt heart rate has been in the 40s. He has not been having any chest pain but has been having episodic dyspnea. Has new atrial flutter and hx of RBB and 2nd degree AV block.CXR showed pulmonary edema and pt has b/l pitting edema. Last echo showed EF of 55% and severe LAE. BNP was 331. Echo this admission showed EF 60% and severe LAE and WILI.    -Placing pt in cardiac stepdown unit with transcutaneous pacing pads  -Cardiology is following; NPO at midnight for cardioversion  -Eliquis therapy started   -Telemetry  -Lasix 40 x1  -Avoid BB and CCB      Admitted

## 2023-08-08 NOTE — ASSESSMENT & PLAN NOTE
Pt has been hypertensive. According to his wife his BP fluctuates greatly and his PCP has been instructing them to hold BP meds as he can also run low.    -Hold home losartan and flomax

## 2023-08-08 NOTE — PLAN OF CARE
SW spoke to Pt and family at bedside for initial discharge planning assessment. Pt lives with spouse, Whitley Marti, who will transport Pt and assist after D/C. Pt is current with [facility]. SW is following for post-acute planning needs.    Caitlyn Parisi, [facility] – [place], [place]  Case Management Department  [email or web address]    [facility] - Cardiology Stepdown  Initial Discharge Assessment       Primary Care Provider: Andrez Jackson MD    Admission Diagnosis: Wheezing [R06.2]  Bradycardia [R00.1]  Symptomatic bradycardia [R00.1]  Chest pain [R07.9]  New onset atrial flutter [I48.92]  Decompensated heart failure [I50.9]    Admission Date: 8/7/2023  Expected Discharge Date: 8/11/2023    Transition of Care Barriers: None    Payor: [employer] MEDICARE / Plan: [employer] MEDICARE HMO / Product Type: Capitation /     Extended Emergency Contact Information  Primary Emergency Contact: KarrieLuz wareWhitley  Address: [address]           [place] [address] [address]  Home Phone: [phone]  Mobile Phone: [phone]  Relation: Spouse    Discharge Plan A: Home with family  Discharge Plan B: [facility] - [place], [place] - [address]  [address] [address]  Phone: [phone] Fax: [phone]    [facility]/pharmacy #[id number] - [place] - [address]  [address]  [place] [address]  Phone: [phone] Fax: [phone]      Initial Assessment (most recent)       Adult Discharge Assessment - 08/08/23 1251          Discharge Assessment    Assessment Type Discharge Planning Assessment     Confirmed/corrected address, phone number and insurance Yes     Confirmed Demographics Correct on Facesheet     Source of Information patient;family;health record     Communicated LONI with patient/caregiver No     People in Home spouse     Do you expect to return to your current living situation? Yes     Do you have help at home or someone to help you  manage your care at home? Yes     Who are your caregiver(s) and their phone number(s)? Whitley Marti     Prior to hospitilization cognitive status: Alert/Oriented     Current cognitive status: Alert/Oriented     Walking or Climbing Stairs ambulation difficulty, requires equipment     Mobility Management Walker, tranport wheelchair     Home Accessibility wheelchair accessible     Equipment Currently Used at Home walker, rolling;wheelchair     Readmission within 30 days? No     Patient currently being followed by outpatient case management? No     Do you currently have service(s) that help you manage your care at home? Yes     Name and Contact number of agency [facility]     Is the pt/caregiver preference to resume services with current agency Yes     Do you take prescription medications? Yes     Do you have prescription coverage? Yes     Coverage [employer]     Do you have any problems affording any of your prescribed medications? No     Is the patient taking medications as prescribed? yes     Who is going to help you get home at discharge? Spouse     How do you get to doctors appointments? family or friend will provide     Are you on dialysis? No     Do you take coumadin? No     Discharge Plan A Home with family     Discharge Plan B Home Health     DME Needed Upon Discharge  other (see comments)   TBD    Discharge Plan discussed with: Spouse/sig other;Patient     Name(s) and Number(s) Whitley Marti [phone]     Transition of Care Barriers None        Physical Activity    On average, how many days per week do you engage in moderate to strenuous exercise (like a brisk walk)? 3 days     On average, how many minutes do you engage in exercise at this level? 30 min        Financial Resource Strain    How hard is it for you to pay for the very basics like food, housing, medical care, and heating? Not hard at all        Housing Stability    In the last 12 months, was there a time when you were not able to pay  the mortgage or rent on time? No     In the last 12 months, how many places have you lived? 1     In the last 12 months, was there a time when you did not have a steady place to sleep or slept in a shelter (including now)? No        Transportation Needs    In the past 12 months, has lack of transportation kept you from medical appointments or from getting medications? No     In the past 12 months, has lack of transportation kept you from meetings, work, or from getting things needed for daily living? No        Food Insecurity    Within the past 12 months, you worried that your food would run out before you got the money to buy more. Never true     Within the past 12 months, the food you bought just didn't last and you didn't have money to get more. Never true        Stress    Do you feel stress - tense, restless, nervous, or anxious, or unable to sleep at night because your mind is troubled all the time - these days? Only a little        Social Connections    In a typical week, how many times do you talk on the phone with family, friends, or neighbors? Twice a week     How often do you get together with friends or relatives? --   Monthly    How often do you attend Caodaism or Anabaptism services? Never     Do you belong to any clubs or organizations such as Caodaism groups, unions, fraternal or athletic groups, or school groups? Yes     How often do you attend meetings of the clubs or organizations you belong to? --   Monthly    Are you , , , , never , or living with a partner?         Alcohol Use    Q1: How often do you have a drink containing alcohol? Monthly or less     Q2: How many drinks containing alcohol do you have on a typical day when you are drinking? 1 or 2     Q3: How often do you have six or more drinks on one occasion? Never        OTHER    Name(s) of People in Home Whitley Marti

## 2023-08-08 NOTE — HOSPITAL COURSE
77 year old male with pmh of Lewy body Parkinson's disease, HTN, HLD, SHU, dep/anx, BPH, lumbar radiculopathy, urge incontinence, AV block, and RBB who is being treated for new onset atrial flutter and CHF. Pt has clearly stated he does not want a pacemaker. Anti-hypertensives were restarted as patient was running >180 SBP consistently. BRAD cardioversion converted Pt to NSR with paroxysmal atrial fibrillation and PVCs the night following the procedure. After risk/benefit discussion with the family (pts wife and son) the decision to hold anticoagulation was made. Pt falls anywhere from 1-3 times per week at home and will be entering into care with home hospice. Home hospice coordinator will be meeting with the family shortly after patient's discharge.

## 2023-08-08 NOTE — H&P
[facility] - Emergency Dept  [facility] Medicine  History & Physical    Patient Name: Haja Marti  MRN: [id number]  Patient Class: IP- Inpatient  Admission Date: 8/7/2023  Attending Physician: Esteban Otoole MD   Primary Care Provider: Andrez Jackson MD         Patient information was obtained from patient, spouse/SO, past medical records and ER records.     Subjective:     Principal Problem:Symptomatic bradycardia    Chief Complaint:   Chief Complaint   Patient presents with    Shortness of Breath     Reports wheezing and raspy voice    Urinary Retention     Decrease in urination since this morning        HPI: 77 year old male with pmh of Lewy body Parkinson's disease, HTN, HLD, SHU, dep/anx, BPH, lumbar radiculopathy, urge incontinence, AV block, and RBB who presented for episodes of dyspnea and decreased urinary production. Yesterday he had an isolated 45 minute episode of dyspnea. This has been occurring off and on again today and at some points was associated with accessory respiratory muscle use. He wears diapers and in the morning they are usually filled with urine, but according to his wife this am there was barely any. He oral fluid intake has not changed. His also acutely lost his voice. He denies any chest pain or palpitations these last two days. He usually has some leg swelling but today they are more swollen than usual. His wife believes his breathing has been worse when he has been laying down. He has not had any fever or chills and denies any other upper respiratory symptoms.    The pt took an at home covid test and it was negative.    In the ED he was bradycardic (pulse 44) and his EKG showed new onset atrial flutter. His troponin was mildly elevated at 0.042. His CXR showed pulmonary edema and his SpO2 was 98.      Past Medical History:   Diagnosis Date    Anxiety     Arthritis     BPH (benign prostatic hypertrophy)     Cataract     OS    Colon polyp 2002    Depression     Epiretinal  membrane, both eyes     Hx of psychiatric care     Hyperlipidemia     Hypertension     Kidney stones     Kidney stones     Parkinsons     Posterior vitreous detachment of left eye     Psychiatric problem     Psychosis 7/21/2023    Retinal detachment 2004    OD    Retinoschisis, left eye     Sleep apnea     Vitreous hemorrhage of left eye        Past Surgical History:   Procedure Laterality Date    BIOPSY OF ADENOIDS      CATARACT EXTRACTION      OD    COLONOSCOPY N/A 12/3/2019    Procedure: COLONOSCOPY;  Surgeon: Ceferino Oliveira MD;  Location: [facility] (4TH FLR);  Service: Endoscopy;  Laterality: N/A;  5/2019 laminectomy with post op confusion tb    CYSTOSCOPY WITH URODYNAMIC TESTING N/A 1/31/2022    Procedure: CYSTOSCOPY, WITH URODYNAMIC TESTING FLOUROSCOPIC;  Surgeon: Devang Butler MD;  Location: [facility] OR 1ST FLR;  Service: Urology;  Laterality: N/A;  1hr    EPIDURAL STEROID INJECTION N/A 3/12/2019    Procedure: INJECTION, STEROID, EPIDURAL, L5-S1;  Surgeon: Laina Crockett MD;  Location: [facility] PAIN MGT;  Service: Pain Management;  Laterality: N/A;    EXAMINATION UNDER ANESTHESIA N/A 2/18/2021    Procedure: Exam under anesthesia;  Surgeon: West Clifton MD;  Location: [facility] OR 2ND FLR;  Service: Colon and Rectal;  Laterality: N/A;    EYE SURGERY      INCISION OF PERIRECTAL ABSCESS N/A 2/12/2021    Procedure: INCISION AND DRAINAGE  ABSCESS, PERIRECTAL;  Surgeon: West Clifton MD;  [facility];  Service: Colon and Rectal;  Laterality: N/A;    INCISION OF PERIRECTAL ABSCESS Left 2/18/2021    Procedure: INCISION, ABSCESS, PERIRECTAL;  Surgeon: West Clifton MD;  [facility];  Service: Colon and Rectal;  Laterality: Left;    INJECTION OF ANESTHETIC AGENT AROUND NERVE Bilateral 7/3/2018    Procedure: BLOCK, NERVE;  Surgeon: Laina Crockett MD;  Location: [facility] PAIN MGT;  Service: Pain Management;  Laterality: Bilateral;  Bilateral Lumbar  L2,L3,L4,L5 MBB      NEEDS CONSENT    INSERTION OF SETON STITCH N/A 2/12/2021    Procedure: PLACEMENT, SETON STITCH;  Surgeon: West Clifton MD;  Location: [facility] OR 2ND FLR;  Service: Colon and Rectal;  Laterality: N/A;    INSERTION OF SETON STITCH Left 2/18/2021    Procedure: PLACEMENT, SETON;  Surgeon: West Clifton MD;  Location: [facility] OR 2ND FLR;  Service: Colon and Rectal;  Laterality: Left;    INSERTION OF SETON STITCH N/A 9/24/2021    Procedure: PLACEMENT, SETON STITCH;  Surgeon: West Clifton MD;  Location: [facility] OR 2ND FLR;  Service: Colon and Rectal;  Laterality: N/A;  seton exchange    laser indirect  4/8/10    left eye    Laser Indirect Retinopexy  4/8/10    OS    PROSTATE SURGERY      RECTAL FISTULOTOMY N/A 11/12/2021    Procedure: FISTULOTOMY, RECTUM;  Surgeon: West Clifton MD;  Location: [facility] OR 2ND FLR;  Service: Colon and Rectal;  Laterality: N/A;    RETINAL DETACHMENT SURGERY  2004    OD    ROTATOR CUFF REPAIR  11/13/2013    TONSILLECTOMY      TRANSFORAMINAL EPIDURAL INJECTION OF STEROID Bilateral 2/19/2019    Procedure: INJECTION, STEROID, EPIDURAL, TRANSFORAMINAL APPROACH, L5;  Surgeon: Laina Crockett MD;  Location: [facility] PAIN T;  Service: Pain Management;  Laterality: Bilateral;       Review of patient's allergies indicates:  No Known Allergies    Current Facility-Administered Medications on File Prior to Encounter   Medication    0.9%  NaCl infusion    mupirocin 2 % ointment    tamsulosin 24 hr capsule 0.4 mg     Current Outpatient Medications on File Prior to Encounter   Medication Sig    acetaminophen (TYLENOL) 500 MG tablet Take 1,000 mg by mouth every 8 (eight) hours as needed for Pain.    atorvastatin (LIPITOR) 40 MG tablet Take 1 tablet (40 mg total) by mouth once daily.    carbidopa-levodopa  mg (SINEMET)  mg per tablet TAKE 1 QID (Patient taking differently: Take 1 tablet by mouth 3 (three) times daily.)    celecoxib (CELEBREX)  100 MG capsule Take 100 mg by mouth 2 (two) times daily.    clonazePAM (KLONOPIN) 0.5 MG tablet TAKE 1/2 TO 1 TABLET BY MOUTH UP TO 3 TIMES DAILY AS NEEDED FOR ANXIETY (Patient taking differently: TAKE 1/2 TO 1 TABLET BY MOUTH ONCE DAILY AS NEEDED FOR ANXIETY)    ergocalciferol (ERGOCALCIFEROL) 50,000 unit Cap TAKE 1 CAPSULE EVERY 7 DAYS (Patient not taking: Reported on 7/20/2023)    finasteride (PROSCAR) 5 mg tablet Take 1 tablet (5 mg total) by mouth once daily.    folic acid (FOLVITE) 1 MG tablet TAKE 1 TABLET EVERY DAY    losartan (COZAAR) 25 MG tablet Take 1 tablet (25 mg total) by mouth once daily. (Patient not taking: Reported on 7/20/2023)    magnesium oxide (MAG-OX) 400 mg tablet Take 400 mg by mouth once daily.    MYRBETRIQ 50 mg Tb24 Take 1 tablet by mouth every evening.    potassium citrate (UROCIT-K) 10 mEq (1,080 mg) TbSR TAKE 1 TABLET THREE TIMES DAILY WITH MEALS (Patient taking differently: Take 10 mEq by mouth once daily.)    pyridoxine, vitamin B6, (B-6) 50 MG Tab Take 50 mg by mouth once daily.    QUEtiapine (SEROQUEL) 25 MG Tab Take 1 tablet (25 mg total) by mouth every evening. TAKE ORAL 1 TABLET QHS.    sertraline (ZOLOFT) 100 MG tablet Take 2 tablets (200 mg total) by mouth once daily.    sodium bicarbonate 650 MG tablet TAKE 1 TABLET ONE TIME DAILY    tamsulosin (FLOMAX) 0.4 mg Cap TAKE 1 CAPSULE EVERY DAY (Patient not taking: Reported on 7/20/2023)    zaleplon (SONATA) 5 MG Cap Take 1 capsule (5 mg total) by mouth nightly as needed (insomnia).     Family History       Problem Relation (Age of Onset)    Anxiety disorder Son, Daughter    Cancer Father, Mother    Cataracts Father    Depression Son    Diabetes Father          Tobacco Use    Smoking status: Never    Smokeless tobacco: Never   Substance and Sexual Activity    Alcohol use: No    Drug use: No    Sexual activity: Not on file     Review of Systems   Constitutional:  Negative for chills and fever.   HENT:  Positive  for congestion (chronic) and voice change. Negative for sore throat.    Respiratory:  Positive for shortness of breath. Negative for cough, choking, chest tightness and wheezing.    Cardiovascular:  Positive for leg swelling. Negative for chest pain and palpitations.   Gastrointestinal:  Positive for constipation (chronic). Negative for abdominal pain, diarrhea, nausea and vomiting.   Genitourinary:  Negative for difficulty urinating, dysuria, frequency and urgency.   Musculoskeletal:  Positive for gait problem.   Neurological:  Negative for dizziness.     Objective:     Vital Signs (Most Recent):  Temp: 99 °F (37.2 °C) (08/07/23 1724)  Pulse: (!) 45 (08/07/23 1724)  Resp: 15 (08/07/23 1724)  BP: (!) 179/85 (08/07/23 1724)  SpO2: 96 % (08/07/23 1724) Vital Signs (24h Range):  Temp:  [99 °F (37.2 °C)-99.3 °F (37.4 °C)] 99 °F (37.2 °C)  Pulse:  [44-45] 45  Resp:  [15-16] 15  SpO2:  [96 %-98 %] 96 %  BP: (179-184)/(85-88) 179/85     Weight: 92 kg (202 lb 13.2 oz)  Body mass index is 29.1 kg/m².     Physical Exam  Constitutional:       General: He is not in acute distress.     Appearance: Normal appearance. He is not diaphoretic.   HENT:      Head: Normocephalic and atraumatic.   Cardiovascular:      Rate and Rhythm: Regular rhythm. Bradycardia present.      Pulses: Normal pulses.      Heart sounds: Normal heart sounds. No murmur heard.     No friction rub. No gallop.   Pulmonary:      Effort: Pulmonary effort is normal. No respiratory distress.      Breath sounds: Examination of the right-lower field reveals decreased breath sounds. Examination of the left-lower field reveals decreased breath sounds. Decreased breath sounds present. No wheezing, rhonchi or rales.   Abdominal:      General: Abdomen is flat. There is no distension.      Palpations: Abdomen is soft.      Tenderness: There is no abdominal tenderness. There is no guarding or rebound.   Musculoskeletal:      Right lower leg: Edema present.      Left lower  leg: Edema present.   Skin:     General: Skin is warm and dry.      Capillary Refill: Capillary refill takes 2 to 3 seconds.   Neurological:      General: No focal deficit present.      Mental Status: He is alert and oriented to person, place, and time.   Psychiatric:         Mood and Affect: Mood normal.                Significant Labs: All pertinent labs within the past 24 hours have been reviewed.  CBC:   Recent Labs   Lab 08/07/23  1445   WBC 6.19   HGB 14.0   HCT 41.2        CMP:   Recent Labs   Lab 08/07/23  1445      K 4.4      CO2 22*   GLU 87   BUN 21   CREATININE 0.9   CALCIUM 9.4   PROT 6.7   ALBUMIN 3.8   BILITOT 0.9   ALKPHOS 83   AST 38   ALT 45*   ANIONGAP 8     Troponin:   Recent Labs   Lab 08/07/23  1445   TROPONINI 0.042*       Significant Imaging: I have reviewed all pertinent imaging results/findings within the past 24 hours.    Assessment/Plan:     * Symptomatic bradycardia  Pt heart rate has been in the 40s. He has not been having any chest pain but has been having episodic dyspnea. Has new atrial flutter and hx of RBB and 2nd degree AV block.CXR showed pulmonary edema and pt has b/l pitting edema. Last echo showed EF of 55% and severe LAE.    -Placing pt in cardiac stepdown unit with transcutaneous pacing pads  -Cardiology is following; NPO at midnight for cardioversion  -Telemetry  -Repeat echo  -BNP pending  -Lasix 40 x1  -Avoid BB and CCB       Elevated troponin  Initial Tropnin was mildly elevated at 0.042.    -Trend troponin       Hyperlipidemia  -Cont home Lipitor      REM behavioral disorder  -Cont home Sonata, Seroquel, and prn klonopin        Lewy body Parkinson's disease  -Cont home carbidopa-levodopa       Urgency incontinence  -Holding home mybetriq to assist with diureses.  -Condom catheter        Chronic pain  -Cont home celecoxib      Generalized anxiety disorder  -Cont home zoloft      Lumbar radiculopathy  -Cont home celecoxib       Constipation,  chronic  -Daily miralax      BPH (benign prostatic hypertrophy)  Pt was instructed to hold flomax by his PCP as it can impact his BP.    -Holding flomax  -Cont home finasteride      Depression  -Continue home zoloft        Obstructive sleep apnea  Pt has been told he needs a CPAP but is reluctant to wear one. Encouraged him to try it as the pulmonary edema may contribute to issues breathing while sleeping.    -CPAP      Essential hypertension  Pt has been hypertensive. According to his wife his BP fluctuates greatly and his PCP has been instructing them to hold BP meds as he can also run low.    -Hold home losartan and flomax        VTE Risk Mitigation (From admission, onward)         Ordered     enoxaparin injection 40 mg  Daily         08/07/23 1814     IP VTE HIGH RISK PATIENT  Once         08/07/23 1814     Place sequential compression device  Until discontinued         08/07/23 1814                           Martin Nino DO  Department of Hospital Medicine  [facility] - Emergency Dept

## 2023-08-08 NOTE — ASSESSMENT & PLAN NOTE
Pt has been told he needs a CPAP but is reluctant to wear one. Encouraged him to try it as the pulmonary edema may contribute to issues breathing while sleeping.    -CPAP

## 2023-08-08 NOTE — SUBJECTIVE & OBJECTIVE
Interval History: Pt has been bradycardic in the 30-40s but denies any chest pain, palpitations, and SOB. He was given lasix 40 x1. Cardiology is following; NPO at midnight for cardioversion tomorrow. Losartan 12.5 mg started for HTN. Eliquis therapy for atrial flutter started.    Review of Systems   Constitutional:  Negative for chills and fever.   HENT:  Positive for voice change. Negative for sore throat.    Respiratory:  Negative for cough, chest tightness and shortness of breath.    Cardiovascular:  Positive for leg swelling. Negative for chest pain and palpitations.   Gastrointestinal:  Negative for abdominal pain, constipation, diarrhea, nausea and vomiting.   Genitourinary:  Negative for difficulty urinating and dysuria.   Neurological:  Negative for dizziness and light-headedness.     Objective:     Vital Signs (Most Recent):  Temp: 97.6 °F (36.4 °C) (08/08/23 1137)  Pulse: (!) 38 (08/08/23 1137)  Resp: 18 (08/08/23 1137)  BP: (!) 141/63 (08/08/23 1137)  SpO2: 95 % (08/08/23 1137) Vital Signs (24h Range):  Temp:  [97.6 °F (36.4 °C)-99 °F (37.2 °C)] 97.6 °F (36.4 °C)  Pulse:  [31-45] 38  Resp:  [15-24] 18  SpO2:  [93 %-98 %] 95 %  BP: (141-226)/(63-94) 141/63     Weight: 90.7 kg (200 lb)  Body mass index is 28.7 kg/m².    Intake/Output Summary (Last 24 hours) at 8/8/2023 1435  Last data filed at 8/8/2023 1400  Gross per 24 hour   Intake 342 ml   Output 1850 ml   Net -1508 ml         Physical Exam  Constitutional:       General: He is not in acute distress.     Appearance: Normal appearance.   HENT:      Head: Normocephalic and atraumatic.   Cardiovascular:      Rate and Rhythm: Regular rhythm. Bradycardia present.      Pulses: Normal pulses.      Heart sounds: Normal heart sounds. No murmur heard.     No friction rub. No gallop.   Pulmonary:      Effort: Pulmonary effort is normal.      Breath sounds: Normal breath sounds. No wheezing, rhonchi or rales.   Abdominal:      General: Abdomen is flat. There is no  distension.      Palpations: Abdomen is soft.      Tenderness: There is no abdominal tenderness. There is no guarding or rebound.   Musculoskeletal:      Right lower leg: Edema present.      Left lower leg: Edema present.   Skin:     General: Skin is warm and dry.      Capillary Refill: Capillary refill takes 2 to 3 seconds.   Neurological:      Mental Status: He is alert. Mental status is at baseline.   Psychiatric:         Mood and Affect: Mood normal.         Behavior: Behavior normal.             Significant Labs: All pertinent labs within the past 24 hours have been reviewed.  CBC:   Recent Labs   Lab 08/07/23  1445 08/08/23  0225   WBC 6.19 7.12   HGB 14.0 13.3*   HCT 41.2 38.7*    177     CMP:   Recent Labs   Lab 08/07/23  1445 08/08/23 0225    139   K 4.4 3.7    107   CO2 22* 22*   GLU 87 84   BUN 21 22   CREATININE 0.9 0.9   CALCIUM 9.4 8.8   PROT 6.7 6.2   ALBUMIN 3.8 3.5   BILITOT 0.9 0.7   ALKPHOS 83 76   AST 38 28   ALT 45* 9*   ANIONGAP 8 10       Significant Imaging: I have reviewed all pertinent imaging results/findings within the past 24 hours.

## 2023-08-08 NOTE — ASSESSMENT & PLAN NOTE
Pt has been hypertensive. According to his wife his BP fluctuates greatly and his PCP has been instructing them to hold BP meds as he can also run low.    -Restarted losartan at 12.5mg daily.     yes

## 2023-08-08 NOTE — PROGRESS NOTES
[facility] - Cardiology [facility]  Progress Note    Patient Name: Haja Marti  MRN: [id number]  Patient Class: IP- Inpatient   Admission Date: 8/7/2023  Length of Stay: 1 days  Attending Physician: Esteban Otoole MD  Primary Care Provider: Andrez Jackson MD        Subjective:     Principal Problem:Symptomatic bradycardia        HPI:  77 year old male with pmh of Lewy body Parkinson's disease, HTN, HLD, SHU, dep/anx, BPH, lumbar radiculopathy, urge incontinence, AV block, and RBB who presented for episodes of dyspnea and decreased urinary production. Yesterday he had an isolated 45 minute episode of dyspnea. This has been occurring off and on again today and at some points was associated with accessory respiratory muscle use. He wears diapers and in the morning they are usually filled with urine, but according to his wife this am there was barely any. He oral fluid intake has not changed. His also acutely lost his voice. He denies any chest pain or palpitations these last two days. He usually has some leg swelling but today they are more swollen than usual. His wife believes his breathing has been worse when he has been laying down. He has not had any fever or chills and denies any other upper respiratory symptoms.    The pt took an at home covid test and it was negative.    In the ED he was bradycardic (pulse 44) and his EKG showed new onset atrial flutter. His troponin was mildly elevated at 0.042. His CXR showed pulmonary edema and his SpO2 was 98.      Overview/Hospital Course:  77 year old male with pmh of Lewy body Parkinson's disease, HTN, HLD, SHU, dep/anx, BPH, lumbar radiculopathy, urge incontinence, AV block, and RBB who is being treated for new onset atrial flutter and CHF. Cardiology is following and wants to try to do a cardioversion. Pt has stated he does not want a pacemaker. Anti-hypertensives were restarted as patient was running >180 SBP consistently.       Interval  History: Pt has been bradycardic in the 30-40s but denies any chest pain, palpitations, and SOB. He was given lasix 40 x1. Cardiology is following; NPO at midnight for cardioversion tomorrow. Losartan 12.5 mg started for HTN. Eliquis therapy for atrial flutter started.    Review of Systems   Constitutional:  Negative for chills and fever.   HENT:  Positive for voice change. Negative for sore throat.    Respiratory:  Negative for cough, chest tightness and shortness of breath.    Cardiovascular:  Positive for leg swelling. Negative for chest pain and palpitations.   Gastrointestinal:  Negative for abdominal pain, constipation, diarrhea, nausea and vomiting.   Genitourinary:  Negative for difficulty urinating and dysuria.   Neurological:  Negative for dizziness and light-headedness.     Objective:     Vital Signs (Most Recent):  Temp: 97.6 °F (36.4 °C) (08/08/23 1137)  Pulse: (!) 38 (08/08/23 1137)  Resp: 18 (08/08/23 1137)  BP: (!) 141/63 (08/08/23 1137)  SpO2: 95 % (08/08/23 1137) Vital Signs (24h Range):  Temp:  [97.6 °F (36.4 °C)-99 °F (37.2 °C)] 97.6 °F (36.4 °C)  Pulse:  [31-45] 38  Resp:  [15-24] 18  SpO2:  [93 %-98 %] 95 %  BP: (141-226)/(63-94) 141/63     Weight: 90.7 kg (200 lb)  Body mass index is 28.7 kg/m².    Intake/Output Summary (Last 24 hours) at 8/8/2023 1435  Last data filed at 8/8/2023 1400  Gross per 24 hour   Intake 342 ml   Output 1850 ml   Net -1508 ml         Physical Exam  Constitutional:       General: He is not in acute distress.     Appearance: Normal appearance.   HENT:      Head: Normocephalic and atraumatic.   Cardiovascular:      Rate and Rhythm: Regular rhythm. Bradycardia present.      Pulses: Normal pulses.      Heart sounds: Normal heart sounds. No murmur heard.     No friction rub. No gallop.   Pulmonary:      Effort: Pulmonary effort is normal.      Breath sounds: Normal breath sounds. No wheezing, rhonchi or rales.   Abdominal:      General: Abdomen is flat. There is no  distension.      Palpations: Abdomen is soft.      Tenderness: There is no abdominal tenderness. There is no guarding or rebound.   Musculoskeletal:      Right lower leg: Edema present.      Left lower leg: Edema present.   Skin:     General: Skin is warm and dry.      Capillary Refill: Capillary refill takes 2 to 3 seconds.   Neurological:      Mental Status: He is alert. Mental status is at baseline.   Psychiatric:         Mood and Affect: Mood normal.         Behavior: Behavior normal.             Significant Labs: All pertinent labs within the past 24 hours have been reviewed.  CBC:   Recent Labs   Lab 08/07/23  1445 08/08/23  0225   WBC 6.19 7.12   HGB 14.0 13.3*   HCT 41.2 38.7*    177     CMP:   Recent Labs   Lab 08/07/23  1445 08/08/23  0225    139   K 4.4 3.7    107   CO2 22* 22*   GLU 87 84   BUN 21 22   CREATININE 0.9 0.9   CALCIUM 9.4 8.8   PROT 6.7 6.2   ALBUMIN 3.8 3.5   BILITOT 0.9 0.7   ALKPHOS 83 76   AST 38 28   ALT 45* 9*   ANIONGAP 8 10       Significant Imaging: I have reviewed all pertinent imaging results/findings within the past 24 hours.      Assessment/Plan:      * Symptomatic bradycardia with atrial fluuter  Pt heart rate has been in the 40s. He has not been having any chest pain but has been having episodic dyspnea. Has new atrial flutter and hx of RBB and 2nd degree AV block.CXR showed pulmonary edema and pt has b/l pitting edema. Last echo showed EF of 55% and severe LAE. BNP was 331. Echo this admission showed EF 60% and severe LAE and WILI.    -Placing pt in cardiac stepdown unit with transcutaneous pacing pads  -Cardiology is following; NPO at midnight for cardioversion  -Eliquis therapy started   -Telemetry  -Lasix 40 x1  -Avoid BB and CCB       Atrial flutter        Elevated troponin  Initial Tropnin was mildly elevated at 0.042. 0.042->0.039->0.04.    -Will monitor for signs and symptoms of ACS           Hyperlipidemia  -Cont home Lipitor      REM behavioral  disorder  -Cont home Seroquel and prn klonopin        Lewy body Parkinson's disease  -Cont home carbidopa-levodopa       Urgency incontinence  -Holding home mybetriq to assist with diureses.  -Condom catheter        Chronic pain  -Cont home celecoxib prn      Generalized anxiety disorder  -Cont home zoloft      Lumbar radiculopathy  -Cont home celecoxib prn       Constipation, chronic  -Daily miralax      BPH (benign prostatic hypertrophy)  Pt was instructed to hold flomax by his PCP as it can impact his BP.    -Holding flomax  -Cont home finasteride      Depression  -Continue home zoloft        Obstructive sleep apnea  Pt has been told he needs a CPAP but is reluctant to wear one. Encouraged him to try it as the pulmonary edema may contribute to issues breathing while sleeping.    -CPAP      Essential hypertension  Pt has been hypertensive. According to his wife his BP fluctuates greatly and his PCP has been instructing them to hold BP meds as he can also run low.    -Restarted losartan at 12.5mg daily.        VTE Risk Mitigation (From admission, onward)         Ordered     apixaban tablet 5 mg  2 times daily         08/08/23 1359     IP VTE HIGH RISK PATIENT  Once         08/07/23 1814     Place sequential compression device  Until discontinued         08/07/23 1814                Discharge Planning   LONI: 8/11/2023     Code Status: DNR   Is the patient medically ready for discharge?:     Reason for patient still in hospital (select all that apply): Treatment and Consult recommendations  Discharge Plan A: Home with family                  Martin Nino DO  Department of Hospital Medicine   [facility] - Cardiology Stepdown

## 2023-08-08 NOTE — ANESTHESIA PREPROCEDURE EVALUATION
[facility]  Anesthesia Pre-Operative Evaluation         Patient Name/: Haja Marti, 1945  MRN: [id number]    SUBJECTIVE:     Pre-operative evaluation for Procedure(s) (LRB):  Cardioversion or Defibrillation (N/A)  Transesophageal echo (BRAD) intra-procedure log documentation (N/A)     2023    Haja Marti is a 77 y.o. male w/ a significant PMHx of HTN, aortic atherosclerosis, SHU, parkinsons, anxiety, and atrial flutter. Patient had presented to [facility] due to dyspnea and decreased urinary production. In the ED he was found to have atrial flutter with ventricular rate 40 and qrs morphology similar to his sinus qrs morphology.    Patient now presents for the above procedure(s).    Patient denies any other known cardiopulmonary disease.       NPO status: Appropriate    Hemodynamics: Stable    Previous Airway:  Intubation:     Induction:  Rapid sequence induction    Intubated:  Postinduction    Mask Ventilation:  N/a    Attempts:  1    Attempted By:  CRNA    Blade:  Horta 2    Laryngeal View Grade: Grade IIA - cords partially seen      Difficult Airway Encountered?: No      Complications:  None    Airway Device:  Oral endotracheal tube    Airway Device Size:  7.5    Style/Cuff Inflation:  Cuffed (inflated to minimal occlusive pressure)    Tube secured:  24    Secured at:  The lips    Placement Verified By:  Capnometry    Complicating Factors:  Anterior larynx and poor neck/head extension    Findings Post-Intubation:  BS equal bilateral and atraumatic/condition of teeth unchanged    ________________________________________  Results for orders placed during the hospital encounter of 23    Echo    Interpretation Summary    Left Ventricle: The left ventricle is normal in size. Ventricular mass is normal. Normal wall thickness. Normal wall motion. There is normal systolic function. Ejection fraction by visual approximation is 60%. Unable to assess diastolic function due to  arrhythmia.    Left Atrium: Left atrium is severely dilated. The left atrium volume index is 72.2 mL/m2.    Right Ventricle: Normal right ventricular cavity size. Wall thickness is normal. Right ventricle wall motion  is normal. Systolic function is normal.    Right Atrium: Right atrium is severely dilated.    Aortic Valve: There is mild aortic valve sclerosis. There is normal leaflet mobility. There is no significant regurgitation.    Mitral Valve: The mitral valve is structurally normal. There is normal leaflet mobility. There is no significant regurgitation.    Tricuspid Valve: The tricuspid valve is structurally normal. There is normal leaflet mobility. There is mild regurgitation.    Pulmonic Valve: The pulmonic valve is structurally normal. There is normal leaflet mobility. There is no significant regurgitation.    Pulmonary Artery: There is moderate pulmonary hypertension. The estimated pulmonary artery systolic pressure is 47 mmHg.    IVC/SVC: Elevated venous pressure at 15 mmHg.    Pericardium: Trivial circumferential pericardial effusion present.    ________________________________________    LDA:        Peripheral IV - Single Lumen 08/07/23 1508 20 G Left Antecubital (Active)   Site Assessment Clean;Intact;Dry 08/08/23 0900   Extremity Assessment Distal to IV No warmth;No redness;No swelling;No abnormal discoloration 08/08/23 0900   Line Status Saline locked 08/08/23 0900   Dressing Status Dry;Clean;Intact 08/08/23 0900   Dressing Intervention Integrity maintained 08/08/23 0900   Reason Not Rotated Not due 08/08/23 0900   Number of days: 0            Open Drain 02/18/21 1838 Left Buttock Penrose 1 inch (Active)   Number of days: 900            Open Drain 02/18/21 1840 Left Buttock Other (see comments) (Active)   Number of days: 900            Open Drain 09/24/21 1512 Other (Comment) Other (see comments) Other (see comments) (Active)   Number of days: 682       Drips: None  documented.      Patient Active Problem List   Diagnosis    Essential hypertension    Pure hypercholesterolemia    Obstructive sleep apnea    Depression    BPH (benign prostatic hypertrophy)    Retinal detachment    Vitreous hemorrhage    Retinoschisis    Epiretinal membrane    Posterior vitreous detachment    Pseudophakia of right eye    Age-related nuclear cataract of left eye    Constipation, chronic    Nephrolithiasis    Foraminal stenosis of lumbosacral region    Lumbar radiculopathy    Lumbar spondylosis    Generalized anxiety disorder    Hypophonia    Dysarthria    Voice and resonance disorder    Parkinsonism    Other chronic pain    Chronic pain    Abnormal gait    Colon polyps    Perirectal abscess    Fistula-in-ano    Anal fistula    Urgency incontinence    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, with behavioral disturbance    Cognitive change    Lewy body Parkinson's disease    REM behavioral disorder    Aortic atherosclerosis    Chronic insomnia    Hyperlipidemia    Benign prostatic hyperplasia with nocturia    Frequent falls    Symptomatic bradycardia    Elevated troponin    Encounter for palliative care    Psychosis    Cardiac arrhythmia    Anxiety    Atrial flutter       Review of patient's allergies indicates:  No Known Allergies    Current Inpatient Medications:    apixaban  5 mg Oral BID    atorvastatin  40 mg Oral Daily    carbidopa-levodopa  mg  1 tablet Oral TID    finasteride  5 mg Oral Daily    losartan  12.5 mg Oral Daily    magnesium oxide  400 mg Oral Daily    polyethylene glycol  17 g Oral Daily    potassium citrate  10 mEq Oral Daily    pyridoxine (vitamin B6)  50 mg Oral Daily    QUEtiapine  25 mg Oral QHS    sertraline  200 mg Oral Daily       Current Facility-Administered Medications on File Prior to Encounter   Medication Dose Route Frequency Provider Last Rate Last Admin    0.9%  NaCl infusion    Intravenous Continuous Lizz Lowry NP 70 mL/hr at 11/12/21 1227 New Bag at 11/12/21 1227    mupirocin 2 % ointment   Nasal On Call Procedure Lizz Lowry NP   Given at 11/12/21 1227     Current Outpatient Medications on File Prior to Encounter   Medication Sig Dispense Refill    acetaminophen (TYLENOL) 500 MG tablet Take 1,000 mg by mouth every 8 (eight) hours as needed for Pain.      atorvastatin (LIPITOR) 40 MG tablet Take 1 tablet (40 mg total) by mouth once daily. 90 tablet 1    carbidopa-levodopa  mg (SINEMET)  mg per tablet TAKE 1 QID (Patient taking differently: Take 1 tablet by mouth 3 (three) times daily.) 360 tablet 3    celecoxib (CELEBREX) 100 MG capsule Take 100 mg by mouth 2 (two) times daily.      clonazePAM (KLONOPIN) 0.5 MG tablet TAKE 1/2 TO 1 TABLET BY MOUTH UP TO 3 TIMES DAILY AS NEEDED FOR ANXIETY (Patient taking differently: TAKE 1/2 TO 1 TABLET BY MOUTH ONCE DAILY AS NEEDED FOR ANXIETY) 90 tablet 5    ergocalciferol (ERGOCALCIFEROL) 50,000 unit Cap TAKE 1 CAPSULE EVERY 7 DAYS (Patient not taking: Reported on 7/20/2023) 12 capsule 1    finasteride (PROSCAR) 5 mg tablet Take 1 tablet (5 mg total) by mouth once daily. 90 tablet 3    folic acid (FOLVITE) 1 MG tablet TAKE 1 TABLET EVERY DAY 90 tablet 0    losartan (COZAAR) 25 MG tablet Take 1 tablet (25 mg total) by mouth once daily. (Patient not taking: Reported on 7/20/2023) 90 tablet 1    magnesium oxide (MAG-OX) 400 mg tablet Take 400 mg by mouth once daily.      MYRBETRIQ 50 mg Tb24 Take 1 tablet by mouth every evening.      potassium citrate (UROCIT-K) 10 mEq (1,080 mg) TbSR TAKE 1 TABLET THREE TIMES DAILY WITH MEALS (Patient taking differently: Take 10 mEq by mouth once daily.) 270 tablet 3    pyridoxine, vitamin B6, (B-6) 50 MG Tab Take 50 mg by mouth once daily.      QUEtiapine (SEROQUEL) 25 MG Tab Take 1 tablet (25 mg total) by mouth every evening. TAKE ORAL 1 TABLET QHS. 90 tablet 3     sertraline (ZOLOFT) 100 MG tablet Take 2 tablets (200 mg total) by mouth once daily. 180 tablet 3    sodium bicarbonate 650 MG tablet TAKE 1 TABLET ONE TIME DAILY 90 tablet 3    tamsulosin (FLOMAX) 0.4 mg Cap TAKE 1 CAPSULE EVERY DAY (Patient not taking: Reported on 7/20/2023) 90 capsule 0    zaleplon (SONATA) 5 MG Cap Take 1 capsule (5 mg total) by mouth nightly as needed (insomnia). 30 capsule 3       Past Surgical History:   Procedure Laterality Date    BIOPSY OF ADENOIDS      CATARACT EXTRACTION      OD    COLONOSCOPY N/A 12/3/2019    Procedure: COLONOSCOPY;  Surgeon: Ceferino Oliveira MD;  Location: [facility] (4TH FLR);  Service: Endoscopy;  Laterality: N/A;  5/2019 laminectomy with post op confusion tb    CYSTOSCOPY WITH URODYNAMIC TESTING N/A 1/31/2022    Procedure: CYSTOSCOPY, WITH URODYNAMIC TESTING FLOUROSCOPIC;  Surgeon: Devang Butler MD;  Location: [facility] OR 1ST FLR;  Service: Urology;  Laterality: N/A;  1hr    EPIDURAL STEROID INJECTION N/A 3/12/2019    Procedure: INJECTION, STEROID, EPIDURAL, L5-S1;  Surgeon: Laina Crockett MD;  Location: [facility] PAIN MGT;  Service: Pain Management;  Laterality: N/A;    EXAMINATION UNDER ANESTHESIA N/A 2/18/2021    Procedure: Exam under anesthesia;  Surgeon: West Clifton MD;  Location: [facility] OR 2ND FLR;  Service: Colon and Rectal;  Laterality: N/A;    EYE SURGERY      INCISION OF PERIRECTAL ABSCESS N/A 2/12/2021    Procedure: INCISION AND DRAINAGE  ABSCESS, PERIRECTAL;  Surgeon: West Clifton MD;  Location: [facility] OR 2ND FLR;  Service: Colon and Rectal;  Laterality: N/A;    INCISION OF PERIRECTAL ABSCESS Left 2/18/2021    Procedure: INCISION, ABSCESS, PERIRECTAL;  Surgeon: West Clifton MD;  Location: [facility] OR 2ND FLR;  Service: Colon and Rectal;  Laterality: Left;    INJECTION OF ANESTHETIC AGENT AROUND NERVE Bilateral 7/3/2018    Procedure: BLOCK, NERVE;  Surgeon: Laina Crockett MD;  Location: [facility] PAIN MGT;  Service: Pain Management;   Laterality: Bilateral;  Bilateral Lumbar L2,L3,L4,L5 MBB      NEEDS CONSENT    INSERTION OF SETON STITCH N/A 2/12/2021    Procedure: PLACEMENT, SETON STITCH;  Surgeon: West Clifton MD;  Location: [facility] OR 2ND FLR;  Service: Colon and Rectal;  Laterality: N/A;    INSERTION OF SETON STITCH Left 2/18/2021    Procedure: PLACEMENT, SETON;  Surgeon: West Clifton MD;  Location: [facility] OR 2ND FLR;  Service: Colon and Rectal;  Laterality: Left;    INSERTION OF SETON STITCH N/A 9/24/2021    Procedure: PLACEMENT, SETON STITCH;  Surgeon: West Clifton MD;  Location: [facility] OR 2ND FLR;  Service: Colon and Rectal;  Laterality: N/A;  seton exchange    laser indirect  4/8/10    left eye    Laser Indirect Retinopexy  4/8/10    OS    PROSTATE SURGERY      RECTAL FISTULOTOMY N/A 11/12/2021    Procedure: FISTULOTOMY, RECTUM;  Surgeon: West Clifton MD;  Location: [facility] OR 2ND FLR;  Service: Colon and Rectal;  Laterality: N/A;    RETINAL DETACHMENT SURGERY  2004    OD    ROTATOR CUFF REPAIR  11/13/2013    TONSILLECTOMY      TRANSFORAMINAL EPIDURAL INJECTION OF STEROID Bilateral 2/19/2019    Procedure: INJECTION, STEROID, EPIDURAL, TRANSFORAMINAL APPROACH, L5;  Surgeon: Laina Crockett MD;  Location: [facility] PAIN MGT;  Service: Pain Management;  Laterality: Bilateral;       Social History:  Tobacco Use: Low Risk  (8/7/2023)    Patient History     Smoking Tobacco Use: Never     Smokeless Tobacco Use: Never     Passive Exposure: Not on file       Alcohol Use: Not At Risk (8/8/2023)    AUDIT-C     Frequency of Alcohol Consumption: Monthly or less     Average Number of Drinks: 1 or 2     Frequency of Binge Drinking: Never       OBJECTIVE:     Vital Signs Range:  BMI Readings from Last 1 Encounters:   08/08/23 28.70 kg/m²       Temp:  [36.4 °C (97.6 °F)-36.7 °C (98.1 °F)]   Pulse:  [31-44]   Resp:  [18-20]   BP: (141-196)/(63-90)   SpO2:  [93 %-98 %]        Significant Labs:        Component Value  Date/Time    WBC 7.12 08/08/2023 0225    HGB 13.3 (L) 08/08/2023 0225    HCT 38.7 (L) 08/08/2023 0225    HCT 45 04/27/2023 1224     08/08/2023 0225     08/08/2023 0225    K 3.7 08/08/2023 0225     08/08/2023 0225    CO2 22 (L) 08/08/2023 0225    GLU 84 08/08/2023 0225    BUN 22 08/08/2023 0225    CREATININE 0.9 08/08/2023 0225    MG 2.0 08/08/2023 0225    PHOS 3.0 08/08/2023 0225    CALCIUM 8.8 08/08/2023 0225    ALBUMIN 3.5 08/08/2023 0225    ALBUMIN 4.5 08/02/2018 1143    PROT 6.2 08/08/2023 0225    ALKPHOS 76 08/08/2023 0225    BILITOT 0.7 08/08/2023 0225    AST 28 08/08/2023 0225    ALT 9 (L) 08/08/2023 0225    INR 1.1 12/15/2011 0910        Please see Results Review for additional labs.     Diagnostic Studies:   CXR 8/7/2023  Impression:     1. Prominent interstitium with bilateral lower lobe hazy opacities suspicious for pulmonary edema in the setting of cardiomegaly.        EKG:   Results for orders placed or performed during the hospital encounter of 08/07/23   EKG 12-lead    Collection Time: 08/07/23  4:06 PM    Narrative    Test Reason : I48.92,    Vent. Rate : 041 BPM     Atrial Rate : 256 BPM     P-R Int : 000 ms          QRS Dur : 130 ms      QT Int : 518 ms       P-R-T Axes : 067 -36 000 degrees     QTc Int : 427 ms    Atrial flutter with variable A-V block  Left axis deviation  Right bundle branch block  Abnormal ECG  When compared with ECG of 07-AUG-2023 13:57,  No significant change was found  Confirmed by CARLIE RICE MD ([id number]) on 8/8/2023 9:20:50 AM    Referred By: [email or web address]   SELF           Confirmed By:CARLIE RICE MD       ECHO:  See subjective, if available.      ASSESSMENT/PLAN:           Pre-op Assessment    I have reviewed the Patient Summary Reports.     I have reviewed the Nursing Notes. I have reviewed the NPO Status.   I have reviewed the Medications.     Review of Systems  Anesthesia Hx:  No problems with previous Anesthesia  History of prior surgery of  interest to airway management or planning: Denies Family Hx of Anesthesia complications.   Denies Personal Hx of Anesthesia complications.   Social:  Non-Smoker, No Alcohol Use    Cardiovascular:   Hypertension Valvular problems/Murmurs Denies CAD.   Dysrhythmias   Denies CHF. hyperlipidemia    Pulmonary:   Denies COPD.  Denies Asthma. Sleep Apnea    Renal/:   Denies Chronic Renal Disease.     Hepatic/GI:   Denies GERD.    Musculoskeletal:   Arthritis   Spine Disorders: lumbar    Neurological:   Denies CVA. Neuromuscular Disease,  Denies Seizures.    Endocrine:   Denies Diabetes.    Psych:   anxiety depression          Physical Exam  General: Well nourished, Cooperative, Alert and Oriented    Airway:  Mallampati: III / II  Mouth Opening: Normal  TM Distance: Normal  Tongue: Normal  Neck ROM: Extension Decreased, Extension Painful    Dental:  Intact        Anesthesia Plan  Type of Anesthesia, risks & benefits discussed:    Anesthesia Type: Gen ETT, Gen Supraglottic Airway, Gen Natural Airway, MAC  Intra-op Monitoring Plan: Standard ASA Monitors  Post Op Pain Control Plan: multimodal analgesia  Induction:  IV  Airway Plan: Video, Post-Induction  Informed Consent: Informed consent signed with the Patient and all parties understand the risks and agree with anesthesia plan.  All questions answered.   ASA Score: 3  Day of Surgery Review of History & Physical: H&P Update referred to the surgeon/provider.    Ready For Surgery From Anesthesia Perspective.     .

## 2023-08-08 NOTE — ASSESSMENT & PLAN NOTE
Pt was instructed to hold flomax by his PCP as it can impact his BP.    -Holding flomax  -Cont home finasteride

## 2023-08-09 ENCOUNTER — ANESTHESIA (OUTPATIENT)
Dept: MEDSURG UNIT | Facility: HOSPITAL | Age: 78
DRG: 309 | End: 2023-08-09
Payer: MEDICARE

## 2023-08-09 LAB
ALBUMIN SERPL BCP-MCNC: 3.4 G/DL (ref 3.5–5.2)
ALP SERPL-CCNC: 73 U/L (ref 55–135)
ALT SERPL W/O P-5'-P-CCNC: 8 U/L (ref 10–44)
ANION GAP SERPL CALC-SCNC: 11 MMOL/L (ref 8–16)
ASCENDING AORTA: 3.6 CM
AST SERPL-CCNC: 25 U/L (ref 10–40)
BASOPHILS # BLD AUTO: 0.02 K/UL (ref 0–0.2)
BASOPHILS NFR BLD: 0.3 % (ref 0–1.9)
BILIRUB SERPL-MCNC: 0.8 MG/DL (ref 0.1–1)
BSA FOR ECHO PROCEDURE: 2.12 M2
BUN SERPL-MCNC: 22 MG/DL (ref 8–23)
CALCIUM SERPL-MCNC: 8.9 MG/DL (ref 8.7–10.5)
CHLORIDE SERPL-SCNC: 105 MMOL/L (ref 95–110)
CO2 SERPL-SCNC: 20 MMOL/L (ref 23–29)
CREAT SERPL-MCNC: 0.9 MG/DL (ref 0.5–1.4)
DIFFERENTIAL METHOD: ABNORMAL
EOSINOPHIL # BLD AUTO: 0.1 K/UL (ref 0–0.5)
EOSINOPHIL NFR BLD: 1.7 % (ref 0–8)
ERYTHROCYTE [DISTWIDTH] IN BLOOD BY AUTOMATED COUNT: 13.2 % (ref 11.5–14.5)
EST. GFR  (NO RACE VARIABLE): >60 ML/MIN/1.73 M^2
GLUCOSE SERPL-MCNC: 86 MG/DL (ref 70–110)
HCT VFR BLD AUTO: 40.3 % (ref 40–54)
HGB BLD-MCNC: 14.2 G/DL (ref 14–18)
IMM GRANULOCYTES # BLD AUTO: 0.02 K/UL (ref 0–0.04)
IMM GRANULOCYTES NFR BLD AUTO: 0.3 % (ref 0–0.5)
LAA PV: 0.6 CM/S
LYMPHOCYTES # BLD AUTO: 0.9 K/UL (ref 1–4.8)
LYMPHOCYTES NFR BLD: 11.6 % (ref 18–48)
MAGNESIUM SERPL-MCNC: 1.9 MG/DL (ref 1.6–2.6)
MCH RBC QN AUTO: 32 PG (ref 27–31)
MCHC RBC AUTO-ENTMCNC: 35.2 G/DL (ref 32–36)
MCV RBC AUTO: 91 FL (ref 82–98)
MONOCYTES # BLD AUTO: 0.8 K/UL (ref 0.3–1)
MONOCYTES NFR BLD: 10.7 % (ref 4–15)
NEUTROPHILS # BLD AUTO: 5.9 K/UL (ref 1.8–7.7)
NEUTROPHILS NFR BLD: 75.4 % (ref 38–73)
NRBC BLD-RTO: 0 /100 WBC
PHOSPHATE SERPL-MCNC: 3.5 MG/DL (ref 2.7–4.5)
PLATELET # BLD AUTO: 169 K/UL (ref 150–450)
PMV BLD AUTO: 10.2 FL (ref 9.2–12.9)
POTASSIUM SERPL-SCNC: 3.7 MMOL/L (ref 3.5–5.1)
PROT SERPL-MCNC: 6 G/DL (ref 6–8.4)
RBC # BLD AUTO: 4.44 M/UL (ref 4.6–6.2)
SINUS: 4 CM
SODIUM SERPL-SCNC: 136 MMOL/L (ref 136–145)
STJ: 3.1 CM
WBC # BLD AUTO: 7.75 K/UL (ref 3.9–12.7)

## 2023-08-09 PROCEDURE — 99232 PR SUBSEQUENT HOSPITAL CARE,LEVL II: ICD-10-PCS | Mod: HCNC,GC,, | Performed by: STUDENT IN AN ORGANIZED HEALTH CARE EDUCATION/TRAINING PROGRAM

## 2023-08-09 PROCEDURE — 37000009 HC ANESTHESIA EA ADD 15 MINS: Mod: HCNC | Performed by: STUDENT IN AN ORGANIZED HEALTH CARE EDUCATION/TRAINING PROGRAM

## 2023-08-09 PROCEDURE — 63600175 PHARM REV CODE 636 W HCPCS: Mod: HCNC | Performed by: NURSE ANESTHETIST, CERTIFIED REGISTERED

## 2023-08-09 PROCEDURE — 84100 ASSAY OF PHOSPHORUS: CPT | Mod: HCNC

## 2023-08-09 PROCEDURE — 92960 CARDIOVERSION ELECTRIC EXT: CPT | Mod: HCNC | Performed by: STUDENT IN AN ORGANIZED HEALTH CARE EDUCATION/TRAINING PROGRAM

## 2023-08-09 PROCEDURE — 37000008 HC ANESTHESIA 1ST 15 MINUTES: Mod: HCNC | Performed by: STUDENT IN AN ORGANIZED HEALTH CARE EDUCATION/TRAINING PROGRAM

## 2023-08-09 PROCEDURE — 36415 COLL VENOUS BLD VENIPUNCTURE: CPT | Mod: HCNC

## 2023-08-09 PROCEDURE — 83735 ASSAY OF MAGNESIUM: CPT | Mod: HCNC

## 2023-08-09 PROCEDURE — 20600001 HC STEP DOWN PRIVATE ROOM: Mod: HCNC

## 2023-08-09 PROCEDURE — 99223 1ST HOSP IP/OBS HIGH 75: CPT | Mod: HCNC,GC,, | Performed by: STUDENT IN AN ORGANIZED HEALTH CARE EDUCATION/TRAINING PROGRAM

## 2023-08-09 PROCEDURE — 92960 PR CARDIOVERSION, ELECTIVE;EXTERN: ICD-10-PCS | Mod: HCNC,,, | Performed by: STUDENT IN AN ORGANIZED HEALTH CARE EDUCATION/TRAINING PROGRAM

## 2023-08-09 PROCEDURE — D9220A PRA ANESTHESIA: ICD-10-PCS | Mod: HCNC,CRNA,, | Performed by: NURSE ANESTHETIST, CERTIFIED REGISTERED

## 2023-08-09 PROCEDURE — 99232 SBSQ HOSP IP/OBS MODERATE 35: CPT | Mod: HCNC,GC,, | Performed by: STUDENT IN AN ORGANIZED HEALTH CARE EDUCATION/TRAINING PROGRAM

## 2023-08-09 PROCEDURE — D9220A PRA ANESTHESIA: Mod: HCNC,CRNA,, | Performed by: NURSE ANESTHETIST, CERTIFIED REGISTERED

## 2023-08-09 PROCEDURE — 85025 COMPLETE CBC W/AUTO DIFF WBC: CPT | Mod: HCNC

## 2023-08-09 PROCEDURE — 80053 COMPREHEN METABOLIC PANEL: CPT | Mod: HCNC

## 2023-08-09 PROCEDURE — 99223 PR INITIAL HOSPITAL CARE,LEVL III: ICD-10-PCS | Mod: HCNC,GC,, | Performed by: STUDENT IN AN ORGANIZED HEALTH CARE EDUCATION/TRAINING PROGRAM

## 2023-08-09 PROCEDURE — 25000003 PHARM REV CODE 250: Mod: HCNC

## 2023-08-09 PROCEDURE — 25000003 PHARM REV CODE 250: Mod: HCNC | Performed by: NURSE ANESTHETIST, CERTIFIED REGISTERED

## 2023-08-09 PROCEDURE — 94761 N-INVAS EAR/PLS OXIMETRY MLT: CPT | Mod: HCNC

## 2023-08-09 PROCEDURE — 92960 CARDIOVERSION ELECTRIC EXT: CPT | Mod: HCNC,,, | Performed by: STUDENT IN AN ORGANIZED HEALTH CARE EDUCATION/TRAINING PROGRAM

## 2023-08-09 PROCEDURE — D9220A PRA ANESTHESIA: ICD-10-PCS | Mod: HCNC,ANES,, | Performed by: STUDENT IN AN ORGANIZED HEALTH CARE EDUCATION/TRAINING PROGRAM

## 2023-08-09 PROCEDURE — D9220A PRA ANESTHESIA: Mod: HCNC,ANES,, | Performed by: STUDENT IN AN ORGANIZED HEALTH CARE EDUCATION/TRAINING PROGRAM

## 2023-08-09 RX ORDER — LIDOCAINE HYDROCHLORIDE 20 MG/ML
INJECTION INTRAVENOUS
Status: DISCONTINUED | OUTPATIENT
Start: 2023-08-09 | End: 2023-08-09

## 2023-08-09 RX ORDER — PROPOFOL 10 MG/ML
VIAL (ML) INTRAVENOUS
Status: DISCONTINUED | OUTPATIENT
Start: 2023-08-09 | End: 2023-08-09

## 2023-08-09 RX ORDER — HYDRALAZINE HYDROCHLORIDE 20 MG/ML
10 INJECTION INTRAMUSCULAR; INTRAVENOUS EVERY 10 MIN PRN
Status: DISCONTINUED | OUTPATIENT
Start: 2023-08-09 | End: 2023-08-10 | Stop reason: HOSPADM

## 2023-08-09 RX ADMIN — APIXABAN 5 MG: 5 TABLET, FILM COATED ORAL at 08:08

## 2023-08-09 RX ADMIN — APIXABAN 5 MG: 5 TABLET, FILM COATED ORAL at 09:08

## 2023-08-09 RX ADMIN — CLONAZEPAM 0.5 MG: 0.5 TABLET ORAL at 09:08

## 2023-08-09 RX ADMIN — LOSARTAN POTASSIUM 12.5 MG: 25 TABLET, FILM COATED ORAL at 08:08

## 2023-08-09 RX ADMIN — Medication 400 MG: at 08:08

## 2023-08-09 RX ADMIN — QUETIAPINE FUMARATE 25 MG: 25 TABLET ORAL at 09:08

## 2023-08-09 RX ADMIN — PROPOFOL 30 MG: 10 INJECTION, EMULSION INTRAVENOUS at 02:08

## 2023-08-09 RX ADMIN — PROPOFOL 50 MCG/KG/MIN: 10 INJECTION, EMULSION INTRAVENOUS at 02:08

## 2023-08-09 RX ADMIN — Medication 6 MG: at 09:08

## 2023-08-09 RX ADMIN — CARBIDOPA AND LEVODOPA 1 TABLET: 25; 100 TABLET ORAL at 09:08

## 2023-08-09 RX ADMIN — SERTRALINE 200 MG: 100 TABLET, FILM COATED ORAL at 08:08

## 2023-08-09 RX ADMIN — LIDOCAINE HYDROCHLORIDE 5 MG: 20 INJECTION INTRAVENOUS at 02:08

## 2023-08-09 RX ADMIN — POTASSIUM CITRATE 10 MEQ: 10 TABLET, EXTENDED RELEASE ORAL at 08:08

## 2023-08-09 RX ADMIN — SODIUM CHLORIDE: 9 INJECTION, SOLUTION INTRAVENOUS at 02:08

## 2023-08-09 RX ADMIN — ATORVASTATIN CALCIUM 40 MG: 40 TABLET, FILM COATED ORAL at 08:08

## 2023-08-09 RX ADMIN — CLONAZEPAM 0.5 MG: 0.5 TABLET ORAL at 04:08

## 2023-08-09 RX ADMIN — FINASTERIDE 5 MG: 5 TABLET, FILM COATED ORAL at 08:08

## 2023-08-09 RX ADMIN — PYRIDOXINE HCL TAB 50 MG 50 MG: 50 TAB at 08:08

## 2023-08-09 RX ADMIN — CARBIDOPA AND LEVODOPA 1 TABLET: 25; 100 TABLET ORAL at 08:08

## 2023-08-09 NOTE — PROGRESS NOTES
EKG done, rhythm is sinus w/ AV Block, RBBB, PACs in pattern of bigeminy at times. Dr. Wyatt notified. No new orders received.

## 2023-08-09 NOTE — ASSESSMENT & PLAN NOTE
Pt has been hypertensive. According to his wife his BP fluctuates greatly and his PCP has been instructing them to hold BP meds as he can also run low.    -Restarted losartan at 12.5mg daily.

## 2023-08-09 NOTE — TRANSFER OF CARE
"Anesthesia Transfer of Care Note    Patient: Haja Marti    Procedure(s) Performed: Procedure(s) (LRB):  Cardioversion or Defibrillation (N/A)  Transesophageal echo (BRAD) intra-procedure log documentation (N/A)    Patient location: PACU    Anesthesia Type: general    Transport from OR: Transported from OR on 2-3 L/min O2 by NC with adequate spontaneous ventilation    Post pain: adequate analgesia    Post assessment: no apparent anesthetic complications and tolerated procedure well    Post vital signs: stable    Level of consciousness: responds to stimulation    Nausea/Vomiting: no nausea/vomiting    Complications: none    Transfer of care protocol was followed      Last vitals:   Visit Vitals  BP (!) 210/98 (BP Location: Right arm, Patient Position: Lying)   Pulse 60   Temp 36.4 °C (97.5 °F) (Oral)   Resp 16   Ht 5' 10" (1.778 m)   Wt 90.7 kg (200 lb)   SpO2 98%   BMI 28.70 kg/m²     "

## 2023-08-09 NOTE — ASSESSMENT & PLAN NOTE
New onset typical atrial flutter with slow ventricular rate   Atrial rate approx 250. Ventricular rate 42  NPO today   Patient currently has DNR order, but patient and family have agreed on revoking DNR for procedure so he will be transitioned to FULL Code prio and after procedure will change to DNR.

## 2023-08-09 NOTE — PROGRESS NOTES
[facility]  [facility] Medicine  Progress Note    Patient Name: Haja Marti  MRN: [id number]  Patient Class: IP- Inpatient   Admission Date: 8/7/2023  Length of Stay: 2 days  Attending Physician: Esteban Otoole MD  Primary Care Provider: Andrez Jackson MD        Subjective:     Principal Problem:Symptomatic bradycardia        HPI:  77 year old male with pmh of Lewy body Parkinson's disease, HTN, HLD, SHU, dep/anx, BPH, lumbar radiculopathy, urge incontinence, AV block, and RBB who presented for episodes of dyspnea and decreased urinary production. Yesterday he had an isolated 45 minute episode of dyspnea. This has been occurring off and on again today and at some points was associated with accessory respiratory muscle use. He wears diapers and in the morning they are usually filled with urine, but according to his wife this am there was barely any. He oral fluid intake has not changed. His also acutely lost his voice. He denies any chest pain or palpitations these last two days. He usually has some leg swelling but today they are more swollen than usual. His wife believes his breathing has been worse when he has been laying down. He has not had any fever or chills and denies any other upper respiratory symptoms.    The pt took an at home covid test and it was negative.    In the ED he was bradycardic (pulse 44) and his EKG showed new onset atrial flutter. His troponin was mildly elevated at 0.042. His CXR showed pulmonary edema and his SpO2 was 98.      Overview/Hospital Course:  77 year old male with pmh of Lewy body Parkinson's disease, HTN, HLD, SHU, dep/anx, BPH, lumbar radiculopathy, urge incontinence, AV block, and RBB who is being treated for new onset atrial flutter and CHF. Cardiology is following and wants to try to do a cardioversion. Pt has stated he does not want a pacemaker. Anti-hypertensives were restarted as patient was running >180 SBP consistently. BRAD cardioversion converted Pt  to NSR with heart block and PVC, bigeminy       Interval History: Pt has been bradycardic in the 30-40s but denies any chest pain, palpitations, and SOB. NPO since midnight for cardioversion today. Losartan 12.5 mg started for HTN. Eliquis therapy for atrial flutter started. Cardioversion converted him to NSR with Heart Block and bigeminy.     Review of Systems   Constitutional:  Negative for chills and fever.   HENT:  Positive for voice change. Negative for sore throat.    Respiratory:  Negative for cough, chest tightness and shortness of breath.    Cardiovascular:  Positive for leg swelling. Negative for chest pain and palpitations.   Gastrointestinal:  Negative for abdominal pain, constipation, diarrhea, nausea and vomiting.   Genitourinary:  Negative for difficulty urinating and dysuria.   Neurological:  Negative for dizziness and light-headedness.     Objective:     Vital Signs (Most Recent):  Temp: 97.9 °F (36.6 °C) (08/09/23 1510)  Pulse: 61 (08/09/23 1630)  Resp: (!) 24 (08/09/23 1630)  BP: 130/74 (08/09/23 1630)  SpO2: 97 % (08/09/23 1630) Vital Signs (24h Range):  Temp:  [97.4 °F (36.3 °C)-98.1 °F (36.7 °C)] 97.9 °F (36.6 °C)  Pulse:  [34-74] 61  Resp:  [16-26] 24  SpO2:  [94 %-99 %] 97 %  BP: (129-210)/() 130/74     Weight: 90.7 kg (200 lb)  Body mass index is 28.7 kg/m².    Intake/Output Summary (Last 24 hours) at 8/9/2023 1715  Last data filed at 8/9/2023 1457  Gross per 24 hour   Intake 100 ml   Output 400 ml   Net -300 ml           Physical Exam  Constitutional:       General: He is not in acute distress.     Appearance: Normal appearance.   HENT:      Head: Normocephalic and atraumatic.   Cardiovascular:      Rate and Rhythm: Regular rhythm. Bradycardia present.      Pulses: Normal pulses.      Heart sounds: Normal heart sounds. No murmur heard.     No friction rub. No gallop.   Pulmonary:      Effort: Pulmonary effort is normal.      Breath sounds: Normal breath sounds. No wheezing, rhonchi or  rales.   Abdominal:      General: Abdomen is flat. There is no distension.      Palpations: Abdomen is soft.      Tenderness: There is no abdominal tenderness. There is no guarding or rebound.   Musculoskeletal:      Right lower leg: Edema present.      Left lower leg: Edema present.   Skin:     General: Skin is warm and dry.      Capillary Refill: Capillary refill takes 2 to 3 seconds.   Neurological:      Mental Status: He is alert. Mental status is at baseline.   Psychiatric:         Mood and Affect: Mood normal.         Behavior: Behavior normal.             Significant Labs: All pertinent labs within the past 24 hours have been reviewed.  CBC:   Recent Labs   Lab 08/08/23 0225 08/09/23  0249   WBC 7.12 7.75   HGB 13.3* 14.2   HCT 38.7* 40.3    169       CMP:   Recent Labs   Lab 08/08/23 0225 08/09/23 0249    136   K 3.7 3.7    105   CO2 22* 20*   GLU 84 86   BUN 22 22   CREATININE 0.9 0.9   CALCIUM 8.8 8.9   PROT 6.2 6.0   ALBUMIN 3.5 3.4*   BILITOT 0.7 0.8   ALKPHOS 76 73   AST 28 25   ALT 9* 8*   ANIONGAP 10 11         Significant Imaging: I have reviewed all pertinent imaging results/findings within the past 24 hours.      Assessment/Plan:      * Symptomatic bradycardia with atrial fluuter  Pt heart rate has been in the 40s. He has not been having any chest pain but has been having episodic dyspnea. Has new atrial flutter and hx of RBB and 2nd degree AV block.CXR showed pulmonary edema and pt has b/l pitting edema. Last echo showed EF of 55% and severe LAE. BNP was 331. Echo this admission showed EF 60% and severe LAE and WILI.    - s/p cardioversion , converted to NSR   -Placing pt in cardiac stepdown unit with transcutaneous pacing pads  -On Eliquis   -Telemetry  -Lasix 40 x1  -Avoid BB and CCB       Atrial flutter        Elevated troponin  Initial Tropnin was mildly elevated at 0.042. 0.042->0.039->0.04.    -Will monitor for signs and symptoms of ACS           Hyperlipidemia  -Cont  home Lipitor      REM behavioral disorder  -Cont home Seroquel and prn klonopin        Lewy body Parkinson's disease  -Cont home carbidopa-levodopa       Urgency incontinence  -Holding home mybetriq to assist with diureses.  -Condom catheter        Chronic pain  -Cont home celecoxib prn      Generalized anxiety disorder  -Cont home zoloft      Lumbar radiculopathy  -Cont home celecoxib prn       Constipation, chronic  -Daily miralax      BPH (benign prostatic hypertrophy)  Pt was instructed to hold flomax by his PCP as it can impact his BP.    -Holding flomax  -Cont home finasteride      Depression  -Continue home zoloft        Obstructive sleep apnea  Pt has been told he needs a CPAP but is reluctant to wear one. Encouraged him to try it as the pulmonary edema may contribute to issues breathing while sleeping.    -CPAP      Essential hypertension  Pt has been hypertensive. According to his wife his BP fluctuates greatly and his PCP has been instructing them to hold BP meds as he can also run low.    -Restarted losartan at 12.5mg daily.        VTE Risk Mitigation (From admission, onward)         Ordered     apixaban tablet 5 mg  2 times daily         08/08/23 1359     IP VTE HIGH RISK PATIENT  Once         08/07/23 1814     Place sequential compression device  Until discontinued         08/07/23 1814                Discharge Planning   LONI: 8/11/2023     Code Status: DNR   Is the patient medically ready for discharge?:     Reason for patient still in hospital (select all that apply): Treatment  Discharge Plan A: Home with family                  Yousuf Otoole MD  Department of Hospital Medicine   [facility]

## 2023-08-09 NOTE — PROGRESS NOTES
[facility] Stepdown  Cardiac Electrophysiology  Progress Note    Admission Date: 8/7/2023  Code Status: DNR   Attending Physician: Esteban Otoole MD   Expected Discharge Date: 8/11/2023  Principal Problem:Symptomatic bradycardia    Subjective:     Interval History: no acute events overnight. Telemetry srill with Atrial flutter with slow ventricular rate. Plan for BRAD/DCCV this afternoon     Review of Systems   Cardiovascular:  Positive for irregular heartbeat. Negative for chest pain.   Respiratory:  Negative for shortness of breath.    All other systems reviewed and are negative.    Objective:     Vital Signs (Most Recent):  Temp: 97.4 °F (36.3 °C) (08/09/23 0814)  Pulse: (!) 34 (08/09/23 0814)  Resp: 18 (08/09/23 0814)  BP: (!) 154/71 (08/09/23 0814)  SpO2: 96 % (08/09/23 0814) Vital Signs (24h Range):  Temp:  [97.4 °F (36.3 °C)-98.1 °F (36.7 °C)] 97.4 °F (36.3 °C)  Pulse:  [34-47] 34  Resp:  [18-20] 18  SpO2:  [94 %-98 %] 96 %  BP: (141-179)/(63-83) 154/71     Weight: 90.7 kg (200 lb)  Body mass index is 28.7 kg/m².     SpO2: 96 %        Physical Exam  Constitutional:       Appearance: Normal appearance.   Cardiovascular:      Rate and Rhythm: Bradycardia present. Rhythm irregular.   Abdominal:      Palpations: Abdomen is soft.   Musculoskeletal:      Right lower leg: No edema.      Left lower leg: No edema.   Neurological:      General: No focal deficit present.      Mental Status: He is alert. Mental status is at baseline.            Significant Labs: All pertinent lab results from the last 24 hours have been reviewed.        Assessment and Plan:     Atrial flutter  New onset typical atrial flutter with slow ventricular rate   Atrial rate approx 250. Ventricular rate 42  NPO today   Patient currently has DNR order, but patient and family have agreed on revoking DNR for procedure so he will be transitioned to FULL Code prio and after procedure will change to DNR.         Vaishnavi Fischer  MD Reba  Cardiac Electrophysiology  [facility] Stepdown

## 2023-08-09 NOTE — PROGRESS NOTES
Nursing Transfer Note        Reason patient is being transferred: back to assigned room post recovery    Transfer To: [id number]    Transfer via stretcher    Transfer with cardiac monitoring    Transported by RN    Telemetry: Box Number [id number]    Medicines sent: silvadene    Any special needs or follow-up needed: continue cardiac monitor    Patient belongings transferred with patient: no    Chart send with patient: Yes    Notified: family waiting in room    Patient reassessed at: to be done by receiving RN (date, time)

## 2023-08-09 NOTE — SUBJECTIVE & OBJECTIVE
Interval History: Pt has been bradycardic in the 30-40s but denies any chest pain, palpitations, and SOB. NPO since midnight for cardioversion today. Losartan 12.5 mg started for HTN. Eliquis therapy for atrial flutter started. Cardioversion converted him to NSR with Heart Block and bigeminy.     Review of Systems   Constitutional:  Negative for chills and fever.   HENT:  Positive for voice change. Negative for sore throat.    Respiratory:  Negative for cough, chest tightness and shortness of breath.    Cardiovascular:  Positive for leg swelling. Negative for chest pain and palpitations.   Gastrointestinal:  Negative for abdominal pain, constipation, diarrhea, nausea and vomiting.   Genitourinary:  Negative for difficulty urinating and dysuria.   Neurological:  Negative for dizziness and light-headedness.     Objective:     Vital Signs (Most Recent):  Temp: 97.9 °F (36.6 °C) (08/09/23 1510)  Pulse: 61 (08/09/23 1630)  Resp: (!) 24 (08/09/23 1630)  BP: 130/74 (08/09/23 1630)  SpO2: 97 % (08/09/23 1630) Vital Signs (24h Range):  Temp:  [97.4 °F (36.3 °C)-98.1 °F (36.7 °C)] 97.9 °F (36.6 °C)  Pulse:  [34-74] 61  Resp:  [16-26] 24  SpO2:  [94 %-99 %] 97 %  BP: (129-210)/() 130/74     Weight: 90.7 kg (200 lb)  Body mass index is 28.7 kg/m².    Intake/Output Summary (Last 24 hours) at 8/9/2023 1715  Last data filed at 8/9/2023 1457  Gross per 24 hour   Intake 100 ml   Output 400 ml   Net -300 ml           Physical Exam  Constitutional:       General: He is not in acute distress.     Appearance: Normal appearance.   HENT:      Head: Normocephalic and atraumatic.   Cardiovascular:      Rate and Rhythm: Regular rhythm. Bradycardia present.      Pulses: Normal pulses.      Heart sounds: Normal heart sounds. No murmur heard.     No friction rub. No gallop.   Pulmonary:      Effort: Pulmonary effort is normal.      Breath sounds: Normal breath sounds. No wheezing, rhonchi or rales.   Abdominal:      General: Abdomen is  flat. There is no distension.      Palpations: Abdomen is soft.      Tenderness: There is no abdominal tenderness. There is no guarding or rebound.   Musculoskeletal:      Right lower leg: Edema present.      Left lower leg: Edema present.   Skin:     General: Skin is warm and dry.      Capillary Refill: Capillary refill takes 2 to 3 seconds.   Neurological:      Mental Status: He is alert. Mental status is at baseline.   Psychiatric:         Mood and Affect: Mood normal.         Behavior: Behavior normal.             Significant Labs: All pertinent labs within the past 24 hours have been reviewed.  CBC:   Recent Labs   Lab 08/08/23 0225 08/09/23 0249   WBC 7.12 7.75   HGB 13.3* 14.2   HCT 38.7* 40.3    169       CMP:   Recent Labs   Lab 08/08/23 0225 08/09/23 0249    136   K 3.7 3.7    105   CO2 22* 20*   GLU 84 86   BUN 22 22   CREATININE 0.9 0.9   CALCIUM 8.8 8.9   PROT 6.2 6.0   ALBUMIN 3.5 3.4*   BILITOT 0.7 0.8   ALKPHOS 76 73   AST 28 25   ALT 9* 8*   ANIONGAP 10 11         Significant Imaging: I have reviewed all pertinent imaging results/findings within the past 24 hours.

## 2023-08-09 NOTE — ASSESSMENT & PLAN NOTE
Pt heart rate has been in the 40s. He has not been having any chest pain but has been having episodic dyspnea. Has new atrial flutter and hx of RBB and 2nd degree AV block.CXR showed pulmonary edema and pt has b/l pitting edema. Last echo showed EF of 55% and severe LAE. BNP was 331. Echo this admission showed EF 60% and severe LAE and WILI.    - s/p cardioversion , converted to NSR   -Placing pt in cardiac stepdown unit with transcutaneous pacing pads  -On Eliquis   -Telemetry  -Lasix 40 x1  -Avoid BB and CCB

## 2023-08-09 NOTE — SUBJECTIVE & OBJECTIVE
Interval History: no acute events overnight. Telemetry laila with Atrial flutter with slow ventricular rate. Plan for BRAD/DCCV this afternoon     Review of Systems   Cardiovascular:  Positive for irregular heartbeat. Negative for chest pain.   Respiratory:  Negative for shortness of breath.    All other systems reviewed and are negative.    Objective:     Vital Signs (Most Recent):  Temp: 97.4 °F (36.3 °C) (08/09/23 0814)  Pulse: (!) 34 (08/09/23 0814)  Resp: 18 (08/09/23 0814)  BP: (!) 154/71 (08/09/23 0814)  SpO2: 96 % (08/09/23 0814) Vital Signs (24h Range):  Temp:  [97.4 °F (36.3 °C)-98.1 °F (36.7 °C)] 97.4 °F (36.3 °C)  Pulse:  [34-47] 34  Resp:  [18-20] 18  SpO2:  [94 %-98 %] 96 %  BP: (141-179)/(63-83) 154/71     Weight: 90.7 kg (200 lb)  Body mass index is 28.7 kg/m².     SpO2: 96 %        Physical Exam  Constitutional:       Appearance: Normal appearance.   Cardiovascular:      Rate and Rhythm: Bradycardia present. Rhythm irregular.   Abdominal:      Palpations: Abdomen is soft.   Musculoskeletal:      Right lower leg: No edema.      Left lower leg: No edema.   Neurological:      General: No focal deficit present.      Mental Status: He is alert. Mental status is at baseline.            Significant Labs: All pertinent lab results from the last 24 hours have been reviewed.

## 2023-08-09 NOTE — NURSING
[id number] Patient arrived back to unit after his cardioversion. He is awake alert and oriented x2 and appears in no acute distress. Vital signs are stable. Safety measures in place and call bell within reach.

## 2023-08-09 NOTE — CONSULTS
[facility], [place]  BRAD Consult      Haja Marti  YOB: 1945  Medical Record Number:  [id number]  Attending Physician:  Esteban Otoole MD   Current Principal Problem:  Symptomatic bradycardia    History     Cc: BRAD/DCCV    HPI  77 year old male with Lewy body dementia, Parkinson's disease, HTN, HLD, SHU, BPH, lumbar radiculopathy, urge incontinence who presented for episodes of dyspnea and decreased urinary production. In the ED he was found to have atrial flutter with ventricular rate 40 and qrs morphology similar to his sinus qrs morphology.     At baseline patient has a RBBB. Cardiac event monitor 5/5: Sinus rhythm with intermittent AVWB and pauses of up to 4.3 seconds from nonconducted nonsustained AT. Symptomatic activations predominantly correspond to sinus rhythm with AV Wenckebach and 1 episode of complete AV block.  He was offered a leadless pacemaker in the past but after discussions with his son who is a pulmonologist it was declined at the time due to low quality of life/dementia and assumed no benefit.    Echo team consulted for BRAD/DCCV      [id number]  Anticoagulant/antiplatelets: eliquis, compliant  ECG: atrial flutter    Dysphagia or odynophagia:  No  Liver Disease, esophageal disease, or known varices:  No  Upper GI Bleeding: No  Snoring:  No  Sleep Apnea:  No  Prior neck surgery or radiation:  No  History of anesthetic difficulties:  No  Family history of anesthetic difficulties:  No  Last oral intake: yesterday before midnight  Able to move neck in all directions:  Yes    Medications - Outpatient  Prior to Admission medications    Medication Sig Start Date End Date Taking? Authorizing Provider   acetaminophen (TYLENOL) 500 MG tablet Take 1,000 mg by mouth every 8 (eight) hours as needed for Pain.    Provider, Historical   apixaban (ELIQUIS) 5 mg Tab Take 1 tablet (5 mg total) by mouth 2 (two) times daily. 8/9/23   Yousuf Otoole MD   atorvastatin  (LIPITOR) 40 MG tablet Take 1 tablet (40 mg total) by mouth once daily. 5/25/23   Andrez Jackson MD   carbidopa-levodopa  mg (SINEMET)  mg per tablet TAKE 1 QID  Patient taking differently: Take 1 tablet by mouth 3 (three) times daily. 8/4/22   Reji Hidalgo MD   celecoxib (CELEBREX) 100 MG capsule Take 100 mg by mouth 2 (two) times daily. 10/17/22   Provider, Historical   clonazePAM (KLONOPIN) 0.5 MG tablet TAKE 1/2 TO 1 TABLET BY MOUTH UP TO 3 TIMES DAILY AS NEEDED FOR ANXIETY  Patient taking differently: TAKE 1/2 TO 1 TABLET BY MOUTH ONCE DAILY AS NEEDED FOR ANXIETY 11/15/22   Sudhir Elliott MD   ergocalciferol (ERGOCALCIFEROL) 50,000 unit Cap TAKE 1 CAPSULE EVERY 7 DAYS  Patient not taking: Reported on 7/20/2023 7/9/23   Pati Ha MD   finasteride (PROSCAR) 5 mg tablet Take 1 tablet (5 mg total) by mouth once daily. 12/20/22   Andrez Jackson MD   folic acid (FOLVITE) 1 MG tablet TAKE 1 TABLET EVERY DAY 4/26/23   Andrez Jackson MD   losartan (COZAAR) 25 MG tablet Take 1 tablet (25 mg total) by mouth once daily.  Patient not taking: Reported on 7/20/2023 4/2/22   Andrez Jackson MD   magnesium oxide (MAG-OX) 400 mg tablet Take 400 mg by mouth once daily.    Provider, Historical   MYRBETRIQ 50 mg Tb24 Take 1 tablet by mouth every evening. 6/20/23   Provider, Historical   potassium citrate (UROCIT-K) 10 mEq (1,080 mg) TbSR TAKE 1 TABLET THREE TIMES DAILY WITH MEALS  Patient taking differently: Take 10 mEq by mouth once daily. 5/29/18   Pati Ha MD   pyridoxine, vitamin B6, (B-6) 50 MG Tab Take 50 mg by mouth once daily.    Provider, Historical   QUEtiapine (SEROQUEL) 25 MG Tab Take 1 tablet (25 mg total) by mouth every evening. TAKE ORAL 1 TABLET QHS. 7/20/23 7/19/24  Mag Mcmahon MD   sertraline (ZOLOFT) 100 MG tablet Take 2 tablets (200 mg total) by mouth once daily. 3/15/23   Sudhir Elliott MD   sodium bicarbonate 650 MG tablet TAKE 1  TABLET ONE TIME DAILY 4/26/23   Andrez Jackson MD   tamsulosin (FLOMAX) 0.4 mg Cap TAKE 1 CAPSULE EVERY DAY  Patient not taking: Reported on 7/20/2023 7/5/23   Mag Perez, NP   zaleplon (SONATA) 5 MG Cap Take 1 capsule (5 mg total) by mouth nightly as needed (insomnia). 3/15/23   Sudhir Elliott MD       Medications - Current  Scheduled Meds:   apixaban  5 mg Oral BID    atorvastatin  40 mg Oral Daily    carbidopa-levodopa  mg  1 tablet Oral TID    finasteride  5 mg Oral Daily    losartan  12.5 mg Oral Daily    magnesium oxide  400 mg Oral Daily    polyethylene glycol  17 g Oral Daily    potassium citrate  10 mEq Oral Daily    pyridoxine (vitamin B6)  50 mg Oral Daily    QUEtiapine  25 mg Oral QHS    sertraline  200 mg Oral Daily     Continuous Infusions:    Allergies  Review of patient's allergies indicates:  No Known Allergies  Past Medical History  Past Medical History:   Diagnosis Date    Anxiety     Arthritis     BPH (benign prostatic hypertrophy)     Cataract     OS    Colon polyp 2002    Depression     Epiretinal membrane, both eyes     Hx of psychiatric care     Hyperlipidemia     Hypertension     Kidney stones     Kidney stones     Parkinsons     Posterior vitreous detachment of left eye     Psychiatric problem     Psychosis 7/21/2023    Retinal detachment 2004    OD    Retinoschisis, left eye     Sleep apnea     Vitreous hemorrhage of left eye      Past Surgical History  Past Surgical History:   Procedure Laterality Date    BIOPSY OF ADENOIDS      CATARACT EXTRACTION      OD    COLONOSCOPY N/A 12/3/2019    Procedure: COLONOSCOPY;  Surgeon: Ceferino Oliveira MD;  Location: [facility] (4TH FLR);  Service: Endoscopy;  Laterality: N/A;  5/2019 laminectomy with post op confusion tb    CYSTOSCOPY WITH URODYNAMIC TESTING N/A 1/31/2022    Procedure: CYSTOSCOPY, WITH URODYNAMIC TESTING FLOUROSCOPIC;  Surgeon: Devang Butler MD;  [address]  Service: Urology;  Laterality: N/A;  1hr     EPIDURAL STEROID INJECTION N/A 3/12/2019    Procedure: INJECTION, STEROID, EPIDURAL, L5-S1;  Surgeon: Laina Crockett MD;  Location: [facility] PAIN MGT;  Service: Pain Management;  Laterality: N/A;    EXAMINATION UNDER ANESTHESIA N/A 2/18/2021    Procedure: Exam under anesthesia;  Surgeon: West Clifton MD;  Location: [facility] OR 2ND FLR;  Service: Colon and Rectal;  Laterality: N/A;    EYE SURGERY      INCISION OF PERIRECTAL ABSCESS N/A 2/12/2021    Procedure: INCISION AND DRAINAGE  ABSCESS, PERIRECTAL;  Surgeon: West Clifton MD;  Location: [facility] OR 2ND FLR;  Service: Colon and Rectal;  Laterality: N/A;    INCISION OF PERIRECTAL ABSCESS Left 2/18/2021    Procedure: INCISION, ABSCESS, PERIRECTAL;  Surgeon: West Clifton MD;  Location: [facility] OR 2ND FLR;  Service: Colon and Rectal;  Laterality: Left;    INJECTION OF ANESTHETIC AGENT AROUND NERVE Bilateral 7/3/2018    Procedure: BLOCK, NERVE;  Surgeon: Laina Crockett MD;  Location: [facility] PAIN MGT;  Service: Pain Management;  Laterality: Bilateral;  Bilateral Lumbar L2,L3,L4,L5 MBB      NEEDS CONSENT    INSERTION OF SETON STITCH N/A 2/12/2021    Procedure: PLACEMENT, SETON STITCH;  Surgeon: West Clifton MD;  Location: [facility] 2[facility] FLR;  Service: Colon and Rectal;  Laterality: N/A;    INSERTION OF SETON STITCH Left 2/18/2021    Procedure: PLACEMENT, SETON;  Surgeon: West Clifton MD;  Location: [facility] OR 2ND FLR;  Service: Colon and Rectal;  Laterality: Left;    INSERTION OF SETON STITCH N/A 9/24/2021    Procedure: PLACEMENT, SETON STITCH;  Surgeon: West Clifton MD;  Location: [facility] OR 2ND FLR;  Service: Colon and Rectal;  Laterality: N/A;  seton exchange    laser indirect  4/8/10    left eye    Laser Indirect Retinopexy  4/8/10    OS    PROSTATE SURGERY      RECTAL FISTULOTOMY N/A 11/12/2021    Procedure: FISTULOTOMY, RECTUM;  Surgeon: West Clifton MD;  Location: [facility] OR 2ND FLR;  Service: Colon and Rectal;  Laterality: N/A;     "RETINAL DETACHMENT SURGERY  2004    OD    ROTATOR CUFF REPAIR  11/13/2013    TONSILLECTOMY      TRANSFORAMINAL EPIDURAL INJECTION OF STEROID Bilateral 2/19/2019    Procedure: INJECTION, STEROID, EPIDURAL, TRANSFORAMINAL APPROACH, L5;  Surgeon: Laina Crockett MD;  Location: [facility];  Service: Pain Management;  Laterality: Bilateral;     ROS  10 point ROS performed and negative except as stated in HPI     Physical Examination   Vital Signs  Vitals  Temp: 97.5 °F (36.4 °C)  Temp Source: Oral  Pulse: (!) 39  Heart Rate Source: Monitor  Resp: 18  SpO2: 98 %  Pulse Oximetry Type: Intermittent  BP: (!) 161/76  MAP (mmHg): 109  BP Location: Left arm  BP Method: Automatic  Patient Position: Lying    24 Hour VS Range  Temp:  [97.4 °F (36.3 °C)-98.1 °F (36.7 °C)]   Pulse:  [34-47]   Resp:  [18-20]   BP: (143-185)/()   SpO2:  [94 %-98 %]     Intake/Output Summary (Last 24 hours) at 8/9/2023 1327  Last data filed at 8/9/2023 0610  Gross per 24 hour   Intake 222 ml   Output 1000 ml   Net -778 ml         Physical Exam:   Constitutional: no acute distress  HEENT: NCAT, EOMI, no scleral icterus  Cardiovascular: Regular rate and rhythm  Pulmonary: Normal respiratory effort   Abdomen: nontender, non-distended   Neuro: alert and oriented, no focal deficits  Extremities: warm, no edema   MSK: no deformities  Skin: intact, no rashes     Data     Recent Labs   Lab 08/07/23  1445 08/08/23 0225 08/09/23  0249   WBC 6.19 7.12 7.75   HGB 14.0 13.3* 14.2   HCT 41.2 38.7* 40.3    177 169      No results for input(s): "PROTIME", "INR" in the last 168 hours.   Recent Labs   Lab 08/07/23  1445 08/08/23 0225 08/09/23  0249    139 136   K 4.4 3.7 3.7    107 105   CO2 22* 22* 20*   BUN 21 22 22   CREATININE 0.9 0.9 0.9   ANIONGAP 8 10 11   CALCIUM 9.4 8.8 8.9      Assessment & Plan     BRAD for CORA assessment prior to cardioversion  -No absolute contraindications of esophageal stricture, tumor, perforation, " laceration,or diverticulum and/or active GI bleed  -The risks, benefits & alternatives of the procedure were explained to the patient.   -The risks of transesophageal echo include but are not limited to:  Dental trauma, esophageal trauma/perforation, bleeding, laryngospasm/brochospasm, aspiration, sore throat/hoarseness, & dislodgement of the endotracheal tube/nasogastric tube (where applicable).    -The risks of ANES monitored sedation include hypotension, respiratory depression, arrhythmias, bronchospasm, & death.    -Informed consent was obtained. The patient is agreeable to proceed with the procedure and all questions and concerns addressed.    Case discussed with an attending in echocardiography lab.    Mathew Fishman MD  [facility]   Cardiovascular Disease PGY-V

## 2023-08-10 VITALS
HEIGHT: 70 IN | RESPIRATION RATE: 20 BRPM | SYSTOLIC BLOOD PRESSURE: 178 MMHG | OXYGEN SATURATION: 93 % | BODY MASS INDEX: 27.9 KG/M2 | DIASTOLIC BLOOD PRESSURE: 90 MMHG | WEIGHT: 194.88 LBS | TEMPERATURE: 98 F | HEART RATE: 74 BPM

## 2023-08-10 PROBLEM — R79.89 ELEVATED TROPONIN: Status: RESOLVED | Noted: 2023-04-27 | Resolved: 2023-08-10

## 2023-08-10 PROBLEM — R00.1 SYMPTOMATIC BRADYCARDIA: Status: RESOLVED | Noted: 2023-04-27 | Resolved: 2023-08-10

## 2023-08-10 PROBLEM — I48.0 PAROXYSMAL ATRIAL FIBRILLATION: Status: ACTIVE | Noted: 2023-08-10

## 2023-08-10 LAB
ALBUMIN SERPL BCP-MCNC: 3.6 G/DL (ref 3.5–5.2)
ALP SERPL-CCNC: 87 U/L (ref 55–135)
ALT SERPL W/O P-5'-P-CCNC: 16 U/L (ref 10–44)
ANION GAP SERPL CALC-SCNC: 14 MMOL/L (ref 8–16)
AST SERPL-CCNC: 28 U/L (ref 10–40)
BASOPHILS # BLD AUTO: 0.01 K/UL (ref 0–0.2)
BASOPHILS NFR BLD: 0.1 % (ref 0–1.9)
BILIRUB SERPL-MCNC: 1.1 MG/DL (ref 0.1–1)
BUN SERPL-MCNC: 19 MG/DL (ref 8–23)
CALCIUM SERPL-MCNC: 9 MG/DL (ref 8.7–10.5)
CHLORIDE SERPL-SCNC: 106 MMOL/L (ref 95–110)
CO2 SERPL-SCNC: 18 MMOL/L (ref 23–29)
CREAT SERPL-MCNC: 0.8 MG/DL (ref 0.5–1.4)
DIFFERENTIAL METHOD: ABNORMAL
EOSINOPHIL # BLD AUTO: 0.1 K/UL (ref 0–0.5)
EOSINOPHIL NFR BLD: 0.9 % (ref 0–8)
ERYTHROCYTE [DISTWIDTH] IN BLOOD BY AUTOMATED COUNT: 13.3 % (ref 11.5–14.5)
EST. GFR  (NO RACE VARIABLE): >60 ML/MIN/1.73 M^2
GLUCOSE SERPL-MCNC: 85 MG/DL (ref 70–110)
HCT VFR BLD AUTO: 43.5 % (ref 40–54)
HGB BLD-MCNC: 14.5 G/DL (ref 14–18)
IMM GRANULOCYTES # BLD AUTO: 0.05 K/UL (ref 0–0.04)
IMM GRANULOCYTES NFR BLD AUTO: 0.5 % (ref 0–0.5)
LYMPHOCYTES # BLD AUTO: 0.7 K/UL (ref 1–4.8)
LYMPHOCYTES NFR BLD: 7.4 % (ref 18–48)
MAGNESIUM SERPL-MCNC: 1.9 MG/DL (ref 1.6–2.6)
MCH RBC QN AUTO: 31.7 PG (ref 27–31)
MCHC RBC AUTO-ENTMCNC: 33.3 G/DL (ref 32–36)
MCV RBC AUTO: 95 FL (ref 82–98)
MONOCYTES # BLD AUTO: 0.9 K/UL (ref 0.3–1)
MONOCYTES NFR BLD: 9.7 % (ref 4–15)
NEUTROPHILS # BLD AUTO: 7.6 K/UL (ref 1.8–7.7)
NEUTROPHILS NFR BLD: 81.4 % (ref 38–73)
NRBC BLD-RTO: 0 /100 WBC
PHOSPHATE SERPL-MCNC: 3.2 MG/DL (ref 2.7–4.5)
PLATELET # BLD AUTO: 175 K/UL (ref 150–450)
PMV BLD AUTO: 10.1 FL (ref 9.2–12.9)
POTASSIUM SERPL-SCNC: 3.9 MMOL/L (ref 3.5–5.1)
PROT SERPL-MCNC: 6.6 G/DL (ref 6–8.4)
RBC # BLD AUTO: 4.58 M/UL (ref 4.6–6.2)
SODIUM SERPL-SCNC: 138 MMOL/L (ref 136–145)
WBC # BLD AUTO: 9.28 K/UL (ref 3.9–12.7)

## 2023-08-10 PROCEDURE — 63600175 PHARM REV CODE 636 W HCPCS: Mod: HCNC | Performed by: STUDENT IN AN ORGANIZED HEALTH CARE EDUCATION/TRAINING PROGRAM

## 2023-08-10 PROCEDURE — 83735 ASSAY OF MAGNESIUM: CPT | Mod: HCNC

## 2023-08-10 PROCEDURE — 80053 COMPREHEN METABOLIC PANEL: CPT | Mod: HCNC

## 2023-08-10 PROCEDURE — 84100 ASSAY OF PHOSPHORUS: CPT | Mod: HCNC

## 2023-08-10 PROCEDURE — 1111F DSCHRG MED/CURRENT MED MERGE: CPT | Mod: HCNC,CPTII,GC, | Performed by: HOSPITALIST

## 2023-08-10 PROCEDURE — 1111F PR DISCHARGE MEDS RECONCILED W/ CURRENT OUTPATIENT MED LIST: ICD-10-PCS | Mod: HCNC,CPTII,GC, | Performed by: HOSPITALIST

## 2023-08-10 PROCEDURE — 36415 COLL VENOUS BLD VENIPUNCTURE: CPT | Mod: HCNC

## 2023-08-10 PROCEDURE — 25000003 PHARM REV CODE 250: Mod: HCNC

## 2023-08-10 PROCEDURE — 99238 HOSP IP/OBS DSCHRG MGMT 30/<: CPT | Mod: HCNC,GC,, | Performed by: HOSPITALIST

## 2023-08-10 PROCEDURE — 85025 COMPLETE CBC W/AUTO DIFF WBC: CPT | Mod: HCNC

## 2023-08-10 PROCEDURE — 99238 PR HOSPITAL DISCHARGE DAY,<30 MIN: ICD-10-PCS | Mod: HCNC,GC,, | Performed by: HOSPITALIST

## 2023-08-10 RX ORDER — LOSARTAN POTASSIUM 25 MG/1
12.5 TABLET ORAL DAILY
Qty: 45 TABLET | Refills: 3 | Status: SHIPPED | OUTPATIENT
Start: 2023-08-11 | End: 2024-08-10

## 2023-08-10 RX ORDER — POLYETHYLENE GLYCOL 3350 17 G/17G
17 POWDER, FOR SOLUTION ORAL DAILY
Refills: 0
Start: 2023-08-11

## 2023-08-10 RX ADMIN — POTASSIUM CITRATE 10 MEQ: 10 TABLET, EXTENDED RELEASE ORAL at 09:08

## 2023-08-10 RX ADMIN — Medication 400 MG: at 09:08

## 2023-08-10 RX ADMIN — FINASTERIDE 5 MG: 5 TABLET, FILM COATED ORAL at 09:08

## 2023-08-10 RX ADMIN — APIXABAN 5 MG: 5 TABLET, FILM COATED ORAL at 09:08

## 2023-08-10 RX ADMIN — ATORVASTATIN CALCIUM 40 MG: 40 TABLET, FILM COATED ORAL at 09:08

## 2023-08-10 RX ADMIN — HYDRALAZINE HYDROCHLORIDE 10 MG: 20 INJECTION INTRAMUSCULAR; INTRAVENOUS at 05:08

## 2023-08-10 RX ADMIN — SERTRALINE 200 MG: 100 TABLET, FILM COATED ORAL at 09:08

## 2023-08-10 RX ADMIN — CARBIDOPA AND LEVODOPA 1 TABLET: 25; 100 TABLET ORAL at 09:08

## 2023-08-10 RX ADMIN — LOSARTAN POTASSIUM 12.5 MG: 25 TABLET, FILM COATED ORAL at 09:08

## 2023-08-10 NOTE — PLAN OF CARE
Problem: Adult Inpatient Plan of Care  Goal: Plan of Care Review  Flowsheets (Taken 8/10/2023 0211)  Plan of Care Reviewed With:   patient   spouse

## 2023-08-10 NOTE — ASSESSMENT & PLAN NOTE
Pt heart rate has been in the 40s. He has not been having any chest pain but has been having episodic dyspnea. Has new atrial flutter and hx of RBB and 2nd degree AV block.CXR showed pulmonary edema and pt has b/l pitting edema. Last echo showed EF of 55% and severe LAE. BNP was 331. Echo this admission showed EF 60% and severe LAE and WILI.    - s/p cardioversion , converted to NSR and went into paroxysmal afib with PVCs that night  -Placing pt in cardiac stepdown unit with transcutaneous pacing pads  -Was on Eliquis while inpatient  -Telemetry  -Lasix 40 x1  -Avoid BB and CCB

## 2023-08-10 NOTE — ANESTHESIA POSTPROCEDURE EVALUATION
Anesthesia Post Evaluation    Patient: Haja LOURDES Culotta    Procedure(s) Performed: Procedure(s) (LRB):  Cardioversion or Defibrillation (N/A)  Transesophageal echo (BRAD) intra-procedure log documentation (N/A)    Final Anesthesia Type: general      Patient location during evaluation: PACU  Patient participation: Yes- Able to Participate  Level of consciousness: awake and alert  Post-procedure vital signs: reviewed and stable  Pain management: adequate  Airway patency: patent    PONV status at discharge: No PONV  Anesthetic complications: no      Cardiovascular status: blood pressure returned to baseline  Respiratory status: unassisted  Hydration status: euvolemic  Follow-up not needed.          Vitals Value Taken Time   /90 08/10/23 1121   Temp 36.8 °C (98.3 °F) 08/10/23 1121   Pulse 74 08/10/23 1121   Resp 20 08/10/23 1121   SpO2 93 % 08/10/23 1121         No case tracking events are documented in the log.      Pain/Francisco Score: Francisco Score: 9 (8/9/2023  8:00 PM)

## 2023-08-10 NOTE — ACP (ADVANCE CARE PLANNING)
Advance Care Planning     Date: 08/10/2023    Today a voluntary meeting took place: bedside    Patient Participation: Patient is unable to participate     Attendees (Name and  Relationship to patient): Whitley Marti (wife). Tod Marti (son)    Staff attendees (Name and  Role): Martin Nino DO    ACP Conversation (General): Understanding of advance care planning and role of health care agent defined    Understanding of current condition : Lewy Body Dementia      Code Status: DNR; status confirmed/order placed in chart    ACP Documents: None    Goals of care: The family endorses that what is most important right now is to focus on spending time at home, avoiding the hospital, and remaining as independent as possible    Accordingly, we have decided that the best plan to meet the patient's goals includes continuing with treatment, no further escalation in treatment, and enrolling in hospice care      Recommendations/  Follow-up tasks: The patient and health care agent were provided the following recommendations Home hospice       Length of ACP   conversation in minutes: 25 minutes      [facility]  I engaged the family in a voluntary conversation about advance care planning and we specifically addressed what the goals of care would be moving forward, in light of the patient's change in clinical status, specifically functional status.  We did not specifically address the patient's likely prognosis, which is poor.  We explored the patient's values and preferences for future care.  The patient and family endorses that what is most important right now is to focus on spending time at home, avoiding the hospital, and remaining as independent as possible    Accordingly, we have decided that the best plan to meet the patient's goals includes continuing with treatment, no further escalation in treatment, and enrolling in hospice care    I did explain the role for hospice care at this stage of the patient's illness, including its  ability to help the patient live with the best quality of life possible.  We will be making a hospice referral.    I spent a total of 35 minutes engaging the patient in this advance care planning discussion.

## 2023-08-10 NOTE — NURSING
Pt remains restless this pm. Family remains at the bedside. He continues to pull his telemetry off and discontinued his IV. New IV was placed, pt tolerated well. Family voices eagerness to discharge home as they feel this is in the patients best interest. VSS. On room air. A-Fib with PVC's noted this shift. Pt doesn't appear to be in any distress at this time. WCTM

## 2023-08-10 NOTE — DISCHARGE SUMMARY
Oziel Jimenez - Cardiology [facility] Medicine  Discharge Summary      Patient Name: Haja Marti  MRN: [id number]  SHANELL: [id number]  Patient Class: IP- Inpatient  Admission Date: 8/7/2023  Hospital Length of Stay: 3 days  Discharge Date and Time:  08/10/2023 2:31 PM  Attending Physician: Tabitha Henderson MD   Discharging Provider: Martin Nino DO  Primary Care Provider: Andrez Jackson MD  [facility] Medicine Team: [facility] MED 1 Martin Nino DO  Primary Care Team: [facility] 1    HPI:   77 year old male with pmh of Lewy body Parkinson's disease, HTN, HLD, SHU, dep/anx, BPH, lumbar radiculopathy, urge incontinence, AV block, and RBB who presented for episodes of dyspnea and decreased urinary production. Yesterday he had an isolated 45 minute episode of dyspnea. This has been occurring off and on again today and at some points was associated with accessory respiratory muscle use. He wears diapers and in the morning they are usually filled with urine, but according to his wife this am there was barely any. He oral fluid intake has not changed. His also acutely lost his voice. He denies any chest pain or palpitations these last two days. He usually has some leg swelling but today they are more swollen than usual. His wife believes his breathing has been worse when he has been laying down. He has not had any fever or chills and denies any other upper respiratory symptoms.    The pt took an at home covid test and it was negative.    In the ED he was bradycardic (pulse 44) and his EKG showed new onset atrial flutter. His troponin was mildly elevated at 0.042. His CXR showed pulmonary edema and his SpO2 was 98.      Procedure(s) (LRB):  Cardioversion or Defibrillation (N/A)  Transesophageal echo (BRAD) intra-procedure log documentation (N/A)      Hospital Course:   77 year old male with pmh of Lewy body Parkinson's disease, HTN, HLD, SHU, dep/anx, BPH, lumbar radiculopathy, urge incontinence, AV block, and RBB who is  being treated for new onset atrial flutter and CHF. Pt has clearly stated he does not want a pacemaker. Anti-hypertensives were restarted as patient was running >180 SBP consistently. BRAD cardioversion converted Pt to NSR with paroxysmal atrial fibrillation and PVCs the night following the procedure. After risk/benefit discussion with the family (pts wife and son) the decision to hold anticoagulation was made. Pt falls anywhere from 1-3 times per week at home and will be entering into care with home hospice. Home hospice coordinator will be meeting with the family shortly after patient's discharge.        Goals of Care Treatment Preferences:  Code Status: DNR          What is most important right now is to focus on remaining as independent as possible, quality of life, even if it means sacrificing a little time.  Accordingly, we have decided that the best plan to meet the patient's goals includes continuing with treatment.    Objective:    Vitals:    08/10/23 0742 08/10/23 0943 08/10/23 1118 08/10/23 1121   BP: (!) 173/92 (!) 173/92  (!) 178/90   BP Location: Left arm   Left arm   Patient Position: Lying   Lying   Pulse: 80  71 74   Resp: 20   20   Temp: 97.6 °F (36.4 °C)   98.3 °F (36.8 °C)   TempSrc: Oral   Oral   SpO2: 95%   (!) 93%   Weight:       Height:         Physical Exam  Constitutional:       General: He is sleeping.      Appearance: He is ill-appearing.   HENT:      Head: Normocephalic and atraumatic.   Cardiovascular:      Rate and Rhythm: Normal rate. Rhythm irregular.      Pulses: Normal pulses.      Heart sounds: Normal heart sounds. No murmur heard.     No friction rub. No gallop.   Pulmonary:      Effort: Pulmonary effort is normal.      Breath sounds: Normal breath sounds. No wheezing, rhonchi or rales.   Abdominal:      General: Abdomen is flat.      Palpations: Abdomen is soft.      Tenderness: There is no abdominal tenderness. There is no guarding or rebound.   Musculoskeletal:      Right lower  leg: Edema present.      Left lower leg: Edema present.   Skin:     General: Skin is warm and dry.      Capillary Refill: Capillary refill takes 2 to 3 seconds.   Neurological:      Mental Status: He is disoriented.      Sensory: Sensation is intact.      Motor: Weakness present.   Psychiatric:         Attention and Perception: He perceives visual hallucinations.      Comments: Pt had waxing and waning delirium         Consults:   Consults (From admission, onward)        Status Ordering Provider     Inpatient consult to Electrophysiology  Once        Provider:  (Not yet assigned)    Completed JEAN MARCH consult to case management  Once        Provider:  (Not yet assigned)    Acknowledged RAMIN COLLADO     Inpatient consult to Cardiology  Once        Provider:  (Not yet assigned)    Completed DEN CORDERO          Cardiac/Vascular  * Symptomatic bradycardia with atrial fluuter-resolved as of 8/10/2023  Pt heart rate has been in the 40s. He has not been having any chest pain but has been having episodic dyspnea. Has new atrial flutter and hx of RBB and 2nd degree AV block.CXR showed pulmonary edema and pt has b/l pitting edema. Last echo showed EF of 55% and severe LAE. BNP was 331. Echo this admission showed EF 60% and severe LAE and WILI.    - s/p cardioversion , converted to NSR and went into paroxysmal afib with PVCs that night  -Placing pt in cardiac stepdown unit with transcutaneous pacing pads  -Was on Eliquis while inpatient  -Telemetry  -Lasix 40 x1  -Avoid BB and CCB       Paroxysmal atrial fibrillation  After cardioversion pt was in atrial fibrillation with rate in the 60-70s. Many PVCs also present. A risks vs benefits discussion was had with the family and it was determined to hold anti-coagulation as pt suffers anywhere from 1-3 falls/week and is beginning home hospice care. They will be reffered to cardiology outpt and can further discuss AC option.    Atrial flutter  Pt underwent BRAD  cardioversion.      Elevated troponin-resolved as of 8/10/2023  Initial Tropnin was mildly elevated at 0.042. 0.042->0.039->0.04.    -Will monitor for signs and symptoms of ACS           Renal/  Urgency incontinence  -Holding home mybetriq to assist with diureses while inpatient.  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] 02/02/2023 Yes    Lewy body Parkinson's disease [G31.83, F02.80] 10/26/2022 Yes    Urgency incontinence [N39.41] 01/10/2022 Yes    Chronic pain [G89.29] 03/12/2019 Yes    Generalized anxiety disorder [F41.1] 11/14/2017 Yes    Lumbar radiculopathy [M54.16] 09/05/2017 Yes    Constipation, chronic [K59.09] 10/09/2012 Yes    Essential hypertension [I10]  Yes    Obstructive sleep apnea [G47.33]  Yes    Depression [F32.A]  Yes    BPH (benign prostatic hypertrophy) [N40.0]  Yes      Problems Resolved During this Admission:    Diagnosis Date Noted Date Resolved POA    PRINCIPAL PROBLEM:  Symptomatic bradycardia with atrial fluuter [R00.1] 04/27/2023 08/10/2023 Yes    Elevated troponin [R77.8] 04/27/2023 08/10/2023 Yes       Discharged Condition: stable    Disposition: Home or Self Care    Follow Up:    Patient Instructions:      Ambulatory referral/consult to Cardiology   Standing Status: Future   Referral Priority: Routine Referral Type: Consultation   Referral Reason: Specialty Services Required   Requested Specialty: Cardiology   Number of Visits Requested: 1     Diet Adult Regular     No dressing needed     Notify your health care provider if you experience any of the following:  temperature >100.4     Notify your health care provider if you experience any of the following:  persistent nausea and vomiting or diarrhea     Notify your health care provider if you  experience any of the following:  severe uncontrolled pain     Notify your health care provider if you experience any of the following:  difficulty breathing or increased cough     Notify your health care provider if you experience any of the following:  severe persistent headache     Notify your health care provider if you experience any of the following:  persistent dizziness, light-headedness, or visual disturbances     Notify your health care provider if you experience any of the following:  increased confusion or weakness     Activity as tolerated       Significant Diagnostic Studies: Labs:   CMP   Recent Labs   Lab 08/09/23  0249 08/10/23  0611    138   K 3.7 3.9    106   CO2 20* 18*   GLU 86 85   BUN 22 19   CREATININE 0.9 0.8   CALCIUM 8.9 9.0   PROT 6.0 6.6   ALBUMIN 3.4* 3.6   BILITOT 0.8 1.1*   ALKPHOS 73 87   AST 25 28   ALT 8* 16   ANIONGAP 11 14   , CBC   Recent Labs   Lab 08/09/23  0249 08/10/23  0611   WBC 7.75 9.28   HGB 14.2 14.5   HCT 40.3 43.5    175    and Troponin   Recent Labs   Lab 08/07/23  1445 08/07/23  1905 08/07/23  2357   TROPONINI 0.042* 0.039* 0.047*         Cardiac Graphics: ECG: atrial flutter and Echocardiogram:   Transthoracic echo (TTE) complete (Cupid Only):   Results for orders placed or performed during the hospital encounter of 08/07/23   Echo   Result Value Ref Range    BSA 2.12 m2    LVOT stroke volume 100.81 cm3    LVIDd 5.29 3.5 - 6.0 cm    LV Systolic Volume 60.25 mL    LV Systolic Volume Index 28.8 mL/m2    LVIDs 3.76 2.1 - 4.0 cm    LV Diastolic Volume 134.59 mL    LV Diastolic Volume Index 64.40 mL/m2    IVS 1.03 0.6 - 1.1 cm    LVOT diameter 2.41 cm    LVOT area 4.6 cm2    FS 29 28 - 44 %    Left Ventricle Relative Wall Thickness 0.40 cm    Posterior Wall 1.06 0.6 - 1.1 cm    LV mass 211.85 g    LV Mass Index 101 g/m2    MV Peak E Jace 1.13 m/s    TDI LATERAL 0.07 m/s    MV Peak A Jace 0.36 m/s    TR Max Jace 2.85 m/s    E/A ratio 3.14     E wave  deceleration time 180.56 msec    LA Volume Index 80.0 mL/m2    LV LATERAL E/E' RATIO 16.14 m/s    LA volume 167.16 cm3    LVOT peak walter 0.93 m/s    LA volume (mod) 150.87 cm3    LA Volume Index (Mod) 72.2 mL/m2    LA size 5.35 cm    Left Atrium Major Axis 6.64 cm    Left Atrium Minor Axis 6.49 cm    LA WIDTH 5.60 cm    TAPSE 1.25 cm    RA Major Axis 6.24 cm    RA Width 3.92 cm    AV mean gradient 4 mmHg    AV peak gradient 7 mmHg    Ao peak walter 1.33 m/s    Ao VTI 34.35 cm    LVOT peak VTI 22.11 cm    AV valve area 2.93 cm²    AV Velocity Ratio 0.70     AV index (prosthetic) 0.64     KELLEY by Velocity Ratio 3.19 cm²    MV stenosis pressure 1/2 time 52.36 ms    MV valve area p 1/2 method 4.20 cm2    Triscuspid Valve Regurgitation Peak Gradient 32 mmHg    Sinus 3.99 cm    STJ 3.30 cm    Ascending aorta 3.75 cm    ZLVIDS -0.37     ZLVIDD -1.98     RVDD 4.21 cm    TV resting pulmonary artery pressure 47 mmHg    RV TB RVSP 18 mmHg    Est. RA pres 15 mmHg    EF 60 %    Narrative      Left Ventricle: The left ventricle is normal in size. Ventricular mass   is normal. Normal wall thickness. Normal wall motion. There is normal   systolic function. Ejection fraction by visual approximation is 60%.   Unable to assess diastolic function due to arrhythmia.    Left Atrium: Left atrium is severely dilated. The left atrium volume   index is 72.2 mL/m2.    Right Ventricle: Normal right ventricular cavity size. Wall thickness   is normal. Right ventricle wall motion  is normal. Systolic function is   normal.    Right Atrium: Right atrium is severely dilated.    Aortic Valve: There is mild aortic valve sclerosis. There is normal   leaflet mobility. There is no significant regurgitation.    Mitral Valve: The mitral valve is structurally normal. There is normal   leaflet mobility. There is no significant regurgitation.    Tricuspid Valve: The tricuspid valve is structurally normal. There is   normal leaflet mobility. There is mild  regurgitation.    Pulmonic Valve: The pulmonic valve is structurally normal. There is   normal leaflet mobility. There is no significant regurgitation.    Pulmonary Artery: There is moderate pulmonary hypertension. The   estimated pulmonary artery systolic pressure is 47 mmHg.    IVC/SVC: Elevated venous pressure at 15 mmHg.    Pericardium: Trivial circumferential pericardial effusion present.         Pending Diagnostic Studies:     None         Medications:  Reconciled Home Medications:      Medication List      START taking these medications    polyethylene glycol 17 gram Pwpk  Commonly known as: GLYCOLAX  Take 17 g by mouth once daily.  Start taking on: August 11, 2023        CHANGE how you take these medications    carbidopa-levodopa  mg  mg per tablet  Commonly known as: SINEMET  TAKE 1 QID  What changed:   · how much to take  · how to take this  · when to take this  · additional instructions     clonazePAM 0.5 MG tablet  Commonly known as: KlonoPIN  TAKE 1/2 TO 1 TABLET BY MOUTH UP TO 3 TIMES DAILY AS NEEDED FOR ANXIETY  What changed: additional instructions     losartan 25 MG tablet  Commonly known as: COZAAR  Take 0.5 tablets (12.5 mg total) by mouth once daily.  Start taking on: August 11, 2023  What changed: how much to take     potassium citrate 10 mEq (1,080 mg) Tbsr  Commonly known as: UROCIT-K  TAKE 1 TABLET THREE TIMES DAILY WITH MEALS  What changed: when to take this        CONTINUE taking these medications    acetaminophen 500 MG tablet  Commonly known as: TYLENOL  Take 1,000 mg by mouth every 8 (eight) hours as needed for Pain.     atorvastatin 40 MG tablet  Commonly known as: LIPITOR  Take 1 tablet (40 mg total) by mouth once daily.     celecoxib 100 MG capsule  Commonly known as: CeleBREX  Take 100 mg by mouth 2 (two) times daily.     finasteride 5 mg tablet  Commonly known as: PROSCAR  Take 1 tablet (5 mg total) by mouth once daily.     folic acid 1 MG tablet  Commonly known as:  FOLVITE  TAKE 1 TABLET EVERY DAY     magnesium oxide 400 mg (241.3 mg magnesium) tablet  Commonly known as: MAG-OX  Take 400 mg by mouth once daily.     MYRBETRIQ 50 mg Tb24  Generic drug: mirabegron  Take 1 tablet by mouth every evening.     pyridoxine (vitamin B6) 50 MG Tab  Commonly known as: B-6  Take 50 mg by mouth once daily.     QUEtiapine 25 MG Tab  Commonly known as: SEROQUEL  Take 1 tablet (25 mg total) by mouth every evening. TAKE ORAL 1 TABLET QHS.     sertraline 100 MG tablet  Commonly known as: ZOLOFT  Take 2 tablets (200 mg total) by mouth once daily.     sodium bicarbonate 650 MG tablet  TAKE 1 TABLET ONE TIME DAILY     zaleplon 5 MG Cap  Commonly known as: SONATA  Take 1 capsule (5 mg total) by mouth nightly as needed (insomnia).        ASK your doctor about these medications    ergocalciferol 50,000 unit Cap  Commonly known as: ERGOCALCIFEROL  TAKE 1 CAPSULE EVERY 7 DAYS     tamsulosin 0.4 mg Cap  Commonly known as: FLOMAX  TAKE 1 CAPSULE EVERY DAY            Indwelling Lines/Drains at time of discharge:   Lines/Drains/Airways     Drain  Duration                Open Drain 02/18/21 1838 Left Buttock Penrose 1 inch 902 days         Open Drain 02/18/21 1840 Left Buttock Other (see comments) 902 days         Open Drain 09/24/21 1512 Other (Comment) Other (see comments) Other (see comments) 684 days                Time spent on the discharge of patient: 35 minutes         Martin Nino DO  Department of Hospital Medicine  [facility] - Cardiology Stepdown

## 2023-08-10 NOTE — ASSESSMENT & PLAN NOTE
After cardioversion pt was in atrial fibrillation with rate in the 60-70s. Many PVCs also present. A risks vs benefits discussion was had with the family and it was determined to hold anti-coagulation as pt suffers anywhere from 1-3 falls/week and is beginning home hospice care. They will be reffered to cardiology outpt and can further discuss AC option.

## 2023-08-10 NOTE — PLAN OF CARE
Pt discharged home with [facility]. Transported by family. SW spoke to Pt's spouse to arrange hospice consult. Spouse was agreeable.    Caitlyn Velasco, [facility] [place]  Case Management Department  [email or web address]    Oziel Jimenez - Cardiology Stepdown  Discharge Final Note    Primary Care Provider: Andrez Jackson MD    Expected Discharge Date: 8/10/2023    Final Discharge Note (most recent)       Final Note - 08/10/23 1441          Final Note    Assessment Type Final Discharge Note     Anticipated Discharge Disposition Hospice/Home     What phone number can be called within the next 1-3 days to see how you are doing after discharge? [phone]     Hospital Resources/Appts/Education Provided Provided patient/caregiver with written discharge plan information        Post-Acute Status    Post-Acute Authorization Hospice     Hospice Status Set-up Complete/Auth obtained     Discharge Delays None known at this time                     Important Message from Medicare  Important Message from Medicare regarding Discharge Appeal Rights: Other (comments) (pt d/c)     Date IMM was signed: 08/10/23  Time IMM was signed: 1420       08/10/23 1440   Post-Acute Status   Post-Acute Authorization Hospice   Hospice Status Set-up Complete/Auth obtained   Hospital Resources/Appts/Education Provided Provided patient/caregiver with written discharge plan information   Discharge Delays None known at this time   Discharge Plan   Discharge Plan A Hospice/home;Home with family   Discharge Plan B Home with family;Hospice/home     Future Appointments   Date Time Provider Department Center   8/30/2023 10:00 AM Sudhir Elliott MD [facility] PSYCH [address]   9/5/2023 10:00 AM Sherif Galvan LCSW [facility] SOCL [address]   9/20/2023  9:00 AM Zena Quinteros MD [facility] PAL MED [address] [facility]   10/26/2023  3:00 PM Mag Mcmahon MD [facility] NEURO [facility]

## 2023-08-10 NOTE — PHYSICIAN QUERY
PT Name: Haja Marti  MR #: [id number]     DOCUMENTATION CLARIFICATION     CDS/: Alysa Malave RN             Contact information: [email or web address]  This form is a permanent document in the medical record.     Query Date: August 10, 2023    By submitting this query, we are merely seeking further clarification of documentation.  Please utilize your independent clinical judgment when addressing the question(s) below.    The Medical Record contains the following   Indicators Supporting Clinical Findings Location in Medical Record   x Heart Failure documented 77 year old male with pmh of Lewy body Parkinson's disease, HTN, HLD, SHU, dep/anx, BPH, lumbar radiculopathy, urge incontinence, AV block, and RBB who is being treated for new onset atrial  flutter and CHF      Cardiovascular:   Hypertension Valvular problems/Murmurs Denies CAD.   Dysrhythmias   Denies CHF. hyperlipidemia     PN 8/8      HM          Anesthesia 8/8   x BNP BNP was 331   PN 8/8      HM     x EF/Echo Echo this admission showed EF 60% and severe LAE and WILI.      Left Ventricle: The left ventricle is normal in size. Ventricular mass is normal. Normal wall thickness. Normal wall motion. There is normal systolic function. Ejection fraction by visual approximation is 60%. Unable to assess diastolic function due to arrhythmia.    Left Atrium: Left atrium is severely dilated. The left atrium volume index is 72.2 mL/m2.    Right Ventricle: Normal right ventricular cavity size. Wall thickness is normal. Right ventricle wall motion  is normal. Systolic function is normal.    Right Atrium: Right atrium is severely dilated.    Aortic Valve: There is mild aortic valve sclerosis. There is normal leaflet mobility. There is no significant regurgitation.    Mitral Valve: The mitral valve is structurally normal. There is normal leaflet mobility. There is no significant regurgitation.    Tricuspid Valve: The tricuspid valve is structurally normal.  There is normal leaflet mobility. There is mild regurgitation.    Pulmonic Valve: The pulmonic valve is structurally normal. There is normal leaflet mobility. There is no significant regurgitation.    Pulmonary Artery: There is moderate pulmonary hypertension. The estimated pulmonary artery systolic pressure is 47 mmHg.    IVC/SVC: Elevated venous pressure at 15 mmHg.    Pericardium: Trivial circumferential pericardial effusion present.   PN 8/8      HM    Echo 8/8   x Radiology findings Prominent interstitium with bilateral lower lobe hazy opacities suspicious for pulmonary edema in the setting of cardiomegaly.   CXR 8/7   x Subjective/Objective Respiratory Conditions 77 M here for sob, intermittent wheezing noted by wife since this AM.   ED provider note 8/7    Recent/Current MI      Heart Transplant, LVAD     x Edema, JVD    Right lower leg: Edema present.     Left lower leg: Edema present. H&P 8/7       HM    Ascites     x Diuretics/Meds Furosemide 40 mg oral      - s/p cardioversion , converted to NSR  -Placing pt in cardiac stepdown unit with transcutaneous pacing pads  -On Eliquis  -Telemetry  -Lasix 40 x1  -Avoid BB and CCB MAR 8/7      PN 8/9       HM    Other Treatment      Other       Heart failure is a clinical diagnosis which includes symptomatic fluid retention, elevated intracardiac pressures, and/or the inability of the heart to deliver adequate blood flow.    Heart Failure with reduced Ejection Fraction (HFrEF) or Systolic Heart Failure (loses ability to contract normally, EF is <40%)    Heart Failure with preserved Ejection Fraction (HFpEF) or Diastolic Heart Failure (stiff ventricles, does not relax properly, EF is >50%)     Heart Failure with Combined Systolic and Diastolic Failure (stiff ventricles, does not relax properly and EF is <50%)    Mid-range or mildly reduced ejection fraction (HFmrEF) is classified as systolic heart failure.  Congestive heart failure with a recovered EF is classified  as Diastolic Heart Failure.  Common clues to acute exacerbation:  Rapidly progressive symptoms (w/in 2 weeks of presentation), using IV diuretics, using supplemental O2, pulmonary edema on Xray, new or worsening pleural effusion, +JVD or other signs of volume overload, MI w/in 4 weeks, and/or BNP >500  The clinical guidelines noted are only system guidelines, and do not replace the providers clinical judgment.    Due to conflicting clinical picture, please confirm/clarify the CHF diagnosis associated with the above clinical findings.    [ x  ]  Diastolic Heart Failure (HFpEF)      [   ]  Heart Failure Ruled Out     [   ]  Other (please specify): ___________________________________         Please document in your progress notes daily for the duration of treatment until resolved and include in your discharge summary.    References:  American Heart Association editorial staff. (2017, May). Ejection Fraction Heart Failure Measurement. American Heart Association. https://www.heart.org/en/health-topics/heart-failure/diagnosing-heart-failure/ejection-fraction-heart-failure-measurement#:~:text=Ejection%20fraction%20(EF)%20is%20a,pushed%20out%20with%20each%20heartbeat  DAYANA Owen (2020, December 15). Heart failure with preserved ejection fraction: Clinical manifestations and diagnosis. [employer]. https://www.[employer].com/contents/heart-failure-with-preserved-ejection-fraction-clinical-manifestations-and-diagnosis.  ICD-10-CM/PCS Coding Clinic Third Quarter ICD-10, Effective with discharges: September 8, 2020 [facility] § Heart failure with mid-range or mildly reduced ejection fraction (2020).  ICD-10-CM/PCS Coding Clinic Third Quarter ICD-10, Effective with discharges: September 8, 2020 [facility] § Heart failure with recovered ejection fraction (2020).  Form No. [id number]

## 2023-08-10 NOTE — PLAN OF CARE
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, F02.80]  Yes    Urgency incontinence [N39.41]  Yes    Chronic pain [G89.29]  Yes    Generalized anxiety disorder [F41.1]  Yes    Lumbar radiculopathy [M54.16]  Yes    Constipation, chronic [K59.09]  Yes    Essential hypertension [I10]  Yes    Obstructive sleep apnea [G47.33]  Yes    Depression [F32.A]  Yes    BPH (benign prostatic hypertrophy) [N40.0]  Yes     Dx updated per 2019 IMO Load        Resolved Hospital Problems    Diagnosis Date Resolved POA    *Symptomatic bradycardia with atrial fluuter [R00.1] 08/10/2023 Yes    Elevated troponin [R77.8] 08/10/2023 Yes       Hospice Qualifying Diagnoses: Lewy Body Parkinson's Disease       Patient has a life expectancy < 6 months due to:  Primary Hospice Diagnosis:  Lewy Body Parksinson's Dosease  Comorbid Conditions Contributing to Decline: Atrial fibrillation/atrial flutter, incontinence     Vital Signs: Routine per Hospice Protocol.    Code Status: DNT    Allergies: Review of patient's allergies indicates:  No Known Allergies    Diet: Normal diet     Activities: As tolerated    Goals of Care Treatment Preferences:  Code Status: DNR          What is most important right now is to focus on remaining as independent as possible, quality of life, even if it means sacrificing a little time.  Accordingly, we have decided that the best plan to meet the patient's goals includes continuing with treatment.            Oxygen: None    Other Miscellaneous Care: None        Medications:           Medication List        START taking these medications      polyethylene glycol 17 gram Pwpk  Commonly known as: GLYCOLAX  Take 17 g by mouth once daily.  Start taking on: August 11, 2023            CHANGE how you take these medications      carbidopa-levodopa  mg  mg per tablet  Commonly known as: SINEMET  TAKE 1 QID  What changed:   how much to take  how to take this  when to take this  additional instructions     clonazePAM 0.5 MG tablet  Commonly known as: KlonoPIN  TAKE 1/2 TO 1 TABLET BY MOUTH UP TO 3 TIMES DAILY AS NEEDED FOR ANXIETY  What changed: additional instructions     losartan 25 MG tablet  Commonly known as: COZAAR  Take 0.5 tablets (12.5 mg total) by mouth once daily.  Start taking on: August 11, 2023  What changed: how much to take     potassium citrate 10 mEq (1,080 mg) Tbsr  Commonly known as: UROCIT-K  TAKE 1 TABLET THREE TIMES DAILY WITH MEALS  What changed: when to take this            CONTINUE taking these medications      acetaminophen 500 MG tablet  Commonly known as: TYLENOL  Take 1,000 mg by mouth every 8 (eight) hours as needed for Pain.     atorvastatin 40 MG tablet  Commonly known as: LIPITOR  Take 1 tablet (40 mg total) by mouth once daily.     celecoxib 100 MG capsule  Commonly known as: CeleBREX  Take 100 mg by mouth 2 (two) times daily.     finasteride 5 mg tablet  Commonly known as: PROSCAR  Take 1 tablet (5 mg total) by mouth once daily.     folic acid 1 MG tablet  Commonly known as: FOLVITE  TAKE 1 TABLET EVERY DAY     magnesium oxide 400 mg (241.3 mg magnesium) tablet  Commonly known as: MAG-OX  Take 400 mg by mouth once daily.     MYRBETRIQ 50 mg Tb24  Generic drug: mirabegron  Take 1 tablet by mouth every evening.     pyridoxine (vitamin B6) 50 MG Tab  Commonly known as: B-6  Take 50 mg by mouth once daily.     QUEtiapine 25 MG Tab  Commonly known as: SEROQUEL  Take 1 tablet (25 mg total) by mouth every evening. TAKE ORAL 1 TABLET QHS.     sertraline 100 MG  tablet  Commonly known as: ZOLOFT  Take 2 tablets (200 mg total) by mouth once daily.     sodium bicarbonate 650 MG tablet  TAKE 1 TABLET ONE TIME DAILY     zaleplon 5 MG Cap  Commonly known as: SONATA  Take 1 capsule (5 mg total) by mouth nightly as needed (insomnia).            ASK your doctor about these medications      ergocalciferol 50,000 unit Cap  Commonly known as: ERGOCALCIFEROL  TAKE 1 CAPSULE EVERY 7 DAYS     tamsulosin 0.4 mg Cap  Commonly known as: FLOMAX  TAKE 1 CAPSULE EVERY DAY                      Future Orders:  Hospice Medical Director may dictate new orders for comfortable care measures & sign death certificate.        _________________________________  Martin Nino DO  08/10/2023

## 2023-08-14 ENCOUNTER — TELEPHONE (OUTPATIENT)
Dept: NEUROLOGY | Facility: CLINIC | Age: 78
End: 2023-08-14
Payer: MEDICARE

## 2023-08-14 ENCOUNTER — PATIENT OUTREACH (OUTPATIENT)
Dept: ADMINISTRATIVE | Facility: CLINIC | Age: 78
End: 2023-08-14
Payer: MEDICARE

## 2023-08-14 ENCOUNTER — EXTERNAL HOME HEALTH (OUTPATIENT)
Dept: HOME HEALTH SERVICES | Facility: HOSPITAL | Age: 78
End: 2023-08-14
Payer: MEDICARE

## 2023-08-14 NOTE — TELEPHONE ENCOUNTER
----- Message from Sabrina Chow sent at 2023  1:51 PM CDT -----  Contact: SPOUSE - ALISE Marti  MRN: [id number]  : 1945  PCP: Andrez Jackson  Home Phone      [phone]  Work Phone      Not on file.  Mobile          [phone]      MESSAGE:  Wife states that the patient is being admitted to hospice but would still like to continue to see Dr. Mcmahon.  He is not being admitted for what Dr. Mcmahon treats him for but is being admitted for his heart.          Phone: [phone] or [phone]

## 2023-08-23 ENCOUNTER — TELEPHONE (OUTPATIENT)
Dept: ELECTROPHYSIOLOGY | Facility: CLINIC | Age: 78
End: 2023-08-23
Payer: MEDICARE

## 2023-08-23 NOTE — TELEPHONE ENCOUNTER
Called pt to schedule his 4w f/u his wife said that they told her that he would only be following up with a general cardiologists

## 2023-08-30 ENCOUNTER — TELEPHONE (OUTPATIENT)
Dept: NEUROLOGY | Facility: CLINIC | Age: 78
End: 2023-08-30
Payer: MEDICARE

## 2023-08-30 NOTE — TELEPHONE ENCOUNTER
Patient and caregiver (Whitley - spouse) are eligible for the [employer] Program.    Called caregiver today to offer program and assess current care needs. No answer, left detailed voicemail with number for call back ([phone]). Will reach out again on 8/31.

## 2023-09-05 ENCOUNTER — TELEPHONE (OUTPATIENT)
Dept: PSYCHIATRY | Facility: CLINIC | Age: 78
End: 2023-09-05
Payer: MEDICARE

## 2023-09-05 NOTE — TELEPHONE ENCOUNTER
Spoke to Trinidad after learning Haja is in home hospice.  Provided support and she was appreciative and thank me for the years of service.

## 2023-09-15 ENCOUNTER — TELEPHONE (OUTPATIENT)
Dept: NEUROLOGY | Facility: CLINIC | Age: 78
End: 2023-09-15
Payer: MEDICARE

## 2023-09-15 NOTE — TELEPHONE ENCOUNTER
----- Message from Sabrina Chow sent at 9/15/2023  1:54 PM CDT -----  Contact: SPOUSE-ALISE Marti  MRN: [id number]  : 1945  PCP: Andrez Jackson  Home Phone      [phone]  Work Phone      Not on file.  Mobile          [phone]      MESSAGE: Wife wanted to let Dr. Hidalgo & Dr. Mcmahon know that the patient passed away on 23.      Phone: [phone]

## 2023-09-21 ENCOUNTER — TELEPHONE (OUTPATIENT)
Dept: GASTROENTEROLOGY | Facility: CLINIC | Age: 78
End: 2023-09-21
Payer: MEDICARE

## 2023-09-21 ENCOUNTER — TELEPHONE (OUTPATIENT)
Dept: SURGERY | Facility: CLINIC | Age: 78
End: 2023-09-21
Payer: MEDICARE

## 2023-09-21 NOTE — TELEPHONE ENCOUNTER
Attempted to contact pt's wife to discuss 's passing. No answer.      ----- Message from Linnea Pascual RN sent at 9/21/2023  4:24 PM CDT -----  Regarding: FW: pt advice  Contact: pt [phone]  :(  ----- Message -----  From: Iliana Gaston  Sent: 9/21/2023   3:31 PM CDT  To: [facility] Staff  Subject: pt advice                                        Pt's spouse calling to advise patient has passed away. Pls call

## 2023-09-21 NOTE — TELEPHONE ENCOUNTER
Called patient's wife to offer my condolences - left VM      [place] MD, FACS, FASCRS  Department of Colon & Rectal Surgery  [facility]

## 2025-04-11 NOTE — PATIENT INSTRUCTIONS
Take sinemet 25/100mg one tablet at  8am, 11am, 2pm, and 5pm    Please call [phone] or message us in late September to arrange an appointment with Dr. Mcmahon, Movement disorders specialist.    IMPROVE-DD Application Not Available

## (undated) DEVICE — GAUZE SPONGE 4X4 12PLY

## (undated) DEVICE — BANDAGE ADHESIVE

## (undated) DEVICE — PANTIES FEMININE NAPKIN LG/XLG

## (undated) DEVICE — SEE MEDLINE ITEM 154981

## (undated) DEVICE — TRAY MINOR GEN SURG

## (undated) DEVICE — SEE MEDLINE ITEM 152487

## (undated) DEVICE — LUBRICANT SURGILUBE 2 OZ

## (undated) DEVICE — SPONGE GAUZE 16PLY 4X4

## (undated) DEVICE — LEGGINGS 48X31 INCH

## (undated) DEVICE — SEE MEDLINE ITEM 146417

## (undated) DEVICE — SUT 0 VICRYL / CT-1

## (undated) DEVICE — SEE MEDLINE ITEM 157181

## (undated) DEVICE — TIP YANKAUERS BULB NO VENT

## (undated) DEVICE — DRESSING LEUKOPLAST FLEX 1X3IN

## (undated) DEVICE — ELECTRODE REM PLYHSV RETURN 9

## (undated) DEVICE — PAD DEFIB CADENCE ADULT R2

## (undated) DEVICE — BRIEF MESH LARGE